# Patient Record
Sex: MALE | Race: WHITE | NOT HISPANIC OR LATINO | Employment: OTHER | URBAN - METROPOLITAN AREA
[De-identification: names, ages, dates, MRNs, and addresses within clinical notes are randomized per-mention and may not be internally consistent; named-entity substitution may affect disease eponyms.]

---

## 2017-01-06 ENCOUNTER — OFFICE VISIT (OUTPATIENT)
Dept: CARDIOLOGY | Facility: CLINIC | Age: 47
End: 2017-01-06
Payer: MEDICARE

## 2017-01-06 VITALS
BODY MASS INDEX: 32.74 KG/M2 | HEART RATE: 64 BPM | HEIGHT: 77 IN | SYSTOLIC BLOOD PRESSURE: 148 MMHG | DIASTOLIC BLOOD PRESSURE: 92 MMHG | WEIGHT: 277.31 LBS

## 2017-01-06 DIAGNOSIS — I34.0 NON-RHEUMATIC MITRAL REGURGITATION: ICD-10-CM

## 2017-01-06 DIAGNOSIS — I48.3 TYPICAL ATRIAL FLUTTER: ICD-10-CM

## 2017-01-06 DIAGNOSIS — I10 ESSENTIAL HYPERTENSION: ICD-10-CM

## 2017-01-06 DIAGNOSIS — R00.2 PALPITATIONS: ICD-10-CM

## 2017-01-06 DIAGNOSIS — R07.89 ATYPICAL CHEST PAIN: Primary | ICD-10-CM

## 2017-01-06 DIAGNOSIS — R61 DIAPHORESIS: ICD-10-CM

## 2017-01-06 DIAGNOSIS — Z72.0 TOBACCO ABUSE: ICD-10-CM

## 2017-01-06 PROCEDURE — 1159F MED LIST DOCD IN RCRD: CPT | Mod: S$GLB,,, | Performed by: INTERNAL MEDICINE

## 2017-01-06 PROCEDURE — 3080F DIAST BP >= 90 MM HG: CPT | Mod: S$GLB,,, | Performed by: INTERNAL MEDICINE

## 2017-01-06 PROCEDURE — 99214 OFFICE O/P EST MOD 30 MIN: CPT | Mod: S$GLB,,, | Performed by: INTERNAL MEDICINE

## 2017-01-06 PROCEDURE — 93000 ELECTROCARDIOGRAM COMPLETE: CPT | Mod: S$GLB,,, | Performed by: INTERNAL MEDICINE

## 2017-01-06 PROCEDURE — 99999 PR PBB SHADOW E&M-EST. PATIENT-LVL III: CPT | Mod: PBBFAC,,, | Performed by: INTERNAL MEDICINE

## 2017-01-06 PROCEDURE — 3077F SYST BP >= 140 MM HG: CPT | Mod: S$GLB,,, | Performed by: INTERNAL MEDICINE

## 2017-01-06 RX ORDER — METOPROLOL SUCCINATE 50 MG/1
50 TABLET, EXTENDED RELEASE ORAL DAILY
Qty: 30 TABLET | Refills: 11 | Status: SHIPPED | OUTPATIENT
Start: 2017-01-06 | End: 2017-03-27 | Stop reason: SDUPTHER

## 2017-01-06 NOTE — MR AVS SNAPSHOT
[facility]  [address] [facility] [address]  Phone: [phone]  Fax: [phone]                  Valerio Yan   2017 1:40 PM   Office Visit    Description:  Male : 1970   Provider:  Tomi Mir MD   Department:  [facility]           Reason for Visit     Hypertension     Atrial Flutter     Palpitations     Chest Pain           Diagnoses this Visit        Comments    Atypical chest pain    -  Primary     Tobacco abuse         Palpitations         Non-rheumatic mitral regurgitation         Typical atrial flutter         Diaphoresis         Essential hypertension                To Do List           Future Appointments        Provider Department Dept Phone    2017 11:40 AM [facility] [phone]      Goals (5 Years of Data)     None      Follow-Up and Disposition     Return in about 4 weeks (around 2/3/2017).       These Medications        Disp Refills Start End    metoprolol succinate (TOPROL-XL) 50 MG 24 hr tablet 30 tablet 11 2017    Take 1 tablet (50 mg total) by mouth once daily. - Oral    Pharmacy: [facility] [address] #: [phone]         [facility]     [facility] On Call Nurse Care Line -  Assistance  Registered nurses in the [facility] provide clinical advisement, health education, appointment booking, and other advisory services.  Call for this free service at [phone].             Medications           Message regarding Medications     Verify the changes and/or additions to your medication regime listed below are the same as discussed with your clinician today.  If any of these changes or additions are incorrect, please notify your healthcare provider.        START taking these NEW medications        Refills    metoprolol succinate (TOPROL-XL) 50 MG 24 hr tablet 11    Sig: Take 1 tablet (50 mg total) by  "mouth once daily.    Class: Normal    Route: Oral      STOP taking these medications     hydrocodone-acetaminophen 7.5-325mg (NORCO) 7.5-325 mg per tablet Take 1 tablet by mouth every 6 (six) hours as needed for Pain.    metoprolol tartrate (LOPRESSOR) 25 MG tablet Take 1 tablet (25 mg total) by mouth daily as needed.           Verify that the below list of medications is an accurate representation of the medications you are currently taking.  If none reported, the list may be blank. If incorrect, please contact your healthcare provider. Carry this list with you in case of emergency.           Current Medications     atorvastatin (LIPITOR) 20 MG tablet Take 1 tablet (20 mg total) by mouth once daily.    calcium-vitamin D 600 mg, 1,500mg,-200 unit 600 mg(1,500mg) -200 unit Tab Take 1 tablet by mouth once daily.     cyanocobalamin (VITAMIN B-12) 1,000 mcg/mL injection Inject 1 mL (1,000 mcg total) into the skin every 30 days. Please supply 3 mL syringes and 26 gauge 1/2 inch needles.    esomeprazole (NEXIUM PACKET) 40 mg GrPS ONE PACKET ONCE DAILY    lisinopril-hydrochlorothiazide (PRINZIDE,ZESTORETIC) 20-12.5 mg per tablet Take 1 tablet by mouth once daily.    multivitamin with minerals (MULTIPLE VITAMIN-MINERALS) tablet Take 1 tablet by mouth Daily.      metoprolol succinate (TOPROL-XL) 50 MG 24 hr tablet Take 1 tablet (50 mg total) by mouth once daily.           Clinical Reference Information           Vital Signs - Last Recorded  Most recent update: 1/6/2017  1:42 PM by Monica Talavera    BP Pulse Ht Wt BMI    (!) 148/92 (BP Location: Left arm, Patient Position: Sitting) 64 6' 5" (1.956 m) 125.8 kg (277 lb 5.4 oz) 32.89 kg/m2      Blood Pressure          Most Recent Value    Right Arm BP - Sitting  164/98    Left Arm BP - Sitting  148/92    BP  (!)  148/92      Allergies as of 1/6/2017     Latex    Reglan  [Metoclopramide Hcl]      Immunizations Administered on Date of Encounter - 1/6/2017     None      Orders " Placed During Today's Visit      Normal Orders This Visit    Ambulatory referral to Smoking Cessation Program     Future Labs/Procedures Expected by Expires    NM Myocardial Perfusion Spect Multi Pharmacologic  1/6/2017 1/6/2018    2D echo with color flow doppler  As directed 1/6/2018    NM Multi Pharm Stress Cardiac Component  As directed 1/6/2018      Smoking Cessation     If you would like to quit smoking:   You may be eligible for free services if you are a [place] resident and started smoking cigarettes before September 1, 1988.  Call the [employer]oking [employer]essation [employer] (SCT) toll free at [phone] or [phone].   Call 2-218-QUIT-NOW if you do not meet the above criteria.

## 2017-01-06 NOTE — PROGRESS NOTES
"Subjective:    Patient ID:  Valerio Yan is a 46 y.o. male who presents for evaluation of Hypertension; Atrial Flutter; Palpitations; and Chest Pain      HPI Mr. Yan presents for f/u.   His current medical conditions include HTN, PAFL s/p ablation May 2016, tobacco abuse.  + family h/o CAD (father MI in 50's, cabg; mother w MI).  Taken off xarelto since I last saw him after he saw Dr. Barone.  Has stable palpitations, improved after ablation and not similar to his a flutter.  HTN above goal, often > 140/80.  He increased his metoprolol to 50 mg nightly but it is tartrate.  Some atypical cp, few weeks, left sided, few hours, intermittent, better if he pushes on chest.  Also has had some sweats, intermittently, spontaneously.    Still smokes, but is interested in quitting.   ECG today shows sinus dyana 59 bpm, possible LAE.    Review of Systems   Constitution: Positive for diaphoresis.   HENT: Negative.    Eyes: Negative.    Cardiovascular: Positive for chest pain and palpitations.   Respiratory: Negative.    Endocrine: Negative.    Hematologic/Lymphatic: Negative.    Skin: Negative.    Musculoskeletal: Negative.    Gastrointestinal: Negative.    Genitourinary: Negative.    Neurological: Negative.    Psychiatric/Behavioral: Negative.    Allergic/Immunologic: Negative.        Visit Vitals    BP (!) 148/92 (BP Location: Left arm, Patient Position: Sitting)    Pulse 64    Ht 6' 5" (1.956 m)    Wt 125.8 kg (277 lb 5.4 oz)    BMI 32.89 kg/m2          Objective:    Physical Exam   Constitutional: He is oriented to person, place, and time. He appears well-developed and well-nourished.   HENT:   Head: Normocephalic.   Neck: Normal range of motion. Neck supple. Normal carotid pulses, no hepatojugular reflux and no JVD present. Carotid bruit is not present. No thyromegaly present.   Cardiovascular: Normal rate, regular rhythm, S1 normal and S2 normal.  PMI is not displaced.  Exam reveals no S3, no S4, no " distant heart sounds, no friction rub, no midsystolic click and no opening snap.    No murmur heard.  Pulses:       Radial pulses are 2+ on the right side, and 2+ on the left side.   Pulmonary/Chest: Effort normal and breath sounds normal. He has no wheezes. He has no rales.   Abdominal: Soft. Bowel sounds are normal. He exhibits no distension, no abdominal bruit, no ascites and no mass. There is no tenderness.   Musculoskeletal: He exhibits no edema.   Neurological: He is alert and oriented to person, place, and time.   Skin: Skin is warm.   Psychiatric: He has a normal mood and affect. His behavior is normal.   Nursing note and vitals reviewed.    I have reviewed all pertinent labs and cardiac studies.      Chemistry        Component Value Date/Time     05/23/2016 1059    K 4.2 05/23/2016 1059     05/23/2016 1059    CO2 23 05/23/2016 1059    BUN 11 05/23/2016 1059    CREATININE 0.7 05/23/2016 1059    GLU 98 05/23/2016 1059        Component Value Date/Time    CALCIUM 8.6 (L) 05/23/2016 1059    ALKPHOS 85 03/23/2016 0956    AST 21 03/23/2016 0956    ALT 23 03/23/2016 0956    BILITOT 0.5 03/23/2016 0956        Lab Results   Component Value Date    WBC 9.89 05/27/2016    HGB 13.3 (L) 05/27/2016    HCT 40.3 05/27/2016    MCV 82 05/27/2016     05/27/2016     Lab Results   Component Value Date    HGBA1C 5.8 05/23/2011     Lab Results   Component Value Date    CHOL 190 09/30/2014    CHOL 157 06/14/2013    CHOL 170 09/12/2012     Lab Results   Component Value Date    HDL 50 09/30/2014    HDL 47 06/14/2013    HDL 41 09/12/2012     Lab Results   Component Value Date    LDLCALC 120.2 09/30/2014    LDLCALC 97.0 06/14/2013    LDLCALC 110.0 09/12/2012     Lab Results   Component Value Date    TRIG 99 09/30/2014    TRIG 63 06/14/2013    TRIG 96 09/12/2012     Lab Results   Component Value Date    CHOLHDL 26.3 09/30/2014    CHOLHDL 29.9 06/14/2013    CHOLHDL 24.1 09/12/2012           Assessment:       1.  Atypical chest pain    2. Tobacco abuse    3. Palpitations    4. Non-rheumatic mitral regurgitation    5. Typical atrial flutter    6. Diaphoresis    7. Essential hypertension         Plan:             ATYPICAL CP.  LIKELY MSK IN ETIOLOGY.  MULTIPLE RISK FACTORS FOR CAD.  WILL R/O ISCHEMIA WITH STRESS TESTING.   STRESS MPI  ECHOCARDIOGRAM  CHANGE METOPROLOL TARTRATE TO TOPROL XL 50 MG DAILY WHICH WILL HELP HIS HTN CONTROL.  S/P PAFL ABLATION, SEEMS TO BE HOLDING UP.  ENROLL IN TOBACCO CESSATION CLINIC.  CONTINUE OTHER MEDS.   F/U AFTER WITH ME OR MID LEVEL AFTER STRESS TEST TO ASSESS HTN CONTROL.

## 2017-01-10 ENCOUNTER — TELEPHONE (OUTPATIENT)
Dept: INTERNAL MEDICINE | Facility: CLINIC | Age: 47
End: 2017-01-10

## 2017-01-10 NOTE — TELEPHONE ENCOUNTER
Recv'd fax from [employer]. Pt does not want to do PT, he does not think is it helping him.

## 2017-01-12 ENCOUNTER — HOSPITAL ENCOUNTER (OUTPATIENT)
Dept: RADIOLOGY | Facility: HOSPITAL | Age: 47
Discharge: HOME | End: 2017-01-12
Attending: INTERNAL MEDICINE
Payer: MEDICARE

## 2017-01-12 ENCOUNTER — CLINICAL SUPPORT (OUTPATIENT)
Dept: CARDIOLOGY | Facility: CLINIC | Age: 47
End: 2017-01-12
Payer: MEDICARE

## 2017-01-12 DIAGNOSIS — R00.2 PALPITATIONS: ICD-10-CM

## 2017-01-12 DIAGNOSIS — I34.0 NON-RHEUMATIC MITRAL REGURGITATION: ICD-10-CM

## 2017-01-12 DIAGNOSIS — I48.3 TYPICAL ATRIAL FLUTTER: ICD-10-CM

## 2017-01-12 DIAGNOSIS — R07.89 ATYPICAL CHEST PAIN: ICD-10-CM

## 2017-01-12 DIAGNOSIS — R61 DIAPHORESIS: ICD-10-CM

## 2017-01-12 PROCEDURE — 78452 HT MUSCLE IMAGE SPECT MULT: CPT | Mod: 26,,, | Performed by: INTERNAL MEDICINE

## 2017-01-12 PROCEDURE — 93015 CV STRESS TEST SUPVJ I&R: CPT | Mod: S$GLB,,, | Performed by: INTERNAL MEDICINE

## 2017-01-12 PROCEDURE — 93306 TTE W/DOPPLER COMPLETE: CPT | Mod: S$GLB,,, | Performed by: INTERNAL MEDICINE

## 2017-01-13 ENCOUNTER — PATIENT MESSAGE (OUTPATIENT)
Dept: CARDIOLOGY | Facility: CLINIC | Age: 47
End: 2017-01-13

## 2017-01-13 ENCOUNTER — TELEPHONE (OUTPATIENT)
Dept: CARDIOLOGY | Facility: CLINIC | Age: 47
End: 2017-01-13

## 2017-01-13 LAB
DIASTOLIC DYSFUNCTION: NO
DIASTOLIC DYSFUNCTION: NO
ESTIMATED PA SYSTOLIC PRESSURE: 28.09
RETIRED EF AND QEF - SEE NOTES: 60 (ref 55–65)

## 2017-01-13 NOTE — TELEPHONE ENCOUNTER
Please call pt  He passed his stress test  No blockages noted  Echo shows normal heart function    F/u next appt    Dr Mir

## 2017-01-16 ENCOUNTER — CLINICAL SUPPORT (OUTPATIENT)
Dept: SMOKING CESSATION | Facility: CLINIC | Age: 47
End: 2017-01-16
Payer: COMMERCIAL

## 2017-01-16 DIAGNOSIS — F17.200 NICOTINE DEPENDENCE: Primary | ICD-10-CM

## 2017-01-16 PROCEDURE — 99404 PREV MED CNSL INDIV APPRX 60: CPT | Mod: S$GLB,,, | Performed by: GENERAL PRACTICE

## 2017-01-16 RX ORDER — VARENICLINE TARTRATE 0.5 (11)-1
KIT ORAL
Qty: 1 PACKAGE | Refills: 0 | Status: SHIPPED | OUTPATIENT
Start: 2017-01-16 | End: 2017-02-09 | Stop reason: ALTCHOICE

## 2017-01-30 ENCOUNTER — CLINICAL SUPPORT (OUTPATIENT)
Dept: SMOKING CESSATION | Facility: CLINIC | Age: 47
End: 2017-01-30
Payer: COMMERCIAL

## 2017-01-30 DIAGNOSIS — F17.200 NICOTINE DEPENDENCE: Primary | ICD-10-CM

## 2017-01-30 PROCEDURE — 99404 PREV MED CNSL INDIV APPRX 60: CPT | Mod: S$GLB,,, | Performed by: GENERAL PRACTICE

## 2017-01-30 NOTE — PROGRESS NOTES
Individual Follow-Up Form    1/30/2017    Clinical Status of Patient: Outpatient    Length of Service: 60 minutes    Continuing Medication: yes  Chantix    Other Medications:      Target Symptoms: Withdrawal and medication side effects. The following were  rated moderate (3) to severe (4) on TCRS:  · Moderate (3): desire tobacco, vivid dreams  · Severe (4): none    Comments: Patient states that he is smoking the same amount each day. He is on Day 8 of Chantix and has noticed a change in the taste of his cigarettes. We discussed an aggressive quit plan that he feels he will be able to achieve. His CO measurement was 33. Will continue to monitor and encourage progress.    Diagnosis: F17.200    Next Visit: 1 week

## 2017-02-06 ENCOUNTER — CLINICAL SUPPORT (OUTPATIENT)
Dept: SMOKING CESSATION | Facility: CLINIC | Age: 47
End: 2017-02-06
Payer: COMMERCIAL

## 2017-02-06 DIAGNOSIS — F17.200 NICOTINE DEPENDENCE: Primary | ICD-10-CM

## 2017-02-06 PROCEDURE — 99407 BEHAV CHNG SMOKING > 10 MIN: CPT | Mod: S$GLB,,, | Performed by: GENERAL PRACTICE

## 2017-02-09 DIAGNOSIS — F17.200 NICOTINE DEPENDENCE: Primary | ICD-10-CM

## 2017-02-09 RX ORDER — VARENICLINE TARTRATE 1 MG/1
1 TABLET, FILM COATED ORAL 2 TIMES DAILY
Qty: 60 TABLET | Refills: 0 | Status: SHIPPED | OUTPATIENT
Start: 2017-02-09 | End: 2017-03-07 | Stop reason: ALTCHOICE

## 2017-02-10 ENCOUNTER — OFFICE VISIT (OUTPATIENT)
Dept: CARDIOLOGY | Facility: CLINIC | Age: 47
End: 2017-02-10
Payer: MEDICARE

## 2017-02-10 VITALS
SYSTOLIC BLOOD PRESSURE: 150 MMHG | HEART RATE: 54 BPM | HEIGHT: 77 IN | BODY MASS INDEX: 33.1 KG/M2 | DIASTOLIC BLOOD PRESSURE: 82 MMHG | WEIGHT: 280.31 LBS

## 2017-02-10 DIAGNOSIS — I34.0 NON-RHEUMATIC MITRAL REGURGITATION: ICD-10-CM

## 2017-02-10 DIAGNOSIS — Z72.0 TOBACCO ABUSE: ICD-10-CM

## 2017-02-10 DIAGNOSIS — R00.2 PALPITATIONS: ICD-10-CM

## 2017-02-10 DIAGNOSIS — R07.89 ATYPICAL CHEST PAIN: ICD-10-CM

## 2017-02-10 DIAGNOSIS — I10 ESSENTIAL HYPERTENSION: Primary | ICD-10-CM

## 2017-02-10 DIAGNOSIS — E78.2 MIXED HYPERLIPIDEMIA: ICD-10-CM

## 2017-02-10 DIAGNOSIS — I48.3 TYPICAL ATRIAL FLUTTER: ICD-10-CM

## 2017-02-10 DIAGNOSIS — I11.9 LVH (LEFT VENTRICULAR HYPERTROPHY) DUE TO HYPERTENSIVE DISEASE, WITHOUT HEART FAILURE: ICD-10-CM

## 2017-02-10 PROCEDURE — 99214 OFFICE O/P EST MOD 30 MIN: CPT | Mod: S$GLB,,, | Performed by: INTERNAL MEDICINE

## 2017-02-10 PROCEDURE — 99999 PR PBB SHADOW E&M-EST. PATIENT-LVL III: CPT | Mod: PBBFAC,,, | Performed by: INTERNAL MEDICINE

## 2017-02-10 PROCEDURE — 3079F DIAST BP 80-89 MM HG: CPT | Mod: S$GLB,,, | Performed by: INTERNAL MEDICINE

## 2017-02-10 PROCEDURE — 3077F SYST BP >= 140 MM HG: CPT | Mod: S$GLB,,, | Performed by: INTERNAL MEDICINE

## 2017-02-10 RX ORDER — LISINOPRIL AND HYDROCHLOROTHIAZIDE 12.5; 2 MG/1; MG/1
2 TABLET ORAL DAILY
Qty: 90 TABLET | Refills: 3
Start: 2017-02-10 | End: 2017-03-27 | Stop reason: SDUPTHER

## 2017-02-10 NOTE — PROGRESS NOTES
Subjective:    Patient ID:  Valerio Yan is a 46 y.o. male who presents for evaluation of Test Results,  Hypertension; Palpitations; Atrial Flutter; and Hyperlipidemia      HPI Mr. Yan presents for f/u.   His current medical conditions include HTN, PAFL s/p ablation May 2016, tobacco abuse.  + family h/o CAD (father MI in 50's, cabg; mother w MI).  Pt taken off xarelto after his PAFL ablation.  He was seen last month.  Had some atypical cp sxs.  Chronic intermittent palpitations.  BP was running high.  toprol substituted for his metoprolol tartrate.  Stress test done and shows no ischemia.  Echo shows normal LV function, LVH.  He states he quit smoking since last visit. On chantix now.  No bothersome cp sxs right now and palpitations stable, infrequent.  BP still high.      Patient Active Problem List   Diagnosis    Epigastric pain    Cerebral palsy    Polyneuropathy    Quadriplegia    Guillain-Crosby syndrome    Ozuna esophagus    Peptic ulcer disease    Ozuna's esophagus    Typical atrial flutter    Ozuna's esophagus without dysplasia    Mitral regurgitation    Palpitations    Tobacco abuse    Atypical chest pain    Diaphoresis    Hypertension    LVH (left ventricular hypertrophy) due to hypertensive disease    Mixed hyperlipidemia     Past Medical History   Diagnosis Date    Atrial flutter     Decubitus skin ulcer     Guillain-Crosby     Guillain-Crosby syndrome 7/30/2013    Hyperlipidemia     Hypertension     Joint pain      knees    LVH (left ventricular hypertrophy) due to hypertensive disease 2/10/2017    Mitral regurgitation 6/9/2016       Current Outpatient Prescriptions:     atorvastatin (LIPITOR) 20 MG tablet, Take 1 tablet (20 mg total) by mouth once daily., Disp: 90 tablet, Rfl: 3    calcium-vitamin D 600 mg, 1,500mg,-200 unit 600 mg(1,500mg) -200 unit Tab, Take 1 tablet by mouth once daily. , Disp: , Rfl:     cyanocobalamin (VITAMIN B-12) 1,000 mcg/mL  "injection, Inject 1 mL (1,000 mcg total) into the skin every 30 days. Please supply 3 mL syringes and 26 gauge 1/2 inch needles., Disp: 10 mL, Rfl: 1    esomeprazole (NEXIUM PACKET) 40 mg GrPS, ONE PACKET ONCE DAILY, Disp: 30 each, Rfl: 11    lisinopril-hydrochlorothiazide (PRINZIDE,ZESTORETIC) 20-12.5 mg per tablet, Take 2 tablets by mouth once daily., Disp: 90 tablet, Rfl: 3    metoprolol succinate (TOPROL-XL) 50 MG 24 hr tablet, Take 1 tablet (50 mg total) by mouth once daily., Disp: 30 tablet, Rfl: 11    multivitamin with minerals (MULTIPLE VITAMIN-MINERALS) tablet, Take 1 tablet by mouth Daily.  , Disp: , Rfl:     varenicline (CHANTIX) 1 mg Tab, Take 1 tablet (1 mg total) by mouth 2 (two) times daily., Disp: 60 tablet, Rfl: 0      Review of Systems   Constitution: Negative.   HENT: Negative.    Eyes: Negative.    Cardiovascular: Positive for chest pain and palpitations.   Respiratory: Negative.    Endocrine: Negative.    Hematologic/Lymphatic: Negative.    Skin: Negative.    Musculoskeletal: Negative.    Gastrointestinal: Negative.    Genitourinary: Negative.    Neurological: Negative.    Psychiatric/Behavioral: Negative.    Allergic/Immunologic: Negative.        Visit Vitals    BP (!) 150/82 (BP Location: Left arm, Patient Position: Sitting, BP Method: Manual)    Pulse (!) 54  Comment: radial    Ht 6' 5" (1.956 m)    Wt 127.2 kg (280 lb 5 oz)    BMI 33.24 kg/m2       Wt Readings from Last 3 Encounters:   02/10/17 127.2 kg (280 lb 5 oz)   01/06/17 125.8 kg (277 lb 5.4 oz)   12/07/16 125.7 kg (277 lb 1.9 oz)     Temp Readings from Last 3 Encounters:   12/07/16 98.3 °F (36.8 °C) (Tympanic)   11/14/16 98.2 °F (36.8 °C) (Tympanic)   05/28/16 98.3 °F (36.8 °C) (Oral)     BP Readings from Last 3 Encounters:   02/10/17 (!) 150/82   01/06/17 (!) 148/92   12/07/16 (!) 152/88     Pulse Readings from Last 3 Encounters:   02/10/17 (!) 54   01/06/17 64   12/07/16 (!) 49          Objective:    Physical Exam "   Constitutional: He is oriented to person, place, and time. He appears well-developed and well-nourished.   HENT:   Head: Normocephalic.   Neck: Normal range of motion. Neck supple. Normal carotid pulses, no hepatojugular reflux and no JVD present. Carotid bruit is not present. No thyromegaly present.   Cardiovascular: Normal rate, regular rhythm, S1 normal and S2 normal.  PMI is not displaced.  Exam reveals no S3, no S4, no distant heart sounds, no friction rub, no midsystolic click and no opening snap.    No murmur heard.  Pulses:       Radial pulses are 2+ on the right side, and 2+ on the left side.   Pulmonary/Chest: Effort normal and breath sounds normal. He has no wheezes. He has no rales.   Abdominal: Soft. Bowel sounds are normal. He exhibits no distension, no abdominal bruit, no ascites and no mass. There is no tenderness.   Musculoskeletal: He exhibits no edema.   Neurological: He is alert and oriented to person, place, and time.   Skin: Skin is warm.   Psychiatric: He has a normal mood and affect. His behavior is normal.   Nursing note and vitals reviewed.      I have reviewed all pertinent labs and cardiac studies.      Chemistry        Component Value Date/Time     05/23/2016 1059    K 4.2 05/23/2016 1059     05/23/2016 1059    CO2 23 05/23/2016 1059    BUN 11 05/23/2016 1059    CREATININE 0.7 05/23/2016 1059    GLU 98 05/23/2016 1059        Component Value Date/Time    CALCIUM 8.6 (L) 05/23/2016 1059    ALKPHOS 85 03/23/2016 0956    AST 21 03/23/2016 0956    ALT 23 03/23/2016 0956    BILITOT 0.5 03/23/2016 0956        Lab Results   Component Value Date    WBC 9.89 05/27/2016    HGB 13.3 (L) 05/27/2016    HCT 40.3 05/27/2016    MCV 82 05/27/2016     05/27/2016     Lab Results   Component Value Date    HGBA1C 5.8 05/23/2011     Lab Results   Component Value Date    CHOL 190 09/30/2014    CHOL 157 06/14/2013    CHOL 170 09/12/2012     Lab Results   Component Value Date    HDL 50  09/30/2014    HDL 47 06/14/2013    HDL 41 09/12/2012     Lab Results   Component Value Date    LDLCALC 120.2 09/30/2014    LDLCALC 97.0 06/14/2013    LDLCALC 110.0 09/12/2012     Lab Results   Component Value Date    TRIG 99 09/30/2014    TRIG 63 06/14/2013    TRIG 96 09/12/2012     Lab Results   Component Value Date    CHOLHDL 26.3 09/30/2014    CHOLHDL 29.9 06/14/2013    CHOLHDL 24.1 09/12/2012     Narrative   Date of Procedure: 01/12/2017        TEST DESCRIPTION   Technical Quality: This is a technically challenging study.     Aorta: The aortic root is normal in size, measuring 2.5 cm at sinotubular junction and 3.2 cm at Sinuses of Valsalva. The proximal ascending aorta is normal in size, measuring 3.7 cm across.     Left Atrium: The left atrial volume index is mildly enlarged, measuring 36.69 cc/m2.     Left Ventricle: The left ventricle is normal in size, with an end-diastolic diameter of 4.9 cm, and an end-systolic diameter of 3.7 cm. LV wall thickness is normal, with the septum measuring 1.9 cm and the posterior wall measuring 1.3 cm across. Relative   wall thickness was increased at 0.53, and the LV mass index was increased at 179.6 g/m2 consistent with concentric left ventricular hypertrophy. Global left ventricular systolic function appears normal. Visually estimated ejection fraction is 60-65%.   The LV Doppler derived stroke volume equals 80.0 ccs.   The E/e'(lat) is 5, consistent with normal diastolic function.     Right Atrium: The right atrium is normal in size, measuring 5.4 cm in length and 3.8 cm in width in the apical view.     Right Ventricle: The right ventricle is normal in size measuring 3.3 cm at the base in the apical right ventricle-focused view. Global right ventricular systolic function appears normal. Tricuspid annular plane systolic excursion (TAPSE) is 2.0 cm. The   estimated PA systolic pressure is greater than 28 mmHg.     Aortic Valve:  Aortic valve is normal in structure with  normal leaflet mobility.     Mitral Valve:  Mitral valve is normal in structure with normal leaflet mobility.     Tricuspid Valve:  Tricuspid valve is normal in structure with normal leaflet mobility.     Pulmonary Valve:  Pulmonary valve is normal in structure with normal leaflet mobility.     Intracavitary: There is no evidence of pericardial effusion, intracavity mass, thrombi, or vegetation.         CONCLUSIONS     1 - Mild left atrial enlargement.     2 - Concentric hypertrophy.     3 - Normal left ventricular systolic function (EF 60-65%).     4 - Normal left ventricular diastolic function.     5 - Normal right ventricular systolic function .     6 - The estimated PA systolic pressure is greater than 28 mmHg.             This document has been electronically    SIGNED BY: Dash Cannon MD On: 01/13/2017 10:25         EKG Conclusions:    1. The EKG portion of this study is negative for ischemia at a peak heart rate of 85 bpm (49% of predicted).   2. Blood pressure remained stable throughout the protocol  (Presenting BP: 150/103 Peak BP: 138/86).   3. No significant arrhythmias were present.   4. There were no symptoms of chest discomfort or significant dyspnea throughout the protocol.     Nuclear Procedure:  Following a single isotope protocol, 10.2 mCi of Tc99 labeled Sestamibi was given at rest and tomographic imaging was performed. Regadenoson pharmacologic stress testing was performed as described above. Immediately following the IV bolus of regadenoson,   33 mCi of Tc99 labeled Sestamibi was given and tomographic imaging was performed. The site of the IV injection was the left hand. Images were obtained on a ADTZ camera.     Comments:  This is a technically adequate study. Inspection of the transaxial images demonstrated no significant cranial, caudal, or lateral patient motion in the camera between rest and stress acquisitions. There is homogeneous uptake of radiotracer in all walls   of the  myocardium on stress and rest images. The extracardiac distribution of radioactivity is normal. The left ventricular cavity is normal in size and does not increase with stress. On gated SPECT, left ventricular motion is normal at rest. There is   evidence of right ventricular hypertrophy.     Nuclear Quantitative Functional Analysis:   LVEF: 54 %    Impression: NORMAL MYOCARDIAL PERFUSION  1. The perfusion scan is free of evidence for myocardial ischemia or injury.   2. Resting wall motion is physiologic.   3. Resting LV function is normal.   4. The ventricular volumes are normal at rest and stress.   5. The extracardiac distribution of radioactivity is normal.   6. There is evidence of right ventricular hypertrophy.           This document has been electronically    SIGNED BY: Dahs Cannon MD On: 01/13/2017 09:07  Assessment:       1. Essential hypertension    2. Tobacco abuse    3. Palpitations    4. Non-rheumatic mitral regurgitation    5. Typical atrial flutter    6. LVH (left ventricular hypertrophy) due to hypertensive disease, without heart failure    7. Atypical chest pain    8. Mixed hyperlipidemia         Plan:             INCREASE LISINOPRIL/HCTZ 20/12.5 MG TO 2 PILLS DAILY FOR MORE OPTIMAL HTN CONTROL.  CONTINUE OTHER CV MEDS.  STABLE PALPITATIONS, CONTINUE BETA-BLOCKER TX.  ATYPICAL CP, RESOLVED.  NO ISCHEMIA ON STRESS TEST.  MONITOR.  LVH -- NEEDS OPTIMAL HTN CONTROL.  ALL TEST RESULTS REVIEWED IN DETAIL WITH PT.   CONTINUE TO REFRAIN FROM SMOKING, CHANTIX OK.  EXERCISE  CARDIAC DIET  F/U 3 MONTHS WITH LIPIDS.

## 2017-02-10 NOTE — MR AVS SNAPSHOT
[facility]  [address] [address]  Phone: [phone]  Fax: [phone]                  Valerio Yan   2/10/2017 11:00 AM   Office Visit    Description:  Male : 1970   Provider:  Tomi Mir MD   Department:  [facility]           Reason for Visit     Hypertension     Palpitations     Atrial Flutter           Diagnoses this Visit        Comments    Essential hypertension    -  Primary     Tobacco abuse         Palpitations         Non-rheumatic mitral regurgitation         Typical atrial flutter         LVH (left ventricular hypertrophy) due to hypertensive disease, without heart failure         Atypical chest pain         Mixed hyperlipidemia                To Do List           Future Appointments        Provider Department Dept Phone    2017 10:40 AM Sam Barone MD [facility] Arrhythmia [phone]      Goals (5 Years of Data)     None      Follow-Up and Disposition     Return in about 3 months (around 5/10/2017).       These Medications        Disp Refills Start End    lisinopril-hydrochlorothiazide (PRINZIDE,ZESTORETIC) 20-12.5 mg per tablet 90 tablet 3 2/10/2017 2/10/2018    Take 2 tablets by mouth once daily. - Oral    Pharmacy: [facility] [id number] [facility] #: [phone]         [facility]     [facility] On Call Nurse Care Line -  Assistance  Registered nurses in the [facility] provide clinical advisement, health education, appointment booking, and other advisory services.  Call for this free service at [phone].             Medications           Message regarding Medications     Verify the changes and/or additions to your medication regime listed below are the same as discussed with your clinician today.  If any of these changes or additions are incorrect, please notify your healthcare provider.        CHANGE how you are taking these medications     Start  "Taking Instead of    lisinopril-hydrochlorothiazide (PRINZIDE,ZESTORETIC) 20-12.5 mg per tablet lisinopril-hydrochlorothiazide (PRINZIDE,ZESTORETIC) 20-12.5 mg per tablet    Dosage:  Take 2 tablets by mouth once daily. Dosage:  Take 1 tablet by mouth once daily.    Reason for Change:  Reorder            Verify that the below list of medications is an accurate representation of the medications you are currently taking.  If none reported, the list may be blank. If incorrect, please contact your healthcare provider. Carry this list with you in case of emergency.           Current Medications     atorvastatin (LIPITOR) 20 MG tablet Take 1 tablet (20 mg total) by mouth once daily.    calcium-vitamin D 600 mg, 1,500mg,-200 unit 600 mg(1,500mg) -200 unit Tab Take 1 tablet by mouth once daily.     cyanocobalamin (VITAMIN B-12) 1,000 mcg/mL injection Inject 1 mL (1,000 mcg total) into the skin every 30 days. Please supply 3 mL syringes and 26 gauge 1/2 inch needles.    esomeprazole (NEXIUM PACKET) 40 mg GrPS ONE PACKET ONCE DAILY    lisinopril-hydrochlorothiazide (PRINZIDE,ZESTORETIC) 20-12.5 mg per tablet Take 2 tablets by mouth once daily.    metoprolol succinate (TOPROL-XL) 50 MG 24 hr tablet Take 1 tablet (50 mg total) by mouth once daily.    multivitamin with minerals (MULTIPLE VITAMIN-MINERALS) tablet Take 1 tablet by mouth Daily.      varenicline (CHANTIX) 1 mg Tab Take 1 tablet (1 mg total) by mouth 2 (two) times daily.           Clinical Reference Information           Your Vitals Were     BP Pulse Height Weight BMI    150/82 (BP Location: Left arm, Patient Position: Sitting, BP Method: Manual) 54 6' 5" (1.956 m) 127.2 kg (280 lb 5 oz) 33.24 kg/m2      Blood Pressure          Most Recent Value    BP  (!)  150/82      Allergies as of 2/10/2017     Latex    Reglan  [Metoclopramide Hcl]      Immunizations Administered on Date of Encounter - 2/10/2017     None      Orders Placed During Today's Visit     Future " Labs/Procedures Expected by Expires    Comprehensive metabolic panel  5/10/2017 (Approximate) 4/11/2018    Lipid panel  5/10/2017 4/11/2018      Smoking Cessation     If you would like to quit smoking:   You may be eligible for free services if you are a [place] resident and started smoking cigarettes before September 1, 1988.  Call the [employer]oking [employer]essation [employer] (SCT) toll free at (047) 463-3265 or (405) 736-9596.   Call 1-800-QUIT-NOW if you do not meet the above criteria.            Language Assistance Services     ATTENTION: Language assistance services are available, free of charge. Please call [phone].      ATENCIÓN: Si habla español, tiene a pond disposición servicios gratuitos de asistencia lingüística. Llame al [phone].     CHÚ Ý: N?u b?n nói Ti?ng Vi?t, có các d?ch v? h? tr? ngôn ng? mi?n phí dành cho b?n. G?i s? [phone].         [facility] complies with applicable Federal civil rights laws and does not discriminate on the basis of race, color, national origin, age, disability, or sex.

## 2017-02-14 ENCOUNTER — CLINICAL SUPPORT (OUTPATIENT)
Dept: SMOKING CESSATION | Facility: CLINIC | Age: 47
End: 2017-02-14
Payer: COMMERCIAL

## 2017-02-14 DIAGNOSIS — F17.200 NICOTINE DEPENDENCE: Primary | ICD-10-CM

## 2017-02-14 PROCEDURE — 99403 PREV MED CNSL INDIV APPRX 45: CPT | Mod: S$GLB,,, | Performed by: GENERAL PRACTICE

## 2017-02-14 NOTE — PROGRESS NOTES
Individual Follow-Up Form    2/14/2017    Quit Date: 2-5-2017  Clinical Status of Patient: Outpatient    Length of Service: 45 minutes    Continuing Medication: yes  Chantix    Other Medications: none     Target Symptoms: Withdrawal and medication side effects. The following were  rated moderate (3) to severe (4) on TCRS:  · Moderate (3): none  · Severe (4): none    Comments: Patient states that he has successfully quit smoking. He has been monitoring his blood pressure due to history of hypertension. He states that he needs to schedule a EGD for follow up. He denies any negative thoughts or behavior at this time. Will continue to encourage and monitor progress.    Diagnosis: F17.200    Next Visit: 2 weeks

## 2017-02-16 ENCOUNTER — OFFICE VISIT (OUTPATIENT)
Dept: CARDIOLOGY | Facility: CLINIC | Age: 47
End: 2017-02-16
Payer: MEDICARE

## 2017-02-16 VITALS
HEIGHT: 77 IN | DIASTOLIC BLOOD PRESSURE: 74 MMHG | WEIGHT: 280.19 LBS | SYSTOLIC BLOOD PRESSURE: 130 MMHG | HEART RATE: 52 BPM | BODY MASS INDEX: 33.08 KG/M2

## 2017-02-16 DIAGNOSIS — I48.3 TYPICAL ATRIAL FLUTTER: Primary | ICD-10-CM

## 2017-02-16 DIAGNOSIS — I10 ESSENTIAL HYPERTENSION: ICD-10-CM

## 2017-02-16 PROCEDURE — 99214 OFFICE O/P EST MOD 30 MIN: CPT | Mod: S$GLB,,, | Performed by: INTERNAL MEDICINE

## 2017-02-16 PROCEDURE — 3078F DIAST BP <80 MM HG: CPT | Mod: S$GLB,,, | Performed by: INTERNAL MEDICINE

## 2017-02-16 PROCEDURE — 3075F SYST BP GE 130 - 139MM HG: CPT | Mod: S$GLB,,, | Performed by: INTERNAL MEDICINE

## 2017-02-16 PROCEDURE — 99999 PR PBB SHADOW E&M-EST. PATIENT-LVL III: CPT | Mod: PBBFAC,,, | Performed by: INTERNAL MEDICINE

## 2017-02-16 RX ORDER — HYDROGEN PEROXIDE 3 %
20 SOLUTION, NON-ORAL MISCELLANEOUS
Status: ON HOLD | COMMUNITY
End: 2017-03-21 | Stop reason: CLARIF

## 2017-02-16 NOTE — PROGRESS NOTES
Subjective:    Patient ID:  Valerio Yan is a 46 y.o. male who presents for follow-up of Atrial Flutter      HPI Comments: 46 yoM referred for AFL management. 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 He was discharged on metoprolol and aspirin. His echo was reportedly normal. He then presented for EGD 3/23/16 at [facility] and was still in AFL, rate controlled. He underwent EGD with biopsy and was started on xarelto afterwards. He has had pulse rates over 150 bpm. He feels palpitations, dyspnea and rare chest pressure. He has no history of HTN, DM, CHF, PVD, TIA/CVA. He has residual distal extremity weakness due to [place] disease. He has had no issues with xarelto. No known arrhythmia history.     7/18/16: He underwent successful AFL ablation 5/27/16 without complications. He has experienced some rapid palpitations, some with cold ingestion. He has used metoprolol on occasion. Xarelto stopped.     Interval history: Recent normal echo and stress tests by Dr Mir for atypical chest pain. No recurrence by symptoms.     Echo 1/17:  CONCLUSIONS     1 - Mild left atrial enlargement.     2 - Concentric hypertrophy.     3 - Normal left ventricular systolic function (EF 60-65%).     4 - Normal left ventricular diastolic function.     5 - Normal right ventricular systolic function .     6 - The estimated PA systolic pressure is greater than 28 mmHg.     NM Stress 1/17:    Nuclear Quantitative Functional Analysis:   LVEF: 54 %    Impression: NORMAL MYOCARDIAL PERFUSION  1. The perfusion scan is free of evidence for myocardial ischemia or injury.   2. Resting wall motion is physiologic.   3. Resting LV function is normal.   4. The ventricular volumes are normal at rest and stress.   5. The extracardiac distribution of radioactivity is normal.   6. There is evidence of right ventricular hypertrophy.     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     Hyperlipidemia                                                Hypertension                                                  Joint pain                                                      Comment:knees    LVH (left ventricular hypertrophy) due to hype* 2/10/2017     Mitral regurgitation                            6/9/2016      Past Surgical History:    JUAN-EN-Y PROCEDURE                                            TRACHEAL SURGERY                                               GASTROSTOMY TUBE PLACEMENT                                     PEG TUBE REMOVAL                                               decubitus surgery                                                Comment:to sacral    barrette esophagus                                             ESOPHAGOGASTRODUODENOSCOPY                                     KNEE ARTHROSCOPY                                 2002          RADIOFREQUENCY ABLATION                                        Social History    Marital status:              Spouse name:                       Years of education:                 Number of children:               Occupational History    None on file    Social History Main Topics    Smoking status: Former Smoker                                                                Packs/day: 0.75      Years: 25.00          Types: Cigarettes       Start date: 4/4/1988       Quit date: 2/5/2017    Smokeless status: Former User                          Types: Snuff       Quit date: 1/6/1999    Alcohol use: Yes           8.4 oz/week       14 Glasses of wine per week    Drug use: No              Sexual activity: Yes               Partners with: Female    Other Topics            Concern    None on file    Social History Narrative    None on file    Review of patient's  family history indicates:    Heart attack                   Mother                    Heart disease                  Mother                    Heart disease                  Father                    Heart attack                   Father                        Review of Systems   Constitution: Negative for weakness and malaise/fatigue.   HENT: Negative.    Eyes: Negative.    Cardiovascular: Negative for chest pain, dyspnea on exertion, irregular heartbeat, near-syncope, palpitations and syncope.   Respiratory: Negative.    Endocrine: Negative.    Hematologic/Lymphatic: Negative.    Skin: Negative.    Musculoskeletal: Negative.    Gastrointestinal: Negative.    Genitourinary: Negative.    Neurological: Positive for paresthesias.   Psychiatric/Behavioral: Negative.    Allergic/Immunologic: Negative.         Objective:    Physical Exam   Constitutional: He is oriented to person, place, and time. He appears well-developed and well-nourished. No distress.   HENT:   Head: Normocephalic and atraumatic.   Mouth/Throat: No oropharyngeal exudate.   Eyes: Conjunctivae and EOM are normal. Pupils are equal, round, and reactive to light. Right eye exhibits no discharge. Left eye exhibits no discharge.   Neck: Normal range of motion. Neck supple. No JVD present. No thyromegaly present.   Cardiovascular: Normal rate and regular rhythm.    No murmur heard.  Pulmonary/Chest: Effort normal and breath sounds normal. No respiratory distress. He has no wheezes.   Abdominal: Soft. Bowel sounds are normal. He exhibits no distension. There is no tenderness.   Musculoskeletal: He exhibits no edema.   Neurological: He is alert and oriented to person, place, and time. No cranial nerve deficit.   Bilateral upper extremity weakness   Skin: Skin is warm and dry. No rash noted. He is not diaphoretic. No erythema.   Psychiatric: He has a normal mood and affect. His behavior is normal. Judgment and thought content normal.   Vitals reviewed.         Assessment:       1. Typical atrial flutter    2. Essential hypertension         Plan:       46 yoM AFL s/p RFA here for long term visit. No clinical or documented recurrence. On metoprolol and aspirin. No changes to medical therapy. RTC 1y or as needed.

## 2017-03-07 ENCOUNTER — OFFICE VISIT (OUTPATIENT)
Dept: GASTROENTEROLOGY | Facility: CLINIC | Age: 47
End: 2017-03-07
Payer: MEDICARE

## 2017-03-07 ENCOUNTER — CLINICAL SUPPORT (OUTPATIENT)
Dept: SMOKING CESSATION | Facility: CLINIC | Age: 47
End: 2017-03-07
Payer: COMMERCIAL

## 2017-03-07 VITALS
HEIGHT: 77 IN | WEIGHT: 284.63 LBS | SYSTOLIC BLOOD PRESSURE: 128 MMHG | BODY MASS INDEX: 33.61 KG/M2 | DIASTOLIC BLOOD PRESSURE: 74 MMHG | HEART RATE: 58 BPM

## 2017-03-07 DIAGNOSIS — F17.200 NICOTINE DEPENDENCE: Primary | ICD-10-CM

## 2017-03-07 DIAGNOSIS — R16.0 HEPATOMEGALY: ICD-10-CM

## 2017-03-07 DIAGNOSIS — G61.0 GUILLAIN BARRÉ SYNDROME: ICD-10-CM

## 2017-03-07 DIAGNOSIS — K21.9 GASTROESOPHAGEAL REFLUX DISEASE WITHOUT ESOPHAGITIS: Primary | ICD-10-CM

## 2017-03-07 DIAGNOSIS — K22.70 BARRETT'S ESOPHAGUS WITHOUT DYSPLASIA: ICD-10-CM

## 2017-03-07 PROCEDURE — 3078F DIAST BP <80 MM HG: CPT | Mod: S$GLB,,, | Performed by: INTERNAL MEDICINE

## 2017-03-07 PROCEDURE — 99999 PR PBB SHADOW E&M-EST. PATIENT-LVL III: CPT | Mod: PBBFAC,,, | Performed by: INTERNAL MEDICINE

## 2017-03-07 PROCEDURE — 99402 PREV MED CNSL INDIV APPRX 30: CPT | Mod: S$GLB,,, | Performed by: GENERAL PRACTICE

## 2017-03-07 PROCEDURE — 3074F SYST BP LT 130 MM HG: CPT | Mod: S$GLB,,, | Performed by: INTERNAL MEDICINE

## 2017-03-07 PROCEDURE — 99214 OFFICE O/P EST MOD 30 MIN: CPT | Mod: S$GLB,,, | Performed by: INTERNAL MEDICINE

## 2017-03-07 PROCEDURE — 1160F RVW MEDS BY RX/DR IN RCRD: CPT | Mod: S$GLB,,, | Performed by: INTERNAL MEDICINE

## 2017-03-07 RX ORDER — VARENICLINE TARTRATE 1 MG/1
1 TABLET, FILM COATED ORAL 2 TIMES DAILY
Qty: 60 TABLET | Refills: 0 | Status: ON HOLD | OUTPATIENT
Start: 2017-03-07 | End: 2017-03-21 | Stop reason: CLARIF

## 2017-03-07 NOTE — MR AVS SNAPSHOT
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         Hepatomegaly         Guillain Barré syndrome                To Do List           Future Appointments        Provider Department Dept Phone    5/3/2017 9:50 AM LABORATORY, [address] [facility] [phone]    2017 10:40 AM Tomi Mir MD [facility] Cardiology [phone]      Goals (5 Years of Data)     None      [facility] On Call     [facility] Nurse Care Line -  Assistance  Registered nurses in the [facility] provide clinical advisement, health education, appointment booking, and other advisory services.  Call for this free service at [phone].             Medications           Message regarding Medications     Verify the changes and/or additions to your medication regime listed below are the same as discussed with your clinician today.  If any of these changes or additions are incorrect, please notify your healthcare provider.             Verify that the below list of medications is an accurate representation of the medications you are currently taking.  If none reported, the list may be blank. If incorrect, please contact your healthcare provider. Carry this list with you in case of emergency.           Current Medications     atorvastatin (LIPITOR) 20 MG tablet Take 1 tablet (20 mg total) by mouth once daily.    calcium-vitamin D 600 mg, 1,500mg,-200 unit 600 mg(1,500mg) -200 unit Tab Take 1 tablet by mouth once daily.     cyanocobalamin  "(VITAMIN B-12) 1,000 mcg/mL injection Inject 1 mL (1,000 mcg total) into the skin every 30 days. Please supply 3 mL syringes and 26 gauge 1/2 inch needles.    esomeprazole (NEXIUM PACKET) 40 mg GrPS ONE PACKET ONCE DAILY    esomeprazole (NEXIUM) 20 MG capsule Take 20 mg by mouth before breakfast.    lisinopril-hydrochlorothiazide (PRINZIDE,ZESTORETIC) 20-12.5 mg per tablet Take 2 tablets by mouth once daily.    metoprolol succinate (TOPROL-XL) 50 MG 24 hr tablet Take 1 tablet (50 mg total) by mouth once daily.    multivitamin with minerals (MULTIPLE VITAMIN-MINERALS) tablet Take 1 tablet by mouth Daily.      varenicline (CHANTIX CONTINUING MONTH BOX) 1 mg Tab Take 1 tablet (1 mg total) by mouth 2 (two) times daily.           Clinical Reference Information           Your Vitals Were     BP Pulse Height Weight BMI    128/74 58 6' 5" (1.956 m) 129.1 kg (284 lb 9.8 oz) 33.75 kg/m2      Blood Pressure          Most Recent Value    BP  128/74      Allergies as of 3/7/2017     Latex    Reglan  [Metoclopramide Hcl]      Immunizations Administered on Date of Encounter - 3/7/2017     None      Orders Placed During Today's Visit      Normal Orders This Visit    Case request GI: ESOPHAGOGASTRODUODENOSCOPY (EGD)     Future Labs/Procedures Expected by Expires    US Abdomen Complete  3/7/2017 3/7/2018      Language Assistance Services     ATTENTION: Language assistance services are available, free of charge. Please call [phone].      ATENCIÓN: Si habla español, tiene a pond disposición servicios gratuitos de asistencia lingüística. Llame al [phone].     CHÚ Ý: N?u b?n nói Ti?ng Vi?t, có các d?ch v? h? tr? ngôn ng? mi?n phí dành cho b?n. G?i s? [phone].         [facility] complies with applicable Federal civil rights laws and does not discriminate on the basis of race, color, national origin, age, disability, or sex.        "

## 2017-03-07 NOTE — PROGRESS NOTES
Individual Follow-Up Form    3/7/2017    Quit Date: 2-5-2017    Clinical Status of Patient: Outpatient    Length of Service: 30 minutes    Continuing Medication: yes  Chantix    Other Medications: none     Target Symptoms: Withdrawal and medication side effects. The following were  rated moderate (3) to severe (4) on TCRS:  · Moderate (3): none  · Severe (4): none    Comments: Patient was seen today for a medication follow up. He has remained tobacco free for 1 month. He states that he struggled with the urge to smoke during [place] when he was around other smokers but was successful in refraining. We talked about stress management and coping strategies for high risk situations. We talked about getting mentally prepared for those situations. He denies any negative thoughts or behavior at this time. Will continue to monitor progress.    Diagnosis: F17.200    Next Visit: 4 weeks

## 2017-03-07 NOTE — PROGRESS NOTES
Subjective:       Patient ID: Valerio Yan is a 46 y.o. male.    Chief Complaint: Follow-up and EGD    HPI Comments: Patient with history of GERD carries a diagnosis of Ozuna's esophagus. The last assessment was in March 2016, and area indeterminent for dysplasia was found. Because of this his repeat EGD was moved up to 1 year. He is presently due for repeat.    Review of Systems   Constitutional: Positive for diaphoresis. Negative for activity change, appetite change, chills, fatigue, fever and unexpected weight change.   HENT: Negative for congestion, ear discharge, facial swelling, hearing loss, nosebleeds, postnasal drip, sinus pressure, sneezing, tinnitus, trouble swallowing and voice change.    Eyes: Negative for photophobia, redness and visual disturbance.   Respiratory: Negative for cough, chest tightness, shortness of breath and wheezing.    Cardiovascular: Negative for chest pain and palpitations.   Gastrointestinal: Negative for abdominal distention, abdominal pain, blood in stool, constipation, diarrhea, nausea, rectal pain and vomiting.   Genitourinary: Negative for difficulty urinating, discharge, dysuria, flank pain, frequency, hematuria, scrotal swelling, testicular pain and urgency.   Musculoskeletal: Positive for back pain and gait problem. Negative for arthralgias, joint swelling, myalgias and neck stiffness.   Skin: Negative for color change, pallor, rash and wound.   Neurological: Negative for dizziness, tremors, seizures, syncope, facial asymmetry, speech difficulty, weakness, light-headedness, numbness and headaches.   Hematological: Negative for adenopathy. Does not bruise/bleed easily.   Psychiatric/Behavioral: Negative for agitation, confusion, hallucinations, sleep disturbance and suicidal ideas.       Objective:      Physical Exam   Constitutional: He is oriented to person, place, and time. He appears well-developed and well-nourished. No distress.   HENT:   Head: Normocephalic  and atraumatic.   Nose: Nose normal.   Mouth/Throat: Oropharynx is clear and moist. No oropharyngeal exudate.   Eyes: Conjunctivae are normal. Pupils are equal, round, and reactive to light. No scleral icterus.   Neck: Normal range of motion. Neck supple. No thyromegaly present.   Cardiovascular: Normal rate and regular rhythm.  Exam reveals no gallop and no friction rub.    No murmur heard.  Pulmonary/Chest: Effort normal and breath sounds normal. No respiratory distress. He has no wheezes. He has no rales.   Abdominal: Soft. Bowel sounds are normal. He exhibits no distension and no mass. There is no tenderness. There is no rebound and no guarding.   Mildly enlarged liver with full rounded edge.   Musculoskeletal: He exhibits no edema or tenderness.   Legs in braces; lower extremity muscle atrophy   Lymphadenopathy:     He has no cervical adenopathy.   Neurological: He is alert and oriented to person, place, and time. He exhibits normal muscle tone. Coordination normal.   Skin: Skin is warm. No rash noted. He is not diaphoretic.   Psychiatric: He has a normal mood and affect. His behavior is normal. Judgment and thought content normal.   Vitals reviewed.      Assessment:    GERD   Ozuna's Esophagus   Hepatomegaly    Guillain-Arabi  No diagnosis found.    Plan:       EGD and Abdominal U/S.

## 2017-03-13 ENCOUNTER — CLINICAL SUPPORT (OUTPATIENT)
Dept: SMOKING CESSATION | Facility: CLINIC | Age: 47
End: 2017-03-13
Payer: COMMERCIAL

## 2017-03-13 DIAGNOSIS — F17.200 NICOTINE DEPENDENCE: Primary | ICD-10-CM

## 2017-03-13 PROCEDURE — 99407 BEHAV CHNG SMOKING > 10 MIN: CPT | Mod: S$GLB,,, | Performed by: GENERAL PRACTICE

## 2017-03-20 ENCOUNTER — TELEPHONE (OUTPATIENT)
Dept: RADIOLOGY | Facility: HOSPITAL | Age: 47
End: 2017-03-20

## 2017-03-21 ENCOUNTER — HOSPITAL ENCOUNTER (OUTPATIENT)
Facility: HOSPITAL | Age: 47
Discharge: HOME | End: 2017-03-21
Attending: INTERNAL MEDICINE | Admitting: INTERNAL MEDICINE
Payer: MEDICARE

## 2017-03-21 ENCOUNTER — SURGERY (OUTPATIENT)
Age: 47
End: 2017-03-21

## 2017-03-21 ENCOUNTER — ANESTHESIA (OUTPATIENT)
Dept: ENDOSCOPY | Facility: HOSPITAL | Age: 47
End: 2017-03-21
Payer: MEDICARE

## 2017-03-21 ENCOUNTER — ANESTHESIA EVENT (OUTPATIENT)
Dept: ENDOSCOPY | Facility: HOSPITAL | Age: 47
End: 2017-03-21
Payer: MEDICARE

## 2017-03-21 ENCOUNTER — HOSPITAL ENCOUNTER (OUTPATIENT)
Dept: RADIOLOGY | Facility: HOSPITAL | Age: 47
Discharge: HOME | End: 2017-03-21
Attending: INTERNAL MEDICINE | Admitting: INTERNAL MEDICINE
Payer: MEDICARE

## 2017-03-21 VITALS
RESPIRATION RATE: 18 BRPM | OXYGEN SATURATION: 97 % | SYSTOLIC BLOOD PRESSURE: 109 MMHG | WEIGHT: 284 LBS | HEIGHT: 77 IN | DIASTOLIC BLOOD PRESSURE: 71 MMHG | BODY MASS INDEX: 33.53 KG/M2 | HEART RATE: 60 BPM | TEMPERATURE: 98 F

## 2017-03-21 DIAGNOSIS — R10.13 EPIGASTRIC PAIN: Primary | ICD-10-CM

## 2017-03-21 DIAGNOSIS — R00.2 PALPITATIONS: ICD-10-CM

## 2017-03-21 DIAGNOSIS — R07.89 ATYPICAL CHEST PAIN: ICD-10-CM

## 2017-03-21 DIAGNOSIS — G82.50 QUADRIPLEGIA: ICD-10-CM

## 2017-03-21 DIAGNOSIS — G61.0 GUILLAIN-BARRE SYNDROME: ICD-10-CM

## 2017-03-21 DIAGNOSIS — K22.70 BARRETT'S ESOPHAGUS WITHOUT DYSPLASIA: ICD-10-CM

## 2017-03-21 DIAGNOSIS — K21.9 GASTROESOPHAGEAL REFLUX DISEASE WITHOUT ESOPHAGITIS: ICD-10-CM

## 2017-03-21 DIAGNOSIS — I11.9 LVH (LEFT VENTRICULAR HYPERTROPHY) DUE TO HYPERTENSIVE DISEASE, WITHOUT HEART FAILURE: ICD-10-CM

## 2017-03-21 DIAGNOSIS — I48.3 TYPICAL ATRIAL FLUTTER: ICD-10-CM

## 2017-03-21 DIAGNOSIS — E78.2 MIXED HYPERLIPIDEMIA: ICD-10-CM

## 2017-03-21 DIAGNOSIS — R16.0 HEPATOMEGALY: ICD-10-CM

## 2017-03-21 DIAGNOSIS — I10 ESSENTIAL HYPERTENSION: ICD-10-CM

## 2017-03-21 DIAGNOSIS — K22.70 BARRETT'S ESOPHAGUS: ICD-10-CM

## 2017-03-21 DIAGNOSIS — R61 DIAPHORESIS: ICD-10-CM

## 2017-03-21 DIAGNOSIS — Z72.0 TOBACCO ABUSE: ICD-10-CM

## 2017-03-21 DIAGNOSIS — K27.9 PEPTIC ULCER DISEASE: ICD-10-CM

## 2017-03-21 DIAGNOSIS — G62.9 POLYNEUROPATHY: ICD-10-CM

## 2017-03-21 PROCEDURE — 43239 EGD BIOPSY SINGLE/MULTIPLE: CPT | Mod: ,,, | Performed by: INTERNAL MEDICINE

## 2017-03-21 PROCEDURE — 25000003 PHARM REV CODE 250: Performed by: NURSE ANESTHETIST, CERTIFIED REGISTERED

## 2017-03-21 PROCEDURE — 43239 EGD BIOPSY SINGLE/MULTIPLE: CPT | Performed by: INTERNAL MEDICINE

## 2017-03-21 PROCEDURE — 76700 US EXAM ABDOM COMPLETE: CPT | Mod: 26,,, | Performed by: RADIOLOGY

## 2017-03-21 PROCEDURE — 37000008 HC ANESTHESIA 1ST 15 MINUTES: Performed by: INTERNAL MEDICINE

## 2017-03-21 PROCEDURE — 27201012 HC FORCEPS, HOT/COLD, DISP: Performed by: INTERNAL MEDICINE

## 2017-03-21 PROCEDURE — 76700 US EXAM ABDOM COMPLETE: CPT | Mod: TC

## 2017-03-21 PROCEDURE — 25000003 PHARM REV CODE 250: Performed by: INTERNAL MEDICINE

## 2017-03-21 PROCEDURE — 88305 TISSUE EXAM BY PATHOLOGIST: CPT | Mod: 26,,, | Performed by: PATHOLOGY

## 2017-03-21 PROCEDURE — 63600175 PHARM REV CODE 636 W HCPCS: Performed by: NURSE ANESTHETIST, CERTIFIED REGISTERED

## 2017-03-21 PROCEDURE — 37000009 HC ANESTHESIA EA ADD 15 MINS: Performed by: INTERNAL MEDICINE

## 2017-03-21 PROCEDURE — 88305 TISSUE EXAM BY PATHOLOGIST: CPT | Performed by: PATHOLOGY

## 2017-03-21 RX ORDER — SODIUM CHLORIDE, SODIUM LACTATE, POTASSIUM CHLORIDE, CALCIUM CHLORIDE 600; 310; 30; 20 MG/100ML; MG/100ML; MG/100ML; MG/100ML
INJECTION, SOLUTION INTRAVENOUS CONTINUOUS
Status: DISCONTINUED | OUTPATIENT
Start: 2017-03-21 | End: 2017-03-21 | Stop reason: HOSPADM

## 2017-03-21 RX ORDER — LIDOCAINE HYDROCHLORIDE 10 MG/ML
INJECTION INFILTRATION; PERINEURAL
Status: DISCONTINUED | OUTPATIENT
Start: 2017-03-21 | End: 2017-03-21

## 2017-03-21 RX ORDER — PROPOFOL 10 MG/ML
VIAL (ML) INTRAVENOUS
Status: DISCONTINUED | OUTPATIENT
Start: 2017-03-21 | End: 2017-03-21

## 2017-03-21 RX ADMIN — SODIUM CHLORIDE, SODIUM LACTATE, POTASSIUM CHLORIDE, AND CALCIUM CHLORIDE: .6; .31; .03; .02 INJECTION, SOLUTION INTRAVENOUS at 10:03

## 2017-03-21 RX ADMIN — PROPOFOL 50 MG: 10 INJECTION, EMULSION INTRAVENOUS at 11:03

## 2017-03-21 RX ADMIN — LIDOCAINE HYDROCHLORIDE 50 MG: 10 INJECTION, SOLUTION INFILTRATION; PERINEURAL at 11:03

## 2017-03-21 NOTE — INTERVAL H&P NOTE
The patient has been examined and the H&P has been reviewed:  Family History   Problem Relation Age of Onset    Heart attack Mother     Heart disease Mother     Heart disease Father     Heart attack Father      Past Medical History:   Diagnosis Date    Atrial flutter     Ozuna's esophagus     Decubitus skin ulcer     Encounter for blood transfusion     Guillain-[place]     Guillain-[place] syndrome 7/30/2013    Hyperlipidemia     Hypertension     Joint pain     knees    LVH (left ventricular hypertrophy) due to hypertensive disease 2/10/2017    Mitral regurgitation 6/9/2016    Transfusion reaction     1 x  to PRBC fever     Past Surgical History:   Procedure Laterality Date    barrette esophagus      decubitus surgery      to sacral    ESOPHAGOGASTRODUODENOSCOPY      GASTROSTOMY TUBE PLACEMENT      KNEE ARTHROSCOPY  2002    PEG TUBE REMOVAL      RADIOFREQUENCY ABLATION      JUAN-EN-Y PROCEDURE      TRACHEAL SURGERY       Social History     Social History    Marital status:      Spouse name: N/A    Number of children: N/A    Years of education: N/A     Occupational History    Not on file.     Social History Main Topics    Smoking status: Former Smoker     Packs/day: 0.75     Years: 25.00     Types: Cigarettes     Start date: 4/4/1988     Quit date: 2/5/2017    Smokeless tobacco: Former User     Types: Snuff     Quit date: 1/6/1999    Alcohol use 8.4 oz/week     14 Glasses of wine per week    Drug use: No    Sexual activity: Yes     Partners: Female     Other Topics Concern    Not on file     Social History Narrative     Review of patient's allergies indicates:   Allergen Reactions    Latex      Other reaction(s): Itching  Other reaction(s): Hives    Reglan  [metoclopramide hcl]      Pt. Was in coma so is not aware of the reaction  Other reaction(s): Unable to obtain     No current facility-administered medications on file prior to encounter.      Current Outpatient  Prescriptions on File Prior to Encounter   Medication Sig Dispense Refill    atorvastatin (LIPITOR) 20 MG tablet Take 1 tablet (20 mg total) by mouth once daily. 90 tablet 3    calcium-vitamin D 600 mg, 1,500mg,-200 unit 600 mg(1,500mg) -200 unit Tab Take 1 tablet by mouth once daily.       esomeprazole (NEXIUM PACKET) 40 mg GrPS ONE PACKET ONCE DAILY 30 each 11    lisinopril-hydrochlorothiazide (PRINZIDE,ZESTORETIC) 20-12.5 mg per tablet Take 2 tablets by mouth once daily. 90 tablet 3    metoprolol succinate (TOPROL-XL) 50 MG 24 hr tablet Take 1 tablet (50 mg total) by mouth once daily. 30 tablet 11    multivitamin with minerals (MULTIPLE VITAMIN-MINERALS) tablet Take 1 tablet by mouth Daily.        cyanocobalamin (VITAMIN B-12) 1,000 mcg/mL injection Inject 1 mL (1,000 mcg total) into the skin every 30 days. Please supply 3 mL syringes and 26 gauge 1/2 inch needles. 10 mL 1    [DISCONTINUED] esomeprazole (NEXIUM) 20 MG capsule Take 20 mg by mouth before breakfast.      [DISCONTINUED] varenicline (CHANTIX CONTINUING MONTH BOX) 1 mg Tab Take 1 tablet (1 mg total) by mouth 2 (two) times daily. 60 tablet 0         Anesthesia/Surgery risks, benefits and alternative options discussed and understood by patient/family.          Active Hospital Problems    Diagnosis  POA    Ozuna's esophagus [K22.70]  Yes      Resolved Hospital Problems    Diagnosis Date Resolved POA   No resolved problems to display.

## 2017-03-21 NOTE — DISCHARGE SUMMARY
[facility]  Brief Operative Note     SUMMARY     Surgery Date: 3/21/2017     Surgeon(s) and Role:     * Ilan Araya III, MD - Primary    Assisting Surgeon: None    Pre-op Diagnosis:  Gastroesophageal reflux disease without esophagitis [K21.9]  Ozuna's esophagus without dysplasia [K22.70]    Post-op Diagnosis:  Post-Op Diagnosis Codes:     * Gastroesophageal reflux disease without esophagitis [K21.9]     * Ozuna's esophagus without dysplasia [K22.70]    Procedure(s) (LRB):  ESOPHAGOGASTRODUODENOSCOPY (EGD) (N/A)    Anesthesia: Monitor Anesthesia Care    Description of the findings of the procedure: Procedures completed. See Procedure note for full details.    Findings/Key Components: Procedures completed. See Procedure note for full details.    Prosthetics/Devices: None    Estimated Blood Loss: * No values recorded between 3/21/2017 12:00 AM and 3/21/2017 12:08 PM *         Specimens:   Specimen (12h ago through future)    Start     Ordered    03/21/17 1152  Specimen to Pathology - Surgery  Once     Comments:  1. Ozuna's esophagus biopsies at 38 cm  2. Ozuna's esophagus biopsies at 39 cm    03/21/17 1154          Discharge Note    SUMMARY     Admit Date: 3/21/2017    Discharge Date and Time: 3/21/2017    Hospital Course (synopsis of major diagnoses, care, treatment, and services provided during the course of the hospital stay):  Procedures completed. See Procedure note for full details. Discharge patient when discharge criteria met.    Final Diagnosis: Post-Op Diagnosis Codes:     * Gastroesophageal reflux disease without esophagitis [K21.9]     * Ozuna's esophagus without dysplasia [K22.70]    Disposition: Discharge patient when discharge criteria met.    Follow Up/Patient Instructions:       Medications:  Reconciled Home Medications: Current Discharge Medication List      CONTINUE these medications which have NOT CHANGED    Details   atorvastatin (LIPITOR) 20 MG tablet Take 1 tablet  (20 mg total) by mouth once daily.  Qty: 90 tablet, Refills: 3    Associated Diagnoses: Hypercholesteremia      calcium-vitamin D 600 mg, 1,500mg,-200 unit 600 mg(1,500mg) -200 unit Tab Take 1 tablet by mouth once daily.       esomeprazole (NEXIUM PACKET) 40 mg GrPS ONE PACKET ONCE DAILY  Qty: 30 each, Refills: 11    Associated Diagnoses: Ozuna's esophagus with low grade dysplasia; Gastroesophageal reflux disease, esophagitis presence not specified      lisinopril-hydrochlorothiazide (PRINZIDE,ZESTORETIC) 20-12.5 mg per tablet Take 2 tablets by mouth once daily.  Qty: 90 tablet, Refills: 3    Associated Diagnoses: Essential hypertension      metoprolol succinate (TOPROL-XL) 50 MG 24 hr tablet Take 1 tablet (50 mg total) by mouth once daily.  Qty: 30 tablet, Refills: 11    Associated Diagnoses: Essential hypertension      multivitamin with minerals (MULTIPLE VITAMIN-MINERALS) tablet Take 1 tablet by mouth Daily.        cyanocobalamin (VITAMIN B-12) 1,000 mcg/mL injection Inject 1 mL (1,000 mcg total) into the skin every 30 days. Please supply 3 mL syringes and 26 gauge 1/2 inch needles.  Qty: 10 mL, Refills: 1    Associated Diagnoses: History of Megha-en-Y gastric bypass              Discharge Procedure Orders  Diet general     Activity as tolerated

## 2017-03-21 NOTE — ANESTHESIA PREPROCEDURE EVALUATION
03/21/2017  Valerio Yan is a 46 y.o., male.    [facility] Anesthesia Evaluation    I have reviewed the Patient Summary Reports.    I have reviewed the Nursing Notes.   I have reviewed the Medications.     Review of Systems  Anesthesia Hx:  No problems with previous Anesthesia    Social:  Former Smoker    Cardiovascular:   Hypertension ECG has been reviewed. CONCLUSIONS     1 - Mild left atrial enlargement.     2 - Concentric hypertrophy.     3 - Normal left ventricular systolic function (EF 60-65%).     4 - Normal left ventricular diastolic function.     5 - Normal right ventricular systolic function .     6 - The estimated PA systolic pressure is greater than 28 mmHg.  Hypertension, Essential Hypertension    Hepatic/GI:   PUD,    Neurological:   Neuromuscular Disease,  Neuromuscular Disease, Guillain [place]   Endocrine:  Metabolic Disorders, Obesity / BMI > 30      Physical Exam  General:  Obesity    Airway/Jaw/Neck:  Airway Findings: Mouth Opening: Normal Tongue: Normal  General Airway Assessment: Adult       Chest/Lungs:  Chest/Lungs Findings: Clear to auscultation     Heart/Vascular:  Heart Findings:            Anesthesia Plan  Type of Anesthesia, risks & benefits discussed:  Anesthesia Type:  MAC  Patient's Preference:   Intra-op Monitoring Plan:   Intra-op Monitoring Plan Comments:   Post Op Pain Control Plan:   Post Op Pain Control Plan Comments:   Induction:   IV  Beta Blocker:  Patient is not currently on a Beta-Blocker (No further documentation required).       Informed Consent: Patient understands risks and agrees with Anesthesia plan.  Questions answered. Anesthesia consent signed with patient.  ASA Score: 3     Day of Surgery Review of History & Physical: I have interviewed and examined the patient. I have reviewed the patient's H&P dated:  There are no significant changes.          Ready For  Surgery From Anesthesia Perspective.

## 2017-03-21 NOTE — ANESTHESIA POSTPROCEDURE EVALUATION
"Anesthesia Post Evaluation    Patient: Valerio Yan    Procedure(s) Performed: Procedure(s) (LRB):  ESOPHAGOGASTRODUODENOSCOPY (EGD) (N/A)    Final Anesthesia Type: MAC  Patient location during evaluation: GI PACU  Patient participation: Yes- Able to Participate  Level of consciousness: awake and alert and oriented  Post-procedure vital signs: reviewed and stable  Pain management: adequate  Airway patency: patent  PONV status at discharge: No PONV  Anesthetic complications: no      Cardiovascular status: blood pressure returned to baseline  Respiratory status: unassisted, room air and spontaneous ventilation  Hydration status: euvolemic  Follow-up not needed.        Visit Vitals    /71 (BP Location: Left arm, Patient Position: Lying, BP Method: Automatic)    Pulse 60    Temp 36.7 °C (98.1 °F) (Oral)    Resp 18    Ht 6' 5" (1.956 m)    Wt 128.8 kg (284 lb)    SpO2 97%    BMI 33.68 kg/m2       Pain/Sridevi Score: Pain Assessment Performed: Yes (3/21/2017 10:30 AM)  Presence of Pain: denies (3/21/2017 12:30 PM)  Sridevi Score: 10 (3/21/2017 12:30 PM)      "

## 2017-03-21 NOTE — IP AVS SNAPSHOT
[address] [place] [address]           Patient Discharge Instructions     Our goal is to set you up for success. This packet includes information on your condition, medications, and your home care. It will help you to care for yourself so you don't get sicker and need to go back to the hospital.     Please ask your nurse if you have any questions.        There are many details to remember when preparing to leave the hospital. Here is what you will need to do:    1. Take your medicine. If you are prescribed medications, review your Medication List in the following pages. You may have new medications to  at the pharmacy and others that you'll need to stop taking. Review the instructions for how and when to take your medications. Talk with your doctor or nurses if you are unsure of what to do.     2. Go to your follow-up appointments. Specific follow-up information is listed in the following pages. Your may be contacted by a transition nurse or clinical provider about future appointments. Be sure we have all of the phone numbers to reach you, if needed. Please contact your provider's office if you are unable to make an appointment.     3. Watch for warning signs. Your doctor or nurse will give you detailed warning signs to watch for and when to call for assistance. These instructions may also include educational information about your condition. If you experience any of warning signs to your health, call your doctor.               ** Verify the list of medication(s) below is accurate and up to date. Carry this with you in case of emergency. If your medications have changed, please notify your healthcare provider.             Medication List      CONTINUE taking these medications        Additional Info                      atorvastatin 20 MG tablet   Commonly known as:  LIPITOR   Quantity:  90 tablet   Refills:  3   Dose:  20 mg    Instructions:  Take 1 tablet  (20 mg total) by mouth once daily.     Begin Date    AM    Noon    PM    Bedtime       calcium-vitamin D3 600 mg(1,500mg) -200 unit Tab   Refills:  0   Dose:  1 tablet    Instructions:  Take 1 tablet by mouth once daily.     Begin Date    AM    Noon    PM    Bedtime       cyanocobalamin 1,000 mcg/mL injection   Commonly known as:  VITAMIN B-12   Quantity:  10 mL   Refills:  1   Dose:  1000 mcg    Instructions:  Inject 1 mL (1,000 mcg total) into the skin every 30 days. Please supply 3 mL syringes and 26 gauge 1/2 inch needles.     Begin Date    AM    Noon    PM    Bedtime       esomeprazole 40 mg Grps   Commonly known as:  NEXIUM PACKET   Quantity:  30 each   Refills:  11    Instructions:  ONE PACKET ONCE DAILY     Begin Date    AM    Noon    PM    Bedtime       lisinopril-hydrochlorothiazide 20-12.5 mg per tablet   Commonly known as:  PRINZIDE,ZESTORETIC   Quantity:  90 tablet   Refills:  3   Dose:  2 tablet    Instructions:  Take 2 tablets by mouth once daily.     Begin Date    AM    Noon    PM    Bedtime       metoprolol succinate 50 MG 24 hr tablet   Commonly known as:  TOPROL-XL   Quantity:  30 tablet   Refills:  11   Dose:  50 mg    Instructions:  Take 1 tablet (50 mg total) by mouth once daily.     Begin Date    AM    Noon    PM    Bedtime       MULTIPLE VITAMIN-MINERALS tablet   Refills:  0   Dose:  1 tablet   Generic drug:  multivitamin with minerals    Instructions:  Take 1 tablet by mouth Daily.     Begin Date    AM    Noon    PM    Bedtime                  Please bring to all follow up appointments:    1. A copy of your discharge instructions.  2. All medicines you are currently taking in their original bottles.  3. Identification and insurance card.    Please arrive 15 minutes ahead of scheduled appointment time.    Please call 24 hours in advance if you must reschedule your appointment and/or time.        Your Scheduled Appointments     May 03, 2017  9:50 AM CDT   Fasting Lab with LABORATORY, JALIL  [facility]    [address] [facility] [address]   [phone]            May 19, 2017 10:40 AM CDT   Established Patient Visit with MD [facility] ([facility]    [address] [facility] [address]   [phone]                Discharge Instructions     Future Orders    Activity as tolerated     Diet general     Questions:    Total calories:      Fat restriction, if any:      Protein restriction, if any:      Na restriction, if any:      Fluid restriction:      Additional restrictions:          Discharge Instructions         What Is Ozuna Esophagus?          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 This means that there have been changes to the lining of the esophagus near the stomach. The changes may have been caused by the acid reflux that happens with GERD (gastroesophageal reflux disease). The changed lining is not cancerous, but may increase your chances of developing cancer later on.      When you have GERD  The esophagus is the tube that carries food and liquid from the mouth to the stomach. Your lower esophageal sphincter (LES) is a one-way valve at the top of the stomach. It keeps food and stomach acid from flowing backward. If the LES is weakened, food and stomach acid flow back (reflux) into your esophagus. If this happens often, the condition is called GERD.  Changes in the lining  The stomach is kept safe from its own acid by a special lining. The esophagus isnt meant to contact stomach acid. With GERD, acid flows back into the esophagus often. This damages the esophagus. In response to the damage, new tissue forms that is not normal. This is Ozuna esophagus. The new tissue may keep changing. This is why it is more likely to become cancer in the future.  Preventing further damage  Your healthcare provider may suggest regular tests to keep track of changes in the esophagus. This usually includes an endoscopy, when a  "flexible lighted scope is placed through the mouth into the esophagus. Biopsies (tissue samples) can be taken of the abnormal areas. You are usually sedated with an IV medicine for comfort. He or she may also suggest ways for you to control GERD. This includes lifestyle changes, medicine, or even surgery. This should help keep your Ozuna esophagus from getting worse.  Symptoms of GERD  Symptoms include the following:  · Heartburn  · Sour-tasting fluid backing up into your mouth  · Frequent burping or belching  · Symptoms that get worse after you eat, bend over, or lie down  · Coughing repeatedly to clear your throat  · Hoarseness   Date Last Reviewed: 6/1/2016 © 2000-2016 [employer]. [address] [place] [address], [place] [address]. All rights reserved. This information is not intended as a substitute for professional medical care. Always follow your healthcare professional's instructions.            Admission Information     Date & Time Provider Department CSN    3/21/2017  8:47 AM Ilan Araya III, MD [facility] -  [id number]      Care Providers     Provider Role Specialty Primary office phone    Ilan Araya III, MD Attending Provider Gastroenterology [phone]    Ilan Araya III, MD Surgeon  Gastroenterology [phone]      Your Vitals Were     BP Pulse Temp Resp Height Weight    102/44 (BP Location: Left arm, Patient Position: Lying, BP Method: Automatic) 63 98.1 °F (36.7 °C) (Oral) 18 6' 5" (1.956 m) 128.8 kg (284 lb)    SpO2 BMI             95% 33.68 kg/m2         Recent Lab Values        5/23/2011                           8:00 AM           A1C 5.8                       Pending Labs     Order Current Status    Specimen to Pathology - Surgery Collected (03/21/17 8394)      Allergies as of 3/21/2017        Reactions    Latex     Other reaction(s): Itching  Other reaction(s): Hives    Reglan  [Metoclopramide Hcl]     Pt. Was in coma so is not aware of the reaction  Other " reaction(s): Unable to obtain      [facility]     [facility] Nurse Care Line - 24/7 Assistance  Unless otherwise directed by your provider, please contact [facility], our nurse care line that is available for 24/7 assistance.     Registered nurses in the [facility] provide clinical advisement, health education, appointment booking, and other advisory services.  Call for this free service at [phone].        Advance Directives     An advance directive is a document which, in the event you are no longer able to make decisions for yourself, tells your healthcare team what kind of treatment you do or do not want to receive, or who you would like to make those decisions for you.  If you do not currently have an advance directive, [facility] encourages you to create one.  For more information call:  [phone] ([phone]), [phone] ([phone]),  or log on to [email or web address].        Smoking Cessation     If you would like to quit smoking:   You may be eligible for free services if you are a [place] resident and started smoking cigarettes before September 1, 1988.  Call the Smoking Cessation Trust (SCT) toll free at (081) 083-3609 or (451) 925-8332.   Call 5-415-QUIT-NOW if you do not meet the above criteria.            Language Assistance Services     ATTENTION: Language assistance services are available, free of charge. Please call [phone].      ATENCIÓN: Si habla español, tiene a pond disposición servicios gratuitos de asistencia lingüística. Llame al [phone].     [facility] Ý: N?u b?n nói Ti?ng Vi?t, có các d?ch v? h? tr? ngôn ng? mi?n phí dành cho b?n. G?i s? [phone].         [facility] -  complies with applicable Federal civil rights laws and does not discriminate on the basis of race, color, national origin, age, disability, or sex.

## 2017-03-21 NOTE — OR NURSING
Final time out preformed for Patient and procedure verification agreed by all staff - RN, Tech, MD and CRNA.

## 2017-03-21 NOTE — INTERVAL H&P NOTE
The patient has been examined and the H&P has been reviewed:I have reviewed this note and I agree with this assessment. The patient was seen in the GI office and remains stable for endoscopy at the time of this present evaluation.         Anesthesia/Surgery risks, benefits and alternative options discussed and understood by patient/family.          Active Hospital Problems    Diagnosis  POA    Ozuna's esophagus [K22.70]  Yes      Resolved Hospital Problems    Diagnosis Date Resolved POA   No resolved problems to display.

## 2017-03-21 NOTE — TRANSFER OF CARE
"Anesthesia Transfer of Care Note    Patient: Valerio Yan    Procedure(s) Performed: Procedure(s) (LRB):  ESOPHAGOGASTRODUODENOSCOPY (EGD) (N/A)    Patient location: GI    Anesthesia Type: MAC    Transport from OR: Transported from OR on room air with adequate spontaneous ventilation    Post pain: adequate analgesia    Post assessment: no apparent anesthetic complications    Post vital signs: stable    Level of consciousness: awake, oriented and alert    Nausea/Vomiting: no nausea/vomiting    Complications: none          Last vitals:   Visit Vitals    BP (!) 145/82 (BP Location: Left arm, Patient Position: Lying, BP Method: Automatic)    Pulse 61    Temp 36.7 °C (98.1 °F) (Oral)    Resp 20    Ht 6' 5" (1.956 m)    Wt 128.8 kg (284 lb)    SpO2 96%    BMI 33.68 kg/m2     "

## 2017-03-21 NOTE — ANESTHESIA RELEASE NOTE
"Anesthesia Release from PACU Note    Patient: Valerio Yan    Procedure(s) Performed: Procedure(s) (LRB):  ESOPHAGOGASTRODUODENOSCOPY (EGD) (N/A)    Anesthesia type: MAC    Post pain: Adequate analgesia    Post assessment: no apparent anesthetic complications, tolerated procedure well and no evidence of recall    Last Vitals:   Visit Vitals    /71 (BP Location: Left arm, Patient Position: Lying, BP Method: Automatic)    Pulse 60    Temp 36.7 °C (98.1 °F) (Oral)    Resp 18    Ht 6' 5" (1.956 m)    Wt 128.8 kg (284 lb)    SpO2 97%    BMI 33.68 kg/m2       Post vital signs: stable    Level of consciousness: awake, alert  and oriented    Nausea/Vomiting: no nausea/no vomiting    Complications: none    Airway Patency: patent    Respiratory: unassisted, spontaneous ventilation, room air    Cardiovascular: stable and blood pressure at baseline    Hydration: euvolemic  "

## 2017-03-21 NOTE — DISCHARGE INSTRUCTIONS
What Is Ozuna Esophagus?          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 This means that there have been changes to the lining of the esophagus near the stomach. The changes may have been caused by the acid reflux that happens with GERD (gastroesophageal reflux disease). The changed lining is not cancerous, but may increase your chances of developing cancer later on.      When you have GERD  The esophagus is the tube that carries food and liquid from the mouth to the stomach. Your lower esophageal sphincter (LES) is a one-way valve at the top of the stomach. It keeps food and stomach acid from flowing backward. If the LES is weakened, food and stomach acid flow back (reflux) into your esophagus. If this happens often, the condition is called GERD.  Changes in the lining  The stomach is kept safe from its own acid by a special lining. The esophagus isnt meant to contact stomach acid. With GERD, acid flows back into the esophagus often. This damages the esophagus. In response to the damage, new tissue forms that is not normal. This is Ozuna esophagus. The new tissue may keep changing. This is why it is more likely to become cancer in the future.  Preventing further damage  Your healthcare provider may suggest regular tests to keep track of changes in the esophagus. This usually includes an endoscopy, when a flexible lighted scope is placed through the mouth into the esophagus. Biopsies (tissue samples) can be taken of the abnormal areas. You are usually sedated with an IV medicine for comfort. He or she may also suggest ways for you to control GERD. This includes lifestyle changes, medicine, or even surgery. This should help keep your Ozuna esophagus from getting worse.  Symptoms of GERD  Symptoms include the following:  · Heartburn  · Sour-tasting fluid backing up into your mouth  · Frequent burping or belching  · Symptoms that get worse after you eat, bend over, or lie down  · Coughing repeatedly to clear your  throat  · Hoarseness   Date Last Reviewed: 6/1/2016  © 8550-9310 [employer]. [address] [place] [address], [place] [address]. All rights reserved. This information is not intended as a substitute for professional medical care. Always follow your healthcare professional's instructions.

## 2017-03-21 NOTE — H&P (VIEW-ONLY)
Subjective:       Patient ID: Valerio Yan is a 46 y.o. male.    Chief Complaint: Follow-up and EGD    HPI Comments: Patient with history of GERD carries a diagnosis of Ozuna's esophagus. The last assessment was in March 2016, and area indeterminent for dysplasia was found. Because of this his repeat EGD was moved up to 1 year. He is presently due for repeat.    Review of Systems   Constitutional: Positive for diaphoresis. Negative for activity change, appetite change, chills, fatigue, fever and unexpected weight change.   HENT: Negative for congestion, ear discharge, facial swelling, hearing loss, nosebleeds, postnasal drip, sinus pressure, sneezing, tinnitus, trouble swallowing and voice change.    Eyes: Negative for photophobia, redness and visual disturbance.   Respiratory: Negative for cough, chest tightness, shortness of breath and wheezing.    Cardiovascular: Negative for chest pain and palpitations.   Gastrointestinal: Negative for abdominal distention, abdominal pain, blood in stool, constipation, diarrhea, nausea, rectal pain and vomiting.   Genitourinary: Negative for difficulty urinating, discharge, dysuria, flank pain, frequency, hematuria, scrotal swelling, testicular pain and urgency.   Musculoskeletal: Positive for back pain and gait problem. Negative for arthralgias, joint swelling, myalgias and neck stiffness.   Skin: Negative for color change, pallor, rash and wound.   Neurological: Negative for dizziness, tremors, seizures, syncope, facial asymmetry, speech difficulty, weakness, light-headedness, numbness and headaches.   Hematological: Negative for adenopathy. Does not bruise/bleed easily.   Psychiatric/Behavioral: Negative for agitation, confusion, hallucinations, sleep disturbance and suicidal ideas.       Objective:      Physical Exam   Constitutional: He is oriented to person, place, and time. He appears well-developed and well-nourished. No distress.   HENT:   Head: Normocephalic  and atraumatic.   Nose: Nose normal.   Mouth/Throat: Oropharynx is clear and moist. No oropharyngeal exudate.   Eyes: Conjunctivae are normal. Pupils are equal, round, and reactive to light. No scleral icterus.   Neck: Normal range of motion. Neck supple. No thyromegaly present.   Cardiovascular: Normal rate and regular rhythm.  Exam reveals no gallop and no friction rub.    No murmur heard.  Pulmonary/Chest: Effort normal and breath sounds normal. No respiratory distress. He has no wheezes. He has no rales.   Abdominal: Soft. Bowel sounds are normal. He exhibits no distension and no mass. There is no tenderness. There is no rebound and no guarding.   Mildly enlarged liver with full rounded edge.   Musculoskeletal: He exhibits no edema or tenderness.   Legs in braces; lower extremity muscle atrophy   Lymphadenopathy:     He has no cervical adenopathy.   Neurological: He is alert and oriented to person, place, and time. He exhibits normal muscle tone. Coordination normal.   Skin: Skin is warm. No rash noted. He is not diaphoretic.   Psychiatric: He has a normal mood and affect. His behavior is normal. Judgment and thought content normal.   Vitals reviewed.      Assessment:    GERD   Ozuna's Esophagus   Hepatomegaly    Guillain-[place]  No diagnosis found.    Plan:       EGD and Abdominal U/S.

## 2017-03-26 ENCOUNTER — PATIENT MESSAGE (OUTPATIENT)
Dept: INTERNAL MEDICINE | Facility: CLINIC | Age: 47
End: 2017-03-26

## 2017-03-27 DIAGNOSIS — I10 ESSENTIAL HYPERTENSION: ICD-10-CM

## 2017-03-27 DIAGNOSIS — E78.00 HYPERCHOLESTEREMIA: ICD-10-CM

## 2017-03-27 RX ORDER — ATORVASTATIN CALCIUM 20 MG/1
20 TABLET, FILM COATED ORAL DAILY
Qty: 90 TABLET | Refills: 3 | Status: SHIPPED | OUTPATIENT
Start: 2017-03-27 | End: 2018-05-04 | Stop reason: SDUPTHER

## 2017-03-27 RX ORDER — METOPROLOL SUCCINATE 50 MG/1
50 TABLET, EXTENDED RELEASE ORAL DAILY
Qty: 90 TABLET | Refills: 3 | Status: SHIPPED | OUTPATIENT
Start: 2017-03-27 | End: 2018-04-26 | Stop reason: SDUPTHER

## 2017-03-27 RX ORDER — LISINOPRIL AND HYDROCHLOROTHIAZIDE 12.5; 2 MG/1; MG/1
2 TABLET ORAL DAILY
Qty: 90 TABLET | Refills: 3 | Status: SHIPPED | OUTPATIENT
Start: 2017-03-27 | End: 2017-06-23 | Stop reason: SDUPTHER

## 2017-03-28 ENCOUNTER — PATIENT MESSAGE (OUTPATIENT)
Dept: GASTROENTEROLOGY | Facility: CLINIC | Age: 47
End: 2017-03-28

## 2017-03-30 DIAGNOSIS — E78.00 HYPERCHOLESTEREMIA: ICD-10-CM

## 2017-03-30 RX ORDER — ATORVASTATIN CALCIUM 20 MG/1
20 TABLET, FILM COATED ORAL DAILY
Qty: 90 TABLET | Refills: 3 | Status: SHIPPED | OUTPATIENT
Start: 2017-03-30 | End: 2017-12-06 | Stop reason: SDUPTHER

## 2017-04-04 ENCOUNTER — CLINICAL SUPPORT (OUTPATIENT)
Dept: SMOKING CESSATION | Facility: CLINIC | Age: 47
End: 2017-04-04
Payer: COMMERCIAL

## 2017-04-04 DIAGNOSIS — F17.200 NICOTINE DEPENDENCE: Primary | ICD-10-CM

## 2017-04-04 PROCEDURE — 99402 PREV MED CNSL INDIV APPRX 30: CPT | Mod: S$GLB,,, | Performed by: GENERAL PRACTICE

## 2017-04-04 NOTE — PROGRESS NOTES
Individual Follow-Up Form    4/4/2017    Quit Date: 2-5-2017    Clinical Status of Patient: Outpatient    Length of Service: 30 minutes    Continuing Medication: no     Target Symptoms: Withdrawal and medication side effects. The following were  rated moderate (3) to severe (4) on TCRS:  · Moderate (3): none  · Severe (4): none    Comments: Patient remains tobacco free. He discontinued Chantix use at last visit due to increased liver enzymes and fatty liver results. He states that he went to the [facility] and was around other smokers. He states that he has a hard time refraining from smoking but did not smoke. Other smokers is his trigger at this time so he is avoiding those high risk situations. He denies any negative thoguths or behavior at this time. Will continue to encourage and monitor progress.  Diagnosis: F17.200    Next Visit: 2 weeks

## 2017-05-03 ENCOUNTER — PATIENT MESSAGE (OUTPATIENT)
Dept: INTERNAL MEDICINE | Facility: CLINIC | Age: 47
End: 2017-05-03

## 2017-05-03 ENCOUNTER — PATIENT MESSAGE (OUTPATIENT)
Dept: CARDIOLOGY | Facility: CLINIC | Age: 47
End: 2017-05-03

## 2017-05-03 ENCOUNTER — LAB VISIT (OUTPATIENT)
Dept: LAB | Facility: HOSPITAL | Age: 47
End: 2017-05-03
Attending: INTERNAL MEDICINE
Payer: MEDICARE

## 2017-05-03 DIAGNOSIS — E78.2 MIXED HYPERLIPIDEMIA: ICD-10-CM

## 2017-05-03 LAB
ALBUMIN SERPL BCP-MCNC: 3.9 G/DL
ALP SERPL-CCNC: 85 U/L
ALT SERPL W/O P-5'-P-CCNC: 51 U/L
ANION GAP SERPL CALC-SCNC: 9 MMOL/L
AST SERPL-CCNC: 43 U/L
BILIRUB SERPL-MCNC: 0.7 MG/DL
BUN SERPL-MCNC: 12 MG/DL
CALCIUM SERPL-MCNC: 9.8 MG/DL
CHLORIDE SERPL-SCNC: 104 MMOL/L
CHOLEST/HDLC SERPL: 3.2 {RATIO}
CO2 SERPL-SCNC: 27 MMOL/L
CREAT SERPL-MCNC: 0.8 MG/DL
EST. GFR  (AFRICAN AMERICAN): >60 ML/MIN/1.73 M^2
EST. GFR  (NON AFRICAN AMERICAN): >60 ML/MIN/1.73 M^2
GLUCOSE SERPL-MCNC: 91 MG/DL
HDL/CHOLESTEROL RATIO: 31.6 %
HDLC SERPL-MCNC: 190 MG/DL
HDLC SERPL-MCNC: 60 MG/DL
LDLC SERPL CALC-MCNC: 88.6 MG/DL
NONHDLC SERPL-MCNC: 130 MG/DL
POTASSIUM SERPL-SCNC: 4 MMOL/L
PROT SERPL-MCNC: 7.5 G/DL
SODIUM SERPL-SCNC: 140 MMOL/L
TRIGL SERPL-MCNC: 207 MG/DL

## 2017-05-03 PROCEDURE — 80053 COMPREHEN METABOLIC PANEL: CPT

## 2017-05-03 PROCEDURE — 80061 LIPID PANEL: CPT

## 2017-05-03 PROCEDURE — 36415 COLL VENOUS BLD VENIPUNCTURE: CPT

## 2017-05-19 ENCOUNTER — OFFICE VISIT (OUTPATIENT)
Dept: INTERNAL MEDICINE | Facility: CLINIC | Age: 47
End: 2017-05-19
Payer: MEDICARE

## 2017-05-19 ENCOUNTER — OFFICE VISIT (OUTPATIENT)
Dept: CARDIOLOGY | Facility: CLINIC | Age: 47
End: 2017-05-19
Payer: MEDICARE

## 2017-05-19 ENCOUNTER — LAB VISIT (OUTPATIENT)
Dept: LAB | Facility: HOSPITAL | Age: 47
End: 2017-05-19
Attending: FAMILY MEDICINE
Payer: MEDICARE

## 2017-05-19 VITALS
HEART RATE: 58 BPM | WEIGHT: 292.31 LBS | TEMPERATURE: 96 F | DIASTOLIC BLOOD PRESSURE: 90 MMHG | OXYGEN SATURATION: 97 % | HEIGHT: 77 IN | SYSTOLIC BLOOD PRESSURE: 160 MMHG | BODY MASS INDEX: 34.51 KG/M2

## 2017-05-19 VITALS
SYSTOLIC BLOOD PRESSURE: 134 MMHG | BODY MASS INDEX: 34.56 KG/M2 | HEART RATE: 60 BPM | DIASTOLIC BLOOD PRESSURE: 84 MMHG | WEIGHT: 292.69 LBS | HEIGHT: 77 IN

## 2017-05-19 DIAGNOSIS — Z72.0 TOBACCO ABUSE: ICD-10-CM

## 2017-05-19 DIAGNOSIS — I11.9 LVH (LEFT VENTRICULAR HYPERTROPHY) DUE TO HYPERTENSIVE DISEASE, WITHOUT HEART FAILURE: ICD-10-CM

## 2017-05-19 DIAGNOSIS — R25.1 SHAKY: Primary | ICD-10-CM

## 2017-05-19 DIAGNOSIS — E78.2 MIXED HYPERLIPIDEMIA: Primary | ICD-10-CM

## 2017-05-19 DIAGNOSIS — R00.2 PALPITATIONS: ICD-10-CM

## 2017-05-19 DIAGNOSIS — R23.2 HOT FLASHES: ICD-10-CM

## 2017-05-19 DIAGNOSIS — I48.3 TYPICAL ATRIAL FLUTTER: ICD-10-CM

## 2017-05-19 DIAGNOSIS — I34.0 NON-RHEUMATIC MITRAL REGURGITATION: ICD-10-CM

## 2017-05-19 DIAGNOSIS — R25.1 SHAKY: ICD-10-CM

## 2017-05-19 DIAGNOSIS — R61 DIAPHORESIS: ICD-10-CM

## 2017-05-19 DIAGNOSIS — I10 ESSENTIAL HYPERTENSION: ICD-10-CM

## 2017-05-19 PROBLEM — R07.89 ATYPICAL CHEST PAIN: Status: RESOLVED | Noted: 2017-01-06 | Resolved: 2017-05-19

## 2017-05-19 LAB
25(OH)D3+25(OH)D2 SERPL-MCNC: 25 NG/ML
GLUCOSE SERPL-MCNC: 89 MG/DL (ref 70–110)

## 2017-05-19 PROCEDURE — 1160F RVW MEDS BY RX/DR IN RCRD: CPT | Mod: S$GLB,,, | Performed by: INTERNAL MEDICINE

## 2017-05-19 PROCEDURE — 1160F RVW MEDS BY RX/DR IN RCRD: CPT | Mod: S$GLB,,, | Performed by: FAMILY MEDICINE

## 2017-05-19 PROCEDURE — 99999 PR PBB SHADOW E&M-EST. PATIENT-LVL III: CPT | Mod: PBBFAC,,, | Performed by: FAMILY MEDICINE

## 2017-05-19 PROCEDURE — 3074F SYST BP LT 130 MM HG: CPT | Mod: S$GLB,,, | Performed by: FAMILY MEDICINE

## 2017-05-19 PROCEDURE — 99214 OFFICE O/P EST MOD 30 MIN: CPT | Mod: S$GLB,,, | Performed by: FAMILY MEDICINE

## 2017-05-19 PROCEDURE — 3078F DIAST BP <80 MM HG: CPT | Mod: S$GLB,,, | Performed by: FAMILY MEDICINE

## 2017-05-19 PROCEDURE — 99999 PR PBB SHADOW E&M-EST. PATIENT-LVL III: CPT | Mod: PBBFAC,,, | Performed by: INTERNAL MEDICINE

## 2017-05-19 PROCEDURE — 82948 REAGENT STRIP/BLOOD GLUCOSE: CPT | Mod: S$GLB,,, | Performed by: FAMILY MEDICINE

## 2017-05-19 PROCEDURE — 3075F SYST BP GE 130 - 139MM HG: CPT | Mod: S$GLB,,, | Performed by: INTERNAL MEDICINE

## 2017-05-19 PROCEDURE — 3079F DIAST BP 80-89 MM HG: CPT | Mod: S$GLB,,, | Performed by: INTERNAL MEDICINE

## 2017-05-19 PROCEDURE — 99214 OFFICE O/P EST MOD 30 MIN: CPT | Mod: S$GLB,,, | Performed by: INTERNAL MEDICINE

## 2017-05-19 NOTE — PROGRESS NOTES
Subjective:       Patient ID: Valerio Yan is a 46 y.o. male.    Chief Complaint: Hot Flashes (feels like fainting, hot all the time) and Results (liver and spleen enlarged)    HPI Mr. Yan presents today with multiple complaints. He states that he has been having symptoms of sweating for a month. Lightheadedness, shaky some times before eating and sometimes after eating.   Couple swallows of coffee he notices he gets shaky however sometimes he has those same symptoms without coffee.   Tuesday he had an appointment and he thought he was going to fall out of his chair but then started feeling better so he didn't come to the appointment.    Feeling lasts until eating and if it occurs after eating he has to lay down for 10-15 minutes.     Hasn't taken metoprolol and lisinopril hctz the way he is suppose to since wife has been out of the country b/c she usually does that for him.   Hasn't eaten anything this morning.     Review of Systems   Constitutional: Positive for diaphoresis. Negative for activity change, fatigue and fever.   Cardiovascular: Negative for chest pain and palpitations.   Gastrointestinal: Negative for abdominal pain, diarrhea, nausea and vomiting.   Endocrine: Positive for heat intolerance. Negative for cold intolerance.   Musculoskeletal: Negative for arthralgias and back pain.   Neurological: Positive for dizziness, tremors and light-headedness.           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    Hyperlipidemia     Hypertension     Joint pain     knees    LVH (left ventricular hypertrophy) due to hypertensive disease 2/10/2017    Mitral regurgitation 6/9/2016    Transfusion reaction     1 x  to PRBC fever     Past Surgical History:   Procedure Laterality Date    barrette esophagus      decubitus surgery      to  sacral    ESOPHAGOGASTRODUODENOSCOPY      GASTROSTOMY TUBE PLACEMENT      KNEE ARTHROSCOPY  2002    PEG TUBE REMOVAL      RADIOFREQUENCY ABLATION      JUAN-EN-Y PROCEDURE      TRACHEAL SURGERY       Family History   Problem Relation Age of Onset    Heart attack Mother     Heart disease Mother     Heart disease Father     Heart attack Father      Social History     Social History    Marital status:      Spouse name: N/A    Number of children: N/A    Years of education: N/A     Social History Main Topics    Smoking status: Former Smoker     Packs/day: 0.75     Years: 25.00     Types: Cigarettes     Start date: 4/4/1988     Quit date: 2/5/2017    Smokeless tobacco: Former User     Types: Snuff     Quit date: 1/6/1999    Alcohol use 8.4 oz/week     14 Glasses of wine per week    Drug use: No    Sexual activity: Yes     Partners: Female     Other Topics Concern    None     Social History Narrative    None       Objective:        Physical Exam   HENT:   Head: Normocephalic.   Surgical scar   Cardiovascular: Normal rate, regular rhythm and normal heart sounds.    Pulmonary/Chest: Effort normal and breath sounds normal.   Abdominal: Soft. Bowel sounds are normal. He exhibits no distension.   Musculoskeletal: Normal range of motion.   Braces lower extremities   Skin: Skin is warm.         Assessment/Plan:     Shaky  -     POCT Glucose  -     Vitamin D; Future; Expected date: 05/19/2017    Hot flashes  -     Testosterone, free; Future; Expected date: 05/19/2017  -     TESTOSTERONE PANEL; Future; Expected date: 05/19/2017    Discussed symptoms with patient and reviewed recent blood work and added new labs today. Recommend cutting back on caffeine for a while and eating small meals throughout the day so that glucose doesn't drop too low. Follow up on blood work. He is concerned with testosterone levels as well.       Return if symptoms worsen or fail to improve.    Therese Lala MD  [facility]   Family  Medicine

## 2017-05-19 NOTE — MR AVS SNAPSHOT
[facility]  [address] [facility] [address]  Phone: [phone]  Fax: [phone]                  Valerio Yan   2017 9:40 AM   Office Visit    Description:  Male : 1970   Provider:  Therese Lala MD   [facility]           Reason for Visit     Hot Flashes     Results           Diagnoses this Visit        Comments    Shaky    -  Primary     Hot flashes                To Do List           Future Appointments        Provider Department Dept Phone    2017 10:40 AM Tomi Mir MD [facility] [phone]    2017 11:30 AM LAB, SAME DAY [facility] [phone]      Goals (5 Years of Data)     None      [facility] On Call     [facility] Nurse Care Line - 24/ Assistance  Unless otherwise directed by your provider, please contact [facility], our nurse care line that is available for  assistance.     Registered nurses in the [facility] Center provide: appointment scheduling, clinical advisement, health education, and other advisory services.  Call: [phone] (toll free)               Medications           Message regarding Medications     Verify the changes and/or additions to your medication regime listed below are the same as discussed with your clinician today.  If any of these changes or additions are incorrect, please notify your healthcare provider.             Verify that the below list of medications is an accurate representation of the medications you are currently taking.  If none reported, the list may be blank. If incorrect, please contact your healthcare provider. Carry this list with you in case of emergency.           Current Medications     atorvastatin (LIPITOR) 20 MG tablet Take 1 tablet (20 mg total) by mouth once daily.    atorvastatin (LIPITOR) 20 MG tablet Take 1 tablet (20 mg total) by mouth once daily.    calcium-vitamin D 600 mg,  "1,500mg,-200 unit 600 mg(1,500mg) -200 unit Tab Take 1 tablet by mouth once daily.     cyanocobalamin (VITAMIN B-12) 1,000 mcg/mL injection Inject 1 mL (1,000 mcg total) into the skin every 30 days. Please supply 3 mL syringes and 26 gauge 1/2 inch needles.    esomeprazole (NEXIUM PACKET) 40 mg GrPS ONE PACKET ONCE DAILY    lisinopril-hydrochlorothiazide (PRINZIDE,ZESTORETIC) 20-12.5 mg per tablet Take 2 tablets by mouth once daily.    metoprolol succinate (TOPROL-XL) 50 MG 24 hr tablet Take 1 tablet (50 mg total) by mouth once daily.    multivitamin with minerals (MULTIPLE VITAMIN-MINERALS) tablet Take 1 tablet by mouth Daily.             Clinical Reference Information           Your Vitals Were     BP Pulse Temp Height    160/90 (BP Location: Left arm, Patient Position: Sitting, BP Method: Manual) 58 96.3 °F (35.7 °C) (Tympanic) 6' 5" (1.956 m)    Weight SpO2 BMI    132.6 kg (292 lb 5.3 oz) 97% 34.67 kg/m2      Blood Pressure          Most Recent Value    BP  (!)  160/90      Allergies as of 5/19/2017     Latex    Reglan  [Metoclopramide Hcl]      Immunizations Administered on Date of Encounter - 5/19/2017     None      Orders Placed During Today's Visit      Normal Orders This Visit    POCT Glucose     Future Labs/Procedures Expected by Expires    TESTOSTERONE PANEL  5/19/2017 7/18/2018    Testosterone, free  5/19/2017 7/18/2018    Vitamin D  5/19/2017 7/18/2018 5/19/2017 10:29 AM - Nanda Victoria LPN      Component Results     Component Value Flag Ref Range Units Status    POC Glucose 89  70 - 110 mg/dL Final    Comment:    MD notified            Language Assistance Services     ATTENTION: Language assistance services are available, free of charge. Please call [phone].      ATENCIÓN: Si habla español, tiene a pond disposición servicios gratuitos de asistencia lingüística. Llame al [phone].     CHÚ Ý: N?u b?n nói Ti?ng Vi?t, có các d?ch v? h? tr? ngôn ng? mi?n phí dành cho b?n. G?i s? " [phone].         [facility] complies with applicable Federal civil rights laws and does not discriminate on the basis of race, color, national origin, age, disability, or sex.

## 2017-05-19 NOTE — MR AVS SNAPSHOT
[facility]  [address] [facility] [address] [address]  Phone: [phone]  Fax: [phone]                  Valerio Yan   2017 10:40 AM   Office Visit    Description:  Male : 1970   Provider:  Tomi Mir MD   [facility]  [facility]           Reason for Visit     Hyperlipidemia     Hypertension           Diagnoses this Visit        Comments    Mixed hyperlipidemia    -  Primary     Non-rheumatic mitral regurgitation         LVH (left ventricular hypertrophy) due to hypertensive disease, without heart failure         Essential hypertension         Typical atrial flutter         Tobacco abuse         Palpitations                To Do List           Future Appointments        Provider Department Dept Phone    2017 11:30 AM LAB, SAME DAY [facility] [phone]      Goals (5 Years of Data)     None      Follow-Up and Disposition     Return in about 6 months (around 2017).      [facility]     [facility] Care [facility] -  Assistance  Unless otherwise directed by your provider, please contact [facility], our nurse care line that is available for  assistance.     Registered nurses in the [facility] provide: appointment scheduling, clinical advisement, health education, and other advisory services.  Call: [phone] (toll free)               Medications           Message regarding Medications     Verify the changes and/or additions to your medication regime listed below are the same as discussed with your clinician today.  If any of these changes or additions are incorrect, please notify your healthcare provider.             Verify that the below list of medications is an accurate representation of the medications you are currently taking.  If none reported, the list may be blank. If incorrect, please contact your healthcare provider. Carry this list with you in case of emergency.     "       Current Medications     atorvastatin (LIPITOR) 20 MG tablet Take 1 tablet (20 mg total) by mouth once daily.    atorvastatin (LIPITOR) 20 MG tablet Take 1 tablet (20 mg total) by mouth once daily.    calcium-vitamin D 600 mg, 1,500mg,-200 unit 600 mg(1,500mg) -200 unit Tab Take 1 tablet by mouth once daily.     cyanocobalamin (VITAMIN B-12) 1,000 mcg/mL injection Inject 1 mL (1,000 mcg total) into the skin every 30 days. Please supply 3 mL syringes and 26 gauge 1/2 inch needles.    esomeprazole (NEXIUM PACKET) 40 mg GrPS ONE PACKET ONCE DAILY    lisinopril-hydrochlorothiazide (PRINZIDE,ZESTORETIC) 20-12.5 mg per tablet Take 2 tablets by mouth once daily.    metoprolol succinate (TOPROL-XL) 50 MG 24 hr tablet Take 1 tablet (50 mg total) by mouth once daily.    multivitamin with minerals (MULTIPLE VITAMIN-MINERALS) tablet Take 1 tablet by mouth Daily.             Clinical Reference Information           Your Vitals Were     BP Pulse Height Weight BMI    134/84 (BP Location: Left arm, Patient Position: Sitting) 60 6' 5" (1.956 m) 132.7 kg (292 lb 10.6 oz) 34.7 kg/m2      Blood Pressure          Most Recent Value    Right Arm BP - Sitting  142/88    Left Arm BP - Sitting  134/84    BP  134/84      Allergies as of 5/19/2017     Latex    Reglan  [Metoclopramide Hcl]      Immunizations Administered on Date of Encounter - 5/19/2017     None      Orders Placed During Today's Visit     Future Labs/Procedures Expected by Expires    Comprehensive metabolic panel  11/19/2017 7/18/2018    Lipid panel  11/19/2017 7/18/2018      Language Assistance Services     ATTENTION: Language assistance services are available, free of charge. Please call [phone].      ATENCIÓN: Si habla erickson, tiene a pond disposición servicios gratuitos de asistencia lingüística. Llame al [phone].     CHÚ Ý: N?u b?n nói Ti?ng Vi?t, có các d?ch v? h? tr? ngôn ng? mi?n phí dành cho b?n. G?i s? [phone].         [facility] " complies with applicable Federal civil rights laws and does not discriminate on the basis of race, color, national origin, age, disability, or sex.

## 2017-05-19 NOTE — PROGRESS NOTES
"Subjective:    Patient ID:  Valerio Yan is a 46 y.o. male who presents for evaluation of Atrial Flutter,   Hyperlipidemia and Hypertension      HPI Mr. Yan presents for f/u.   His current medical conditions include HTN, LVH, PAFL s/p ablation May 2016, tobacco abuse.  + family h/o CAD (father MI in 50's, cabg; mother w MI).  Pt taken off xarelto after his PAFL ablation.  Negative stress mpi Jan 2017.  Echo Jan 2017 showed normal LV function, LVH.  No cp sxs.  Sometimes feels lightheaded or shaky at times w sweats.  No DUKE. No pnd/orthopnea.  Feels shaky, like he might pass out sometimes.  States when wife is out of town doesn't take his BP meds like he should.  Rare palpitations.  TG elevated.  Not smoking, quit within last year.  Eats red meat, drinks more etoh than he should at times.  BP controlled overall right now.    Review of Systems   Constitution: Positive for malaise/fatigue and weight loss.   HENT: Negative.    Eyes: Negative.    Cardiovascular: Positive for palpitations.   Respiratory: Negative.    Endocrine: Negative.    Hematologic/Lymphatic: Negative.    Skin: Negative.    Musculoskeletal: Negative.    Gastrointestinal: Negative.    Genitourinary: Negative.    Neurological: Negative.    Psychiatric/Behavioral: Negative.    Allergic/Immunologic: Negative.            /84 (BP Location: Left arm, Patient Position: Sitting)  Pulse 60  Ht 6' 5" (1.956 m)  Wt 132.7 kg (292 lb 10.6 oz)  BMI 34.7 kg/m2    Wt Readings from Last 3 Encounters:   05/19/17 132.7 kg (292 lb 10.6 oz)   05/19/17 132.6 kg (292 lb 5.3 oz)   03/21/17 128.8 kg (284 lb)     Temp Readings from Last 3 Encounters:   05/19/17 96.3 °F (35.7 °C) (Tympanic)   03/21/17 98.1 °F (36.7 °C) (Oral)   12/07/16 98.3 °F (36.8 °C) (Tympanic)     BP Readings from Last 3 Encounters:   05/19/17 134/84   05/19/17 (!) 160/90   03/21/17 109/71     Pulse Readings from Last 3 Encounters:   05/19/17 60   05/19/17 (!) 58   03/21/17 60 "       Objective:    Physical Exam   Constitutional: He is oriented to person, place, and time. He appears well-developed and well-nourished.   HENT:   Head: Normocephalic.   Neck: Normal range of motion. Neck supple. Normal carotid pulses, no hepatojugular reflux and no JVD present. Carotid bruit is not present. No thyromegaly present.   Cardiovascular: Normal rate, regular rhythm, S1 normal and S2 normal.  PMI is not displaced.  Exam reveals no S3, no S4, no distant heart sounds, no friction rub, no midsystolic click and no opening snap.    No murmur heard.  Pulses:       Radial pulses are 2+ on the right side, and 2+ on the left side.   Pulmonary/Chest: Effort normal and breath sounds normal. He has no wheezes. He has no rales.   Abdominal: Soft. Bowel sounds are normal. He exhibits no distension, no abdominal bruit, no ascites and no mass. There is no tenderness.   Musculoskeletal: He exhibits no edema.   Neurological: He is alert and oriented to person, place, and time.   Skin: Skin is warm.   Psychiatric: He has a normal mood and affect. His behavior is normal.   Nursing note and vitals reviewed.      I have reviewed all pertinent labs and cardiac studies.      Chemistry        Component Value Date/Time     05/03/2017 0724    K 4.0 05/03/2017 0724     05/03/2017 0724    CO2 27 05/03/2017 0724    BUN 12 05/03/2017 0724    CREATININE 0.8 05/03/2017 0724    GLU 91 05/03/2017 0724        Component Value Date/Time    CALCIUM 9.8 05/03/2017 0724    ALKPHOS 85 05/03/2017 0724    AST 43 (H) 05/03/2017 0724    ALT 51 (H) 05/03/2017 0724    BILITOT 0.7 05/03/2017 0724        Lab Results   Component Value Date    WBC 9.89 05/27/2016    HGB 13.3 (L) 05/27/2016    HCT 40.3 05/27/2016    MCV 82 05/27/2016     05/27/2016     Lab Results   Component Value Date    HGBA1C 5.8 05/23/2011     Lab Results   Component Value Date    CHOL 190 05/03/2017    CHOL 190 09/30/2014    CHOL 157 06/14/2013     Lab Results    Component Value Date    HDL 60 05/03/2017    HDL 50 09/30/2014    HDL 47 06/14/2013     Lab Results   Component Value Date    LDLCALC 88.6 05/03/2017    LDLCALC 120.2 09/30/2014    LDLCALC 97.0 06/14/2013     Lab Results   Component Value Date    TRIG 207 (H) 05/03/2017    TRIG 99 09/30/2014    TRIG 63 06/14/2013     Lab Results   Component Value Date    CHOLHDL 31.6 05/03/2017    CHOLHDL 26.3 09/30/2014    CHOLHDL 29.9 06/14/2013           Assessment:       1. Mixed hyperlipidemia    2. Non-rheumatic mitral regurgitation    3. LVH (left ventricular hypertrophy) due to hypertensive disease, without heart failure    4. Essential hypertension    5. Typical atrial flutter    6. Tobacco abuse    7. Palpitations    8. Diaphoresis         Plan:             STABLE CV CONDITIONS.  SXS DON'T SOUND CARDIAC RELATED BUT MORE HORMONAL, OR BLOOD SUGAR RELATED.  F/U TESTS THAT PCP ORDERED TODAY.  HE HAD NEGATIVE STRESS MPI AND GOOD ECHO RESULTS EARLIER THIS YEAR.  CONTINUE CURRENT MEDS.  PT COUNSELED ON DIETARY MODIFICATION AS WELL AS LIMITING ETOH TO LOWER HIS TG TO GOAL.  THIS IS DUE TO DIET, WEIGHT GAIN SINCE HE QUIT SMOKING AND ETOH USE.  CARDIAC DIET  CONTINUE TO REFRAIN FROM SMOKING.   EXERCISE  WEIGHT LOSS  F/U 6 MONTHS WITH LIPIDS.

## 2017-05-22 ENCOUNTER — PATIENT MESSAGE (OUTPATIENT)
Dept: INTERNAL MEDICINE | Facility: CLINIC | Age: 47
End: 2017-05-22

## 2017-05-22 LAB — TESTOST FREE SERPL-MCNC: 10.7 PG/ML

## 2017-05-23 ENCOUNTER — CLINICAL SUPPORT (OUTPATIENT)
Dept: SMOKING CESSATION | Facility: CLINIC | Age: 47
End: 2017-05-23
Payer: COMMERCIAL

## 2017-05-23 DIAGNOSIS — F17.200 NICOTINE DEPENDENCE: Primary | ICD-10-CM

## 2017-05-23 PROCEDURE — 99407 BEHAV CHNG SMOKING > 10 MIN: CPT | Mod: S$GLB,,,

## 2017-05-24 ENCOUNTER — TELEPHONE (OUTPATIENT)
Dept: INTERNAL MEDICINE | Facility: CLINIC | Age: 47
End: 2017-05-24

## 2017-05-24 LAB
ALBUMIN SERPL-MCNC: 4.3 G/DL (ref 3.6–5.1)
SHBG SERPL-SCNC: 22 NMOL/L (ref 10–50)
TESTOST FREE SERPL-MCNC: 61.7 PG/ML (ref 46–224)
TESTOST SERPL-MCNC: 345 NG/DL (ref 250–1100)
TESTOSTERONE.FREE+WB SERPL-MCNC: 121.5 NG/DL (ref 110–575)

## 2017-05-24 NOTE — TELEPHONE ENCOUNTER
----- Message from Therese Lala MD sent at 5/22/2017  8:21 AM CDT -----  Free testosterone normal waiting for the panel to result

## 2017-05-26 ENCOUNTER — PATIENT MESSAGE (OUTPATIENT)
Dept: INTERNAL MEDICINE | Facility: CLINIC | Age: 47
End: 2017-05-26

## 2017-05-31 ENCOUNTER — OFFICE VISIT (OUTPATIENT)
Dept: ENDOCRINOLOGY | Facility: CLINIC | Age: 47
End: 2017-05-31
Payer: MEDICARE

## 2017-05-31 ENCOUNTER — LAB VISIT (OUTPATIENT)
Dept: LAB | Facility: HOSPITAL | Age: 47
End: 2017-05-31
Attending: INTERNAL MEDICINE
Payer: MEDICARE

## 2017-05-31 VITALS
HEIGHT: 77 IN | BODY MASS INDEX: 34.73 KG/M2 | SYSTOLIC BLOOD PRESSURE: 142 MMHG | HEART RATE: 56 BPM | DIASTOLIC BLOOD PRESSURE: 80 MMHG | WEIGHT: 294.13 LBS

## 2017-05-31 DIAGNOSIS — R79.89 LOW TESTOSTERONE: ICD-10-CM

## 2017-05-31 DIAGNOSIS — R25.1 SHAKING: ICD-10-CM

## 2017-05-31 DIAGNOSIS — R61 EXCESSIVE SWEATING: ICD-10-CM

## 2017-05-31 DIAGNOSIS — R53.83 FATIGUE, UNSPECIFIED TYPE: ICD-10-CM

## 2017-05-31 DIAGNOSIS — I10 ESSENTIAL HYPERTENSION: ICD-10-CM

## 2017-05-31 DIAGNOSIS — R61 EXCESSIVE SWEATING: Primary | ICD-10-CM

## 2017-05-31 LAB
BASOPHILS # BLD AUTO: 0.02 K/UL
BASOPHILS NFR BLD: 0.3 %
DIFFERENTIAL METHOD: ABNORMAL
EOSINOPHIL # BLD AUTO: 0.2 K/UL
EOSINOPHIL NFR BLD: 2.2 %
ERYTHROCYTE [DISTWIDTH] IN BLOOD BY AUTOMATED COUNT: 13.3 %
HCT VFR BLD AUTO: 42.4 %
HGB BLD-MCNC: 14.1 G/DL
LYMPHOCYTES # BLD AUTO: 2.6 K/UL
LYMPHOCYTES NFR BLD: 33.7 %
MCH RBC QN AUTO: 32 PG
MCHC RBC AUTO-ENTMCNC: 33.3 %
MCV RBC AUTO: 96 FL
MONOCYTES # BLD AUTO: 0.6 K/UL
MONOCYTES NFR BLD: 8.2 %
NEUTROPHILS # BLD AUTO: 4.2 K/UL
NEUTROPHILS NFR BLD: 55.3 %
PLATELET # BLD AUTO: 146 K/UL
PMV BLD AUTO: 11.1 FL
PROLACTIN SERPL IA-MCNC: 6 NG/ML
RBC # BLD AUTO: 4.4 M/UL
T3FREE SERPL-MCNC: 2.5 PG/ML
T4 FREE SERPL-MCNC: 0.75 NG/DL
TSH SERPL DL<=0.005 MIU/L-ACNC: 1.04 UIU/ML
VIT B12 SERPL-MCNC: 855 PG/ML
WBC # BLD AUTO: 7.65 K/UL

## 2017-05-31 PROCEDURE — 82384 ASSAY THREE CATECHOLAMINES: CPT

## 2017-05-31 PROCEDURE — 84270 ASSAY OF SEX HORMONE GLOBUL: CPT

## 2017-05-31 PROCEDURE — 84443 ASSAY THYROID STIM HORMONE: CPT

## 2017-05-31 PROCEDURE — 84439 ASSAY OF FREE THYROXINE: CPT

## 2017-05-31 PROCEDURE — 85025 COMPLETE CBC W/AUTO DIFF WBC: CPT

## 2017-05-31 PROCEDURE — 99999 PR PBB SHADOW E&M-EST. PATIENT-LVL III: CPT | Mod: PBBFAC,,, | Performed by: INTERNAL MEDICINE

## 2017-05-31 PROCEDURE — 84146 ASSAY OF PROLACTIN: CPT

## 2017-05-31 PROCEDURE — 36415 COLL VENOUS BLD VENIPUNCTURE: CPT | Mod: PO

## 2017-05-31 PROCEDURE — 82530 CORTISOL FREE: CPT

## 2017-05-31 PROCEDURE — 99204 OFFICE O/P NEW MOD 45 MIN: CPT | Mod: S$GLB,,, | Performed by: INTERNAL MEDICINE

## 2017-05-31 PROCEDURE — 83497 ASSAY OF 5-HIAA: CPT

## 2017-05-31 PROCEDURE — 83036 HEMOGLOBIN GLYCOSYLATED A1C: CPT

## 2017-05-31 PROCEDURE — 82607 VITAMIN B-12: CPT

## 2017-05-31 PROCEDURE — 84481 FREE ASSAY (FT-3): CPT

## 2017-05-31 NOTE — LETTER
May 31, 2017      Therese Lala MD  [address] [place] [address] [address] [place] [address]           [facility] Endocrin[facility]  [address]  [address]  [place] [address]  Phone: [phone]  Fax: [phone]          Patient: Valerio Yan   MR Number: [id number]   YOB: 1970   Date of Visit: 5/31/2017       Dear Dr. Therese Lala:    Thank you for referring Valerio Yan to me for evaluation. Attached you will find relevant portions of my assessment and plan of care.    If you have questions, please do not hesitate to call me. I look forward to following Valerio Yan along with you.    Sincerely,    Candace Vera MD    Enclosure  CC:  No Recipients    If you would like to receive this communication electronically, please contact [email or web address] or [phone] to request more information on [employer] access.    For providers and/or their staff who would like to refer a patient to [facility], please contact us through our one-stop-shop provider referral line, [facility], at [phone].    If you feel you have received this communication in error or would no longer like to receive these types of communications, please e-mail [email or web address]

## 2017-05-31 NOTE — PROGRESS NOTES
Subjective:       Patient ID: Valerio Yan is a 46 y.o. male.    Chief Complaint: Shaking (sweats)    HPI     Records were reviewed    Pt is concerned as he has been having 2 months of intermittent episodes involving feeling Hot, shaky, sweating and clammy associated with feeling hard to take a breath and feeling like he wants to pass out; drinking OJ used to help but food triggers episodes 40% of time; had similar sweating episodes when hospitalized with Guillian Sebastopol but it resolved([id number] hospitalized and in LTAC) but heat episodes resolved then after discharge    He has residual weakness of extremities as a result of Gullian Sebastopol with limited use of arms and hands, and uses orthotics in legs to help him walk, wife assists with ADLs    He is a former med tech  Hx of Atrial Flutter, s/p ablation 2016- His cardiologist does not think his sympoms are cardiac as he had normal cardia tests in pat year    He sees [facility] Mora [facility]    Laying down helps episode  No night sweats    Hx of CSF leak related to arachnoid cyst draining into frontal sinus that was repaired in 2013 and ever since then has had chronic headaches    Had gastric bypass surgery- wt prior to that was 360 lb and he got down to 240lb now 294lb- he can't explain the weight loss as he feels his diet and activity level have not changed    Testosterone was found to be 395, nl free testosterone    POCT glucose was 89 when in his PCPs office    Has hepatospenomegaly  Hx of Barretts  Drinks 3 large glasses of wine daily  Has fatty lever    Has HTN but never severely elevated    Hx of collapsed lung  Review of Systems   Constitutional: Positive for diaphoresis.   HENT: Positive for tinnitus.    Eyes: Positive for visual disturbance.   Gastrointestinal: Negative for abdominal pain and nausea.   Endocrine: Positive for heat intolerance.   Neurological: Positive for headaches.   Psychiatric/Behavioral: Negative for dysphoric mood and sleep  disturbance. The patient is not nervous/anxious.        Objective:      Physical Exam   Constitutional: He is oriented to person, place, and time. No distress.   HENT:   Head: Normocephalic and atraumatic.   Mouth/Throat: No oropharyngeal exudate.   No exoptholmos   Eyes: Conjunctivae and EOM are normal. Pupils are equal, round, and reactive to light. No scleral icterus.   Neck: No tracheal deviation present. No thyromegaly present.   Cardiovascular: Normal rate, regular rhythm, normal heart sounds and intact distal pulses.  Exam reveals no gallop and no friction rub.    No murmur heard.  Pulmonary/Chest: Effort normal and breath sounds normal. No respiratory distress. He has no wheezes. He has no rales.   Abdominal: Soft. Bowel sounds are normal. He exhibits no distension and no mass. There is no tenderness. There is no rebound and no guarding. No hernia.   Musculoskeletal: He exhibits deformity. He exhibits no edema or tenderness.   Weakness of all extremities with muscle atrophy of legs- orthothics in place, contraction of hands   Lymphadenopathy:     He has no cervical adenopathy.   Neurological: He is alert and oriented to person, place, and time. He has normal reflexes. No cranial nerve deficit.   Skin: Skin is warm and dry. No rash noted. He is not diaphoretic. No erythema.   Psychiatric: He has a normal mood and affect. His behavior is normal.   Vitals reviewed.        Lab Review: Results for YASMEEN TOUSSAINT (MRN [id number]) as of 5/31/2017 14:54   Ref. Range 5/3/2017 07:24 5/19/2017 10:29 5/19/2017 10:34   Sodium Latest Ref Range: 136 - 145 mmol/L 140     Potassium Latest Ref Range: 3.5 - 5.1 mmol/L 4.0     Chloride Latest Ref Range: 95 - 110 mmol/L 104     CO2 Latest Ref Range: 23 - 29 mmol/L 27     Anion Gap Latest Ref Range: 8 - 16 mmol/L 9     BUN, Bld Latest Ref Range: 6 - 20 mg/dL 12     Creatinine Latest Ref Range: 0.5 - 1.4 mg/dL 0.8     eGFR if non African American Latest Ref Range: >60  mL/min/1.73 m^2 >60.0     eGFR if African American Latest Ref Range: >60 mL/min/1.73 m^2 >60.0     Glucose Latest Ref Range: 70 - 110 mg/dL 91     Calcium Latest Ref Range: 8.7 - 10.5 mg/dL 9.8     Alkaline Phosphatase Latest Ref Range: 55 - 135 U/L 85     Total Protein Latest Ref Range: 6.0 - 8.4 g/dL 7.5     Albumin Latest Ref Range: 3.5 - 5.2 g/dL 3.9     Total Bilirubin Latest Ref Range: 0.1 - 1.0 mg/dL 0.7     AST Latest Ref Range: 10 - 40 U/L 43 (H)     ALT Latest Ref Range: 10 - 44 U/L 51 (H)     Triglycerides Latest Ref Range: 30 - 150 mg/dL 207 (H)     Cholesterol Latest Ref Range: 120 - 199 mg/dL 190     HDL Latest Ref Range: 40 - 75 mg/dL 60     LDL Cholesterol Latest Ref Range: 63.0 - 159.0 mg/dL 88.6     Total Cholesterol/HDL Ratio Latest Ref Range: 2.0 - 5.0  3.2     Vit D, 25-Hydroxy Latest Ref Range: 30 - 96 ng/mL   25 (L)   Testosterone Latest Ref Range: 250 - 1100 ng/dL   345   Testosterone, Free Latest Ref Range: 46.0 - 224.0 pg/mL   61.7   Testosterone Testosterone Latest Ref Range: 110.0 - 575.0 ng/dL   121.5   Sex Hormone Binding Globulin Latest Ref Range: 10 - 50 nmol/L   22   Albumin Latest Ref Range: 3.6 - 5.1 g/dL   4.3   Testosterone, Free Latest Ref Range: 5.1 - 41.5 pg/mL   10.7   POC Glucose Latest Ref Range: 70 - 110 mg/dL  89    HDL/Chol Ratio Latest Ref Range: 20.0 - 50.0 % 31.6     Non-HDL Cholesterol Latest Units: mg/dL 130     POCT GLUCOSE Unknown  Rpt        Assessment:     1. Excessive sweating  TSH    T4, free    T3, free    Hemoglobin A1c    CBC auto differential    Sex Hormone Binding Globulin    Vitamin B12    Prolactin    Metanephrines, Fractionated 24 hr Urine    Creatinine, urine, timed    Catecholamines, fractionated, Urine    Cortisol, urine, free    5 HIAA, quantitative, Urine    CORTISOL, URINE, FREE, 24 HOUR    Metanephrines, Fractionated 24 hr Urine    Creatinine, urine, timed    Catecholamines, fractionated, Urine    5 HIAA, quantitative, Urine   2. Fatigue,  unspecified type  TSH    T4, free    T3, free    Hemoglobin A1c    CBC auto differential    Sex Hormone Binding Globulin    Vitamin B12    Prolactin   3. Shaking  TSH    T4, free    T3, free    Hemoglobin A1c    CBC auto differential    Sex Hormone Binding Globulin    Vitamin B12    Prolactin    Metanephrines, Fractionated 24 hr Urine    Creatinine, urine, timed    Catecholamines, fractionated, Urine    Cortisol, urine, free    5 HIAA, quantitative, Urine   4. Essential hypertension  Metanephrines, Fractionated 24 hr Urine    Creatinine, urine, timed    Catecholamines, fractionated, Urine    Cortisol, urine, free    5 HIAA, quantitative, Urine    CORTISOL, URINE, FREE, 24 HOUR    Metanephrines, Fractionated 24 hr Urine    Creatinine, urine, timed    Catecholamines, fractionated, Urine    5 HIAA, quantitative, Urine   5. Low testosterone  Sex Hormone Binding Globulin        His symptoms are concerning for thyroid disease- hyperthyroidism although he does not have weight loss or tachycardia,; also in differential is pheochromocytoma due to his episodic symptoms ,  and neuroendocrine disease such as carcinoid; check tests below    In regards to his testosterone since free testosterone is normal to need to treat, can follow- check SHBG which can be decreased in obesity  Plan:   Excessive sweating  -     TSH; Future; Expected date: 05/31/2017  -     T4, free; Future; Expected date: 05/31/2017  -     T3, free; Future; Expected date: 05/31/2017  -     Hemoglobin A1c; Future; Expected date: 05/31/2017  -     CBC auto differential; Future; Expected date: 05/31/2017  -     Sex Hormone Binding Globulin; Future; Expected date: 05/31/2017  -     Vitamin B12; Future; Expected date: 05/31/2017  -     Prolactin; Future; Expected date: 05/31/2017  -     Metanephrines, Fractionated 24 hr Urine; Future  -     Creatinine, urine, timed; Future  -     Catecholamines, fractionated, Urine; Future  -     Cortisol, urine, free; Future  -      5 HIAA, quantitative, Urine; Future  -     CORTISOL, URINE, FREE, 24 HOUR; Future  -     Metanephrines, Fractionated 24 hr Urine; Future  -     Creatinine, urine, timed; Future  -     Catecholamines, fractionated, Urine; Future  -     5 HIAA, quantitative, Urine; Future    Fatigue, unspecified type  -     TSH; Future; Expected date: 05/31/2017  -     T4, free; Future; Expected date: 05/31/2017  -     T3, free; Future; Expected date: 05/31/2017  -     Hemoglobin A1c; Future; Expected date: 05/31/2017  -     CBC auto differential; Future; Expected date: 05/31/2017  -     Sex Hormone Binding Globulin; Future; Expected date: 05/31/2017  -     Vitamin B12; Future; Expected date: 05/31/2017  -     Prolactin; Future; Expected date: 05/31/2017    Shaking  -     TSH; Future; Expected date: 05/31/2017  -     T4, free; Future; Expected date: 05/31/2017  -     T3, free; Future; Expected date: 05/31/2017  -     Hemoglobin A1c; Future; Expected date: 05/31/2017  -     CBC auto differential; Future; Expected date: 05/31/2017  -     Sex Hormone Binding Globulin; Future; Expected date: 05/31/2017  -     Vitamin B12; Future; Expected date: 05/31/2017  -     Prolactin; Future; Expected date: 05/31/2017  -     Metanephrines, Fractionated 24 hr Urine; Future  -     Creatinine, urine, timed; Future  -     Catecholamines, fractionated, Urine; Future  -     Cortisol, urine, free; Future  -     5 HIAA, quantitative, Urine; Future    Essential hypertension  -     Metanephrines, Fractionated 24 hr Urine; Future  -     Creatinine, urine, timed; Future  -     Catecholamines, fractionated, Urine; Future  -     Cortisol, urine, free; Future  -     5 HIAA, quantitative, Urine; Future  -     CORTISOL, URINE, FREE, 24 HOUR; Future  -     Metanephrines, Fractionated 24 hr Urine; Future  -     Creatinine, urine, timed; Future  -     Catecholamines, fractionated, Urine; Future  -     5 HIAA, quantitative, Urine; Future    Low testosterone  -     Sex  Hormone Binding Globulin; Future; Expected date: 05/31/2017          This note is unavailable for viewing online. Certain specialties, including Behavioral Health and Pain Management, are currently not included in the open progress note program. For notes unavailable online, you can request a copy of your medical record.

## 2017-06-01 ENCOUNTER — PATIENT MESSAGE (OUTPATIENT)
Dept: ENDOCRINOLOGY | Facility: CLINIC | Age: 47
End: 2017-06-01

## 2017-06-01 LAB
ESTIMATED AVG GLUCOSE: 91 MG/DL
HBA1C MFR BLD HPLC: 4.8 %

## 2017-06-02 ENCOUNTER — LAB VISIT (OUTPATIENT)
Dept: LAB | Facility: HOSPITAL | Age: 47
End: 2017-06-02
Attending: INTERNAL MEDICINE
Payer: MEDICARE

## 2017-06-02 DIAGNOSIS — I10 ESSENTIAL HYPERTENSION: ICD-10-CM

## 2017-06-02 DIAGNOSIS — R61 EXCESSIVE SWEATING: ICD-10-CM

## 2017-06-02 LAB
CREAT 24H UR-MRATE: 85 MG/HR
CREAT UR-MCNC: 60 MG/DL
CREATININE, URINE (MG/SPEC): 2040 MG/SPEC
SHBG SERPL-SCNC: 27 NMOL/L
URINE COLLECTION DURATION: 24 HR
URINE VOLUME: 3400 ML

## 2017-06-02 PROCEDURE — 82570 ASSAY OF URINE CREATININE: CPT

## 2017-06-02 PROCEDURE — 83497 ASSAY OF 5-HIAA: CPT

## 2017-06-02 PROCEDURE — 82384 ASSAY THREE CATECHOLAMINES: CPT

## 2017-06-04 LAB
5HIAA & CREATININE UR-IMP: NORMAL
5OH-INDOLEACETATE 24H UR-MCNC: 6.6 MG/L
5OH-INDOLEACETATE 24H UR-MRATE: NORMAL MG/D (ref 0–15)
5OH-INDOLEACETATE/CREAT 24H UR: 4 MG/GCR (ref 0–14)
CATECHOLS UR-IMP: NORMAL
COLLECT DURATION TIME SPEC: NORMAL HR
COLLECT DURATION TIME SPEC: NORMAL HR
CREAT 24H UR-MRATE: NORMAL MG/D (ref 1000–2500)
CREAT 24H UR-MRATE: NORMAL MG/D (ref 1000–2500)
CREAT UR-MCNC: 184 MG/DL
CREAT UR-MCNC: 184 MG/DL
DOPAMINE 24H UR-MRATE: NORMAL UG/D (ref 77–324)
DOPAMINE UR-MCNC: 282 UG/L
DOPAMINE/CREAT UR: 153 UG/G CRT (ref 0–250)
EPINEPH 24H UR-MRATE: NORMAL UG/D (ref 1–7)
EPINEPH UR-MCNC: 25 UG/L
EPINEPH/CREAT UR: 14 UG/G CRT (ref 0–20)
NOREPINEPH 24H UR-MRATE: NORMAL UG/D (ref 16–71)
NOREPINEPH UR-MCNC: 59 UG/L
NOREPINEPH/CREAT UR: 32 UG/G CRT (ref 0–45)
SPECIMEN VOL ?TM UR: NORMAL ML
SPECIMEN VOL ?TM UR: NORMAL ML

## 2017-06-05 LAB
5HIAA & CREATININE UR-IMP: NORMAL
5OH-INDOLEACETATE 24H UR-MCNC: 2 MG/L
5OH-INDOLEACETATE 24H UR-MRATE: NORMAL MG/D (ref 0–15)
5OH-INDOLEACETATE/CREAT 24H UR: 3 MG/GCR (ref 0–14)
CATECHOLS UR-IMP: NORMAL
COLLECT DURATION TIME SPEC: 24 HR
COLLECT DURATION TIME SPEC: 24 HR
COLLECT DURATION TIME UR: 24 H
CORTIS 24H UR-MRATE: 1.4 MCG/24 H (ref 3.5–45)
CREAT 24H UR-MRATE: NORMAL MG/D (ref 1000–2500)
CREAT 24H UR-MRATE: NORMAL MG/D (ref 1000–2500)
CREAT UR-MCNC: 60 MG/DL
CREAT UR-MCNC: 60 MG/DL
DOPAMINE 24H UR-MRATE: NORMAL UG/D (ref 77–324)
DOPAMINE UR-MCNC: 87 UG/L
DOPAMINE/CREAT UR: 145 UG/G CRT (ref 0–250)
EPINEPH 24H UR-MRATE: NORMAL UG/D (ref 1–7)
EPINEPH UR-MCNC: 3 UG/L
EPINEPH/CREAT UR: 5 UG/G CRT (ref 0–20)
NOREPINEPH 24H UR-MRATE: NORMAL UG/D (ref 16–71)
NOREPINEPH UR-MCNC: 15 UG/L
NOREPINEPH/CREAT UR: 25 UG/G CRT (ref 0–45)
SPECIMEN VOL ?TM UR: 60 ML
SPECIMEN VOL ?TM UR: NORMAL ML
SPECIMEN VOL ?TM UR: NORMAL ML

## 2017-06-07 ENCOUNTER — PATIENT MESSAGE (OUTPATIENT)
Dept: ENDOCRINOLOGY | Facility: CLINIC | Age: 47
End: 2017-06-07

## 2017-06-07 ENCOUNTER — TELEPHONE (OUTPATIENT)
Dept: ADMINISTRATIVE | Facility: HOSPITAL | Age: 47
End: 2017-06-07

## 2017-06-07 DIAGNOSIS — R61 EXCESSIVE SWEATING: Primary | ICD-10-CM

## 2017-06-07 DIAGNOSIS — I10 ESSENTIAL HYPERTENSION: ICD-10-CM

## 2017-06-07 NOTE — TELEPHONE ENCOUNTER
----- Message from Candace Vera MD sent at 6/7/2017  2:13 PM CDT -----  Please schedule fasting lab for plasma metanephrine

## 2017-06-08 ENCOUNTER — PATIENT MESSAGE (OUTPATIENT)
Dept: ADMINISTRATIVE | Facility: HOSPITAL | Age: 47
End: 2017-06-08

## 2017-06-08 NOTE — TELEPHONE ENCOUNTER
PATIENT SCHEDULED FOR LAB WORK AND 24 HOUR URINE AT [facility] ON 6/8/2017 PER PATEINTS REQUEST.

## 2017-06-09 ENCOUNTER — LAB VISIT (OUTPATIENT)
Dept: LAB | Facility: HOSPITAL | Age: 47
End: 2017-06-09
Attending: INTERNAL MEDICINE
Payer: MEDICARE

## 2017-06-09 DIAGNOSIS — R61 EXCESSIVE SWEATING: ICD-10-CM

## 2017-06-09 DIAGNOSIS — I10 ESSENTIAL HYPERTENSION: ICD-10-CM

## 2017-06-12 ENCOUNTER — LAB VISIT (OUTPATIENT)
Dept: LAB | Facility: HOSPITAL | Age: 47
End: 2017-06-12
Attending: INTERNAL MEDICINE
Payer: MEDICARE

## 2017-06-12 DIAGNOSIS — G80.9 CEREBRAL PALSY, UNSPECIFIED TYPE: ICD-10-CM

## 2017-06-12 DIAGNOSIS — G61.0 GUILLAIN-BARRE SYNDROME: ICD-10-CM

## 2017-06-12 DIAGNOSIS — R07.89 ATYPICAL CHEST PAIN: Primary | ICD-10-CM

## 2017-06-12 DIAGNOSIS — R61 DIAPHORESIS: ICD-10-CM

## 2017-06-12 DIAGNOSIS — R07.89 ATYPICAL CHEST PAIN: ICD-10-CM

## 2017-06-12 DIAGNOSIS — R61 DIAPHORESIS: Primary | ICD-10-CM

## 2017-06-12 PROCEDURE — 83835 ASSAY OF METANEPHRINES: CPT

## 2017-06-16 LAB
COLLECT DURATION TIME SPEC: NORMAL HR
CREAT 24H UR-MRATE: NORMAL MG/D (ref 1000–2500)
CREAT UR-MCNC: 70 MG/DL
METANEPH 24H UR-MCNC: 70 UG/L
METANEPH 24H UR-MRATE: NORMAL UG/D (ref 62–207)
METANEPH+NORMETANEPH UR-IMP: NORMAL
METANEPH/CREAT 24H UR: 100 UG/G CRT (ref 0–300)
NORMETANEPHRINE 24H UR-MCNC: 138 UG/L
NORMETANEPHRINE 24H UR-MRATE: NORMAL UG/D (ref 125–510)
NORMETANEPHRINE/CREAT 24H UR: 197 UG/G CRT (ref 0–400)
SPECIMEN VOL ?TM UR: NORMAL ML

## 2017-06-23 DIAGNOSIS — I10 ESSENTIAL HYPERTENSION: ICD-10-CM

## 2017-06-23 RX ORDER — LISINOPRIL AND HYDROCHLOROTHIAZIDE 12.5; 2 MG/1; MG/1
2 TABLET ORAL DAILY
Qty: 90 TABLET | Refills: 3 | Status: SHIPPED | OUTPATIENT
Start: 2017-06-23 | End: 2018-04-26 | Stop reason: SDUPTHER

## 2017-06-23 NOTE — TELEPHONE ENCOUNTER
----- Message from Ruth JALEN Gentile sent at 6/23/2017  9:30 AM CDT -----  Contact: Viki from [facility]   States she's calling rg needing a refill and can be reached at [phone]//thanks/dbw       1. What is the name of the medication you are requesting? Lisinopril   2. What is the dose? 12.5mg  3. How do you take the medication? Orally, topically, etc? orally  4. How often do you take this medication? Twice daily per pt   5. Do you need a 30 day or 90 day supply? 90 day  6. How many refills are you requesting? Rest of the yr per pharm  7. What is your preferred pharmacy and location of the pharmacy?   [facility] - [place] - [address] AT [facility]  [address]  [facility] [address]  Phone: [phone] Fax: [phone]      8. Who can we contact with further questions? Viki

## 2017-06-27 ENCOUNTER — PATIENT MESSAGE (OUTPATIENT)
Dept: ENDOCRINOLOGY | Facility: CLINIC | Age: 47
End: 2017-06-27

## 2017-08-07 ENCOUNTER — PATIENT MESSAGE (OUTPATIENT)
Dept: INTERNAL MEDICINE | Facility: CLINIC | Age: 47
End: 2017-08-07

## 2017-08-07 DIAGNOSIS — G47.00 INSOMNIA, UNSPECIFIED TYPE: Primary | ICD-10-CM

## 2017-08-07 RX ORDER — TRAZODONE HYDROCHLORIDE 50 MG/1
50 TABLET ORAL NIGHTLY
Qty: 30 TABLET | Refills: 1 | Status: SHIPPED | OUTPATIENT
Start: 2017-08-07 | End: 2018-01-05

## 2017-08-16 ENCOUNTER — PATIENT MESSAGE (OUTPATIENT)
Dept: INTERNAL MEDICINE | Facility: CLINIC | Age: 47
End: 2017-08-16

## 2017-08-16 DIAGNOSIS — G47.00 INSOMNIA, UNSPECIFIED TYPE: Primary | ICD-10-CM

## 2017-08-18 ENCOUNTER — PATIENT MESSAGE (OUTPATIENT)
Dept: INTERNAL MEDICINE | Facility: CLINIC | Age: 47
End: 2017-08-18

## 2017-08-18 RX ORDER — AMITRIPTYLINE HYDROCHLORIDE 50 MG/1
50 TABLET, FILM COATED ORAL NIGHTLY
Qty: 30 TABLET | Refills: 1 | Status: SHIPPED | OUTPATIENT
Start: 2017-08-18 | End: 2018-05-04 | Stop reason: SDUPTHER

## 2017-10-20 ENCOUNTER — PATIENT MESSAGE (OUTPATIENT)
Dept: INTERNAL MEDICINE | Facility: CLINIC | Age: 47
End: 2017-10-20

## 2017-10-20 DIAGNOSIS — G47.00 INSOMNIA, UNSPECIFIED TYPE: ICD-10-CM

## 2017-10-20 NOTE — TELEPHONE ENCOUNTER
It looks like emails with Dr. Lala show his psychiatrist should be refilling this. Can we contact patient and verify?

## 2017-10-23 RX ORDER — AMITRIPTYLINE HYDROCHLORIDE 50 MG/1
TABLET, FILM COATED ORAL
Qty: 30 TABLET | Refills: 1 | OUTPATIENT
Start: 2017-10-23

## 2017-11-18 DIAGNOSIS — I10 ESSENTIAL HYPERTENSION: ICD-10-CM

## 2017-11-18 RX ORDER — LISINOPRIL AND HYDROCHLOROTHIAZIDE 12.5; 2 MG/1; MG/1
TABLET ORAL
Qty: 90 TABLET | Refills: 3 | Status: SHIPPED | OUTPATIENT
Start: 2017-11-18 | End: 2017-12-06 | Stop reason: SDUPTHER

## 2017-11-22 ENCOUNTER — PATIENT OUTREACH (OUTPATIENT)
Dept: ADMINISTRATIVE | Facility: HOSPITAL | Age: 47
End: 2017-11-22

## 2017-12-06 ENCOUNTER — OFFICE VISIT (OUTPATIENT)
Dept: INTERNAL MEDICINE | Facility: CLINIC | Age: 47
End: 2017-12-06
Payer: MEDICARE

## 2017-12-06 VITALS
HEIGHT: 77 IN | SYSTOLIC BLOOD PRESSURE: 126 MMHG | HEART RATE: 59 BPM | WEIGHT: 306.25 LBS | TEMPERATURE: 98 F | DIASTOLIC BLOOD PRESSURE: 78 MMHG | OXYGEN SATURATION: 97 % | BODY MASS INDEX: 36.16 KG/M2

## 2017-12-06 DIAGNOSIS — Z72.0 TOBACCO USE: Primary | ICD-10-CM

## 2017-12-06 DIAGNOSIS — Z00.00 ROUTINE MEDICAL EXAM: ICD-10-CM

## 2017-12-06 PROCEDURE — 99999 PR PBB SHADOW E&M-EST. PATIENT-LVL III: CPT | Mod: PBBFAC,,, | Performed by: FAMILY MEDICINE

## 2017-12-06 PROCEDURE — 99213 OFFICE O/P EST LOW 20 MIN: CPT | Mod: S$GLB,,, | Performed by: FAMILY MEDICINE

## 2017-12-06 NOTE — PROGRESS NOTES
Valerio Yan  12/07/2017  [id number]    Therese Lala MD  Patient Care Team:  Therese Lala MD as PCP - General (Internal Medicine)    Has the patient seen any provider outside of the network since the last visit ? (no). If yes, HIPPA forms completed and records requested.      Visit Type:a scheduled routine follow-up visit    Chief Complaint:  Chief Complaint   Patient presents with    Annual Exam       History of Present Illness:      Still sweating, not hormone problem-endocrine   Neurology [facility]   Gained a lot of weight since February and hasn't done anything different. When gaining weight he developed sweating. He started back smoking b/c his symptoms started when he quit. He tried taking Chantix     Screening Questionnaires:    In the last two weeks how often have you felt down, depressed, or hopeless ( no )    In the last two weeks how often have you had little interest or pleasure in doing  (no )    In the last two weeks how often have you been bothered by the following problems:  1. Feeling nervous, anxious, or on edge ( no )    2. Not being able to stop or control worrying ( no)    3. Worrying too much about different things ( no)    4. Trouble relaxing ( no )    5. Being so restless that it is hard to sit still  (no )    6. Becoming easily annoyed or irritable (no)    7. Feeling afraid as if something awful might happen (no )    How often do you have a drink containing Alcohol? denied     Do you exercise  (yes ) moderately active    Do you take a baby Aspirin daily ( no)    Do you have an advance directive ( no ) The patient was given information regarding Living Will/Durable Power-of- if requested.     The following were reviewed: Active problem list, medication list, allergies, family history, social history, and Health Maintenance.     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    Hyperlipidemia     Hypertension     Joint pain     knees    LVH (left ventricular hypertrophy) due to hypertensive disease 2/10/2017    Mitral regurgitation 6/9/2016    Transfusion reaction     1 x  to PRBC fever     Past Surgical History:   Procedure Laterality Date    barrette esophagus      decubitus surgery      to sacral    ESOPHAGOGASTRODUODENOSCOPY      GASTROSTOMY TUBE PLACEMENT      KNEE ARTHROSCOPY  2002    PEG TUBE REMOVAL      RADIOFREQUENCY ABLATION      JUAN-EN-Y PROCEDURE      TRACHEAL SURGERY       Family History   Problem Relation Age of Onset    Heart attack Mother     Heart disease Mother     Heart disease Father     Heart attack Father      Social History     Social History    Marital status:      Spouse name: N/A    Number of children: 2    Years of education: N/A     Occupational History    Not on file.     Social History Main Topics    Smoking status: Former Smoker     Packs/day: 0.75     Years: 25.00     Types: Cigarettes     Start date: 4/4/1988     Quit date: 2/5/2017    Smokeless tobacco: Former User     Types: Snuff     Quit date: 1/6/1999    Alcohol use 8.4 oz/week     14 Glasses of wine per week    Drug use: No    Sexual activity: Yes     Partners: Female     Other Topics Concern    Not on file     Social History Narrative    No narrative on file     Patient Active Problem List   Diagnosis    Epigastric pain    Cerebral palsy    Polyneuropathy    Quadriplegia    Guillain-[place] syndrome    Ozuna esophagus    Peptic ulcer disease    Ozuna's esophagus    Typical atrial flutter    Ozuna's esophagus without dysplasia    Mitral regurgitation    Palpitations    Tobacco abuse    Diaphoresis    Hypertension    LVH (left ventricular hypertrophy) due to hypertensive disease    Mixed hyperlipidemia     Review of patient's allergies indicates:   Allergen Reactions     Latex      Other reaction(s): Itching  Other reaction(s): Hives    Reglan  [metoclopramide hcl]      Pt. Was in coma so is not aware of the reaction  Other reaction(s): Unable to obtain       Health Maintenance  Health Maintenance Topics with due status: Not Due       Topic Last Completion Date    Lipid Panel 05/03/2017     Health Maintenance Due   Topic Date Due    TETANUS VACCINE  10/15/1986       Medications:  Current Outpatient Prescriptions on File Prior to Visit   Medication Sig Dispense Refill    amitriptyline (ELAVIL) 50 MG tablet Take 1 tablet (50 mg total) by mouth every evening. 30 tablet 1    atorvastatin (LIPITOR) 20 MG tablet Take 1 tablet (20 mg total) by mouth once daily. 90 tablet 3    calcium-vitamin D 600 mg, 1,500mg,-200 unit 600 mg(1,500mg) -200 unit Tab Take 1 tablet by mouth once daily.       cyanocobalamin (VITAMIN B-12) 1,000 mcg/mL injection Inject 1 mL (1,000 mcg total) into the skin every 30 days. Please supply 3 mL syringes and 26 gauge 1/2 inch needles. 10 mL 1    esomeprazole (NEXIUM PACKET) 40 mg GrPS ONE PACKET ONCE DAILY 30 each 11    lisinopril-hydrochlorothiazide (PRINZIDE,ZESTORETIC) 20-12.5 mg per tablet Take 2 tablets by mouth once daily. 90 tablet 3    metoprolol succinate (TOPROL-XL) 50 MG 24 hr tablet Take 1 tablet (50 mg total) by mouth once daily. 90 tablet 3    multivitamin with minerals (MULTIPLE VITAMIN-MINERALS) tablet Take 1 tablet by mouth Daily.        trazodone (DESYREL) 50 MG tablet Take 1 tablet (50 mg total) by mouth every evening. 30 tablet 1     No current facility-administered medications on file prior to visit.        Medications have been reviewed and reconciled with patient at visit today.    Barriers to medications present (no )    Adverse reactions to current medications (no)    Over the counter medications reviewed (No) and if needed added to active Medication list.    Exam:  Vitals:    12/06/17 1139   BP: 126/78   Pulse: (!) 59   Temp: 97.8  °F (36.6 °C)     Weight: (!) 138.9 kg (306 lb 3.5 oz)   Body mass index is 36.79 kg/m².    Review of Systems   HENT: Negative for hearing loss.    Eyes: Negative for discharge.   Respiratory: Negative for wheezing.    Cardiovascular: Negative for chest pain and palpitations.   Gastrointestinal: Negative for blood in stool, constipation, diarrhea and vomiting.   Genitourinary: Negative for hematuria and urgency.   Musculoskeletal: Negative for neck pain.   Neurological: Negative for weakness and headaches.   Endo/Heme/Allergies: Negative for polydipsia.       Physical Exam  HENT:   Head: Normocephalic.   Surgical scar   Cardiovascular: Normal rate, regular rhythm and normal heart sounds.    Pulmonary/Chest: Effort normal and breath sounds normal.   Abdominal: Soft. Bowel sounds are normal. He exhibits no distension.   Musculoskeletal: Normal range of motion.   Braces lower extremities   Skin: Skin is warm    Laboratory Reviewed: (Yes)  Lab Results   Component Value Date    WBC 7.65 05/31/2017    HGB 14.1 05/31/2017    HCT 42.4 05/31/2017     (L) 05/31/2017    CHOL 190 05/03/2017    TRIG 207 (H) 05/03/2017    HDL 60 05/03/2017    ALT 51 (H) 05/03/2017    AST 43 (H) 05/03/2017     05/03/2017    K 4.0 05/03/2017     05/03/2017    CREATININE 0.8 05/03/2017    BUN 12 05/03/2017    CO2 27 05/03/2017    TSH 1.036 05/31/2017    INR 1.1 05/23/2016    HGBA1C 4.8 05/31/2017       Assessment:  The primary encounter diagnosis was Tobacco use. A diagnosis of Routine medical exam was also pertinent to this visit.    Plan:  Tobacco use  -     Ambulatory referral to Smoking Cessation Program    Routine medical exam          Follow up: Return if symptoms worsen or fail to improve.      Care Plan/Goals: Reviewed N/A  Goals     None              After visit summary printed and given to patient upon discharge.  Patient goals and care plan are included in After visit summary.      Answers for HPI/ROS submitted by the  patient on 12/5/2017   activity change: No  unexpected weight change: No  rhinorrhea: No  trouble swallowing: No  visual disturbance: No  chest tightness: No  polyuria: No  difficulty urinating: No  joint swelling: No  arthralgias: No  confusion: No  dysphoric mood: No

## 2018-01-04 ENCOUNTER — HOSPITAL ENCOUNTER (EMERGENCY)
Facility: HOSPITAL | Age: 48
Discharge: HOME | End: 2018-01-05
Attending: EMERGENCY MEDICINE
Payer: MEDICARE

## 2018-01-04 DIAGNOSIS — T78.40XA ALLERGIC REACTION, INITIAL ENCOUNTER: Primary | ICD-10-CM

## 2018-01-04 DIAGNOSIS — M79.606 LEG PAIN, DIFFUSE, UNSPECIFIED LATERALITY: ICD-10-CM

## 2018-01-04 LAB
BASOPHILS # BLD AUTO: 0.03 K/UL
BASOPHILS NFR BLD: 0.3 %
DIFFERENTIAL METHOD: ABNORMAL
EOSINOPHIL # BLD AUTO: 0.2 K/UL
EOSINOPHIL NFR BLD: 1.7 %
ERYTHROCYTE [DISTWIDTH] IN BLOOD BY AUTOMATED COUNT: 13.9 %
HCT VFR BLD AUTO: 44.6 %
HGB BLD-MCNC: 15.4 G/DL
LYMPHOCYTES # BLD AUTO: 2.8 K/UL
LYMPHOCYTES NFR BLD: 28.7 %
MCH RBC QN AUTO: 32.8 PG
MCHC RBC AUTO-ENTMCNC: 34.5 G/DL
MCV RBC AUTO: 95 FL
MONOCYTES # BLD AUTO: 1 K/UL
MONOCYTES NFR BLD: 9.8 %
NEUTROPHILS # BLD AUTO: 5.9 K/UL
NEUTROPHILS NFR BLD: 59.5 %
PLATELET # BLD AUTO: 182 K/UL
PMV BLD AUTO: 10.7 FL
RBC # BLD AUTO: 4.69 M/UL
WBC # BLD AUTO: 9.89 K/UL

## 2018-01-04 PROCEDURE — 63600175 PHARM REV CODE 636 W HCPCS: Performed by: EMERGENCY MEDICINE

## 2018-01-04 PROCEDURE — 85025 COMPLETE CBC W/AUTO DIFF WBC: CPT

## 2018-01-04 PROCEDURE — 25000003 PHARM REV CODE 250: Performed by: EMERGENCY MEDICINE

## 2018-01-04 PROCEDURE — 96374 THER/PROPH/DIAG INJ IV PUSH: CPT

## 2018-01-04 PROCEDURE — 80053 COMPREHEN METABOLIC PANEL: CPT

## 2018-01-04 PROCEDURE — 83605 ASSAY OF LACTIC ACID: CPT

## 2018-01-04 PROCEDURE — S0028 INJECTION, FAMOTIDINE, 20 MG: HCPCS | Performed by: EMERGENCY MEDICINE

## 2018-01-04 PROCEDURE — 96375 TX/PRO/DX INJ NEW DRUG ADDON: CPT

## 2018-01-04 PROCEDURE — 99284 EMERGENCY DEPT VISIT MOD MDM: CPT | Mod: 25

## 2018-01-04 RX ORDER — DIPHENHYDRAMINE HYDROCHLORIDE 50 MG/ML
25 INJECTION INTRAMUSCULAR; INTRAVENOUS
Status: COMPLETED | OUTPATIENT
Start: 2018-01-04 | End: 2018-01-04

## 2018-01-04 RX ORDER — ONDANSETRON 2 MG/ML
4 INJECTION INTRAMUSCULAR; INTRAVENOUS
Status: COMPLETED | OUTPATIENT
Start: 2018-01-04 | End: 2018-01-04

## 2018-01-04 RX ORDER — METHYLPREDNISOLONE SOD SUCC 125 MG
125 VIAL (EA) INJECTION
Status: COMPLETED | OUTPATIENT
Start: 2018-01-04 | End: 2018-01-04

## 2018-01-04 RX ORDER — FAMOTIDINE 10 MG/ML
20 INJECTION INTRAVENOUS
Status: COMPLETED | OUTPATIENT
Start: 2018-01-04 | End: 2018-01-04

## 2018-01-04 RX ORDER — HYDROMORPHONE HYDROCHLORIDE 2 MG/ML
1 INJECTION, SOLUTION INTRAMUSCULAR; INTRAVENOUS; SUBCUTANEOUS
Status: COMPLETED | OUTPATIENT
Start: 2018-01-04 | End: 2018-01-04

## 2018-01-04 RX ADMIN — ONDANSETRON 4 MG: 2 INJECTION, SOLUTION INTRAMUSCULAR; INTRAVENOUS at 10:01

## 2018-01-04 RX ADMIN — HYDROMORPHONE HYDROCHLORIDE 1 MG: 2 INJECTION, SOLUTION INTRAMUSCULAR; INTRAVENOUS; SUBCUTANEOUS at 10:01

## 2018-01-04 RX ADMIN — FAMOTIDINE 20 MG: 10 INJECTION, SOLUTION INTRAVENOUS at 11:01

## 2018-01-04 RX ADMIN — METHYLPREDNISOLONE SODIUM SUCCINATE 125 MG: 125 INJECTION, POWDER, FOR SOLUTION INTRAMUSCULAR; INTRAVENOUS at 11:01

## 2018-01-04 RX ADMIN — DIPHENHYDRAMINE HYDROCHLORIDE 25 MG: 50 INJECTION, SOLUTION INTRAMUSCULAR; INTRAVENOUS at 11:01

## 2018-01-05 ENCOUNTER — OFFICE VISIT (OUTPATIENT)
Dept: INTERNAL MEDICINE | Facility: CLINIC | Age: 48
End: 2018-01-05
Payer: MEDICARE

## 2018-01-05 VITALS
BODY MASS INDEX: 36.32 KG/M2 | HEIGHT: 77 IN | TEMPERATURE: 97 F | HEART RATE: 70 BPM | SYSTOLIC BLOOD PRESSURE: 136 MMHG | OXYGEN SATURATION: 94 % | DIASTOLIC BLOOD PRESSURE: 88 MMHG | WEIGHT: 307.56 LBS

## 2018-01-05 VITALS
SYSTOLIC BLOOD PRESSURE: 163 MMHG | BODY MASS INDEX: 36.64 KG/M2 | RESPIRATION RATE: 18 BRPM | WEIGHT: 310.31 LBS | HEART RATE: 92 BPM | OXYGEN SATURATION: 95 % | TEMPERATURE: 98 F | HEIGHT: 77 IN | DIASTOLIC BLOOD PRESSURE: 81 MMHG

## 2018-01-05 DIAGNOSIS — M79.605 PAIN IN BOTH LOWER EXTREMITIES: Primary | ICD-10-CM

## 2018-01-05 DIAGNOSIS — M79.89 SWELLING OF BOTH LOWER EXTREMITIES: ICD-10-CM

## 2018-01-05 DIAGNOSIS — M79.604 PAIN IN BOTH LOWER EXTREMITIES: Primary | ICD-10-CM

## 2018-01-05 DIAGNOSIS — L72.9 SCALP CYST: ICD-10-CM

## 2018-01-05 LAB
ALBUMIN SERPL BCP-MCNC: 3.5 G/DL
ALP SERPL-CCNC: 79 U/L
ALT SERPL W/O P-5'-P-CCNC: 63 U/L
ANION GAP SERPL CALC-SCNC: 13 MMOL/L
AST SERPL-CCNC: 60 U/L
BILIRUB SERPL-MCNC: 0.7 MG/DL
BUN SERPL-MCNC: 9 MG/DL
CALCIUM SERPL-MCNC: 9 MG/DL
CHLORIDE SERPL-SCNC: 102 MMOL/L
CO2 SERPL-SCNC: 23 MMOL/L
CREAT SERPL-MCNC: 0.7 MG/DL
EST. GFR  (AFRICAN AMERICAN): >60 ML/MIN/1.73 M^2
EST. GFR  (NON AFRICAN AMERICAN): >60 ML/MIN/1.73 M^2
GLUCOSE SERPL-MCNC: 105 MG/DL
LACTATE SERPL-SCNC: 1.9 MMOL/L
POTASSIUM SERPL-SCNC: 3.3 MMOL/L
PROT SERPL-MCNC: 7.1 G/DL
SODIUM SERPL-SCNC: 138 MMOL/L

## 2018-01-05 PROCEDURE — 96376 TX/PRO/DX INJ SAME DRUG ADON: CPT

## 2018-01-05 PROCEDURE — 99213 OFFICE O/P EST LOW 20 MIN: CPT | Mod: S$GLB,,, | Performed by: FAMILY MEDICINE

## 2018-01-05 PROCEDURE — 99999 PR PBB SHADOW E&M-EST. PATIENT-LVL III: CPT | Mod: PBBFAC,,, | Performed by: FAMILY MEDICINE

## 2018-01-05 PROCEDURE — 63600175 PHARM REV CODE 636 W HCPCS: Performed by: EMERGENCY MEDICINE

## 2018-01-05 RX ORDER — PREDNISONE 20 MG/1
40 TABLET ORAL DAILY
Qty: 10 TABLET | Refills: 0 | Status: SHIPPED | OUTPATIENT
Start: 2018-01-05 | End: 2018-01-10

## 2018-01-05 RX ORDER — OXYCODONE AND ACETAMINOPHEN 7.5; 325 MG/1; MG/1
1 TABLET ORAL EVERY 8 HOURS PRN
Qty: 12 TABLET | Refills: 0 | Status: SHIPPED | OUTPATIENT
Start: 2018-01-05 | End: 2018-01-08 | Stop reason: ALTCHOICE

## 2018-01-05 RX ORDER — PROCHLORPERAZINE MALEATE 10 MG
10 TABLET ORAL ONCE
Status: COMPLETED | OUTPATIENT
Start: 2018-01-05 | End: 2018-01-05

## 2018-01-05 RX ORDER — DIPHENHYDRAMINE HCL 25 MG
25 CAPSULE ORAL EVERY 6 HOURS PRN
Qty: 20 CAPSULE | Refills: 0 | COMMUNITY
Start: 2018-01-05 | End: 2018-03-20

## 2018-01-05 RX ORDER — HYDROMORPHONE HYDROCHLORIDE 2 MG/ML
1 INJECTION, SOLUTION INTRAMUSCULAR; INTRAVENOUS; SUBCUTANEOUS
Status: COMPLETED | OUTPATIENT
Start: 2018-01-05 | End: 2018-01-05

## 2018-01-05 RX ADMIN — HYDROMORPHONE HYDROCHLORIDE 1 MG: 2 INJECTION, SOLUTION INTRAMUSCULAR; INTRAVENOUS; SUBCUTANEOUS at 12:01

## 2018-01-05 RX ADMIN — PROCHLORPERAZINE MALEATE 10 MG: 5 TABLET, FILM COATED ORAL at 12:01

## 2018-01-05 NOTE — ED NOTES
Patient AAOx3, nad, no sob, no compliants of cp. P Continue to monitor patient while in ER. Call light in reach, side rails up, bed locked an in low position. Patient reports no pain or nausea after medications. Continue to monitor.

## 2018-01-05 NOTE — ED NOTES
LOC: The patient is awake, alert and aware of environment with an appropriate affect, the patient is oriented x 3 and speaking appropriately.  APPEARANCE: Patient resting comfortably and in no acute distress, patient is clean and well groomed, patient's clothing is properly fastened.  SKIN: The skin is warm and dry, color consistent with ethnicity, patient has normal skin turgor and moist mucus membranes, skin intact, no breakdown or bruising noted.  MUSCULOSKELETAL: Patient moving all extremities spontaneously, no obvious swelling or deformities noted.  RESPIRATORY: Airway is open and patent, respirations are spontaneous, patient has a normal effort and rate, no accessory muscle use noted, bilateral breath sounds equal and clear to auscultation. Patient reports no shortness of breath, no distress noted.  CARDIAC: Patient has a normal rate and regular rhythm, no periphreal edema noted, capillary refill < 3 seconds. Bilateral lower leg redness and swelling  ABDOMEN: Soft and non tender to palpation, no distention noted, normoactive bowel sounds present in all four quadrants.  NEUROLOGIC: PERRL, 3 mm bilaterally, eyes open spontaneously, behavior appropriate to situation, follows commands, facial expression symmetrical, bilateral hand grasp equal and even, purposeful motor response noted, normal sensation in all extremities when touched with a finger.  PSCYH: Brief WNL

## 2018-01-05 NOTE — ED PROVIDER NOTES
SCRIBE #1 NOTE: I, Corinne Mack, am scribing for, and in the presence of, Joe Berrios MD. I have scribed the entire note.      History      Chief Complaint   Patient presents with    Cellulitis     redness, warmth, and pain to R foot and leg.       Review of patient's allergies indicates:   Allergen Reactions    Latex      Other reaction(s): Itching  Other reaction(s): Hives    Reglan  [metoclopramide hcl]      Pt. Was in coma so is not aware of the reaction  Other reaction(s): Unable to obtain        HPI   HPI    1/4/2018, 9:39 PM   History obtained from the patient      History of Present Illness: Valerio Yan is a 47 y.o. male patient who presents to the Emergency Department for worsening BLE swelling which onset today at 4:00 PM. Symptoms are constant and moderate in severity. No mitigating or exacerbating factors reported. Associated sxs include BLE redness and BLE pain. Patient denies any fever, chills, CP, SOB, N/V, back pain, neck pian, HA, dizziness, and all other sxs at this time. No prior Tx reported. Pt has Hx of Guillain-[place] syndrome, HTN, and LVH. No further complaints or concerns at this time.         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    Hyperlipidemia     Hypertension     Joint pain     knees    LVH (left ventricular hypertrophy) due to hypertensive disease 2/10/2017    Mitral regurgitation 6/9/2016    Transfusion reaction     1 x  to PRBC fever       Past Surgical History:  Past Surgical History:   Procedure Laterality Date    barrette esophagus      decubitus surgery      to sacral    ESOPHAGOGASTRODUODENOSCOPY      GASTROSTOMY TUBE PLACEMENT      KNEE ARTHROSCOPY  2002    PEG TUBE REMOVAL       RADIOFREQUENCY ABLATION      JUAN-EN-Y PROCEDURE      TRACHEAL SURGERY           Family History:  Family History   Problem Relation Age of Onset    Heart attack Mother     Heart disease Mother     Heart disease Father     Heart attack Father        Social History:  Social History     Social History Main Topics    Smoking status: Former Smoker     Packs/day: 0.75     Years: 25.00     Types: Cigarettes     Start date: 4/4/1988     Quit date: 2/5/2017    Smokeless tobacco: Former User     Types: Snuff     Quit date: 1/6/1999    Alcohol use 8.4 oz/week     14 Glasses of wine per week    Drug use: No    Sexual activity: Yes     Partners: Female       ROS   Review of Systems   Constitutional: Negative for chills and fever.   Respiratory: Negative for cough and shortness of breath.    Cardiovascular: Positive for leg swelling (bilaterally). Negative for chest pain.   Gastrointestinal: Negative for abdominal pain, diarrhea, nausea and vomiting.   Musculoskeletal: Positive for myalgias (BLE). Negative for back pain, neck pain and neck stiffness.        (+) BLE redness   Skin: Negative for rash and wound.   Neurological: Negative for dizziness, light-headedness, numbness and headaches.   All other systems reviewed and are negative.    Physical Exam      Initial Vitals [01/04/18 2006]   BP Pulse Resp Temp SpO2   (!) 165/109 92 18 98.7 °F (37.1 °C) 98 %      MAP       127.67          Physical Exam  Nursing Notes and Vital Signs Reviewed.  Constitutional: Patient is in no apparent distress. Well-developed and well-nourished.  Head: Atraumatic. Normocephalic.  Eyes: PERRL. EOM intact. Conjunctivae are not pale. No scleral icterus.  ENT: Mucous membranes are moist. Oropharynx is clear and symmetric.    Neck: Supple. Full ROM. No lymphadenopathy.  Cardiovascular: Regular rate. Regular rhythm. No murmurs, rubs, or gallops. Distal pulses are 2+ and symmetric.  Pulmonary/Chest: No respiratory distress. Clear to auscultation  "bilaterally. No wheezing or rales.  Abdominal: Soft and non-distended.    Musculoskeletal: Moves all extremities. No obvious deformities. Slight BLE edema. Calf tenderness to palpation. BLE erythema.  Skin: Warm and dry.  Neurological:  Alert, awake, and appropriate.  Normal speech.  No acute focal neurological deficits are appreciated.  Psychiatric: Normal affect. Good eye contact. Appropriate in content.    ED Course    Procedures  ED Vital Signs:  Vitals:    01/04/18 2006 01/04/18 2244 01/04/18 2247 01/04/18 2301   BP: (!) 165/109 (!) 162/95 (!) 152/78 137/70   Pulse: 92 92 95 89   Resp: 18      Temp: 98.7 °F (37.1 °C)      TempSrc: Oral      SpO2: 98% 97% 97% 96%   Weight: (!) 140.8 kg (310 lb 4.8 oz)      Height: 6' 5" (1.956 m)       01/04/18 2331   BP: 136/67   Pulse: 87   Resp:    Temp:    TempSrc:    SpO2: (!) 92%   Weight:    Height:        Abnormal Lab Results:  Labs Reviewed   CBC W/ AUTO DIFFERENTIAL - Abnormal; Notable for the following:        Result Value    MCH 32.8 (*)     All other components within normal limits   COMPREHENSIVE METABOLIC PANEL - Abnormal; Notable for the following:     Potassium 3.3 (*)     AST 60 (*)     ALT 63 (*)     All other components within normal limits   CULTURE, BLOOD   CULTURE, BLOOD   LACTIC ACID, PLASMA        All Lab Results:  Results for orders placed or performed during the hospital encounter of 01/04/18   CBC auto differential   Result Value Ref Range    WBC 9.89 3.90 - 12.70 K/uL    RBC 4.69 4.60 - 6.20 M/uL    Hemoglobin 15.4 14.0 - 18.0 g/dL    Hematocrit 44.6 40.0 - 54.0 %    MCV 95 82 - 98 fL    MCH 32.8 (H) 27.0 - 31.0 pg    MCHC 34.5 32.0 - 36.0 g/dL    RDW 13.9 11.5 - 14.5 %    Platelets 182 150 - 350 K/uL    MPV 10.7 9.2 - 12.9 fL    Gran # 5.9 1.8 - 7.7 K/uL    Lymph # 2.8 1.0 - 4.8 K/uL    Mono # 1.0 0.3 - 1.0 K/uL    Eos # 0.2 0.0 - 0.5 K/uL    Baso # 0.03 0.00 - 0.20 K/uL    Gran% 59.5 38.0 - 73.0 %    Lymph% 28.7 18.0 - 48.0 %    Mono% 9.8 4.0 - " 15.0 %    Eosinophil% 1.7 0.0 - 8.0 %    Basophil% 0.3 0.0 - 1.9 %    Differential Method Automated    Comprehensive metabolic panel   Result Value Ref Range    Sodium 138 136 - 145 mmol/L    Potassium 3.3 (L) 3.5 - 5.1 mmol/L    Chloride 102 95 - 110 mmol/L    CO2 23 23 - 29 mmol/L    Glucose 105 70 - 110 mg/dL    BUN, Bld 9 6 - 20 mg/dL    Creatinine 0.7 0.5 - 1.4 mg/dL    Calcium 9.0 8.7 - 10.5 mg/dL    Total Protein 7.1 6.0 - 8.4 g/dL    Albumin 3.5 3.5 - 5.2 g/dL    Total Bilirubin 0.7 0.1 - 1.0 mg/dL    Alkaline Phosphatase 79 55 - 135 U/L    AST 60 (H) 10 - 40 U/L    ALT 63 (H) 10 - 44 U/L    Anion Gap 13 8 - 16 mmol/L    eGFR if African American >60 >60 mL/min/1.73 m^2    eGFR if non African American >60 >60 mL/min/1.73 m^2   Lactic acid, plasma   Result Value Ref Range    Lactate (Lactic Acid) 1.9 0.5 - 2.2 mmol/L       Imaging Results:  Imaging Results          US Lower Extremity Veins Bilateral (Final result)  Result time 01/04/18 23:37:56    Final result by Joo Sosa MD (01/04/18 23:37:56)                 Impression:     No evidence of deep venous thrombosis bilateral lower extremities.      Electronically signed by: JOO SOSA  Date:     01/04/18  Time:    23:37              Narrative:    Exam: US LOWER EXTREMITY VEINS BILATERAL     Indication: M79.606 Pain in leg, unspecified;    Findings: There is documented compressibility and color Doppler flow with normal venous waveform and good calf augmentation response bilateral lower extremities.                             US Lower Extremity Arteries Bilateral (Final result)  Result time 01/05/18 00:15:21    Final result by Joo Sosa MD (01/05/18 00:15:21)                 Impression:     Less than 30% narrowing between the left popliteal and anterior tibial artery with only mildly increased peak systolic velocity.  Otherwise, unremarkable bilateral lower extremity arterial ultrasound.            Electronically signed by: JOO SOSA  Date:      01/05/18  Time:    00:15              Narrative:    Exam: US LOWER EXTREMITY ARTERIES BILATERAL     Indication: M79.606 Pain in leg, unspecified;    Findings: There is no identifiable atherosclerotic plaque involving either lower extremity.  Normal triphasic waveforms above the knee and biphasic waveforms below the knee.  There is no hemodynamically significant stenosis.  No occlusion.  Less than 50% narrowing between the left popliteal and anterior tibial arteries.  Peak velocities are as follows:    Right lower extremity:  Common femoral 135 cm/s  profunda is 79  proximal   mid   distal SFA 83  popliteal 97  anterior tibial 90  posterior tibial 70  dorsalis pedis 81    Left lower extremity:  common femoral 129 cm/s  profunda femoral 50  proximal   mid   distal SFA 77  popliteal 87  anterior tibial 125  the posterior tibial 83  dorsalis pedis 71                                      The Emergency Provider reviewed the vital signs and test results, which are outlined above.    ED Discussion     10:46 PM: Pt now has erythema to his distal BUE. Pt is in no respiratory distress.    12:40 AM: Reassessed pt at this time. Pt is awake, alert, and in no distress. Discussed with pt all pertinent ED information and results. Discussed pt dx and plan of tx. Gave pt all f/u and return to the ED instructions. All questions and concerns were addressed at this time. Pt expresses understanding of information and instructions, and is comfortable with plan to discharge. Pt is stable for discharge.    I discussed with patient and/or family/caretaker that evaluation in the ED does not suggest any emergent or life threatening medical conditions requiring immediate intervention beyond what was provided in the ED, and I believe patient is safe for discharge.  Regardless, an unremarkable evaluation in the ED does not preclude the development or presence of a serious of life threatening condition. As such,  patient was instructed to return immediately for any worsening or change in current symptoms.    Patient is safe for discharge. There is no suggestion of airway or ENT emergency. Patient is hemodynamically stable and there is no suggestion of active anaphylaxis or progressive worsening of current symptoms.      ED Medication(s):  Medications   hydromorphone (PF) injection 1 mg (1 mg Intravenous Given 1/4/18 2245)   ondansetron injection 4 mg (4 mg Intravenous Given 1/4/18 2244)   methylPREDNISolone sodium succinate injection 125 mg (125 mg Intravenous Given 1/4/18 2300)   famotidine (PF) injection 20 mg (20 mg Intravenous Given 1/4/18 2300)   diphenhydrAMINE injection 25 mg (25 mg Intravenous Given 1/4/18 2300)   hydromorphone (PF) injection 1 mg (1 mg Intravenous Given 1/5/18 0014)   prochlorperazine tablet 10 mg (10 mg Oral Given 1/5/18 0015)       New Prescriptions    DIPHENHYDRAMINE (BENADRYL) 25 MG CAPSULE    Take 1 each (25 mg total) by mouth every 6 (six) hours as needed for Itching or Allergies.    PREDNISONE (DELTASONE) 20 MG TABLET    Take 2 tablets (40 mg total) by mouth once daily.       Follow-up Information     Therese Lala MD. Call in 1 day.    Specialty:  Internal Medicine  Contact information:  [address] [place] [address] [phone]             [facility]    Specialty:  Emergency Medicine  Why:  If symptoms worsen  Contact information:  [address] [address] [facility] [address] [phone]                   Medical Decision Making    Medical Decision Making:   Clinical Tests:   Lab Tests: Ordered and Reviewed  Radiological Study: Ordered and Reviewed           Scribe Attestation:   Scribe #1: I performed the above scribed service and the documentation accurately describes the services I performed. I attest to the accuracy of the note.    Attending:   Physician Attestation Statement for Scribe #1: I, Joe Berrios MD,  personally performed the services described in this documentation, as scribed by Corinne Mack, in my presence, and it is both accurate and complete.          Clinical Impression       ICD-10-CM ICD-9-CM   1. Allergic reaction, initial encounter T78.40XA 995.3   2. Leg pain, diffuse, unspecified laterality M79.606 729.5       Disposition:   Disposition: Discharged  Condition: Stable         Joe Berrios MD  01/09/18 0401

## 2018-01-05 NOTE — ED NOTES
bc 1 collected at 2245, from left ac. Per provider hold sending cultures to lab. If need blood cultures will collect #2 and send. Patient clincial picture presented more like allergic response as patients redness spreading now to bilateral lower ext and bilateral upper ext. Patient denies itching. Patient denies new medications, denies different or unusal food consumptoion. Denies any new detergents collognes etc.

## 2018-01-05 NOTE — PROGRESS NOTES
Subjective:       Patient ID: Valerio Yan is a 47 y.o. male.    Chief Complaint: Leg Pain (ER f/u bilateral leg pain)    HPI Mr. Yan presents today after being seen in the ED yesterday for leg swelling and what was thought to be an allergic reaction. HPI from ED below    History of Present Illness: Valerio Yan is a 47 y.o. male patient who presents to the Emergency Department for worsening BLE swelling which onset today at 4:00 PM. Symptoms are constant and moderate in severity. No mitigating or exacerbating factors reported. Associated sxs include BLE redness and BLE pain. Patient denies any fever, chills, CP, SOB, N/V, back pain, neck pian, HA, dizziness, and all other sxs at this time. No prior Tx reported. Pt has Hx of Guillain-[place] syndrome, HTN, and LVH. No further complaints or concerns at this time.     In the ED he was given Dilaudid which helped for an hour then the second dose lasted 15 minutes  Swelling went down with benadryl.   Blood pressure improved   At first felt the pain was from his brace that he wears but pain got worse after taking shoe off.   He was fine yesterday until all of a sudden the redness, pain and swelling occurred.  He does not recall eating anything or doing anything different.    Since leaving the hospital it is worse in regard to pain.   Symptoms came on fast.   4/10 - 8/10- between 4 and 6 pm yesterday prior to going to the ED.     Took another dose of benadryl last night.     Review of Systems   Constitutional: Negative for activity change and unexpected weight change.   HENT: Negative for hearing loss, rhinorrhea and trouble swallowing.    Eyes: Negative for discharge and visual disturbance.   Respiratory: Negative for chest tightness and wheezing.    Cardiovascular: Positive for palpitations. Negative for chest pain.   Gastrointestinal: Positive for diarrhea. Negative for blood in stool, constipation and vomiting.   Endocrine: Negative for polydipsia  and polyuria.   Genitourinary: Negative for difficulty urinating, hematuria and urgency.   Musculoskeletal: Positive for joint swelling. Negative for arthralgias and neck pain.   Neurological: Positive for weakness and headaches.   Psychiatric/Behavioral: Negative for confusion and dysphoric mood.           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   HENT:   Head: Normocephalic and atraumatic.   Musculoskeletal: He exhibits edema.   Trace edema bilateral lower extremities.   Redness noted from knees to feet.   Pulses 2+ bilaterally   Feet are Cool to touch   Vitals reviewed.        Assessment/Plan:   Pain in both lower extremities  -     oxyCODONE-acetaminophen (PERCOCET) 7.5-325 mg per tablet; Take 1 tablet by mouth every 8 (eight) hours as needed for Pain.  Dispense: 12 tablet; Refill: 0  Patient prefers not to have more than he would use over the next few days. He has addictive tendencies when it comes to medication in the past. Last time he needed pain medication he was able to do fine and had medication left over. .    Symptoms thought to be an allergic reaction since it did respond to benadryl. Take one more dose of benadryl and  steroids given in the ED yesterday. Follow up Monday of next week if symptoms persist. Go to ED if symptoms worsen over the weekend.   Reviewed results from ED visit yesterday.    Swelling of both lower extremities-improved since yesterday  Symptoms thought to be an allergic reaction since it did  respond to benadryl. Take one more dose of benadryl and  steroids given in the ED yesterday. Follow up Monday of next week if symptoms persist. Go to ED if symptoms worsen over the weekend    Scalp cyst  Will monitor for now. Previous Physician said they would remove it if it got any larger. I am not comfortable with opening it as it is beneath the scalp.   If he would like it removed in the future will send to surgery for consult.         Return if symptoms worsen or fail to improve.    Therese Lala MD  [facility]   Family Medicine

## 2018-01-07 ENCOUNTER — PATIENT MESSAGE (OUTPATIENT)
Dept: INTERNAL MEDICINE | Facility: CLINIC | Age: 48
End: 2018-01-07

## 2018-01-07 DIAGNOSIS — M79.89 SWELLING OF BOTH LOWER EXTREMITIES: ICD-10-CM

## 2018-01-07 DIAGNOSIS — M79.605 PAIN IN BOTH LOWER EXTREMITIES: Primary | ICD-10-CM

## 2018-01-07 DIAGNOSIS — M79.604 PAIN IN BOTH LOWER EXTREMITIES: Primary | ICD-10-CM

## 2018-01-08 ENCOUNTER — TELEPHONE (OUTPATIENT)
Dept: RADIOLOGY | Facility: HOSPITAL | Age: 48
End: 2018-01-08

## 2018-01-08 ENCOUNTER — PATIENT MESSAGE (OUTPATIENT)
Dept: INTERNAL MEDICINE | Facility: CLINIC | Age: 48
End: 2018-01-08

## 2018-01-08 ENCOUNTER — TELEPHONE (OUTPATIENT)
Dept: INTERNAL MEDICINE | Facility: CLINIC | Age: 48
End: 2018-01-08

## 2018-01-08 DIAGNOSIS — M79.89 PAIN AND SWELLING OF LEFT LOWER EXTREMITY: Primary | ICD-10-CM

## 2018-01-08 DIAGNOSIS — M79.89 PAIN AND SWELLING OF RIGHT LOWER EXTREMITY: ICD-10-CM

## 2018-01-08 DIAGNOSIS — M79.604 PAIN AND SWELLING OF RIGHT LOWER EXTREMITY: ICD-10-CM

## 2018-01-08 DIAGNOSIS — M79.605 PAIN AND SWELLING OF LEFT LOWER EXTREMITY: Primary | ICD-10-CM

## 2018-01-08 RX ORDER — OXYCODONE AND ACETAMINOPHEN 10; 325 MG/1; MG/1
1 TABLET ORAL EVERY 6 HOURS PRN
Qty: 12 TABLET | Refills: 0 | Status: SHIPPED | OUTPATIENT
Start: 2018-01-08 | End: 2018-01-11 | Stop reason: SDUPTHER

## 2018-01-08 RX ORDER — IBUPROFEN 800 MG/1
800 TABLET ORAL 3 TIMES DAILY
Qty: 20 TABLET | Refills: 0 | Status: SHIPPED | OUTPATIENT
Start: 2018-01-08 | End: 2018-01-22 | Stop reason: SDUPTHER

## 2018-01-08 NOTE — TELEPHONE ENCOUNTER
Spoke to Dr Lala @ 10:00am about venous and arterial u/s. Stated that pt wanted studies standing . I suggested a Venous insuffiencey and exercise LISA would probably be the correct order. Dr Lala agreed.

## 2018-01-08 NOTE — TELEPHONE ENCOUNTER
With history with pain medication lets try a higher dose of the Ibuprofen. I sent 800 mg to the pharmacy today.

## 2018-01-08 NOTE — TELEPHONE ENCOUNTER
Appts scheduled Wed 01/08/18 @ 1pm. Pt states he may need more pain med. Took OTC IBU and states it helped pain but did not take it completely away. Please review.

## 2018-01-10 ENCOUNTER — CLINICAL SUPPORT (OUTPATIENT)
Dept: CARDIOLOGY | Facility: CLINIC | Age: 48
End: 2018-01-10
Attending: FAMILY MEDICINE
Payer: MEDICARE

## 2018-01-10 ENCOUNTER — PATIENT MESSAGE (OUTPATIENT)
Dept: INTERNAL MEDICINE | Facility: CLINIC | Age: 48
End: 2018-01-10

## 2018-01-10 DIAGNOSIS — M79.89 PAIN AND SWELLING OF RIGHT LOWER EXTREMITY: ICD-10-CM

## 2018-01-10 DIAGNOSIS — M79.605 PAIN AND SWELLING OF LEFT LOWER EXTREMITY: ICD-10-CM

## 2018-01-10 DIAGNOSIS — M79.89 PAIN AND SWELLING OF LEFT LOWER EXTREMITY: ICD-10-CM

## 2018-01-10 DIAGNOSIS — M79.89 PAIN AND SWELLING OF LOWER EXTREMITY, UNSPECIFIED LATERALITY: ICD-10-CM

## 2018-01-10 DIAGNOSIS — M79.604 LOWER EXTREMITY PAIN, BILATERAL: Primary | ICD-10-CM

## 2018-01-10 DIAGNOSIS — M79.89 PAIN AND SWELLING OF RIGHT LOWER EXTREMITY: Primary | ICD-10-CM

## 2018-01-10 DIAGNOSIS — M79.604 PAIN IN BOTH LOWER EXTREMITIES: ICD-10-CM

## 2018-01-10 DIAGNOSIS — M79.604 PAIN AND SWELLING OF RIGHT LOWER EXTREMITY: ICD-10-CM

## 2018-01-10 DIAGNOSIS — M79.604 PAIN AND SWELLING OF RIGHT LOWER EXTREMITY: Primary | ICD-10-CM

## 2018-01-10 DIAGNOSIS — L53.9 ERYTHEMA OF LOWER EXTREMITY: ICD-10-CM

## 2018-01-10 DIAGNOSIS — M79.605 LOWER EXTREMITY PAIN, BILATERAL: Primary | ICD-10-CM

## 2018-01-10 DIAGNOSIS — M79.605 PAIN IN BOTH LOWER EXTREMITIES: ICD-10-CM

## 2018-01-10 DIAGNOSIS — M79.606 PAIN AND SWELLING OF LOWER EXTREMITY, UNSPECIFIED LATERALITY: ICD-10-CM

## 2018-01-10 LAB — VASCULAR ANKLE BRACHIAL INDEX (ABI) RIGHT: 1.11 (ref 0.9–1.2)

## 2018-01-10 PROCEDURE — 93922 UPR/L XTREMITY ART 2 LEVELS: CPT | Mod: S$GLB,,, | Performed by: INTERNAL MEDICINE

## 2018-01-10 NOTE — TELEPHONE ENCOUNTER
----- Message from Therese Lala MD sent at 1/10/2018  8:12 AM CST -----      ----- Message -----  From: Jaclyn Rico  Sent: 1/9/2018   1:12 PM  To: Therese Lala MD    Hi Dr Lala, we have another order for a venous US on Mr Yan, and noticed a standing venous ultrasound to check for venous insufficiency was ordered, however, [facility] and our cardiologists don't have a protocol for doing these studies. These studies are done at a vascular clinic, usually Dr Campa's office. The exam we do in cardiology would basically be the same study he had a few days before. Sorry for the confusion, we will be happy to do his LISA with exercise when he come in on Wednesday.      Jaclyn Rico RCS, RVS  [facility]

## 2018-01-11 ENCOUNTER — PATIENT MESSAGE (OUTPATIENT)
Dept: INTERNAL MEDICINE | Facility: CLINIC | Age: 48
End: 2018-01-11

## 2018-01-11 RX ORDER — FUROSEMIDE 20 MG/1
20 TABLET ORAL 2 TIMES DAILY PRN
Qty: 60 TABLET | Refills: 0 | Status: SHIPPED | OUTPATIENT
Start: 2018-01-11 | End: 2018-02-20 | Stop reason: SDUPTHER

## 2018-01-11 RX ORDER — OXYCODONE AND ACETAMINOPHEN 10; 325 MG/1; MG/1
1 TABLET ORAL EVERY 8 HOURS PRN
Qty: 15 TABLET | Refills: 0 | Status: SHIPPED | OUTPATIENT
Start: 2018-01-11 | End: 2018-01-15 | Stop reason: SDUPTHER

## 2018-01-12 ENCOUNTER — TELEPHONE (OUTPATIENT)
Dept: INTERNAL MEDICINE | Facility: CLINIC | Age: 48
End: 2018-01-12

## 2018-01-12 NOTE — TELEPHONE ENCOUNTER
----- Message from Gema Morrow sent at 1/12/2018  8:07 AM CST -----  Contact: [facility]   [facility] called in regards to order for pt above need to know if pt need a ultrasound or need to see the doctor or both fax number is [phone] if questions call [phone]

## 2018-01-15 ENCOUNTER — PATIENT MESSAGE (OUTPATIENT)
Dept: INTERNAL MEDICINE | Facility: CLINIC | Age: 48
End: 2018-01-15

## 2018-01-15 ENCOUNTER — CLINICAL SUPPORT (OUTPATIENT)
Dept: SMOKING CESSATION | Facility: CLINIC | Age: 48
End: 2018-01-15
Payer: COMMERCIAL

## 2018-01-15 DIAGNOSIS — M79.604 PAIN IN BOTH LOWER EXTREMITIES: ICD-10-CM

## 2018-01-15 DIAGNOSIS — F17.200 NICOTINE DEPENDENCE: Primary | ICD-10-CM

## 2018-01-15 DIAGNOSIS — M79.605 PAIN IN BOTH LOWER EXTREMITIES: ICD-10-CM

## 2018-01-15 PROCEDURE — 99406 BEHAV CHNG SMOKING 3-10 MIN: CPT | Mod: S$GLB,,,

## 2018-01-15 RX ORDER — OXYCODONE AND ACETAMINOPHEN 10; 325 MG/1; MG/1
1 TABLET ORAL EVERY 8 HOURS PRN
Qty: 20 TABLET | Refills: 0 | Status: SHIPPED | OUTPATIENT
Start: 2018-01-15 | End: 2018-01-22 | Stop reason: SDUPTHER

## 2018-01-17 ENCOUNTER — PATIENT MESSAGE (OUTPATIENT)
Dept: INTERNAL MEDICINE | Facility: CLINIC | Age: 48
End: 2018-01-17

## 2018-01-17 DIAGNOSIS — L03.119 CELLULITIS OF LOWER EXTREMITY, UNSPECIFIED LATERALITY: Primary | ICD-10-CM

## 2018-01-17 RX ORDER — CLINDAMYCIN HYDROCHLORIDE 300 MG/1
300 CAPSULE ORAL EVERY 8 HOURS
Qty: 30 CAPSULE | Refills: 0 | Status: SHIPPED | OUTPATIENT
Start: 2018-01-17 | End: 2018-03-20

## 2018-01-18 ENCOUNTER — PATIENT MESSAGE (OUTPATIENT)
Dept: INTERNAL MEDICINE | Facility: CLINIC | Age: 48
End: 2018-01-18

## 2018-01-19 ENCOUNTER — PATIENT MESSAGE (OUTPATIENT)
Dept: INTERNAL MEDICINE | Facility: CLINIC | Age: 48
End: 2018-01-19

## 2018-01-19 DIAGNOSIS — M79.604 PAIN IN BOTH LOWER EXTREMITIES: ICD-10-CM

## 2018-01-19 DIAGNOSIS — M79.605 PAIN IN BOTH LOWER EXTREMITIES: ICD-10-CM

## 2018-01-22 DIAGNOSIS — M79.604 PAIN IN BOTH LOWER EXTREMITIES: ICD-10-CM

## 2018-01-22 DIAGNOSIS — M79.605 PAIN IN BOTH LOWER EXTREMITIES: ICD-10-CM

## 2018-01-22 RX ORDER — OXYCODONE AND ACETAMINOPHEN 10; 325 MG/1; MG/1
1 TABLET ORAL EVERY 8 HOURS PRN
Qty: 20 TABLET | Refills: 0 | Status: CANCELLED | OUTPATIENT
Start: 2018-01-22

## 2018-01-22 RX ORDER — OXYCODONE AND ACETAMINOPHEN 10; 325 MG/1; MG/1
1 TABLET ORAL EVERY 6 HOURS PRN
Qty: 30 TABLET | Refills: 0 | Status: SHIPPED | OUTPATIENT
Start: 2018-01-22 | End: 2018-01-25 | Stop reason: SDUPTHER

## 2018-01-22 RX ORDER — IBUPROFEN 800 MG/1
800 TABLET ORAL 3 TIMES DAILY
Qty: 30 TABLET | Refills: 0 | Status: SHIPPED | OUTPATIENT
Start: 2018-01-22 | End: 2018-02-14

## 2018-01-24 ENCOUNTER — TELEPHONE (OUTPATIENT)
Dept: SMOKING CESSATION | Facility: CLINIC | Age: 48
End: 2018-01-24

## 2018-01-24 ENCOUNTER — PATIENT MESSAGE (OUTPATIENT)
Dept: INTERNAL MEDICINE | Facility: CLINIC | Age: 48
End: 2018-01-24

## 2018-01-24 DIAGNOSIS — Z98.84 HISTORY OF ROUX-EN-Y GASTRIC BYPASS: ICD-10-CM

## 2018-01-24 RX ORDER — CYANOCOBALAMIN 1000 UG/ML
1000 INJECTION, SOLUTION INTRAMUSCULAR; SUBCUTANEOUS
Qty: 10 ML | Refills: 1 | Status: SHIPPED | OUTPATIENT
Start: 2018-01-24 | End: 2018-02-26

## 2018-01-24 NOTE — TELEPHONE ENCOUNTER
Attempted to contact patient to follow up with smoking cessation and offer to reschedule his intake. I was able to leave a detailed message with my contact information, Cecilia Carbajal, [phone]. Requested a return call.

## 2018-01-25 ENCOUNTER — PATIENT MESSAGE (OUTPATIENT)
Dept: INTERNAL MEDICINE | Facility: CLINIC | Age: 48
End: 2018-01-25

## 2018-01-25 DIAGNOSIS — M79.604 PAIN IN BOTH LOWER EXTREMITIES: ICD-10-CM

## 2018-01-25 DIAGNOSIS — M79.605 PAIN IN BOTH LOWER EXTREMITIES: ICD-10-CM

## 2018-01-25 RX ORDER — OXYCODONE AND ACETAMINOPHEN 10; 325 MG/1; MG/1
1 TABLET ORAL EVERY 6 HOURS PRN
Qty: 30 TABLET | Refills: 0 | Status: SHIPPED | OUTPATIENT
Start: 2018-01-25 | End: 2018-02-01 | Stop reason: SDUPTHER

## 2018-01-29 ENCOUNTER — PATIENT MESSAGE (OUTPATIENT)
Dept: ADMINISTRATIVE | Facility: OTHER | Age: 48
End: 2018-01-29

## 2018-01-31 ENCOUNTER — PATIENT MESSAGE (OUTPATIENT)
Dept: INTERNAL MEDICINE | Facility: CLINIC | Age: 48
End: 2018-01-31

## 2018-01-31 ENCOUNTER — CLINICAL SUPPORT (OUTPATIENT)
Dept: SMOKING CESSATION | Facility: CLINIC | Age: 48
End: 2018-01-31
Payer: COMMERCIAL

## 2018-01-31 DIAGNOSIS — F17.210 MODERATE SMOKER (20 OR LESS PER DAY): Primary | ICD-10-CM

## 2018-01-31 DIAGNOSIS — Z72.0 TOBACCO USE: Primary | ICD-10-CM

## 2018-01-31 PROCEDURE — 99999 PR PBB SHADOW E&M-EST. PATIENT-LVL I: CPT | Mod: PBBFAC,,,

## 2018-01-31 PROCEDURE — 99404 PREV MED CNSL INDIV APPRX 60: CPT | Mod: S$GLB,,,

## 2018-01-31 RX ORDER — IBUPROFEN 200 MG
1 TABLET ORAL DAILY
Qty: 14 PATCH | Refills: 0 | Status: SHIPPED | OUTPATIENT
Start: 2018-01-31 | End: 2018-03-20

## 2018-01-31 RX ORDER — VARENICLINE TARTRATE 0.5 (11)-1
KIT ORAL
Qty: 1 PACKAGE | Refills: 0 | Status: SHIPPED | OUTPATIENT
Start: 2018-01-31 | End: 2018-08-02

## 2018-01-31 RX ORDER — BUPROPION HYDROCHLORIDE 150 MG/1
150 TABLET, EXTENDED RELEASE ORAL 2 TIMES DAILY
Qty: 60 TABLET | Refills: 0 | Status: SHIPPED | OUTPATIENT
Start: 2018-01-31 | End: 2018-03-20

## 2018-02-01 ENCOUNTER — PATIENT MESSAGE (OUTPATIENT)
Dept: INTERNAL MEDICINE | Facility: CLINIC | Age: 48
End: 2018-02-01

## 2018-02-01 DIAGNOSIS — M79.604 PAIN IN BOTH LOWER EXTREMITIES: ICD-10-CM

## 2018-02-01 DIAGNOSIS — G90.523 COMPLEX REGIONAL PAIN SYNDROME TYPE 1 OF BOTH LOWER EXTREMITIES: Primary | ICD-10-CM

## 2018-02-01 DIAGNOSIS — M79.605 PAIN IN BOTH LOWER EXTREMITIES: ICD-10-CM

## 2018-02-01 RX ORDER — OXYCODONE AND ACETAMINOPHEN 10; 325 MG/1; MG/1
1 TABLET ORAL EVERY 4 HOURS PRN
Qty: 60 TABLET | Refills: 0 | Status: SHIPPED | OUTPATIENT
Start: 2018-02-01 | End: 2018-02-09 | Stop reason: SDUPTHER

## 2018-02-01 RX ORDER — OXYCODONE AND ACETAMINOPHEN 10; 325 MG/1; MG/1
1 TABLET ORAL EVERY 6 HOURS PRN
Qty: 30 TABLET | Refills: 0 | Status: SHIPPED | OUTPATIENT
Start: 2018-02-01 | End: 2018-02-01 | Stop reason: SDUPTHER

## 2018-02-08 ENCOUNTER — PATIENT MESSAGE (OUTPATIENT)
Dept: PAIN MEDICINE | Facility: CLINIC | Age: 48
End: 2018-02-08

## 2018-02-08 DIAGNOSIS — M79.605 PAIN IN BOTH LOWER EXTREMITIES: ICD-10-CM

## 2018-02-08 DIAGNOSIS — M79.604 PAIN IN BOTH LOWER EXTREMITIES: ICD-10-CM

## 2018-02-08 DIAGNOSIS — G90.523 COMPLEX REGIONAL PAIN SYNDROME TYPE 1 OF BOTH LOWER EXTREMITIES: ICD-10-CM

## 2018-02-08 RX ORDER — OXYCODONE AND ACETAMINOPHEN 10; 325 MG/1; MG/1
1 TABLET ORAL EVERY 4 HOURS PRN
Qty: 60 TABLET | Refills: 0 | Status: CANCELLED | OUTPATIENT
Start: 2018-02-08

## 2018-02-09 ENCOUNTER — OFFICE VISIT (OUTPATIENT)
Dept: INTERNAL MEDICINE | Facility: CLINIC | Age: 48
End: 2018-02-09
Payer: MEDICARE

## 2018-02-09 ENCOUNTER — PATIENT MESSAGE (OUTPATIENT)
Dept: INTERNAL MEDICINE | Facility: CLINIC | Age: 48
End: 2018-02-09

## 2018-02-09 VITALS
SYSTOLIC BLOOD PRESSURE: 130 MMHG | DIASTOLIC BLOOD PRESSURE: 80 MMHG | WEIGHT: 297.81 LBS | HEIGHT: 77 IN | TEMPERATURE: 96 F | BODY MASS INDEX: 35.16 KG/M2 | OXYGEN SATURATION: 85 % | HEART RATE: 99 BPM

## 2018-02-09 DIAGNOSIS — R49.0 HOARSENESS OF VOICE: ICD-10-CM

## 2018-02-09 DIAGNOSIS — G90.523 COMPLEX REGIONAL PAIN SYNDROME TYPE 1 OF BOTH LOWER EXTREMITIES: ICD-10-CM

## 2018-02-09 DIAGNOSIS — M79.671 PAIN IN BOTH FEET: ICD-10-CM

## 2018-02-09 DIAGNOSIS — M79.672 PAIN IN BOTH FEET: ICD-10-CM

## 2018-02-09 DIAGNOSIS — L03.119 CELLULITIS OF LOWER EXTREMITY, UNSPECIFIED LATERALITY: ICD-10-CM

## 2018-02-09 DIAGNOSIS — J02.9 SORE THROAT: Primary | ICD-10-CM

## 2018-02-09 DIAGNOSIS — M79.604 PAIN IN BOTH LOWER EXTREMITIES: ICD-10-CM

## 2018-02-09 DIAGNOSIS — L84 CALLUS OF FOOT: ICD-10-CM

## 2018-02-09 DIAGNOSIS — M79.605 PAIN IN BOTH LOWER EXTREMITIES: ICD-10-CM

## 2018-02-09 PROCEDURE — 99999 PR PBB SHADOW E&M-EST. PATIENT-LVL V: CPT | Mod: PBBFAC,,, | Performed by: FAMILY MEDICINE

## 2018-02-09 PROCEDURE — 99213 OFFICE O/P EST LOW 20 MIN: CPT | Mod: S$GLB,,, | Performed by: FAMILY MEDICINE

## 2018-02-09 PROCEDURE — 3008F BODY MASS INDEX DOCD: CPT | Mod: S$GLB,,, | Performed by: FAMILY MEDICINE

## 2018-02-09 PROCEDURE — 99499 UNLISTED E&M SERVICE: CPT | Mod: S$GLB,,, | Performed by: FAMILY MEDICINE

## 2018-02-09 RX ORDER — OXYCODONE AND ACETAMINOPHEN 10; 325 MG/1; MG/1
1 TABLET ORAL EVERY 4 HOURS PRN
Qty: 25 TABLET | Refills: 0 | Status: SHIPPED | OUTPATIENT
Start: 2018-02-11 | End: 2018-02-09 | Stop reason: SDUPTHER

## 2018-02-09 RX ORDER — OXYCODONE AND ACETAMINOPHEN 10; 325 MG/1; MG/1
1 TABLET ORAL EVERY 4 HOURS PRN
Qty: 25 TABLET | Refills: 0 | Status: SHIPPED | OUTPATIENT
Start: 2018-02-15 | End: 2018-02-16 | Stop reason: SDUPTHER

## 2018-02-09 NOTE — PROGRESS NOTES
Subjective:       Patient ID: Valerio Yan is a 47 y.o. male.    Chief Complaint: Sore Throat and Foot Pain (Bilateral)    HPI Mr. Yan presents with sore throat and foot pain. Redness of toes the past few days.    Painful to touch. He was seen for swelling and pain of lower extremities clots ruled out round of antibiotics given at the time for possible cellulitis after researching it was concluded he may have Chronic regional pain syndrome. He was seen by vascular medicine and the physician agreed with CRPS. He is now pending pain management appointment.   He started noticing redness, warmth where the redness seems to be spreading.   Both great toes are very dark he thinks he may have an infection.     Sore throat and has lost voice. Wakes up sometimes and feels he is choking. See past medical history  He has had persistent hoarseness of his voice for a while.     Review of Systems   Constitutional: Positive for activity change and appetite change (he reports not being able to taste).   HENT: Positive for postnasal drip and voice change.    Musculoskeletal: Positive for gait problem and myalgias.   Neurological: Negative for dizziness and headaches.           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    Hyperlipidemia     Hypertension     Joint pain     knees    LVH (left ventricular hypertrophy) due to hypertensive disease 2/10/2017    Mitral regurgitation 6/9/2016    Transfusion reaction     1 x  to PRBC fever     Past Surgical History:   Procedure Laterality Date    barrette esophagus      decubitus surgery      to sacral    ESOPHAGOGASTRODUODENOSCOPY      GASTROSTOMY TUBE PLACEMENT      KNEE ARTHROSCOPY  2002    PEG TUBE REMOVAL      RADIOFREQUENCY ABLATION      JUAN-EN-Y PROCEDURE      TRACHEAL SURGERY       Objective:         Physical Exam   Constitutional: He appears well-developed and well-nourished.   Musculoskeletal: He exhibits tenderness (bilateral lower extremities). He exhibits no edema.   Both feet are warm to touch they are usually cool to touch   Pulses are good 2+     Callus thick skin bottom of both great toes. Dark in color   See pictures attached   Vitals reviewed.        Assessment/Plan:     Sore throat  -     Ambulatory Referral to ENT    Hoarseness of voice  -     Ambulatory Referral to ENT    Callus of foot  -     Ambulatory Referral to Podiatry    Pain in both feet  -     Ambulatory Referral to Podiatry    Pain in both lower extremities  -     Discontinue: oxyCODONE-acetaminophen (PERCOCET)  mg per tablet; Take 1 tablet by mouth every 4 (four) hours as needed for Pain.  Dispense: 25 tablet; Refill: 0  -     oxyCODONE-acetaminophen (PERCOCET)  mg per tablet; Take 1 tablet by mouth every 4 (four) hours as needed for Pain.  Dispense: 25 tablet; Refill: 0    Complex regional pain syndrome type 1 of both lower extremities  -     Discontinue: oxyCODONE-acetaminophen (PERCOCET)  mg per tablet; Take 1 tablet by mouth every 4 (four) hours as needed for Pain.  Dispense: 25 tablet; Refill: 0  -     oxyCODONE-acetaminophen (PERCOCET)  mg per tablet; Take 1 tablet by mouth every 4 (four) hours as needed for Pain.  Dispense: 25 tablet; Refill: 0  Patient is scheduled to see pain management. He would like to know if he can use CBD oil as he has read this has helped many people with this diagnosis. He has not heard back from Dr. Craft. He is not going to use it without approval. Printed 2 prescriptions for patient both 4 days worth. He has had to increase the amount of times he takes this in a day for relief. He doesn't always get relief. He is concerned as he has had history of having to come off pain medication with Suboxone in the past.     Cellulitis of lower extremity, unspecified laterality    Yuriy had clindamycin from before that we stopped after 2 days he will complete this course of antibiotics. Inform MD if no improvement or redness spreading in   2-3 days    Follow-up if symptoms worsen or fail to improve.    Therese Lala MD  [facility]

## 2018-02-14 ENCOUNTER — HOSPITAL ENCOUNTER (OUTPATIENT)
Dept: RADIOLOGY | Facility: HOSPITAL | Age: 48
Discharge: HOME | End: 2018-02-14
Attending: PODIATRIST
Payer: MEDICARE

## 2018-02-14 ENCOUNTER — OFFICE VISIT (OUTPATIENT)
Dept: PODIATRY | Facility: CLINIC | Age: 48
End: 2018-02-14
Payer: MEDICARE

## 2018-02-14 VITALS
SYSTOLIC BLOOD PRESSURE: 118 MMHG | WEIGHT: 297.81 LBS | DIASTOLIC BLOOD PRESSURE: 82 MMHG | BODY MASS INDEX: 35.16 KG/M2 | HEART RATE: 79 BPM | HEIGHT: 77 IN

## 2018-02-14 DIAGNOSIS — R23.4 FISSURE IN SKIN OF FOOT: ICD-10-CM

## 2018-02-14 DIAGNOSIS — M79.672 BILATERAL FOOT PAIN: Primary | ICD-10-CM

## 2018-02-14 DIAGNOSIS — M79.671 BILATERAL FOOT PAIN: Primary | ICD-10-CM

## 2018-02-14 DIAGNOSIS — M21.372 FOOT DROP, BILATERAL: ICD-10-CM

## 2018-02-14 DIAGNOSIS — M21.371 FOOT DROP, BILATERAL: ICD-10-CM

## 2018-02-14 DIAGNOSIS — M79.671 BILATERAL FOOT PAIN: ICD-10-CM

## 2018-02-14 DIAGNOSIS — G61.0 GUILLAIN-BARRE SYNDROME: ICD-10-CM

## 2018-02-14 DIAGNOSIS — M79.672 BILATERAL FOOT PAIN: ICD-10-CM

## 2018-02-14 DIAGNOSIS — S92.411A CLOSED DISPLACED FRACTURE OF PROXIMAL PHALANX OF RIGHT GREAT TOE, INITIAL ENCOUNTER: Primary | ICD-10-CM

## 2018-02-14 DIAGNOSIS — G62.9 POLYNEUROPATHY: ICD-10-CM

## 2018-02-14 PROCEDURE — 73630 X-RAY EXAM OF FOOT: CPT | Mod: 26,50,, | Performed by: RADIOLOGY

## 2018-02-14 PROCEDURE — 99499 UNLISTED E&M SERVICE: CPT | Mod: S$GLB,,, | Performed by: PODIATRIST

## 2018-02-14 PROCEDURE — 99999 PR PBB SHADOW E&M-EST. PATIENT-LVL III: CPT | Mod: PBBFAC,,, | Performed by: PODIATRIST

## 2018-02-14 PROCEDURE — 3008F BODY MASS INDEX DOCD: CPT | Mod: S$GLB,,, | Performed by: PODIATRIST

## 2018-02-14 PROCEDURE — 73630 X-RAY EXAM OF FOOT: CPT | Mod: 50,TC,FY,PO

## 2018-02-14 PROCEDURE — 99204 OFFICE O/P NEW MOD 45 MIN: CPT | Mod: 25,S$GLB,, | Performed by: PODIATRIST

## 2018-02-14 PROCEDURE — 28510 TREATMENT OF TOE FRACTURE: CPT | Mod: RT,S$GLB,, | Performed by: PODIATRIST

## 2018-02-14 PROCEDURE — 11056 PARNG/CUTG B9 HYPRKR LES 2-4: CPT | Mod: 59,Q9,S$GLB, | Performed by: PODIATRIST

## 2018-02-14 NOTE — LETTER
February 14, 2018      Therese Lala MD  [address] [place] [address] [address] [place] [address]           [facility] - Podiatry  [address] [facility] [place] [address]  Phone: [phone]  Fax: [phone]          Patient: Valerio Yan   MR Number: [id number]   YOB: 1970   Date of Visit: 2/14/2018       Dear Dr. Therese Lala:    Thank you for referring Valerio Yan to me for evaluation. Attached you will find relevant portions of my assessment and plan of care.    If you have questions, please do not hesitate to call me. I look forward to following Valerio Yan along with you.    Sincerely,    Shyanne Nolan, DPALICIA    Enclosure  CC:  No Recipients    If you would like to receive this communication electronically, please contact [email or web address] or [phone] to request more information on [facility] Link access.    For providers and/or their staff who would like to refer a patient to [facility], please contact us through our one-stop-shop provider referral line, [facility], at [phone].    If you feel you have received this communication in error or would no longer like to receive these types of communications, please e-mail [email or web address]

## 2018-02-14 NOTE — PROGRESS NOTES
Podiatry Initial Consultation  Requesting Consult Provider:   PCP: Dr. Therese Lala MD    CHIEF COMPLAINT   Chief Complaint   Patient presents with    Foot Pain     bilateral great toe pain, redness, and swelling    Callouses     biltaral great toe callouses with dark discoloration and skin breakage         HPI:    Valerio Yan is a 47 y.o. male presenting to podiatry clinic with complaint of painful fissures on both great toes and calluses. Pt has Guillain-[place]. He has neuropathy. He denies any trauma. He states pain and swelling to great toe since January 2018. He has CRPS and states he will be seeing pain management for chronic pain.     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    Hyperlipidemia     Hypertension     Joint pain     knees    LVH (left ventricular hypertrophy) due to hypertensive disease 2/10/2017    Mitral regurgitation 6/9/2016    Transfusion reaction     1 x  to PRBC fever       PROBLEM LIST  Patient Active Problem List    Diagnosis Date Noted    LVH (left ventricular hypertrophy) due to hypertensive disease 02/10/2017    Mixed hyperlipidemia 02/10/2017    Diaphoresis 01/06/2017    Hypertension 01/06/2017    Mitral regurgitation 06/09/2016    Palpitations 06/09/2016    Tobacco abuse 06/09/2016    Ozuna's esophagus 03/23/2016    Typical atrial flutter 03/23/2016    Ozuna's esophagus without dysplasia 03/23/2016    Ozuna esophagus 04/02/2014    Peptic ulcer disease 04/02/2014    Cerebral palsy 07/30/2013    Polyneuropathy 07/30/2013    Quadriplegia 07/30/2013    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 30 tablet 1    atorvastatin (LIPITOR) 20 MG tablet Take 1 tablet (20 mg total) by mouth once daily. 90 tablet 3    calcium-vitamin D 600 mg, 1,500mg,-200 unit 600 mg(1,500mg) -200 unit Tab Take 1 tablet by mouth once daily.       clindamycin (CLEOCIN) 300 MG capsule Take 1 capsule (300 mg total) by mouth every 8 (eight) hours. 30 capsule 0    cyanocobalamin (VITAMIN B-12) 1,000 mcg/mL injection Inject 1 mL (1,000 mcg total) into the skin every 30 days. Please supply 3 mL syringes and 26 gauge 1/2 inch needles. 10 mL 1    esomeprazole (NEXIUM PACKET) 40 mg GrPS ONE PACKET ONCE DAILY 30 each 11    furosemide (LASIX) 20 MG tablet Take 1 tablet (20 mg total) by mouth 2 (two) times daily as needed (swelling). 60 tablet 0    lisinopril-hydrochlorothiazide (PRINZIDE,ZESTORETIC) 20-12.5 mg per tablet Take 2 tablets by mouth once daily. 90 tablet 3    metoprolol succinate (TOPROL-XL) 50 MG 24 hr tablet Take 1 tablet (50 mg total) by mouth once daily. 90 tablet 3    multivitamin with minerals (MULTIPLE VITAMIN-MINERALS) tablet Take 1 tablet by mouth Daily.        [START ON 2/15/2018] oxyCODONE-acetaminophen (PERCOCET)  mg per tablet Take 1 tablet by mouth every 4 (four) hours as needed for Pain. 25 tablet 0    buPROPion (WELLBUTRIN SR) 150 MG TBSR 12 hr tablet Take 1 tablet (150 mg total) by mouth 2 (two) times daily. 60 tablet 0    diphenhydrAMINE (BENADRYL) 25 mg capsule Take 1 each (25 mg total) by mouth every 6 (six) hours as needed for Itching or Allergies. 20 capsule 0    nicotine (NICODERM CQ) 21 mg/24 hr Place 1 patch onto the skin once daily. 14 patch 0    varenicline (CHANTIX STARTING MONTH BOX) 0.5 mg (11)- 1 mg (42) tablet Take one 0.5mg tab by mouth once daily X3 days,then increase to one 0.5mg tab twice daily X4 days,then increase to one 1mg tab twice daily 1 Package 0    [DISCONTINUED] ibuprofen (ADVIL,MOTRIN) 800 MG tablet Take 1 tablet (800 mg total) by  "mouth 3 (three) times daily. 30 tablet 0     No current facility-administered medications on file prior to visit.        PSH     Past Surgical History:   Procedure Laterality Date    barrette esophagus      decubitus surgery      to sacral    ESOPHAGOGASTRODUODENOSCOPY      GASTROSTOMY TUBE PLACEMENT      KNEE ARTHROSCOPY  2002    PEG TUBE REMOVAL      RADIOFREQUENCY ABLATION      JUAN-EN-Y PROCEDURE      TRACHEAL SURGERY          ALL  Review of patient's allergies indicates:   Allergen Reactions    Latex      Other reaction(s): Itching  Other reaction(s): Hives    Reglan  [metoclopramide hcl]      Pt. Was in coma so is not aware of the reaction  Other reaction(s): Unable to obtain       SOC     Social History   Substance Use Topics    Smoking status: Current Every Day Smoker     Packs/day: 0.50     Years: 30.00     Types: Cigarettes     Start date: 4/4/1988     Last attempt to quit: 2/5/2017    Smokeless tobacco: Former User     Types: Snuff     Quit date: 1/6/1999    Alcohol use 8.4 oz/week     14 Glasses of wine per week         Family HX    Family History   Problem Relation Age of Onset    Heart attack Mother     Heart disease Mother     Heart disease Father     Heart attack Father             REVIEW OF SYSTEMS  General: Denies any fever or chills  Chest: Denies shortness of breath, wheezing, coughing, or sputum production  Heart: Denies chest pain, cold extremities, orthopenia, or reduced exercise tolerance  As noted above and per history of current illness above, otherwise negative in the remainder of the 14 systems.      PHYSICAL EXAM:      Vitals:    02/14/18 1330   BP: 118/82   Pulse: 79   Weight: 135.1 kg (297 lb 13.5 oz)   Height: 6' 5.01" (1.956 m)       General: This patient is well-developed, well-nourished and appears stated age, well-oriented to person, place and time, and cooperative and pleasant on today's visit      LOWER EXTREMITY  Vascular:   · Palpable DP/PT pulses b/l  · Skin " temperature warm to warm from prox to distally  · CFT <5 secs b/l  · There is  edema noted b/l    Dermatologic:   · No open skin lesions noted  · No erythema or drainage noted b/l  · Webspaces are C/D/I B/L  · There is hyperkeratotic tissue noted plantar hallux b/l with deep fissure at sulcus of bilateral hallux, fissure noted plantar RIGHT heel .   · Skin texture and turgor WNL    Neurologic:  · epicritic sensation grossly intact b/l   · Light touch and sharp/dull sensation intact b/l  · Achilles and patellar deep tendon reflexes intact  · Babinski reflex absent b/l    Musculoskeletal/Orthopedic:  · No symptomatic structural abnormalities noted.   · Muscle strength weak with 0/5 muscle strength for anterior muscle compartment, 4/5 PFs, 5/5 invertors and evertors    IMAGING:  Right proximal phalanx fracture noted, minimally displaced  Left foot xrays no abnormal findings noted    ASSESSMENT   Fissure in skin of foot    Closed displaced fracture of proximal phalanx of right great toe, initial encounter    Foot drop, bilateral    Polyneuropathy    Guillain-[place] syndrome        PLAN    1. Patient was educated about clinical and imaging findings, and verbalizes understanding of above.    After obtaining verbal consent, closed treatment of toe fracture performed and demonstrated via musa splinting technique with cotton spacer placed in between digits; Pt instructed to perform daily for approximately 6-8 wks. Stiff soled shoe recommendation. No high impact activities involving the digit until healing is complete. NSAIDS prn and icing recommendations provided.     -With patient's permission via verbal consent, the involved area was cleansed with an alcohol swab. Trimming of hyperkeratotic lesions deep to epidermal layer x 3 bilateral foot was performed with a #15 blade without incident. Patient relates relief following the procedure. Patient will continue to monitor the areas daily, inspect feet, wear protective shoe  gear when ambulatory, moisturizer to maintain skin integrity.    -Rx for Compound UREA + antibiotic ointment cream Rx'd     Report Electronically Signed By:  Shyanne Nolan DPM   Podiatric Medicine & Surgery  [facility]  2/14/2018  1:38 PM

## 2018-02-15 ENCOUNTER — CLINICAL SUPPORT (OUTPATIENT)
Dept: SMOKING CESSATION | Facility: CLINIC | Age: 48
End: 2018-02-15
Payer: COMMERCIAL

## 2018-02-15 DIAGNOSIS — F17.210 MODERATE SMOKER (20 OR LESS PER DAY): Primary | ICD-10-CM

## 2018-02-15 PROCEDURE — 99999 PR PBB SHADOW E&M-EST. PATIENT-LVL I: CPT | Mod: PBBFAC,,,

## 2018-02-15 PROCEDURE — 99407 BEHAV CHNG SMOKING > 10 MIN: CPT | Mod: S$GLB,,,

## 2018-02-16 ENCOUNTER — PATIENT MESSAGE (OUTPATIENT)
Dept: INTERNAL MEDICINE | Facility: CLINIC | Age: 48
End: 2018-02-16

## 2018-02-16 DIAGNOSIS — M79.605 PAIN IN BOTH LOWER EXTREMITIES: ICD-10-CM

## 2018-02-16 DIAGNOSIS — M79.604 PAIN IN BOTH LOWER EXTREMITIES: ICD-10-CM

## 2018-02-16 DIAGNOSIS — G90.523 COMPLEX REGIONAL PAIN SYNDROME TYPE 1 OF BOTH LOWER EXTREMITIES: ICD-10-CM

## 2018-02-16 RX ORDER — OXYCODONE AND ACETAMINOPHEN 10; 325 MG/1; MG/1
1 TABLET ORAL EVERY 4 HOURS PRN
Qty: 25 TABLET | Refills: 0 | Status: SHIPPED | OUTPATIENT
Start: 2018-02-18 | End: 2018-02-20 | Stop reason: SDUPTHER

## 2018-02-20 DIAGNOSIS — M79.605 PAIN IN BOTH LOWER EXTREMITIES: ICD-10-CM

## 2018-02-20 DIAGNOSIS — M79.604 PAIN IN BOTH LOWER EXTREMITIES: ICD-10-CM

## 2018-02-20 DIAGNOSIS — G90.523 COMPLEX REGIONAL PAIN SYNDROME TYPE 1 OF BOTH LOWER EXTREMITIES: ICD-10-CM

## 2018-02-21 ENCOUNTER — PATIENT MESSAGE (OUTPATIENT)
Dept: INTERNAL MEDICINE | Facility: CLINIC | Age: 48
End: 2018-02-21

## 2018-02-21 ENCOUNTER — TELEPHONE (OUTPATIENT)
Dept: PAIN MEDICINE | Facility: CLINIC | Age: 48
End: 2018-02-21

## 2018-02-21 ENCOUNTER — PATIENT MESSAGE (OUTPATIENT)
Dept: PAIN MEDICINE | Facility: CLINIC | Age: 48
End: 2018-02-21

## 2018-02-21 DIAGNOSIS — M79.604 PAIN IN BOTH LOWER EXTREMITIES: ICD-10-CM

## 2018-02-21 DIAGNOSIS — M79.605 PAIN IN BOTH LOWER EXTREMITIES: ICD-10-CM

## 2018-02-21 DIAGNOSIS — G90.523 COMPLEX REGIONAL PAIN SYNDROME TYPE 1 OF BOTH LOWER EXTREMITIES: ICD-10-CM

## 2018-02-21 RX ORDER — FUROSEMIDE 20 MG/1
20 TABLET ORAL 2 TIMES DAILY PRN
Qty: 60 TABLET | Refills: 0 | Status: SHIPPED | OUTPATIENT
Start: 2018-02-21 | End: 2018-05-04 | Stop reason: SDUPTHER

## 2018-02-21 RX ORDER — OXYCODONE AND ACETAMINOPHEN 10; 325 MG/1; MG/1
1 TABLET ORAL EVERY 4 HOURS PRN
Qty: 25 TABLET | Refills: 0 | Status: SHIPPED | OUTPATIENT
Start: 2018-02-21 | End: 2018-02-21 | Stop reason: SDUPTHER

## 2018-02-21 RX ORDER — OXYCODONE AND ACETAMINOPHEN 10; 325 MG/1; MG/1
1 TABLET ORAL EVERY 4 HOURS PRN
Qty: 25 TABLET | Refills: 0 | Status: SHIPPED | OUTPATIENT
Start: 2018-02-24 | End: 2018-02-27 | Stop reason: SDUPTHER

## 2018-02-21 NOTE — TELEPHONE ENCOUNTER
Also your RX for the weekend is ready for . Sorry I tried to get it taken care of before you stopped by today and picked up the other prescription.

## 2018-02-26 ENCOUNTER — PATIENT MESSAGE (OUTPATIENT)
Dept: INTERNAL MEDICINE | Facility: CLINIC | Age: 48
End: 2018-02-26

## 2018-02-26 DIAGNOSIS — G90.523 COMPLEX REGIONAL PAIN SYNDROME TYPE 1 OF BOTH LOWER EXTREMITIES: ICD-10-CM

## 2018-02-26 DIAGNOSIS — M79.605 PAIN IN BOTH LOWER EXTREMITIES: ICD-10-CM

## 2018-02-26 DIAGNOSIS — M79.604 PAIN IN BOTH LOWER EXTREMITIES: ICD-10-CM

## 2018-02-27 DIAGNOSIS — G90.523 COMPLEX REGIONAL PAIN SYNDROME TYPE 1 OF BOTH LOWER EXTREMITIES: ICD-10-CM

## 2018-02-27 DIAGNOSIS — M79.605 PAIN IN BOTH LOWER EXTREMITIES: ICD-10-CM

## 2018-02-27 DIAGNOSIS — M79.604 PAIN IN BOTH LOWER EXTREMITIES: ICD-10-CM

## 2018-02-27 RX ORDER — OXYCODONE AND ACETAMINOPHEN 10; 325 MG/1; MG/1
1 TABLET ORAL EVERY 4 HOURS PRN
Qty: 25 TABLET | Refills: 0 | Status: CANCELLED | OUTPATIENT
Start: 2018-02-27

## 2018-02-27 NOTE — TELEPHONE ENCOUNTER
----- Message from Erinn Mendes sent at 2/27/2018  3:50 PM CST -----  Contact: pt  He's calling in regards to RX medication (pain meds) refill pls call pt back at [phone] (home)

## 2018-02-27 NOTE — TELEPHONE ENCOUNTER
Pt states he has been getting refills of Percocet every 4 days. Last refill 02/24/18. Requesting another. Will take last pill tonight from 02/24/18. Pain Mgmt appt scheduled 03/13/18. Please review

## 2018-02-28 ENCOUNTER — TELEPHONE (OUTPATIENT)
Dept: INTERNAL MEDICINE | Facility: CLINIC | Age: 48
End: 2018-02-28

## 2018-02-28 RX ORDER — OXYCODONE AND ACETAMINOPHEN 10; 325 MG/1; MG/1
1 TABLET ORAL EVERY 4 HOURS PRN
Qty: 43 TABLET | Refills: 0 | Status: SHIPPED | OUTPATIENT
Start: 2018-02-28 | End: 2018-03-05 | Stop reason: SDUPTHER

## 2018-02-28 NOTE — TELEPHONE ENCOUNTER
----- Message from Kacie Crawford sent at 2/28/2018 11:02 AM CST -----  Contact: pt  The pt request a call concerning a refill on his pain med, the pt can be reached at [phone]///[id number]

## 2018-03-05 ENCOUNTER — PATIENT MESSAGE (OUTPATIENT)
Dept: INTERNAL MEDICINE | Facility: CLINIC | Age: 48
End: 2018-03-05

## 2018-03-05 DIAGNOSIS — G90.523 COMPLEX REGIONAL PAIN SYNDROME TYPE 1 OF BOTH LOWER EXTREMITIES: ICD-10-CM

## 2018-03-05 DIAGNOSIS — M79.605 PAIN IN BOTH LOWER EXTREMITIES: ICD-10-CM

## 2018-03-05 DIAGNOSIS — G57.71 COMPLEX REGIONAL PAIN SYNDROME TYPE 2 OF BOTH LOWER EXTREMITIES: Primary | ICD-10-CM

## 2018-03-05 DIAGNOSIS — M79.604 PAIN IN BOTH LOWER EXTREMITIES: ICD-10-CM

## 2018-03-05 DIAGNOSIS — G57.72 COMPLEX REGIONAL PAIN SYNDROME TYPE 2 OF BOTH LOWER EXTREMITIES: Primary | ICD-10-CM

## 2018-03-05 RX ORDER — OXYCODONE AND ACETAMINOPHEN 10; 325 MG/1; MG/1
1 TABLET ORAL EVERY 4 HOURS PRN
Qty: 25 TABLET | Refills: 0 | Status: SHIPPED | OUTPATIENT
Start: 2018-03-05 | End: 2018-03-05 | Stop reason: SDUPTHER

## 2018-03-05 RX ORDER — OXYCODONE AND ACETAMINOPHEN 10; 325 MG/1; MG/1
1 TABLET ORAL EVERY 4 HOURS PRN
Qty: 25 TABLET | Refills: 0 | Status: SHIPPED | OUTPATIENT
Start: 2018-03-09 | End: 2018-03-13 | Stop reason: SDUPTHER

## 2018-03-05 NOTE — TELEPHONE ENCOUNTER
[facility], [address], [address] [place], [place] [address]   Phone: [phone]   Fax: [phone]   Email: [email or web address]

## 2018-03-07 ENCOUNTER — CLINICAL SUPPORT (OUTPATIENT)
Dept: SMOKING CESSATION | Facility: CLINIC | Age: 48
End: 2018-03-07
Payer: COMMERCIAL

## 2018-03-07 DIAGNOSIS — F17.210 MODERATE SMOKER (20 OR LESS PER DAY): Primary | ICD-10-CM

## 2018-03-07 PROCEDURE — 99407 BEHAV CHNG SMOKING > 10 MIN: CPT | Mod: S$GLB,,,

## 2018-03-07 PROCEDURE — 99999 PR PBB SHADOW E&M-EST. PATIENT-LVL I: CPT | Mod: PBBFAC,,,

## 2018-03-08 ENCOUNTER — PATIENT MESSAGE (OUTPATIENT)
Dept: INTERNAL MEDICINE | Facility: CLINIC | Age: 48
End: 2018-03-08

## 2018-03-12 DIAGNOSIS — M79.605 PAIN IN BOTH LOWER EXTREMITIES: ICD-10-CM

## 2018-03-12 DIAGNOSIS — M79.604 PAIN IN BOTH LOWER EXTREMITIES: ICD-10-CM

## 2018-03-12 DIAGNOSIS — G90.523 COMPLEX REGIONAL PAIN SYNDROME TYPE 1 OF BOTH LOWER EXTREMITIES: ICD-10-CM

## 2018-03-12 RX ORDER — OXYCODONE AND ACETAMINOPHEN 10; 325 MG/1; MG/1
1 TABLET ORAL EVERY 4 HOURS PRN
Qty: 25 TABLET | Refills: 0 | Status: CANCELLED | OUTPATIENT
Start: 2018-03-12

## 2018-03-13 ENCOUNTER — PATIENT MESSAGE (OUTPATIENT)
Dept: INTERNAL MEDICINE | Facility: CLINIC | Age: 48
End: 2018-03-13

## 2018-03-13 DIAGNOSIS — M79.604 PAIN IN BOTH LOWER EXTREMITIES: ICD-10-CM

## 2018-03-13 DIAGNOSIS — G90.523 COMPLEX REGIONAL PAIN SYNDROME TYPE 1 OF BOTH LOWER EXTREMITIES: ICD-10-CM

## 2018-03-13 DIAGNOSIS — M79.605 PAIN IN BOTH LOWER EXTREMITIES: ICD-10-CM

## 2018-03-14 ENCOUNTER — PATIENT MESSAGE (OUTPATIENT)
Dept: INTERNAL MEDICINE | Facility: CLINIC | Age: 48
End: 2018-03-14

## 2018-03-14 RX ORDER — OXYCODONE AND ACETAMINOPHEN 10; 325 MG/1; MG/1
1 TABLET ORAL EVERY 4 HOURS PRN
Qty: 25 TABLET | Refills: 0 | Status: SHIPPED | OUTPATIENT
Start: 2018-03-14 | End: 2018-03-16 | Stop reason: SDUPTHER

## 2018-03-14 RX ORDER — OXYCODONE AND ACETAMINOPHEN 10; 325 MG/1; MG/1
1 TABLET ORAL EVERY 4 HOURS PRN
Qty: 25 TABLET | Refills: 0 | Status: SHIPPED | OUTPATIENT
Start: 2018-03-14 | End: 2018-03-14 | Stop reason: SDUPTHER

## 2018-03-16 ENCOUNTER — OFFICE VISIT (OUTPATIENT)
Dept: PODIATRY | Facility: CLINIC | Age: 48
End: 2018-03-16
Payer: MEDICARE

## 2018-03-16 ENCOUNTER — PATIENT MESSAGE (OUTPATIENT)
Dept: INTERNAL MEDICINE | Facility: CLINIC | Age: 48
End: 2018-03-16

## 2018-03-16 ENCOUNTER — HOSPITAL ENCOUNTER (OUTPATIENT)
Dept: RADIOLOGY | Facility: HOSPITAL | Age: 48
Discharge: HOME | End: 2018-03-16
Attending: PODIATRIST
Payer: MEDICARE

## 2018-03-16 VITALS
DIASTOLIC BLOOD PRESSURE: 80 MMHG | SYSTOLIC BLOOD PRESSURE: 118 MMHG | BODY MASS INDEX: 35.16 KG/M2 | HEART RATE: 65 BPM | HEIGHT: 77 IN | WEIGHT: 297.81 LBS

## 2018-03-16 DIAGNOSIS — M79.605 PAIN IN BOTH LOWER EXTREMITIES: ICD-10-CM

## 2018-03-16 DIAGNOSIS — G90.523 COMPLEX REGIONAL PAIN SYNDROME TYPE 1 OF BOTH LOWER EXTREMITIES: ICD-10-CM

## 2018-03-16 DIAGNOSIS — S99.921D TOE INJURY, RIGHT, SUBSEQUENT ENCOUNTER: ICD-10-CM

## 2018-03-16 DIAGNOSIS — S99.921D TOE INJURY, RIGHT, SUBSEQUENT ENCOUNTER: Primary | ICD-10-CM

## 2018-03-16 DIAGNOSIS — S92.411D CLOSED DISPLACED FRACTURE OF PROXIMAL PHALANX OF RIGHT GREAT TOE WITH ROUTINE HEALING, SUBSEQUENT ENCOUNTER: ICD-10-CM

## 2018-03-16 DIAGNOSIS — G62.9 POLYNEUROPATHY: ICD-10-CM

## 2018-03-16 DIAGNOSIS — R23.4 FISSURE IN SKIN OF FOOT: Primary | ICD-10-CM

## 2018-03-16 DIAGNOSIS — M79.604 PAIN IN BOTH LOWER EXTREMITIES: ICD-10-CM

## 2018-03-16 DIAGNOSIS — L84 CORN OR CALLUS: ICD-10-CM

## 2018-03-16 DIAGNOSIS — G61.0 GUILLAIN-BARRE SYNDROME: ICD-10-CM

## 2018-03-16 PROCEDURE — 3079F DIAST BP 80-89 MM HG: CPT | Mod: CPTII,S$GLB,, | Performed by: PODIATRIST

## 2018-03-16 PROCEDURE — 99999 PR PBB SHADOW E&M-EST. PATIENT-LVL IV: CPT | Mod: PBBFAC,,, | Performed by: PODIATRIST

## 2018-03-16 PROCEDURE — 3074F SYST BP LT 130 MM HG: CPT | Mod: CPTII,S$GLB,, | Performed by: PODIATRIST

## 2018-03-16 PROCEDURE — 99213 OFFICE O/P EST LOW 20 MIN: CPT | Mod: 24,25,S$GLB, | Performed by: PODIATRIST

## 2018-03-16 PROCEDURE — 11056 PARNG/CUTG B9 HYPRKR LES 2-4: CPT | Mod: 79,Q8,S$GLB, | Performed by: PODIATRIST

## 2018-03-16 PROCEDURE — 73660 X-RAY EXAM OF TOE(S): CPT | Mod: TC

## 2018-03-16 PROCEDURE — 73660 X-RAY EXAM OF TOE(S): CPT | Mod: 26,RT,, | Performed by: RADIOLOGY

## 2018-03-16 PROCEDURE — 99499 UNLISTED E&M SERVICE: CPT | Mod: S$GLB,,, | Performed by: PODIATRIST

## 2018-03-16 RX ORDER — OXYCODONE AND ACETAMINOPHEN 10; 325 MG/1; MG/1
1 TABLET ORAL EVERY 4 HOURS PRN
Qty: 25 TABLET | Refills: 0 | Status: SHIPPED | OUTPATIENT
Start: 2018-03-16 | End: 2018-03-16 | Stop reason: SDUPTHER

## 2018-03-16 RX ORDER — OXYCODONE AND ACETAMINOPHEN 10; 325 MG/1; MG/1
1 TABLET ORAL EVERY 4 HOURS PRN
Qty: 25 TABLET | Refills: 0 | Status: SHIPPED | OUTPATIENT
Start: 2018-03-20 | End: 2018-03-21 | Stop reason: SDUPTHER

## 2018-03-16 NOTE — PROGRESS NOTES
Podiatry Note    CHIEF COMPLAINT   Chief Complaint   Patient presents with    Follow-up     right great toe fracture          HPI:    Valerio Yan is a 47 y.o. male presenting to podiatry clinic with complaint of painful fissures on both great toes and calluses. Pt has Guillain-[place]. He has neuropathy. He states pain and swelling to great toe since January 2018. He has CRPS and states he will be seeing pain management for chronic pain.  He is here for follow up. He has performed wound care instructions and musa splinting of right great toe. Wounds are healing and much improved. He denies any pain to fracture site. No further complaints.    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    Hyperlipidemia     Hypertension     Joint pain     knees    LVH (left ventricular hypertrophy) due to hypertensive disease 2/10/2017    Mitral regurgitation 6/9/2016    Transfusion reaction     1 x  to PRBC fever       PROBLEM LIST  Patient Active Problem List    Diagnosis Date Noted    Complex regional pain syndrome type 2 of both lower extremities 03/20/2018     Class: Chronic    LVH (left ventricular hypertrophy) due to hypertensive disease 02/10/2017    Mixed hyperlipidemia 02/10/2017    Diaphoresis 01/06/2017    Hypertension 01/06/2017    Mitral regurgitation 06/09/2016    Palpitations 06/09/2016    Tobacco abuse 06/09/2016    Ozuna's esophagus 03/23/2016    Typical atrial flutter 03/23/2016    Ozuna's esophagus without dysplasia 03/23/2016    Ozuna esophagus 04/02/2014    Peptic ulcer disease 04/02/2014    Cerebral palsy 07/30/2013    Polyneuropathy 07/30/2013    Quadriplegia 07/30/2013    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 30 tablet 1    atorvastatin (LIPITOR) 20 MG tablet Take 1 tablet (20 mg total) by mouth once daily. 90 tablet 3    calcium-vitamin D 600 mg, 1,500mg,-200 unit 600 mg(1,500mg) -200 unit Tab Take 1 tablet by mouth once daily.       esomeprazole (NEXIUM PACKET) 40 mg GrPS ONE PACKET ONCE DAILY 30 each 11    furosemide (LASIX) 20 MG tablet Take 1 tablet (20 mg total) by mouth 2 (two) times daily as needed (swelling). 60 tablet 0    lisinopril-hydrochlorothiazide (PRINZIDE,ZESTORETIC) 20-12.5 mg per tablet Take 2 tablets by mouth once daily. 90 tablet 3    metoprolol succinate (TOPROL-XL) 50 MG 24 hr tablet Take 1 tablet (50 mg total) by mouth once daily. 90 tablet 3    multivitamin with minerals (MULTIPLE VITAMIN-MINERALS) tablet Take 1 tablet by mouth Daily.        varenicline (CHANTIX STARTING MONTH BOX) 0.5 mg (11)- 1 mg (42) tablet Take one 0.5mg tab by mouth once daily X3 days,then increase to one 0.5mg tab twice daily X4 days,then increase to one 1mg tab twice daily 1 Package 0     No current facility-administered medications on file prior to visit.        PSH     Past Surgical History:   Procedure Laterality Date    barrette esophagus      decubitus surgery      to sacral    ESOPHAGOGASTRODUODENOSCOPY      GASTROSTOMY TUBE PLACEMENT      KNEE ARTHROSCOPY  2002    PEG TUBE REMOVAL      RADIOFREQUENCY ABLATION      JUAN-EN-Y PROCEDURE      TRACHEAL SURGERY          ALL  Review of patient's allergies indicates:   Allergen Reactions    Latex      Other reaction(s): Itching  Other reaction(s): Hives    Reglan  [metoclopramide hcl]      Pt. Was in coma so is not aware of the reaction  Other reaction(s): Unable to obtain       SOC     Social History   Substance Use Topics    Smoking status: Current Every Day Smoker     Packs/day: 0.50     Years: 30.00     Types: Cigarettes      "Start date: 4/4/1988     Last attempt to quit: 2/5/2017    Smokeless tobacco: Former User     Types: Snuff     Quit date: 1/6/1999    Alcohol use 8.4 oz/week     14 Glasses of wine per week         Family HX    Family History   Problem Relation Age of Onset    Heart attack Mother     Heart disease Mother     Heart disease Father     Heart attack Father             REVIEW OF SYSTEMS  General: Denies any fever or chills  Chest: Denies shortness of breath, wheezing, coughing, or sputum production  Heart: Denies chest pain, cold extremities, orthopenia, or reduced exercise tolerance  As noted above and per history of current illness above, otherwise negative in the remainder of the 14 systems.      PHYSICAL EXAM:      Vitals:    03/16/18 1359   BP: 118/80   Pulse: 65   Weight: 135.1 kg (297 lb 13.5 oz)   Height: 6' 5.01" (1.956 m)       General: This patient is well-developed, well-nourished and appears stated age, well-oriented to person, place and time, and cooperative and pleasant on today's visit      LOWER EXTREMITY  Vascular:   · Palpable DP/PT pulses b/l  · Skin temperature warm to warm from prox to distally  · CFT <5 secs b/l  · There is  edema noted b/l    Dermatologic:   · No open skin lesions noted  · No erythema or drainage noted b/l  · Webspaces are C/D/I B/L  · There is hyperkeratotic tissue noted plantar hallux b/l post debridement reveals no open wounds  · Skin texture and turgor WNL    Neurologic:  · epicritic sensation grossly intact b/l   · Light touch and sharp/dull sensation intact b/l  · Achilles and patellar deep tendon reflexes intact  · Babinski reflex absent b/l    Musculoskeletal/Orthopedic:  · No symptomatic structural abnormalities noted.   · Muscle strength weak with 0/5 muscle strength for anterior muscle compartment, 4/5 PFs, 5/5 invertors and evertors    IMAGING:  reviewed    ASSESSMENT   Fissure in skin of foot    Closed displaced fracture of proximal phalanx of right great toe " with routine healing, subsequent encounter    Polyneuropathy    Guillain-[place] syndrome        PLAN    1. Patient was educated about clinical and imaging findings, and verbalizes understanding of above.    Continue with musa splinting 3 additional weeks and stiff soled shoe    -With patient's permission via verbal consent, the involved area was cleansed with an alcohol swab. Trimming of hyperkeratotic lesions deep to epidermal layer x 1 bilateral foot was performed with a #15 blade without incident. Patient relates relief following the procedure. Patient will continue to monitor the areas daily, inspect feet, wear protective shoe gear when ambulatory, moisturizer to maintain skin integrity.  -continue with urea/topical abx    Report Electronically Signed By:  Shyanne Nolan DPM   Podiatric Medicine & Surgery  [facility]  3/27/2018  1:38 PM

## 2018-03-20 ENCOUNTER — PATIENT MESSAGE (OUTPATIENT)
Dept: INTERNAL MEDICINE | Facility: CLINIC | Age: 48
End: 2018-03-20

## 2018-03-20 ENCOUNTER — OFFICE VISIT (OUTPATIENT)
Dept: NEUROLOGY | Facility: CLINIC | Age: 48
End: 2018-03-20
Payer: MEDICARE

## 2018-03-20 VITALS
SYSTOLIC BLOOD PRESSURE: 120 MMHG | DIASTOLIC BLOOD PRESSURE: 82 MMHG | BODY MASS INDEX: 33.22 KG/M2 | HEIGHT: 77 IN | HEART RATE: 68 BPM | WEIGHT: 281.31 LBS

## 2018-03-20 DIAGNOSIS — G57.72 COMPLEX REGIONAL PAIN SYNDROME TYPE 2 OF BOTH LOWER EXTREMITIES: ICD-10-CM

## 2018-03-20 DIAGNOSIS — M79.604 PAIN IN BOTH LOWER EXTREMITIES: ICD-10-CM

## 2018-03-20 DIAGNOSIS — M79.605 PAIN IN BOTH LOWER EXTREMITIES: ICD-10-CM

## 2018-03-20 DIAGNOSIS — G57.71 COMPLEX REGIONAL PAIN SYNDROME TYPE 2 OF BOTH LOWER EXTREMITIES: ICD-10-CM

## 2018-03-20 DIAGNOSIS — G90.523 COMPLEX REGIONAL PAIN SYNDROME TYPE 1 OF BOTH LOWER EXTREMITIES: ICD-10-CM

## 2018-03-20 PROBLEM — G57.73 COMPLEX REGIONAL PAIN SYNDROME TYPE 2 OF BOTH LOWER EXTREMITIES: Status: ACTIVE | Noted: 2018-03-20

## 2018-03-20 PROCEDURE — 99205 OFFICE O/P NEW HI 60 MIN: CPT | Mod: S$GLB,,, | Performed by: PSYCHIATRY & NEUROLOGY

## 2018-03-20 PROCEDURE — 99499 UNLISTED E&M SERVICE: CPT | Mod: S$GLB,,, | Performed by: PSYCHIATRY & NEUROLOGY

## 2018-03-20 PROCEDURE — 3074F SYST BP LT 130 MM HG: CPT | Mod: CPTII,S$GLB,, | Performed by: PSYCHIATRY & NEUROLOGY

## 2018-03-20 PROCEDURE — 3079F DIAST BP 80-89 MM HG: CPT | Mod: CPTII,S$GLB,, | Performed by: PSYCHIATRY & NEUROLOGY

## 2018-03-20 PROCEDURE — 99999 PR PBB SHADOW E&M-EST. PATIENT-LVL III: CPT | Mod: PBBFAC,,, | Performed by: PSYCHIATRY & NEUROLOGY

## 2018-03-20 RX ORDER — GABAPENTIN 300 MG/1
300 CAPSULE ORAL 3 TIMES DAILY
Qty: 90 CAPSULE | Refills: 11 | Status: SHIPPED | OUTPATIENT
Start: 2018-03-20 | End: 2018-04-27 | Stop reason: SDUPTHER

## 2018-03-20 NOTE — PROGRESS NOTES
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  The patient because of that illness was either hospitalized or in a rehabilitation unit the better part of a year and a half before he was finally discharged.  He was left with significant residual deficits in both arms and both legs.    The patient reports today that he has developed a new group of symptoms related to an event that occurred in January when he developed suddenly as severe pain with swelling and erythema in the right foot.  He does not recall any particular injury to the foot or to the lower leg even though he has been in a bilateral braces because of his previous Guillain-Barré syndrome with residual weakness.  The pain was described as burning but also felt as hot and at other times felt as cold.  The patient noted extreme erythema that would calm and go in the foot.  The symptoms then began to develop in the left foot as well and involved other body areas from that point on.  The patient for example has had off-and-on erythema of both arms and hands.  The pain in the foot continues to the present day.  The patient states that the pain is clearly aggravated by any stimulus is applied to the foot including bed covers or light touching of the foot and ankle.  The patient has continued to have intermittent swelling and erythema in both feet as well.    Prior to this event, the patient had developed profound generalized diaphoresis is been present for the better part of the past year.  Patient states that he is constantly sweating whether the temperature is hot or temperature is cold.  The patient states that he is wringing wet most of the time with particular sweating in the head and neck area.  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    The patient states that the pain  has become intractable and is present despite taking oxycodone on a regular basis that have been provided by primary care.  The patient states that he would prefer being on alternative medication to the oxycodone but so far this is the only medicine that has seemed to be of any long lasting benefit to him.  The patient indicates that he is scheduled to be evaluated by chronic pain management in the near future.      ROS:  GENERAL: No weight loss.  The patient has had marked generalized fatigue and limits his activities because of the generalized weakness.  SKIN: No rashes, itching   HEAD: No headaches or recent head trauma.  EYES: Visual acuity fine. No photophobia, ocular pain or diplopia.  EARS: Denies ear pain, discharge or vertigo.  NOSE: No loss of smell, no epistaxis or postnasal drip.  MOUTH & THROAT: No hoarseness or change in voice. No excessive gum bleeding.  NODES: Denies swollen glands.  CHEST: Denies [place], cyanosis, wheezing, cough and sputum production.  CARDIOVASCULAR: Denies chest pain, PND, orthopnea  ABDOMEN: Appetite fine. No weight loss. Denies diarrhea, abdominal pain, hematemesis or blood in stool.  URINARY: No flank pain, dysuria or hematuria.  PERIPHERAL VASCULAR: No claudication or cyanosis.  MUSCULOSKELETAL: No joint stiffness or swelling.  The patient is also developed chronic low back pain because of his altered gait.  The patient has weakness in both arms and both legs and is wearing bilateral AFOs.  He has limited function of both hands due to muscle weakness and contractures.  NEUROLOGIC: No history of seizures, or unexplained loss of consciousness.    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  His father is living but has a history of coronary artery disease.    SOCIAL HISTORY:  The patient is  and lives with his family.  He is a current every day smoker.  He also utilizes alcohol in the form of wine.    PE:   VITAL SIGNS: Blood pressure 120/82, pulse 68 and regular, weight 127.6 kg, height 6 foot 5 inches, BMI 33.36  APPEARANCE: Well nourished, well developed, appears to be very uncomfortable with severe diaphoresis.  The patient is constantly having to wipe the skin dry because of the persistent diaphoresis.  HEAD: Normocephalic, atraumatic.  EYES: PERRL. EOMI.  Non-icteric sclerae.    EARS: TM's intact. Light reflex normal. No retraction or perforation.    NOSE: Mucosa pink. Airway clear.  MOUTH & THROAT: No tonsillar enlargement. No pharyngeal erythema or exudate. No stridor.  NECK: Supple. No bruits.  CHEST: Lungs clear to auscultation.  CARDIOVASCULAR: Regular rhythm without significant murmurs.  ABDOMEN: Bowel sounds normal. Not distended.   MUSCULOSKELETAL: The patient has bilateral AFOs in place.  He has limited mobility of both hands due to previous weakness but also the development of contractures and persistent weakness of the hand musculature and forearm musculature.  NEUROLOGIC:   Mental Status:  The patient is well oriented to person, time, place, and situation.  The patient is attentive to the environment and cooperative for the exam.  Cranial Nerves: II-XII grossly intact. Fundoscopic exam is normal.  No hemorrhage, exudate or papilledema is present. The extraocular muscles are intact in the cardinal directions of gaze.  No ptosis is present. Facial features are symmetrical.  Speech is normal in fluency, diction, and phrasing.  Tongue protrudes in the midline.    Gait and Station: The patient is independently ambulatory by utilizing bilateral AFOs.  His gait is unsteady and wide-based.  Motor: Marked generalized  weakness is present in both upper and both lower extremities as manifested by bilateral foot drop requiring AFOs.  He also has diminished  strength in both hands as well as limited mobility of the fingers and hand and distal forearm.    Sensory: Vibratory sensation is absent in both ankles and diminished at both knees.  He has diminished appreciation of vibratory sensation in the fingertips but is present at the wrist and elbow bilaterally.  Monofilament testing is diminished distally in both lower extremities and upper extremities.  In the lower extremities, monofilament testing is perceived just below the knee bilaterally.  In the upper extremities, monofilament testing is perceived at the metacarpal region and above.  Cerebellar: No resting tremor is seen.  No involuntary movements or present.  The patient is unable to perform rapid alternating movements due to his prior history of weakness and muscle contractures.  Reflexes:  Stretch reflexes are absent both upper and lower extremities.  Plantar stimulation is flexor bilaterally and no pathological reflexes are seen        ASSESSMENT:  1.  History of severe Guillain-Barré syndrome  2.  Complex regional pain syndrome, type II both lower extremities    RECOMMENDATIONS:  1.  I would totally support the patient's decision to have pain management evaluate him and to take measures to control his distal pain in both feet which I suspect is complex regional pain syndrome.  I did not prescribe pain medication as primary care head giving him enough until he was seen by pain management.  I did suggest a trial of gabapentin 300 mg 3 times a day even though he had utilized this medication to a dose level of 3200 mg in the past without success.  He also indicates that Lyrica was of no benefit to him.  2.  Return to neurology as needed.    This was a 60 minute visit with the patient and his wife with over 50% of time spent counseling the patient and discussion of  Guillain-Barré syndrome and complex regional pain syndrome.  This note is generated with speech recognition software and is subject to transcription error and sound alike phrases that may be missed by proofreading.

## 2018-03-21 RX ORDER — OXYCODONE AND ACETAMINOPHEN 10; 325 MG/1; MG/1
1 TABLET ORAL EVERY 4 HOURS PRN
Qty: 30 TABLET | Refills: 0 | Status: SHIPPED | OUTPATIENT
Start: 2018-03-21 | End: 2018-03-26 | Stop reason: SDUPTHER

## 2018-03-23 ENCOUNTER — PATIENT MESSAGE (OUTPATIENT)
Dept: INTERNAL MEDICINE | Facility: CLINIC | Age: 48
End: 2018-03-23

## 2018-03-23 DIAGNOSIS — M79.605 PAIN IN BOTH LOWER EXTREMITIES: ICD-10-CM

## 2018-03-23 DIAGNOSIS — M79.604 PAIN IN BOTH LOWER EXTREMITIES: ICD-10-CM

## 2018-03-23 DIAGNOSIS — G90.523 COMPLEX REGIONAL PAIN SYNDROME TYPE 1 OF BOTH LOWER EXTREMITIES: ICD-10-CM

## 2018-03-23 RX ORDER — OXYCODONE AND ACETAMINOPHEN 10; 325 MG/1; MG/1
1 TABLET ORAL EVERY 4 HOURS PRN
Qty: 30 TABLET | Refills: 0 | Status: CANCELLED | OUTPATIENT
Start: 2018-03-23

## 2018-03-26 ENCOUNTER — PATIENT MESSAGE (OUTPATIENT)
Dept: INTERNAL MEDICINE | Facility: CLINIC | Age: 48
End: 2018-03-26

## 2018-03-26 DIAGNOSIS — M79.604 PAIN IN BOTH LOWER EXTREMITIES: ICD-10-CM

## 2018-03-26 DIAGNOSIS — M79.605 PAIN IN BOTH LOWER EXTREMITIES: ICD-10-CM

## 2018-03-26 DIAGNOSIS — G90.523 COMPLEX REGIONAL PAIN SYNDROME TYPE 1 OF BOTH LOWER EXTREMITIES: ICD-10-CM

## 2018-03-26 RX ORDER — OXYCODONE AND ACETAMINOPHEN 10; 325 MG/1; MG/1
1 TABLET ORAL EVERY 4 HOURS PRN
Qty: 30 TABLET | Refills: 0 | Status: SHIPPED | OUTPATIENT
Start: 2018-03-26 | End: 2018-03-29 | Stop reason: SDUPTHER

## 2018-03-28 ENCOUNTER — PATIENT MESSAGE (OUTPATIENT)
Dept: INTERNAL MEDICINE | Facility: CLINIC | Age: 48
End: 2018-03-28

## 2018-03-29 ENCOUNTER — PATIENT MESSAGE (OUTPATIENT)
Dept: INTERNAL MEDICINE | Facility: CLINIC | Age: 48
End: 2018-03-29

## 2018-03-29 DIAGNOSIS — M79.604 PAIN IN BOTH LOWER EXTREMITIES: ICD-10-CM

## 2018-03-29 DIAGNOSIS — M79.605 PAIN IN BOTH LOWER EXTREMITIES: ICD-10-CM

## 2018-03-29 DIAGNOSIS — G90.523 COMPLEX REGIONAL PAIN SYNDROME TYPE 1 OF BOTH LOWER EXTREMITIES: ICD-10-CM

## 2018-03-29 RX ORDER — OXYCODONE AND ACETAMINOPHEN 10; 325 MG/1; MG/1
1 TABLET ORAL EVERY 4 HOURS PRN
Qty: 30 TABLET | Refills: 0 | Status: SHIPPED | OUTPATIENT
Start: 2018-03-29 | End: 2018-04-02 | Stop reason: SDUPTHER

## 2018-04-01 ENCOUNTER — PATIENT MESSAGE (OUTPATIENT)
Dept: INTERNAL MEDICINE | Facility: CLINIC | Age: 48
End: 2018-04-01

## 2018-04-01 DIAGNOSIS — E78.00 PURE HYPERCHOLESTEROLEMIA: ICD-10-CM

## 2018-04-01 DIAGNOSIS — R39.14 FEELING OF INCOMPLETE BLADDER EMPTYING: ICD-10-CM

## 2018-04-01 DIAGNOSIS — I10 ESSENTIAL HYPERTENSION: Primary | ICD-10-CM

## 2018-04-01 DIAGNOSIS — R39.198 DIFFICULTY URINATING: ICD-10-CM

## 2018-04-01 DIAGNOSIS — R39.16 STRAINS TO URINATE: ICD-10-CM

## 2018-04-02 ENCOUNTER — PATIENT MESSAGE (OUTPATIENT)
Dept: INTERNAL MEDICINE | Facility: CLINIC | Age: 48
End: 2018-04-02

## 2018-04-02 DIAGNOSIS — M79.604 PAIN IN BOTH LOWER EXTREMITIES: ICD-10-CM

## 2018-04-02 DIAGNOSIS — M79.605 PAIN IN BOTH LOWER EXTREMITIES: ICD-10-CM

## 2018-04-02 DIAGNOSIS — G90.523 COMPLEX REGIONAL PAIN SYNDROME TYPE 1 OF BOTH LOWER EXTREMITIES: ICD-10-CM

## 2018-04-02 RX ORDER — OXYCODONE AND ACETAMINOPHEN 10; 325 MG/1; MG/1
1 TABLET ORAL EVERY 4 HOURS PRN
Qty: 30 TABLET | Refills: 0 | Status: SHIPPED | OUTPATIENT
Start: 2018-04-02 | End: 2018-04-06 | Stop reason: SDUPTHER

## 2018-04-03 ENCOUNTER — LAB VISIT (OUTPATIENT)
Dept: LAB | Facility: HOSPITAL | Age: 48
End: 2018-04-03
Attending: FAMILY MEDICINE
Payer: MEDICARE

## 2018-04-03 DIAGNOSIS — E78.00 PURE HYPERCHOLESTEROLEMIA: ICD-10-CM

## 2018-04-03 DIAGNOSIS — I10 ESSENTIAL HYPERTENSION: ICD-10-CM

## 2018-04-03 DIAGNOSIS — R39.198 DIFFICULTY URINATING: ICD-10-CM

## 2018-04-03 DIAGNOSIS — R39.14 FEELING OF INCOMPLETE BLADDER EMPTYING: ICD-10-CM

## 2018-04-03 LAB
ALBUMIN SERPL BCP-MCNC: 3.5 G/DL
ALP SERPL-CCNC: 97 U/L
ALT SERPL W/O P-5'-P-CCNC: 23 U/L
ANION GAP SERPL CALC-SCNC: 10 MMOL/L
AST SERPL-CCNC: 28 U/L
BILIRUB SERPL-MCNC: 0.7 MG/DL
BUN SERPL-MCNC: 9 MG/DL
CALCIUM SERPL-MCNC: 8.8 MG/DL
CHLORIDE SERPL-SCNC: 99 MMOL/L
CHOLEST SERPL-MCNC: 163 MG/DL
CHOLEST/HDLC SERPL: 3.1 {RATIO}
CO2 SERPL-SCNC: 31 MMOL/L
CREAT SERPL-MCNC: 0.7 MG/DL
EST. GFR  (AFRICAN AMERICAN): >60 ML/MIN/1.73 M^2
EST. GFR  (NON AFRICAN AMERICAN): >60 ML/MIN/1.73 M^2
GLUCOSE SERPL-MCNC: 110 MG/DL
HDLC SERPL-MCNC: 52 MG/DL
HDLC SERPL: 31.9 %
LDLC SERPL CALC-MCNC: 91 MG/DL
NONHDLC SERPL-MCNC: 111 MG/DL
POTASSIUM SERPL-SCNC: 3.8 MMOL/L
PROSTATE SPECIFIC ANTIGEN, TOTAL: 1.4 NG/ML
PROT SERPL-MCNC: 7.1 G/DL
PSA FREE MFR SERPL: 15.71 %
PSA FREE SERPL-MCNC: 0.22 NG/ML
SODIUM SERPL-SCNC: 140 MMOL/L
TRIGL SERPL-MCNC: 100 MG/DL

## 2018-04-03 PROCEDURE — 36415 COLL VENOUS BLD VENIPUNCTURE: CPT

## 2018-04-03 PROCEDURE — 80061 LIPID PANEL: CPT

## 2018-04-03 PROCEDURE — 80053 COMPREHEN METABOLIC PANEL: CPT

## 2018-04-03 PROCEDURE — 84154 ASSAY OF PSA FREE: CPT

## 2018-04-04 ENCOUNTER — PATIENT MESSAGE (OUTPATIENT)
Dept: INTERNAL MEDICINE | Facility: CLINIC | Age: 48
End: 2018-04-04

## 2018-04-05 ENCOUNTER — PATIENT MESSAGE (OUTPATIENT)
Dept: INTERNAL MEDICINE | Facility: CLINIC | Age: 48
End: 2018-04-05

## 2018-04-06 ENCOUNTER — PATIENT MESSAGE (OUTPATIENT)
Dept: INTERNAL MEDICINE | Facility: CLINIC | Age: 48
End: 2018-04-06

## 2018-04-06 DIAGNOSIS — M79.605 PAIN IN BOTH LOWER EXTREMITIES: ICD-10-CM

## 2018-04-06 DIAGNOSIS — M79.604 PAIN IN BOTH LOWER EXTREMITIES: ICD-10-CM

## 2018-04-06 DIAGNOSIS — R82.79 POSITIVE URINE CULTURE: Primary | ICD-10-CM

## 2018-04-06 DIAGNOSIS — N30.00 ACUTE CYSTITIS WITHOUT HEMATURIA: ICD-10-CM

## 2018-04-06 DIAGNOSIS — G90.523 COMPLEX REGIONAL PAIN SYNDROME TYPE 1 OF BOTH LOWER EXTREMITIES: ICD-10-CM

## 2018-04-06 RX ORDER — OXYCODONE AND ACETAMINOPHEN 10; 325 MG/1; MG/1
1 TABLET ORAL EVERY 4 HOURS PRN
Qty: 30 TABLET | Refills: 0 | Status: SHIPPED | OUTPATIENT
Start: 2018-04-06 | End: 2018-04-11 | Stop reason: SDUPTHER

## 2018-04-06 RX ORDER — CIPROFLOXACIN 500 MG/1
500 TABLET ORAL EVERY 12 HOURS
Qty: 14 TABLET | Refills: 0 | Status: SHIPPED | OUTPATIENT
Start: 2018-04-06 | End: 2018-06-14 | Stop reason: CLARIF

## 2018-04-11 DIAGNOSIS — M79.604 PAIN IN BOTH LOWER EXTREMITIES: ICD-10-CM

## 2018-04-11 DIAGNOSIS — G90.523 COMPLEX REGIONAL PAIN SYNDROME TYPE 1 OF BOTH LOWER EXTREMITIES: ICD-10-CM

## 2018-04-11 DIAGNOSIS — M79.605 PAIN IN BOTH LOWER EXTREMITIES: ICD-10-CM

## 2018-04-12 ENCOUNTER — PATIENT MESSAGE (OUTPATIENT)
Dept: INTERNAL MEDICINE | Facility: CLINIC | Age: 48
End: 2018-04-12

## 2018-04-12 RX ORDER — OXYCODONE AND ACETAMINOPHEN 10; 325 MG/1; MG/1
1 TABLET ORAL EVERY 4 HOURS PRN
Qty: 30 TABLET | Refills: 0 | Status: SHIPPED | OUTPATIENT
Start: 2018-04-12 | End: 2018-04-16 | Stop reason: SDUPTHER

## 2018-04-13 ENCOUNTER — PATIENT MESSAGE (OUTPATIENT)
Dept: GASTROENTEROLOGY | Facility: CLINIC | Age: 48
End: 2018-04-13

## 2018-04-16 ENCOUNTER — PATIENT MESSAGE (OUTPATIENT)
Dept: INTERNAL MEDICINE | Facility: CLINIC | Age: 48
End: 2018-04-16

## 2018-04-16 DIAGNOSIS — Z12.11 COLON CANCER SCREENING: ICD-10-CM

## 2018-04-16 DIAGNOSIS — G90.523 COMPLEX REGIONAL PAIN SYNDROME TYPE 1 OF BOTH LOWER EXTREMITIES: ICD-10-CM

## 2018-04-16 DIAGNOSIS — M79.604 PAIN IN BOTH LOWER EXTREMITIES: ICD-10-CM

## 2018-04-16 DIAGNOSIS — M79.605 PAIN IN BOTH LOWER EXTREMITIES: ICD-10-CM

## 2018-04-16 DIAGNOSIS — K22.70 BARRETT'S ESOPHAGUS WITHOUT DYSPLASIA: Primary | ICD-10-CM

## 2018-04-16 RX ORDER — OXYCODONE AND ACETAMINOPHEN 10; 325 MG/1; MG/1
1 TABLET ORAL EVERY 4 HOURS PRN
Qty: 30 TABLET | Refills: 0 | Status: CANCELLED | OUTPATIENT
Start: 2018-04-16

## 2018-04-16 RX ORDER — SODIUM, POTASSIUM,MAG SULFATES 17.5-3.13G
SOLUTION, RECONSTITUTED, ORAL ORAL
Qty: 254 ML | Refills: 0 | Status: ON HOLD | OUTPATIENT
Start: 2018-04-16 | End: 2018-05-17 | Stop reason: HOSPADM

## 2018-04-16 RX ORDER — OXYCODONE AND ACETAMINOPHEN 10; 325 MG/1; MG/1
1 TABLET ORAL EVERY 4 HOURS PRN
Qty: 30 TABLET | Refills: 0 | Status: SHIPPED | OUTPATIENT
Start: 2018-04-16 | End: 2018-04-19 | Stop reason: SDUPTHER

## 2018-04-17 ENCOUNTER — TELEPHONE (OUTPATIENT)
Dept: GASTROENTEROLOGY | Facility: CLINIC | Age: 48
End: 2018-04-17

## 2018-04-18 ENCOUNTER — CLINICAL SUPPORT (OUTPATIENT)
Dept: SMOKING CESSATION | Facility: CLINIC | Age: 48
End: 2018-04-18
Payer: COMMERCIAL

## 2018-04-18 DIAGNOSIS — F17.200 NICOTINE DEPENDENCE: Primary | ICD-10-CM

## 2018-04-18 PROCEDURE — 99407 BEHAV CHNG SMOKING > 10 MIN: CPT | Mod: S$GLB,,,

## 2018-04-19 ENCOUNTER — PATIENT MESSAGE (OUTPATIENT)
Dept: INTERNAL MEDICINE | Facility: CLINIC | Age: 48
End: 2018-04-19

## 2018-04-19 DIAGNOSIS — M79.604 PAIN IN BOTH LOWER EXTREMITIES: ICD-10-CM

## 2018-04-19 DIAGNOSIS — M79.605 PAIN IN BOTH LOWER EXTREMITIES: ICD-10-CM

## 2018-04-19 DIAGNOSIS — G90.523 COMPLEX REGIONAL PAIN SYNDROME TYPE 1 OF BOTH LOWER EXTREMITIES: ICD-10-CM

## 2018-04-19 RX ORDER — OXYCODONE AND ACETAMINOPHEN 10; 325 MG/1; MG/1
1 TABLET ORAL EVERY 4 HOURS PRN
Qty: 30 TABLET | Refills: 0 | Status: SHIPPED | OUTPATIENT
Start: 2018-04-19 | End: 2018-04-24 | Stop reason: SDUPTHER

## 2018-04-23 ENCOUNTER — PATIENT MESSAGE (OUTPATIENT)
Dept: INTERNAL MEDICINE | Facility: CLINIC | Age: 48
End: 2018-04-23

## 2018-04-24 ENCOUNTER — PATIENT MESSAGE (OUTPATIENT)
Dept: INTERNAL MEDICINE | Facility: CLINIC | Age: 48
End: 2018-04-24

## 2018-04-24 DIAGNOSIS — G90.523 COMPLEX REGIONAL PAIN SYNDROME TYPE 1 OF BOTH LOWER EXTREMITIES: ICD-10-CM

## 2018-04-24 DIAGNOSIS — M79.605 PAIN IN BOTH LOWER EXTREMITIES: ICD-10-CM

## 2018-04-24 DIAGNOSIS — M79.604 PAIN IN BOTH LOWER EXTREMITIES: ICD-10-CM

## 2018-04-24 RX ORDER — OXYCODONE AND ACETAMINOPHEN 10; 325 MG/1; MG/1
1 TABLET ORAL EVERY 4 HOURS PRN
Qty: 30 TABLET | Refills: 0 | Status: SHIPPED | OUTPATIENT
Start: 2018-04-24 | End: 2018-04-27 | Stop reason: SDUPTHER

## 2018-04-26 DIAGNOSIS — M79.605 PAIN IN BOTH LOWER EXTREMITIES: ICD-10-CM

## 2018-04-26 DIAGNOSIS — M79.604 PAIN IN BOTH LOWER EXTREMITIES: ICD-10-CM

## 2018-04-26 DIAGNOSIS — G57.71 COMPLEX REGIONAL PAIN SYNDROME TYPE 2 OF BOTH LOWER EXTREMITIES: ICD-10-CM

## 2018-04-26 DIAGNOSIS — G57.72 COMPLEX REGIONAL PAIN SYNDROME TYPE 2 OF BOTH LOWER EXTREMITIES: ICD-10-CM

## 2018-04-26 DIAGNOSIS — G90.523 COMPLEX REGIONAL PAIN SYNDROME TYPE 1 OF BOTH LOWER EXTREMITIES: ICD-10-CM

## 2018-04-26 DIAGNOSIS — I10 ESSENTIAL HYPERTENSION: ICD-10-CM

## 2018-04-26 RX ORDER — METOPROLOL SUCCINATE 50 MG/1
50 TABLET, EXTENDED RELEASE ORAL DAILY
Qty: 90 TABLET | Refills: 3 | Status: SHIPPED | OUTPATIENT
Start: 2018-04-26 | End: 2019-03-12 | Stop reason: SDUPTHER

## 2018-04-26 RX ORDER — LISINOPRIL AND HYDROCHLOROTHIAZIDE 12.5; 2 MG/1; MG/1
2 TABLET ORAL DAILY
Qty: 90 TABLET | Refills: 3 | Status: SHIPPED | OUTPATIENT
Start: 2018-04-26 | End: 2018-11-07 | Stop reason: SDUPTHER

## 2018-04-27 ENCOUNTER — PATIENT MESSAGE (OUTPATIENT)
Dept: INTERNAL MEDICINE | Facility: CLINIC | Age: 48
End: 2018-04-27

## 2018-04-27 DIAGNOSIS — G57.72 COMPLEX REGIONAL PAIN SYNDROME TYPE 2 OF BOTH LOWER EXTREMITIES: ICD-10-CM

## 2018-04-27 DIAGNOSIS — G57.71 COMPLEX REGIONAL PAIN SYNDROME TYPE 2 OF BOTH LOWER EXTREMITIES: ICD-10-CM

## 2018-04-27 RX ORDER — GABAPENTIN 300 MG/1
300 CAPSULE ORAL 3 TIMES DAILY
Qty: 90 CAPSULE | Refills: 11 | Status: SHIPPED | OUTPATIENT
Start: 2018-04-27 | End: 2018-06-20 | Stop reason: SDUPTHER

## 2018-04-27 RX ORDER — OXYCODONE AND ACETAMINOPHEN 10; 325 MG/1; MG/1
1 TABLET ORAL EVERY 4 HOURS PRN
Qty: 30 TABLET | Refills: 0 | Status: SHIPPED | OUTPATIENT
Start: 2018-04-27 | End: 2018-04-30 | Stop reason: SDUPTHER

## 2018-04-27 RX ORDER — GABAPENTIN 300 MG/1
300 CAPSULE ORAL 3 TIMES DAILY
Qty: 90 CAPSULE | Refills: 11 | Status: CANCELLED | OUTPATIENT
Start: 2018-04-27 | End: 2019-04-27

## 2018-04-30 ENCOUNTER — PATIENT MESSAGE (OUTPATIENT)
Dept: INTERNAL MEDICINE | Facility: CLINIC | Age: 48
End: 2018-04-30

## 2018-04-30 DIAGNOSIS — M79.605 PAIN IN BOTH LOWER EXTREMITIES: ICD-10-CM

## 2018-04-30 DIAGNOSIS — M79.604 PAIN IN BOTH LOWER EXTREMITIES: ICD-10-CM

## 2018-04-30 DIAGNOSIS — G90.523 COMPLEX REGIONAL PAIN SYNDROME TYPE 1 OF BOTH LOWER EXTREMITIES: ICD-10-CM

## 2018-04-30 RX ORDER — OXYCODONE AND ACETAMINOPHEN 10; 325 MG/1; MG/1
1 TABLET ORAL EVERY 4 HOURS PRN
Qty: 30 TABLET | Refills: 0 | Status: SHIPPED | OUTPATIENT
Start: 2018-04-30 | End: 2018-05-04 | Stop reason: SDUPTHER

## 2018-05-04 ENCOUNTER — PATIENT MESSAGE (OUTPATIENT)
Dept: INTERNAL MEDICINE | Facility: CLINIC | Age: 48
End: 2018-05-04

## 2018-05-04 DIAGNOSIS — G47.00 INSOMNIA, UNSPECIFIED TYPE: ICD-10-CM

## 2018-05-04 DIAGNOSIS — G90.523 COMPLEX REGIONAL PAIN SYNDROME TYPE 1 OF BOTH LOWER EXTREMITIES: ICD-10-CM

## 2018-05-04 DIAGNOSIS — E78.00 HYPERCHOLESTEREMIA: ICD-10-CM

## 2018-05-04 DIAGNOSIS — M79.604 PAIN IN BOTH LOWER EXTREMITIES: ICD-10-CM

## 2018-05-04 DIAGNOSIS — M79.605 PAIN IN BOTH LOWER EXTREMITIES: ICD-10-CM

## 2018-05-04 RX ORDER — FUROSEMIDE 20 MG/1
20 TABLET ORAL 2 TIMES DAILY PRN
Qty: 180 TABLET | Refills: 3 | Status: SHIPPED | OUTPATIENT
Start: 2018-05-04 | End: 2020-03-12 | Stop reason: SDUPTHER

## 2018-05-04 RX ORDER — AMITRIPTYLINE HYDROCHLORIDE 50 MG/1
50 TABLET, FILM COATED ORAL NIGHTLY
Qty: 90 TABLET | Refills: 3 | Status: SHIPPED | OUTPATIENT
Start: 2018-05-04 | End: 2018-07-10

## 2018-05-04 RX ORDER — OXYCODONE AND ACETAMINOPHEN 10; 325 MG/1; MG/1
1 TABLET ORAL EVERY 4 HOURS PRN
Qty: 30 TABLET | Refills: 0 | Status: SHIPPED | OUTPATIENT
Start: 2018-05-04 | End: 2018-05-09 | Stop reason: SDUPTHER

## 2018-05-05 RX ORDER — ATORVASTATIN CALCIUM 20 MG/1
20 TABLET, FILM COATED ORAL DAILY
Qty: 90 TABLET | Refills: 3 | Status: SHIPPED | OUTPATIENT
Start: 2018-05-05 | End: 2019-02-27

## 2018-05-09 ENCOUNTER — PATIENT MESSAGE (OUTPATIENT)
Dept: INTERNAL MEDICINE | Facility: CLINIC | Age: 48
End: 2018-05-09

## 2018-05-09 DIAGNOSIS — G90.523 COMPLEX REGIONAL PAIN SYNDROME TYPE 1 OF BOTH LOWER EXTREMITIES: ICD-10-CM

## 2018-05-09 DIAGNOSIS — M79.605 PAIN IN BOTH LOWER EXTREMITIES: ICD-10-CM

## 2018-05-09 DIAGNOSIS — M79.604 PAIN IN BOTH LOWER EXTREMITIES: ICD-10-CM

## 2018-05-09 RX ORDER — OXYCODONE AND ACETAMINOPHEN 10; 325 MG/1; MG/1
1 TABLET ORAL EVERY 4 HOURS PRN
Qty: 30 TABLET | Refills: 0 | Status: SHIPPED | OUTPATIENT
Start: 2018-05-09 | End: 2018-05-14 | Stop reason: SDUPTHER

## 2018-05-10 ENCOUNTER — PATIENT MESSAGE (OUTPATIENT)
Dept: INTERNAL MEDICINE | Facility: CLINIC | Age: 48
End: 2018-05-10

## 2018-05-11 ENCOUNTER — PATIENT MESSAGE (OUTPATIENT)
Dept: OTHER | Facility: OTHER | Age: 48
End: 2018-05-11

## 2018-05-11 DIAGNOSIS — M79.604 PAIN IN BOTH LOWER EXTREMITIES: ICD-10-CM

## 2018-05-11 DIAGNOSIS — M79.605 PAIN IN BOTH LOWER EXTREMITIES: ICD-10-CM

## 2018-05-11 DIAGNOSIS — G90.523 COMPLEX REGIONAL PAIN SYNDROME TYPE 1 OF BOTH LOWER EXTREMITIES: ICD-10-CM

## 2018-05-11 RX ORDER — OXYCODONE AND ACETAMINOPHEN 10; 325 MG/1; MG/1
1 TABLET ORAL EVERY 4 HOURS PRN
Qty: 30 TABLET | Refills: 0 | Status: CANCELLED | OUTPATIENT
Start: 2018-05-11

## 2018-05-14 ENCOUNTER — PATIENT MESSAGE (OUTPATIENT)
Dept: INTERNAL MEDICINE | Facility: CLINIC | Age: 48
End: 2018-05-14

## 2018-05-14 ENCOUNTER — TELEPHONE (OUTPATIENT)
Dept: GASTROENTEROLOGY | Facility: CLINIC | Age: 48
End: 2018-05-14

## 2018-05-14 DIAGNOSIS — M79.604 PAIN IN BOTH LOWER EXTREMITIES: ICD-10-CM

## 2018-05-14 DIAGNOSIS — M79.605 PAIN IN BOTH LOWER EXTREMITIES: ICD-10-CM

## 2018-05-14 DIAGNOSIS — G90.523 COMPLEX REGIONAL PAIN SYNDROME TYPE 1 OF BOTH LOWER EXTREMITIES: ICD-10-CM

## 2018-05-14 RX ORDER — OXYCODONE AND ACETAMINOPHEN 10; 325 MG/1; MG/1
1 TABLET ORAL EVERY 4 HOURS PRN
Qty: 30 TABLET | Refills: 0 | Status: CANCELLED | OUTPATIENT
Start: 2018-05-14

## 2018-05-14 RX ORDER — OXYCODONE AND ACETAMINOPHEN 10; 325 MG/1; MG/1
1 TABLET ORAL EVERY 4 HOURS PRN
Qty: 30 TABLET | Refills: 0 | Status: SHIPPED | OUTPATIENT
Start: 2018-05-14 | End: 2018-08-01 | Stop reason: ALTCHOICE

## 2018-05-14 NOTE — TELEPHONE ENCOUNTER
----- Message from Isis Ramirez sent at 5/14/2018  2:12 PM CDT -----  Contact: pt   Pt states that he has some questions regarding procedure that is schedule for Thursday.   [phone] ([place])

## 2018-05-14 NOTE — TELEPHONE ENCOUNTER
----- Message from Kacie Crawford sent at 5/14/2018  3:35 PM CDT -----  Contact: pt  The pt states he is returning a missed call, the pt can be reached at [phone]///[id number]

## 2018-05-14 NOTE — TELEPHONE ENCOUNTER
Returned patients call and colonoscopy instructions were gone over with patient again.. Patient verbalized understanding.

## 2018-05-15 ENCOUNTER — PATIENT MESSAGE (OUTPATIENT)
Dept: INTERNAL MEDICINE | Facility: CLINIC | Age: 48
End: 2018-05-15

## 2018-05-17 ENCOUNTER — HOSPITAL ENCOUNTER (OUTPATIENT)
Facility: HOSPITAL | Age: 48
Discharge: HOME | End: 2018-05-17
Attending: INTERNAL MEDICINE | Admitting: INTERNAL MEDICINE
Payer: MEDICARE

## 2018-05-17 ENCOUNTER — SURGERY (OUTPATIENT)
Age: 48
End: 2018-05-17

## 2018-05-17 ENCOUNTER — ANESTHESIA EVENT (OUTPATIENT)
Dept: ENDOSCOPY | Facility: HOSPITAL | Age: 48
End: 2018-05-17
Payer: MEDICARE

## 2018-05-17 ENCOUNTER — ANESTHESIA (OUTPATIENT)
Dept: ENDOSCOPY | Facility: HOSPITAL | Age: 48
End: 2018-05-17
Payer: MEDICARE

## 2018-05-17 VITALS
RESPIRATION RATE: 18 BRPM | SYSTOLIC BLOOD PRESSURE: 118 MMHG | BODY MASS INDEX: 32.71 KG/M2 | OXYGEN SATURATION: 98 % | WEIGHT: 277 LBS | HEART RATE: 62 BPM | HEIGHT: 77 IN | TEMPERATURE: 98 F | DIASTOLIC BLOOD PRESSURE: 66 MMHG

## 2018-05-17 DIAGNOSIS — K22.70 BARRETT'S ESOPHAGUS: ICD-10-CM

## 2018-05-17 DIAGNOSIS — D12.4 ADENOMATOUS POLYP OF DESCENDING COLON: Primary | ICD-10-CM

## 2018-05-17 PROCEDURE — 25000003 PHARM REV CODE 250: Performed by: INTERNAL MEDICINE

## 2018-05-17 PROCEDURE — 88305 TISSUE EXAM BY PATHOLOGIST: CPT | Mod: 26,,, | Performed by: PATHOLOGY

## 2018-05-17 PROCEDURE — 43239 EGD BIOPSY SINGLE/MULTIPLE: CPT | Performed by: INTERNAL MEDICINE

## 2018-05-17 PROCEDURE — 27201012 HC FORCEPS, HOT/COLD, DISP: Performed by: INTERNAL MEDICINE

## 2018-05-17 PROCEDURE — 43239 EGD BIOPSY SINGLE/MULTIPLE: CPT | Mod: 51,,, | Performed by: INTERNAL MEDICINE

## 2018-05-17 PROCEDURE — 45385 COLONOSCOPY W/LESION REMOVAL: CPT | Mod: PT,,, | Performed by: INTERNAL MEDICINE

## 2018-05-17 PROCEDURE — 88305 TISSUE EXAM BY PATHOLOGIST: CPT | Performed by: PATHOLOGY

## 2018-05-17 PROCEDURE — 27201089 HC SNARE, DISP (ANY): Performed by: INTERNAL MEDICINE

## 2018-05-17 PROCEDURE — 63600175 PHARM REV CODE 636 W HCPCS: Performed by: NURSE ANESTHETIST, CERTIFIED REGISTERED

## 2018-05-17 PROCEDURE — 37000008 HC ANESTHESIA 1ST 15 MINUTES: Performed by: INTERNAL MEDICINE

## 2018-05-17 PROCEDURE — 25000003 PHARM REV CODE 250: Performed by: NURSE ANESTHETIST, CERTIFIED REGISTERED

## 2018-05-17 PROCEDURE — 45385 COLONOSCOPY W/LESION REMOVAL: CPT | Performed by: INTERNAL MEDICINE

## 2018-05-17 PROCEDURE — 27200997: Performed by: INTERNAL MEDICINE

## 2018-05-17 PROCEDURE — 37000009 HC ANESTHESIA EA ADD 15 MINS: Performed by: INTERNAL MEDICINE

## 2018-05-17 RX ORDER — SODIUM CHLORIDE, SODIUM LACTATE, POTASSIUM CHLORIDE, CALCIUM CHLORIDE 600; 310; 30; 20 MG/100ML; MG/100ML; MG/100ML; MG/100ML
INJECTION, SOLUTION INTRAVENOUS CONTINUOUS
Status: DISCONTINUED | OUTPATIENT
Start: 2018-05-17 | End: 2018-05-17 | Stop reason: HOSPADM

## 2018-05-17 RX ORDER — LIDOCAINE HYDROCHLORIDE 10 MG/ML
INJECTION INFILTRATION; PERINEURAL
Status: DISCONTINUED | OUTPATIENT
Start: 2018-05-17 | End: 2018-05-17

## 2018-05-17 RX ORDER — PROPOFOL 10 MG/ML
VIAL (ML) INTRAVENOUS
Status: DISCONTINUED | OUTPATIENT
Start: 2018-05-17 | End: 2018-05-17

## 2018-05-17 RX ADMIN — PROPOFOL 25 MG: 10 INJECTION, EMULSION INTRAVENOUS at 09:05

## 2018-05-17 RX ADMIN — PROPOFOL 50 MG: 10 INJECTION, EMULSION INTRAVENOUS at 09:05

## 2018-05-17 RX ADMIN — PROPOFOL 100 MG: 10 INJECTION, EMULSION INTRAVENOUS at 08:05

## 2018-05-17 RX ADMIN — SODIUM CHLORIDE, SODIUM LACTATE, POTASSIUM CHLORIDE, AND CALCIUM CHLORIDE: 600; 310; 30; 20 INJECTION, SOLUTION INTRAVENOUS at 09:05

## 2018-05-17 RX ADMIN — SODIUM CHLORIDE, SODIUM LACTATE, POTASSIUM CHLORIDE, AND CALCIUM CHLORIDE: 600; 310; 30; 20 INJECTION, SOLUTION INTRAVENOUS at 08:05

## 2018-05-17 RX ADMIN — LIDOCAINE HYDROCHLORIDE 100 MG: 10 INJECTION, SOLUTION INFILTRATION; PERINEURAL at 08:05

## 2018-05-17 NOTE — OR NURSING
+++++    EGD    1. 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 Barretts Bx at 42cm .Patient tolerated procedure well.    Colonoscopy    3. Descending colon polyp, Clip x1.  Patient tolerated procedure well.

## 2018-05-17 NOTE — PLAN OF CARE
Dr Araya came to bedside and discussed findings. NO N/V,  no abdominal pain, no GI bleeding, and vitals stable.  Pt discharged from unit.

## 2018-05-17 NOTE — ANESTHESIA PREPROCEDURE EVALUATION
05/17/2018  Valerio Yan is a 47 y.o., male.    Anesthesia Evaluation    I have reviewed the Patient Summary Reports.    I have reviewed the Nursing Notes.   I have reviewed the Medications.     Review of Systems  Anesthesia Hx:  No problems with previous Anesthesia Denies Hx of Anesthetic complications  History of prior surgery of interest to airway management or planning: Previous anesthesia: General, MAC Denies Family Hx of Anesthesia complications.   Denies Personal Hx of Anesthesia complications.   Social:  Smoker, Alcohol Use    Hematology/Oncology:  Hematology Normal   Oncology Normal     EENT/Dental:EENT/Dental Normal   Cardiovascular:   Hypertension, well controlled hyperlipidemia    Pulmonary:  Pulmonary Normal    Hepatic/GI:   PUD,    Neurological:   Neuromuscular Disease,  Neuromuscular Disease, Guillain [place]   Endocrine:  Endocrine Normal    Dermatological:  Skin Normal    Psych:  Psychiatric Normal           Physical Exam  General:  Well nourished    Airway/Jaw/Neck:  Airway Findings: Mouth Opening: Normal Tongue: Normal  General Airway Assessment: Adult  Mallampati: III  TM Distance: 4 - 6 cm      Dental:  Dental Findings: In tact   Chest/Lungs:  Chest/Lungs Findings: Clear to auscultation, Normal Respiratory Rate     Heart/Vascular:  Heart Findings: Rate: Normal  Rhythm: Regular Rhythm  Sounds: Normal        Mental Status:  Mental Status Findings:  Cooperative, Alert and Oriented         Anesthesia Plan  Type of Anesthesia, risks & benefits discussed:  Anesthesia Type:  MAC  Patient's Preference:   Intra-op Monitoring Plan: standard ASA monitors  Intra-op Monitoring Plan Comments:   Post Op Pain Control Plan:   Post Op Pain Control Plan Comments:   Induction:   IV  Beta Blocker:  Patient is on a Beta-Blocker and has received one dose within the past 24 hours (No further documentation  required).       Informed Consent: Patient understands risks and agrees with Anesthesia plan.  Questions answered. Anesthesia consent signed with patient.  ASA Score: 3     Day of Surgery Review of History & Physical: I have interviewed and examined the patient. I have reviewed the patient's H&P dated:            Ready For Surgery From Anesthesia Perspective.

## 2018-05-17 NOTE — DISCHARGE INSTRUCTIONS
Understanding Colon and Rectal Polyps    The colon (also called the large intestine) is a muscular tube that forms the last part of the digestive tract. It absorbs water and stores food waste. The colon is about 4 to 6 feet long. The rectum is the last 6 inches of the colon. The colon and rectum have a smooth lining composed of millions of cells. Changes in these cells can lead to growths in the colon that can become cancerous and should be removed. Multiple tests are available to screen for colon cancer, but the colonoscopy is the most recommended test. During colonoscopy, these polyps can be removed. How often you need this test depends on many things including your condition, your family history, symptoms, and what the findings were at the previous colonoscopy.   When the colon lining changes  Changes that happen in the cells that line the colon or rectum can lead to growths called polyps. Over a period of years, polyps can turn cancerous. Removing polyps early may prevent cancer from ever forming.  Polyps  Polyps are fleshy clumps of tissue that form on the lining of the colon or rectum. Small polyps are usually benign (not cancerous). However, over time, cells in a polyp can change and become cancerous. Certain types of polyps known as adenomatous polyps are premalignant. The risk for invasive cancer increases with the size of the polyp and certain cell and gene features. This means that they can become cancerous if they're not removed. Hyperplastic polyps are benign. They can grow quite large and not turn cancerous.   Cancer  Almost all colorectal cancers start when polyp cells begin growing abnormally. As a cancerous tumor grows, it may involve more and more of the colon or rectum. In time, cancer can also grow beyond the colon or rectum and spread to nearby organs or to glands called lymph nodes. The cells can also travel to other parts of the body. This is known as metastasis. The earlier a cancerous  tumor is removed, the better the chance of preventing its spread.    Date Last Reviewed: 8/1/2016  © 3539-9547 [employer]. [address] [place] [address], [place] [address]. All rights reserved. This information is not intended as a substitute for professional medical care. Always follow your healthcare professional's instructions.        What Is Ozuna Esophagus?          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 This means that there have been changes to the lining of the esophagus near the stomach. The changes may have been caused by the acid reflux that happens with GERD (gastroesophageal reflux disease). The changed lining is not cancerous, but may increase your chances of developing cancer later on.      When you have GERD  The esophagus is the tube that carries food and liquid from the mouth to the stomach. Your lower esophageal sphincter (LES) is a one-way valve at the top of the stomach. It keeps food and stomach acid from flowing backward. If the LES is weakened, food and stomach acid flow back (reflux) into your esophagus. If this happens often, the condition is called GERD.  Changes in the lining  The stomach is kept safe from its own acid by a special lining. The esophagus isnt meant to contact stomach acid. With GERD, acid flows back into the esophagus often. This damages the esophagus. In response to the damage, new tissue forms that is not normal. This is Ozuna esophagus. The new tissue may keep changing. This is why it is more likely to become cancer in the future.  Preventing further damage  Your healthcare provider may suggest regular tests to keep track of changes in the esophagus. This usually includes an endoscopy, when a flexible lighted scope is placed through the mouth into the esophagus. Biopsies (tissue samples) can be taken of the abnormal areas. You are usually sedated with an IV medicine for comfort. He or she may also suggest ways for you to control GERD. This includes lifestyle changes,  medicine, or even surgery. This should help keep your Ozuna esophagus from getting worse.  Symptoms of GERD  Symptoms include the following:  · Heartburn  · Sour-tasting fluid backing up into your mouth  · Frequent burping or belching  · Symptoms that get worse after you eat, bend over, or lie down  · Coughing repeatedly to clear your throat  · Hoarseness   Date Last Reviewed: 6/1/2016  © 0502-6981 [email or web address]. [address] [place] [address], [place] [address]. All rights reserved. This information is not intended as a substitute for professional medical care. Always follow your healthcare professional's instructions.

## 2018-05-17 NOTE — H&P
Short Stay Endoscopy History and Physical    PCP - Therese Lala MD    Procedure - EGD and Colonoscopy  ASA - 3  Mallampati - per anesthesia  History of Anesthesia problems - no  Family history Anesthesia problems -  no     HPI:  This is a 47 y.o.male here for evaluation of : Ozuna's esophagus and Colon Cancer Screen    Reflux - yes  Dysphagia - no  Abdominal pain - no  Diarrhea - no  Anemia - no  GI bleeding - no  Nausea and vomiting-no  Early satiety-no  aversion to sight or smell of food-no    ROS:  Constitutional: No fevers, chills, No weight loss  ENT: No allergies  CV: No chest pain  Pulm: No cough, No shortness of breath  Ophtho: No vision changes  GI: see HPI  Derm: No rash  Heme: No lymphadenopathy, No bruising  MSK: No arthritis  : No dysuria, No hematuria  Endo: No hot or cold intolerance  Neuro: No syncope, No seizure  Psych: No anxiety, No depression    Medical History:  Past Medical History:   Diagnosis Date    Atrial flutter     Ozuna's esophagus     Decubitus skin ulcer     Encounter for blood transfusion     Guillain-[place]     Guillain-[place] syndrome 7/30/2013    Hyperlipidemia     Hypertension     Joint pain     knees    LVH (left ventricular hypertrophy) due to hypertensive disease 2/10/2017    Mitral regurgitation 6/9/2016    Transfusion reaction     1 x  to PRBC fever       Surgical History:  Past Surgical History:   Procedure Laterality Date    barrette esophagus      decubitus surgery      to sacral    ESOPHAGOGASTRODUODENOSCOPY      GASTROSTOMY TUBE PLACEMENT      KNEE ARTHROSCOPY  2002    PEG TUBE REMOVAL      RADIOFREQUENCY ABLATION      JUAN-EN-Y PROCEDURE      TRACHEAL SURGERY         Family History:  Family History   Problem Relation Age of Onset    Heart attack Mother     Heart disease Mother     Heart disease Father     Heart attack Father        Social History:  Social History     Social History    Marital status:      Spouse name: N/A     Number of children: 2    Years of education: N/A     Occupational History    Not on file.     Social History Main Topics    Smoking status: Current Every Day Smoker     Packs/day: 0.50     Years: 30.00     Types: Cigarettes     Start date: 4/4/1988     Last attempt to quit: 2/5/2017    Smokeless tobacco: Former User     Types: Snuff     Quit date: 1/6/1999    Alcohol use 8.4 oz/week     14 Glasses of wine per week    Drug use: No    Sexual activity: Yes     Partners: Female     Other Topics Concern    Not on file     Social History Narrative    No narrative on file       Allergies:   Review of patient's allergies indicates:   Allergen Reactions    Latex      Other reaction(s): Itching  Other reaction(s): Hives    Reglan  [metoclopramide hcl]      Pt. Was in coma so is not aware of the reaction  Other reaction(s): Unable to obtain       Medications:   No current facility-administered medications on file prior to encounter.      Current Outpatient Prescriptions on File Prior to Encounter   Medication Sig Dispense Refill    calcium-vitamin D 600 mg, 1,500mg,-200 unit 600 mg(1,500mg) -200 unit Tab Take 1 tablet by mouth once daily.       ciprofloxacin HCl (CIPRO) 500 MG tablet Take 1 tablet (500 mg total) by mouth every 12 (twelve) hours. 14 tablet 0    esomeprazole (NEXIUM PACKET) 40 mg GrPS ONE PACKET ONCE DAILY 30 each 11    multivitamin with minerals (MULTIPLE VITAMIN-MINERALS) tablet Take 1 tablet by mouth Daily.        sodium,potassium,mag sulfates (SUPREP BOWEL PREP KIT) 17.5-3.13-1.6 gram SolR As directed for colonoscopy 254 mL 0    varenicline (CHANTIX STARTING MONTH BOX) 0.5 mg (11)- 1 mg (42) tablet Take one 0.5mg tab by mouth once daily X3 days,then increase to one 0.5mg tab twice daily X4 days,then increase to one 1mg tab twice daily 1 Package 0       Objective Findings:    Vital Signs:There were no vitals filed for this visit.        Physical Exam:  General Appearance: Well appearing in no  acute distress  Eyes:    No scleral icterus  ENT: Neck supple, Lips, mucosa, and tongue normal; teeth and gums normal  Lungs: CTA bilaterally in anterior and posterior fields, no wheezes, no crackles.  Heart:  Regular rate, S1, S2 normal, no murmurs heard.  Abdomen: Soft, non tender, non distended with normal bowel sounds. No hepatosplenomegaly, ascites, or mass.  Extremities: No clubbing, cyanosis or edema; Positive muscle atrophy in lower extremities  Skin: No rash    Labs:  Reviewed    Plan:EGD and Colonoscopy  I have explained the risks and benefits of endoscopy procedures to the patient including but not limited to bleeding, perforation, infection, and death. The patient wishes to proceed.

## 2018-05-17 NOTE — ANESTHESIA POSTPROCEDURE EVALUATION
"Anesthesia Post Evaluation    Patient: Valerio Yan    Procedure(s) Performed: Procedure(s) (LRB):  ESOPHAGOGASTRODUODENOSCOPY (EGD) (N/A)  COLONOSCOPY (N/A)    Final Anesthesia Type: MAC  Patient location during evaluation: GI PACU  Patient participation: Yes- Able to Participate  Level of consciousness: awake and alert  Post-procedure vital signs: reviewed and stable  Pain management: adequate  Airway patency: patent  PONV status at discharge: No PONV  Anesthetic complications: no      Cardiovascular status: hemodynamically stable and blood pressure returned to baseline  Respiratory status: unassisted, spontaneous ventilation and room air  Hydration status: euvolemic  Follow-up not needed.        Visit Vitals  /75 (BP Location: Right arm, Patient Position: Lying)   Pulse (!) 56   Temp 36.8 °C (98.2 °F) (Oral)   Resp 19   Ht 6' 5" (1.956 m)   Wt 125.6 kg (277 lb)   SpO2 96%   BMI 32.85 kg/m²       Pain/Sridevi Score: Pain Assessment Performed: Yes (5/17/2018  8:29 AM)  Presence of Pain: complains of pain/discomfort (5/17/2018  8:29 AM)      "

## 2018-05-17 NOTE — ANESTHESIA RELEASE NOTE
"Anesthesia Release from PACU Note    Patient: Valerio Yan    Procedure(s) Performed: Procedure(s) (LRB):  ESOPHAGOGASTRODUODENOSCOPY (EGD) (N/A)  COLONOSCOPY (N/A)    Anesthesia type: MAC    Post pain: Adequate analgesia    Post assessment: no apparent anesthetic complications, tolerated procedure well and no evidence of recall    Last Vitals:   Visit Vitals  /75 (BP Location: Right arm, Patient Position: Lying)   Pulse (!) 56   Temp 36.8 °C (98.2 °F) (Oral)   Resp 19   Ht 6' 5" (1.956 m)   Wt 125.6 kg (277 lb)   SpO2 96%   BMI 32.85 kg/m²       Post vital signs: stable    Level of consciousness: awake    Nausea/Vomiting: no nausea/no vomiting    Complications: none    Airway Patency: patent    Respiratory: unassisted, spontaneous ventilation, room air    Cardiovascular: stable and blood pressure at baseline    Hydration: euvolemic  "

## 2018-05-17 NOTE — DISCHARGE SUMMARY
[facility]  Brief Operative Note     SUMMARY     Surgery Date: 5/17/2018     Surgeon(s) and Role:     * Ilan Araya III, MD - Primary    Assisting Surgeon: None    Pre-op Diagnosis:  Colon cancer screening [Z12.11]  Ozuna's esophagus without dysplasia [K22.70]    Post-op Diagnosis:  Post-Op Diagnosis Codes:     * Colon cancer screening [Z12.11]     * Oznua's esophagus without dysplasia [K22.70]     - Colon Polyp     - S/P Bariatric Surgery  Procedure(s) (LRB):  ESOPHAGOGASTRODUODENOSCOPY (EGD) (N/A)  COLONOSCOPY (N/A)    Anesthesia: Monitor Anesthesia Care    Description of the findings of the procedure: Procedures completed. See Procedure note for full details.    Findings/Key Components: Procedures completed. See Procedure note for full details.    Prosthetics/Devices: None    Estimated Blood Loss: * No values recorded between 5/17/2018 12:00 AM and 5/17/2018 10:27 AM *         Specimens:   Specimen (12h ago through future)    Start     Ordered    05/17/18 0915  Specimen to Pathology - Surgery  Once     Comments:  1. Barretts Bx @ 40cm2. Barretts Bx at 42cm3. Descending colon polyp      05/17/18 0957          Discharge Note    SUMMARY     Admit Date: 5/17/2018    Discharge Date and Time: 5/17/2018    Hospital Course (synopsis of major diagnoses, care, treatment, and services provided during the course of the hospital stay):  Procedures completed. See Procedure note for full details. Discharge patient when discharge criteria met.    Final Diagnosis: Post-Op Diagnosis Codes:     * Colon cancer screening [Z12.11]     * Ozuna's esophagus without dysplasia [K22.70]      - Colon Polyp      - S/P bariatric Surgery  Disposition: Discharge patient when discharge criteria met.    Follow Up/Patient Instructions:       Medications:  Reconciled Home Medications: Current Discharge Medication List      CONTINUE these medications which have NOT CHANGED    Details   amitriptyline (ELAVIL) 50 MG tablet  Take 1 tablet (50 mg total) by mouth every evening.  Qty: 90 tablet, Refills: 3    Associated Diagnoses: Insomnia, unspecified type      atorvastatin (LIPITOR) 20 MG tablet Take 1 tablet (20 mg total) by mouth once daily.  Qty: 90 tablet, Refills: 3    Associated Diagnoses: Hypercholesteremia      calcium-vitamin D 600 mg, 1,500mg,-200 unit 600 mg(1,500mg) -200 unit Tab Take 1 tablet by mouth once daily.       esomeprazole (NEXIUM PACKET) 40 mg GrPS ONE PACKET ONCE DAILY  Qty: 30 each, Refills: 11    Associated Diagnoses: Ozuna's esophagus with low grade dysplasia; Gastroesophageal reflux disease, esophagitis presence not specified      furosemide (LASIX) 20 MG tablet Take 1 tablet (20 mg total) by mouth 2 (two) times daily as needed (swelling).  Qty: 180 tablet, Refills: 3      gabapentin (NEURONTIN) 300 MG capsule Take 1 capsule (300 mg total) by mouth 3 (three) times daily.  Qty: 90 capsule, Refills: 11    Associated Diagnoses: Complex regional pain syndrome type 2 of both lower extremities      lisinopril-hydrochlorothiazide (PRINZIDE,ZESTORETIC) 20-12.5 mg per tablet Take 2 tablets by mouth once daily.  Qty: 90 tablet, Refills: 3    Associated Diagnoses: Essential hypertension      metoprolol succinate (TOPROL-XL) 50 MG 24 hr tablet Take 1 tablet (50 mg total) by mouth once daily.  Qty: 90 tablet, Refills: 3    Associated Diagnoses: Essential hypertension      multivitamin with minerals (MULTIPLE VITAMIN-MINERALS) tablet Take 1 tablet by mouth Daily.        oxyCODONE-acetaminophen (PERCOCET)  mg per tablet Take 1 tablet by mouth every 4 (four) hours as needed for Pain.  Qty: 30 tablet, Refills: 0    Associated Diagnoses: Pain in both lower extremities; Complex regional pain syndrome type 1 of both lower extremities      varenicline (CHANTIX STARTING MONTH BOX) 0.5 mg (11)- 1 mg (42) tablet Take one 0.5mg tab by mouth once daily X3 days,then increase to one 0.5mg tab twice daily X4 days,then increase to  one 1mg tab twice daily  Qty: 1 Package, Refills: 0    Associated Diagnoses: Tobacco use      ciprofloxacin HCl (CIPRO) 500 MG tablet Take 1 tablet (500 mg total) by mouth every 12 (twelve) hours.  Qty: 14 tablet, Refills: 0    Associated Diagnoses: Positive urine culture; Acute cystitis without hematuria         STOP taking these medications       sodium,potassium,mag sulfates (SUPREP BOWEL PREP KIT) 17.5-3.13-1.6 gram SolR Comments:   Reason for Stopping:                Discharge Procedure Orders  Diet general     Activity as tolerated

## 2018-05-17 NOTE — TRANSFER OF CARE
"Anesthesia Transfer of Care Note    Patient: Valerio Yan    Procedure(s) Performed: Procedure(s) (LRB):  ESOPHAGOGASTRODUODENOSCOPY (EGD) (N/A)  COLONOSCOPY (N/A)    Patient location: Other: GI PACU    Anesthesia Type: MAC    Transport from OR: Transported from OR on room air with adequate spontaneous ventilation    Post pain: adequate analgesia    Post assessment: no apparent anesthetic complications    Post vital signs: stable    Level of consciousness: awake    Nausea/Vomiting: no nausea/vomiting    Complications: none    Transfer of care protocol was followed      Last vitals:   Visit Vitals  /75 (BP Location: Right arm, Patient Position: Lying)   Pulse (!) 56   Temp 36.8 °C (98.2 °F) (Oral)   Resp 19   Ht 6' 5" (1.956 m)   Wt 125.6 kg (277 lb)   SpO2 96%   BMI 32.85 kg/m²     "

## 2018-05-17 NOTE — [employer]OVATION PATIENT INSTRUCTIONS
Discharge Summary/Instructions after an Endoscopic Procedure  Patient Name: Valerio Yan  Patient MRN: [id number]  Patient YOB: 1970  Thursday, May 17, 2018 Ilan Araya III, MD  RESTRICTIONS:  During your procedure today, you received medications for sedation.  These   medications may affect your judgment, balance and coordination.  Therefore,   for 24 hours, you have the following restrictions:   - DO NOT drive a car, operate machinery, make legal/financial decisions,   sign important papers or drink alcohol.    ACTIVITY:  The following day: return to full activity including work, except no heavy   lifting, straining or running for 3 days if polyps were removed.  DIET:  Eat and drink normally unless instructed otherwise.     TREATMENT FOR COMMON SIDE EFFECTS:  - Mild abdominal pain, nausea, belching, bloating or excessive gas:  rest,   eat lightly and use a heating pad.  - Sore Throat: treat with throat lozenges and/or gargle with warm salt   water.  - Because air was used during the procedure, expelling large amounts of air   from your rectum or belching is normal.  - If a bowel prep was taken, you may not have a bowel movement for 1-3 days.    This is normal.  SYMPTOMS TO WATCH FOR AND REPORT TO YOUR PHYSICIAN:  1. Abdominal pain or bloating, other than gas cramps.  2. Chest pain.  3. Back pain.  4. Signs of infection such as: chills or fever occurring within 24 hours   after the procedure.  5. Rectal bleeding, which would show as bright red, maroon, or black stools.   (A tablespoon of blood from the rectum is not serious, especially if   hemorrhoids are present.)  6. Vomiting.  7. Weakness or dizziness.  GO DIRECTLY TO THE NEAREST EMERGENCY ROOM IF YOU HAVE ANY OF THE FOLLOWING:      Difficulty breathing              Chills and/or fever over 101 F   Persistent vomiting and/or vomiting blood   Severe abdominal pain   Severe chest pain   Black, tarry stools   Bleeding- more than one tablespoon   Any  other symptom or condition that you feel may need urgent attention  Your doctor recommends these additional instructions:  If any biopsies were taken, your doctors clinic will contact you in 1 to 2   weeks with any results.  - Discharge patient to home (via wheelchair).   - Resume previous diet.   - Continue present medications.   - Await pathology results.   - Repeat upper endoscopy in 2 years for surveillance based on pathology   results.   - Return to GI clinic at appointment to be scheduled.  For questions, problems or results please call your physician Ilan Araya III, MD at Work:  [phone]  If you have any questions about the above instructions, call the GI   department at [phone] or call the endoscopy unit at [phone]   from 7am until 3 pm.  [facility], EMERGENCY ROOM PHONE NUMBER:   [phone]  IF A COMPLICATION OR EMERGENCY SITUATION ARISES AND YOU ARE UNABLE TO REACH   YOUR PHYSICIAN - GO DIRECTLY TO THE EMERGENCY ROOM.  I have read or have had read to me these discharge instructions for my   procedure and have received a written copy.  I understand these   instructions and will follow-up with my physician if I have any questions.     __________________________________       _____________________________________  Nurse Signature                                          Patient/Designated   Responsible Party Signature  Ilan Araya III, MD  5/17/2018 10:25:22 AM  This report has been verified and signed electronically.  [employer]

## 2018-05-17 NOTE — PROVATION PATIENT INSTRUCTIONS
Discharge Summary/Instructions after an Endoscopic Procedure  Patient Name: Valerio Yan  Patient MRN: [id number]  Patient YOB: 1970  Thursday, May 17, 2018 Ilan Araya III, MD  RESTRICTIONS:  During your procedure today, you received medications for sedation.  These   medications may affect your judgment, balance and coordination.  Therefore,   for 24 hours, you have the following restrictions:   - DO NOT drive a car, operate machinery, make legal/financial decisions,   sign important papers or drink alcohol.    ACTIVITY:  The following day: return to full activity including work, except no heavy   lifting, straining or running for 3 days if polyps were removed.  DIET:  Eat and drink normally unless instructed otherwise.     TREATMENT FOR COMMON SIDE EFFECTS:  - Mild abdominal pain, nausea, belching, bloating or excessive gas:  rest,   eat lightly and use a heating pad.  - Sore Throat: treat with throat lozenges and/or gargle with warm salt   water.  - Because air was used during the procedure, expelling large amounts of air   from your rectum or belching is normal.  - If a bowel prep was taken, you may not have a bowel movement for 1-3 days.    This is normal.  SYMPTOMS TO WATCH FOR AND REPORT TO YOUR PHYSICIAN:  1. Abdominal pain or bloating, other than gas cramps.  2. Chest pain.  3. Back pain.  4. Signs of infection such as: chills or fever occurring within 24 hours   after the procedure.  5. Rectal bleeding, which would show as bright red, maroon, or black stools.   (A tablespoon of blood from the rectum is not serious, especially if   hemorrhoids are present.)  6. Vomiting.  7. Weakness or dizziness.  GO DIRECTLY TO THE NEAREST EMERGENCY ROOM IF YOU HAVE ANY OF THE FOLLOWING:      Difficulty breathing              Chills and/or fever over 101 F   Persistent vomiting and/or vomiting blood   Severe abdominal pain   Severe chest pain   Black, tarry stools   Bleeding- more than one tablespoon   Any  other symptom or condition that you feel may need urgent attention  Your doctor recommends these additional instructions:  If any biopsies were taken, your doctors clinic will contact you in 1 to 2   weeks with any results.  - Discharge patient to home (via wheelchair).   - High fiber diet.   - Continue present medications.   - Await pathology results.   - Repeat colonoscopy in 5 years for surveillance.   - Return to primary care physician as previously scheduled.   - Discharge patient to home (via wheelchair).   - High fiber diet.   - Continue present medications.   - Await pathology results.   - Repeat colonoscopy in 5 years for surveillance.   - Return to primary care physician as previously scheduled.  For questions, problems or results please call your physician Ilan Araya III, MD at Work:  [phone]  If you have any questions about the above instructions, call the GI   department at [phone] or call the endoscopy unit at [phone]   from 7am until 3 pm.  [facility], EMERGENCY ROOM PHONE NUMBER:   [phone]  IF A COMPLICATION OR EMERGENCY SITUATION ARISES AND YOU ARE UNABLE TO REACH   YOUR PHYSICIAN - GO DIRECTLY TO THE EMERGENCY ROOM.  I have read or have had read to me these discharge instructions for my   procedure and have received a written copy.  I understand these   instructions and will follow-up with my physician if I have any questions.     __________________________________       _____________________________________  Nurse Signature                                          Patient/Designated   Responsible Party Signature  Ilan Araya III, MD  5/17/2018 10:16:39 AM  This report has been verified and signed electronically.  [employer]

## 2018-05-23 ENCOUNTER — PATIENT OUTREACH (OUTPATIENT)
Dept: ADMINISTRATIVE | Facility: HOSPITAL | Age: 48
End: 2018-05-23

## 2018-05-29 ENCOUNTER — PATIENT MESSAGE (OUTPATIENT)
Dept: GASTROENTEROLOGY | Facility: CLINIC | Age: 48
End: 2018-05-29

## 2018-05-29 ENCOUNTER — TELEPHONE (OUTPATIENT)
Dept: GASTROENTEROLOGY | Facility: CLINIC | Age: 48
End: 2018-05-29

## 2018-05-29 NOTE — TELEPHONE ENCOUNTER
----- Message from Lizzy Crawford sent at 5/29/2018  2:34 PM CDT -----  Patient calling to get his test results from his procedure. Please adv/call [phone].//cw

## 2018-05-30 ENCOUNTER — PATIENT MESSAGE (OUTPATIENT)
Dept: NEUROLOGY | Facility: CLINIC | Age: 48
End: 2018-05-30

## 2018-05-30 ENCOUNTER — PATIENT MESSAGE (OUTPATIENT)
Dept: GASTROENTEROLOGY | Facility: CLINIC | Age: 48
End: 2018-05-30

## 2018-06-14 ENCOUNTER — HOSPITAL ENCOUNTER (EMERGENCY)
Facility: HOSPITAL | Age: 48
Discharge: HOME | End: 2018-06-14
Payer: MEDICARE

## 2018-06-14 ENCOUNTER — PATIENT MESSAGE (OUTPATIENT)
Dept: INTERNAL MEDICINE | Facility: CLINIC | Age: 48
End: 2018-06-14

## 2018-06-14 VITALS
DIASTOLIC BLOOD PRESSURE: 72 MMHG | HEART RATE: 72 BPM | BODY MASS INDEX: 32.12 KG/M2 | WEIGHT: 272 LBS | SYSTOLIC BLOOD PRESSURE: 120 MMHG | HEIGHT: 77 IN | RESPIRATION RATE: 17 BRPM | TEMPERATURE: 98 F | OXYGEN SATURATION: 96 %

## 2018-06-14 DIAGNOSIS — G89.29 ACUTE EXACERBATION OF CHRONIC LOW BACK PAIN: Primary | ICD-10-CM

## 2018-06-14 DIAGNOSIS — S20.229A CONTUSION OF BACK, UNSPECIFIED LATERALITY, INITIAL ENCOUNTER: ICD-10-CM

## 2018-06-14 DIAGNOSIS — F17.200 CURRENT SMOKER: ICD-10-CM
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
## 2018-06-14 DIAGNOSIS — S39.012A STRAIN OF LUMBAR PARASPINOUS MUSCLE, INITIAL ENCOUNTER: ICD-10-CM

## 2018-06-14 DIAGNOSIS — M54.50 ACUTE EXACERBATION OF CHRONIC LOW BACK PAIN: Primary | ICD-10-CM

## 2018-06-14 PROCEDURE — 99283 EMERGENCY DEPT VISIT LOW MDM: CPT

## 2018-06-14 PROCEDURE — 63600175 PHARM REV CODE 636 W HCPCS: Performed by: PHYSICIAN ASSISTANT

## 2018-06-14 RX ORDER — TIZANIDINE 4 MG/1
4 TABLET ORAL NIGHTLY PRN
Qty: 10 TABLET | Refills: 0 | Status: SHIPPED | OUTPATIENT
Start: 2018-06-14 | End: 2018-06-24

## 2018-06-14 RX ORDER — HYDROMORPHONE HYDROCHLORIDE 1 MG/ML
1 INJECTION, SOLUTION INTRAMUSCULAR; INTRAVENOUS; SUBCUTANEOUS
Status: COMPLETED | OUTPATIENT
Start: 2018-06-14 | End: 2018-06-14

## 2018-06-14 RX ORDER — ORPHENADRINE CITRATE 30 MG/ML
60 INJECTION INTRAMUSCULAR; INTRAVENOUS ONCE
Status: COMPLETED | OUTPATIENT
Start: 2018-06-14 | End: 2018-06-14

## 2018-06-14 RX ADMIN — HYDROMORPHONE HYDROCHLORIDE 1 MG: 1 INJECTION, SOLUTION INTRAMUSCULAR; INTRAVENOUS; SUBCUTANEOUS at 11:06

## 2018-06-14 RX ADMIN — ORPHENADRINE CITRATE 60 MG: 30 INJECTION INTRAMUSCULAR; INTRAVENOUS at 11:06

## 2018-06-14 NOTE — ED PROVIDER NOTES
History      Chief Complaint   Patient presents with    Back Pain     pt states his L leg went out while urinating this AM and he fell and hit the floor pt states he took 2 10 mg Percocets this AM       Review of patient's allergies indicates:   Allergen Reactions    Latex      Other reaction(s): Itching  Other reaction(s): Hives    Reglan  [metoclopramide hcl]      Pt. Was in coma so is not aware of the reaction  Other reaction(s): Unable to obtain        HPI   HPI    6/14/2018, 10:45 AM   History obtained from the patient      History of Present Illness: Valerio Yan is a 47 y.o. male patient who presents to the Emergency Department for flare up of low back pain x one day.  Patient states that he fell this morning; patient states that he landed on buttock.   Patient reports that he also hit head; denies LOC.   Patient is currently in pain management.   Patient reports no relief of back pain after taking 2 Percocet 10 mg earlier today.  Denies bowel/bladder incontinence, numbness, weakness, chest pain, fever, vomiting, diarrhea, SOB, headache, dizziness.  Patient states that pain is worse with movement.        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;  Service:  Endoscopy;  Laterality: N/A;    decubitus surgery      to sacral    ESOPHAGOGASTRODUODENOSCOPY      GASTROSTOMY TUBE PLACEMENT      KNEE ARTHROSCOPY  2002    PEG TUBE REMOVAL      RADIOFREQUENCY ABLATION      RFA      JUAN-EN-Y PROCEDURE      TRACHEAL SURGERY           Family History:  Family History   Problem Relation Age of Onset    Heart attack Mother     Heart disease Mother     Heart disease Father     Heart attack Father        Social History:  Social History     Social History Main Topics    Smoking status: Current Every Day Smoker     Packs/day: 0.50     Years: 30.00     Types: Cigarettes     Start date: 4/4/1988     Last attempt to quit: 2/5/2017    Smokeless tobacco: Former User     Types: Snuff     Quit date: 1/6/1999      Comment: Currently at 4 cigs per day    Alcohol use 8.4 oz/week     14 Glasses of wine per week    Drug use: No    Sexual activity: Yes     Partners: Female       ROS   Review of Systems   Constitutional: Negative for chills and fever.   HENT: Negative for congestion and rhinorrhea.    Respiratory: Negative for cough and wheezing.    Cardiovascular: Negative for chest pain and palpitations.   Gastrointestinal: Negative for diarrhea, nausea and vomiting.   Genitourinary: Negative for dysuria and frequency.   Musculoskeletal: Positive for back pain and myalgias. Negative for neck pain.   Skin: Negative for rash and wound.   Neurological: Negative for dizziness and headaches.       Physical Exam      Initial Vitals [06/14/18 1026]   BP Pulse Resp Temp SpO2   116/79 (!) 130 18 98.3 °F (36.8 °C) 97 %      MAP       --          Physical Exam  Nursing Notes and Vital Signs Reviewed.  Constitutional: Patient is in no apparent distress. Awake and alert. Well-developed and well-nourished.  Head: Atraumatic. Normocephalic.  Eyes: PERRL. EOM intact. Conjunctivae are not pale. No scleral icterus.  ENT: Mucous membranes are moist. Oropharynx is clear and symmetric.    Neck: Supple.  "Full ROM. No lymphadenopathy.  Cardiovascular: Regular rate. Regular rhythm. No murmurs, rubs, or gallops.   Pulmonary/Chest: No respiratory distress. Clear to auscultation bilaterally. No wheezing, rales, or rhonchi.  Abdominal: Soft and non-distended.  There is no tenderness.  No rebound, guarding, or rigidity.   Genitourinary: No CVA tenderness  Musculoskeletal: Moves all extremities. No obvious deformities. No edema. No calf tenderness.  Back:  Pain with lumbar flexion and extension.  TTP over bilateral lumbar paraspinal musculature.  Tender over lumbar spine.    Skin: Warm and dry.  Neurological:  Alert, awake, and appropriate.  Normal speech.  No acute focal neurological deficits are appreciated.  Equal strength BLE.  Lower extremity DTR 2+.    Psychiatric: Normal affect. Good eye contact. Appropriate in content.    ED Course    Procedures  ED Vital Signs:  Vitals:    06/14/18 1026 06/14/18 1208   BP: 116/79 120/72   Pulse: (!) 130 72   Resp: 18 17   Temp: 98.3 °F (36.8 °C) 98.3 °F (36.8 °C)   TempSrc: Oral Oral   SpO2: 97% 96%   Weight: 123.4 kg (272 lb)    Height: 6' 5" (1.956 m)        Abnormal Lab Results:  Labs Reviewed - No data to display         Imaging Results:  Imaging Results          X-Ray Lumbar Spine Ap And Lateral (Final result)  Result time 06/14/18 11:52:37    Final result by MONTSE Powell Sr., MD (06/14/18 11:52:37)                 Impression:      1. There are 4 lumbar type vertebral bodies. There is a transitional vertebral body between the thoracic and lumbar portions of the spine. There is a transitional vertebral body between L4 and the sacrum.  2. There are mild degenerative changes between the transitional vertebral body and L1.  3. There is a mild amount of atherosclerosis.      Electronically signed by: Wil Powell MD  Date:    06/14/2018  Time:    11:52             Narrative:    EXAMINATION:  XR LUMBAR SPINE AP AND LATERAL    CLINICAL HISTORY:  Unspecified fall, initial " encounterLow back pain, minor trauma;    COMPARISON:  12/07/2016    FINDINGS:  There are 4 lumbar type vertebral bodies.  There is a transitional vertebral body between the thoracic and lumbar portions of the spine.  There is a transitional vertebral body between L4 and the sacrum.  There are mild degenerative changes between the transitional vertebral body and L1.  There is no fracture, spondylolisthesis, or scoliosis. There is normal lumbar lordosis.  There is a mild amount of atherosclerosis.                                        The Emergency Provider reviewed the vital signs and test results, which are outlined above.    ED Discussion     12:10 PM: Reassessed pt at this time.  Pt states his condition has improved at this time. Discussed with pt all pertinent ED information and results. Discussed pt dx and plan of tx. Gave pt all f/u and return to the ED instructions. All questions and concerns were addressed at this time. Pt expresses understanding of information and instructions, and is comfortable with plan to discharge. Pt is stable for discharge.    I discussed with patient and/or family/caretaker that evaluation in the ED does not suggest any emergent or life threatening medical conditions requiring immediate intervention beyond what was provided in the ED, and I believe patient is safe for discharge.  Regardless, an unremarkable evaluation in the ED does not preclude the development or presence of a serious of life threatening condition. As such, patient was instructed to return immediately for any worsening or change in current symptoms.      ED Medication(s):  Medications   orphenadrine injection 60 mg (60 mg Intramuscular Given 6/14/18 1105)   HYDROmorphone injection 1 mg (1 mg Intramuscular Given 6/14/18 1105)       Discharge Medication List as of 6/14/2018 12:09 PM      START taking these medications    Details   tiZANidine (ZANAFLEX) 4 MG tablet Take 1 tablet (4 mg total) by mouth nightly as needed  (muscle spasms)., Starting Thu 6/14/2018, Until Sun 6/24/2018, Print             Follow-up Information     Therese Lala MD In 3 days.    Specialty:  Internal Medicine  Contact information:  [address] [place] [address]  [phone]             Pineda Saini MD. Schedule an appointment as soon as possible for a visit in 2 days.    Specialty:  Neurosurgery  Contact information:  [address] [address]  [facility] [address]  [place] [address]  [phone]                     Medical Decision Making                  Clinical Impression       ICD-10-CM ICD-9-CM   1. Acute exacerbation of chronic low back pain M54.5 724.2    G89.29 338.19     338.29   2. Fall W19.XXXA E888.9   3. Strain of lumbar paraspinous muscle, initial encounter S39.012A 847.2   4. Contusion of back, unspecified laterality, initial encounter S20.229A 922.31   5. Current smoker F17.200 305.1       Disposition:   Disposition: Discharged  Condition: Stable           Paula Victoria PA-C  06/15/18 1259

## 2018-06-16 ENCOUNTER — NURSE TRIAGE (OUTPATIENT)
Dept: ADMINISTRATIVE | Facility: CLINIC | Age: 48
End: 2018-06-16

## 2018-06-16 ENCOUNTER — HOSPITAL ENCOUNTER (EMERGENCY)
Facility: HOSPITAL | Age: 48
Discharge: HOME | End: 2018-06-16
Attending: EMERGENCY MEDICINE
Payer: MEDICARE

## 2018-06-16 VITALS
SYSTOLIC BLOOD PRESSURE: 162 MMHG | TEMPERATURE: 99 F | OXYGEN SATURATION: 98 % | BODY MASS INDEX: 32.12 KG/M2 | WEIGHT: 272 LBS | HEIGHT: 77 IN | RESPIRATION RATE: 18 BRPM | DIASTOLIC BLOOD PRESSURE: 94 MMHG | HEART RATE: 88 BPM

## 2018-06-16 DIAGNOSIS — S93.402A SPRAIN OF LEFT ANKLE, UNSPECIFIED LIGAMENT, INITIAL ENCOUNTER: ICD-10-CM

## 2018-06-16 DIAGNOSIS — G61.0 GUILLAIN-BARRE SYNDROME: ICD-10-CM
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
## 2018-06-16 DIAGNOSIS — S86.912S KNEE STRAIN, LEFT, SEQUELA: Primary | ICD-10-CM

## 2018-06-16 PROCEDURE — 63600175 PHARM REV CODE 636 W HCPCS: Performed by: EMERGENCY MEDICINE

## 2018-06-16 PROCEDURE — 96372 THER/PROPH/DIAG INJ SC/IM: CPT

## 2018-06-16 PROCEDURE — 29505 APPLICATION LONG LEG SPLINT: CPT | Mod: LT

## 2018-06-16 PROCEDURE — 99283 EMERGENCY DEPT VISIT LOW MDM: CPT | Mod: 25

## 2018-06-16 RX ORDER — KETOROLAC TROMETHAMINE 30 MG/ML
30 INJECTION, SOLUTION INTRAMUSCULAR; INTRAVENOUS
Status: COMPLETED | OUTPATIENT
Start: 2018-06-16 | End: 2018-06-16

## 2018-06-16 RX ADMIN — KETOROLAC TROMETHAMINE 30 MG: 30 INJECTION, SOLUTION INTRAMUSCULAR at 10:06

## 2018-06-16 NOTE — ED PROVIDER NOTES
"SCRIBE #1 NOTE: I, Corinne Mack, am scribing for, and in the presence of, Daron Lombardi Jr., MD. I have scribed the entire note.      History      Chief Complaint   Patient presents with    Fall     pt reports frequent falls, states "for no reason, legs just giving out of him" LEFT knee/ankle pain       Review of patient's allergies indicates:   Allergen Reactions    Latex      Other reaction(s): Itching  Other reaction(s): Hives    Reglan  [metoclopramide hcl]      Pt. Was in coma so is not aware of the reaction  Other reaction(s): Unable to obtain        HPI   HPI    6/16/2018, 10:25 AM   History obtained from the patient      History of Present Illness: Valerio Yan is a 47 y.o. male patient with PMHx of Aflutter, Guillain-Burdett syndrome, HTN, and LVH who presents to the Emergency Department for frequent falls which onset a few days ago. Pt states that his "legs have been giving out" recently which is causing him to fall more frequently. Symptoms are episodic and moderate in severity. No mitigating or exacerbating factors reported. Associated sxs include bilateral knee pain and L ankle pain. Patient denies any head injury, N/V, back pain, neck pain, HA, dizziness, and all other sxs at this time. No prior Tx reported. No further complaints or concerns at this time.         Arrival mode: Personal vehicle    PCP: Therese Lala MD       Past Medical History:  Past Medical History:   Diagnosis Date    Atrial flutter     Ozuna's esophagus     CRPS (complex regional pain syndrome type II)     Decubitus skin ulcer     Encounter for blood transfusion     Guillain-Burdett     Guillain-Burdett syndrome 7/30/2013    Hyperlipidemia     Hypertension     Joint pain     knees    LVH (left ventricular hypertrophy) due to hypertensive disease 2/10/2017    Mitral regurgitation 6/9/2016    Transfusion reaction     1 x  to PRBC fever       Past Surgical History:  Past Surgical History:   Procedure Laterality " Date    barrette esophagus      COLONOSCOPY N/A 5/17/2018    Procedure: COLONOSCOPY;  Surgeon: Ilan Araya III, MD;  Location: [facility];  Service: Endoscopy;  Laterality: N/A;    decubitus surgery      to sacral    ESOPHAGOGASTRODUODENOSCOPY      GASTROSTOMY TUBE PLACEMENT      KNEE ARTHROSCOPY  2002    PEG TUBE REMOVAL      RADIOFREQUENCY ABLATION      RFA      JUAN-EN-Y PROCEDURE      TRACHEAL SURGERY           Family History:  Family History   Problem Relation Age of Onset    Heart attack Mother     Heart disease Mother     Heart disease Father     Heart attack Father        Social History:  Social History     Social History Main Topics    Smoking status: Current Every Day Smoker     Packs/day: 0.50     Years: 30.00     Types: Cigarettes     Start date: 4/4/1988     Last attempt to quit: 2/5/2017    Smokeless tobacco: Former User     Types: Snuff     Quit date: 1/6/1999      Comment: Currently at 4 cigs per day    Alcohol use 8.4 oz/week     14 Glasses of wine per week    Drug use: No    Sexual activity: Yes     Partners: Female       ROS   Review of Systems   Constitutional: Negative for chills and fever.   HENT: Negative for nosebleeds.         (-) head injury   Respiratory: Negative for cough and shortness of breath.    Cardiovascular: Negative for chest pain and leg swelling.   Gastrointestinal: Negative for abdominal pain, nausea and vomiting.   Musculoskeletal: Positive for arthralgias (L ankle; bilateral knees). Negative for back pain, neck pain and neck stiffness.        (+) frequent falls   Skin: Negative for rash and wound.   Neurological: Negative for dizziness, light-headedness, numbness and headaches.        (-) LOC   All other systems reviewed and are negative.    Physical Exam      Initial Vitals [06/16/18 1020]   BP Pulse Resp Temp SpO2   (!) 150/100 72 17 98.8 °F (37.1 °C) 96 %      MAP       --          Physical Exam  Nursing Notes and Vital Signs  Reviewed.  Constitutional: Patient is in no apparent distress. Well-developed and well-nourished.  Head: Atraumatic. Normocephalic.  Eyes: PERRL. EOM intact. Conjunctivae are not pale. No scleral icterus.  ENT: Mucous membranes are moist. Oropharynx is clear and symmetric.    Neck: Supple. Full ROM. No lymphadenopathy.  Cardiovascular: Regular rate. Regular rhythm. No murmurs, rubs, or gallops. Distal pulses are 2+ and symmetric.  Pulmonary/Chest: No respiratory distress. Clear to auscultation bilaterally. No wheezing or rales.  Abdominal: Soft and non-distended.  There is no tenderness.  No rebound, guarding, or rigidity.   Musculoskeletal: Moves all extremities. No obvious deformities. No edema. Chronic deficits noted to the BUE and the BLE. Braces in place to the BLE.  Left Ankle:  No obvious deformity. There is no swelling.  There is no tenderness.  No bony tenderness over navicular.  No tenderness over the base of the 5th metatarsal.  No proximal fibular tenderness. He is able to bear weight.   Ankle dorsiflexion and ankle plantar flexion are intact.  Intact sensation to light touch. Distal capillary refill takes less than 2 seconds.  PT and DP pulses are 2+ bilaterally.  Left Knee:  No obvious deformity. There is pain with AP stress with ligament instability.  No increased warmth, erythema, induration or fluctuance.  No ligament laxity.   DP and PT pulses are 2+.  Normal capillary refill.  Distal sensation is intact.  Skin: Warm and dry.  Neurological:  Alert, awake, and appropriate.  Normal speech.  No acute focal neurological deficits are appreciated.  Psychiatric: Normal affect. Good eye contact. Appropriate in content.    ED Course    Splint Application  Date/Time: 6/16/2018 11:11 AM  Performed by: SHERMAN MADDOX JR  Authorized by: SHERMAN MADDOX JR   Consent Done: Yes  Consent: Verbal consent obtained.  Risks and benefits: risks, benefits and alternatives were discussed  Consent given by: patient  Patient  "understanding: patient states understanding of the procedure being performed  Patient consent: the patient's understanding of the procedure matches consent given  Patient identity confirmed: , name and verbally with patient  Location details: left knee  Splint type: knee immobilizer.  Post-procedure: The splinted body part was neurovascularly unchanged following the procedure.  Patient tolerance: Patient tolerated the procedure well with no immediate complications        ED Vital Signs:  Vitals:    18 1020   BP: (!) 150/100   Pulse: 72   Resp: 17   Temp: 98.8 °F (37.1 °C)   TempSrc: Oral   SpO2: 96%   Weight: 123.4 kg (272 lb)   Height: 6' 5" (1.956 m)       Abnormal Lab Results:  Labs Reviewed - No data to display     All Lab Results:  None    Imaging Results:  Imaging Results          X-Ray Knee Complete 4 or more Views Bilat (Final result)  Result time 18 10:56:30   Procedure changed from X-Ray Ankle Complete Left     Final result by Hermes Howard MD (18 10:56:30)                 Impression:      No acute findings.      Electronically signed by: Hermes Howard MD  Date:    2018  Time:    10:56             Narrative:    EXAMINATION:  XR KNEE COMP 4 OR MORE VIEWS BILAT    CLINICAL HISTORY:  Knee pain, trauma after fall    TECHNIQUE:  Routine radiographs obtained.    COMPARISON:  None    FINDINGS:  Negative for acute fracture or dislocation.    No significant arthritic changes.    Osteopenia.                               X-Ray Ankle Complete Left (Final result)  Result time 18 10:57:48   Procedure changed from X-Ray Knee 3 View Bilateral     Final result by Hermes Howard MD (18 10:57:48)                 Impression:      Negative for fracture.  There are some small rectangular densities projected just deep to the anterior calcaneus which could represent foreign bodies/glass.  Correlate with exam.  The largest of these is approximately 1 cm..      Electronically signed " by: Hermes Howard MD  Date:    06/16/2018  Time:    10:57             Narrative:    EXAMINATION:  XR ANKLE COMPLETE 3 VIEW LEFT    CLINICAL HISTORY:  pain;  Unspecified fall, initial encounter    TECHNIQUE:  Routine radiographs obtained.    COMPARISON:  None    FINDINGS:  Negative for acute fracture or dislocation.    No significant arthritic changes.    Osteopenia.                                        The Emergency Provider reviewed the vital signs and test results, which are outlined above.    ED Discussion     11:08 AM: Reassessed pt at this time. Pt is awake, alert, and in no distress. Discussed with pt all pertinent ED information and results. Discussed pt dx and plan of tx. Gave pt all f/u and return to the ED instructions. All questions and concerns were addressed at this time. Pt expresses understanding of information and instructions, and is comfortable with plan to discharge. Pt is stable for discharge.    I discussed with patient and/or family/caretaker that evaluation in the ED does not suggest any emergent or life threatening medical conditions requiring immediate intervention beyond what was provided in the ED, and I believe patient is safe for discharge.  Regardless, an unremarkable evaluation in the ED does not preclude the development or presence of a serious of life threatening condition. As such, patient was instructed to return immediately for any worsening or change in current symptoms.      ED Medication(s):  Medications   ketorolac injection 30 mg (30 mg Intramuscular Given 6/16/18 1059)       New Prescriptions    No medications on file             Medical Decision Making    Medical Decision Making:   Clinical Tests:   Radiological Study: Ordered and Reviewed           Scribe Attestation:   Scribe #1: I performed the above scribed service and the documentation accurately describes the services I performed. I attest to the accuracy of the note.    Attending:   Physician Attestation Statement  for Scribe #1: I, Daron Lombardi Jr., MD, personally performed the services described in this documentation, as scribed by Corinne Mack, in my presence, and it is both accurate and complete.          Clinical Impression       ICD-10-CM ICD-9-CM   1. Knee strain, left, sequela S86.912S 905.7   2. Fall W19.XXXA E888.9   3. Sprain of left ankle, unspecified ligament, initial encounter S93.402A 845.00   4. Guillain-Hominy syndrome G61.0 357.0       Disposition:   Disposition: Discharged  Condition: Stable           Daron Lombardi Jr., MD  06/16/18 5718

## 2018-06-16 NOTE — TELEPHONE ENCOUNTER
Patient called the following:    -I have fallen 3 times in the past 36 hours   -my knees gave out on me   -I am in a lot of pain  -both needs need MRI   -left knee injury 2001  -I have taken 2 percocet and my pain is 10/10    Education completed per [facility] On Call Care Advice including follow up with provider within 4 hours. Patient verbalized understanding.    Reason for Disposition   [1] SEVERE pain (e.g., excruciating, unable to walk) AND [2] not improved after 2 hours of pain medicine    Protocols used: ST KNEE PAIN-A-AH

## 2018-06-18 ENCOUNTER — PATIENT MESSAGE (OUTPATIENT)
Dept: INTERNAL MEDICINE | Facility: CLINIC | Age: 48
End: 2018-06-18

## 2018-06-18 ENCOUNTER — HOSPITAL ENCOUNTER (OUTPATIENT)
Dept: RADIOLOGY | Facility: HOSPITAL | Age: 48
Discharge: HOME | End: 2018-06-18
Attending: ORTHOPAEDIC SURGERY
Payer: MEDICARE

## 2018-06-18 DIAGNOSIS — M25.562 ACUTE PAIN OF BOTH KNEES: Primary | ICD-10-CM

## 2018-06-18 DIAGNOSIS — M79.671 PAIN IN BOTH FEET: ICD-10-CM
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
## 2018-06-18 DIAGNOSIS — M25.562 ACUTE PAIN OF BOTH KNEES: ICD-10-CM

## 2018-06-18 DIAGNOSIS — M25.561 ACUTE PAIN OF BOTH KNEES: Primary | ICD-10-CM

## 2018-06-18 DIAGNOSIS — M25.561 ACUTE PAIN OF BOTH KNEES: ICD-10-CM

## 2018-06-18 DIAGNOSIS — M79.672 PAIN IN BOTH FEET: ICD-10-CM

## 2018-06-18 DIAGNOSIS — M79.89 BILATERAL SWELLING OF FEET: ICD-10-CM

## 2018-06-18 PROCEDURE — 73564 X-RAY EXAM KNEE 4 OR MORE: CPT | Mod: 26,50,, | Performed by: RADIOLOGY

## 2018-06-18 PROCEDURE — 73564 X-RAY EXAM KNEE 4 OR MORE: CPT | Mod: TC,50

## 2018-06-20 ENCOUNTER — PATIENT MESSAGE (OUTPATIENT)
Dept: INTERNAL MEDICINE | Facility: CLINIC | Age: 48
End: 2018-06-20

## 2018-06-20 ENCOUNTER — OFFICE VISIT (OUTPATIENT)
Dept: INTERNAL MEDICINE | Facility: CLINIC | Age: 48
End: 2018-06-20
Payer: MEDICARE

## 2018-06-20 ENCOUNTER — OFFICE VISIT (OUTPATIENT)
Dept: ORTHOPEDICS | Facility: CLINIC | Age: 48
End: 2018-06-20
Payer: MEDICARE

## 2018-06-20 ENCOUNTER — HOSPITAL ENCOUNTER (OUTPATIENT)
Dept: RADIOLOGY | Facility: HOSPITAL | Age: 48
Discharge: HOME | End: 2018-06-20
Attending: FAMILY MEDICINE
Payer: MEDICARE

## 2018-06-20 ENCOUNTER — HOSPITAL ENCOUNTER (OUTPATIENT)
Dept: RADIOLOGY | Facility: HOSPITAL | Age: 48
Discharge: HOME | End: 2018-06-20
Attending: ORTHOPAEDIC SURGERY
Payer: MEDICARE

## 2018-06-20 VITALS
BODY MASS INDEX: 33.19 KG/M2 | SYSTOLIC BLOOD PRESSURE: 122 MMHG | WEIGHT: 281.06 LBS | HEART RATE: 74 BPM | DIASTOLIC BLOOD PRESSURE: 78 MMHG | HEIGHT: 77 IN | TEMPERATURE: 98 F | OXYGEN SATURATION: 98 %

## 2018-06-20 VITALS
WEIGHT: 272 LBS | HEIGHT: 77 IN | SYSTOLIC BLOOD PRESSURE: 133 MMHG | DIASTOLIC BLOOD PRESSURE: 87 MMHG | HEART RATE: 62 BPM | BODY MASS INDEX: 32.12 KG/M2

## 2018-06-20 DIAGNOSIS — I48.3 TYPICAL ATRIAL FLUTTER: ICD-10-CM

## 2018-06-20 DIAGNOSIS — M79.671 PAIN IN BOTH FEET: ICD-10-CM

## 2018-06-20 DIAGNOSIS — M25.572 LEFT ANKLE PAIN, UNSPECIFIED CHRONICITY: ICD-10-CM

## 2018-06-20 DIAGNOSIS — G80.8 OTHER CEREBRAL PALSY: ICD-10-CM

## 2018-06-20 DIAGNOSIS — G57.71 COMPLEX REGIONAL PAIN SYNDROME TYPE 2 OF BOTH LOWER EXTREMITIES: ICD-10-CM

## 2018-06-20 DIAGNOSIS — S92.514A CLOSED NONDISPLACED FRACTURE OF PROXIMAL PHALANX OF LESSER TOE OF RIGHT FOOT, INITIAL ENCOUNTER: ICD-10-CM

## 2018-06-20 DIAGNOSIS — G57.72 COMPLEX REGIONAL PAIN SYNDROME TYPE 2 OF BOTH LOWER EXTREMITIES: ICD-10-CM

## 2018-06-20 DIAGNOSIS — M25.562 LEFT KNEE PAIN, UNSPECIFIED CHRONICITY: Primary | ICD-10-CM

## 2018-06-20 DIAGNOSIS — Z00.00 ROUTINE PHYSICAL EXAMINATION: Primary | ICD-10-CM

## 2018-06-20 DIAGNOSIS — M79.672 PAIN IN BOTH FEET: ICD-10-CM

## 2018-06-20 DIAGNOSIS — S92.525A CLOSED NONDISPLACED FRACTURE OF MIDDLE PHALANX OF LESSER TOE OF LEFT FOOT, INITIAL ENCOUNTER: ICD-10-CM

## 2018-06-20 DIAGNOSIS — M79.672 LEFT FOOT PAIN: ICD-10-CM

## 2018-06-20 DIAGNOSIS — M79.89 BILATERAL SWELLING OF FEET: ICD-10-CM
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
## 2018-06-20 PROCEDURE — 3074F SYST BP LT 130 MM HG: CPT | Mod: CPTII,S$GLB,, | Performed by: FAMILY MEDICINE

## 2018-06-20 PROCEDURE — 73630 X-RAY EXAM OF FOOT: CPT | Mod: 50,TC

## 2018-06-20 PROCEDURE — 3078F DIAST BP <80 MM HG: CPT | Mod: CPTII,S$GLB,, | Performed by: FAMILY MEDICINE

## 2018-06-20 PROCEDURE — 3079F DIAST BP 80-89 MM HG: CPT | Mod: CPTII,S$GLB,, | Performed by: ORTHOPAEDIC SURGERY

## 2018-06-20 PROCEDURE — 99999 PR PBB SHADOW E&M-EST. PATIENT-LVL IV: CPT | Mod: PBBFAC,,, | Performed by: ORTHOPAEDIC SURGERY

## 2018-06-20 PROCEDURE — 99396 PREV VISIT EST AGE 40-64: CPT | Mod: S$GLB,,, | Performed by: FAMILY MEDICINE

## 2018-06-20 PROCEDURE — 3008F BODY MASS INDEX DOCD: CPT | Mod: CPTII,S$GLB,, | Performed by: ORTHOPAEDIC SURGERY

## 2018-06-20 PROCEDURE — 99999 PR PBB SHADOW E&M-EST. PATIENT-LVL III: CPT | Mod: PBBFAC,,, | Performed by: FAMILY MEDICINE

## 2018-06-20 PROCEDURE — 99204 OFFICE O/P NEW MOD 45 MIN: CPT | Mod: S$GLB,,, | Performed by: ORTHOPAEDIC SURGERY

## 2018-06-20 PROCEDURE — 3075F SYST BP GE 130 - 139MM HG: CPT | Mod: CPTII,S$GLB,, | Performed by: ORTHOPAEDIC SURGERY

## 2018-06-20 PROCEDURE — 73630 X-RAY EXAM OF FOOT: CPT | Mod: 26,50,, | Performed by: RADIOLOGY

## 2018-06-20 PROCEDURE — 99499 UNLISTED E&M SERVICE: CPT | Mod: S$PBB,,, | Performed by: FAMILY MEDICINE

## 2018-06-20 PROCEDURE — 73610 X-RAY EXAM OF ANKLE: CPT | Mod: TC,LT

## 2018-06-20 PROCEDURE — 73610 X-RAY EXAM OF ANKLE: CPT | Mod: 26,LT,, | Performed by: RADIOLOGY

## 2018-06-20 RX ORDER — GABAPENTIN 300 MG/1
300 CAPSULE ORAL 3 TIMES DAILY
Qty: 90 CAPSULE | Refills: 11 | Status: SHIPPED | OUTPATIENT
Start: 2018-06-20 | End: 2018-07-27 | Stop reason: DRUGHIGH

## 2018-06-20 NOTE — PROGRESS NOTES
Subjective:     Patient ID: Valerio Yan is a 47 y.o. male.    Chief Complaint: Pain of the Left Knee    He is here for pain of the left knee as well as pain of the left distal tibia at the ankle, pain of the left and right feet.  He had a fall on Friday and Thursday of last week, multiple falls.  He believes this may be due to medication side effect.  Has a history of [place]ime [place] disease previously. Wears B AFOs to ankles due to foot drop. Notes prior left knee arthrsocopy in the past.       Knee Injury    The pain is present in the left knee. The pain radiates to the left foot. This is a new problem. The current episode started in the past 7 days. The injury was the result of a falling action while at home. The problem occurs intermittently. The problem has been gradually worsening. The quality of the pain is described as aching, sharp, tingling and shooting. The pain is at a severity of 6/10. Associated symptoms include joint swelling and stiffness. Pertinent negatives include no fever or itching. The symptoms are aggravated by activity, bearing weight, standing, walking, rotation, twisting, bending and lying down. He has tried brace/corset and oral narcotics for the symptoms. The treatment provided mild relief. Physical therapy was not tried.      Past Medical History:   Diagnosis Date    Atrial flutter     Ozuna's esophagus     CRPS (complex regional pain syndrome type II)     Decubitus skin ulcer     Encounter for blood transfusion     Guillain-Goodland     Guillain-Goodland syndrome 7/30/2013    Hyperlipidemia     Hypertension     Joint pain     knees    LVH (left ventricular hypertrophy) due to hypertensive disease 2/10/2017    Mitral regurgitation 6/9/2016    Transfusion reaction     1 x  to PRBC fever     Past Surgical History:   Procedure Laterality Date    barrette esophagus      COLONOSCOPY N/A 5/17/2018    Procedure: COLONOSCOPY;  Surgeon: Ilan Araya III, MD;  Location: [facility] [place]  END[facility];  Service: Endoscopy;  Laterality: N/A;    decubitus surgery      to sacral    ESOPHAGOGASTRODUODENOSCOPY      GASTROSTOMY TUBE PLACEMENT      KNEE ARTHROSCOPY  2002    PEG TUBE REMOVAL      RADIOFREQUENCY ABLATION      RFA      JUAN-EN-Y PROCEDURE      TRACHEAL SURGERY       Family History   Problem Relation Age of Onset    Heart attack Mother     Heart disease Mother     Heart disease Father     Heart attack Father      Social History     Social History    Marital status:      Spouse name: N/A    Number of children: 2    Years of education: N/A     Occupational History    Not on file.     Social History Main Topics    Smoking status: Current Every Day Smoker     Packs/day: 0.50     Years: 30.00     Types: Cigarettes     Start date: 4/4/1988     Last attempt to quit: 2/5/2017    Smokeless tobacco: Former User     Types: Snuff     Quit date: 1/6/1999      Comment: Currently at 4 cigs per day    Alcohol use 8.4 oz/week     14 Glasses of wine per week    Drug use: No    Sexual activity: Yes     Partners: Female     Other Topics Concern    Not on file     Social History Narrative    No narrative on file     Medication List with Changes/Refills   Current Medications    AMITRIPTYLINE (ELAVIL) 50 MG TABLET    Take 1 tablet (50 mg total) by mouth every evening.    ATORVASTATIN (LIPITOR) 20 MG TABLET    Take 1 tablet (20 mg total) by mouth once daily.    CALCIUM-VITAMIN D 600 MG, 1,500MG,-200 UNIT 600 MG(1,500MG) -200 UNIT TAB    Take 1 tablet by mouth once daily.     ESOMEPRAZOLE (NEXIUM PACKET) 40 MG GRPS    ONE PACKET ONCE DAILY    FUROSEMIDE (LASIX) 20 MG TABLET    Take 1 tablet (20 mg total) by mouth 2 (two) times daily as needed (swelling).    LISINOPRIL-HYDROCHLOROTHIAZIDE (PRINZIDE,ZESTORETIC) 20-12.5 MG PER TABLET    Take 2 tablets by mouth once daily.    METOPROLOL SUCCINATE (TOPROL-XL) 50 MG 24 HR TABLET    Take 1 tablet (50 mg total) by mouth once daily.    MULTIVITAMIN WITH  MINERALS (MULTIPLE VITAMIN-MINERALS) TABLET    Take 1 tablet by mouth Daily.      OXYCODONE-ACETAMINOPHEN (PERCOCET)  MG PER TABLET    Take 1 tablet by mouth every 4 (four) hours as needed for Pain.    TIZANIDINE (ZANAFLEX) 4 MG TABLET    Take 1 tablet (4 mg total) by mouth nightly as needed (muscle spasms).    VARENICLINE (CHANTIX STARTING MONTH BOX) 0.5 MG (11)- 1 MG (42) TABLET    Take one 0.5mg tab by mouth once daily X3 days,then increase to one 0.5mg tab twice daily X4 days,then increase to one 1mg tab twice daily   Changed and/or Refilled Medications    Modified Medication Previous Medication    GABAPENTIN (NEURONTIN) 300 MG CAPSULE gabapentin (NEURONTIN) 300 MG capsule       Take 1 capsule (300 mg total) by mouth 3 (three) times daily.    Take 1 capsule (300 mg total) by mouth 3 (three) times daily.     Review of patient's allergies indicates:   Allergen Reactions    Latex      Other reaction(s): Itching  Other reaction(s): Hives    Reglan  [metoclopramide hcl]      Pt. Was in coma so is not aware of the reaction  Other reaction(s): Unable to obtain     Review of Systems   Constitution: Negative for fever.   HENT: Negative for sore throat.    Eyes: Negative for blurred vision.   Cardiovascular: Negative for dyspnea on exertion.   Respiratory: Negative for shortness of breath.    Hematologic/Lymphatic: Does not bruise/bleed easily.   Skin: Negative for itching.   Musculoskeletal: Positive for arthritis, back pain, joint pain, joint swelling, muscle cramps, muscle weakness and stiffness.   Gastrointestinal: Negative for vomiting.   Genitourinary: Negative for dysuria.   Neurological: Negative for dizziness.   Psychiatric/Behavioral: The patient does not have insomnia.        Objective:   Body mass index is 32.25 kg/m².  Vitals:    06/20/18 1320   BP: 133/87   Pulse: 62           General    Nursing note and vitals reviewed.  Constitutional: He is oriented to person, place, and time. He appears  well-developed. No distress.   HENT:   Head: Normocephalic and atraumatic.   Eyes: EOM are normal.   Cardiovascular: Normal rate.    Pulmonary/Chest: Effort normal. No stridor.   Neurological: He is alert and oriented to person, place, and time.   Psychiatric: He has a normal mood and affect. His behavior is normal.         Right Ankle/Foot Exam     Comments:  Right foot multiple areas of punctate bruising to the toes.  Minimally tender to palpation over the 1st toe, but significantly tender to palpation and markedly tender palpation over the base of the 5th proximal phalanx.  Sensation light touch is decreased to the foot but baseline. wwp toes    Left Ankle/Foot Exam     Comments:  Left ft multiple areas of punctate bruising to the toes.  Minimally tender palpation over the anterior aspect of the distal tibia just above the plafond.  Skin is intact here.  Significantly and markedly tender palpation over the 4th and 5th toes.  Mild to moderate midfoot swelling. Non hypermobile 1st ray.  Calcaneus is nontender palpation medial and lateral aspect of the ankle are nontender palpation.  Toes are warm well-perfused.  Sensation light touch is decreased to the foot but baseline.    Right Knee Exam     Inspection   Scars: absent  Effusion: effusion    Range of Motion   Extension: 0   Flexion: 120     Left Knee Exam     Inspection   Scars: absent  Effusion: absent    Tenderness   The patient tender to palpation of the medial joint line and lateral joint line.    Crepitus   The patient has crepitus of the patella.    Range of Motion   Extension: 0   Flexion: 110     Tests   Stability PCL-Posterior Drawer: normal (0 to 2mm)  MCL - Valgus: normal (0 to 2mm)  LCL - Varus: normal (0 to 2mm)  Patella   Patellar Tracking: normal  J-Sign: J sign absent    Other   Sensation: normal    Comments:  WWP foot    Muscle Strength   Right Lower Extremity   Right quadriceps strength: decreased quad tone.   Left Lower Extremity   Left  quadriceps strength: decreased quad tone.       IMAGING   EXAMINATION:  XR FOOT COMPLETE 3 VIEW BILATERAL    CLINICAL HISTORY:  Unspecified fall, subsequent encounter    TECHNIQUE:  AP, lateral, and oblique views of both feet were performed.    COMPARISON:  02/14/2018    FINDINGS:  Right: Previously described intra-articular fracture involving the head of the 1st proximal phalanx medially appears unchanged.  There is also Mild cortical irregularity concerning for nondisplaced fracture at the junction of the proximal shaft and base of the 5th proximal phalanx without definite involvement of the articular surface.  This finding was not definitely present on prior exam.  No dislocations visualized.    Left: There is an obliquely oriented minimally distracted fracture involving the base of the 5th proximal phalanx which likely involves the articular surface at the MTP joint.  There is a transverse oblique nondisplaced fracture involving the proximal shaft of the 4th proximal phalanx without appreciable involvement of the articular surface.  There also appears to be a minimally distracted obliquely oriented fracture involving the base of the 3rd middle phalanx with possible involvement of the articular surface.  These findings were not seen on prior exam.  No dislocations visualized.   Impression       Multiple bilateral fractures as above.      Electronically signed by: Hadley Bella MD  Date: 06/20/2018  Time: 13:26     EXAMINATION:  XR KNEE ORTHO BILAT WITH FLEXION    CLINICAL HISTORY:  Pain in right knee    TECHNIQUE:  AP standing of both knees, PA flexion standing views of both knees, and Merchant views of both knees were performed.  Lateral views of both knees were also performed.    COMPARISON:  06/16/2018    FINDINGS:  Mild bilateral tricompartmental degenerative changes are seen with marginal spurring and slight medial compartment joint space narrowing.  No joint effusion.  No acute fracture.  No dislocation.   Minimal patellar tilt on the left.   Impression       As above      Electronically signed by: Adair Stroud MD  Date: 06/18/2018  Time: 13:07     EXAMINATION:  XR ANKLE COMPLETE 3 VIEW LEFT    CLINICAL HISTORY:  Pain in left ankle and joints of left foot    TECHNIQUE:  AP, lateral and oblique views of the left ankle were performed.    COMPARISON:  06/16/2018    FINDINGS:  No acute fractures or dislocations visualized.  Multiple previously described rectangular densities in the soft tissues along the plantar surface of the anterior calcaneus again noted raising concern for possible foreign bodies.  Appearance is unchanged from prior.  Ankle mortise is well maintained.Dorsal and plantar surface calcaneal enthesophytes are present.   Impression       Unchanged appearance of the left ankle as above.      Electronically signed by: Hadley Bella MD  Date: 06/20/2018  Time: 16:00     Results reviewed by me interpreted by me in demonstrated discussed with the patient and his wife.    Assessment:     Encounter Diagnoses   Name Primary?    Left knee pain, unspecified chronicity Yes    Left foot pain     Left ankle pain, unspecified chronicity     Closed nondisplaced fracture of proximal phalanx of lesser toe of right foot, initial encounter     Closed nondisplaced fracture of middle phalanx of lesser toe of left foot, initial encounter         Plan:       Discussed with his acute injury and acute pain to the left knee, physical exam findings, I do think it is reasonable to evaluate the integrity of the menisci and MRI will be very helpful going forward treatment recommendations.    Discussed that with his swelling to the midfoot, I do not see obvious Lisfranc injury, but I do think an MRI here is very helpful to rule out more serious midfoot sprain or Lisfran injury.     Short Cam boot for the left lower extremity at this time    -post op shoe right foot    He will follow up in 1-2 days after these imaging tests have  been completed    RLE WBAT in post op shoe  LLE can be NWB for now until results are back  May need wheelchair

## 2018-06-20 NOTE — PROGRESS NOTES
Valerio Yan  06/26/2018  [id number]    Therese Lala MD  Patient Care Team:  Therese Lala MD as PCP - General (Internal Medicine)  [facility] (Pain Medicine)    Has the patient seen any provider outside of the network since the last visit ? (no). If yes, HIPPA forms completed and records requested.      Visit Type:a scheduled routine follow-up visit    Chief Complaint:  Chief Complaint   Patient presents with    Annual Exam       History of Present Illness:    Annual exam  Falling  Repeated falls over the last few days he fell 3 times in 2 days.   Thinks it is the amitryptiline   Woke up at 5 am and was still groggy.   2nd took medications early    3rd feels knee gave out on him.     Screening Questionnaires:    In the last two weeks how often have you felt down, depressed, or hopeless ( no )    In the last two weeks how often have you had little interest or pleasure in doing  (no )    In the last two weeks how often have you been bothered by the following problems:  1. Feeling nervous, anxious, or on edge ( no )    2. Not being able to stop or control worrying ( no)    3. Worrying too much about different things ( no)    4. Trouble relaxing ( no )    5. Being so restless that it is hard to sit still  (no )    6. Becoming easily annoyed or irritable (intermittent)    7. Feeling afraid as if something awful might happen (no )    How often do you have a drink containing Alcohol? denied     Do you exercise  (no ) not active    Do you take a baby Aspirin daily ( no)    Do you have an advance directive ( no ) The patient was given information regarding Living Will/Durable Power-of- if requested.     The following were reviewed: Active problem list, medication list, allergies, family history, social history, and Health Maintenance.     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    Hyperlipidemia     Hypertension     Joint pain     knees    LVH (left ventricular hypertrophy) due to hypertensive disease 2/10/2017    Mitral regurgitation 6/9/2016    Transfusion reaction     1 x  to PRBC fever     Past Surgical History:   Procedure Laterality Date    barrette esophagus      COLONOSCOPY N/A 5/17/2018    Procedure: COLONOSCOPY;  Surgeon: Ilan Araya III, MD;  Location[facility];  Service: Endoscopy;  Laterality: N/A;    decubitus surgery      to sacral    ESOPHAGOGASTRODUODENOSCOPY      GASTROSTOMY TUBE PLACEMENT      KNEE ARTHROSCOPY  2002    PEG TUBE REMOVAL      RADIOFREQUENCY ABLATION      RFA      JUAN-EN-Y PROCEDURE      TRACHEAL SURGERY       Family History   Problem Relation Age of Onset    Heart attack Mother     Heart disease Mother     Heart disease Father     Heart attack Father      Social History     Social History    Marital status:      Spouse name: N/A    Number of children: 2    Years of education: N/A     Occupational History    Not on file.     Social History Main Topics    Smoking status: Current Every Day Smoker     Packs/day: 0.50     Years: 30.00     Types: Cigarettes     Start date: 4/4/1988     Last attempt to quit: 2/5/2017    Smokeless tobacco: Former User     Types: Snuff     Quit date: 1/6/1999      Comment: Currently at 4 cigs per day    Alcohol use 8.4 oz/week     14 Glasses of wine per week    Drug use: No    Sexual activity: Yes     Partners: Female     Other Topics Concern    Not on file     Social History Narrative    No narrative on file     Patient Active Problem List   Diagnosis    Epigastric pain    Cerebral palsy    Polyneuropathy    Guillain-[place] syndrome    Ozuna esophagus    Peptic ulcer disease    Ozuna's esophagus    Typical atrial flutter    Ozuna's esophagus without  dysplasia    Mitral regurgitation    Palpitations    Tobacco abuse    Diaphoresis    Hypertension    LVH (left ventricular hypertrophy) due to hypertensive disease    Mixed hyperlipidemia    Complex regional pain syndrome type 2 of both lower extremities     Review of patient's allergies indicates:   Allergen Reactions    Reglan  [metoclopramide hcl] Other (See Comments)     Pt. Was in coma so is not aware of the reaction  Other reaction(s): Unable to obtain    Latex Rash     Other reaction(s): Itching  Other reaction(s): Hives       Health Maintenance  Health Maintenance Topics with due status: Not Due       Topic Last Completion Date    Influenza Vaccine 12/06/2017    Lipid Panel 04/03/2018     Health Maintenance Due   Topic Date Due    TETANUS VACCINE  10/15/1986    Complete Opioid Risk Tool  10/15/1986    Urine Drug Screen  10/15/1986    Naloxone Prescription  10/15/1986       Medications:  Current Outpatient Prescriptions on File Prior to Visit   Medication Sig Dispense Refill    amitriptyline (ELAVIL) 50 MG tablet Take 1 tablet (50 mg total) by mouth every evening. (Patient taking differently: Take 150 mg by mouth every evening. ) 90 tablet 3    atorvastatin (LIPITOR) 20 MG tablet Take 1 tablet (20 mg total) by mouth once daily. 90 tablet 3    calcium-vitamin D 600 mg, 1,500mg,-200 unit 600 mg(1,500mg) -200 unit Tab Take 1 tablet by mouth once daily.       esomeprazole (NEXIUM PACKET) 40 mg GrPS ONE PACKET ONCE DAILY 30 each 11    furosemide (LASIX) 20 MG tablet Take 1 tablet (20 mg total) by mouth 2 (two) times daily as needed (swelling). 180 tablet 3    lisinopril-hydrochlorothiazide (PRINZIDE,ZESTORETIC) 20-12.5 mg per tablet Take 2 tablets by mouth once daily. 90 tablet 3    metoprolol succinate (TOPROL-XL) 50 MG 24 hr tablet Take 1 tablet (50 mg total) by mouth once daily. 90 tablet 3    multivitamin with minerals (MULTIPLE VITAMIN-MINERALS) tablet Take 1 tablet by mouth Daily.         oxyCODONE-acetaminophen (PERCOCET)  mg per tablet Take 1 tablet by mouth every 4 (four) hours as needed for Pain. 30 tablet 0    varenicline (CHANTIX STARTING MONTH BOX) 0.5 mg (11)- 1 mg (42) tablet Take one 0.5mg tab by mouth once daily X3 days,then increase to one 0.5mg tab twice daily X4 days,then increase to one 1mg tab twice daily 1 Package 0     No current facility-administered medications on file prior to visit.        Medications have been reviewed and reconciled with patient at visit today.    Barriers to medications present (no )    Adverse reactions to current medications (no)    Over the counter medications reviewed (Yes) and if needed added to active Medication list.    Exam:  Vitals:    06/20/18 1522   BP: 122/78   Pulse: 74   Temp: 97.7 °F (36.5 °C)     Weight: 127.5 kg (281 lb 1.4 oz)   Body mass index is 33.33 kg/m².    Review of Systems   Constitutional: Negative for chills and fever.   HENT: Negative for congestion and tinnitus.    Eyes: Negative for blurred vision, pain and discharge.   Respiratory: Negative for cough and wheezing.    Cardiovascular: Negative for chest pain, palpitations, orthopnea and leg swelling.   Gastrointestinal: Negative for abdominal pain, blood in stool, constipation, diarrhea, heartburn, nausea and vomiting.   Genitourinary: Negative for dysuria, flank pain, frequency, hematuria and urgency.   Musculoskeletal: Positive for back pain, falls, joint pain, myalgias and neck pain.   Skin: Negative for itching and rash.   Neurological: Negative for dizziness, tingling and headaches.   Psychiatric/Behavioral: Negative for depression.       Physical Exam   Constitutional: He is oriented to person, place, and time. He appears well-developed and well-nourished.   HENT:   Head: Normocephalic and atraumatic.   Eyes: EOM are normal. Pupils are equal, round, and reactive to light.   Neck: Normal range of motion.   Musculoskeletal: He exhibits no edema.   Boot on right  foot hard sole shoe on left foot  Brace on both feet, ankle and lower legs   Neurological: He is alert and oriented to person, place, and time.   Vitals reviewed.      Laboratory Reviewed: (Yes)  Lab Results   Component Value Date    WBC 9.89 01/04/2018    HGB 15.4 01/04/2018    HCT 44.6 01/04/2018     01/04/2018    CHOL 163 04/03/2018    TRIG 100 04/03/2018    HDL 52 04/03/2018    ALT 23 04/03/2018    AST 28 04/03/2018     04/03/2018    K 3.8 04/03/2018    CL 99 04/03/2018    CREATININE 0.7 04/03/2018    BUN 9 04/03/2018    CO2 31 (H) 04/03/2018    TSH 1.036 05/31/2017    INR 1.1 05/23/2016    HGBA1C 4.8 05/31/2017       Assessment:  The primary encounter diagnosis was Routine physical examination. Diagnoses of Complex regional pain syndrome type 2 of both lower extremities, Other cerebral palsy, and Typical atrial flutter were also pertinent to this visit.    Plan:    Routine physical examination  Reviewed medical, social, surgical and family history. Reviewed health maintenance    Complex regional pain syndrome type 2 of both lower extremities-pain management bellieves his pain is from the [place]  -     gabapentin (NEURONTIN) 300 MG capsule; Take 1 capsule (300 mg total) by mouth 3 (three) times daily.  Dispense: 90 capsule; Refill: 11    Other cerebral palsy-stabe    Typical atrial flutter-stable        Follow up: Follow-up in about 1 year (around 6/20/2019).      Care Plan/Goals: Reviewed Yes  Goals        Patient Stated     Blood Pressure < 130/80 (pt-stated)               After visit summary printed and given to patient upon discharge.  Patient goals and care plan are included in After visit summary.

## 2018-07-02 ENCOUNTER — HOSPITAL ENCOUNTER (OUTPATIENT)
Dept: RADIOLOGY | Facility: HOSPITAL | Age: 48
Discharge: HOME | End: 2018-07-02
Attending: ORTHOPAEDIC SURGERY
Payer: MEDICARE

## 2018-07-02 DIAGNOSIS — M79.672 LEFT FOOT PAIN: ICD-10-CM

## 2018-07-02 DIAGNOSIS — M25.562 LEFT KNEE PAIN, UNSPECIFIED CHRONICITY: ICD-10-CM

## 2018-07-02 PROCEDURE — 73721 MRI JNT OF LWR EXTRE W/O DYE: CPT | Mod: TC,LT

## 2018-07-02 PROCEDURE — 73718 MRI LOWER EXTREMITY W/O DYE: CPT | Mod: TC,LT

## 2018-07-06 ENCOUNTER — PES CALL (OUTPATIENT)
Dept: ADMINISTRATIVE | Facility: CLINIC | Age: 48
End: 2018-07-06

## 2018-07-10 ENCOUNTER — PATIENT MESSAGE (OUTPATIENT)
Dept: INTERNAL MEDICINE | Facility: CLINIC | Age: 48
End: 2018-07-10

## 2018-07-11 ENCOUNTER — TELEPHONE (OUTPATIENT)
Dept: SMOKING CESSATION | Facility: CLINIC | Age: 48
End: 2018-07-11

## 2018-07-13 ENCOUNTER — OFFICE VISIT (OUTPATIENT)
Dept: ORTHOPEDICS | Facility: CLINIC | Age: 48
End: 2018-07-13
Payer: MEDICARE

## 2018-07-13 ENCOUNTER — HOSPITAL ENCOUNTER (OUTPATIENT)
Dept: RADIOLOGY | Facility: HOSPITAL | Age: 48
Discharge: HOME | End: 2018-07-13
Attending: ORTHOPAEDIC SURGERY
Payer: MEDICARE

## 2018-07-13 VITALS
HEIGHT: 77 IN | HEART RATE: 63 BPM | SYSTOLIC BLOOD PRESSURE: 171 MMHG | WEIGHT: 281.06 LBS | DIASTOLIC BLOOD PRESSURE: 108 MMHG | BODY MASS INDEX: 33.19 KG/M2

## 2018-07-13 DIAGNOSIS — M79.672 LEFT FOOT PAIN: Primary | ICD-10-CM

## 2018-07-13 DIAGNOSIS — S92.511D CLOSED DISPLACED FRACTURE OF PROXIMAL PHALANX OF LESSER TOE OF RIGHT FOOT WITH ROUTINE HEALING, SUBSEQUENT ENCOUNTER: ICD-10-CM

## 2018-07-13 DIAGNOSIS — S92.512D CLOSED DISPLACED FRACTURE OF PROXIMAL PHALANX OF LESSER TOE OF LEFT FOOT WITH ROUTINE HEALING, SUBSEQUENT ENCOUNTER: ICD-10-CM

## 2018-07-13 DIAGNOSIS — M79.672 LEFT FOOT PAIN: ICD-10-CM

## 2018-07-13 DIAGNOSIS — S82.122D CLOSED FRACTURE OF LATERAL PORTION OF LEFT TIBIAL PLATEAU WITH ROUTINE HEALING, SUBSEQUENT ENCOUNTER: Primary | ICD-10-CM

## 2018-07-13 PROCEDURE — 73630 X-RAY EXAM OF FOOT: CPT | Mod: 26,LT,, | Performed by: RADIOLOGY

## 2018-07-13 PROCEDURE — 99213 OFFICE O/P EST LOW 20 MIN: CPT | Mod: S$GLB,,, | Performed by: ORTHOPAEDIC SURGERY

## 2018-07-13 PROCEDURE — 3008F BODY MASS INDEX DOCD: CPT | Mod: CPTII,S$GLB,, | Performed by: ORTHOPAEDIC SURGERY

## 2018-07-13 PROCEDURE — 73630 X-RAY EXAM OF FOOT: CPT | Mod: TC,LT

## 2018-07-13 PROCEDURE — 99999 PR PBB SHADOW E&M-EST. PATIENT-LVL III: CPT | Mod: PBBFAC,,, | Performed by: ORTHOPAEDIC SURGERY

## 2018-07-13 PROCEDURE — 3077F SYST BP >= 140 MM HG: CPT | Mod: CPTII,S$GLB,, | Performed by: ORTHOPAEDIC SURGERY

## 2018-07-13 PROCEDURE — 3080F DIAST BP >= 90 MM HG: CPT | Mod: CPTII,S$GLB,, | Performed by: ORTHOPAEDIC SURGERY

## 2018-07-13 NOTE — PROGRESS NOTES
"Subjective:     Patient ID: Valerio Yan is a 47 y.o. male.    Chief Complaint: Pain and Swelling of the Left Knee    New: left knee pain resolved. Left foot pain across toes persists. Has been weight bearing against medical advice, notes that wheelchair is "cumbersome" and boot feels "unsteady." Right foot no pain.    Prior: He is here for pain of the left knee as well as pain of the left distal tibia at the ankle, pain of the left and right feet.  He had a fall on Friday and Thursday of last week, multiple falls.  He believes this may be due to medication side effect.  Has a history of [place] disease previously. Wears B AFOs to ankles due to foot drop. Notes prior left knee arthrsocopy in the past.       Knee Injury    The pain is present in the left knee and left foot. The pain radiates to the left foot. This is a new problem. The current episode started more than 1 month ago. The injury was the result of a falling action while at home. The problem occurs intermittently. The problem has been gradually improving. The quality of the pain is described as aching, sharp, tingling and shooting. The pain is at a severity of 4/10. Associated symptoms include joint swelling and stiffness. Pertinent negatives include no fever or itching. The symptoms are aggravated by activity, bearing weight, standing, walking, rotation, twisting, bending and lying down. He has tried brace/corset and oral narcotics for the symptoms. The treatment provided mild relief. Physical therapy was not tried.      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    Hyperlipidemia     Hypertension     Joint pain     knees    LVH (left ventricular hypertrophy) due to hypertensive disease 2/10/2017    Mitral regurgitation " 6/9/2016    Transfusion reaction     1 x  to PRBC fever     Past Surgical History:   Procedure Laterality Date    barrette esophagus      COLONOSCOPY N/A 5/17/2018    Procedure: COLONOSCOPY;  Surgeon: Ilan Araya III, MD;  Location: [facility];  Service: Endoscopy;  Laterality: N/A;    decubitus surgery      to sacral    ESOPHAGOGASTRODUODENOSCOPY      GASTROSTOMY TUBE PLACEMENT      KNEE ARTHROSCOPY  2002    PEG TUBE REMOVAL      RADIOFREQUENCY ABLATION      RFA      JUAN-EN-Y PROCEDURE      TRACHEAL SURGERY       Family History   Problem Relation Age of Onset    Heart attack Mother     Heart disease Mother     Heart disease Father     Heart attack Father      Social History     Social History    Marital status:      Spouse name: N/A    Number of children: 2    Years of education: N/A     Occupational History    Not on file.     Social History Main Topics    Smoking status: Current Every Day Smoker     Packs/day: 0.50     Years: 30.00     Types: Cigarettes     Start date: 4/4/1988     Last attempt to quit: 2/5/2017    Smokeless tobacco: Former User     Types: Snuff     Quit date: 1/6/1999      Comment: Currently at 4 cigs per day    Alcohol use 8.4 oz/week     14 Glasses of wine per week    Drug use: No    Sexual activity: Yes     Partners: Female     Other Topics Concern    Not on file     Social History Narrative    No narrative on file     Medication List with Changes/Refills   Current Medications    ATORVASTATIN (LIPITOR) 20 MG TABLET    Take 1 tablet (20 mg total) by mouth once daily.    CALCIUM-VITAMIN D 600 MG, 1,500MG,-200 UNIT 600 MG(1,500MG) -200 UNIT TAB    Take 1 tablet by mouth once daily.     ESOMEPRAZOLE (NEXIUM PACKET) 40 MG GRPS    ONE PACKET ONCE DAILY    FUROSEMIDE (LASIX) 20 MG TABLET    Take 1 tablet (20 mg total) by mouth 2 (two) times daily as needed (swelling).    GABAPENTIN (NEURONTIN) 300 MG CAPSULE    Take 1 capsule (300 mg total) by mouth 3 (three)  times daily.    LISINOPRIL-HYDROCHLOROTHIAZIDE (PRINZIDE,ZESTORETIC) 20-12.5 MG PER TABLET    Take 2 tablets by mouth once daily.    METOPROLOL SUCCINATE (TOPROL-XL) 50 MG 24 HR TABLET    Take 1 tablet (50 mg total) by mouth once daily.    MULTIVITAMIN WITH MINERALS (MULTIPLE VITAMIN-MINERALS) TABLET    Take 1 tablet by mouth Daily.      OXYCODONE-ACETAMINOPHEN (PERCOCET)  MG PER TABLET    Take 1 tablet by mouth every 4 (four) hours as needed for Pain.    VARENICLINE (CHANTIX STARTING MONTH BOX) 0.5 MG (11)- 1 MG (42) TABLET    Take one 0.5mg tab by mouth once daily X3 days,then increase to one 0.5mg tab twice daily X4 days,then increase to one 1mg tab twice daily     Review of patient's allergies indicates:   Allergen Reactions    Latex      Other reaction(s): Itching  Other reaction(s): Hives    Reglan  [metoclopramide hcl]      Pt. Was in coma so is not aware of the reaction  Other reaction(s): Unable to obtain     Review of Systems   Constitution: Negative for fever.   HENT: Negative for sore throat.    Eyes: Negative for blurred vision.   Cardiovascular: Negative for dyspnea on exertion.   Respiratory: Positive for sleep disturbances due to breathing. Negative for shortness of breath.    Hematologic/Lymphatic: Does not bruise/bleed easily.   Skin: Negative for itching.   Musculoskeletal: Positive for arthritis, back pain, joint pain, joint swelling, muscle cramps, muscle weakness and stiffness.   Gastrointestinal: Negative for vomiting.   Genitourinary: Negative for dysuria.   Neurological: Negative for dizziness.   Psychiatric/Behavioral: The patient does not have insomnia.        Objective:   Body mass index is 33.33 kg/m².  Vitals:    07/13/18 1055   BP: (!) 171/108   Pulse: 63           General    Nursing note and vitals reviewed.  Constitutional: He is oriented to person, place, and time. He appears well-developed. No distress.   HENT:   Head: Normocephalic and atraumatic.   Eyes: EOM are normal.    Cardiovascular: Normal rate.    Pulmonary/Chest: Effort normal. No stridor.   Neurological: He is alert and oriented to person, place, and time.   Psychiatric: He has a normal mood and affect. His behavior is normal.         Right Ankle/Foot Exam     Comments:    Minimally tender to palpation over the 1st toe,  tender to palpation over the base of the 5th proximal phalanx.  Sensation light touch is decreased to the foot but baseline. wwp toes    Left Ankle/Foot Exam     Comments:     Minimally tender palpation over the anterior aspect of the distal tibia just above the plafond.  Skin is intact here.  Significantly and markedly tender palpation over the 4th and 5th toes.  Mild to moderate midfoot swelling. Non hypermobile 1st ray.  Calcaneus is nontender palpation medial and lateral aspect of the ankle are nontender palpation.  Toes are warm well-perfused.  Sensation light touch is decreased to the foot but baseline.    Right Knee Exam     Inspection   Scars: absent  Effusion: effusion    Range of Motion   Extension: 0   Flexion: 120     Left Knee Exam     Inspection   Scars: absent  Effusion: absent    Tenderness   The patient tender to palpation of the medial joint line and lateral joint line.    Crepitus   The patient has crepitus of the patella.    Range of Motion   Extension: 0   Flexion: 110     Tests   Stability PCL-Posterior Drawer: normal (0 to 2mm)  MCL - Valgus: normal (0 to 2mm)  LCL - Varus: normal (0 to 2mm)  Patella   Patellar Tracking: normal  J-Sign: J sign absent    Other   Sensation: normal    Comments:  WWP foot    Muscle Strength   Right Lower Extremity   Right quadriceps strength: decreased quad tone.   Left Lower Extremity   Left quadriceps strength: decreased quad tone.        EXAMINATION:  MRI FOOT (FOREFOOT) LEFT WITHOUT CONTRAST    CLINICAL HISTORY:  abnormal xray;    TECHNIQUE:  Standard foot MRI protocol without IV contrast was performed.    COMPARISON:  Comparison was made to a plain  film examination of the left foot performed on 06/20/2018    FINDINGS:  There is a nondisplaced fracture in the distal diaphyseal portion of the proximal phalanx of the 2nd toe.  There is a mild amount of associated bone marrow edema.  There is a nondisplaced fracture in the proximal metaphyseal portion of the middle phalanx of the 3rd toe.  There is a marked amount of associated edema.  There is a nondisplaced fracture in the proximal metaphyseal portion of the proximal phalanx of the 4th toe.  There is a moderate amount of associated bone marrow edema.  There is a nondisplaced fracture in the proximal metaphyseal portion of the proximal phalanx of the small toe.  There is a moderate amount of associated bone marrow edema.  There is no dislocation.  There is a nondisplaced fracture in the distal aspect of the 1st metatarsal.  There is a mild amount of associated bone marrow edema.  There is no dislocation.    There is a mild amount of edema in the tarsal bones.  There is a metallic artifact in the lateral aspect of the sole of the foot.  There is a normal appearing amount of fluid within the visualized joints of the foot.  The ligaments and tendons of the foot appear to be intact.   Impression       1. There is a nondisplaced fracture in the distal diaphyseal portion of the proximal phalanx of the 2nd toe. There is a mild amount of associated bone marrow edema. There is a nondisplaced fracture in the proximal metaphyseal portion of the middle phalanx of the 3rd toe. There is a marked amount of associated edema. There is a nondisplaced fracture in the proximal metaphyseal portion of the proximal phalanx of the 4th toe. There is a moderate amount of associated bone marrow edema. There is a nondisplaced fracture in the proximal metaphyseal portion of the proximal phalanx of the small toe. There is a moderate amount of associated bone marrow edema.  2. There is a mild amount of edema in the tarsal bones.  3. The  ligaments and tendons of the foot appear to be intact.      Electronically signed by: Wil Powell MD  Date: 07/02/2018  Time: 13:05     EXAMINATION:  MRI KNEE WITHOUT CONTRAST LEFT    CLINICAL HISTORY:  left knee pain;    TECHNIQUE:  Standard knee MRI protocol without IV contrast was performed.    COMPARISON:  A plain film examination of the knees performed on 06/18/2018.    FINDINGS:  There is a depressed fracture involving the posterior aspect of the lateral tibial plateau.  There is a moderate amount of adjacent bone marrow edema.  There is no dislocation. There is a normal amount of fluid within the knee. There is a 23 mm Baker's cyst. The cruciate and collateral ligaments appear intact.  The medial meniscus is normal in appearance.  There is a poorly visualized tear involving the inferior surface of the posterior horn of the lateral meniscus.  There are mild degenerative changes in the anterior horn and body of the lateral meniscus.  There is a 10 mm chondral defect overlying the medial femoral condyle.  There is moderate thinning of the cartilage in the lateral compartment of the knee.  There is grade 3 chondromalacia of the medial and lateral patellar facets.   Impression       1. There is a depressed fracture involving the posterior aspect of the lateral tibial plateau. There is a moderate amount of adjacent bone marrow edema.  2. There is a poorly visualized tear involving the inferior surface of the posterior horn of the lateral meniscus. There are mild degenerative changes in the anterior horn and body of the lateral meniscus.  3. There is a 10 mm chondral defect overlying the medial femoral condyle. There is moderate thinning of the cartilage in the lateral compartment of the knee. There is grade 3 chondromalacia of the medial and lateral patellar facets.  4. There is a 23 mm Baker's cyst.      Electronically signed by: Wil Powell MD  Date: 07/02/2018  Time: 12:31         Results reviewed by me  interpreted by me in demonstrated discussed with the patient     Assessment:     Encounter Diagnoses   Name Primary?    Closed fracture of lateral portion of left tibial plateau with routine healing, subsequent encounter Yes    Closed displaced fracture of proximal phalanx of lesser toe of left foot with routine healing, subsequent encounter     Closed displaced fracture of proximal phalanx of lesser toe of right foot with routine healing, subsequent encounter         Plan:       -Left foot NWB or can WBAT with post op shoe or boot  -right foot NWB or can WBAT with post op shoe or boot  -left knee can WBAT ROM as tolerated at this point  -no indication for surgery  Follow up in 6 weeks with new xrays Left foot weight bearing

## 2018-07-17 ENCOUNTER — TELEPHONE (OUTPATIENT)
Dept: SMOKING CESSATION | Facility: CLINIC | Age: 48
End: 2018-07-17

## 2018-07-26 ENCOUNTER — PATIENT MESSAGE (OUTPATIENT)
Dept: INTERNAL MEDICINE | Facility: CLINIC | Age: 48
End: 2018-07-26

## 2018-07-27 ENCOUNTER — PATIENT MESSAGE (OUTPATIENT)
Dept: INTERNAL MEDICINE | Facility: CLINIC | Age: 48
End: 2018-07-27

## 2018-07-27 RX ORDER — GABAPENTIN 600 MG/1
600 TABLET ORAL 3 TIMES DAILY
Qty: 270 TABLET | Refills: 3 | Status: SHIPPED | OUTPATIENT
Start: 2018-07-27 | End: 2020-11-11 | Stop reason: ALTCHOICE

## 2018-08-01 ENCOUNTER — PATIENT MESSAGE (OUTPATIENT)
Dept: INTERNAL MEDICINE | Facility: CLINIC | Age: 48
End: 2018-08-01

## 2018-08-02 ENCOUNTER — PATIENT MESSAGE (OUTPATIENT)
Dept: INTERNAL MEDICINE | Facility: CLINIC | Age: 48
End: 2018-08-02

## 2018-08-02 DIAGNOSIS — F17.200 TOBACCO USE DISORDER: ICD-10-CM

## 2018-08-02 DIAGNOSIS — Z71.6 ENCOUNTER FOR SMOKING CESSATION COUNSELING: Primary | ICD-10-CM

## 2018-08-02 RX ORDER — VARENICLINE TARTRATE 1 MG/1
1 TABLET, FILM COATED ORAL 2 TIMES DAILY
Qty: 60 TABLET | Refills: 2 | Status: SHIPPED | OUTPATIENT
Start: 2018-08-02 | End: 2018-10-15 | Stop reason: SDUPTHER

## 2018-08-08 ENCOUNTER — TELEPHONE (OUTPATIENT)
Dept: SMOKING CESSATION | Facility: CLINIC | Age: 48
End: 2018-08-08

## 2018-08-08 ENCOUNTER — PATIENT MESSAGE (OUTPATIENT)
Dept: INTERNAL MEDICINE | Facility: CLINIC | Age: 48
End: 2018-08-08

## 2018-08-18 ENCOUNTER — CLINICAL SUPPORT (OUTPATIENT)
Dept: SMOKING CESSATION | Facility: CLINIC | Age: 48
End: 2018-08-18
Payer: COMMERCIAL

## 2018-08-18 DIAGNOSIS — F17.200 NICOTINE DEPENDENCE: Primary | ICD-10-CM

## 2018-08-18 PROCEDURE — 99407 BEHAV CHNG SMOKING > 10 MIN: CPT | Mod: S$GLB,,,

## 2018-08-18 NOTE — PROGRESS NOTES
Called pt to f/u on his 6 month smoking cessation quit status. Pt stated he is still smoking. Informed him he has benefits available and is able to rejoin. Pt not interested in program since Chantix isn't covered. Will f/u in 6 months for 1 yr f/u.

## 2018-08-24 DIAGNOSIS — R52 PAIN: Primary | ICD-10-CM

## 2018-08-27 ENCOUNTER — PATIENT MESSAGE (OUTPATIENT)
Dept: INTERNAL MEDICINE | Facility: CLINIC | Age: 48
End: 2018-08-27

## 2018-08-27 ENCOUNTER — OFFICE VISIT (OUTPATIENT)
Dept: ORTHOPEDICS | Facility: CLINIC | Age: 48
End: 2018-08-27
Payer: MEDICARE

## 2018-08-27 ENCOUNTER — HOSPITAL ENCOUNTER (OUTPATIENT)
Dept: RADIOLOGY | Facility: HOSPITAL | Age: 48
Discharge: HOME | End: 2018-08-27
Attending: ORTHOPAEDIC SURGERY
Payer: MEDICARE

## 2018-08-27 VITALS
WEIGHT: 281 LBS | HEART RATE: 56 BPM | SYSTOLIC BLOOD PRESSURE: 140 MMHG | HEIGHT: 77 IN | DIASTOLIC BLOOD PRESSURE: 92 MMHG | BODY MASS INDEX: 33.18 KG/M2

## 2018-08-27 DIAGNOSIS — M79.672 LEFT FOOT PAIN: ICD-10-CM

## 2018-08-27 DIAGNOSIS — S92.512D CLOSED DISPLACED FRACTURE OF PROXIMAL PHALANX OF LESSER TOE OF LEFT FOOT WITH ROUTINE HEALING, SUBSEQUENT ENCOUNTER: Primary | ICD-10-CM

## 2018-08-27 PROCEDURE — 3077F SYST BP >= 140 MM HG: CPT | Mod: CPTII,S$GLB,, | Performed by: ORTHOPAEDIC SURGERY

## 2018-08-27 PROCEDURE — 73630 X-RAY EXAM OF FOOT: CPT | Mod: TC,LT

## 2018-08-27 PROCEDURE — 99214 OFFICE O/P EST MOD 30 MIN: CPT | Mod: S$GLB,,, | Performed by: ORTHOPAEDIC SURGERY

## 2018-08-27 PROCEDURE — 3008F BODY MASS INDEX DOCD: CPT | Mod: CPTII,S$GLB,, | Performed by: ORTHOPAEDIC SURGERY

## 2018-08-27 PROCEDURE — 99999 PR PBB SHADOW E&M-EST. PATIENT-LVL III: CPT | Mod: PBBFAC,,, | Performed by: ORTHOPAEDIC SURGERY

## 2018-08-27 PROCEDURE — 73630 X-RAY EXAM OF FOOT: CPT | Mod: 26,LT,, | Performed by: RADIOLOGY

## 2018-08-27 PROCEDURE — 3080F DIAST BP >= 90 MM HG: CPT | Mod: CPTII,S$GLB,, | Performed by: ORTHOPAEDIC SURGERY

## 2018-08-27 RX ORDER — IBUPROFEN 800 MG/1
800 TABLET ORAL 3 TIMES DAILY
Qty: 30 TABLET | Refills: 0 | Status: SHIPPED | OUTPATIENT
Start: 2018-08-27 | End: 2021-01-18 | Stop reason: SDUPTHER

## 2018-08-27 NOTE — PROGRESS NOTES
Subjective:     Patient ID: Valerio Yan is a 47 y.o. male.    Chief Complaint: Pain of the Left Foot    New:  Feels like left forefoot pain is improving overall, does not like using a boot or postop shoe due to proceed fall risk.  Uses his AFOs and athletic shoe very well he states.  He does report another fall with possible new breaks to the right forefoot and toes.  He does not want to get x-rays of the right foot today.  He does have x-rays of left foot that her back.    Prior: He is here for pain of the left knee as well as pain of the left distal tibia at the ankle, pain of the left and right feet.  He had a fall on Friday and Thursday of last week, multiple falls.  He believes this may be due to medication side effect.  Has a history of guillain-Zwingle disease previously. Wears B AFOs to ankles due to foot drop. Notes prior left knee arthrsocopy in the past.       Knee Injury    The pain is present in the left knee and left foot. The pain radiates to the left foot. This is a new problem. The current episode started more than 1 month ago. The injury was the result of a falling action while at home. The problem occurs intermittently. The problem has been gradually improving. The quality of the pain is described as aching, sharp, tingling and shooting. The pain is at a severity of 3/10. Associated symptoms include joint swelling and stiffness. Pertinent negatives include no fever or itching. The symptoms are aggravated by activity, bearing weight, standing, walking, rotation, twisting, bending and lying down. He has tried brace/corset and oral narcotics for the symptoms. The treatment provided mild relief. Physical therapy was not tried.      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     Hyperlipidemia     Hypertension     Joint pain     knees    LVH (left ventricular hypertrophy) due to hypertensive disease 2/10/2017    Mitral regurgitation 2016    Transfusion reaction     1 x  to PRBC fever     Past Surgical History:   Procedure Laterality Date    barrette esophagus      decubitus surgery      to sacral    ESOPHAGOGASTRODUODENOSCOPY      GASTROSTOMY TUBE PLACEMENT      KNEE ARTHROSCOPY      PEG TUBE REMOVAL      RADIOFREQUENCY ABLATION      RFA      JUAN-EN-Y PROCEDURE      TRACHEAL SURGERY       Family History   Problem Relation Age of Onset    Heart attack Mother     Heart disease Mother     Heart disease Father     Heart attack Father      Social History     Socioeconomic History    Marital status:      Spouse name: Not on file    Number of children: 2    Years of education: Not on file    Highest education level: Not on file   Social Needs    Financial resource strain: Not on file    Food insecurity - worry: Not on file    Food insecurity - inability: Not on file    Transportation needs - medical: Not on file    Transportation needs - non-medical: Not on file   Occupational History    Not on file   Tobacco Use    Smoking status: Current Every Day Smoker     Packs/day: 0.50     Years: 30.00     Pack years: 15.00     Types: Cigarettes     Start date: 1988     Last attempt to quit: 2017     Years since quittin.5    Smokeless tobacco: Former User     Types: Snuff     Quit date: 1999    Tobacco comment: Currently at 4 cigs per day   Substance and Sexual Activity    Alcohol use: Yes     Alcohol/week: 8.4 oz     Types: 14 Glasses of wine per week    Drug use: No    Sexual activity: Yes     Partners: Female   Other Topics Concern    Not on file   Social History Narrative    Not on file     Medication List with Changes/Refills   New Medications    IBUPROFEN (ADVIL,MOTRIN) 800 MG TABLET    Take 1 tablet (800 mg total) by mouth 3 (three)  times daily.   Current Medications    ATORVASTATIN (LIPITOR) 20 MG TABLET    Take 1 tablet (20 mg total) by mouth once daily.    CALCIUM-VITAMIN D 600 MG, 1,500MG,-200 UNIT 600 MG(1,500MG) -200 UNIT TAB    Take 1 tablet by mouth once daily.     ESOMEPRAZOLE (NEXIUM PACKET) 40 MG GRPS    ONE PACKET ONCE DAILY    FUROSEMIDE (LASIX) 20 MG TABLET    Take 1 tablet (20 mg total) by mouth 2 (two) times daily as needed (swelling).    GABAPENTIN (NEURONTIN) 600 MG TABLET    Take 1 tablet (600 mg total) by mouth 3 (three) times daily.    LISINOPRIL-HYDROCHLOROTHIAZIDE (PRINZIDE,ZESTORETIC) 20-12.5 MG PER TABLET    Take 2 tablets by mouth once daily.    METOPROLOL SUCCINATE (TOPROL-XL) 50 MG 24 HR TABLET    Take 1 tablet (50 mg total) by mouth once daily.    MULTIVITAMIN WITH MINERALS (MULTIPLE VITAMIN-MINERALS) TABLET    Take 1 tablet by mouth Daily.      VARENICLINE (CHANTIX) 1 MG TAB    Take 1 tablet (1 mg total) by mouth 2 (two) times daily.     Review of patient's allergies indicates:   Allergen Reactions    Latex      Other reaction(s): Itching  Other reaction(s): Hives    Reglan  [metoclopramide hcl]      Pt. Was in coma so is not aware of the reaction  Other reaction(s): Unable to obtain     Review of Systems   Constitution: Negative for fever.   HENT: Negative for sore throat.    Eyes: Negative for blurred vision.   Cardiovascular: Negative for dyspnea on exertion.   Respiratory: Positive for sleep disturbances due to breathing. Negative for shortness of breath.    Hematologic/Lymphatic: Does not bruise/bleed easily.   Skin: Negative for itching.   Musculoskeletal: Positive for arthritis, back pain, joint pain, joint swelling, muscle cramps, muscle weakness and stiffness.   Gastrointestinal: Negative for vomiting.   Genitourinary: Negative for dysuria.   Neurological: Negative for dizziness.   Psychiatric/Behavioral: The patient does not have insomnia.        Objective:   Body mass index is 33.32 kg/m².  Vitals:     08/27/18 0946   BP: (!) 140/92   Pulse: (!) 56           General    Nursing note and vitals reviewed.  Constitutional: He is oriented to person, place, and time. He appears well-developed. No distress.   HENT:   Head: Normocephalic and atraumatic.   Eyes: EOM are normal.   Cardiovascular: Normal rate.    Pulmonary/Chest: Effort normal. No stridor.   Neurological: He is alert and oriented to person, place, and time.   Psychiatric: He has a normal mood and affect. His behavior is normal.         Right Ankle/Foot Exam     Comments:    Minimally tender to palpation over the 1st toe,  tender to palpation over the base of the 5th proximal phalanx.  Sensation light touch is decreased to the foot but baseline. wwp toes    Left Ankle/Foot Exam     Comments:     Minimally tender palpation over the anterior aspect of the distal tibia just above the plafond.  Skin is intact here.  Mild  tender palpation over the 4th and 5th toes.  Mild to moderate midfoot swelling. Non hypermobile 1st ray.  Calcaneus is nontender palpation medial and lateral aspect of the ankle are nontender palpation.  Toes are warm well-perfused.  Sensation light touch is decreased to the foot but baseline.      Left Knee Exam     Other   Sensation: normal    Comments:       Muscle Strength   Right Lower Extremity   Right quadriceps strength: decreased quad tone.   Left Lower Extremity   Left quadriceps strength: decreased quad tone.              EXAMINATION:  XR FOOT COMPLETE 3 VIEW LEFT    CLINICAL HISTORY:  .  Pain in left foot    TECHNIQUE:  AP, lateral and oblique views of the left foot were performed.    COMPARISON:  July 13, 2018    FINDINGS:  Apparent healing fractures again noted involving the bases of the 4th and 5th proximal phalanges without significant callus formation.  Articulation maintained at the MTP and PIP joints.  Healing fracture base 3rd middle phalanx noted without significant change from prior exam.  No grossly evident fracture  involving the 2nd proximal phalanx on this exam.  Correlate clinically.  Multi articular degenerative changes noted throughout the foot.  Dorsal and plantar calcaneal spurs.  Pes planus.  Several radiopaque densities project along the plantar margin of the hindfoot level of the anterior aspect of the calcaneus.  In retrospect little interval change noted compared to prior studies.   Impression       Healing fractures again noted involving the bases of the 4th and 5th proximal and 3rd middle phalanx without definite fracture noted involving the 2nd proximal phalanx.    Additional chronic findings as detailed above.      Electronically signed by: Robert Burnette MD  Date: 08/27/2018  Time: 09:49           Results reviewed by me interpreted by me in demonstrated discussed with the patient     Assessment:     Encounter Diagnosis   Name Primary?    Closed displaced fracture of proximal phalanx of lesser toe of left foot with routine healing, subsequent encounter Yes        Plan:       -Left foot can WBAT    -right foot can WBAT    -no indication for surgery  Follow up prn

## 2018-10-11 ENCOUNTER — PATIENT MESSAGE (OUTPATIENT)
Dept: INTERNAL MEDICINE | Facility: CLINIC | Age: 48
End: 2018-10-11

## 2018-10-13 ENCOUNTER — PATIENT MESSAGE (OUTPATIENT)
Dept: INTERNAL MEDICINE | Facility: CLINIC | Age: 48
End: 2018-10-13

## 2018-10-13 DIAGNOSIS — Z71.6 ENCOUNTER FOR SMOKING CESSATION COUNSELING: ICD-10-CM

## 2018-10-13 DIAGNOSIS — F17.200 TOBACCO USE DISORDER: ICD-10-CM

## 2018-10-15 RX ORDER — VARENICLINE TARTRATE 1 MG/1
1 TABLET, FILM COATED ORAL 2 TIMES DAILY
Qty: 60 TABLET | Refills: 2 | Status: SHIPPED | OUTPATIENT
Start: 2018-10-15 | End: 2019-04-29

## 2018-10-22 ENCOUNTER — OFFICE VISIT (OUTPATIENT)
Dept: INTERNAL MEDICINE | Facility: CLINIC | Age: 48
End: 2018-10-22
Payer: MEDICARE

## 2018-10-22 VITALS
HEART RATE: 58 BPM | WEIGHT: 286.81 LBS | DIASTOLIC BLOOD PRESSURE: 80 MMHG | OXYGEN SATURATION: 96 % | TEMPERATURE: 98 F | SYSTOLIC BLOOD PRESSURE: 124 MMHG | BODY MASS INDEX: 34.01 KG/M2

## 2018-10-22 DIAGNOSIS — Z86.69 HISTORY OF GUILLAIN-BARRE SYNDROME: ICD-10-CM

## 2018-10-22 DIAGNOSIS — F17.200 TOBACCO USE DISORDER: Primary | ICD-10-CM

## 2018-10-22 PROCEDURE — 3008F BODY MASS INDEX DOCD: CPT | Mod: CPTII,HCNC,, | Performed by: FAMILY MEDICINE

## 2018-10-22 PROCEDURE — 99213 OFFICE O/P EST LOW 20 MIN: CPT | Mod: S$PBB,HCNC,, | Performed by: FAMILY MEDICINE

## 2018-10-22 PROCEDURE — 99214 OFFICE O/P EST MOD 30 MIN: CPT | Mod: PBBFAC,HCNC | Performed by: FAMILY MEDICINE

## 2018-10-22 PROCEDURE — 3079F DIAST BP 80-89 MM HG: CPT | Mod: CPTII,HCNC,, | Performed by: FAMILY MEDICINE

## 2018-10-22 PROCEDURE — 99999 PR PBB SHADOW E&M-EST. PATIENT-LVL IV: CPT | Mod: PBBFAC,HCNC,, | Performed by: FAMILY MEDICINE

## 2018-10-22 PROCEDURE — 3074F SYST BP LT 130 MM HG: CPT | Mod: CPTII,HCNC,, | Performed by: FAMILY MEDICINE

## 2018-10-22 NOTE — PROGRESS NOTES
Subjective:       Patient ID: Valerio Yan is a 48 y.o. male.    Chief Complaint: Follow-up    HPI   Mr. Yan presents today for paperwork.     Neurologist use to complete a paper for him after he had guillan barre.   He has never had seizures.   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 This has been 10.5 years ago.     Pain both shoulders and left elbow- nothing he does make it better or worse.   Takes ibuprofen     Review of Systems   Constitutional: Negative for activity change and unexpected weight change.   HENT: Negative for hearing loss, rhinorrhea and trouble swallowing.    Eyes: Negative for discharge and visual disturbance.   Respiratory: Negative for chest tightness and wheezing.    Cardiovascular: Negative for chest pain and palpitations.   Gastrointestinal: Negative for blood in stool, constipation, diarrhea and vomiting.   Endocrine: Negative for polydipsia and polyuria.   Genitourinary: Negative for difficulty urinating, hematuria and urgency.   Musculoskeletal: Positive for arthralgias. Negative for joint swelling and neck pain.   Neurological: Negative for weakness and headaches.   Psychiatric/Behavioral: Negative for confusion and dysphoric mood.         Past Medical History:   Diagnosis Date    Atrial flutter     Ozuna's esophagus     CRPS (complex regional pain syndrome type II)     Decubitus skin ulcer     Encounter for blood transfusion     Guillain-[place]     Guillain-[place] syndrome 7/30/2013    Hyperlipidemia     Hypertension     Joint pain     knees    LVH (left ventricular hypertrophy) due to hypertensive disease 2/10/2017    Mitral regurgitation 6/9/2016    Transfusion reaction     1 x  to PRBC fever     Objective:        Physical Exam   HENT:   Head: Normocephalic and atraumatic.   Eyes: EOM are normal.   Musculoskeletal:   AFOs bilateral LE    Vitals reviewed.          Assessment/Plan:     Tobacco use disorder    History of  Guillain-[place] syndrome    Decreased significantly still using chantix    Completed paperwork for DMV   10.5 years since paralysis with Guillain [place]    Follow-up if symptoms worsen or fail to improve.    Therese Lala MD  [facility]

## 2018-10-24 ENCOUNTER — TELEPHONE (OUTPATIENT)
Dept: INTERNAL MEDICINE | Facility: CLINIC | Age: 48
End: 2018-10-24

## 2018-10-24 ENCOUNTER — PATIENT MESSAGE (OUTPATIENT)
Dept: INTERNAL MEDICINE | Facility: CLINIC | Age: 48
End: 2018-10-24

## 2018-10-25 NOTE — TELEPHONE ENCOUNTER
notified pt of that his RX for chantix came in to the clinic and is ready for .  Pt states he will stop by the clinic today and pick it up.

## 2018-11-05 ENCOUNTER — PATIENT MESSAGE (OUTPATIENT)
Dept: INTERNAL MEDICINE | Facility: CLINIC | Age: 48
End: 2018-11-05

## 2018-11-05 DIAGNOSIS — M25.511 RIGHT SHOULDER PAIN, UNSPECIFIED CHRONICITY: Primary | ICD-10-CM

## 2018-11-05 RX ORDER — OXYCODONE AND ACETAMINOPHEN 10; 325 MG/1; MG/1
1 TABLET ORAL EVERY 6 HOURS PRN
Qty: 24 TABLET | Refills: 0 | Status: SHIPPED | OUTPATIENT
Start: 2018-11-05 | End: 2019-01-31

## 2018-11-06 ENCOUNTER — PATIENT MESSAGE (OUTPATIENT)
Dept: ORTHOPEDICS | Facility: CLINIC | Age: 48
End: 2018-11-06

## 2018-11-07 DIAGNOSIS — I10 ESSENTIAL HYPERTENSION: ICD-10-CM

## 2018-11-07 RX ORDER — LISINOPRIL AND HYDROCHLOROTHIAZIDE 12.5; 2 MG/1; MG/1
TABLET ORAL
Qty: 180 TABLET | Refills: 3 | Status: SHIPPED | OUTPATIENT
Start: 2018-11-07 | End: 2020-03-12 | Stop reason: SDUPTHER

## 2018-11-08 DIAGNOSIS — R52 PAIN: Primary | ICD-10-CM

## 2018-11-09 ENCOUNTER — OFFICE VISIT (OUTPATIENT)
Dept: ORTHOPEDICS | Facility: CLINIC | Age: 48
End: 2018-11-09
Payer: MEDICARE

## 2018-11-09 ENCOUNTER — HOSPITAL ENCOUNTER (OUTPATIENT)
Dept: RADIOLOGY | Facility: HOSPITAL | Age: 48
Discharge: HOME | End: 2018-11-09
Attending: ORTHOPAEDIC SURGERY
Payer: MEDICARE

## 2018-11-09 VITALS
WEIGHT: 286.81 LBS | HEIGHT: 77 IN | HEART RATE: 51 BPM | DIASTOLIC BLOOD PRESSURE: 90 MMHG | SYSTOLIC BLOOD PRESSURE: 145 MMHG | BODY MASS INDEX: 33.86 KG/M2

## 2018-11-09 DIAGNOSIS — M75.21 TENDONITIS OF LONG HEAD OF BICEPS BRACHII OF RIGHT SHOULDER: Primary | ICD-10-CM

## 2018-11-09 DIAGNOSIS — R52 PAIN: ICD-10-CM

## 2018-11-09 PROCEDURE — 99214 OFFICE O/P EST MOD 30 MIN: CPT | Mod: 25,HCNC,S$GLB, | Performed by: ORTHOPAEDIC SURGERY

## 2018-11-09 PROCEDURE — 3077F SYST BP >= 140 MM HG: CPT | Mod: CPTII,HCNC,S$GLB, | Performed by: ORTHOPAEDIC SURGERY

## 2018-11-09 PROCEDURE — 20550 NJX 1 TENDON SHEATH/LIGAMENT: CPT | Mod: HCNC,RT,S$GLB, | Performed by: ORTHOPAEDIC SURGERY

## 2018-11-09 PROCEDURE — 73030 X-RAY EXAM OF SHOULDER: CPT | Mod: TC,HCNC,RT

## 2018-11-09 PROCEDURE — 3080F DIAST BP >= 90 MM HG: CPT | Mod: CPTII,HCNC,S$GLB, | Performed by: ORTHOPAEDIC SURGERY

## 2018-11-09 PROCEDURE — 73030 X-RAY EXAM OF SHOULDER: CPT | Mod: 26,HCNC,RT, | Performed by: RADIOLOGY

## 2018-11-09 PROCEDURE — 3008F BODY MASS INDEX DOCD: CPT | Mod: CPTII,HCNC,S$GLB, | Performed by: ORTHOPAEDIC SURGERY

## 2018-11-09 PROCEDURE — 99999 PR PBB SHADOW E&M-EST. PATIENT-LVL III: CPT | Mod: PBBFAC,HCNC,, | Performed by: ORTHOPAEDIC SURGERY

## 2018-11-09 RX ADMIN — METHYLPREDNISOLONE ACETATE 80 MG: 80 INJECTION, SUSPENSION INTRA-ARTICULAR; INTRALESIONAL; INTRAMUSCULAR; SOFT TISSUE at 03:11

## 2018-11-09 NOTE — LETTER
November 12, 2018      Therese Lala MD  [address] [place] [address] [address] [place] [id number]           [facility]  [address] [place] [address] [address] [place] [address]  Phone: [phone]  Fax: [phone]          Patient: Valerio Yan   MR Number: [id number]   YOB: 1970   Date of Visit: 11/9/2018       Dear Dr. Therese Lala:    Thank you for referring Valerio Yan to me for evaluation. Attached you will find relevant portions of my assessment and plan of care.    If you have questions, please do not hesitate to call me. I look forward to following Valerio Yan along with you.    Sincerely,    Elvis Downing MD    Enclosure  CC:  No Recipients    If you would like to receive this communication electronically, please contact [email or web address] or [phone] to request more information on [facility] access.    For providers and/or their staff who would like to refer a patient to [facility], please contact us through our one-stop-shop provider referral line, [facility], at [phone].    If you feel you have received this communication in error or would no longer like to receive these types of communications, please e-mail [email or web address]

## 2018-11-09 NOTE — PROGRESS NOTES
Subjective:     Patient ID: Valerio Yan is a 48 y.o. male.    Chief Complaint: Pain of the Right Shoulder    Right shoulder pain for the past 3 weeks, over the anterior aspect of the shoulder.       Shoulder Pain    The pain is present in the right shoulder. This is a new problem. The current episode started 1 to 4 weeks ago (pain for about 3 week). There has been no history of extremity trauma. Movement associated with injury: no injury.The problem occurs constantly. The problem has been gradually worsening. The quality of the pain is described as sharp and burning. The pain is at a severity of 6/10. Associated symptoms include joint locking, a limited range of motion and stiffness. Pertinent negatives include no fever or itching. The symptoms are aggravated by activity, bearing weight and lifting. He has tried oral narcotics and OTC pain meds for the symptoms. The treatment provided mild relief. Physical therapy was not tried.      Past Medical History:   Diagnosis Date    Atrial flutter     Ozuna's esophagus     CRPS (complex regional pain syndrome type II)     Decubitus skin ulcer     Encounter for blood transfusion     Guillain-[place]     Guillain-[place] syndrome 7/30/2013    Hyperlipidemia     Hypertension     Joint pain     knees    LVH (left ventricular hypertrophy) due to hypertensive disease 2/10/2017    Mitral regurgitation 6/9/2016    Transfusion reaction     1 x  to PRBC fever     Past Surgical History:   Procedure Laterality Date    ABLATION N/A 5/27/2016    [facility]    barrette esophagus      COLONOSCOPY N/A 5/17/2018    Procedure: COLONOSCOPY;  Surgeon: Ilan Araya III, MD;  Location: [facility] ENDO;  Service: Endoscopy;  Laterality: N/A;    COLONOSCOPY N/A 5/17/2018    Performed by Ilan Araya III, MD at [facility] END[facility]    decubitus surgery      to sacral    EGD (ESOPHAGOGASTRODUODENOSCOPY) N/A 8/23/2013    Performed by Chapin WILLARD  MD L[facility] at [facility] ENDO    ESOPHAGOGASTRODUODENOSCOPY      ESOPHAGOGASTRODUODENOSCOPY (EGD) N/A 2018    Performed by [facility] III, MD at [facility] ENDO    ESOPHAGOGASTRODUODENOSCOPY (EGD) N/A 3/21/2017    Performed by [facility] III, MD at [facility] ENDO    ESOPHAGOGASTRODUODENOSCOPY (EGD) N/A 3/23/2016    Performed by [facility] III, MD at [facility] ENDO    GASTROSTOMY TUBE PLACEMENT      KNEE ARTHROSCOPY      PEG TUBE REMOVAL      RADIOFREQUENCY ABLATION      RFA      JUAN-EN-Y PROCEDURE      TRACHEAL SURGERY      TRANSESOPHAGEAL ECHOCARDIOGRAM (NAILA) N/A 2016    Performed by [facility]one MD at [facility] CATH LAB     Family History   Problem Relation Age of Onset    Heart attack Mother     Heart disease Mother     Heart disease Father     Heart attack Father      Social History     Socioeconomic History    Marital status:      Spouse name: Not on file    Number of children: 2    Years of education: Not on file    Highest education level: Not on file   Social Needs    Financial resource strain: Not on file    Food insecurity - worry: Not on file    Food insecurity - inability: Not on file    Transportation needs - medical: Not on file    Transportation needs - non-medical: Not on file   Occupational History    Not on file   Tobacco Use    Smoking status: Current Every Day Smoker     Packs/day: 0.30     Years: 30.00     Pack years: 9.00     Types: Cigarettes     Start date: 1988     Last attempt to quit: 2017     Years since quittin.7    Smokeless tobacco: Former User     Types: Snuff     Quit date: 1999    Tobacco comment: Currently at 4 cigs per day   Substance and Sexual Activity    Alcohol use: Yes     Alcohol/week: 8.4 oz     Types: 14 Glasses of wine per week    Drug use: No    Sexual activity: Yes     Partners: Female   Other Topics Concern    Not on file   Social History Narrative    Not on file        Medication List            "Accurate as of 11/9/18 11:59 PM. If you have any questions, ask your nurse or doctor.               CONTINUE taking these medications    atorvastatin 20 MG tablet  Commonly known as:  LIPITOR  Take 1 tablet (20 mg total) by mouth once daily.     calcium-vitamin D3 600 mg(1,500mg) -200 unit Tab  Commonly known as:  CALCIUM 600 + D(3)     esomeprazole 40 mg Grps  Commonly known as:  NexIUM Packet  ONE PACKET ONCE DAILY     furosemide 20 MG tablet  Commonly known as:  LASIX  Take 1 tablet (20 mg total) by mouth 2 (two) times daily as needed (swelling).     gabapentin 600 MG tablet  Commonly known as:  NEURONTIN  Take 1 tablet (600 mg total) by mouth 3 (three) times daily.     ibuprofen 800 MG tablet  Commonly known as:  ADVIL,MOTRIN  Take 1 tablet (800 mg total) by mouth 3 (three) times daily.     lisinopril-hydrochlorothiazide 20-12.5 mg per tablet  Commonly known as:  PRINZIDE,ZESTORETIC  TAKE 2 TABLETS ONE TIME DAILY     metoprolol succinate 50 MG 24 hr tablet  Commonly known as:  TOPROL-XL  Take 1 tablet (50 mg total) by mouth once daily.     MULTIPLE VITAMIN-MINERALS tablet  Generic drug:  multivitamin with minerals     oxyCODONE-acetaminophen  mg per tablet  Commonly known as:  PERCOCET  Take 1 tablet by mouth every 6 (six) hours as needed for Pain.     varenicline 1 mg Tab  Commonly known as:  CHANTIX  Take 1 tablet (1 mg total) by mouth 2 (two) times daily.          Review of patient's allergies indicates:   Allergen Reactions    Metoclopramide Other (See Comments) and Hives     Pt. Was in coma so is not aware of the reaction  Pt states "he don't know, was in coma"      Reglan  [metoclopramide hcl] Other (See Comments)     Pt. Was in coma so is not aware of the reaction  Other reaction(s): Unable to obtain    Sulfa (sulfonamide antibiotics) Other (See Comments)     Pt states "he don't know"    Latex Rash     Other reaction(s): Itching  Other reaction(s): Hives    Latex, natural rubber Rash     " blisters       Review of Systems   Constitution: Negative for fever.   HENT: Negative for sore throat.    Eyes: Negative for blurred vision.   Cardiovascular: Negative for dyspnea on exertion.   Respiratory: Negative for shortness of breath.    Hematologic/Lymphatic: Does not bruise/bleed easily.   Skin: Negative for itching.   Musculoskeletal: Positive for arthritis, falls, joint pain, joint swelling, muscle weakness, neck pain and stiffness.   Gastrointestinal: Negative for vomiting.   Genitourinary: Negative for dysuria.   Neurological: Negative for dizziness.   Psychiatric/Behavioral: The patient does not have insomnia.        Objective:   Body mass index is 34.01 kg/m².  Vitals:    11/09/18 1003   BP: (!) 145/90   Pulse: (!) 51           General    Nursing note and vitals reviewed.  Constitutional: He is oriented to person, place, and time. He appears well-developed. No distress.   HENT:   Head: Normocephalic and atraumatic.   Eyes: EOM are normal.   Cardiovascular: Normal rate.    Pulmonary/Chest: Effort normal. No stridor.   Neurological: He is alert and oriented to person, place, and time.   Psychiatric: He has a normal mood and affect. His behavior is normal.         Right Shoulder Exam     Inspection/Observation   Scapular Dyskinesia: positive    Tenderness   The patient is tender to palpation of the biceps tendon.    Tests & Signs   Impingement: positive  Rotator Cuff Painful Arc/Range: moderate  Active Compression Test ([place]'s Sign): positive    Comments:  Right shoulder exam is difficult and limited by his underlying baseline neurologic issues, remember he has previous history of Guillan-[place] syndrome    Very painful over the biceps tendon in the groove - notes this is the primary area of his symptoms      Muscle Strength   Right Upper Extremity   Shoulder External Rotation: 4/5   Supraspinatus: 4/5/5   Left Upper Extremity  Shoulder External Rotation: 5/5   Supraspinatus: 5/5/5     Vascular Exam        Capillary Refill  Right Hand: normal capillary refill      IMAGING Imaging / Radiographs:  4 views of the Right shoulder - AP, Grashey, Outlet and Axillary view were ordered by me and reviewed / interpreted by me demonstrate:   No fracture or dislocation    There is moderate posterior subluxation on the axillary view, no shaye dislocation     Proximal migration of humeral head: None    Glenohumeral degenerative change / arthritis: mild   AC joint degenerative change / arthritis: mild   Acromion type: Type II      Assessment:     Encounter Diagnosis   Name Primary?    Tendonitis of long head of biceps brachii of right shoulder Yes        Plan:     I had an in depth discussion today with Valerio today regarding his right shoulder problem, going over his radiographs and the model to help further his understanding. I explained the anatomy and pathophysiology of the problem. I told Valerio  that I believe the problem relates to long head of the biceps tendonitis . We had an in depth discussion regarding appropriate conservative treatment and management of his condition.     From a treatment standpoint, the decision was made to go forward with     1. Right shoulder biceps tendon sheath CSI recommened, discussed pros and cons, risks and benefits including small risk infection, he would like to proceed    2. celebrex short course x 3 weeks, discussed pros and cons, risks and benefits including black box warning    3. Follow up PRN

## 2018-11-12 RX ORDER — METHYLPREDNISOLONE ACETATE 80 MG/ML
80 INJECTION, SUSPENSION INTRA-ARTICULAR; INTRALESIONAL; INTRAMUSCULAR; SOFT TISSUE
Status: DISCONTINUED | OUTPATIENT
Start: 2018-11-09 | End: 2018-11-12 | Stop reason: HOSPADM

## 2018-11-12 NOTE — PROCEDURES
R bicep tendonTendon Sheath  Date/Time: 11/9/2018 3:14 PM  Performed by: Elvis Downing MD  Authorized by: Elvis Downing MD     Consent Done?: Yes (Verbal)  Timeout: prior to procedure the correct patient, procedure, and site was verified    Indications:  Pain and diagnostic evaluation  Site marked: the procedure site was marked    Timeout: prior to procedure the correct patient, procedure, and site was verified    Location:  Shoulder  Prep: patient was prepped and draped in usual sterile fashion    Ultrasonic guidance for needle placement?: No    Needle size:  22 G  Approach:  Volar  Medications:  80 mg methylPREDNISolone acetate 80 mg/mL  Patient tolerance:  Patient tolerated the procedure well with no immediate complications   The patient had no adverse reactions to the medication. The patient was instructed to apply ice to the joint for 30 minutes on and 30 minutes off for the next 48 hours, and avoid strenuous activities for 48 hours following the injection. Patient was warned of possible blood sugar and/or blood pressure changes during that time regarding corticosteroid injections if applicable.  Following that time, patient can resume regular activities. Note that informed consent was performed with the patient before injection discussing risks, benefits, indications and alternatives to injection, risks including but not limited to bleeding and infection.

## 2018-12-12 ENCOUNTER — PATIENT MESSAGE (OUTPATIENT)
Dept: ORTHOPEDICS | Facility: CLINIC | Age: 48
End: 2018-12-12

## 2019-01-07 ENCOUNTER — OFFICE VISIT (OUTPATIENT)
Dept: ORTHOPEDICS | Facility: CLINIC | Age: 49
End: 2019-01-07
Payer: MEDICARE

## 2019-01-07 VITALS
WEIGHT: 286 LBS | HEIGHT: 77 IN | HEART RATE: 66 BPM | DIASTOLIC BLOOD PRESSURE: 92 MMHG | SYSTOLIC BLOOD PRESSURE: 146 MMHG | BODY MASS INDEX: 33.77 KG/M2

## 2019-01-07 DIAGNOSIS — I48.3 TYPICAL ATRIAL FLUTTER: ICD-10-CM

## 2019-01-07 DIAGNOSIS — K22.70 BARRETT'S ESOPHAGUS WITHOUT DYSPLASIA: ICD-10-CM

## 2019-01-07 DIAGNOSIS — I11.9 HYPERTENSIVE LEFT VENTRICULAR HYPERTROPHY, WITHOUT HEART FAILURE: ICD-10-CM

## 2019-01-07 DIAGNOSIS — E78.2 MIXED HYPERLIPIDEMIA: ICD-10-CM

## 2019-01-07 DIAGNOSIS — G61.0 GUILLAIN-BARRE SYNDROME: ICD-10-CM

## 2019-01-07 DIAGNOSIS — M17.12 PRIMARY OSTEOARTHRITIS OF LEFT KNEE: ICD-10-CM

## 2019-01-07 DIAGNOSIS — I34.0 NON-RHEUMATIC MITRAL REGURGITATION: ICD-10-CM

## 2019-01-07 DIAGNOSIS — G80.8 OTHER CEREBRAL PALSY: ICD-10-CM

## 2019-01-07 DIAGNOSIS — Z72.0 TOBACCO ABUSE: ICD-10-CM

## 2019-01-07 DIAGNOSIS — M75.21 TENDONITIS OF LONG HEAD OF BICEPS BRACHII OF RIGHT SHOULDER: ICD-10-CM

## 2019-01-07 DIAGNOSIS — G62.9 POLYNEUROPATHY: ICD-10-CM

## 2019-01-07 PROCEDURE — 3077F PR MOST RECENT SYSTOLIC BLOOD PRESSURE >= 140 MM HG: ICD-10-PCS | Mod: CPTII,S$GLB,, | Performed by: FAMILY MEDICINE

## 2019-01-07 PROCEDURE — 99999 PR PBB SHADOW E&M-EST. PATIENT-LVL III: CPT | Mod: PBBFAC,,, | Performed by: FAMILY MEDICINE

## 2019-01-07 PROCEDURE — 3008F PR BODY MASS INDEX (BMI) DOCUMENTED: ICD-10-PCS | Mod: CPTII,S$GLB,, | Performed by: FAMILY MEDICINE

## 2019-01-07 PROCEDURE — 99214 PR OFFICE/OUTPT VISIT, EST, LEVL IV, 30-39 MIN: ICD-10-PCS | Mod: S$GLB,,, | Performed by: FAMILY MEDICINE

## 2019-01-07 PROCEDURE — 99999 PR PBB SHADOW E&M-EST. PATIENT-LVL III: ICD-10-PCS | Mod: PBBFAC,,, | Performed by: FAMILY MEDICINE

## 2019-01-07 PROCEDURE — 99499 UNLISTED E&M SERVICE: CPT | Mod: S$GLB,,, | Performed by: FAMILY MEDICINE

## 2019-01-07 PROCEDURE — 99499 RISK ADDL DX/OHS AUDIT: ICD-10-PCS | Mod: S$GLB,,, | Performed by: FAMILY MEDICINE

## 2019-01-07 PROCEDURE — 3080F PR MOST RECENT DIASTOLIC BLOOD PRESSURE >= 90 MM HG: ICD-10-PCS | Mod: CPTII,S$GLB,, | Performed by: FAMILY MEDICINE

## 2019-01-07 PROCEDURE — 99214 OFFICE O/P EST MOD 30 MIN: CPT | Mod: S$GLB,,, | Performed by: FAMILY MEDICINE

## 2019-01-07 PROCEDURE — 3077F SYST BP >= 140 MM HG: CPT | Mod: CPTII,S$GLB,, | Performed by: FAMILY MEDICINE

## 2019-01-07 PROCEDURE — 3080F DIAST BP >= 90 MM HG: CPT | Mod: CPTII,S$GLB,, | Performed by: FAMILY MEDICINE

## 2019-01-07 PROCEDURE — 3008F BODY MASS INDEX DOCD: CPT | Mod: CPTII,S$GLB,, | Performed by: FAMILY MEDICINE

## 2019-01-07 NOTE — PROGRESS NOTES
Subjective:     Patient ID: Valerio Yan is a 48 y.o. male.    Chief Complaint: Pain of the Right Shoulder    Patient is a 48-year-old male with cerebral palsy, generalized debility, history of Guillain-[place], hypertension, AFib, and peptic ulcer disease who presents clinic today for right shoulder pain and left knee buckling.    Right shoulder:  Patient states he has had this right shoulder pain for the past several months.  States that he had a fall which broke multiple of his toes.  Patient is unsure if he injured his shoulder during the fall.  States that most of his pain is on the anterior portion of her shoulder and is difficult for him to raise his arm above his head.  Patient states that he was seen by Dr. Downing who recommended trying a cortisone injection and managing his symptoms conservatively.  Patient denies any physical therapy or occupational therapy.  Denies any previous surgeries or injuries to the shoulder.    Left knee:  Patient states that he has a history of bucket-handle meniscus tear with meniscectomy many years prior.  Patient states that his knee does not hurt her swell, but occasionally wants to buckle and give out.  Patient states that the buckling is concerning to him that he might have a fall or worsening injury.  Denies any physical therapy, cortisone injections, MRIs, redness, or swelling        Past Medical History:   Diagnosis Date    Atrial flutter     Ozuna's esophagus     CRPS (complex regional pain syndrome type II)     Decubitus skin ulcer     Encounter for blood transfusion     Guillain-[place]     Guillain-[place] syndrome 7/30/2013    Hyperlipidemia     Hypertension     Joint pain     knees    LVH (left ventricular hypertrophy) due to hypertensive disease 2/10/2017    Mitral regurgitation 6/9/2016    Primary osteoarthritis of left knee 1/7/2019    Transfusion reaction     1 x  to PRBC fever     Past Surgical History:   Procedure Laterality Date     ABLATION N/A 2016    Per[facility] at [facility] CATH [facility]    barrette esophagus      COLONOSCOPY N/A 2018    Performed by [facility]an [facility] III, [facility] at [facility] ENDO    decubitus surgery      to sacral    EGD (ESOPHAGOGASTRODUODENOSCOPY) N/A 2013    Performed by Chapin Batista MD at [facility] ENDO    ESOPHAGOGASTRODUODENOSCOPY      ESOPHAGOGASTRODUODENOSCOPY (EGD) N/A 2018    Performed by [facility], [facility] at [facility] ENDO    ESOPHAGOGASTRODUODENOSCOPY (EGD) N/A 3/21/2017    Performed by [facility] at [facility] ENDO    ESOPHAGOGASTRODUODENOSCOPY (EGD) N/A 3/23/2016    Performed by [facility] III, [facility] at [facility] ENDO    GASTROSTOMY TUBE PLACEMENT      KNEE ARTHROSCOPY      PEG TUBE REMOVAL      RADIOFREQUENCY ABLATION      RFA      JUAN-EN-Y PROCEDURE      TRACHEAL SURGERY      TRANSESOPHAGEAL ECHOCARDIOGRAM (NAILA) N/A 2016    Performed by [facility] at [facility] CATH LAB     Family History   Problem Relation Age of Onset    Heart attack Mother     Heart disease Mother     Heart disease Father     Heart attack Father      Social History     Socioeconomic History    Marital status:      Spouse name: Not on file    Number of children: 2    Years of education: Not on file    Highest education level: Not on file   Social Needs    Financial resource strain: Not on file    Food insecurity - worry: Not on file    Food insecurity - inability: Not on file    Transportation needs - medical: Not on file    Transportation needs - non-medical: Not on file   Occupational History    Not on file   Tobacco Use    Smoking status: Current Every Day Smoker     Packs/day: 0.30     Years: 30.00     Pack years: 9.00     Types: Cigarettes     Start date: 1988     Last attempt to quit: 2017     Years since quittin.9    Smokeless tobacco: Former User     Types: Snuff     Quit date: 1999    Tobacco comment: Currently at 4 cigs per  "day   Substance and Sexual Activity    Alcohol use: Yes     Alcohol/week: 8.4 oz     Types: 14 Glasses of wine per week    Drug use: No    Sexual activity: Yes     Partners: Female   Other Topics Concern    Not on file   Social History Narrative    Not on file        Medication List           Accurate as of 1/7/19  1:15 PM. If you have any questions, ask your nurse or doctor.               CONTINUE taking these medications    atorvastatin 20 MG tablet  Commonly known as:  LIPITOR  Take 1 tablet (20 mg total) by mouth once daily.     calcium-vitamin D3 600 mg(1,500mg) -200 unit Tab  Commonly known as:  CALCIUM 600 + D(3)     esomeprazole 40 mg Grps  Commonly known as:  NexIUM Packet  ONE PACKET ONCE DAILY     furosemide 20 MG tablet  Commonly known as:  LASIX  Take 1 tablet (20 mg total) by mouth 2 (two) times daily as needed (swelling).     gabapentin 600 MG tablet  Commonly known as:  NEURONTIN  Take 1 tablet (600 mg total) by mouth 3 (three) times daily.     ibuprofen 800 MG tablet  Commonly known as:  ADVIL,MOTRIN  Take 1 tablet (800 mg total) by mouth 3 (three) times daily.     lisinopril-hydrochlorothiazide 20-12.5 mg per tablet  Commonly known as:  PRINZIDE,ZESTORETIC  TAKE 2 TABLETS ONE TIME DAILY     metoprolol succinate 50 MG 24 hr tablet  Commonly known as:  TOPROL-XL  Take 1 tablet (50 mg total) by mouth once daily.     MULTIPLE VITAMIN-MINERALS tablet  Generic drug:  multivitamin with minerals     oxyCODONE-acetaminophen  mg per tablet  Commonly known as:  PERCOCET  Take 1 tablet by mouth every 6 (six) hours as needed for Pain.     varenicline 1 mg Tab  Commonly known as:  CHANTIX  Take 1 tablet (1 mg total) by mouth 2 (two) times daily.          Review of patient's allergies indicates:   Allergen Reactions    Metoclopramide Other (See Comments) and Hives     Pt. Was in coma so is not aware of the reaction  Pt states "he don't know, was in coma"      Reglan  [metoclopramide hcl] Other (See " "Comments)     Pt. Was in coma so is not aware of the reaction  Other reaction(s): Unable to obtain    Sulfa (sulfonamide antibiotics) Other (See Comments)     Pt states "he don't know"    Latex Rash     Other reaction(s): Itching  Other reaction(s): Hives    Latex, natural rubber Rash     blisters       Review of Systems   Constitutional: Negative for chills and fever.   Cardiovascular: Negative for leg swelling.   Gastrointestinal: Negative for nausea and vomiting.   Musculoskeletal: Positive for joint pain. Negative for back pain, falls and myalgias.   Skin: Negative for rash.   Neurological: Negative for tingling, sensory change, focal weakness and weakness.        Objective:   Body mass index is 33.91 kg/m².  Vitals:    01/07/19 1155   BP: (!) 146/92   Pulse: 66   Weight: 129.7 kg (286 lb)   Height: 6' 5" (1.956 m)   PainSc:   3   PainLoc: Shoulder           General    Nursing note and vitals reviewed.  Constitutional: He is oriented to person, place, and time. He appears well-developed and well-nourished. No distress.   Eyes: Conjunctivae are normal. No scleral icterus.   Pulmonary/Chest: Effort normal.   Neurological: He is alert and oriented to person, place, and time.   Psychiatric: He has a normal mood and affect. His behavior is normal. Judgment and thought content normal.     General Musculoskeletal Exam   Gait: abnormal         Left Knee Exam     Inspection   Erythema: absent  Effusion: absent  Deformity: absent    Tenderness   The patient is experiencing no tenderness.     Crepitus   The patient has crepitus of the patella.    Range of Motion   The patient has normal left knee ROM.    Tests   Meniscus   Cyrus:  Medial - negative Lateral - negative    Other   Sensation: normalRight Shoulder Exam     Inspection/Observation   Swelling: absent  Deformity: absent  Scapular Winging: absent  Scapular Dyskinesia: positive  Atrophy: present    Tenderness   The patient is tender to palpation of the " supraspinatus and biceps tendon.    Range of Motion   Active abduction: abnormal   Passive abduction: abnormal   Extension: abnormal   Forward Flexion: abnormal   Forward Elevation: abnormal  Adduction: abnormal  External Rotation 0 degrees: abnormal   External Rotation 90 degrees: abnormal  Internal rotation 0 degrees: abnormal   Internal rotation 90 degrees: abnormal     Tests & Signs   Galarza test: positive  Impingement: positive  Rotator Cuff Painful Arc/Range: moderate  Active Compression Test (Houston's Sign): negative  Speed's Test: positive    Other   Sensation: normal    Comments:  Positive Alexander's; patient's exam is limited secondary to his cerebral palsy and general debility    Left Shoulder Exam   Left shoulder exam is normal.    Inspection/Observation   Swelling: absent  Deformity: absent  Scapular Winging: absent  Scapular Dyskinesia: negative  Atrophy: absent    Tenderness   The patient is experiencing no tenderness.     Range of Motion   Active abduction: normal   Passive abduction: normal   Extension: normal   Forward Flexion: normal   Forward Elevation: normal  Adduction: normal    Tests & Signs   Galarza test: negative  Impingement: negative  Active Compression test (Houston's Sign): negative    Other   Sensation: normal       Muscle Strength   Right Upper Extremity   Shoulder Abduction: 3/5   Shoulder Internal Rotation: 3/5   Shoulder External Rotation: 3/5   Supraspinatus: 3/5/5   Subscapularis: 3/5/5   Biceps: 3/5/5   Left Upper Extremity  Shoulder Abduction: 5/5   Shoulder Internal Rotation: 5/5   Shoulder External Rotation: 5/5   Supraspinatus: 5/5/5   Subscapularis: 5/5/5   Biceps: 5/5/5     EXAMINATION:  XR SHOULDER COMPLETE 2 OR MORE VIEWS RIGHT    CLINICAL HISTORY:  Pain, unspecified    TECHNIQUE:  Two or three views of the right shoulder were performed.    COMPARISON:  None    FINDINGS:  No acute abnormality.  Glenohumeral greater than acromioclavicular joint degenerative findings noted.   Soft tissues unremarkable.      Impression       As above      Electronically signed by: Easton Hand MD  Date: 11/09/2018  Time: 09:50     EXAMINATION:  MRI KNEE WITHOUT CONTRAST LEFT    CLINICAL HISTORY:  left knee pain;    TECHNIQUE:  Standard knee MRI protocol without IV contrast was performed.    COMPARISON:  A plain film examination of the knees performed on 06/18/2018.    FINDINGS:  There is a depressed fracture involving the posterior aspect of the lateral tibial plateau.  There is a moderate amount of adjacent bone marrow edema.  There is no dislocation. There is a normal amount of fluid within the knee. There is a 23 mm Baker's cyst. The cruciate and collateral ligaments appear intact.  The medial meniscus is normal in appearance.  There is a poorly visualized tear involving the inferior surface of the posterior horn of the lateral meniscus.  There are mild degenerative changes in the anterior horn and body of the lateral meniscus.  There is a 10 mm chondral defect overlying the medial femoral condyle.  There is moderate thinning of the cartilage in the lateral compartment of the knee.  There is grade 3 chondromalacia of the medial and lateral patellar facets.      Impression       1. There is a depressed fracture involving the posterior aspect of the lateral tibial plateau. There is a moderate amount of adjacent bone marrow edema.  2. There is a poorly visualized tear involving the inferior surface of the posterior horn of the lateral meniscus. There are mild degenerative changes in the anterior horn and body of the lateral meniscus.  3. There is a 10 mm chondral defect overlying the medial femoral condyle. There is moderate thinning of the cartilage in the lateral compartment of the knee. There is grade 3 chondromalacia of the medial and lateral patellar facets.  4. There is a 23 mm Baker's cyst.      Electronically signed by: Wil Powell MD  Date: 07/02/2018  Time: 12:31         Valerio was seen  today for pain.    Diagnoses and all orders for this visit:    Rotator cuff syndrome of right shoulder and allied disorders  -     Ambulatory Referral to Physical/Occupational Therapy    Tendonitis of long head of biceps brachii of right shoulder  -     Ambulatory Referral to Physical/Occupational Therapy    Primary osteoarthritis of left knee  -     Ambulatory Referral to Physical/Occupational Therapy    Other cerebral palsy    Guillain-[place] syndrome    Hypertensive left ventricular hypertrophy, without heart failure    Mixed hyperlipidemia    Non-rheumatic mitral regurgitation    Typical atrial flutter    Ozuna's esophagus without dysplasia    Tobacco abuse    Polyneuropathy    -discussed conservative versus surgical treatment options with the patient.  Patient at this time would like to take a conservative approach with physical and occupational therapy.  Will have the patient go for twice a week for 6 weeks and then reassess.  If patient is doing well and happy with the results, will continue to monitor.  Patient is unhappy or symptoms worsen/persist, will have him follow back up with Dr. Downing (ortho surgery).  -right shoulder three view Xray images were independently viewed and read by me showing mild AC and glenohumeral joint osteoarthritis.  Humeral head is writing high indicating possible rotator cuff pathology.  No acute fractures dislocations.  -left knee MRI images were independently viewed and read by me  -Formal read by radiologist is as described above  -Discussed findings with patient  -Treatment options and alternatives were discussed with the patient. Patient expressed understanding. Patient was given the opportunity to ask questions and be an active participant in their medical care. Patient had no further questions or concerns at this time.   -Patient is an overall moderate risk for health complications from their medical conditions.

## 2019-01-08 ENCOUNTER — PATIENT MESSAGE (OUTPATIENT)
Dept: NEUROLOGY | Facility: CLINIC | Age: 49
End: 2019-01-08

## 2019-01-08 DIAGNOSIS — G61.0 GUILLAIN BARRÉ SYNDROME: Primary | ICD-10-CM

## 2019-01-11 ENCOUNTER — OFFICE VISIT (OUTPATIENT)
Dept: INTERNAL MEDICINE | Facility: CLINIC | Age: 49
End: 2019-01-11
Payer: MEDICARE

## 2019-01-11 VITALS
WEIGHT: 291 LBS | SYSTOLIC BLOOD PRESSURE: 116 MMHG | HEART RATE: 62 BPM | DIASTOLIC BLOOD PRESSURE: 82 MMHG | OXYGEN SATURATION: 97 % | HEIGHT: 77 IN | BODY MASS INDEX: 34.36 KG/M2 | TEMPERATURE: 96 F

## 2019-01-11 DIAGNOSIS — G61.0 GUILLAIN BARRÉ SYNDROME: ICD-10-CM

## 2019-01-11 DIAGNOSIS — G80.8 OTHER CEREBRAL PALSY: ICD-10-CM

## 2019-01-11 DIAGNOSIS — Z86.69 HISTORY OF GUILLAIN-BARRE SYNDROME: ICD-10-CM

## 2019-01-11 DIAGNOSIS — R07.89 CHEST DISCOMFORT: ICD-10-CM

## 2019-01-11 DIAGNOSIS — R19.7 DIARRHEA, UNSPECIFIED TYPE: Primary | ICD-10-CM

## 2019-01-11 PROCEDURE — 99214 PR OFFICE/OUTPT VISIT, EST, LEVL IV, 30-39 MIN: ICD-10-PCS | Mod: S$GLB,,, | Performed by: FAMILY MEDICINE

## 2019-01-11 PROCEDURE — 3079F PR MOST RECENT DIASTOLIC BLOOD PRESSURE 80-89 MM HG: ICD-10-PCS | Mod: CPTII,S$GLB,, | Performed by: FAMILY MEDICINE

## 2019-01-11 PROCEDURE — 99499 UNLISTED E&M SERVICE: CPT | Mod: S$GLB,,, | Performed by: FAMILY MEDICINE

## 2019-01-11 PROCEDURE — 3074F SYST BP LT 130 MM HG: CPT | Mod: CPTII,S$GLB,, | Performed by: FAMILY MEDICINE

## 2019-01-11 PROCEDURE — 3074F PR MOST RECENT SYSTOLIC BLOOD PRESSURE < 130 MM HG: ICD-10-PCS | Mod: CPTII,S$GLB,, | Performed by: FAMILY MEDICINE

## 2019-01-11 PROCEDURE — 99214 OFFICE O/P EST MOD 30 MIN: CPT | Mod: S$GLB,,, | Performed by: FAMILY MEDICINE

## 2019-01-11 PROCEDURE — 3079F DIAST BP 80-89 MM HG: CPT | Mod: CPTII,S$GLB,, | Performed by: FAMILY MEDICINE

## 2019-01-11 PROCEDURE — 99999 PR PBB SHADOW E&M-EST. PATIENT-LVL III: CPT | Mod: PBBFAC,,, | Performed by: FAMILY MEDICINE

## 2019-01-11 PROCEDURE — 99999 PR PBB SHADOW E&M-EST. PATIENT-LVL III: ICD-10-PCS | Mod: PBBFAC,,, | Performed by: FAMILY MEDICINE

## 2019-01-11 PROCEDURE — 3008F PR BODY MASS INDEX (BMI) DOCUMENTED: ICD-10-PCS | Mod: CPTII,S$GLB,, | Performed by: FAMILY MEDICINE

## 2019-01-11 PROCEDURE — 99499 RISK ADDL DX/OHS AUDIT: ICD-10-PCS | Mod: S$GLB,,, | Performed by: FAMILY MEDICINE

## 2019-01-11 PROCEDURE — 3008F BODY MASS INDEX DOCD: CPT | Mod: CPTII,S$GLB,, | Performed by: FAMILY MEDICINE

## 2019-01-11 NOTE — PROGRESS NOTES
Subjective:       Patient ID: Valerio Yan is a 48 y.o. male.    Chief Complaint: Diarrhea    HPI Mr. Yan presents today with diarrhea and AFO's (new order). He has had diarrhea since Thanksgiving almost 2 months now.   Upset stomach  Has had multiple accidents.   Sometimes it is watery sometimes just loose.   No pain or cramping.   This is occurring 6 days a week sometimes 2-3 times a day.   No one else in the home has it except for son who had it for 2 days.     AFO's   Mr. Yan needs a custom brace as the ones he has are worn and causing problems.      Wears bilateral AFO's   He is now having problems with them.   He previously had an abnormality with the anatomy of his feet that made the braces uncomfortable. He recently had a fall where he obtained a Closed displaced fracture of proximal phalanx of lesser toe of left foot and this has also made it complicated to use a regular brace.    He now has flapping of the right foot when he walks (which I can hear and visualize when walking into exam room)    Because of deformity of his feet the bar on lateral side is irritating and he is at risk of developing a sore.    Lateral malleolus is also likely to be rubbed with the normal brace.   He would benefit from a custom brace and would also benefit from a Hinge, Equinunk ankle joints with posterior bumper stop     With morning medication and night time medication within 10 minutes of taking them he has pain in the upper chest. He describes it as an intense pain. He has nausea and has vomited his medication on a couple occassions. This has been going on since beginning of November.     Has Nexium and has not been taking it daily he has been taking a couple times a week.       Review of Systems   Constitutional: Negative.    Gastrointestinal: Positive for diarrhea. Negative for abdominal pain.   Musculoskeletal: Positive for arthralgias and gait problem.   Skin: Negative.    Psychiatric/Behavioral:  Negative.            Past Medical History:   Diagnosis Date    Atrial flutter     Ozuna's esophagus     CRPS (complex regional pain syndrome type II)     Decubitus skin ulcer     Encounter for blood transfusion     Guillain-[place]     Guillain-[place] syndrome 7/30/2013    Hyperlipidemia     Hypertension     Joint pain     knees    LVH (left ventricular hypertrophy) due to hypertensive disease 2/10/2017    Mitral regurgitation 6/9/2016    Primary osteoarthritis of left knee 1/7/2019    Transfusion reaction     1 x  to PRBC fever     Past Surgical History:   Procedure Laterality Date    ABLATION N/A 5/27/2016    [facility] CATH LAB    barrette esophagus      COLONOSCOPY N/A 5/17/2018    Performed by [facility] at [facility] ENDO    decubitus surgery      to sacral    EGD (ESOPHAGOGASTRODUODENOSCOPY) N/A 8/23/2013    Performed by Chapin Batista MD at [facility] ENDO    ESOPHAGOGASTRODUODENOSCOPY      ESOPHAGOGASTRODUODENOSCOPY (EGD) N/A 5/17/2018    Performed by Ilan [facility]a III, MD at [facility] ENDO    ESOPHAGOGASTRODUODENOSCOPY (EGD) N/A 3/21/2017    Performed by Ilan [facility] III, MD at [facility] ENDO    ESOPHAGOGASTRODUODENOSCOPY (EGD) N/A 3/23/2016    Performed by [facility]an [facility]a III, MD at [facility] ENDO    GASTROSTOMY TUBE PLACEMENT      KNEE ARTHROSCOPY  2002    PEG TUBE REMOVAL      RADIOFREQUENCY ABLATION      RFA      JUAN-EN-Y PROCEDURE      TRACHEAL SURGERY      TRANSESOPHAGEAL ECHOCARDIOGRAM (NAILA) N/A 5/27/2016    Performed by [facility] MD at [facility] CATH LAB     Objective:        Physical Exam   Constitutional: He appears well-developed and well-nourished.   HENT:   Head: Normocephalic and atraumatic.   Abdominal: Soft. Bowel sounds are normal. He exhibits no distension. Tenderness: discomfort on palpation.   Musculoskeletal:   Atrophy bilateral lower extremities  Right Lower Extremity   Right quadriceps strength: decreased quad tone.    Left Lower Extremity   Left quadriceps strength: decreased quad tone   Skin: Skin is warm.   Vitals reviewed.      Right Ankle/Foot Exam: Sensation light touch is decreased to the foot but baseline. wwp toes     Left Ankle/Foot Exam:  Non hypermobile 1st ray.  Calcaneus is nontender palpation medial and lateral aspect of the ankle are nontender palpation.  Toes are warm well-perfused.  Sensation light touch is decreased to the foot but baseline.    Assessment/Plan:     Diarrhea, unspecified type  -     H. pylori antigen, stool; Future; Expected date: 01/11/2019  -     Pancreatic elastase, fecal; Future; Expected date: 01/11/2019  -     Fecal fat, qualitative; Future; Expected date: 01/11/2019  -     Occult blood x 1, stool; Future; Expected date: 01/11/2019  -     WBC, Stool; Future; Expected date: 01/11/2019  -     Rotavirus antigen, stool; Future; Expected date: 01/11/2019  -     Calprotectin; Future; Expected date: 01/11/2019  -     Giardia / Cryptosporidum, EIA; Future; Expected date: 01/11/2019  -     Stool Exam-Ova,Cysts,Parasites; Future; Expected date: 01/11/2019  -     Clostridium difficile EIA; Future; Expected date: 01/11/2019  -     Stool culture; Future; Expected date: 01/11/2019    Chest discomfort  Start taking the Nexium daily for 2 weeks and let me know if symptoms improve/resolve    Other cerebral palsy  History of Guillain-[place] syndrome  Need for Custom AFO's bilaterally      Follow-up if symptoms worsen or fail to improve.    Therese Lala MD  [facility]   Family Medicine

## 2019-01-14 ENCOUNTER — LAB VISIT (OUTPATIENT)
Dept: LAB | Facility: HOSPITAL | Age: 49
End: 2019-01-14
Attending: FAMILY MEDICINE
Payer: MEDICARE

## 2019-01-14 DIAGNOSIS — R19.7 DIARRHEA, UNSPECIFIED TYPE: ICD-10-CM

## 2019-01-14 LAB
OB PNL STL: NEGATIVE
WBC #/AREA STL HPF: NORMAL /[HPF]

## 2019-01-14 PROCEDURE — 87209 SMEAR COMPLEX STAIN: CPT

## 2019-01-14 PROCEDURE — 82272 OCCULT BLD FECES 1-3 TESTS: CPT

## 2019-01-14 PROCEDURE — 87046 STOOL CULTR AEROBIC BACT EA: CPT | Mod: 59

## 2019-01-14 PROCEDURE — 87329 GIARDIA AG IA: CPT

## 2019-01-14 PROCEDURE — 87338 HPYLORI STOOL AG IA: CPT

## 2019-01-14 PROCEDURE — 87045 FECES CULTURE AEROBIC BACT: CPT

## 2019-01-14 PROCEDURE — 87427 SHIGA-LIKE TOXIN AG IA: CPT

## 2019-01-14 PROCEDURE — 89055 LEUKOCYTE ASSESSMENT FECAL: CPT

## 2019-01-14 PROCEDURE — 87425 ROTAVIRUS AG IA: CPT

## 2019-01-14 PROCEDURE — 83993 ASSAY FOR CALPROTECTIN FECAL: CPT

## 2019-01-14 PROCEDURE — 82656 EL-1 FECAL QUAL/SEMIQ: CPT

## 2019-01-14 PROCEDURE — 89125 SPECIMEN FAT STAIN: CPT

## 2019-01-15 LAB
CRYPTOSP AG STL QL IA: NEGATIVE
E COLI SXT1 STL QL IA: NEGATIVE
E COLI SXT2 STL QL IA: NEGATIVE
FAT STL SUDAN IV STN: NORMAL
G LAMBLIA AG STL QL IA: NEGATIVE
O+P STL TRI STN: NORMAL
RV AG STL QL IA.RAPID: NEGATIVE

## 2019-01-16 ENCOUNTER — TELEPHONE (OUTPATIENT)
Dept: SMOKING CESSATION | Facility: CLINIC | Age: 49
End: 2019-01-16

## 2019-01-17 ENCOUNTER — PATIENT MESSAGE (OUTPATIENT)
Dept: INTERNAL MEDICINE | Facility: CLINIC | Age: 49
End: 2019-01-17

## 2019-01-17 ENCOUNTER — TELEPHONE (OUTPATIENT)
Dept: INTERNAL MEDICINE | Facility: CLINIC | Age: 49
End: 2019-01-17

## 2019-01-17 LAB — BACTERIA STL CULT: NORMAL

## 2019-01-17 NOTE — TELEPHONE ENCOUNTER
----- Message from Isis Ramirez sent at 1/17/2019  2:19 PM CST -----  Kourtney Traylor states that she need speak with nurse regarding paperwork that she just sent over.   [phone]

## 2019-01-17 NOTE — TELEPHONE ENCOUNTER
Spoke to Kourtney with [employer] ph : [phone]. She reports she needs this form completed by Dr Lala for pt.   Print, Sign and initial form and fax back

## 2019-01-18 LAB
ELASTASE 1, FECAL: 105 MCG/G
H PYLORI AG STL QL IA: NOT DETECTED

## 2019-01-20 LAB — CALPROTECTIN STL-MCNT: 68.7 MCG/G

## 2019-01-23 ENCOUNTER — CLINICAL SUPPORT (OUTPATIENT)
Dept: SMOKING CESSATION | Facility: CLINIC | Age: 49
End: 2019-01-23
Payer: COMMERCIAL

## 2019-01-23 DIAGNOSIS — F17.200 NICOTINE DEPENDENCE: Primary | ICD-10-CM

## 2019-01-23 PROCEDURE — 99407 BEHAV CHNG SMOKING > 10 MIN: CPT | Mod: S$GLB,,,

## 2019-01-23 PROCEDURE — 99407 PR TOBACCO USE CESSATION INTENSIVE >10 MINUTES: ICD-10-PCS | Mod: S$GLB,,,

## 2019-01-23 NOTE — PROGRESS NOTES
Called pt to f/u on his 12 month smoking cessation quit status. Pt stated he is still smoking, but has cut back a lot. Informed him he has benefits available and is able to rejoin. Pt not interested, stated he will continue to work on his quit on his own. Informed him of benefit period, phone follow ups, and contact information. Will complete smart form and resolve quit #2 episode.

## 2019-01-24 ENCOUNTER — TELEPHONE (OUTPATIENT)
Dept: INTERNAL MEDICINE | Facility: CLINIC | Age: 49
End: 2019-01-24

## 2019-01-24 NOTE — TELEPHONE ENCOUNTER
Notified pt of results. Pt verbalized understanding. Scheduled appt with Dr Araya for 03/01/19 recommended to keep his appt with Dr Lala for 01/31/19. Pt agreed to plan. Pt reports still having diarrhea off and on.

## 2019-01-31 ENCOUNTER — OFFICE VISIT (OUTPATIENT)
Dept: INTERNAL MEDICINE | Facility: CLINIC | Age: 49
End: 2019-01-31
Payer: MEDICARE

## 2019-01-31 ENCOUNTER — TELEPHONE (OUTPATIENT)
Dept: RADIOLOGY | Facility: HOSPITAL | Age: 49
End: 2019-01-31

## 2019-01-31 ENCOUNTER — LAB VISIT (OUTPATIENT)
Dept: LAB | Facility: HOSPITAL | Age: 49
End: 2019-01-31
Attending: FAMILY MEDICINE
Payer: MEDICARE

## 2019-01-31 ENCOUNTER — PATIENT MESSAGE (OUTPATIENT)
Dept: INTERNAL MEDICINE | Facility: CLINIC | Age: 49
End: 2019-01-31

## 2019-01-31 ENCOUNTER — OFFICE VISIT (OUTPATIENT)
Dept: GASTROENTEROLOGY | Facility: CLINIC | Age: 49
End: 2019-01-31
Payer: MEDICARE

## 2019-01-31 VITALS
WEIGHT: 293.44 LBS | DIASTOLIC BLOOD PRESSURE: 88 MMHG | HEIGHT: 77 IN | BODY MASS INDEX: 34.65 KG/M2 | SYSTOLIC BLOOD PRESSURE: 132 MMHG | HEART RATE: 64 BPM

## 2019-01-31 VITALS
BODY MASS INDEX: 34.67 KG/M2 | DIASTOLIC BLOOD PRESSURE: 88 MMHG | SYSTOLIC BLOOD PRESSURE: 132 MMHG | OXYGEN SATURATION: 96 % | HEIGHT: 77 IN | WEIGHT: 293.63 LBS | HEART RATE: 62 BPM | TEMPERATURE: 98 F

## 2019-01-31 DIAGNOSIS — R19.7 DIARRHEA, UNSPECIFIED TYPE: ICD-10-CM

## 2019-01-31 DIAGNOSIS — R10.11 RIGHT UPPER QUADRANT PAIN: ICD-10-CM

## 2019-01-31 DIAGNOSIS — R10.11 RIGHT UPPER QUADRANT PAIN: Primary | ICD-10-CM

## 2019-01-31 DIAGNOSIS — R19.7 DIARRHEA IN ADULT PATIENT: ICD-10-CM

## 2019-01-31 DIAGNOSIS — R10.11 RIGHT UPPER QUADRANT ABDOMINAL PAIN: Primary | ICD-10-CM

## 2019-01-31 DIAGNOSIS — R19.5 OTHER FECAL ABNORMALITIES: ICD-10-CM

## 2019-01-31 LAB
ALBUMIN SERPL BCP-MCNC: 4.1 G/DL
ALP SERPL-CCNC: 77 U/L
ALT SERPL W/O P-5'-P-CCNC: 61 U/L
ANION GAP SERPL CALC-SCNC: 13 MMOL/L
AST SERPL-CCNC: 63 U/L
BASOPHILS # BLD AUTO: 0.05 K/UL
BASOPHILS NFR BLD: 0.6 %
BILIRUB SERPL-MCNC: 0.7 MG/DL
BUN SERPL-MCNC: 9 MG/DL
CALCIUM SERPL-MCNC: 9.1 MG/DL
CHLORIDE SERPL-SCNC: 100 MMOL/L
CO2 SERPL-SCNC: 26 MMOL/L
CREAT SERPL-MCNC: 0.7 MG/DL
DIFFERENTIAL METHOD: ABNORMAL
EOSINOPHIL # BLD AUTO: 0.2 K/UL
EOSINOPHIL NFR BLD: 2.8 %
ERYTHROCYTE [DISTWIDTH] IN BLOOD BY AUTOMATED COUNT: 12.9 %
EST. GFR  (AFRICAN AMERICAN): >60 ML/MIN/1.73 M^2
EST. GFR  (NON AFRICAN AMERICAN): >60 ML/MIN/1.73 M^2
GLUCOSE SERPL-MCNC: 100 MG/DL
HCT VFR BLD AUTO: 45.6 %
HGB BLD-MCNC: 15.3 G/DL
LYMPHOCYTES # BLD AUTO: 2.7 K/UL
LYMPHOCYTES NFR BLD: 33.2 %
MCH RBC QN AUTO: 32 PG
MCHC RBC AUTO-ENTMCNC: 33.6 G/DL
MCV RBC AUTO: 95 FL
MONOCYTES # BLD AUTO: 0.8 K/UL
MONOCYTES NFR BLD: 9.8 %
NEUTROPHILS # BLD AUTO: 4.3 K/UL
NEUTROPHILS NFR BLD: 53.6 %
PLATELET # BLD AUTO: 180 K/UL
PMV BLD AUTO: 10 FL
POTASSIUM SERPL-SCNC: 4.3 MMOL/L
PROT SERPL-MCNC: 7.3 G/DL
RBC # BLD AUTO: 4.78 M/UL
SODIUM SERPL-SCNC: 139 MMOL/L
WBC # BLD AUTO: 7.99 K/UL

## 2019-01-31 PROCEDURE — 99214 OFFICE O/P EST MOD 30 MIN: CPT | Mod: S$GLB,,, | Performed by: FAMILY MEDICINE

## 2019-01-31 PROCEDURE — 3079F PR MOST RECENT DIASTOLIC BLOOD PRESSURE 80-89 MM HG: ICD-10-PCS | Mod: CPTII,S$GLB,, | Performed by: FAMILY MEDICINE

## 2019-01-31 PROCEDURE — 85025 COMPLETE CBC W/AUTO DIFF WBC: CPT

## 2019-01-31 PROCEDURE — 99212 OFFICE O/P EST SF 10 MIN: CPT | Mod: S$GLB,,, | Performed by: INTERNAL MEDICINE

## 2019-01-31 PROCEDURE — 3075F SYST BP GE 130 - 139MM HG: CPT | Mod: CPTII,S$GLB,, | Performed by: INTERNAL MEDICINE

## 2019-01-31 PROCEDURE — 99999 PR PBB SHADOW E&M-EST. PATIENT-LVL III: CPT | Mod: PBBFAC,,, | Performed by: INTERNAL MEDICINE

## 2019-01-31 PROCEDURE — 3075F PR MOST RECENT SYSTOLIC BLOOD PRESS GE 130-139MM HG: ICD-10-PCS | Mod: CPTII,S$GLB,, | Performed by: FAMILY MEDICINE

## 2019-01-31 PROCEDURE — 99999 PR PBB SHADOW E&M-EST. PATIENT-LVL IV: CPT | Mod: PBBFAC,,, | Performed by: FAMILY MEDICINE

## 2019-01-31 PROCEDURE — 3008F BODY MASS INDEX DOCD: CPT | Mod: CPTII,S$GLB,, | Performed by: FAMILY MEDICINE

## 2019-01-31 PROCEDURE — 99999 PR PBB SHADOW E&M-EST. PATIENT-LVL IV: ICD-10-PCS | Mod: PBBFAC,,, | Performed by: FAMILY MEDICINE

## 2019-01-31 PROCEDURE — 99999 PR PBB SHADOW E&M-EST. PATIENT-LVL III: ICD-10-PCS | Mod: PBBFAC,,, | Performed by: INTERNAL MEDICINE

## 2019-01-31 PROCEDURE — 3079F DIAST BP 80-89 MM HG: CPT | Mod: CPTII,S$GLB,, | Performed by: INTERNAL MEDICINE

## 2019-01-31 PROCEDURE — 3079F PR MOST RECENT DIASTOLIC BLOOD PRESSURE 80-89 MM HG: ICD-10-PCS | Mod: CPTII,S$GLB,, | Performed by: INTERNAL MEDICINE

## 2019-01-31 PROCEDURE — 3075F PR MOST RECENT SYSTOLIC BLOOD PRESS GE 130-139MM HG: ICD-10-PCS | Mod: CPTII,S$GLB,, | Performed by: INTERNAL MEDICINE

## 2019-01-31 PROCEDURE — 3008F BODY MASS INDEX DOCD: CPT | Mod: CPTII,S$GLB,, | Performed by: INTERNAL MEDICINE

## 2019-01-31 PROCEDURE — 3079F DIAST BP 80-89 MM HG: CPT | Mod: CPTII,S$GLB,, | Performed by: FAMILY MEDICINE

## 2019-01-31 PROCEDURE — 80053 COMPREHEN METABOLIC PANEL: CPT

## 2019-01-31 PROCEDURE — 3008F PR BODY MASS INDEX (BMI) DOCUMENTED: ICD-10-PCS | Mod: CPTII,S$GLB,, | Performed by: FAMILY MEDICINE

## 2019-01-31 PROCEDURE — 36415 COLL VENOUS BLD VENIPUNCTURE: CPT

## 2019-01-31 PROCEDURE — 3075F SYST BP GE 130 - 139MM HG: CPT | Mod: CPTII,S$GLB,, | Performed by: FAMILY MEDICINE

## 2019-01-31 PROCEDURE — 3008F PR BODY MASS INDEX (BMI) DOCUMENTED: ICD-10-PCS | Mod: CPTII,S$GLB,, | Performed by: INTERNAL MEDICINE

## 2019-01-31 PROCEDURE — 99212 PR OFFICE/OUTPT VISIT, EST, LEVL II, 10-19 MIN: ICD-10-PCS | Mod: S$GLB,,, | Performed by: INTERNAL MEDICINE

## 2019-01-31 PROCEDURE — 99214 PR OFFICE/OUTPT VISIT, EST, LEVL IV, 30-39 MIN: ICD-10-PCS | Mod: S$GLB,,, | Performed by: FAMILY MEDICINE

## 2019-01-31 RX ORDER — MULTIVITAMIN
1 TABLET ORAL NIGHTLY
COMMUNITY

## 2019-01-31 NOTE — PROGRESS NOTES
Subjective:       Patient ID: Valerio Yan is a 48 y.o. male.    Chief Complaint: Follow-up (stomach issues/diarrhea with mucous) and Flank Pain (right flank pain/off and on for 2 days)    HPI Mr. Yan presents today for follow up diarrhea and stomach problems. He also complains of right flank pain that has been on and off for 2 days.   Still having pain and diarrhea   Had to miss doctor's appt and physical therapy appt due to the diarrhea.     Diarrhea X 3 on yesterday-looked like oil/fatty and mucous  Today he has gone once with diarrhea.   Pain in the right upper quadrant.   Currently 3/10 but has gotten to be 7/10   He has not associated increases in pain with eating.   Has not tried anything for pain.     Review of Systems   Constitutional: Negative for activity change, appetite change, fatigue and fever.   HENT: Negative for congestion, ear pain, facial swelling, hearing loss, sore throat and tinnitus.    Eyes: Negative for redness and visual disturbance.   Respiratory: Negative for cough, chest tightness and wheezing.    Gastrointestinal: Positive for abdominal pain and diarrhea. Negative for abdominal distention, constipation, nausea and vomiting.   Endocrine: Negative for polydipsia and polyuria.   Genitourinary: Negative for discharge, flank pain and frequency.   Musculoskeletal: Positive for gait problem. Negative for back pain and joint swelling.   Skin: Negative for rash.   Neurological: Negative for dizziness, tremors, seizures, weakness and headaches.   Psychiatric/Behavioral: Negative for agitation and confusion.           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   HENT:   Head: Normocephalic and atraumatic.   Cardiovascular: Normal heart sounds.   Pulmonary/Chest: Breath sounds normal.   Abdominal: Soft. Bowel sounds are normal. There is tenderness.   Vitals reviewed.        Results for orders placed or performed in visit on 01/14/19   Stool culture   Result Value Ref Range    Stool Culture       No Salmonella,Shigella,Vibrio,Campylobacter,Yersinia isolated.   E. coli 0157 antigen   Result Value Ref Range    Shiga Toxin 1 E.coli Negative     Shiga Toxin 2 E.coli Negative    H. pylori antigen, stool   Result Value Ref Range    H. Pylori Antigen, Stool Not detected Not detected   Pancreatic elastase, fecal   Result Value Ref Range    Elastase 1, Fecal 105 (L) mcg/g   Fecal fat, qualitative   Result Value Ref Range    Fat Stain, Stool Normal Fats    Occult blood x 1, stool   Result Value Ref Range    Occult Blood Negative Negative   WBC, Stool   Result Value Ref Range    Stool WBC No neutrophils seen No neutrophils seen   Rotavirus antigen, stool   Result Value Ref Range    Rotavirus Negative Negative   Calprotectin   Result Value Ref Range    Calprotectin 68.7 mcg/g   Giardia / Cryptosporidum, EIA   Result Value Ref Range    Giardia Antigen - EIA Negative Negative    Cryptosporidium Antigen Negative Negative   Stool Exam-Ova,Cysts,Parasites   Result Value Ref Range    Stool Exam-Ova,Cysts,Parasites No ova or parasites seen        Assessment/Plan:     Right upper quadrant pain  -     Comprehensive metabolic panel; Future; Expected date: 01/31/2019  -     CBC auto differential; Future; Expected date: 01/31/2019  -     CT Abdomen Without Contrast; Future; Expected date: 01/31/2019  -     Cancel: CT Abdomen With Contrast;  Future; Expected date: 01/31/2019  -     CT Abdomen Pelvis W Wo Contrast; Future; Expected date: 02/01/2019    Diarrhea, unspecified type  -     Comprehensive metabolic panel; Future; Expected date: 01/31/2019  -     CBC auto differential; Future; Expected date: 01/31/2019  -     CT Abdomen Without Contrast; Future; Expected date: 01/31/2019  -     Cancel: CT Abdomen With Contrast; Future; Expected date: 01/31/2019  -     CT Abdomen Pelvis W Wo Contrast; Future; Expected date: 02/01/2019    Other fecal abnormalities  -     CT Abdomen Without Contrast; Future; Expected date: 01/31/2019  -     Cancel: CT Abdomen With Contrast; Future; Expected date: 01/31/2019  -     CT Abdomen Pelvis W Wo Contrast; Future; Expected date: 02/01/2019    Scheduled with Dr. Araya and will schedule CT scan  Stool studies showed abnormal fecal elastase which is associated with the pancreas    Follow-up if symptoms worsen or fail to improve.    Therese Lala MD  [facility]   Family Medicine

## 2019-02-01 ENCOUNTER — HOSPITAL ENCOUNTER (OUTPATIENT)
Dept: RADIOLOGY | Facility: HOSPITAL | Age: 49
Discharge: HOME | End: 2019-02-01
Attending: FAMILY MEDICINE
Payer: MEDICARE

## 2019-02-01 ENCOUNTER — HOSPITAL ENCOUNTER (OUTPATIENT)
Dept: RADIOLOGY | Facility: HOSPITAL | Age: 49
Discharge: HOME | End: 2019-02-01
Attending: INTERNAL MEDICINE
Payer: MEDICARE

## 2019-02-01 DIAGNOSIS — R19.7 DIARRHEA IN ADULT PATIENT: ICD-10-CM

## 2019-02-01 DIAGNOSIS — R10.11 RIGHT UPPER QUADRANT PAIN: ICD-10-CM

## 2019-02-01 DIAGNOSIS — R19.5 OTHER FECAL ABNORMALITIES: ICD-10-CM

## 2019-02-01 DIAGNOSIS — R10.11 RIGHT UPPER QUADRANT ABDOMINAL PAIN: ICD-10-CM

## 2019-02-01 DIAGNOSIS — R19.7 DIARRHEA, UNSPECIFIED TYPE: ICD-10-CM

## 2019-02-01 PROCEDURE — 74178 CT ABDOMEN PELVIS W WO CONTRAST: ICD-10-PCS | Mod: 26,,, | Performed by: RADIOLOGY

## 2019-02-01 PROCEDURE — 74019 RADEX ABDOMEN 2 VIEWS: CPT | Mod: TC

## 2019-02-01 PROCEDURE — 74178 CT ABD&PLV WO CNTR FLWD CNTR: CPT | Mod: TC

## 2019-02-01 PROCEDURE — 74019 XR ABDOMEN FLAT AND ERECT: ICD-10-PCS | Mod: 26,,, | Performed by: RADIOLOGY

## 2019-02-01 PROCEDURE — 74178 CT ABD&PLV WO CNTR FLWD CNTR: CPT | Mod: 26,,, | Performed by: RADIOLOGY

## 2019-02-01 PROCEDURE — 74019 RADEX ABDOMEN 2 VIEWS: CPT | Mod: 26,,, | Performed by: RADIOLOGY

## 2019-02-01 PROCEDURE — 25500020 PHARM REV CODE 255: Performed by: FAMILY MEDICINE

## 2019-02-01 RX ADMIN — IOHEXOL 16 ML: 350 INJECTION, SOLUTION INTRAVENOUS at 12:02

## 2019-02-01 RX ADMIN — IOHEXOL 100 ML: 350 INJECTION, SOLUTION INTRAVENOUS at 02:02

## 2019-02-04 ENCOUNTER — PATIENT MESSAGE (OUTPATIENT)
Dept: ADMINISTRATIVE | Facility: OTHER | Age: 49
End: 2019-02-04

## 2019-02-06 ENCOUNTER — HOSPITAL ENCOUNTER (EMERGENCY)
Facility: HOSPITAL | Age: 49
Discharge: HOME | End: 2019-02-06
Attending: EMERGENCY MEDICINE
Payer: MEDICARE

## 2019-02-06 VITALS
OXYGEN SATURATION: 99 % | BODY MASS INDEX: 34.59 KG/M2 | RESPIRATION RATE: 16 BRPM | TEMPERATURE: 98 F | SYSTOLIC BLOOD PRESSURE: 150 MMHG | DIASTOLIC BLOOD PRESSURE: 72 MMHG | HEIGHT: 77 IN | HEART RATE: 57 BPM | WEIGHT: 293 LBS

## 2019-02-06 DIAGNOSIS — R07.9 CHEST PAIN: ICD-10-CM

## 2019-02-06 DIAGNOSIS — R53.1 GENERALIZED WEAKNESS: Primary | ICD-10-CM

## 2019-02-06 LAB
ALBUMIN SERPL BCP-MCNC: 4.1 G/DL
ALP SERPL-CCNC: 80 U/L
ALT SERPL W/O P-5'-P-CCNC: 56 U/L
ANION GAP SERPL CALC-SCNC: 12 MMOL/L
AST SERPL-CCNC: 51 U/L
BASOPHILS # BLD AUTO: 0.03 K/UL
BASOPHILS NFR BLD: 0.4 %
BILIRUB SERPL-MCNC: 0.6 MG/DL
BILIRUB UR QL STRIP: NEGATIVE
BNP SERPL-MCNC: 16 PG/ML
BUN SERPL-MCNC: 11 MG/DL
CALCIUM SERPL-MCNC: 9.3 MG/DL
CHLORIDE SERPL-SCNC: 102 MMOL/L
CLARITY UR: CLEAR
CO2 SERPL-SCNC: 26 MMOL/L
COLOR UR: YELLOW
CREAT SERPL-MCNC: 0.7 MG/DL
DIFFERENTIAL METHOD: ABNORMAL
EOSINOPHIL # BLD AUTO: 0.2 K/UL
EOSINOPHIL NFR BLD: 2.4 %
ERYTHROCYTE [DISTWIDTH] IN BLOOD BY AUTOMATED COUNT: 13.4 %
EST. GFR  (AFRICAN AMERICAN): >60 ML/MIN/1.73 M^2
EST. GFR  (NON AFRICAN AMERICAN): >60 ML/MIN/1.73 M^2
GLUCOSE SERPL-MCNC: 107 MG/DL
GLUCOSE UR QL STRIP: NEGATIVE
HCT VFR BLD AUTO: 45 %
HGB BLD-MCNC: 15.5 G/DL
HGB UR QL STRIP: NEGATIVE
KETONES UR QL STRIP: NEGATIVE
LEUKOCYTE ESTERASE UR QL STRIP: NEGATIVE
LYMPHOCYTES # BLD AUTO: 2.5 K/UL
LYMPHOCYTES NFR BLD: 34 %
MCH RBC QN AUTO: 33.3 PG
MCHC RBC AUTO-ENTMCNC: 34.4 G/DL
MCV RBC AUTO: 97 FL
MONOCYTES # BLD AUTO: 0.6 K/UL
MONOCYTES NFR BLD: 8.9 %
NEUTROPHILS # BLD AUTO: 3.9 K/UL
NEUTROPHILS NFR BLD: 54.3 %
NITRITE UR QL STRIP: NEGATIVE
PH UR STRIP: 8 [PH] (ref 5–8)
PLATELET # BLD AUTO: 172 K/UL
PMV BLD AUTO: 10.5 FL
POTASSIUM SERPL-SCNC: 4.1 MMOL/L
PROT SERPL-MCNC: 7.2 G/DL
PROT UR QL STRIP: NEGATIVE
RBC # BLD AUTO: 4.65 M/UL
SODIUM SERPL-SCNC: 140 MMOL/L
SP GR UR STRIP: 1.01 (ref 1–1.03)
TROPONIN I SERPL DL<=0.01 NG/ML-MCNC: <0.006 NG/ML
URN SPEC COLLECT METH UR: NORMAL
UROBILINOGEN UR STRIP-ACNC: NEGATIVE EU/DL
WBC # BLD AUTO: 7.2 K/UL

## 2019-02-06 PROCEDURE — 25000003 PHARM REV CODE 250: Performed by: EMERGENCY MEDICINE

## 2019-02-06 PROCEDURE — 85025 COMPLETE CBC W/AUTO DIFF WBC: CPT

## 2019-02-06 PROCEDURE — 93010 ELECTROCARDIOGRAM REPORT: CPT | Mod: ,,, | Performed by: INTERNAL MEDICINE

## 2019-02-06 PROCEDURE — 93005 ELECTROCARDIOGRAM TRACING: CPT

## 2019-02-06 PROCEDURE — 81003 URINALYSIS AUTO W/O SCOPE: CPT

## 2019-02-06 PROCEDURE — 83880 ASSAY OF NATRIURETIC PEPTIDE: CPT

## 2019-02-06 PROCEDURE — 84484 ASSAY OF TROPONIN QUANT: CPT

## 2019-02-06 PROCEDURE — 63600175 PHARM REV CODE 636 W HCPCS: Performed by: EMERGENCY MEDICINE

## 2019-02-06 PROCEDURE — 93010 EKG 12-LEAD: ICD-10-PCS | Mod: ,,, | Performed by: INTERNAL MEDICINE

## 2019-02-06 PROCEDURE — 96365 THER/PROPH/DIAG IV INF INIT: CPT

## 2019-02-06 PROCEDURE — 99285 EMERGENCY DEPT VISIT HI MDM: CPT | Mod: 25

## 2019-02-06 PROCEDURE — 80053 COMPREHEN METABOLIC PANEL: CPT

## 2019-02-06 RX ORDER — ACETAMINOPHEN 10 MG/ML
1000 INJECTION, SOLUTION INTRAVENOUS ONCE
Status: COMPLETED | OUTPATIENT
Start: 2019-02-06 | End: 2019-02-06

## 2019-02-06 RX ADMIN — ACETAMINOPHEN 1000 MG: 10 INJECTION, SOLUTION INTRAVENOUS at 01:02

## 2019-02-06 RX ADMIN — DICYCLOMINE HYDROCHLORIDE 50 ML: 10 SOLUTION ORAL at 02:02

## 2019-02-06 NOTE — ED PROVIDER NOTES
"SCRIBE #1 NOTE: I, Ban Gottlieb, am scribing for, and in the presence of, Lynnette Chatman MD. I have scribed the entire note.       History     Chief Complaint   Patient presents with    Fatigue     started suddenly this AM, after taking BP meds    Chest Pain     Review of patient's allergies indicates:   Allergen Reactions    Metoclopramide Other (See Comments) and Hives     Pt. Was in coma so is not aware of the reaction  Pt states "he don't know, was in coma"      Reglan  [metoclopramide hcl] Other (See Comments)     Pt. Was in coma so is not aware of the reaction  Other reaction(s): Unable to obtain    Sulfa (sulfonamide antibiotics) Other (See Comments)     Pt states "he don't know"    Latex Rash     Other reaction(s): Itching  Other reaction(s): Hives    Latex, natural rubber Rash     blisters           History of Present Illness     HPI    2/6/2019, 12:01 PM  History obtained from the patient      History of Present Illness: Valerio Yan is a 48 y.o. male patient with a PMHx of HTN, atrial flutter, and Guillain-Branch syndrome who presents to the Emergency Department for evaluation of light-headedness which onset suddenly earlier today. Symptoms are constant and moderate in severity. Pt states his BP was elevated. No mitigating or exacerbating factors reported. Associated sxs include HA, CP, and nausea. Pt is also c/o 3 episodes of diarrhea today. He states these sxs have been ongoing for months, and Dr. Araya (GI) was made aware of this already. Pt has a f/u appointment with Dr. Araya in the near future. Patient denies any fever, chills, vomiting, blood in stool, abd pain, leg swelling, palpitations, weakness, numbness, LOC, and all other sxs at this time. No further complaints or concerns at this time.       Arrival mode: Personal vehicle      PCP: Therese Lala MD        Past Medical History:  Past Medical History:   Diagnosis Date    Atrial flutter     Ozuna's esophagus     " CRPS (complex regional pain syndrome type II)     Decubitus skin ulcer     Encounter for blood transfusion     Guillain-[place]     Guillain-[place] syndrome 2013    Hyperlipidemia     Hypertension     Joint pain     knees    LVH (left ventricular hypertrophy) due to hypertensive disease 2/10/2017    Mitral regurgitation 2016    Primary osteoarthritis of left knee 2019    Transfusion reaction     1 x  to PRBC fever       Past Surgical History:  Past Surgical History:   Procedure Laterality Date    ABLATION N/A 2016    Performed by Sam Barone MD at [facility] CATH LAB    barrette esophagus      COLONOSCOPY N/A 2018    Performed by [facility]an [facility]a III, [facility] at [facility] ENDO    decubitus surgery      to sacral    EGD (ESOPHAGOGASTRODUODENOSCOPY) N/A 2013    Performed by Chapin Batista MD at [facility] ENDO    ESOPHAGOGASTRODUODENOSCOPY      ESOPHAGOGASTRODUODENOSCOPY (EGD) N/A 2018    Performed by Ilan [facility]a III, MD at [facility] ENDO    ESOPHAGOGASTRODUODENOSCOPY (EGD) N/A 3/21/2017    Performed by Ilan [facility]a III, MD at [facility] ENDO    ESOPHAGOGASTRODUODENOSCOPY (EGD) N/A 3/23/2016    Performed by Ilan [facility]a III, MD at [facility] ENDO    GASTROSTOMY TUBE PLACEMENT      KNEE ARTHROSCOPY      PEG TUBE REMOVAL      RADIOFREQUENCY ABLATION      RFA      JUAN-EN-Y PROCEDURE      TRACHEAL SURGERY      TRANSESOPHAGEAL ECHOCARDIOGRAM (NAILA) N/A 2016    Performed by Sam Barone MD at [facility] CATH LAB         Family History:  Family History   Problem Relation Age of Onset    Heart attack Mother     Heart disease Mother     Heart disease Father     Heart attack Father        Social History:  Social History     Tobacco Use    Smoking status: Current Some Day Smoker     Packs/day: 0.30     Years: 30.00     Pack years: 9.00     Types: Cigarettes     Start date: 1988     Last attempt to quit: 2017     Years since quittin.0    Smokeless tobacco:  Former User     Types: Snuff     Quit date: 1/6/1999    Tobacco comment: Currently at 4 cigs per day   Substance and Sexual Activity    Alcohol use: Yes     Alcohol/week: 8.4 oz     Types: 14 Glasses of wine per week    Drug use: No    Sexual activity: Yes     Partners: Female        Review of Systems     Review of Systems   Constitutional: Negative for chills, diaphoresis and fever.   HENT: Negative for congestion, rhinorrhea and sore throat.    Respiratory: Negative for cough and shortness of breath.    Cardiovascular: Positive for chest pain. Negative for palpitations and leg swelling.   Gastrointestinal: Positive for diarrhea and nausea. Negative for abdominal pain, blood in stool and vomiting.   Genitourinary: Negative for dysuria, frequency and hematuria.   Musculoskeletal: Negative for back pain and neck pain.   Skin: Negative for rash.   Neurological: Positive for dizziness, light-headedness and headaches. Negative for syncope, weakness and numbness.   Hematological: Does not bruise/bleed easily.   All other systems reviewed and are negative.       Physical Exam     Initial Vitals [02/06/19 1146]   BP Pulse Resp Temp SpO2   (!) 189/106 60 18 98.3 °F (36.8 °C) 96 %      MAP       --          Physical Exam  Nursing Notes and Vital Signs Reviewed.  Constitutional: Patient is in no acute distress. Chronically ill-appearing.  Head: Atraumatic. Normocephalic.  Eyes: PERRL. EOM intact. Conjunctivae are not pale. No scleral icterus.  ENT: Mucous membranes are moist. Oropharynx is clear and symmetric.    Neck: Supple. Full ROM. No lymphadenopathy.  Cardiovascular: Regular rate. Regular rhythm. No murmurs, rubs, or gallops. Distal pulses are 2+ and symmetric.  Pulmonary/Chest: No respiratory distress. Clear to auscultation bilaterally. No wheezing or rales.  Abdominal: Soft and non-distended.  There is no tenderness.  No rebound, guarding, or rigidity.  Musculoskeletal: Moves all extremities. No obvious  "deformities. No edema. Braces to BLE.  Skin: Warm and dry.  Neurological:  Alert, awake, and appropriate.  Normal speech.  strength weak but equal.  Psychiatric: Normal affect. Good eye contact. Appropriate in content.     ED Course   Procedures  ED Vital Signs:  Vitals:    02/06/19 1146 02/06/19 1300 02/06/19 1323 02/06/19 1456   BP: (!) 189/106 (!) 145/79 127/70 (!) 150/72   Pulse: 60 (!) 55 (!) 54 (!) 57   Resp: 18 18 15 16   Temp: 98.3 °F (36.8 °C)      TempSrc: Oral      SpO2: 96% 96% 97% 99%   Weight: 132.9 kg (293 lb)      Height: 6' 5" (1.956 m)          Abnormal Lab Results:  Labs Reviewed   CBC W/ AUTO DIFFERENTIAL - Abnormal; Notable for the following components:       Result Value    MCH 33.3 (*)     All other components within normal limits   COMPREHENSIVE METABOLIC PANEL - Abnormal; Notable for the following components:    AST 51 (*)     ALT 56 (*)     All other components within normal limits   TROPONIN I   B-TYPE NATRIURETIC PEPTIDE   URINALYSIS, REFLEX TO URINE CULTURE    Narrative:     Preferred Collection Type->Urine, Clean Catch        All Lab Results:  Results for orders placed or performed during the hospital encounter of 02/06/19   CBC auto differential   Result Value Ref Range    WBC 7.20 3.90 - 12.70 K/uL    RBC 4.65 4.60 - 6.20 M/uL    Hemoglobin 15.5 14.0 - 18.0 g/dL    Hematocrit 45.0 40.0 - 54.0 %    MCV 97 82 - 98 fL    MCH 33.3 (H) 27.0 - 31.0 pg    MCHC 34.4 32.0 - 36.0 g/dL    RDW 13.4 11.5 - 14.5 %    Platelets 172 150 - 350 K/uL    MPV 10.5 9.2 - 12.9 fL    Gran # (ANC) 3.9 1.8 - 7.7 K/uL    Lymph # 2.5 1.0 - 4.8 K/uL    Mono # 0.6 0.3 - 1.0 K/uL    Eos # 0.2 0.0 - 0.5 K/uL    Baso # 0.03 0.00 - 0.20 K/uL    Gran% 54.3 38.0 - 73.0 %    Lymph% 34.0 18.0 - 48.0 %    Mono% 8.9 4.0 - 15.0 %    Eosinophil% 2.4 0.0 - 8.0 %    Basophil% 0.4 0.0 - 1.9 %    Differential Method Automated    Comprehensive metabolic panel   Result Value Ref Range    Sodium 140 136 - 145 mmol/L    Potassium " 4.1 3.5 - 5.1 mmol/L    Chloride 102 95 - 110 mmol/L    CO2 26 23 - 29 mmol/L    Glucose 107 70 - 110 mg/dL    BUN, Bld 11 6 - 20 mg/dL    Creatinine 0.7 0.5 - 1.4 mg/dL    Calcium 9.3 8.7 - 10.5 mg/dL    Total Protein 7.2 6.0 - 8.4 g/dL    Albumin 4.1 3.5 - 5.2 g/dL    Total Bilirubin 0.6 0.1 - 1.0 mg/dL    Alkaline Phosphatase 80 55 - 135 U/L    AST 51 (H) 10 - 40 U/L    ALT 56 (H) 10 - 44 U/L    Anion Gap 12 8 - 16 mmol/L    eGFR if African American >60 >60 mL/min/1.73 m^2    eGFR if non African American >60 >60 mL/min/1.73 m^2   Troponin I #1   Result Value Ref Range    Troponin I <0.006 0.000 - 0.026 ng/mL   B-Type natriuretic peptide (BNP)   Result Value Ref Range    BNP 16 0 - 99 pg/mL   Urinalysis, Reflex to Urine Culture Urine, Clean Catch   Result Value Ref Range    Specimen UA Urine, Clean Catch     Color, UA Yellow Yellow, Straw, Beatris    Appearance, UA Clear Clear    pH, UA 8.0 5.0 - 8.0    Specific Gravity, UA 1.010 1.005 - 1.030    Protein, UA Negative Negative    Glucose, UA Negative Negative    Ketones, UA Negative Negative    Bilirubin (UA) Negative Negative    Occult Blood UA Negative Negative    Nitrite, UA Negative Negative    Urobilinogen, UA Negative <2.0 EU/dL    Leukocytes, UA Negative Negative       Imaging Results:  Imaging Results          CT Head Without Contrast (Final result)  Result time 02/06/19 14:34:41    Final result by Vivek Hernandez MD (02/06/19 14:34:41)                 Impression:      No acute abnormalities.    All CT scans at this facility use dose modulation, iterative reconstruction, and/or weight based dosing when appropriate to reduce radiation dose to as low as reasonable achievable.      Electronically signed by: Vivek Hernandez MD  Date:    02/06/2019  Time:    14:34             Narrative:    EXAMINATION:  CT HEAD WITHOUT CONTRAST    CLINICAL HISTORY:  Headache, acute, norm neuro exam;    TECHNIQUE:  Low dose axial CT images obtained throughout the head without  intravenous contrast. Sagittal and coronal reconstructions were performed.    COMPARISON:  06/10/2014.    FINDINGS:  Intracranial compartment:    The brain parenchyma demonstrates areas of decreased attenuation with moderate periventricular white matter consistent with chronic microvascular ischemic changes.  Chronic encephalomalacia right frontal lobe..  No parenchymal mass, hemorrhage, edema or major vascular distribution infarct.  Vascular calcifications are noted.    Moderate prominence of the sulci and ventricles are consistent with age-related involutional changes.    No extra-axial blood or fluid collections.    Skull/extracranial contents (limited evaluation): No fracture.  Mild ethmoid mucosal thickening.  Postoperative changes are seen within the right frontal bone and involving the right frontal sinus which appears similar to prior.  Mastoid air cells and paranasal sinuses are essentially clear.                               X-Ray Chest AP Portable (Final result)  Result time 02/06/19 12:18:58    Final result by MONTSE Powell Sr., MD (02/06/19 12:18:58)                 Impression:      Normal study.      Electronically signed by: Wil Powell MD  Date:    02/06/2019  Time:    12:18             Narrative:    EXAMINATION:  XR CHEST AP PORTABLE    CLINICAL HISTORY:  Chest Pain;    COMPARISON:  04/30/2014    FINDINGS:  The size of the heart is normal. The lungs are clear. There is no pneumothorax.  The costophrenic angles are sharp.                                 The EKG was ordered, reviewed, and independently interpreted by the ED provider.  Interpretation time: 11:54  Rate: 61 BPM  Rhythm: normal sinus rhythm  Interpretation: No ST&T wave abnormalities. No STEMI.             The Emergency Provider reviewed the vital signs and test results, which are outlined above.     ED Discussion     2:00 PM: Re-evaluated pt. Pt is resting. He is now c/o a burning sensation that feels like indigestion. Will order  GI cocktail.  D/w pt all pertinent results. D/w pt any concerns expressed at this time. Answered all questions. Pt expresses understanding at this time.    2:42 PM: Reassessed pt at this time. Patient is awake, alert, and in NAD. Pt states his condition has improved at this time. I do not believe symptoms are ACS related.  Discussed with pt all pertinent ED information and results. Discussed pt dx and plan of tx. Gave pt all f/u and return to the ED instructions. All questions and concerns were addressed at this time. Pt expresses understanding of information and instructions, and is comfortable with plan to discharge. Pt is stable for discharge.    I have discussed with patient and/or family/caretaker chest pain precautions, specifically to return for worsening chest pain, shortness of breath, fever, or any concern.  I have low suspicion for cardiopulmonary, vascular, infectious, respiratory, or other emergent medical condition based on my evaluation in the ED.    I discussed with patient and/or family/caretaker that evaluation in the ED does not suggest any emergent or life threatening medical conditions requiring immediate intervention beyond what was provided in the ED, and I believe patient is safe for discharge.  Regardless, an unremarkable evaluation in the ED does not preclude the development or presence of a serious of life threatening condition. As such, patient was instructed to return immediately for any worsening or change in current symptoms.    ED Medication(s):  Medications   acetaminophen (10 mg/mL) injection 1,000 mg (0 mg Intravenous Stopped 2/6/19 1416)   GI cocktail (mylanta 30 mL, lidocaine 2 % viscous 10 mL, dicyclomine 10 mL) 50 mL (50 mLs Oral Given 2/6/19 1423)       Discharge Medication List as of 2/6/2019  2:49 PM          Follow-up Information     Therese Lala MD. Schedule an appointment as soon as possible for a visit in 1 day.    Specialty:  Internal Medicine  Why:  REturn to the  emergency room, If symptoms worsen  Contact information:  [id number] [place] [address] [address] [place] [id number]  [phone]                         Medical Decision Making:   Clinical Tests:   Lab Tests: Reviewed and Ordered  Radiological Study: Reviewed and Ordered  Medical Tests: Reviewed and Ordered             Scribe Attestation:   Scribe #1: I performed the above scribed service and the documentation accurately describes the services I performed. I attest to the accuracy of the note.     Attending:   Physician Attestation Statement for Scribe #1: I, Lynnette Chatman MD, personally performed the services described in this documentation, as scribed by Ban Gottlieb, in my presence, and it is both accurate and complete.           Clinical Impression       ICD-10-CM ICD-9-CM   1. Generalized weakness R53.1 780.79   2. Chest pain R07.9 786.50       Disposition:   Disposition: Discharged  Condition: Stable           Lynnette Chatman MD  02/08/19 0845

## 2019-02-07 ENCOUNTER — LAB VISIT (OUTPATIENT)
Dept: LAB | Facility: HOSPITAL | Age: 49
End: 2019-02-07
Payer: MEDICARE

## 2019-02-07 ENCOUNTER — OFFICE VISIT (OUTPATIENT)
Dept: CARDIOLOGY | Facility: CLINIC | Age: 49
End: 2019-02-07
Payer: MEDICARE

## 2019-02-07 ENCOUNTER — OFFICE VISIT (OUTPATIENT)
Dept: INTERNAL MEDICINE | Facility: CLINIC | Age: 49
End: 2019-02-07
Payer: MEDICARE

## 2019-02-07 VITALS
HEART RATE: 54 BPM | OXYGEN SATURATION: 98 % | DIASTOLIC BLOOD PRESSURE: 58 MMHG | BODY MASS INDEX: 34.86 KG/M2 | HEIGHT: 77 IN | TEMPERATURE: 97 F | SYSTOLIC BLOOD PRESSURE: 118 MMHG | WEIGHT: 295.19 LBS

## 2019-02-07 VITALS
SYSTOLIC BLOOD PRESSURE: 118 MMHG | HEART RATE: 56 BPM | HEIGHT: 77 IN | DIASTOLIC BLOOD PRESSURE: 78 MMHG | BODY MASS INDEX: 34.75 KG/M2 | WEIGHT: 294.31 LBS

## 2019-02-07 DIAGNOSIS — Z72.0 TOBACCO ABUSE: ICD-10-CM

## 2019-02-07 DIAGNOSIS — I48.3 TYPICAL ATRIAL FLUTTER: ICD-10-CM

## 2019-02-07 DIAGNOSIS — R42 DIZZINESS: ICD-10-CM

## 2019-02-07 DIAGNOSIS — R07.89 CHEST TIGHTNESS: ICD-10-CM

## 2019-02-07 DIAGNOSIS — I34.0 NON-RHEUMATIC MITRAL REGURGITATION: ICD-10-CM

## 2019-02-07 DIAGNOSIS — E78.2 MIXED HYPERLIPIDEMIA: ICD-10-CM

## 2019-02-07 DIAGNOSIS — I10 ESSENTIAL HYPERTENSION: ICD-10-CM

## 2019-02-07 DIAGNOSIS — R07.89 ATYPICAL CHEST PAIN: Primary | ICD-10-CM

## 2019-02-07 DIAGNOSIS — R07.9 CHEST PAIN AT REST: ICD-10-CM

## 2019-02-07 DIAGNOSIS — R74.8 ABNORMAL LIVER ENZYMES: ICD-10-CM

## 2019-02-07 DIAGNOSIS — R07.9 CHEST PAIN AT REST: Primary | ICD-10-CM

## 2019-02-07 DIAGNOSIS — R74.8 ELEVATED CK: ICD-10-CM

## 2019-02-07 DIAGNOSIS — I11.9 HYPERTENSIVE LEFT VENTRICULAR HYPERTROPHY, WITHOUT HEART FAILURE: ICD-10-CM

## 2019-02-07 LAB
ALBUMIN SERPL BCP-MCNC: 4.1 G/DL
ALP SERPL-CCNC: 83 U/L
ALT SERPL W/O P-5'-P-CCNC: 47 U/L
AST SERPL-CCNC: 44 U/L
BILIRUB DIRECT SERPL-MCNC: 0.3 MG/DL
BILIRUB SERPL-MCNC: 0.9 MG/DL
CK SERPL-CCNC: 369 U/L
PROT SERPL-MCNC: 7.2 G/DL
TROPONIN I SERPL DL<=0.01 NG/ML-MCNC: 0.01 NG/ML

## 2019-02-07 PROCEDURE — 82550 ASSAY OF CK (CPK): CPT

## 2019-02-07 PROCEDURE — 3074F SYST BP LT 130 MM HG: CPT | Mod: CPTII,S$GLB,, | Performed by: FAMILY MEDICINE

## 2019-02-07 PROCEDURE — 3074F SYST BP LT 130 MM HG: CPT | Mod: CPTII,S$GLB,, | Performed by: INTERNAL MEDICINE

## 2019-02-07 PROCEDURE — 3008F BODY MASS INDEX DOCD: CPT | Mod: CPTII,S$GLB,, | Performed by: FAMILY MEDICINE

## 2019-02-07 PROCEDURE — 99214 PR OFFICE/OUTPT VISIT, EST, LEVL IV, 30-39 MIN: ICD-10-PCS | Mod: S$GLB,,, | Performed by: FAMILY MEDICINE

## 2019-02-07 PROCEDURE — 99214 PR OFFICE/OUTPT VISIT, EST, LEVL IV, 30-39 MIN: ICD-10-PCS | Mod: S$GLB,,, | Performed by: INTERNAL MEDICINE

## 2019-02-07 PROCEDURE — 82552 ASSAY OF CPK IN BLOOD: CPT

## 2019-02-07 PROCEDURE — 84484 ASSAY OF TROPONIN QUANT: CPT

## 2019-02-07 PROCEDURE — 3078F DIAST BP <80 MM HG: CPT | Mod: CPTII,S$GLB,, | Performed by: FAMILY MEDICINE

## 2019-02-07 PROCEDURE — 80076 HEPATIC FUNCTION PANEL: CPT

## 2019-02-07 PROCEDURE — 3074F PR MOST RECENT SYSTOLIC BLOOD PRESSURE < 130 MM HG: ICD-10-PCS | Mod: CPTII,S$GLB,, | Performed by: FAMILY MEDICINE

## 2019-02-07 PROCEDURE — 3078F PR MOST RECENT DIASTOLIC BLOOD PRESSURE < 80 MM HG: ICD-10-PCS | Mod: CPTII,S$GLB,, | Performed by: INTERNAL MEDICINE

## 2019-02-07 PROCEDURE — 3008F PR BODY MASS INDEX (BMI) DOCUMENTED: ICD-10-PCS | Mod: CPTII,S$GLB,, | Performed by: INTERNAL MEDICINE

## 2019-02-07 PROCEDURE — 36415 COLL VENOUS BLD VENIPUNCTURE: CPT

## 2019-02-07 PROCEDURE — 99999 PR PBB SHADOW E&M-EST. PATIENT-LVL III: ICD-10-PCS | Mod: PBBFAC,,, | Performed by: FAMILY MEDICINE

## 2019-02-07 PROCEDURE — 99214 OFFICE O/P EST MOD 30 MIN: CPT | Mod: S$GLB,,, | Performed by: FAMILY MEDICINE

## 2019-02-07 PROCEDURE — 3078F DIAST BP <80 MM HG: CPT | Mod: CPTII,S$GLB,, | Performed by: INTERNAL MEDICINE

## 2019-02-07 PROCEDURE — 3074F PR MOST RECENT SYSTOLIC BLOOD PRESSURE < 130 MM HG: ICD-10-PCS | Mod: CPTII,S$GLB,, | Performed by: INTERNAL MEDICINE

## 2019-02-07 PROCEDURE — 99999 PR PBB SHADOW E&M-EST. PATIENT-LVL IV: CPT | Mod: PBBFAC,,, | Performed by: INTERNAL MEDICINE

## 2019-02-07 PROCEDURE — 99999 PR PBB SHADOW E&M-EST. PATIENT-LVL III: CPT | Mod: PBBFAC,,, | Performed by: FAMILY MEDICINE

## 2019-02-07 PROCEDURE — 99999 PR PBB SHADOW E&M-EST. PATIENT-LVL IV: ICD-10-PCS | Mod: PBBFAC,,, | Performed by: INTERNAL MEDICINE

## 2019-02-07 PROCEDURE — 3078F PR MOST RECENT DIASTOLIC BLOOD PRESSURE < 80 MM HG: ICD-10-PCS | Mod: CPTII,S$GLB,, | Performed by: FAMILY MEDICINE

## 2019-02-07 PROCEDURE — 3008F BODY MASS INDEX DOCD: CPT | Mod: CPTII,S$GLB,, | Performed by: INTERNAL MEDICINE

## 2019-02-07 PROCEDURE — 3008F PR BODY MASS INDEX (BMI) DOCUMENTED: ICD-10-PCS | Mod: CPTII,S$GLB,, | Performed by: FAMILY MEDICINE

## 2019-02-07 PROCEDURE — 99214 OFFICE O/P EST MOD 30 MIN: CPT | Mod: S$GLB,,, | Performed by: INTERNAL MEDICINE

## 2019-02-07 RX ORDER — HYDROGEN PEROXIDE 3 %
40 SOLUTION, NON-ORAL MISCELLANEOUS
Qty: 60 CAPSULE | Refills: 11
Start: 2019-02-07 | End: 2023-08-31

## 2019-02-07 RX ORDER — CLONIDINE HYDROCHLORIDE 0.1 MG/1
TABLET ORAL
Qty: 30 TABLET | Refills: 11 | Status: SHIPPED | OUTPATIENT
Start: 2019-02-07 | End: 2021-01-14

## 2019-02-07 NOTE — PROGRESS NOTES
Subjective:    Patient ID:  Valerio Yan is a 48 y.o. male who presents for evaluation of Hypertension; Chest Pain; Risk Factor Management For Atherosclerosis; and Hospital Follow Up      HPI Mr. Yan presents for f/u.   His current medical conditions include HTN, LVH, PAFL s/p ablation May 2016, tobacco abuse.  H/o Guillain-[place] syndrome age 36.  + family h/o CAD (father MI in 50's, cabg; mother w MI).  Pt taken off xarelto after his PAFL ablation.  Negative stress mpi Jan 2017.  Echo Jan 2017 showed normal LV function, LVH.  Now here.  Seen in ER yesterday for elevated BP, dizziness, cp, nausea, diarrhea.  BP was high in ER.  Given GI cocktail.  ECG normal.  - troponin.  Released to go home.   States has had diarrhea for months, and has seen GI for eval, no etiology per pt.  Yesterday, had diarrhea, then lightheadedness.  Few hours later, bad cp sxs.  Squeezing, tightness in mid chest.   Intermittent. No obvious exertional component.  Dizziness sitting up.  - troponin today.  CPK elevated, more than usual for him.  BP now on low side for him and his pulse per pt.       Component      Latest Ref Rng & Units 2/7/2019   CPK      20 - 200 U/L 369 (H)         Current Outpatient Medications:     atorvastatin (LIPITOR) 20 MG tablet, Take 1 tablet (20 mg total) by mouth once daily., Disp: 90 tablet, Rfl: 3    calcium-vitamin D 600 mg, 1,500mg,-200 unit 600 mg(1,500mg) -200 unit Tab, Take 1 tablet by mouth once daily. , Disp: , Rfl:     furosemide (LASIX) 20 MG tablet, Take 1 tablet (20 mg total) by mouth 2 (two) times daily as needed (swelling)., Disp: 180 tablet, Rfl: 3    gabapentin (NEURONTIN) 600 MG tablet, Take 1 tablet (600 mg total) by mouth 3 (three) times daily., Disp: 270 tablet, Rfl: 3    ibuprofen (ADVIL,MOTRIN) 800 MG tablet, Take 1 tablet (800 mg total) by mouth 3 (three) times daily., Disp: 30 tablet, Rfl: 0    lisinopril-hydrochlorothiazide (PRINZIDE,ZESTORETIC) 20-12.5 mg per tablet,  "TAKE 2 TABLETS ONE TIME DAILY, Disp: 180 tablet, Rfl: 3    metoprolol succinate (TOPROL-XL) 50 MG 24 hr tablet, Take 1 tablet (50 mg total) by mouth once daily., Disp: 90 tablet, Rfl: 3    multivitamin (ONE DAILY MULTIVITAMIN) per tablet, Take 1 tablet by mouth., Disp: , Rfl:     multivitamin with minerals (MULTIPLE VITAMIN-MINERALS) tablet, Take 1 tablet by mouth Daily.  , Disp: , Rfl:     varenicline (CHANTIX) 1 mg Tab, Take 1 tablet (1 mg total) by mouth 2 (two) times daily., Disp: 60 tablet, Rfl: 2      Review of Systems   Constitution: Positive for malaise/fatigue.   HENT: Negative.    Eyes: Negative.    Cardiovascular: Positive for chest pain.   Respiratory: Negative.    Endocrine: Negative.    Hematologic/Lymphatic: Negative.    Skin: Negative.    Musculoskeletal: Negative.    Gastrointestinal: Positive for diarrhea and nausea.   Genitourinary: Negative.    Neurological: Positive for dizziness and headaches.   Psychiatric/Behavioral: Negative.    Allergic/Immunologic: Negative.        /78 (BP Location: Left arm, Patient Position: Sitting, BP Method: Large (Manual))   Pulse (!) 56   Ht 6' 5" (1.956 m)   Wt 133.5 kg (294 lb 5 oz)   BMI 34.90 kg/m²     Wt Readings from Last 3 Encounters:   02/07/19 133.5 kg (294 lb 5 oz)   02/07/19 133.9 kg (295 lb 3.1 oz)   02/06/19 132.9 kg (293 lb)     Temp Readings from Last 3 Encounters:   02/07/19 97.1 °F (36.2 °C) (Tympanic)   02/06/19 98.3 °F (36.8 °C) (Oral)   01/31/19 97.8 °F (36.6 °C) (Tympanic)     BP Readings from Last 3 Encounters:   02/07/19 118/78   02/07/19 (!) 118/58   02/06/19 (!) 150/72     Pulse Readings from Last 3 Encounters:   02/07/19 (!) 56   02/07/19 (!) 54   02/06/19 (!) 57          Objective:    Physical Exam   Constitutional: He is oriented to person, place, and time. He appears well-developed and well-nourished.   HENT:   Head: Normocephalic.   Neck: Normal range of motion. Neck supple. Normal carotid pulses, no hepatojugular reflux " and no JVD present. Carotid bruit is not present. No thyromegaly present.   Cardiovascular: Normal rate, regular rhythm, S1 normal and S2 normal. PMI is not displaced. Exam reveals no S3, no S4, no distant heart sounds, no friction rub, no midsystolic click and no opening snap.   No murmur heard.  Pulses:       Radial pulses are 2+ on the right side, and 2+ on the left side.   Some reproducible chest wall pain on palpation mid chest   Pulmonary/Chest: Effort normal and breath sounds normal. He has no wheezes. He has no rales.   Abdominal: Soft. Bowel sounds are normal. He exhibits no distension, no abdominal bruit, no ascites and no mass. There is no tenderness.   Musculoskeletal: He exhibits no edema.   Neurological: He is alert and oriented to person, place, and time.   Skin: Skin is warm.   Psychiatric: He has a normal mood and affect. His behavior is normal.   Nursing note and vitals reviewed.      I have reviewed all pertinent labs and cardiac studies.      Chemistry        Component Value Date/Time     02/06/2019 1209    K 4.1 02/06/2019 1209     02/06/2019 1209    CO2 26 02/06/2019 1209    BUN 11 02/06/2019 1209    CREATININE 0.7 02/06/2019 1209     02/06/2019 1209        Component Value Date/Time    CALCIUM 9.3 02/06/2019 1209    ALKPHOS 83 02/07/2019 1524    AST 44 (H) 02/07/2019 1524    ALT 47 (H) 02/07/2019 1524    BILITOT 0.9 02/07/2019 1524    ESTGFRAFRICA >60 02/06/2019 1209    EGFRNONAA >60 02/06/2019 1209        Lab Results   Component Value Date    WBC 7.20 02/06/2019    HGB 15.5 02/06/2019    HCT 45.0 02/06/2019    MCV 97 02/06/2019     02/06/2019     Lab Results   Component Value Date    TSH 1.036 05/31/2017     Lab Results   Component Value Date    CHOL 163 04/03/2018    CHOL 190 05/03/2017    CHOL 190 09/30/2014     Lab Results   Component Value Date    HDL 52 04/03/2018    HDL 60 05/03/2017    HDL 50 09/30/2014     Lab Results   Component Value Date    LDLCALC 91.0  04/03/2018    LDLCALC 88.6 05/03/2017    LDLCALC 120.2 09/30/2014     Lab Results   Component Value Date    TRIG 100 04/03/2018    TRIG 207 (H) 05/03/2017    TRIG 99 09/30/2014     Lab Results   Component Value Date    CHOLHDL 31.9 04/03/2018    CHOLHDL 31.6 05/03/2017    CHOLHDL 26.3 09/30/2014           Assessment:       1. Atypical chest pain    2. Mixed hyperlipidemia    3. Hypertensive left ventricular hypertrophy, without heart failure    4. Essential hypertension    5. Typical atrial flutter    6. Non-rheumatic mitral regurgitation    7. Elevated CK    8. Dizziness         Plan:             Assortment of sxs presented by patient today in clinic.  Chest pain with mostly atypical features but at risk for CAD (family hx), normal ECG in ER and - troponin x 2.  CP likely MSK, ? Spasms/cramps and has elevated CK enzymes.  ? Statin related myopathy.  Stop Atorvastatin.  Rheumatology referral.  He thinks his BP is now on low side.   Stop Lasix for now.  Cut Toprol back to 25 mg qd.  Echo  Stress test  Clonidine prn for BP > 160/90; may repeat in 30 minutes.  To ER if sxs worsen  Holter  Takes Nexium 20 mg qd.  Increase to 40 mg qd for now.   Recheck CK enzyme one week.   Keep  Hydrated.     Phone review for test results.

## 2019-02-07 NOTE — PROGRESS NOTES
Subjective:       Patient ID: Valerio Yan is a 48 y.o. male.    Chief Complaint: Hospital Follow Up (chest pain)    HPI Mr. holder presents today for follow up from ER visit yesterday.   Still having diarrhea, went to see GI and they felt one of his stool studies that were positive was a false positive. They were to follow up on CT scan. CT scan :  1. Diffuse Fatty infiltration of the liver.  2. Cholelithiasis without evidence to suggest cholecystitis  3. Postoperative changes associated with the stomach.  4. Remaining findings as discussed above.    Still dizzy with sitting and standing.   Drinking lots of water during the day and even over night he drinks water.     BP lower than his normal.   Appt with cardiology today we will repeat labs today    Review of Systems    Constitution: Positive for malaise/fatigue.   HENT: Negative.    Eyes: Negative.    Cardiovascular: Positive for chest pain.   Respiratory: Negative.    Endocrine: Negative.    Hematologic/Lymphatic: Negative.    Skin: Negative.    Musculoskeletal: Negative.    Gastrointestinal: Positive for diarrhea and nausea.   Genitourinary: Negative.    Neurological: Positive for dizziness and headaches.   Psychiatric/Behavioral: Negative.    Allergic/Immunologic: Negative    Past Medical History:   Diagnosis Date    Atrial flutter     Ozuna's esophagus     CRPS (complex regional pain syndrome type II)     Decubitus skin ulcer     Encounter for blood transfusion     Guillain-[place]     Guillain-[place] syndrome 7/30/2013    Hyperlipidemia     Hypertension     Joint pain     knees    LVH (left ventricular hypertrophy) due to hypertensive disease 2/10/2017    Mitral regurgitation 6/9/2016    Primary osteoarthritis of left knee 1/7/2019    Transfusion reaction     1 x  to PRBC fever     Family History   Problem Relation Age of Onset    Heart attack Mother     Heart disease Mother     Heart disease Father     Heart attack Father       Objective:        Physical Exam   Constitutional: He is oriented to person, place, and time. He appears well-developed and well-nourished.   Appears fatigued   HENT:   Surgical scar right temporal region-scalp   Cardiovascular: Normal rate, regular rhythm and normal heart sounds.   Pulmonary/Chest: Effort normal and breath sounds normal.   Neurological: He is alert and oriented to person, place, and time.   Vitals reviewed.        Results for orders placed or performed during the hospital encounter of 02/06/19   CBC auto differential   Result Value Ref Range    WBC 7.20 3.90 - 12.70 K/uL    RBC 4.65 4.60 - 6.20 M/uL    Hemoglobin 15.5 14.0 - 18.0 g/dL    Hematocrit 45.0 40.0 - 54.0 %    MCV 97 82 - 98 fL    MCH 33.3 (H) 27.0 - 31.0 pg    MCHC 34.4 32.0 - 36.0 g/dL    RDW 13.4 11.5 - 14.5 %    Platelets 172 150 - 350 K/uL    MPV 10.5 9.2 - 12.9 fL    Gran # (ANC) 3.9 1.8 - 7.7 K/uL    Lymph # 2.5 1.0 - 4.8 K/uL    Mono # 0.6 0.3 - 1.0 K/uL    Eos # 0.2 0.0 - 0.5 K/uL    Baso # 0.03 0.00 - 0.20 K/uL    Gran% 54.3 38.0 - 73.0 %    Lymph% 34.0 18.0 - 48.0 %    Mono% 8.9 4.0 - 15.0 %    Eosinophil% 2.4 0.0 - 8.0 %    Basophil% 0.4 0.0 - 1.9 %    Differential Method Automated    Comprehensive metabolic panel   Result Value Ref Range    Sodium 140 136 - 145 mmol/L    Potassium 4.1 3.5 - 5.1 mmol/L    Chloride 102 95 - 110 mmol/L    CO2 26 23 - 29 mmol/L    Glucose 107 70 - 110 mg/dL    BUN, Bld 11 6 - 20 mg/dL    Creatinine 0.7 0.5 - 1.4 mg/dL    Calcium 9.3 8.7 - 10.5 mg/dL    Total Protein 7.2 6.0 - 8.4 g/dL    Albumin 4.1 3.5 - 5.2 g/dL    Total Bilirubin 0.6 0.1 - 1.0 mg/dL    Alkaline Phosphatase 80 55 - 135 U/L    AST 51 (H) 10 - 40 U/L    ALT 56 (H) 10 - 44 U/L    Anion Gap 12 8 - 16 mmol/L    eGFR if African American >60 >60 mL/min/1.73 m^2    eGFR if non African American >60 >60 mL/min/1.73 m^2   Troponin I #1   Result Value Ref Range    Troponin I <0.006 0.000 - 0.026 ng/mL   B-Type natriuretic peptide (BNP)    Result Value Ref Range    BNP 16 0 - 99 pg/mL   Urinalysis, Reflex to Urine Culture Urine, Clean Catch   Result Value Ref Range    Specimen UA Urine, Clean Catch     Color, UA Yellow Yellow, Straw, Beatris    Appearance, UA Clear Clear    pH, UA 8.0 5.0 - 8.0    Specific Gravity, UA 1.010 1.005 - 1.030    Protein, UA Negative Negative    Glucose, UA Negative Negative    Ketones, UA Negative Negative    Bilirubin (UA) Negative Negative    Occult Blood UA Negative Negative    Nitrite, UA Negative Negative    Urobilinogen, UA Negative <2.0 EU/dL    Leukocytes, UA Negative Negative       Assessment/Plan:     Chest pain at rest  -     Troponin I; Future; Expected date: 02/07/2019  -     CK; Future; Expected date: 02/07/2019    Chest tightness  -     Troponin I; Future; Expected date: 02/07/2019  -     CK; Future; Expected date: 02/07/2019  -     CK isoenzymes; Future; Expected date: 02/07/2019    Abnormal liver enzymes  -     Hepatic function panel; Future; Expected date: 02/07/2019      Reviewed ED note and labs  Patient sees Cardiology in 1.5 hours. ED didn't repeat any labs/cardiac enzymes. He is still uncomfortable will get labs today and hope resulted by the time he sees Dr. Mir      No Follow-up on file.    Therese Lala MD  ON   Family Medicine

## 2019-02-08 ENCOUNTER — TELEPHONE (OUTPATIENT)
Dept: CARDIOLOGY | Facility: CLINIC | Age: 49
End: 2019-02-08

## 2019-02-08 NOTE — TELEPHONE ENCOUNTER
I have attempted without success to contact this patient by phone to schedule Cardiac Testing.  Left message to contact office.

## 2019-02-10 NOTE — PROGRESS NOTES
Subjective:       Patient ID: Valerio Yan is a 48 y.o. male.    Chief Complaint: Diarrhea; Abdominal Pain; and Weight Gain    The patient is normal service from multiple previous encounters.  He carries a diagnosis of gastroesophageal reflux disease with Ozuna's esophagus, and he has a history of colon polyps with a low risk adenoma removed during colonoscopy in May of last year.  The patient also has Guillain-[place] syndrome and has previously had difficulties with constipation issues because of requirement for for use of narcotic analgesics.  He is now complaining of right upper quadrant abdominal pain as well as diarrhea.    The patient is here before ordered assessment of his abdomen by CT of abdomen and pelvis.  It is felt that Any recommendations will be incomplete in the absence of results of the ordered studies.  It was recommended to him that we are with results of the CT assessment, and I will also order a flat and erect abdomen study for evaluation of his stool burden.  He has continued problems with constipation, he likely has an overflow diarrhea.  If not we will consider other assessments.  Stool cultures have already been requested by his PCP and they are negative.  As stated above, reason colonoscopy in May of last year revealed no major pathology.  Further recommendations will be made when other studies are complete.      Review of Systems    Objective:      Physical Exam    Assessment:       1. Right upper quadrant abdominal pain    2. Diarrhea in adult patient        Plan:   Await results of abdominal and pelvic CT  Flat and upright abdomen

## 2019-02-11 ENCOUNTER — TELEPHONE (OUTPATIENT)
Dept: RHEUMATOLOGY | Facility: CLINIC | Age: 49
End: 2019-02-11

## 2019-02-12 ENCOUNTER — OFFICE VISIT (OUTPATIENT)
Dept: RHEUMATOLOGY | Facility: CLINIC | Age: 49
End: 2019-02-12
Payer: MEDICARE

## 2019-02-12 ENCOUNTER — LAB VISIT (OUTPATIENT)
Dept: LAB | Facility: HOSPITAL | Age: 49
End: 2019-02-12
Payer: MEDICARE

## 2019-02-12 VITALS
HEIGHT: 77 IN | SYSTOLIC BLOOD PRESSURE: 134 MMHG | HEART RATE: 51 BPM | BODY MASS INDEX: 35.45 KG/M2 | DIASTOLIC BLOOD PRESSURE: 82 MMHG | WEIGHT: 300.25 LBS

## 2019-02-12 DIAGNOSIS — R74.8 ABNORMAL CK: ICD-10-CM

## 2019-02-12 DIAGNOSIS — R74.8 ABNORMAL CK: Primary | ICD-10-CM

## 2019-02-12 LAB
CK BB CFR SERPL ELPH: 0 %
CK MB CFR SERPL ELPH: 2 % (ref 0–3.3)
CK MM CFR SERPL ELPH: 98 % (ref 96.7–100)
CK SERPL-CCNC: 302 U/L
CK SERPL-CCNC: 313 U/L (ref 30–223)

## 2019-02-12 PROCEDURE — 3079F DIAST BP 80-89 MM HG: CPT | Mod: CPTII,S$GLB,, | Performed by: INTERNAL MEDICINE

## 2019-02-12 PROCEDURE — 99205 PR OFFICE/OUTPT VISIT, NEW, LEVL V, 60-74 MIN: ICD-10-PCS | Mod: S$GLB,,, | Performed by: INTERNAL MEDICINE

## 2019-02-12 PROCEDURE — 3075F PR MOST RECENT SYSTOLIC BLOOD PRESS GE 130-139MM HG: ICD-10-PCS | Mod: CPTII,S$GLB,, | Performed by: INTERNAL MEDICINE

## 2019-02-12 PROCEDURE — 99999 PR PBB SHADOW E&M-EST. PATIENT-LVL III: ICD-10-PCS | Mod: PBBFAC,,, | Performed by: INTERNAL MEDICINE

## 2019-02-12 PROCEDURE — 3008F PR BODY MASS INDEX (BMI) DOCUMENTED: ICD-10-PCS | Mod: CPTII,S$GLB,, | Performed by: INTERNAL MEDICINE

## 2019-02-12 PROCEDURE — 82550 ASSAY OF CK (CPK): CPT

## 2019-02-12 PROCEDURE — 36415 COLL VENOUS BLD VENIPUNCTURE: CPT

## 2019-02-12 PROCEDURE — 3075F SYST BP GE 130 - 139MM HG: CPT | Mod: CPTII,S$GLB,, | Performed by: INTERNAL MEDICINE

## 2019-02-12 PROCEDURE — 99205 OFFICE O/P NEW HI 60 MIN: CPT | Mod: S$GLB,,, | Performed by: INTERNAL MEDICINE

## 2019-02-12 PROCEDURE — 3079F PR MOST RECENT DIASTOLIC BLOOD PRESSURE 80-89 MM HG: ICD-10-PCS | Mod: CPTII,S$GLB,, | Performed by: INTERNAL MEDICINE

## 2019-02-12 PROCEDURE — 99999 PR PBB SHADOW E&M-EST. PATIENT-LVL III: CPT | Mod: PBBFAC,,, | Performed by: INTERNAL MEDICINE

## 2019-02-12 PROCEDURE — 82085 ASSAY OF ALDOLASE: CPT

## 2019-02-12 PROCEDURE — 3008F BODY MASS INDEX DOCD: CPT | Mod: CPTII,S$GLB,, | Performed by: INTERNAL MEDICINE

## 2019-02-12 NOTE — PROGRESS NOTES
RHEUMATOLOGY CLINIC INITIAL VISIT    Reason for referral:-  Referred by Dr. Mir for evaluation of abnormal CK.     Chief complaints:-  To get checked for abnormal CK.     HPI:-  Valerio Jones a 48 y.o. pleasant male comes in for an initial visit with above chief complaints.  He has significant past medical history as reviewed below including Guillain-[place] syndrome and related muscle atrophy.  He was recently seen at emergency room for substernal chest pain.  Then he will order with his primary care physician who check cardiac enzymes which showed abnormal CK.  He is scheduled to see cardiologist next week.  Since his EKG was abnormal Dr. Mir referred him for evaluation of underlying myopathy.  He have also asked him to stop statin 4 days ago.  Before or after stopping starting he denies any muscle pain.  No unusual muscle weakness other than myopathy related to his neurological problem.  He denies any other photosensitive skin rash, sicca syndrome, Raynaud's phenomenon, treatment resistant headaches, seizures.  No joint pain today.       Review of Systems   Constitutional: Negative for chills and fever.   HENT: Negative for congestion and sore throat.    Eyes: Negative for blurred vision and redness.   Respiratory: Negative for cough and shortness of breath.    Cardiovascular: Positive for chest pain. Negative for leg swelling.   Gastrointestinal: Negative for abdominal pain.   Genitourinary: Negative for dysuria.   Musculoskeletal: Positive for myalgias and neck pain. Negative for back pain, falls and joint pain.   Skin: Negative for rash.   Neurological: Positive for tingling, sensory change and focal weakness. Negative for headaches.   Endo/Heme/Allergies: Does not bruise/bleed easily.   Psychiatric/Behavioral: Negative for memory loss. The patient does not have insomnia.        Past Medical History:   Diagnosis Date    Atrial flutter      Ozuna's esophagus     CRPS (complex regional pain syndrome type II)     Decubitus skin ulcer     Encounter for blood transfusion     Guillain-[place]     Guillain-[place] syndrome 2013    Hyperlipidemia     Hypertension     Joint pain     knees    LVH (left ventricular hypertrophy) due to hypertensive disease 2/10/2017    Mitral regurgitation 2016    Primary osteoarthritis of left knee 2019    Transfusion reaction     1 x  to PRBC fever       Past Surgical History:   Procedure Laterality Date    ABLATION N/A 2016    Performed by [facility] at [facility] CATH LAB    barrette esophagus      COLONOSCOPY N/A 2018    [facility]a III[facility] ENDO    decubitus surgery      to sacral    EGD (ESOPHAGOGASTRODUODENOSCOPY) N/A 2013    Per[facility] by [facility]in Batista [facility] at [facility] ENDO    ESOPHAGOGASTRODUODENOSCOPY      ESOPHAGOGASTRODUODENOSCOPY (EGD) N/A 2018    Performed by [facility], [facility] at [facility] ENDO    ESOPHAGOGASTRODUODENOSCOPY (EGD) N/A 3/21/2017    Performed by [facility] III, [facility] at [facility] ENDO    ESOPHAGOGASTRODUODENOSCOPY (EGD) N/A 3/23/2016    Performed by [facility] III, [facility] at [facility] ENDO    GASTROSTOMY TUBE PLACEMENT      KNEE ARTHROSCOPY      PEG TUBE REMOVAL      RADIOFREQUENCY ABLATION      RFA      JUAN-EN-Y PROCEDURE      TRACHEAL SURGERY      TRANSESOPHAGEAL ECHOCARDIOGRAM (NAILA) N/A 2016    Performed by [facility] at [facility] CATH LAB        Social History     Tobacco Use    Smoking status: Current Some Day Smoker     Packs/day: 0.30     Years: 30.00     Pack years: 9.00     Types: Cigarettes     Start date: 1988     Last attempt to quit: 2017     Years since quittin.0    Smokeless tobacco: Former User     Types: Snuff     Quit date: 1999    Tobacco comment: Currently at 4 cigs per day   Substance Use Topics    Alcohol use: Yes     Alcohol/week: 8.4 oz     Types: 14 Glasses  "of wine per week    Drug use: No       Family History   Problem Relation Age of Onset    Heart attack Mother     Heart disease Mother     Heart disease Father     Heart attack Father        Review of patient's allergies indicates:   Allergen Reactions    Metoclopramide Other (See Comments) and Hives     Pt. Was in coma so is not aware of the reaction  Pt states "he don't know, was in coma"      Reglan  [metoclopramide hcl] Other (See Comments)     Pt. Was in coma so is not aware of the reaction  Other reaction(s): Unable to obtain    Sulfa (sulfonamide antibiotics) Other (See Comments)     Pt states "he don't know"    Latex Rash     Other reaction(s): Itching  Other reaction(s): Hives    Latex, natural rubber Rash     blisters             Physical examination:-    Vitals:    02/12/19 1205   BP: 134/82   Pulse: (!) 51   Weight: (!) 136.2 kg (300 lb 4.3 oz)   Height: 6' 5" (1.956 m)   PainSc: 0-No pain       Physical Exam   Constitutional: He is oriented to person, place, and time and well-developed, well-nourished, and in no distress. No distress.   HENT:   Head: Normocephalic.   Mouth/Throat: Oropharynx is clear and moist.   Eyes: Conjunctivae are normal. Pupils are equal, round, and reactive to light.   Neck: Normal range of motion. Neck supple.   Cardiovascular: Normal rate and intact distal pulses.   Pulmonary/Chest: Effort normal. No respiratory distress.   Abdominal: Soft. There is no tenderness.   Musculoskeletal:   No synovitis in small joints of hands or feet.  Good range of motion in large joints.     Neurological: He is alert and oriented to person, place, and time.   Skin: Skin is warm. No rash noted. No erythema.   Psychiatric: Mood and affect normal.       Labs:-  Results for YASMEEN TOUSSAINT (MRN [id number]) as of 2/12/2019 16:28   Ref. Range 2/7/2019 15:24 2/12/2019 13:33   CPK Latest Ref Range: 20 - 200 U/L 369 (H) 302 (H)       Medication List with Changes/Refills   Current Medications "    ATORVASTATIN (LIPITOR) 20 MG TABLET    Take 1 tablet (20 mg total) by mouth once daily.    CALCIUM-VITAMIN D 600 MG, 1,500MG,-200 UNIT 600 MG(1,500MG) -200 UNIT TAB    Take 1 tablet by mouth once daily.     CLONIDINE (CATAPRES) 0.1 MG TABLET    Take 1 pill for BP > 160/90; may repeat once in 30 minutes    ESOMEPRAZOLE (NEXIUM) 20 MG CAPSULE    Take 2 capsules (40 mg total) by mouth before breakfast.    FUROSEMIDE (LASIX) 20 MG TABLET    Take 1 tablet (20 mg total) by mouth 2 (two) times daily as needed (swelling).    GABAPENTIN (NEURONTIN) 600 MG TABLET    Take 1 tablet (600 mg total) by mouth 3 (three) times daily.    IBUPROFEN (ADVIL,MOTRIN) 800 MG TABLET    Take 1 tablet (800 mg total) by mouth 3 (three) times daily.    LISINOPRIL-HYDROCHLOROTHIAZIDE (PRINZIDE,ZESTORETIC) 20-12.5 MG PER TABLET    TAKE 2 TABLETS ONE TIME DAILY    METOPROLOL SUCCINATE (TOPROL-XL) 50 MG 24 HR TABLET    Take 1 tablet (50 mg total) by mouth once daily.    MULTIVITAMIN (ONE DAILY MULTIVITAMIN) PER TABLET    Take 1 tablet by mouth.    MULTIVITAMIN WITH MINERALS (MULTIPLE VITAMIN-MINERALS) TABLET    Take 1 tablet by mouth Daily.      VARENICLINE (CHANTIX) 1 MG TAB    Take 1 tablet (1 mg total) by mouth 2 (two) times daily.       Assessment/Plans:-  # Abnormal CK:-  His CK was checked as a part of his chest pain workup. He denies any new onset myalgias or muscle weakness no more than his usual weakness related to his Guillain-barre syndrome related muscle atrophy.  He has stopped statin 4 days ago.  He denies any change in his muscle weakness in the last 4 days.    Repeat CK today returned lesser than the recent.  His CK isoenzymes when the CK was abnormal shows that it was from his skeletal muscle and not his myocardial muscle cells.  Differential diagnosis for his abnormal CK would be Muscle atrophy related to neuropathy/Statin induced myopathy.  He does not have statin induced myalgias and based on the level of CK statin induced  immune myositis is extremely unlikely.  I will follow up on the repeat CK ordered by his cardiologist in 10 days.  If both CK and transaminases improved in the next 4 weeks then it is most likely related to statin induced damage and I would recommend trying alternative statins like Pravastatin/fluvastatin which seems to have lesser effect on muscle cells with close monitoring of CK every 4 weeks at least for initial 3 months.   - CK; Future  - Aldolase; Future  - Comprehensive metabolic panel; Standing     # RTC in 4 WEEKS    Thank you Dr. Mir  for allowing me to participate in the care ofValerio Yan.    Time spent: 60 minutes in face to face discussion concerning diagnosis, prognosis, review of lab and test results, benefits of treatment as well as management of disease, counseling of patient and coordination of care between various health care providers . Greater than half of the time spent - 35 minutes was used for coordination of care and counseling of patient.    Disclaimer: This note was prepared using voice recognition system and is likely to have sound alike errors and is not proof read.  Please call me with any questions.

## 2019-02-12 NOTE — ASSESSMENT & PLAN NOTE
His CK was checked as a part of his chest pain workup. He denies any new onset myalgias or muscle weakness no more than his usual weakness related to his Guillain-barre syndrome related muscle atrophy.  He has stopped statin 4 days ago.  He denies any change in his muscle weakness in the last 4 days.    Repeat CK today returned lesser than the recent.  His CK isoenzymes when the CK was abnormal shows that it was from his skeletal muscle and not his myocardial muscle cells.  Differential diagnosis for his abnormal CK would be Muscle atrophy related to neuropathy/Statin induced myopathy.  He does not have statin induced myalgias and based on the level of CK statin induced immune myositis is extremely unlikely.  I will follow up on the repeat CK ordered by his cardiologist in 10 days.  If both CK and transaminases improved in the next 4 weeks then it is most likely related to statin induced damage and I would recommend trying alternative statins like Pravastatin/fluvastatin which seems to have lesser effect on muscle cells with close monitoring of CK every 4 weeks at least for initial 3 months.

## 2019-02-13 LAB — ALDOLASE SERPL-CCNC: 5.3 U/L

## 2019-02-21 ENCOUNTER — OFFICE VISIT (OUTPATIENT)
Dept: NEUROLOGY | Facility: CLINIC | Age: 49
End: 2019-02-21
Payer: MEDICARE

## 2019-02-21 VITALS
DIASTOLIC BLOOD PRESSURE: 82 MMHG | HEIGHT: 77 IN | SYSTOLIC BLOOD PRESSURE: 118 MMHG | HEART RATE: 60 BPM | BODY MASS INDEX: 35.06 KG/M2 | WEIGHT: 296.94 LBS

## 2019-02-21 DIAGNOSIS — G57.72 COMPLEX REGIONAL PAIN SYNDROME TYPE 2 OF BOTH LOWER EXTREMITIES: ICD-10-CM

## 2019-02-21 DIAGNOSIS — G61.0 GUILLAIN-BARRE: Primary | ICD-10-CM

## 2019-02-21 DIAGNOSIS — G57.71 COMPLEX REGIONAL PAIN SYNDROME TYPE 2 OF BOTH LOWER EXTREMITIES: ICD-10-CM

## 2019-02-21 PROCEDURE — 3074F SYST BP LT 130 MM HG: CPT | Mod: CPTII,S$GLB,, | Performed by: PSYCHIATRY & NEUROLOGY

## 2019-02-21 PROCEDURE — 3079F DIAST BP 80-89 MM HG: CPT | Mod: CPTII,S$GLB,, | Performed by: PSYCHIATRY & NEUROLOGY

## 2019-02-21 PROCEDURE — 3074F PR MOST RECENT SYSTOLIC BLOOD PRESSURE < 130 MM HG: ICD-10-PCS | Mod: CPTII,S$GLB,, | Performed by: PSYCHIATRY & NEUROLOGY

## 2019-02-21 PROCEDURE — 3008F PR BODY MASS INDEX (BMI) DOCUMENTED: ICD-10-PCS | Mod: CPTII,S$GLB,, | Performed by: PSYCHIATRY & NEUROLOGY

## 2019-02-21 PROCEDURE — 3008F BODY MASS INDEX DOCD: CPT | Mod: CPTII,S$GLB,, | Performed by: PSYCHIATRY & NEUROLOGY

## 2019-02-21 PROCEDURE — 99214 OFFICE O/P EST MOD 30 MIN: CPT | Mod: S$GLB,,, | Performed by: PSYCHIATRY & NEUROLOGY

## 2019-02-21 PROCEDURE — 99999 PR PBB SHADOW E&M-EST. PATIENT-LVL III: ICD-10-PCS | Mod: PBBFAC,,, | Performed by: PSYCHIATRY & NEUROLOGY

## 2019-02-21 PROCEDURE — 99999 PR PBB SHADOW E&M-EST. PATIENT-LVL III: CPT | Mod: PBBFAC,,, | Performed by: PSYCHIATRY & NEUROLOGY

## 2019-02-21 PROCEDURE — 99214 PR OFFICE/OUTPT VISIT, EST, LEVL IV, 30-39 MIN: ICD-10-PCS | Mod: S$GLB,,, | Performed by: PSYCHIATRY & NEUROLOGY

## 2019-02-21 PROCEDURE — 3079F PR MOST RECENT DIASTOLIC BLOOD PRESSURE 80-89 MM HG: ICD-10-PCS | Mod: CPTII,S$GLB,, | Performed by: PSYCHIATRY & NEUROLOGY

## 2019-02-21 NOTE — PROGRESS NOTES
Subjective:      Patient ID: Valerio Yan is a 48 y.o. male.    Chief Complaint: Follow-up for Guillain-[place] syndrome     The patient returns indicating that he since last being seen has been able to withdraw from his opiates.  The patient states that following withdrawal from the pain medication, his pain actually seem to improve although he still has occasional episodes of burning and tingling in both feet.  The patient has gone through a process of new bracing for both ankles and has how had custom AFO made.  The patient continues to have significant disability in both hands and arms due to lack of motor movement which is greater on the left than on the right.  The patient has ability to grasp white objects with the right hand but does so with a great deal of difficulty.  He is not able to grasp with the left hand.  He continues to have bilateral footdrop.  However with the bracing he is able to ambulate without assistive devices other than the bracing.    He continues to take gabapentin 600 mg twice a day.  He states that this medication has probably been of greater benefit now than in the past.  He also utilizes ibuprofen 800 mg at bedtime to assist him with   Night time pain.          ROS:  GENERAL: NO FEVER, CHILLS,  OR WEIGHT LOSS.  SKIN: NO RASHES, ITCHING OR CHANGES IN COLOR OR TEXTURE OF SKIN.  HEAD: NO HEADACHES OR RECENT HEAD TRAUMA.  EYES: VISUAL ACUITY FINE. NO PHOTOPHOBIA, OCULAR PAIN OR DIPLOPIA.  EARS: DENIES EAR PAIN, DISCHARGE OR VERTIGO.  NOSE: NO LOSS OF SMELL, NO EPISTAXIS OR POSTNASAL DRIP.  MOUTH & THROAT: NO HOARSENESS OR CHANGE IN VOICE. NO EXCESSIVE GUM BLEEDING.  NODES: DENIES SWOLLEN GLANDS.  CHEST: DENIES [place], CYANOSIS, WHEEZING, COUGH AND SPUTUM PRODUCTION.  CARDIOVASCULAR: DENIES CHEST PAIN, PND, ORTHOPNEA  ABDOMEN: APPETITE FINE. NO WEIGHT LOSS. DENIES DIARRHEA, ABDOMINAL PAIN, HEMATEMESIS OR BLOOD IN STOOL.  URINARY: NO FLANK PAIN, DYSURIA OR HEMATURIA.  PERIPHERAL  VASCULAR: NO CLAUDICATION OR CYANOSIS.  MUSCULOSKELETAL: NO JOINT STIFFNESS OR SWELLING. DENIES BACK PAIN.  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    Hyperlipidemia     Hypertension     Joint pain     knees    LVH (left ventricular hypertrophy) due to hypertensive disease 2/10/2017    Mitral regurgitation 6/9/2016    Primary osteoarthritis of left knee 1/7/2019    Transfusion reaction     1 x  to PRBC fever     Past Surgical History:   Procedure Laterality Date    ABLATION N/A 5/27/2016    [facility]ATH LAB    barrette esophagus      COLONOSCOPY N/A 5/17/2018    [facility] III, [facility] at [facility]    decubitus surgery      to sacral    EGD (ESOPHAGOGASTRODUODENOSCOPY) N/A 8/23/2013    [facility] Chapin [facility] at [facility]    ESOPHAGOGASTRODUODENOSCOPY      ESOPHAGOGASTRODUODENOSCOPY (EGD) N/A 5/17/2018    Per[facility] by [facility] at [facility] END[facility]    ESOPHAGOGASTRODUODENOSCOPY (EGD) N/A 3/21/2017    Per[facility] at [facility] END[facility]    ESOPHAGOGASTRODUODENOSCOPY (EGD) N/A 3/23/2016    Performed by [facility] at [facility] END[facility]    GASTROSTOMY TUBE PLACEMENT      KNEE ARTHROSCOPY  2002    PEG TUBE REMOVAL      RADIOFREQUENCY ABLATION      RFA      JUAN-EN-Y PROCEDURE      TRACHEAL SURGERY      TRANSESOPHAGEAL ECHOCARDIOGRAM (NAILA) N/A 5/27/2016    [facility] CATH LAB     Family History   Problem Relation Age of Onset    Heart attack Mother     Heart disease Mother     Heart disease Father     Heart attack Father      Social History     Socioeconomic History    Marital status:       Spouse name: Not on file    Number of children: 2    Years of education: Not on file    Highest education level: Not on file   Social Needs    Financial resource strain: Not on file    Food insecurity - worry: Not on file    Food insecurity - inability: Not on file    Transportation needs - medical: Not on file    Transportation needs - non-medical: Not on file   Occupational History    Not on file   Tobacco Use    Smoking status: Current Some Day Smoker     Packs/day: 0.30     Years: 30.00     Pack years: 9.00     Types: Cigarettes     Start date: 1988     Last attempt to quit: 2017     Years since quittin.0    Smokeless tobacco: Former User     Types: Snuff     Quit date: 1999    Tobacco comment: Currently at 4 cigs per day   Substance and Sexual Activity    Alcohol use: Yes     Alcohol/week: 8.4 oz     Types: 14 Glasses of wine per week    Drug use: No    Sexual activity: Yes     Partners: Female   Other Topics Concern    Not on file   Social History Narrative    Not on file         Objective:   PE:   VITAL SIGNS:  HEIGHT 6 FT 5 IN, WEIGHT 134.7 KG, BMI 35.21  Vitals:    19 0819   BP: 118/82   Pulse: 60     APPEARANCE: WELL NOURISHED, WELL DEVELOPED, IN NO ACUTE DISTRESS.    HEAD: NORMOCEPHALIC, ATRAUMATIC.  EYES: PERRL. EOMI.  NON-ICTERIC SCLERAE.    EARS: TM'S INTACT. LIGHT REFLEX NORMAL. NO RETRACTION OR PERFORATION.    NOSE: MUCOSA PINK. AIRWAY CLEAR.  MOUTH & THROAT: NO TONSILLAR ENLARGEMENT. NO PHARYNGEAL ERYTHEMA OR EXUDATE. NO STRIDOR.  NECK: SUPPLE. NO BRUITS.  CHEST: LUNGS CLEAR TO AUSCULTATION.  CARDIOVASCULAR: REGULAR RHYTHM WITHOUT SIGNIFICANT MURMURS.  ABDOMEN: BOWEL SOUNDS NORMAL.   MUSCULOSKELETAL:  NO BONY DEFORMITY SEEN.  THE PATIENT HAS SEVERE BILATERAL FOREARM AND HAND ATROPHY WITH BILATERAL WRIST DROP.  HE HAS BILATERAL FOOTDROP AS WELL AND IS WEARING BILATERAL AFOS.    NEUROLOGIC:   MENTAL STATUS:  THE PATIENT IS WELL ORIENTED TO PERSON,  TIME, PLACE, AND SITUATION.  THE PATIENT IS ATTENTIVE TO THE ENVIRONMENT AND COOPERATIVE FOR THE EXAM.  CRANIAL NERVES: II-XII GROSSLY INTACT. FUNDOSCOPIC EXAM IS NORMAL.  NO HEMORRHAGE, EXUDATE OR PAPILLEDEMA IS PRESENT. THE EXTRAOCULAR MUSCLES ARE INTACT IN THE CARDINAL DIRECTIONS OF GAZE.  NO PTOSIS IS PRESENT. FACIAL FEATURES ARE SYMMETRICAL.  SPEECH IS NORMAL IN FLUENCY, DICTION, AND PHRASING.  TONGUE PROTRUDES IN THE MIDLINE.    GAIT AND STATION:    THE PATIENT IS INDEPENDENT FOR AMBULATION WITH HIS BILATERAL AFOS BUT HAS AN EXAGGERATED FT AND LEG PLACEMENT DURING AMBULATION.  MOTOR:    THE PATIENT HAS A METER GRASP BILATERALLY WHICH IS SLIGHTLY STRONGER ON THE RIGHT THAN ON THE LEFT.  HE IS UNABLE TO OPPOSE THE THUMB TO THE FINGERS ON EITHER SIDE EXCEPT TO THE 1ST FINGER ON THE RIGHT SIDE WITH A GRADE 2/5 PINCH BETWEEN THUMB AND 1ST FINGER.  BILATERAL FT DROP IS ALSO PRESENT.  SENSORY:    DIMINISHED APPRECIATION OF ALL MODALITIES OF SENSATION IN BOTH FEET AND LOWER LEGS TO ABOVE THE KNEES  CEREBELLAR:    NO INVOLUNTARY MOVEMENTS OR TREMOR SEEN.  REFLEXES:  STRETCH REFLEXES ARE  ABSENT BOTH UPPER AND LOWER EXTREMITIES.  PLANTAR STIMULATION IS FLEXOR BILATERALLY AND NO PATHOLOGICAL REFLEXES ARE SEEN              Assessment:   No diagnosis found.    LATE AFFECTS OF ACUTE GUILLAIN-BARRE SYNDROME  BILATERAL LOWER EXTREMITY COMPLEX REGIONAL PAIN SYNDROME      Plan:    1.  The patient was advised to continue gabapentin and ibuprofen as previously prescribed.    2. He is to continue wearing AFOs for affective ambulation and to continue active exercise as much as possible to maintain his current physical status  3.  Routine follow-up with Neurology in 1 year    This was a 35 min visit with the patient with over 50% of the time spent counseling the patient and discussing with the patient his diagnosis and completing insurance forms for the patient.  This note is generated with speech recognition software and is subject  to transcription error and sound alike phrases that may be missed by proofreading.

## 2019-02-26 ENCOUNTER — CLINICAL SUPPORT (OUTPATIENT)
Dept: CARDIOLOGY | Facility: CLINIC | Age: 49
End: 2019-02-26
Attending: INTERNAL MEDICINE
Payer: MEDICARE

## 2019-02-26 ENCOUNTER — HOSPITAL ENCOUNTER (OUTPATIENT)
Dept: RADIOLOGY | Facility: HOSPITAL | Age: 49
Discharge: HOME | End: 2019-02-26
Attending: INTERNAL MEDICINE
Payer: MEDICARE

## 2019-02-26 DIAGNOSIS — I48.3 TYPICAL ATRIAL FLUTTER: ICD-10-CM

## 2019-02-26 DIAGNOSIS — I11.9 HYPERTENSIVE LEFT VENTRICULAR HYPERTROPHY, WITHOUT HEART FAILURE: ICD-10-CM

## 2019-02-26 DIAGNOSIS — R07.89 ATYPICAL CHEST PAIN: ICD-10-CM

## 2019-02-26 DIAGNOSIS — R42 DIZZINESS: ICD-10-CM

## 2019-02-26 DIAGNOSIS — I10 ESSENTIAL HYPERTENSION: ICD-10-CM

## 2019-02-26 DIAGNOSIS — I34.0 NON-RHEUMATIC MITRAL REGURGITATION: ICD-10-CM

## 2019-02-26 LAB
DIASTOLIC DYSFUNCTION: NO
DIASTOLIC DYSFUNCTION: NO
ESTIMATED PA SYSTOLIC PRESSURE: 24.9
RETIRED EF AND QEF - SEE NOTES: 55 (ref 55–65)

## 2019-02-26 PROCEDURE — 93015 NM MULTI PHARM STRESS CARDIAC COMPONENT: ICD-10-PCS | Mod: S$GLB,,, | Performed by: INTERNAL MEDICINE

## 2019-02-26 PROCEDURE — 93015 CV STRESS TEST SUPVJ I&R: CPT | Mod: S$GLB,,, | Performed by: INTERNAL MEDICINE

## 2019-02-26 PROCEDURE — 93306 2D ECHO WITH COLOR FLOW DOPPLER: ICD-10-PCS | Mod: S$GLB,,, | Performed by: INTERNAL MEDICINE

## 2019-02-26 PROCEDURE — 93306 TTE W/DOPPLER COMPLETE: CPT | Mod: S$GLB,,, | Performed by: INTERNAL MEDICINE

## 2019-02-26 PROCEDURE — 93224 HOLTER MONITOR - 48 HOUR: ICD-10-PCS | Mod: S$GLB,,, | Performed by: INTERNAL MEDICINE

## 2019-02-26 PROCEDURE — A9502 TC99M TETROFOSMIN: HCPCS

## 2019-02-26 PROCEDURE — 93224 XTRNL ECG REC UP TO 48 HRS: CPT | Mod: S$GLB,,, | Performed by: INTERNAL MEDICINE

## 2019-02-26 PROCEDURE — 78452 HT MUSCLE IMAGE SPECT MULT: CPT | Mod: 26,,, | Performed by: INTERNAL MEDICINE

## 2019-02-26 PROCEDURE — 78452 NM MULTI PHARM STRESS CARDIAC COMPONENT: ICD-10-PCS | Mod: 26,,, | Performed by: INTERNAL MEDICINE

## 2019-02-27 ENCOUNTER — TELEPHONE (OUTPATIENT)
Dept: CARDIOLOGY | Facility: HOSPITAL | Age: 49
End: 2019-02-27

## 2019-02-28 ENCOUNTER — TELEPHONE (OUTPATIENT)
Dept: CARDIOLOGY | Facility: CLINIC | Age: 49
End: 2019-02-28

## 2019-02-28 NOTE — TELEPHONE ENCOUNTER
Please call pt  He passed his nuclear stress test.  No blockages noted.  Echo shows normal heart strength/function.  Muscle enzyme elevated but stable.  Stay off statin.  Fu with Rheumatology on this issue.    Dr Mir

## 2019-02-28 NOTE — TELEPHONE ENCOUNTER
Spoke with patient, to advise him that He passed his nuclear stress test.  No blockages noted.  Echo shows normal heart strength/function.  Muscle enzyme elevated but stable.  Stay off statin.  Fu with Rheumatology on this issue.  States that he has Rheumatology appt. On 3/12/19.  Patient denies any questions/concerns.  Instructed to notify office should any questions/concerns arise.  Patient verbalized understanding.

## 2019-03-01 ENCOUNTER — TELEPHONE (OUTPATIENT)
Dept: CARDIOLOGY | Facility: CLINIC | Age: 49
End: 2019-03-01

## 2019-03-01 NOTE — TELEPHONE ENCOUNTER
----- Message from Kristy Ramirez sent at 3/1/2019  7:53 AM CST -----  Contact: self   Type:  Patient Returning Call    Who Called:patient   Who Left Message for Patient:dr. Mir office  Does the patient know what this is regarding?:possibly holter results  Would the patient rather a call back or a response via [facility]? call  Best Call Back Number:[phone]  Additional Information: /a

## 2019-03-12 ENCOUNTER — OFFICE VISIT (OUTPATIENT)
Dept: RHEUMATOLOGY | Facility: CLINIC | Age: 49
End: 2019-03-12
Payer: MEDICARE

## 2019-03-12 VITALS
BODY MASS INDEX: 34.8 KG/M2 | WEIGHT: 294.75 LBS | DIASTOLIC BLOOD PRESSURE: 76 MMHG | HEIGHT: 77 IN | HEART RATE: 53 BPM | SYSTOLIC BLOOD PRESSURE: 143 MMHG

## 2019-03-12 DIAGNOSIS — I10 ESSENTIAL HYPERTENSION: ICD-10-CM

## 2019-03-12 DIAGNOSIS — R74.8 ABNORMAL CK: Primary | ICD-10-CM

## 2019-03-12 PROCEDURE — 3078F DIAST BP <80 MM HG: CPT | Mod: CPTII,S$GLB,, | Performed by: INTERNAL MEDICINE

## 2019-03-12 PROCEDURE — 99999 PR PBB SHADOW E&M-EST. PATIENT-LVL III: ICD-10-PCS | Mod: PBBFAC,,, | Performed by: INTERNAL MEDICINE

## 2019-03-12 PROCEDURE — 3077F PR MOST RECENT SYSTOLIC BLOOD PRESSURE >= 140 MM HG: ICD-10-PCS | Mod: CPTII,S$GLB,, | Performed by: INTERNAL MEDICINE

## 2019-03-12 PROCEDURE — 99214 PR OFFICE/OUTPT VISIT, EST, LEVL IV, 30-39 MIN: ICD-10-PCS | Mod: S$GLB,,, | Performed by: INTERNAL MEDICINE

## 2019-03-12 PROCEDURE — 3008F BODY MASS INDEX DOCD: CPT | Mod: CPTII,S$GLB,, | Performed by: INTERNAL MEDICINE

## 2019-03-12 PROCEDURE — 3078F PR MOST RECENT DIASTOLIC BLOOD PRESSURE < 80 MM HG: ICD-10-PCS | Mod: CPTII,S$GLB,, | Performed by: INTERNAL MEDICINE

## 2019-03-12 PROCEDURE — 3077F SYST BP >= 140 MM HG: CPT | Mod: CPTII,S$GLB,, | Performed by: INTERNAL MEDICINE

## 2019-03-12 PROCEDURE — 99999 PR PBB SHADOW E&M-EST. PATIENT-LVL III: CPT | Mod: PBBFAC,,, | Performed by: INTERNAL MEDICINE

## 2019-03-12 PROCEDURE — 3008F PR BODY MASS INDEX (BMI) DOCUMENTED: ICD-10-PCS | Mod: CPTII,S$GLB,, | Performed by: INTERNAL MEDICINE

## 2019-03-12 PROCEDURE — 99214 OFFICE O/P EST MOD 30 MIN: CPT | Mod: S$GLB,,, | Performed by: INTERNAL MEDICINE

## 2019-03-12 RX ORDER — METOPROLOL SUCCINATE 50 MG/1
50 TABLET, EXTENDED RELEASE ORAL DAILY
Qty: 90 TABLET | Refills: 3 | Status: SHIPPED | OUTPATIENT
Start: 2019-03-12 | End: 2019-05-21 | Stop reason: SDUPTHER

## 2019-03-12 NOTE — ASSESSMENT & PLAN NOTE
Stable abnormal CK likely secondary to Guillain-barre syndrome related muscle atrophy.  No suspicion for any underlying statin myopathy/statin myalgias or statin associated inflammatory autoimmune myositis.  No significant contraindication from muscle standpoint to take statin.  But he is trying diet control and see whether he can stay off statin medications.  Advised to call in future if needed.

## 2019-03-12 NOTE — PROGRESS NOTES
RHEUMATOLOGY CLINIC FOLLOW UP VISIT  Chief complaints:-  To follow up for MUSCLE PROBLEM.    HPI:-  Valerio Jones a 48 y.o. pleasant male comes in for a follow up visit.  He reports doing well today.  He started following key toe diet and lost almost 15 lb since last visit.  He gained back 8 lbs due to [place] Celebrations  when he went to [place].  No new muscle weakness.  No skin rash.    Review of Systems   Constitutional: Negative for chills and fever.   HENT: Negative for congestion and sore throat.    Eyes: Negative for blurred vision and redness.   Respiratory: Negative for cough and shortness of breath.    Cardiovascular: Positive for chest pain. Negative for leg swelling.   Gastrointestinal: Negative for abdominal pain.   Genitourinary: Negative for dysuria.   Musculoskeletal: Positive for myalgias and neck pain. Negative for back pain, falls and joint pain.   Skin: Negative for rash.   Neurological: Positive for tingling, sensory change and focal weakness. Negative for headaches.   Endo/Heme/Allergies: Does not bruise/bleed easily.   Psychiatric/Behavioral: Negative for memory loss. The patient does not have insomnia.        Past Medical History:   Diagnosis Date    Atrial flutter     Ozuna's esophagus     CRPS (complex regional pain syndrome type II)     Decubitus skin ulcer     Encounter for blood transfusion     Guillain-[place]     Guillain-[place] syndrome 7/30/2013    Hyperlipidemia     Hypertension     Joint pain     knees    LVH (left ventricular hypertrophy) due to hypertensive disease 2/10/2017    Mitral regurgitation 6/9/2016    Primary osteoarthritis of left knee 1/7/2019    Transfusion reaction     1 x  to PRBC fever       Past Surgical History:   Procedure Laterality Date    ABLATION N/A 5/27/2016    [facility]ATH LAB    barrette esophagus      COLONOSCOPY N/A 5/17/2018     "Performed by [facility] III[facility] at [facility] ENDO    decubitus surgery      to sacral    EGD (ESOPHAGOGASTRODUODENOSCOPY) N/A 8/23/2013    Performed by Chapin Batista MD at [facility] ENDO    ESOPHAGOGASTRODUODENOSCOPY      ESOPHAGOGASTRODUODENOSCOPY (EGD) N/A 5/17/2018    Performed by Ilan [facility] III, MD at [facility] ENDO    ESOPHAGOGASTRODUODENOSCOPY (EGD) N/A 3/21/2017    Performed by Ilan A[facility]a III, MD at [facility] ENDO    ESOPHAGOGASTRODUODENOSCOPY (EGD) N/A 3/23/2016    Performed by Ilan [facility] III, MD at [facility] ENDO    GASTROSTOMY TUBE PLACEMENT      KNEE ARTHROSCOPY  2002    PEG TUBE REMOVAL      RADIOFREQUENCY ABLATION      RFA      JUAN-EN-Y PROCEDURE      TRACHEAL SURGERY      TRANSESOPHAGEAL ECHOCARDIOGRAM (NAILA) N/A 5/27/2016    Performed by [facility] MD at [facility] CATH LAB        Social History     Tobacco Use    Smoking status: Current Some Day Smoker     Packs/day: 0.30     Years: 30.00     Pack years: 9.00     Types: Cigarettes     Start date: 4/4/1988    Smokeless tobacco: Former User     Types: Snuff     Quit date: 1/6/1999    Tobacco comment: Currently at 4 cigs per day   Substance Use Topics    Alcohol use: Yes     Alcohol/week: 8.4 oz     Types: 14 Glasses of wine per week    Drug use: No       Family History   Problem Relation Age of Onset    Heart attack Mother     Heart disease Mother     Heart disease Father     Heart attack Father        Review of patient's allergies indicates:   Allergen Reactions    Metoclopramide Other (See Comments) and Hives     Pt. Was in coma so is not aware of the reaction  Pt states "he don't know, was in coma"      Reglan  [metoclopramide hcl] Other (See Comments)     Pt. Was in coma so is not aware of the reaction  Other reaction(s): Unable to obtain    Sulfa (sulfonamide antibiotics) Other (See Comments)     Pt states "he don't know"    Latex Rash     Other reaction(s): Itching  Other reaction(s): Hives    Latex, natural " "rubber Rash     blisters         Vitals:    03/12/19 1356   BP: (!) 143/76   Pulse: (!) 53   Weight: 133.7 kg (294 lb 12.1 oz)   Height: 6' 5" (1.956 m)   PainSc: 0-No pain       Physical Exam   Constitutional: He is oriented to person, place, and time and well-developed, well-nourished, and in no distress. No distress.   HENT:   Head: Normocephalic.   Mouth/Throat: Oropharynx is clear and moist.   Eyes: Conjunctivae are normal. Pupils are equal, round, and reactive to light.   Neck: Normal range of motion. Neck supple.   Cardiovascular: Normal rate and intact distal pulses.   Pulmonary/Chest: Effort normal. No respiratory distress.   Abdominal: Soft. There is no tenderness.   Musculoskeletal:   No synovitis in small joints of hands or feet.  No effusion. Weakness related to GBS.    Neurological: He is alert and oriented to person, place, and time.   Skin: Skin is warm. No rash noted. No erythema.   Psychiatric: Mood and affect normal.   Nursing note and vitals reviewed.      Medication List with Changes/Refills   Current Medications    CALCIUM-VITAMIN D 600 MG, 1,500MG,-200 UNIT 600 MG(1,500MG) -200 UNIT TAB    Take 1 tablet by mouth once daily.     CLONIDINE (CATAPRES) 0.1 MG TABLET    Take 1 pill for BP > 160/90; may repeat once in 30 minutes    ESOMEPRAZOLE (NEXIUM) 20 MG CAPSULE    Take 2 capsules (40 mg total) by mouth before breakfast.    FUROSEMIDE (LASIX) 20 MG TABLET    Take 1 tablet (20 mg total) by mouth 2 (two) times daily as needed (swelling).    GABAPENTIN (NEURONTIN) 600 MG TABLET    Take 1 tablet (600 mg total) by mouth 3 (three) times daily.    IBUPROFEN (ADVIL,MOTRIN) 800 MG TABLET    Take 1 tablet (800 mg total) by mouth 3 (three) times daily.    LISINOPRIL-HYDROCHLOROTHIAZIDE (PRINZIDE,ZESTORETIC) 20-12.5 MG PER TABLET    TAKE 2 TABLETS ONE TIME DAILY    MULTIVITAMIN (ONE DAILY MULTIVITAMIN) PER TABLET    Take 1 tablet by mouth.    MULTIVITAMIN WITH MINERALS (MULTIPLE VITAMIN-MINERALS) TABLET    " Take 1 tablet by mouth Daily.      VARENICLINE (CHANTIX) 1 MG TAB    Take 1 tablet (1 mg total) by mouth 2 (two) times daily.   Changed and/or Refilled Medications    Modified Medication Previous Medication    METOPROLOL SUCCINATE (TOPROL-XL) 50 MG 24 HR TABLET metoprolol succinate (TOPROL-XL) 50 MG 24 hr tablet       Take 1 tablet (50 mg total) by mouth once daily.    Take 1 tablet (50 mg total) by mouth once daily.     Results for YASMEEN TOUSSAINT (MRN [id number]) as of 3/12/2019 18:00   Ref. Range 2/7/2019 15:24 2/12/2019 13:33 2/26/2019 09:35   CPK Latest Ref Range: 20 - 200 U/L 369 (H) 302 (H) 308 (H)   The  Assessment/Plans:-  Abnormal CK  Stable abnormal CK likely secondary to Guillain-barre syndrome related muscle atrophy.  No suspicion for any underlying statin myopathy/statin myalgias or statin associated inflammatory autoimmune myositis.  No significant contraindication from muscle standpoint to take statin.  But he is trying diet control and see whether he can stay off statin medications.  Advised to call in future if needed.     # Follow-up if symptoms worsen or fail to improve.      Disclaimer: This note was prepared using voice recognition system and is likely to have sound alike errors and is not proof read.  Please call me with any questions.

## 2019-03-18 ENCOUNTER — PATIENT MESSAGE (OUTPATIENT)
Dept: INTERNAL MEDICINE | Facility: CLINIC | Age: 49
End: 2019-03-18

## 2019-03-21 ENCOUNTER — PES CALL (OUTPATIENT)
Dept: ADMINISTRATIVE | Facility: CLINIC | Age: 49
End: 2019-03-21

## 2019-03-25 ENCOUNTER — TELEPHONE (OUTPATIENT)
Dept: CARDIOLOGY | Facility: CLINIC | Age: 49
End: 2019-03-25

## 2019-03-25 ENCOUNTER — PATIENT MESSAGE (OUTPATIENT)
Dept: CARDIOLOGY | Facility: CLINIC | Age: 49
End: 2019-03-25

## 2019-03-26 ENCOUNTER — TELEPHONE (OUTPATIENT)
Dept: CARDIOLOGY | Facility: CLINIC | Age: 49
End: 2019-03-26

## 2019-03-26 ENCOUNTER — PATIENT MESSAGE (OUTPATIENT)
Dept: CARDIOLOGY | Facility: CLINIC | Age: 49
End: 2019-03-26

## 2019-03-26 NOTE — TELEPHONE ENCOUNTER
Patient called to cancel his Friday appointment due to time conflicts ant limited access to vehicle.  Advised him to try to make appointment Friday.  Patient stated that he will try to work it out with family members.

## 2019-04-04 ENCOUNTER — PATIENT OUTREACH (OUTPATIENT)
Dept: ADMINISTRATIVE | Facility: HOSPITAL | Age: 49
End: 2019-04-04

## 2019-04-19 ENCOUNTER — PATIENT MESSAGE (OUTPATIENT)
Dept: INTERNAL MEDICINE | Facility: CLINIC | Age: 49
End: 2019-04-19

## 2019-04-22 ENCOUNTER — TELEPHONE (OUTPATIENT)
Dept: INTERNAL MEDICINE | Facility: CLINIC | Age: 49
End: 2019-04-22

## 2019-04-22 ENCOUNTER — PATIENT MESSAGE (OUTPATIENT)
Dept: INTERNAL MEDICINE | Facility: CLINIC | Age: 49
End: 2019-04-22

## 2019-04-22 DIAGNOSIS — R10.11 RIGHT UPPER QUADRANT ABDOMINAL PAIN: Primary | ICD-10-CM

## 2019-04-22 DIAGNOSIS — K80.20 GALLSTONES: ICD-10-CM

## 2019-04-23 ENCOUNTER — OFFICE VISIT (OUTPATIENT)
Dept: SURGERY | Facility: CLINIC | Age: 49
End: 2019-04-23
Payer: MEDICARE

## 2019-04-23 ENCOUNTER — LAB VISIT (OUTPATIENT)
Dept: LAB | Facility: HOSPITAL | Age: 49
End: 2019-04-23
Attending: SURGERY
Payer: MEDICARE

## 2019-04-23 VITALS
TEMPERATURE: 99 F | WEIGHT: 284.38 LBS | DIASTOLIC BLOOD PRESSURE: 83 MMHG | BODY MASS INDEX: 33.72 KG/M2 | HEART RATE: 64 BPM | SYSTOLIC BLOOD PRESSURE: 124 MMHG

## 2019-04-23 DIAGNOSIS — K80.20 SYMPTOMATIC CHOLELITHIASIS: ICD-10-CM

## 2019-04-23 DIAGNOSIS — K80.20 SYMPTOMATIC CHOLELITHIASIS: Primary | ICD-10-CM

## 2019-04-23 LAB
ALBUMIN SERPL BCP-MCNC: 3.9 G/DL (ref 3.5–5.2)
ALP SERPL-CCNC: 71 U/L (ref 55–135)
ALT SERPL W/O P-5'-P-CCNC: 42 U/L (ref 10–44)
ANION GAP SERPL CALC-SCNC: 13 MMOL/L (ref 8–16)
AST SERPL-CCNC: 39 U/L (ref 10–40)
BILIRUB SERPL-MCNC: 0.6 MG/DL (ref 0.1–1)
BUN SERPL-MCNC: 12 MG/DL (ref 6–20)
CALCIUM SERPL-MCNC: 10 MG/DL (ref 8.7–10.5)
CHLORIDE SERPL-SCNC: 102 MMOL/L (ref 95–110)
CO2 SERPL-SCNC: 27 MMOL/L (ref 23–29)
CREAT SERPL-MCNC: 0.8 MG/DL (ref 0.5–1.4)
ERYTHROCYTE [DISTWIDTH] IN BLOOD BY AUTOMATED COUNT: 13 % (ref 11.5–14.5)
EST. GFR  (AFRICAN AMERICAN): >60 ML/MIN/1.73 M^2
EST. GFR  (NON AFRICAN AMERICAN): >60 ML/MIN/1.73 M^2
GLUCOSE SERPL-MCNC: 91 MG/DL (ref 70–110)
HCT VFR BLD AUTO: 46.1 % (ref 40–54)
HGB BLD-MCNC: 15.6 G/DL (ref 14–18)
MCH RBC QN AUTO: 32.1 PG (ref 27–31)
MCHC RBC AUTO-ENTMCNC: 33.8 G/DL (ref 32–36)
MCV RBC AUTO: 95 FL (ref 82–98)
PLATELET # BLD AUTO: 205 K/UL (ref 150–350)
PMV BLD AUTO: 11.6 FL (ref 9.2–12.9)
POTASSIUM SERPL-SCNC: 3.9 MMOL/L (ref 3.5–5.1)
PROT SERPL-MCNC: 7.4 G/DL (ref 6–8.4)
RBC # BLD AUTO: 4.86 M/UL (ref 4.6–6.2)
SODIUM SERPL-SCNC: 142 MMOL/L (ref 136–145)
WBC # BLD AUTO: 9.61 K/UL (ref 3.9–12.7)

## 2019-04-23 PROCEDURE — 85027 COMPLETE CBC AUTOMATED: CPT

## 2019-04-23 PROCEDURE — 80053 COMPREHEN METABOLIC PANEL: CPT

## 2019-04-23 PROCEDURE — 99999 PR PBB SHADOW E&M-EST. PATIENT-LVL III: CPT | Mod: PBBFAC,,, | Performed by: SURGERY

## 2019-04-23 PROCEDURE — 3079F PR MOST RECENT DIASTOLIC BLOOD PRESSURE 80-89 MM HG: ICD-10-PCS | Mod: CPTII,S$GLB,, | Performed by: SURGERY

## 2019-04-23 PROCEDURE — 3079F DIAST BP 80-89 MM HG: CPT | Mod: CPTII,S$GLB,, | Performed by: SURGERY

## 2019-04-23 PROCEDURE — 3008F BODY MASS INDEX DOCD: CPT | Mod: CPTII,S$GLB,, | Performed by: SURGERY

## 2019-04-23 PROCEDURE — 36415 COLL VENOUS BLD VENIPUNCTURE: CPT

## 2019-04-23 PROCEDURE — 3008F PR BODY MASS INDEX (BMI) DOCUMENTED: ICD-10-PCS | Mod: CPTII,S$GLB,, | Performed by: SURGERY

## 2019-04-23 PROCEDURE — 99204 OFFICE O/P NEW MOD 45 MIN: CPT | Mod: S$GLB,,, | Performed by: SURGERY

## 2019-04-23 PROCEDURE — 99999 PR PBB SHADOW E&M-EST. PATIENT-LVL III: ICD-10-PCS | Mod: PBBFAC,,, | Performed by: SURGERY

## 2019-04-23 PROCEDURE — 3074F SYST BP LT 130 MM HG: CPT | Mod: CPTII,S$GLB,, | Performed by: SURGERY

## 2019-04-23 PROCEDURE — 3074F PR MOST RECENT SYSTOLIC BLOOD PRESSURE < 130 MM HG: ICD-10-PCS | Mod: CPTII,S$GLB,, | Performed by: SURGERY

## 2019-04-23 PROCEDURE — 99204 PR OFFICE/OUTPT VISIT, NEW, LEVL IV, 45-59 MIN: ICD-10-PCS | Mod: S$GLB,,, | Performed by: SURGERY

## 2019-04-23 NOTE — PROGRESS NOTES
History & Physical  General Surgery      SUBJECTIVE:     Chief Complaint/Reason for Admission: Gall Bladder Problem (right upper quadrant abdmonial pain)      History of Present Illness:  Patient is a 48 y.o. male presents with Gall Bladder Problem (right upper quadrant abdmonial pain)    48-year-old male with history of Guillain-Davilla syndrome, hypertension, a flutter status post ablation not on anticoagulation, tobacco abuse, GERD, Ozuna's esophagus, prior Megha-en-Y bypass, referred for evaluation of right upper quadrant abdominal pain. The patient reports a 1 year history of intermittent right upper quadrant abdominal pain not related to p.o. Intake.  This was also in conjunction with diarrhea which has now resolved with dietary changes. Last upper endoscopy in May 2018 with patent anastomosis and surveillance for Ozuna's.  He reports cramping right upper quadrant abdominal pain which was initially 1 episode per month and has subsequently increased in frequency since January.  His prior abdominal surgeries include a laparoscopic Megha-en-Y gastric bypass and PEG placement.  He has undergone multiple procedures under anesthesia without known anesthetic complication.    Current Outpatient Medications:     calcium-vitamin D 600 mg, 1,500mg,-200 unit 600 mg(1,500mg) -200 unit Tab, Take 1 tablet by mouth once daily. , Disp: , Rfl:     cloNIDine (CATAPRES) 0.1 MG tablet, Take 1 pill for BP > 160/90; may repeat once in 30 minutes, Disp: 30 tablet, Rfl: 11    esomeprazole (NEXIUM) 20 MG capsule, Take 2 capsules (40 mg total) by mouth before breakfast., Disp: 60 capsule, Rfl: 11    furosemide (LASIX) 20 MG tablet, Take 1 tablet (20 mg total) by mouth 2 (two) times daily as needed (swelling)., Disp: 180 tablet, Rfl: 3    gabapentin (NEURONTIN) 600 MG tablet, Take 1 tablet (600 mg total) by mouth 3 (three) times daily., Disp: 270 tablet, Rfl: 3    ibuprofen (ADVIL,MOTRIN) 800 MG tablet, Take 1 tablet (800 mg total)  "by mouth 3 (three) times daily., Disp: 30 tablet, Rfl: 0    lisinopril-hydrochlorothiazide (PRINZIDE,ZESTORETIC) 20-12.5 mg per tablet, TAKE 2 TABLETS ONE TIME DAILY, Disp: 180 tablet, Rfl: 3    metoprolol succinate (TOPROL-XL) 50 MG 24 hr tablet, Take 1 tablet (50 mg total) by mouth once daily., Disp: 90 tablet, Rfl: 3    multivitamin (ONE DAILY MULTIVITAMIN) per tablet, Take 1 tablet by mouth., Disp: , Rfl:     multivitamin with minerals (MULTIPLE VITAMIN-MINERALS) tablet, Take 1 tablet by mouth Daily.  , Disp: , Rfl:     varenicline (CHANTIX) 1 mg Tab, Take 1 tablet (1 mg total) by mouth 2 (two) times daily., Disp: 60 tablet, Rfl: 2    Review of patient's allergies indicates:   Allergen Reactions    Metoclopramide Other (See Comments) and Hives     Pt. Was in coma so is not aware of the reaction  Pt states "he don't know, was in coma"      Metoclopramide (bulk)      Other reaction(s): Unable to obtain    Reglan  [metoclopramide hcl] Other (See Comments)     Pt. Was in coma so is not aware of the reaction  Other reaction(s): Unable to obtain    Sulfa (sulfonamide antibiotics) Other (See Comments)     Pt states "he don't know"    Latex Rash and Itching     Other reaction(s): Itching  Other reaction(s): Hives    Latex, natural rubber Rash     blisters         Past Medical History:   Diagnosis Date    Atrial flutter     Ozuna's esophagus     CRPS (complex regional pain syndrome type II)     Decubitus skin ulcer     Encounter for blood transfusion     Guillain-Hudson     Guillain-Hudson syndrome 7/30/2013    Hyperlipidemia     Hypertension     Joint pain     knees    LVH (left ventricular hypertrophy) due to hypertensive disease 2/10/2017    Mitral regurgitation 6/9/2016    Primary osteoarthritis of left knee 1/7/2019    Transfusion reaction     1 x  to PRBC fever     Past Surgical History:   Procedure Laterality Date    ABLATION N/A 5/27/2016    [facility] " CATH LAB    barrette esophagus      COLONOSCOPY N/A 5/17/2018    Performed by [facility]an [facility]a III, [facility] at [facility] ENDO    decubitus surgery      to sacral    EGD (ESOPHAGOGASTRODUODENOSCOPY) N/A 8/23/2013    Performed by Chapin [facility]ista [facility] at [facility] ENDO    ESOPHAGOGASTRODUODENOSCOPY      ESOPHAGOGASTRODUODENOSCOPY (EGD) N/A 5/17/2018    Performed by [facility], [facility] at [facility] ENDO    ESOPHAGOGASTRODUODENOSCOPY (EGD) N/A 3/21/2017    Performed by [facility] at [facility] ENDO    ESOPHAGOGASTRODUODENOSCOPY (EGD) N/A 3/23/2016    Performed by [facility] III, [facility] at [facility] ENDO    GASTROSTOMY TUBE PLACEMENT      KNEE ARTHROSCOPY  2002    PEG TUBE REMOVAL      RADIOFREQUENCY ABLATION      RFA      JUAN-EN-Y PROCEDURE      TRACHEAL SURGERY      TRANSESOPHAGEAL ECHOCARDIOGRAM (NAILA) N/A 5/27/2016    Performed by [facility] at [facility] CATH LAB     Family History   Problem Relation Age of Onset    Heart attack Mother     Heart disease Mother     Heart disease Father     Heart attack Father      Social History     Tobacco Use    Smoking status: Current Some Day Smoker     Packs/day: 0.30     Years: 30.00     Pack years: 9.00     Types: Cigarettes     Start date: 4/4/1988    Smokeless tobacco: Former User     Types: Snuff     Quit date: 1/6/1999    Tobacco comment: Currently at 4 cigs per day   Substance Use Topics    Alcohol use: Yes     Alcohol/week: 8.4 oz     Types: 14 Glasses of wine per week    Drug use: No        Review of Systems:  Review of Systems   Constitutional: Negative for unexpected weight change.   HENT: Negative for congestion.    Eyes: Negative for visual disturbance.   Respiratory: Negative for shortness of breath.    Cardiovascular: Negative for chest pain.   Gastrointestinal: Positive for abdominal pain. Negative for diarrhea, nausea and vomiting.   Genitourinary: Negative for difficulty urinating.   Musculoskeletal: Positive for arthralgias and back pain.    Skin: Negative for rash.   Allergic/Immunologic: Negative for immunocompromised state.   Neurological: Positive for weakness.   Psychiatric/Behavioral: The patient is not nervous/anxious.        OBJECTIVE:     Vital Signs (Most Recent)  /83 (BP Location: Left arm, Patient Position: Sitting)   Pulse 64   Temp 98.5 °F (36.9 °C) (Oral)   Wt 129 kg (284 lb 6.3 oz)   BMI 33.72 kg/m²     Physical Exam:   Physical Exam   Constitutional: He is oriented to person, place, and time. He appears well-developed and well-nourished. No distress.   HENT:   Head: Normocephalic and atraumatic.   Craniotomy scar noted  Trach scar noted   Eyes: EOM are normal. No scleral icterus.   Neck: Neck supple.   Cardiovascular: Normal rate and regular rhythm.   Pulmonary/Chest: Effort normal.   Abdominal: Soft. He exhibits no distension. There is tenderness (Right upper quadrant, negative Silvestre's).   Laparoscopic and PEG scar noted   Musculoskeletal: Normal range of motion.   Neurological: He is alert and oriented to person, place, and time.   Skin: Skin is warm.   Vitals reviewed.    CT abdomen and pelvis personally reviewed and noted cholelithiasis  Ultrasound from 2017 revealed no stones    ASSESSMENT/PLAN:     Symptomatic cholelithiasis  -     CBC Without Differential; Future; Expected date: 04/23/2019  -     Comprehensive metabolic panel; Future; Expected date: 04/23/2019      Will contact Dr. Mir for preoperative cardiac risk assessment as he has recently seen him.  CBC and CMP   Will coordinate with my partners to schedule potential laparoscopic cholecystectomy    Jeannette Valencia     Addendum of clearance:

## 2019-04-23 NOTE — LETTER
April 23, 2019      Therese Lala MD  [address] [place] [address] Dr Selam [place] [id number]           [facility] - General Surgery  [address] [place] [address] Deann [place] [address]  Phone: [phone]  Fax: [phone]          Patient: Valerio Yan   MR Number: [id number]   YOB: 1970   Date of Visit: 4/23/2019       Dear Dr. Therese Lala:    Thank you for referring Valerio Yan to me for evaluation. Attached you will find relevant portions of my assessment and plan of care.    If you have questions, please do not hesitate to call me. I look forward to following Valerio Yan along with you.    Sincerely,    Jeannette Valencia MD    Enclosure  CC:  No Recipients    If you would like to receive this communication electronically, please contact [email or web address] or [phone] to request more information on [facility] [employer] access.    For providers and/or their staff who would like to refer a patient to [facility], please contact us through our one-stop-shop provider referral line, [facility], at [phone].    If you feel you have received this communication in error or would no longer like to receive these types of communications, please e-mail [email or web address]

## 2019-04-25 ENCOUNTER — TELEPHONE (OUTPATIENT)
Dept: SURGERY | Facility: CLINIC | Age: 49
End: 2019-04-25

## 2019-04-25 NOTE — TELEPHONE ENCOUNTER
----- Message from Goldie Gerardo sent at 4/25/2019  3:54 PM CDT -----  Contact: Pt  Type:  Patient Returning Call    Who Called: Pt  Who Left Message for Patient:nina  Does the patient know what this is regarding?:yes   Would the patient rather a call back or a response via [facility]? Call back   Best Call Back Number: [phone] ([place])   Additional Information: n/a

## 2019-04-25 NOTE — TELEPHONE ENCOUNTER
Left voicemail message for pt to call back to schedule an appt to discuss surgery with Dr Lai.     ----- Message from Jeannette Valencia MD sent at 4/25/2019  2:18 PM CDT -----  Please schedule. Dr Mir messaged that he is low risk for procedure.

## 2019-04-29 ENCOUNTER — OFFICE VISIT (OUTPATIENT)
Dept: SURGERY | Facility: CLINIC | Age: 49
End: 2019-04-29
Payer: MEDICARE

## 2019-04-29 VITALS
BODY MASS INDEX: 33.53 KG/M2 | HEART RATE: 54 BPM | DIASTOLIC BLOOD PRESSURE: 90 MMHG | WEIGHT: 283.94 LBS | SYSTOLIC BLOOD PRESSURE: 139 MMHG | HEIGHT: 77 IN | TEMPERATURE: 99 F

## 2019-04-29 DIAGNOSIS — K80.20 SYMPTOMATIC CHOLELITHIASIS: Primary | ICD-10-CM

## 2019-04-29 PROCEDURE — 99999 PR PBB SHADOW E&M-EST. PATIENT-LVL IV: CPT | Mod: PBBFAC,,, | Performed by: SURGERY

## 2019-04-29 PROCEDURE — 99999 PR PBB SHADOW E&M-EST. PATIENT-LVL IV: ICD-10-PCS | Mod: PBBFAC,,, | Performed by: SURGERY

## 2019-04-29 PROCEDURE — 99499 UNLISTED E&M SERVICE: CPT | Mod: S$GLB,,, | Performed by: SURGERY

## 2019-04-29 PROCEDURE — 99499 NO LOS: ICD-10-PCS | Mod: S$GLB,,, | Performed by: SURGERY

## 2019-04-29 RX ORDER — SODIUM CHLORIDE 9 MG/ML
INJECTION, SOLUTION INTRAVENOUS CONTINUOUS
Status: CANCELLED | OUTPATIENT
Start: 2019-04-29

## 2019-04-29 RX ORDER — LIDOCAINE HYDROCHLORIDE 10 MG/ML
1 INJECTION, SOLUTION EPIDURAL; INFILTRATION; INTRACAUDAL; PERINEURAL ONCE
Status: CANCELLED | OUTPATIENT
Start: 2019-04-29 | End: 2019-04-29

## 2019-04-30 NOTE — H&P (VIEW-ONLY)
History & Physical  General Surgery      SUBJECTIVE:     Chief Complaint/Reason for Admission: Follow-up (discuss gallbladder surgery )      History of Present Illness:  Patient is a 48 y.o. male presents with Follow-up (discuss gallbladder surgery )   Presents to schedule cholecystectomy.  Patient underwent evaluation with Dr. Valencia (see below).  The patient reports his symptoms are continued to get worse and he is noticing increased pain after eating.  He would like to proceed with cholecystectomy.    Per Dr. Valencia:  48-year-old male with history of Guillain-Houston syndrome, hypertension, a flutter status post ablation not on anticoagulation, tobacco abuse, GERD, Ozuna's esophagus, prior Megha-en-Y bypass, referred for evaluation of right upper quadrant abdominal pain. The patient reports a 1 year history of intermittent right upper quadrant abdominal pain not related to p.o. Intake.  This was also in conjunction with diarrhea which has now resolved with dietary changes. Last upper endoscopy in May 2018 with patent anastomosis and surveillance for Ozuna's.  He reports cramping right upper quadrant abdominal pain which was initially 1 episode per month and has subsequently increased in frequency since January.  His prior abdominal surgeries include a laparoscopic Megha-en-Y gastric bypass and PEG placement.  He has undergone multiple procedures under anesthesia without known anesthetic complication.    Current Outpatient Medications:     calcium-vitamin D 600 mg, 1,500mg,-200 unit 600 mg(1,500mg) -200 unit Tab, Take 1 tablet by mouth once daily. , Disp: , Rfl:     cloNIDine (CATAPRES) 0.1 MG tablet, Take 1 pill for BP > 160/90; may repeat once in 30 minutes, Disp: 30 tablet, Rfl: 11    esomeprazole (NEXIUM) 20 MG capsule, Take 2 capsules (40 mg total) by mouth before breakfast., Disp: 60 capsule, Rfl: 11    furosemide (LASIX) 20 MG tablet, Take 1 tablet (20 mg total) by mouth 2 (two) times daily as needed  "(swelling)., Disp: 180 tablet, Rfl: 3    gabapentin (NEURONTIN) 600 MG tablet, Take 1 tablet (600 mg total) by mouth 3 (three) times daily., Disp: 270 tablet, Rfl: 3    ibuprofen (ADVIL,MOTRIN) 800 MG tablet, Take 1 tablet (800 mg total) by mouth 3 (three) times daily., Disp: 30 tablet, Rfl: 0    lisinopril-hydrochlorothiazide (PRINZIDE,ZESTORETIC) 20-12.5 mg per tablet, TAKE 2 TABLETS ONE TIME DAILY, Disp: 180 tablet, Rfl: 3    metoprolol succinate (TOPROL-XL) 50 MG 24 hr tablet, Take 1 tablet (50 mg total) by mouth once daily., Disp: 90 tablet, Rfl: 3    multivitamin (ONE DAILY MULTIVITAMIN) per tablet, Take 1 tablet by mouth., Disp: , Rfl:     multivitamin with minerals (MULTIPLE VITAMIN-MINERALS) tablet, Take 1 tablet by mouth Daily.  , Disp: , Rfl:     Review of patient's allergies indicates:   Allergen Reactions    Metoclopramide Other (See Comments) and Hives     Pt. Was in coma so is not aware of the reaction  Pt states "he don't know, was in coma"      Metoclopramide (bulk)      Other reaction(s): Unable to obtain    Reglan  [metoclopramide hcl] Other (See Comments)     Pt. Was in coma so is not aware of the reaction  Other reaction(s): Unable to obtain    Sulfa (sulfonamide antibiotics) Other (See Comments)     Pt states "he don't know"    Latex Rash and Itching     Other reaction(s): Itching  Other reaction(s): Hives    Latex, natural rubber Rash     blisters         Past Medical History:   Diagnosis Date    Atrial flutter     Ozuna's esophagus     CRPS (complex regional pain syndrome type II)     Decubitus skin ulcer     Encounter for blood transfusion     Guillain-Ramer     Guillain-Ramer syndrome 7/30/2013    Hyperlipidemia     Hypertension     Joint pain     knees    LVH (left ventricular hypertrophy) due to hypertensive disease 2/10/2017    Mitral regurgitation 6/9/2016    Primary osteoarthritis of left knee 1/7/2019    Transfusion reaction     1 x  to PRBC fever     Past " Surgical History:   Procedure Laterality Date    ABLATION N/A 5/27/2016    [facility]    barrette esophagus      COLONOSCOPY N/A 5/17/2018    [facility] ENDO    decubitus surgery      to sacral    EGD (ESOPHAGOGASTRODUODENOSCOPY) N/A 8/23/2013    [facility] by Chapin Batista [facility] at [facility] ENDO    ESOPHAGOGASTRODUODENOSCOPY      ESOPHAGOGASTRODUODENOSCOPY (EGD) N/A 5/17/2018    Per[facility], [facility] at [facility] ENDO    ESOPHAGOGASTRODUODENOSCOPY (EGD) N/A 3/21/2017    Per[facility], [facility] at [facility] ENDO    ESOPHAGOGASTRODUODENOSCOPY (EGD) N/A 3/23/2016    Per[facility], [facility] at [facility] ENDO    GASTROSTOMY TUBE PLACEMENT      KNEE ARTHROSCOPY  2002    PEG TUBE REMOVAL      RADIOFREQUENCY ABLATION      RFA      JUAN-EN-Y PROCEDURE      TRACHEAL SURGERY      TRANSESOPHAGEAL ECHOCARDIOGRAM (NAILA) N/A 5/27/2016    [facility] CATH LAB     Family History   Problem Relation Age of Onset    Heart attack Mother     Heart disease Mother     Heart disease Father     Heart attack Father      Social History     Tobacco Use    Smoking status: Current Some Day Smoker     Packs/day: 0.30     Years: 30.00     Pack years: 9.00     Types: Cigarettes     Start date: 4/4/1988    Smokeless tobacco: Former User     Types: Snuff     Quit date: 1/6/1999    Tobacco comment: Currently at 4 cigs per day   Substance Use Topics    Alcohol use: Yes     Alcohol/week: 8.4 oz     Types: 14 Glasses of wine per week    Drug use: No        Review of Systems:  Review of Systems   Constitutional: Negative for unexpected weight change.   HENT: Negative for congestion.    Eyes: Negative for visual disturbance.   Respiratory: Negative for shortness of breath.    Cardiovascular: Negative for chest pain.   Gastrointestinal: Positive for abdominal pain. Negative for diarrhea, nausea and  "vomiting.   Genitourinary: Negative for difficulty urinating.   Musculoskeletal: Positive for arthralgias and back pain.   Skin: Negative for rash.   Allergic/Immunologic: Negative for immunocompromised state.   Neurological: Positive for weakness.   Psychiatric/Behavioral: The patient is not nervous/anxious.        OBJECTIVE:     Vital Signs (Most Recent)  BP (!) 139/90 (BP Location: Right arm, Patient Position: Sitting, BP Method: Small (Automatic))   Pulse (!) 54   Temp 99.1 °F (37.3 °C) (Oral)   Ht 6' 5" (1.956 m)   Wt 128.8 kg (283 lb 15.2 oz)   BMI 33.67 kg/m²     Physical Exam:   Physical Exam   Constitutional: He is oriented to person, place, and time. He appears well-developed and well-nourished. No distress.   HENT:   Head: Normocephalic and atraumatic.   Craniotomy scar noted  Trach scar noted   Eyes: EOM are normal. No scleral icterus.   Neck: Neck supple.   Cardiovascular: Normal rate and regular rhythm.   Pulmonary/Chest: Effort normal.   Abdominal: Soft. He exhibits no distension. There is tenderness (Right upper quadrant, negative Silvestre's).   Laparoscopic and PEG scar noted   Musculoskeletal: Normal range of motion.   Neurological: He is alert and oriented to person, place, and time.   Skin: Skin is warm.   Vitals reviewed.    CT abdomen and pelvis personally reviewed and noted cholelithiasis  Ultrasound from 2017 revealed no stones    ASSESSMENT/PLAN:     Symptomatic cholelithiasis  -     Case Request Operating Room: XI ROBOTIC CHOLECYSTECTOMY      Will contact Dr. Mir for preoperative cardiac risk assessment as he has recently seen him.  CBC and CMP   Will coordinate with my partners to schedule potential laparoscopic cholecystectomy      Addendum of clearance:        Robotic cholecystectomy 05/02/2019  Preop:  CBC/CMP/EKG/cardiac clearance reviewed  The risks of robotic/laparoscopic cholecystectomy including bleeding, infection, common bile duct injury, bile leak, injury to abdominal " organs, failure to alleviate symptoms, pulmonary embolus, deep vein thrombosis, cardiac event, and possibility of conversion to an open operation were explained to the patient.   The nature of the patient's condition, probability of success, risks of refusing treatment, and alternatives and risks of the alternatives were also explained.  The patient verbalized understanding.

## 2019-04-30 NOTE — PROGRESS NOTES
History & Physical  General Surgery      SUBJECTIVE:     Chief Complaint/Reason for Admission: Follow-up (discuss gallbladder surgery )      History of Present Illness:  Patient is a 48 y.o. male presents with Follow-up (discuss gallbladder surgery )   Presents to schedule cholecystectomy.  Patient underwent evaluation with Dr. Valencia (see below).  The patient reports his symptoms are continued to get worse and he is noticing increased pain after eating.  He would like to proceed with cholecystectomy.    Per Dr. Valencia:  48-year-old male with history of Guillain-Poyen syndrome, hypertension, a flutter status post ablation not on anticoagulation, tobacco abuse, GERD, Ozuna's esophagus, prior Megha-en-Y bypass, referred for evaluation of right upper quadrant abdominal pain. The patient reports a 1 year history of intermittent right upper quadrant abdominal pain not related to p.o. Intake.  This was also in conjunction with diarrhea which has now resolved with dietary changes. Last upper endoscopy in May 2018 with patent anastomosis and surveillance for Ozuna's.  He reports cramping right upper quadrant abdominal pain which was initially 1 episode per month and has subsequently increased in frequency since January.  His prior abdominal surgeries include a laparoscopic Megha-en-Y gastric bypass and PEG placement.  He has undergone multiple procedures under anesthesia without known anesthetic complication.    Current Outpatient Medications:     calcium-vitamin D 600 mg, 1,500mg,-200 unit 600 mg(1,500mg) -200 unit Tab, Take 1 tablet by mouth once daily. , Disp: , Rfl:     cloNIDine (CATAPRES) 0.1 MG tablet, Take 1 pill for BP > 160/90; may repeat once in 30 minutes, Disp: 30 tablet, Rfl: 11    esomeprazole (NEXIUM) 20 MG capsule, Take 2 capsules (40 mg total) by mouth before breakfast., Disp: 60 capsule, Rfl: 11    furosemide (LASIX) 20 MG tablet, Take 1 tablet (20 mg total) by mouth 2 (two) times daily as needed  "(swelling)., Disp: 180 tablet, Rfl: 3    gabapentin (NEURONTIN) 600 MG tablet, Take 1 tablet (600 mg total) by mouth 3 (three) times daily., Disp: 270 tablet, Rfl: 3    ibuprofen (ADVIL,MOTRIN) 800 MG tablet, Take 1 tablet (800 mg total) by mouth 3 (three) times daily., Disp: 30 tablet, Rfl: 0    lisinopril-hydrochlorothiazide (PRINZIDE,ZESTORETIC) 20-12.5 mg per tablet, TAKE 2 TABLETS ONE TIME DAILY, Disp: 180 tablet, Rfl: 3    metoprolol succinate (TOPROL-XL) 50 MG 24 hr tablet, Take 1 tablet (50 mg total) by mouth once daily., Disp: 90 tablet, Rfl: 3    multivitamin (ONE DAILY MULTIVITAMIN) per tablet, Take 1 tablet by mouth., Disp: , Rfl:     multivitamin with minerals (MULTIPLE VITAMIN-MINERALS) tablet, Take 1 tablet by mouth Daily.  , Disp: , Rfl:     Review of patient's allergies indicates:   Allergen Reactions    Metoclopramide Other (See Comments) and Hives     Pt. Was in coma so is not aware of the reaction  Pt states "he don't know, was in coma"      Metoclopramide (bulk)      Other reaction(s): Unable to obtain    Reglan  [metoclopramide hcl] Other (See Comments)     Pt. Was in coma so is not aware of the reaction  Other reaction(s): Unable to obtain    Sulfa (sulfonamide antibiotics) Other (See Comments)     Pt states "he don't know"    Latex Rash and Itching     Other reaction(s): Itching  Other reaction(s): Hives    Latex, natural rubber Rash     blisters         Past Medical History:   Diagnosis Date    Atrial flutter     Ozuna's esophagus     CRPS (complex regional pain syndrome type II)     Decubitus skin ulcer     Encounter for blood transfusion     Guillain-Norfolk     Guillain-Norfolk syndrome 7/30/2013    Hyperlipidemia     Hypertension     Joint pain     knees    LVH (left ventricular hypertrophy) due to hypertensive disease 2/10/2017    Mitral regurgitation 6/9/2016    Primary osteoarthritis of left knee 1/7/2019    Transfusion reaction     1 x  to PRBC fever     Past " Surgical History:   Procedure Laterality Date    ABLATION N/A 5/27/2016    Performed by [facility] at [facility] CATH LAB    barrette esophagus      COLONOSCOPY N/A 5/17/2018    Performed by Ilan [facility]a III, [facility] at [facility] ENDO    decubitus surgery      to sacral    EGD (ESOPHAGOGASTRODUODENOSCOPY) N/A 8/23/2013    Performed by Chapin Batista MD at [facility] ENDO    ESOPHAGOGASTRODUODENOSCOPY      ESOPHAGOGASTRODUODENOSCOPY (EGD) N/A 5/17/2018    Performed by Ilan [facility]a III, MD at [facility] ENDO    ESOPHAGOGASTRODUODENOSCOPY (EGD) N/A 3/21/2017    Performed by Ilan [facility]a III, MD at [facility] ENDO    ESOPHAGOGASTRODUODENOSCOPY (EGD) N/A 3/23/2016    Performed by Ilan [facility]a III, MD at [facility] ENDO    GASTROSTOMY TUBE PLACEMENT      KNEE ARTHROSCOPY  2002    PEG TUBE REMOVAL      RADIOFREQUENCY ABLATION      RFA      JUAN-EN-Y PROCEDURE      TRACHEAL SURGERY      TRANSESOPHAGEAL ECHOCARDIOGRAM (NAILA) N/A 5/27/2016    Performed by [facility] at [facility] CATH LAB     Family History   Problem Relation Age of Onset    Heart attack Mother     Heart disease Mother     Heart disease Father     Heart attack Father      Social History     Tobacco Use    Smoking status: Current Some Day Smoker     Packs/day: 0.30     Years: 30.00     Pack years: 9.00     Types: Cigarettes     Start date: 4/4/1988    Smokeless tobacco: Former User     Types: Snuff     Quit date: 1/6/1999    Tobacco comment: Currently at 4 cigs per day   Substance Use Topics    Alcohol use: Yes     Alcohol/week: 8.4 oz     Types: 14 Glasses of wine per week    Drug use: No        Review of Systems:  Review of Systems   Constitutional: Negative for unexpected weight change.   HENT: Negative for congestion.    Eyes: Negative for visual disturbance.   Respiratory: Negative for shortness of breath.    Cardiovascular: Negative for chest pain.   Gastrointestinal: Positive for abdominal pain. Negative for diarrhea, nausea and  "vomiting.   Genitourinary: Negative for difficulty urinating.   Musculoskeletal: Positive for arthralgias and back pain.   Skin: Negative for rash.   Allergic/Immunologic: Negative for immunocompromised state.   Neurological: Positive for weakness.   Psychiatric/Behavioral: The patient is not nervous/anxious.        OBJECTIVE:     Vital Signs (Most Recent)  BP (!) 139/90 (BP Location: Right arm, Patient Position: Sitting, BP Method: Small (Automatic))   Pulse (!) 54   Temp 99.1 °F (37.3 °C) (Oral)   Ht 6' 5" (1.956 m)   Wt 128.8 kg (283 lb 15.2 oz)   BMI 33.67 kg/m²     Physical Exam:   Physical Exam   Constitutional: He is oriented to person, place, and time. He appears well-developed and well-nourished. No distress.   HENT:   Head: Normocephalic and atraumatic.   Craniotomy scar noted  Trach scar noted   Eyes: EOM are normal. No scleral icterus.   Neck: Neck supple.   Cardiovascular: Normal rate and regular rhythm.   Pulmonary/Chest: Effort normal.   Abdominal: Soft. He exhibits no distension. There is tenderness (Right upper quadrant, negative Silvestre's).   Laparoscopic and PEG scar noted   Musculoskeletal: Normal range of motion.   Neurological: He is alert and oriented to person, place, and time.   Skin: Skin is warm.   Vitals reviewed.    CT abdomen and pelvis personally reviewed and noted cholelithiasis  Ultrasound from 2017 revealed no stones    ASSESSMENT/PLAN:     Symptomatic cholelithiasis  -     Case Request Operating Room: XI ROBOTIC CHOLECYSTECTOMY      Will contact Dr. Mir for preoperative cardiac risk assessment as he has recently seen him.  CBC and CMP   Will coordinate with my partners to schedule potential laparoscopic cholecystectomy      Addendum of clearance:        Robotic cholecystectomy 05/02/2019  Preop:  CBC/CMP/EKG/cardiac clearance reviewed  The risks of robotic/laparoscopic cholecystectomy including bleeding, infection, common bile duct injury, bile leak, injury to abdominal " organs, failure to alleviate symptoms, pulmonary embolus, deep vein thrombosis, cardiac event, and possibility of conversion to an open operation were explained to the patient.   The nature of the patient's condition, probability of success, risks of refusing treatment, and alternatives and risks of the alternatives were also explained.  The patient verbalized understanding.

## 2019-05-01 ENCOUNTER — ANESTHESIA EVENT (OUTPATIENT)
Dept: SURGERY | Facility: HOSPITAL | Age: 49
End: 2019-05-01
Payer: MEDICARE

## 2019-05-01 NOTE — PRE ADMISSION SCREENING
Pre op instructions reviewed with patient per phone:    To confirm, Your surgeon has instructed you:  Surgery is scheduled 05/02/19at 1400.        Please report to [facility] main lobby by 1230.      INSTRUCTIONS IMPORTANT!!!  ¨ No smoking after 12 midnight, the night before surgery.  ¨ No solid food after 12 midnight, but you may have clear liquids up until 3 hours prior to surgery.  This includes: grape, cranberry, and apple juice (not orange, and no coffee.)   ¨ OK to brush teeth, but no gum, candy or mints!    ¨ Take only these medicines with a small swallow of water-morning of surgery.  Gabapentin, Nexium        ____  Do not wear makeup, including mascara.  ____  No powder, lotions or creams to surgical area.  ____  Please remove all jewelry, including piercings and leave at home.  ____  No money or valuables needed. Please leave at home.  ____  Please bring identification and insurance information to hospital.  ____  If going home the same day, arrange for a ride home. You will not be able to   drive if Anesthesia was used.  ____  Children, under 12 years old, must remain in the waiting room with an adult.  They are not allowed in patient areas.  ____  Wear loose fitting clothing. Allow for dressings, bandages.  ____  Stop Aspirin, Ibuprofen, Motrin and Aleve at least 5-7 days before surgery, unless otherwise instructed by your doctor, or the nurse.   You MAY use Tylenol/acetaminophen until day of surgery.  ____  If you take diabetic medication, do not take am of surgery unless instructed by   Doctor.  ____ Stop taking any Fish Oil supplement or any Vitamins that contain Vitamin E at least 5 days prior to surgery.          Bathing Instructions-- The night before surgery and the morning prior to coming to the hospital:   -Do not shave the surgical area.   -Shower and wash your hair and body as usual with your regular soap and shampoo.   -Rinse your hair and body  completely.   -Use one packet of hibiclens to wash the surgical site (using your hand) gently for 5 minutes.  Do not scrub you skin too hard.   -Do not use hibiclens on your head, face, or genitals.   -Do not wash with regular soap after you use the hibiclens.   -Rinse your body thoroughly.   -Dry with clean, soft towel.  Do not use lotion, cream, deodorant, or powders on   the surgical site.    Use antibacterial soap in place of hibiclens if your surgery is on the head, face or genitals.         Surgical Site Infection    Prevention of surgical site infections:     -Keep incisions clean and dry.   -Do not soak/submerge incisions in water until completely healed.   -Do not apply lotions, powders, creams, or deodorants to site.   -Always make sure hands are cleaned with antibacterial soap/ alcohol-based   prior to touching the surgical site.  (This includes doctors, nurses, staff, and yourself.)    Signs and symptoms:   -Redness and pain around the area where you had surgery   -Drainage of cloudy fluid from your surgical wound   -Fever over 100.4  I have read or had read and explained to me, and understand the above information.

## 2019-05-02 ENCOUNTER — HOSPITAL ENCOUNTER (OUTPATIENT)
Facility: HOSPITAL | Age: 49
Discharge: HOME | End: 2019-05-02
Attending: SURGERY | Admitting: SURGERY
Payer: MEDICARE

## 2019-05-02 ENCOUNTER — ANESTHESIA (OUTPATIENT)
Dept: SURGERY | Facility: HOSPITAL | Age: 49
End: 2019-05-02
Payer: MEDICARE

## 2019-05-02 DIAGNOSIS — K80.20 SYMPTOMATIC CHOLELITHIASIS: ICD-10-CM

## 2019-05-02 PROCEDURE — 71000039 HC RECOVERY, EACH ADD'L HOUR: Performed by: SURGERY

## 2019-05-02 PROCEDURE — 36000710: Performed by: SURGERY

## 2019-05-02 PROCEDURE — 63600175 PHARM REV CODE 636 W HCPCS: Performed by: SURGERY

## 2019-05-02 PROCEDURE — 27201423 OPTIME MED/SURG SUP & DEVICES STERILE SUPPLY: Performed by: SURGERY

## 2019-05-02 PROCEDURE — 63600175 PHARM REV CODE 636 W HCPCS: Performed by: NURSE ANESTHETIST, CERTIFIED REGISTERED

## 2019-05-02 PROCEDURE — 88304 TISSUE EXAM BY PATHOLOGIST: CPT | Mod: 26,,, | Performed by: PATHOLOGY

## 2019-05-02 PROCEDURE — 63600175 PHARM REV CODE 636 W HCPCS: Performed by: ANESTHESIOLOGY

## 2019-05-02 PROCEDURE — 37000009 HC ANESTHESIA EA ADD 15 MINS: Performed by: SURGERY

## 2019-05-02 PROCEDURE — 25000003 PHARM REV CODE 250: Performed by: SURGERY

## 2019-05-02 PROCEDURE — 71000015 HC POSTOP RECOV 1ST HR: Performed by: SURGERY

## 2019-05-02 PROCEDURE — 47562 PR LAP,CHOLECYSTECTOMY: ICD-10-PCS | Mod: ,,, | Performed by: SURGERY

## 2019-05-02 PROCEDURE — 25000003 PHARM REV CODE 250: Performed by: ANESTHESIOLOGY

## 2019-05-02 PROCEDURE — 36000711: Performed by: SURGERY

## 2019-05-02 PROCEDURE — 25000003 PHARM REV CODE 250: Performed by: NURSE ANESTHETIST, CERTIFIED REGISTERED

## 2019-05-02 PROCEDURE — 47562 LAPAROSCOPIC CHOLECYSTECTOMY: CPT | Mod: ,,, | Performed by: SURGERY

## 2019-05-02 PROCEDURE — 88304 TISSUE SPECIMEN TO PATHOLOGY - SURGERY: ICD-10-PCS | Mod: 26,,, | Performed by: PATHOLOGY

## 2019-05-02 PROCEDURE — 88304 TISSUE EXAM BY PATHOLOGIST: CPT | Performed by: PATHOLOGY

## 2019-05-02 PROCEDURE — 37000008 HC ANESTHESIA 1ST 15 MINUTES: Performed by: SURGERY

## 2019-05-02 PROCEDURE — 71000033 HC RECOVERY, INTIAL HOUR: Performed by: SURGERY

## 2019-05-02 RX ORDER — PROPOFOL 10 MG/ML
VIAL (ML) INTRAVENOUS
Status: DISCONTINUED | OUTPATIENT
Start: 2019-05-02 | End: 2019-05-02

## 2019-05-02 RX ORDER — GLYCOPYRROLATE 0.2 MG/ML
INJECTION INTRAMUSCULAR; INTRAVENOUS
Status: DISCONTINUED | OUTPATIENT
Start: 2019-05-02 | End: 2019-05-02

## 2019-05-02 RX ORDER — AMOXICILLIN 250 MG
1 CAPSULE ORAL 2 TIMES DAILY
COMMUNITY
Start: 2019-05-02 | End: 2020-11-11 | Stop reason: ALTCHOICE

## 2019-05-02 RX ORDER — LIDOCAINE HCL/PF 100 MG/5ML
SYRINGE (ML) INTRAVENOUS
Status: DISCONTINUED | OUTPATIENT
Start: 2019-05-02 | End: 2019-05-02

## 2019-05-02 RX ORDER — HYDROCODONE BITARTRATE AND ACETAMINOPHEN 5; 325 MG/1; MG/1
2 TABLET ORAL EVERY 4 HOURS PRN
Qty: 30 TABLET | Refills: 0 | Status: SHIPPED | OUTPATIENT
Start: 2019-05-02 | End: 2019-05-02 | Stop reason: HOSPADM

## 2019-05-02 RX ORDER — ONDANSETRON 2 MG/ML
4 INJECTION INTRAMUSCULAR; INTRAVENOUS EVERY 12 HOURS PRN
Status: DISCONTINUED | OUTPATIENT
Start: 2019-05-02 | End: 2019-05-06 | Stop reason: HOSPADM

## 2019-05-02 RX ORDER — EPHEDRINE SULFATE 50 MG/ML
INJECTION, SOLUTION INTRAVENOUS
Status: DISCONTINUED | OUTPATIENT
Start: 2019-05-02 | End: 2019-05-02

## 2019-05-02 RX ORDER — HYDROMORPHONE HYDROCHLORIDE 2 MG/ML
0.5 INJECTION, SOLUTION INTRAMUSCULAR; INTRAVENOUS; SUBCUTANEOUS EVERY 5 MIN PRN
Status: DISCONTINUED | OUTPATIENT
Start: 2019-05-02 | End: 2019-05-06 | Stop reason: HOSPADM

## 2019-05-02 RX ORDER — SODIUM CHLORIDE 9 MG/ML
INJECTION, SOLUTION INTRAVENOUS CONTINUOUS
Status: DISCONTINUED | OUTPATIENT
Start: 2019-05-02 | End: 2019-05-06 | Stop reason: HOSPADM

## 2019-05-02 RX ORDER — SODIUM CHLORIDE 0.9 % (FLUSH) 0.9 %
3 SYRINGE (ML) INJECTION
Status: DISCONTINUED | OUTPATIENT
Start: 2019-05-02 | End: 2019-05-06 | Stop reason: HOSPADM

## 2019-05-02 RX ORDER — SODIUM CHLORIDE, SODIUM LACTATE, POTASSIUM CHLORIDE, CALCIUM CHLORIDE 600; 310; 30; 20 MG/100ML; MG/100ML; MG/100ML; MG/100ML
INJECTION, SOLUTION INTRAVENOUS CONTINUOUS
Status: DISCONTINUED | OUTPATIENT
Start: 2019-05-02 | End: 2023-07-27

## 2019-05-02 RX ORDER — INDOCYANINE GREEN AND WATER 25 MG
2.5 KIT INJECTION ONCE
Status: COMPLETED | OUTPATIENT
Start: 2019-05-02 | End: 2019-05-02

## 2019-05-02 RX ORDER — BUPIVACAINE HYDROCHLORIDE 2.5 MG/ML
INJECTION, SOLUTION EPIDURAL; INFILTRATION; INTRACAUDAL
Status: DISCONTINUED | OUTPATIENT
Start: 2019-05-02 | End: 2019-05-02 | Stop reason: HOSPADM

## 2019-05-02 RX ORDER — FENTANYL CITRATE 50 UG/ML
INJECTION, SOLUTION INTRAMUSCULAR; INTRAVENOUS
Status: DISCONTINUED | OUTPATIENT
Start: 2019-05-02 | End: 2019-05-02

## 2019-05-02 RX ORDER — CEFAZOLIN SODIUM 2 G/50ML
2 SOLUTION INTRAVENOUS
Status: COMPLETED | OUTPATIENT
Start: 2019-05-02 | End: 2019-05-02

## 2019-05-02 RX ORDER — OXYCODONE HYDROCHLORIDE 5 MG/1
5 TABLET ORAL
Status: DISCONTINUED | OUTPATIENT
Start: 2019-05-02 | End: 2019-05-06 | Stop reason: HOSPADM

## 2019-05-02 RX ORDER — HYDROCODONE BITARTRATE AND ACETAMINOPHEN 5; 325 MG/1; MG/1
1 TABLET ORAL EVERY 4 HOURS PRN
Status: DISCONTINUED | OUTPATIENT
Start: 2019-05-02 | End: 2019-05-06 | Stop reason: HOSPADM

## 2019-05-02 RX ORDER — MORPHINE SULFATE 4 MG/ML
2 INJECTION, SOLUTION INTRAMUSCULAR; INTRAVENOUS EVERY 5 MIN PRN
Status: DISCONTINUED | OUTPATIENT
Start: 2019-05-02 | End: 2019-05-06 | Stop reason: HOSPADM

## 2019-05-02 RX ORDER — MEPERIDINE HYDROCHLORIDE 50 MG/ML
12.5 INJECTION INTRAMUSCULAR; INTRAVENOUS; SUBCUTANEOUS ONCE AS NEEDED
Status: DISCONTINUED | OUTPATIENT
Start: 2019-05-02 | End: 2019-05-03 | Stop reason: HOSPADM

## 2019-05-02 RX ORDER — ROCURONIUM BROMIDE 10 MG/ML
INJECTION, SOLUTION INTRAVENOUS
Status: DISCONTINUED | OUTPATIENT
Start: 2019-05-02 | End: 2019-05-02

## 2019-05-02 RX ORDER — LIDOCAINE HYDROCHLORIDE 10 MG/ML
1 INJECTION, SOLUTION EPIDURAL; INFILTRATION; INTRACAUDAL; PERINEURAL ONCE
Status: DISCONTINUED | OUTPATIENT
Start: 2019-05-02 | End: 2019-05-06 | Stop reason: HOSPADM

## 2019-05-02 RX ORDER — LIDOCAINE HYDROCHLORIDE 10 MG/ML
INJECTION, SOLUTION EPIDURAL; INFILTRATION; INTRACAUDAL; PERINEURAL
Status: DISCONTINUED | OUTPATIENT
Start: 2019-05-02 | End: 2019-05-02 | Stop reason: HOSPADM

## 2019-05-02 RX ORDER — LIDOCAINE HYDROCHLORIDE 20 MG/ML
JELLY TOPICAL
Status: DISCONTINUED | OUTPATIENT
Start: 2019-05-02 | End: 2019-05-02

## 2019-05-02 RX ORDER — NEOSTIGMINE METHYLSULFATE 1 MG/ML
INJECTION, SOLUTION INTRAVENOUS
Status: DISCONTINUED | OUTPATIENT
Start: 2019-05-02 | End: 2019-05-02

## 2019-05-02 RX ORDER — METOCLOPRAMIDE HYDROCHLORIDE 5 MG/ML
10 INJECTION INTRAMUSCULAR; INTRAVENOUS EVERY 10 MIN PRN
Status: DISCONTINUED | OUTPATIENT
Start: 2019-05-02 | End: 2019-05-06 | Stop reason: HOSPADM

## 2019-05-02 RX ORDER — SODIUM CHLORIDE 0.9 % (FLUSH) 0.9 %
3 SYRINGE (ML) INJECTION EVERY 8 HOURS
Status: DISCONTINUED | OUTPATIENT
Start: 2019-05-02 | End: 2019-05-06 | Stop reason: HOSPADM

## 2019-05-02 RX ORDER — ACETAMINOPHEN 10 MG/ML
INJECTION, SOLUTION INTRAVENOUS
Status: DISCONTINUED | OUTPATIENT
Start: 2019-05-02 | End: 2019-05-02

## 2019-05-02 RX ORDER — ONDANSETRON 2 MG/ML
INJECTION INTRAMUSCULAR; INTRAVENOUS
Status: DISCONTINUED | OUTPATIENT
Start: 2019-05-02 | End: 2019-05-02

## 2019-05-02 RX ORDER — HYDROCODONE BITARTRATE AND ACETAMINOPHEN 10; 325 MG/1; MG/1
1 TABLET ORAL EVERY 4 HOURS PRN
Status: DISCONTINUED | OUTPATIENT
Start: 2019-05-02 | End: 2019-05-06 | Stop reason: HOSPADM

## 2019-05-02 RX ORDER — OXYCODONE AND ACETAMINOPHEN 5; 325 MG/1; MG/1
2 TABLET ORAL EVERY 4 HOURS PRN
Qty: 30 TABLET | Refills: 0 | Status: SHIPPED | OUTPATIENT
Start: 2019-05-02 | End: 2019-05-06 | Stop reason: SDUPTHER

## 2019-05-02 RX ORDER — DEXAMETHASONE SODIUM PHOSPHATE 4 MG/ML
INJECTION, SOLUTION INTRA-ARTICULAR; INTRALESIONAL; INTRAMUSCULAR; INTRAVENOUS; SOFT TISSUE
Status: DISCONTINUED | OUTPATIENT
Start: 2019-05-02 | End: 2019-05-02

## 2019-05-02 RX ADMIN — INDOCYANINE GREEN 2.5 MG: KIT INTRAVENOUS at 02:05

## 2019-05-02 RX ADMIN — ROCURONIUM BROMIDE 20 MG: 10 INJECTION, SOLUTION INTRAVENOUS at 02:05

## 2019-05-02 RX ADMIN — SODIUM CHLORIDE, SODIUM LACTATE, POTASSIUM CHLORIDE, AND CALCIUM CHLORIDE: 600; 310; 30; 20 INJECTION, SOLUTION INTRAVENOUS at 01:05

## 2019-05-02 RX ADMIN — ACETAMINOPHEN 1000 MG: 10 INJECTION, SOLUTION INTRAVENOUS at 03:05

## 2019-05-02 RX ADMIN — NEOSTIGMINE METHYLSULFATE 5 MG: 1 INJECTION INTRAVENOUS at 03:05

## 2019-05-02 RX ADMIN — FENTANYL CITRATE 50 MCG: 50 INJECTION, SOLUTION INTRAMUSCULAR; INTRAVENOUS at 03:05

## 2019-05-02 RX ADMIN — ONDANSETRON 4 MG: 2 INJECTION, SOLUTION INTRAMUSCULAR; INTRAVENOUS at 03:05

## 2019-05-02 RX ADMIN — DEXAMETHASONE SODIUM PHOSPHATE 8 MG: 4 INJECTION, SOLUTION INTRA-ARTICULAR; INTRALESIONAL; INTRAMUSCULAR; INTRAVENOUS; SOFT TISSUE at 02:05

## 2019-05-02 RX ADMIN — CEFAZOLIN SODIUM 3 G: 2 SOLUTION INTRAVENOUS at 02:05

## 2019-05-02 RX ADMIN — MORPHINE SULFATE 2 MG: 4 INJECTION INTRAVENOUS at 04:05

## 2019-05-02 RX ADMIN — HYDROMORPHONE HYDROCHLORIDE 0.5 MG: 2 INJECTION INTRAMUSCULAR; INTRAVENOUS; SUBCUTANEOUS at 04:05

## 2019-05-02 RX ADMIN — OXYCODONE HYDROCHLORIDE 5 MG: 5 TABLET ORAL at 04:05

## 2019-05-02 RX ADMIN — ROBINUL 0.8 MG: 0.2 INJECTION INTRAMUSCULAR; INTRAVENOUS at 03:05

## 2019-05-02 RX ADMIN — EPHEDRINE SULFATE 25 MG: 50 INJECTION INTRAMUSCULAR; INTRAVENOUS; SUBCUTANEOUS at 02:05

## 2019-05-02 RX ADMIN — LIDOCAINE HYDROCHLORIDE 100 MG: 20 INJECTION, SOLUTION INTRAVENOUS at 01:05

## 2019-05-02 RX ADMIN — PROPOFOL 50 MG: 10 INJECTION, EMULSION INTRAVENOUS at 01:05

## 2019-05-02 RX ADMIN — FENTANYL CITRATE 100 MCG: 50 INJECTION, SOLUTION INTRAMUSCULAR; INTRAVENOUS at 01:05

## 2019-05-02 RX ADMIN — LIDOCAINE HYDROCHLORIDE 1 ML: 20 JELLY TOPICAL at 02:05

## 2019-05-02 RX ADMIN — PROPOFOL 150 MG: 10 INJECTION, EMULSION INTRAVENOUS at 01:05

## 2019-05-02 RX ADMIN — ROCURONIUM BROMIDE 40 MG: 10 INJECTION, SOLUTION INTRAVENOUS at 01:05

## 2019-05-02 RX ADMIN — SODIUM CHLORIDE, SODIUM LACTATE, POTASSIUM CHLORIDE, AND CALCIUM CHLORIDE: 600; 310; 30; 20 INJECTION, SOLUTION INTRAVENOUS at 02:05

## 2019-05-02 NOTE — ANESTHESIA POSTPROCEDURE EVALUATION
Anesthesia Post Evaluation    Patient: Valerio Yan    Procedure(s) Performed: Procedure(s) (LRB):  XI ROBOTIC CHOLECYSTECTOMY (N/A)    Final Anesthesia Type: general  Patient location during evaluation: PACU  Patient participation: Yes- Able to Participate  Level of consciousness: awake and alert  Post-procedure vital signs: reviewed and stable  Pain management: adequate  Airway patency: patent  PONV status at discharge: No PONV  Anesthetic complications: no      Cardiovascular status: blood pressure returned to baseline  Respiratory status: unassisted  Hydration status: euvolemic  Follow-up not needed.          Vitals Value Taken Time   /73 5/2/2019  5:24 PM   Temp 36.7 °C (98 °F) 5/2/2019  5:24 PM   Pulse 84 5/2/2019  5:24 PM   Resp 20 5/2/2019  5:24 PM   SpO2 94 % 5/2/2019  5:25 PM   Vitals shown include unvalidated device data.      No case tracking events are documented in the log.      Pain/Sridevi Score: Pain Rating Prior to Med Admin: 6 (5/2/2019  4:57 PM)  Sridevi Score: 6 (5/2/2019  3:57 PM)

## 2019-05-02 NOTE — PLAN OF CARE
Gave home care instructions to patient and wife, verbalized understanding. All questions answered to the satisfaction of both.

## 2019-05-02 NOTE — OP NOTE
[facility]  Surgery Department  Operative Note    SUMMARY     Date of Procedure: 5/2/2019     Procedure: Procedure(s) (LRB):  XI ROBOTIC CHOLECYSTECTOMY (N/A)     Surgeon(s) and Role:     * Valerio Lai MD - Primary    Assisting Surgeon: None    Pre-Operative Diagnosis: Symptomatic cholelithiasis [K80.20]    Post-Operative Diagnosis: Post-Op Diagnosis Codes:     * Symptomatic cholelithiasis [K80.20]    Anesthesia: General    Technical Procedures Used:  Robotic cholecystectomy    Description of the Findings of the Procedure:  Gallbladder    Complications: No    Estimated Blood Loss (EBL):  10 cc    Implants: * No implants in log *    Specimens:   Specimen (12h ago, onward)    Start     Ordered    05/02/19 1521  Specimen to Pathology - Surgery  Once     Comments:  Pre-op Diagnosis: Symptomatic cholelithiasis [K80.20]Procedure(s):XI ROBOTIC CHOLECYSTECTOMY Specimens: 1.Gallbladder (perm)     Start Status     05/02/19 1521 Collected (05/02/19 1521) Order ID: [id number]       05/02/19 1521                  Condition: Good    Disposition: PACU - hemodynamically stable.    Procedure in detail:  The patient was brought into the operating room and placed on the operating table in the supine position.  General endotracheal anesthesia was induced.  IV antibiotics were administered.  Pneumatic compression devices were placed in the lower extremities.  Arms were tucked at the side.  The abdomen was clipped, prepped and draped in the standard manner.  Indocyanine green was administered     A timeout was performed.     A small incision was made just above  the umbilicus. The fascia was identified and elevated with Amberson clamps. A varies needle was inserted and its position confirmed by saline drop test.  Pneumoperitoneum was established to 15 mmHg.  A 8 mm robotic trocar was inserted.  The laparoscope was inserted.  There was no evidence of a vascular or enteric injury.     Additional trochars were placed as  follows.  An 8 mm trocar was placed in the midclavicular line of the right midabdomen.  An 8 mm trocar was placed in the anterior axillary line of the right midabdomen.  An 8 mm robotic trocar was placed in the midclavicular line in the left midabdomen  The patient was placed in reverse Trendelenburg position and rolled to the left.  The patient was docked to the da Kameron robot.     The fundus of the gallbladder was retracted cephalad.  The infundibulum was retracted laterally.      Peritoneum over the neck of the gallbladder was taken down with the hook cautery and the cystic duct was dissected circumferentially.  The cystic artery was then dissected circumferentially and the neck of the gallbladder was then dissected off the gallbladder bed.  This cleared Calots triangle of all loose areolar tissue and the critical view was obtained.     The cystic duct was clipped with the clip applier using 2 clips proximally 1 clip distally. The cystic artery was clipped with 2 clips proximally 1 clip distally and transected.  The hook cautery was then used to remove the gallbladder from the gallbladder bed.     The gallbladder was nearly excised from the gallbladder bed, the gallbladder bed was inspected and hemostasis was ensured using noted. Removal of the gallbladder was then completed from the liver.    The gallbladder was only connected by the clipped cystic duct at this time this was transected.     The right lateral operating arm of the robot was then removed and an Endo Catch bag was inserted.  The gallbladder and stones was placed in Endo Catch bag.  The patient was then undocked from the da Kameron operating robot.     The gallbladder was extracted. The trochars were removed under direct vision and no bleeding was noted. The right lateral incision site fascia was closed with 0 Vicryl.  The abdomen was then desufflated.  Marcaine was infiltrated.  All incisions were closed with 4-0 Monocryl in a subcuticular manner.  Dermaflex was applied to the incision sites

## 2019-05-02 NOTE — INTERVAL H&P NOTE
The patient has been examined and the H&P has been reviewed:    I concur with the findings and no changes have occurred since H&P was written.    Anesthesia/Surgery risks, benefits and alternative options discussed and understood by patient/family.          Active Hospital Problems    Diagnosis  POA    Symptomatic cholelithiasis [K80.20]  Yes      Resolved Hospital Problems   No resolved problems to display.

## 2019-05-02 NOTE — BRIEF OP NOTE
[facility]  Brief Operative Note     SUMMARY     Surgery Date: 5/2/2019     Surgeon(s) and Role:     * Valerio Lai MD - Primary    Assisting Surgeon: None    Pre-op Diagnosis:  Symptomatic cholelithiasis [K80.20]    Post-op Diagnosis:  Post-Op Diagnosis Codes:     * Symptomatic cholelithiasis [K80.20]    Procedure(s) (LRB):  XI ROBOTIC CHOLECYSTECTOMY (N/A)    Anesthesia: General    Description of the findings of the procedure:  Robotic cholecystectomy    Findings/Key Components:  See op note    Estimated Blood Loss: * No values recorded between 5/2/2019  2:16 PM and 5/2/2019  3:50 PM *         Specimens:   Specimen (12h ago, onward)    Start     Ordered    05/02/19 1521  Specimen to Pathology - Surgery  Once     Comments:  Pre-op Diagnosis: Symptomatic cholelithiasis [K80.20]Procedure(s):XI ROBOTIC CHOLECYSTECTOMY Specimens: 1.Gallbladder (perm)     Start Status     05/02/19 1521 Collected (05/02/19 1521) Order ID: [id number]       05/02/19 1521          Discharge Note    SUMMARY     Admit Date: 5/2/2019    Discharge Date and Time:  05/02/2019 3:51 PM    Hospital Course patient underwent cholecystectomy and was discharged postoperatively    Final Diagnosis: Post-Op Diagnosis Codes:     * Symptomatic cholelithiasis [K80.20]    Disposition: Home or Self Care    Follow Up/Patient Instructions:     Medications:  Reconciled Home Medications:      Medication List      START taking these medications    HYDROcodone-acetaminophen 5-325 mg per tablet  Commonly known as:  NORCO  Take 2 tablets by mouth every 4 (four) hours as needed for Pain.     senna-docusate 8.6-50 mg 8.6-50 mg per tablet  Commonly known as:  PERICOLACE  Take 1 tablet by mouth 2 (two) times daily.        CHANGE how you take these medications    cloNIDine 0.1 MG tablet  Commonly known as:  CATAPRES  Take 1 pill for BP > 160/90; may repeat once in 30 minutes  What changed:    · how much to take  · how to take this  · additional  instructions     esomeprazole 20 MG capsule  Commonly known as:  NEXIUM  Take 2 capsules (40 mg total) by mouth before breakfast.  What changed:  how much to take     furosemide 20 MG tablet  Commonly known as:  LASIX  Take 1 tablet (20 mg total) by mouth 2 (two) times daily as needed (swelling).  What changed:  when to take this     metoprolol succinate 50 MG 24 hr tablet  Commonly known as:  TOPROL-XL  Take 1 tablet (50 mg total) by mouth once daily.  What changed:    · how much to take  · when to take this        CONTINUE taking these medications    calcium-vitamin D3 600 mg(1,500mg) -200 unit Tab  Commonly known as:  CALCIUM 600 + D(3)  Take 1 tablet by mouth once daily.     gabapentin 600 MG tablet  Commonly known as:  NEURONTIN  Take 1 tablet (600 mg total) by mouth 3 (three) times daily.     ibuprofen 800 MG tablet  Commonly known as:  ADVIL,MOTRIN  Take 1 tablet (800 mg total) by mouth 3 (three) times daily.     lisinopril-hydrochlorothiazide 20-12.5 mg per tablet  Commonly known as:  PRINZIDE,ZESTORETIC  TAKE 2 TABLETS ONE TIME DAILY     ONE DAILY MULTIVITAMIN per tablet  Generic drug:  multivitamin  Take 1 tablet by mouth nightly.     VITAMIN B-12 INJ  Inject as directed every 30 days.        ASK your doctor about these medications    MULTIPLE VITAMIN-MINERALS tablet  Generic drug:  multivitamin with minerals  Take 1 tablet by mouth Daily.          Discharge Procedure Orders   Diet general     Ice to affected area     Lifting restrictions   Order Comments: No heavy lifting greater than 10 lb or strenuous activity     Call MD for:  temperature >100.4     Call MD for:  persistent nausea and vomiting     Call MD for:  severe uncontrolled pain     Call MD for:  difficulty breathing, headache or visual disturbances     Call MD for:  redness, tenderness, or signs of infection (pain, swelling, redness, odor or green/yellow discharge around incision site)     Call MD for:  hives     Call MD for:  persistent  dizziness or light-headedness     Call MD for:  extreme fatigue     No dressing needed     Remove dressing in 48 hours     Activity as tolerated     Shower on day dressing removed (No bath)

## 2019-05-02 NOTE — ANESTHESIA PROCEDURE NOTES
Intubation    Diagnosis: cholelithiasis  Patient location during procedure: done in OR  Staffing  Resident/CRNA: Danelle Nelson CRNA  Performed: resident/CRNA   Preanesthetic Checklist  Completed: patient identified, site marked, surgical consent, pre-op evaluation, timeout performed, IV checked, risks and benefits discussed, monitors and equipment checked and anesthesia consent given  Intubation  Indication: surgery  Pre-oxygenation. Induction: intravenous, mask ventilation: easy with oral airway.  Intubation: postinduction, laryngoscopy glidescope  Endotracheal Tube: oral, 7.5 mm ID, cuffed (inflated to minimal occlusive pressure)  Attempts: 2, Grade I - full view of cords  Complicating Factors: anterior larynx  Tube secured at 24 cm at the lips.  Findings post-intubation: bilateral breath sounds, positive ETCO2, atraumatic / condition of teeth unchanged  Position Confirmation: auscultation  Eye Care: taped after induction / before airway management  Additional Notes  Attempt x1 with Mil 2, grade III view. Attempt x1 with glidescope 4 handle, successful intubation with grade I view. BMV between attempts

## 2019-05-02 NOTE — ANESTHESIA PREPROCEDURE EVALUATION
05/02/2019  Valerio Yan is a 48 y.o., male.    Anesthesia Evaluation    I have reviewed the Patient Summary Reports.    I have reviewed the Nursing Notes.   I have reviewed the Medications.     Review of Systems  Anesthesia Hx:  Denies Family Hx of Anesthesia complications.   Denies Personal Hx of Anesthesia complications.   Social:  Smoker    Cardiovascular:   Hypertension CONCLUSIONS     1 - Normal left ventricular systolic function (EF 55%).     2 - Normal left ventricular diastolic function.     3 - Normal right ventricular systolic function .             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  Upper extremity muscle weakness from this.        Physical Exam  General:  Obesity    Airway/Jaw/Neck:  Airway Findings: Mouth Opening: Normal Mallampati: II      Dental:  DENTAL FINDINGS: Normal   Chest/Lungs:  Chest/Lungs Findings: Normal Respiratory Rate     Heart/Vascular:  Heart Findings: Normal            Anesthesia Plan  Type of Anesthesia, risks & benefits discussed:  Anesthesia Type:  general  Patient's Preference:   Intra-op Monitoring Plan: standard ASA monitors  Intra-op Monitoring Plan Comments:   Post Op Pain Control Plan: multimodal analgesia  Post Op Pain Control Plan Comments:   Induction:   IV  Beta Blocker:  Patient is on a Beta-Blocker and has not received dose within the past 24 hours due to non-compliance or for other reasons (Patient should receive a perioperative dose or document why it is withheld).       Informed Consent: Patient understands risks and agrees with Anesthesia plan.  Questions answered. Anesthesia consent signed with patient.  ASA Score: 3     Day of  Surgery Review of History & Physical: I have interviewed and examined the patient. I have reviewed the patient's H&P dated:  There are no significant changes.          Ready For Surgery From Anesthesia Perspective.

## 2019-05-06 ENCOUNTER — PATIENT MESSAGE (OUTPATIENT)
Dept: SURGERY | Facility: CLINIC | Age: 49
End: 2019-05-06

## 2019-05-06 RX ORDER — OXYCODONE AND ACETAMINOPHEN 5; 325 MG/1; MG/1
2 TABLET ORAL EVERY 6 HOURS PRN
Qty: 20 TABLET | Refills: 0 | Status: SHIPPED | OUTPATIENT
Start: 2019-05-06 | End: 2020-11-11 | Stop reason: ALTCHOICE

## 2019-05-06 RX ORDER — ONDANSETRON 8 MG/1
8 TABLET, ORALLY DISINTEGRATING ORAL EVERY 8 HOURS PRN
Qty: 10 TABLET | Refills: 0 | Status: SHIPPED | OUTPATIENT
Start: 2019-05-06 | End: 2020-11-11 | Stop reason: ALTCHOICE

## 2019-05-09 ENCOUNTER — TELEPHONE (OUTPATIENT)
Dept: INTERNAL MEDICINE | Facility: CLINIC | Age: 49
End: 2019-05-09

## 2019-05-09 ENCOUNTER — PATIENT MESSAGE (OUTPATIENT)
Dept: SURGERY | Facility: CLINIC | Age: 49
End: 2019-05-09

## 2019-05-09 VITALS
BODY MASS INDEX: 33.13 KG/M2 | OXYGEN SATURATION: 92 % | SYSTOLIC BLOOD PRESSURE: 150 MMHG | TEMPERATURE: 98 F | HEART RATE: 61 BPM | WEIGHT: 280.63 LBS | HEIGHT: 77 IN | RESPIRATION RATE: 20 BRPM | DIASTOLIC BLOOD PRESSURE: 73 MMHG

## 2019-05-09 DIAGNOSIS — R10.9 ABDOMINAL PAIN, UNSPECIFIED ABDOMINAL LOCATION: Primary | ICD-10-CM

## 2019-05-09 RX ORDER — OXYCODONE AND ACETAMINOPHEN 5; 325 MG/1; MG/1
2 TABLET ORAL EVERY 6 HOURS PRN
Qty: 20 TABLET | Refills: 0 | OUTPATIENT
Start: 2019-05-09

## 2019-05-09 RX ORDER — PROMETHAZINE HYDROCHLORIDE 12.5 MG/1
12.5 TABLET ORAL EVERY 6 HOURS PRN
Qty: 20 TABLET | Refills: 0 | Status: SHIPPED | OUTPATIENT
Start: 2019-05-09 | End: 2020-11-11 | Stop reason: ALTCHOICE

## 2019-05-09 NOTE — TELEPHONE ENCOUNTER
----- Message from Valerio Lai MD sent at 5/9/2019  1:47 PM CDT -----  Regarding: labs   The incisions look ok from the pic but will see tomorrow in person, I ordered labs for tomorrow we can have him get these before we see him if possible and I sent another antiemetic to the pharmacy for him.

## 2019-05-09 NOTE — TELEPHONE ENCOUNTER
----- Message from Tracee Case sent at 5/9/2019  2:22 PM CDT -----  Contact: [facility]  Kourtney called and stated that the clinical notes that they received weren't signed for the DOS  1/11/19. She would like a signed copy faxed to [phone]. She can be contacted at [phone] - ask for Loulou or Nurys.    Thanks,  Tracee

## 2019-05-13 ENCOUNTER — PATIENT MESSAGE (OUTPATIENT)
Dept: SURGERY | Facility: CLINIC | Age: 49
End: 2019-05-13

## 2019-05-13 ENCOUNTER — OFFICE VISIT (OUTPATIENT)
Dept: SURGERY | Facility: CLINIC | Age: 49
End: 2019-05-13
Payer: MEDICARE

## 2019-05-13 ENCOUNTER — TELEPHONE (OUTPATIENT)
Dept: SURGERY | Facility: CLINIC | Age: 49
End: 2019-05-13

## 2019-05-13 ENCOUNTER — LAB VISIT (OUTPATIENT)
Dept: LAB | Facility: HOSPITAL | Age: 49
End: 2019-05-13
Attending: SURGERY
Payer: MEDICARE

## 2019-05-13 VITALS
WEIGHT: 276 LBS | SYSTOLIC BLOOD PRESSURE: 121 MMHG | TEMPERATURE: 99 F | DIASTOLIC BLOOD PRESSURE: 81 MMHG | BODY MASS INDEX: 32.59 KG/M2 | HEART RATE: 57 BPM | HEIGHT: 77 IN

## 2019-05-13 DIAGNOSIS — Z90.49 S/P LAPAROSCOPIC CHOLECYSTECTOMY: ICD-10-CM

## 2019-05-13 DIAGNOSIS — K80.20 SYMPTOMATIC CHOLELITHIASIS: Primary | ICD-10-CM

## 2019-05-13 DIAGNOSIS — R10.9 ABDOMINAL PAIN, UNSPECIFIED ABDOMINAL LOCATION: ICD-10-CM

## 2019-05-13 LAB
ALBUMIN SERPL BCP-MCNC: 3.7 G/DL (ref 3.5–5.2)
ALP SERPL-CCNC: 86 U/L (ref 55–135)
ALT SERPL W/O P-5'-P-CCNC: 39 U/L (ref 10–44)
ANION GAP SERPL CALC-SCNC: 13 MMOL/L (ref 8–16)
AST SERPL-CCNC: 41 U/L (ref 10–40)
BASOPHILS # BLD AUTO: 0.04 K/UL (ref 0–0.2)
BASOPHILS NFR BLD: 0.4 % (ref 0–1.9)
BILIRUB SERPL-MCNC: 0.6 MG/DL (ref 0.1–1)
BUN SERPL-MCNC: 8 MG/DL (ref 6–20)
CALCIUM SERPL-MCNC: 9.4 MG/DL (ref 8.7–10.5)
CHLORIDE SERPL-SCNC: 101 MMOL/L (ref 95–110)
CO2 SERPL-SCNC: 26 MMOL/L (ref 23–29)
CREAT SERPL-MCNC: 0.7 MG/DL (ref 0.5–1.4)
DIFFERENTIAL METHOD: ABNORMAL
EOSINOPHIL # BLD AUTO: 0.2 K/UL (ref 0–0.5)
EOSINOPHIL NFR BLD: 2 % (ref 0–8)
ERYTHROCYTE [DISTWIDTH] IN BLOOD BY AUTOMATED COUNT: 13.5 % (ref 11.5–14.5)
EST. GFR  (AFRICAN AMERICAN): >60 ML/MIN/1.73 M^2
EST. GFR  (NON AFRICAN AMERICAN): >60 ML/MIN/1.73 M^2
GLUCOSE SERPL-MCNC: 80 MG/DL (ref 70–110)
HCT VFR BLD AUTO: 46.8 % (ref 40–54)
HGB BLD-MCNC: 15.5 G/DL (ref 14–18)
IMM GRANULOCYTES # BLD AUTO: 0.04 K/UL (ref 0–0.04)
IMM GRANULOCYTES NFR BLD AUTO: 0.4 % (ref 0–0.5)
LYMPHOCYTES # BLD AUTO: 2 K/UL (ref 1–4.8)
LYMPHOCYTES NFR BLD: 21.5 % (ref 18–48)
MCH RBC QN AUTO: 32.2 PG (ref 27–31)
MCHC RBC AUTO-ENTMCNC: 33.1 G/DL (ref 32–36)
MCV RBC AUTO: 97 FL (ref 82–98)
MONOCYTES # BLD AUTO: 0.8 K/UL (ref 0.3–1)
MONOCYTES NFR BLD: 8.5 % (ref 4–15)
NEUTROPHILS # BLD AUTO: 6.3 K/UL (ref 1.8–7.7)
NEUTROPHILS NFR BLD: 67.2 % (ref 38–73)
NRBC BLD-RTO: 0 /100 WBC
PLATELET # BLD AUTO: 186 K/UL (ref 150–350)
PMV BLD AUTO: 11.4 FL (ref 9.2–12.9)
POTASSIUM SERPL-SCNC: 4 MMOL/L (ref 3.5–5.1)
PROT SERPL-MCNC: 7.1 G/DL (ref 6–8.4)
RBC # BLD AUTO: 4.81 M/UL (ref 4.6–6.2)
SODIUM SERPL-SCNC: 140 MMOL/L (ref 136–145)
WBC # BLD AUTO: 9.41 K/UL (ref 3.9–12.7)

## 2019-05-13 PROCEDURE — 36415 COLL VENOUS BLD VENIPUNCTURE: CPT

## 2019-05-13 PROCEDURE — 99024 PR POST-OP FOLLOW-UP VISIT: ICD-10-PCS | Mod: S$GLB,,, | Performed by: SURGERY

## 2019-05-13 PROCEDURE — 80053 COMPREHEN METABOLIC PANEL: CPT

## 2019-05-13 PROCEDURE — 99999 PR PBB SHADOW E&M-EST. PATIENT-LVL III: ICD-10-PCS | Mod: PBBFAC,,, | Performed by: SURGERY

## 2019-05-13 PROCEDURE — 85025 COMPLETE CBC W/AUTO DIFF WBC: CPT

## 2019-05-13 PROCEDURE — 99024 POSTOP FOLLOW-UP VISIT: CPT | Mod: S$GLB,,, | Performed by: SURGERY

## 2019-05-13 PROCEDURE — 99999 PR PBB SHADOW E&M-EST. PATIENT-LVL III: CPT | Mod: PBBFAC,,, | Performed by: SURGERY

## 2019-05-13 NOTE — TELEPHONE ENCOUNTER
----- Message from Fabiola Lawton sent at 5/13/2019  9:46 AM CDT -----  Contact: patient  Type:  Needs Medical Advice    Who Called: Patient  Symptoms (please be specific):    How long has patient had these symptoms:    Pharmacy name and phone #:    Would the patient rather a call back or a response via [facility]?call   Best Call Back Number: [phone]  Additional Information: Patient wants to speak to nurse about canceling an appt on 5/13/19

## 2019-05-13 NOTE — TELEPHONE ENCOUNTER
Patient states that he is feeling better. He is going to come in today and see Dr. Lai and get the lab work done

## 2019-05-14 NOTE — PROGRESS NOTES
History & Physical  General Surgery      SUBJECTIVE:     Chief Complaint/Reason for Admission: Post-op Evaluation      History of Present Illness:  Patient is a 48 y.o. male s/p robotic cholecystectomy 5/2/19 presents for post op. He reports continued right upper quadrant pain similar to before the surgery which was severe up until a day ago. He also has nausea which is worse with fatty/greasy foods.    -Presents to schedule cholecystectomy.  Patient underwent evaluation with Dr. Valencia (see below).  The patient reports his symptoms are continued to get worse and he is noticing increased pain after eating.  He would like to proceed with cholecystectomy.    Per Dr. Valencia:  48-year-old male with history of Guillain-[place] syndrome, hypertension, a flutter status post ablation not on anticoagulation, tobacco abuse, GERD, Ozuna's esophagus, prior Megha-en-Y bypass, referred for evaluation of right upper quadrant abdominal pain. The patient reports a 1 year history of intermittent right upper quadrant abdominal pain not related to p.o. Intake.  This was also in conjunction with diarrhea which has now resolved with dietary changes. Last upper endoscopy in May 2018 with patent anastomosis and surveillance for Ozuna's.  He reports cramping right upper quadrant abdominal pain which was initially 1 episode per month and has subsequently increased in frequency since January.  His prior abdominal surgeries include a laparoscopic Megha-en-Y gastric bypass and PEG placement.  He has undergone multiple procedures under anesthesia without known anesthetic complication.    Current Outpatient Medications:     calcium-vitamin D 600 mg, 1,500mg,-200 unit 600 mg(1,500mg) -200 unit Tab, Take 1 tablet by mouth once daily. , Disp: , Rfl:     cloNIDine (CATAPRES) 0.1 MG tablet, Take 1 pill for BP > 160/90; may repeat once in 30 minutes (Patient taking differently: Take 0.1 mg by mouth. Take 1 pill for BP > 160/90; may repeat once in  30 minutes), Disp: 30 tablet, Rfl: 11    cyanocobalamin, vitamin B-12, (VITAMIN B-12 INJ), Inject as directed every 30 days., Disp: , Rfl:     esomeprazole (NEXIUM) 20 MG capsule, Take 2 capsules (40 mg total) by mouth before breakfast. (Patient taking differently: Take 20 mg by mouth before breakfast. ), Disp: 60 capsule, Rfl: 11    furosemide (LASIX) 20 MG tablet, Take 1 tablet (20 mg total) by mouth 2 (two) times daily as needed (swelling). (Patient taking differently: Take 20 mg by mouth once daily. ), Disp: 180 tablet, Rfl: 3    gabapentin (NEURONTIN) 600 MG tablet, Take 1 tablet (600 mg total) by mouth 3 (three) times daily., Disp: 270 tablet, Rfl: 3    ibuprofen (ADVIL,MOTRIN) 800 MG tablet, Take 1 tablet (800 mg total) by mouth 3 (three) times daily., Disp: 30 tablet, Rfl: 0    lisinopril-hydrochlorothiazide (PRINZIDE,ZESTORETIC) 20-12.5 mg per tablet, TAKE 2 TABLETS ONE TIME DAILY, Disp: 180 tablet, Rfl: 3    metoprolol succinate (TOPROL-XL) 50 MG 24 hr tablet, Take 1 tablet (50 mg total) by mouth once daily. (Patient taking differently: Take 25 mg by mouth nightly. ), Disp: 90 tablet, Rfl: 3    multivitamin (ONE DAILY MULTIVITAMIN) per tablet, Take 1 tablet by mouth nightly. , Disp: , Rfl:     multivitamin with minerals (MULTIPLE VITAMIN-MINERALS) tablet, Take 1 tablet by mouth Daily.  , Disp: , Rfl:     ondansetron (ZOFRAN-ODT) 8 MG TbDL, Take 1 tablet (8 mg total) by mouth every 8 (eight) hours as needed (Nausea)., Disp: 10 tablet, Rfl: 0    oxyCODONE-acetaminophen (PERCOCET) 5-325 mg per tablet, Take 2 tablets by mouth every 6 (six) hours as needed for Pain., Disp: 20 tablet, Rfl: 0    promethazine (PHENERGAN) 12.5 MG Tab, Take 1 tablet (12.5 mg total) by mouth every 6 (six) hours as needed., Disp: 20 tablet, Rfl: 0    senna-docusate 8.6-50 mg (PERICOLACE) 8.6-50 mg per tablet, Take 1 tablet by mouth 2 (two) times daily., Disp: , Rfl:   No current facility-administered medications for this  "visit.     Facility-Administered Medications Ordered in Other Visits:     lactated ringers infusion, , Intravenous, Continuous, Van Gandhi MD, Stopped at 05/02/19 1551    Review of patient's allergies indicates:   Allergen Reactions    Metoclopramide Other (See Comments) and Hives     Pt. Was in coma so is not aware of the reaction  Pt states "he don't know, was in coma"      Metoclopramide (bulk)      Other reaction(s): Unable to obtain    Reglan  [metoclopramide hcl] Other (See Comments)     Pt. Was in coma so is not aware of the reaction  Other reaction(s): Unable to obtain    Sulfa (sulfonamide antibiotics) Other (See Comments)     Never taken  States son is allergic    Latex Hives, Itching and Rash           Latex, natural rubber Rash     Blisters, adhesives          Past Medical History:   Diagnosis Date    Arm vein blood clot, bilateral     Atrial flutter     Ozuna's esophagus     CRPS (complex regional pain syndrome type II)     Decubitus skin ulcer     Encounter for blood transfusion     Guillain-[place]     Guillain-[place] syndrome 7/30/2013    Hyperlipidemia     Hypertension     Joint pain     knees    LVH (left ventricular hypertrophy) due to hypertensive disease 2/10/2017    Mitral regurgitation 6/9/2016    KIA (obstructive sleep apnea)     Primary osteoarthritis of left knee 1/7/2019    Transfusion reaction     1 x  to PRBC fever     Past Surgical History:   Procedure Laterality Date    ABLATION N/A 5/27/2016    [facility] CATH LAB    barrette esophagus      COLONOSCOPY N/A 5/17/2018    Per[facility]an [facility]a III, [facility] at [facility] ENDO    decubitus surgery      to sacral    EGD (ESOPHAGOGASTRODUODENOSCOPY) N/A 8/23/2013    Performed by Chapin Batista MD at [facility] ENDO    ESOPHAGOGASTRODUODENOSCOPY      ESOPHAGOGASTRODUODENOSCOPY (EGD) N/A 5/17/2018    Performed by [facility]a III, [facility] at [facility] ENDO    " ESOPHAGOGASTRODUODENOSCOPY (EGD) N/A 3/21/2017    Performed by [facility] III, MD at [facility] ENDO    ESOPHAGOGASTRODUODENOSCOPY (EGD) N/A 3/23/2016    Performed by [facility]an [facility] III, [facility] at [facility] ENDO    GASTROSTOMY TUBE PLACEMENT      KNEE ARTHROSCOPY Left 2002    PEG TUBE REMOVAL      RADIOFREQUENCY ABLATION      RFA      JUAN-EN-Y PROCEDURE      TONSILLECTOMY      TRACHEAL SURGERY      TRANSESOPHAGEAL ECHOCARDIOGRAM (NAILA) N/A 5/27/2016    Performed by [facility] at [facility] CATH LAB    XI ROBOTIC CHOLECYSTECTOMY N/A 5/2/2019    Performed by Valerio Lai MD at [facility] OR     Family History   Problem Relation Age of Onset    Heart attack Mother     Heart disease Mother     Heart disease Father     Heart attack Father      Social History     Tobacco Use    Smoking status: Current Some Day Smoker     Packs/day: 0.30     Years: 30.00     Pack years: 9.00     Types: Cigarettes     Start date: 4/4/1988    Smokeless tobacco: Former User     Types: Snuff     Quit date: 1/6/1999    Tobacco comment: Currently at 4 cigs per day No smoking after m.n prior to sx   Substance Use Topics    Alcohol use: Yes     Alcohol/week: 8.4 oz     Types: 14 Glasses of wine per week     Comment: no alcohol prior to surgery    Drug use: No        Review of Systems:  Review of Systems   Constitutional: Negative for unexpected weight change.   HENT: Negative for congestion.    Eyes: Negative for visual disturbance.   Respiratory: Negative for shortness of breath.    Cardiovascular: Negative for chest pain.   Gastrointestinal: Positive for abdominal pain. Negative for diarrhea, nausea and vomiting.   Genitourinary: Negative for difficulty urinating.   Musculoskeletal: Positive for arthralgias and back pain.   Skin: Negative for rash.   Allergic/Immunologic: Negative for immunocompromised state.   Neurological: Positive for weakness.   Psychiatric/Behavioral: The patient is not nervous/anxious.        OBJECTIVE:  "    Vital Signs (Most Recent)  /81 (BP Location: Right arm, Patient Position: Sitting, BP Method: Small (Automatic))   Pulse (!) 57   Temp 98.7 °F (37.1 °C) (Oral)   Ht 6' 5" (1.956 m)   Wt 125.2 kg (276 lb 0.3 oz)   BMI 32.73 kg/m²     Physical Exam:   Physical Exam   Constitutional: He is oriented to person, place, and time. He appears well-developed and well-nourished. No distress.   HENT:   Head: Normocephalic and atraumatic.   Craniotomy scar noted  Trach scar noted   Eyes: EOM are normal. No scleral icterus.   Neck: Neck supple.   Cardiovascular: Normal rate and regular rhythm.   Pulmonary/Chest: Effort normal.   Abdominal: Soft. He exhibits no distension. There is tenderness (Right upper quadrant, negative Silvestre's).   Laparoscopic and PEG scar noted    Surgical incisions healing well with no signs of infection   Musculoskeletal: Normal range of motion.   Neurological: He is alert and oriented to person, place, and time.   Skin: Skin is warm.   Vitals reviewed.    CT abdomen and pelvis personally reviewed and noted cholelithiasis  Ultrasound from 2017 revealed no stones    ASSESSMENT/PLAN:     S/p robotic joão    CBC, CMP  Pain slightly better last couple days  Will review labs and possibly schedule for imaging if labs abnormal or symptoms persist    "

## 2019-05-21 DIAGNOSIS — I10 ESSENTIAL HYPERTENSION: ICD-10-CM

## 2019-05-21 RX ORDER — METOPROLOL SUCCINATE 50 MG/1
50 TABLET, EXTENDED RELEASE ORAL DAILY
Qty: 90 TABLET | Refills: 3 | Status: SHIPPED | OUTPATIENT
Start: 2019-05-21 | End: 2020-05-28 | Stop reason: SDUPTHER

## 2019-09-13 ENCOUNTER — PES CALL (OUTPATIENT)
Dept: ADMINISTRATIVE | Facility: CLINIC | Age: 49
End: 2019-09-13

## 2019-11-06 ENCOUNTER — PATIENT MESSAGE (OUTPATIENT)
Dept: INTERNAL MEDICINE | Facility: CLINIC | Age: 49
End: 2019-11-06

## 2019-11-11 ENCOUNTER — OFFICE VISIT (OUTPATIENT)
Dept: SURGERY | Facility: CLINIC | Age: 49
End: 2019-11-11
Payer: MEDICARE

## 2019-11-11 VITALS
BODY MASS INDEX: 31.36 KG/M2 | SYSTOLIC BLOOD PRESSURE: 137 MMHG | HEIGHT: 77 IN | WEIGHT: 265.63 LBS | DIASTOLIC BLOOD PRESSURE: 87 MMHG | TEMPERATURE: 99 F | HEART RATE: 61 BPM

## 2019-11-11 DIAGNOSIS — L72.9 SCALP CYST: ICD-10-CM

## 2019-11-11 DIAGNOSIS — D17.24 LIPOMA OF LEFT LOWER EXTREMITY: Primary | ICD-10-CM

## 2019-11-11 PROCEDURE — 3075F PR MOST RECENT SYSTOLIC BLOOD PRESS GE 130-139MM HG: ICD-10-PCS | Mod: CPTII,S$GLB,, | Performed by: SURGERY

## 2019-11-11 PROCEDURE — 3079F DIAST BP 80-89 MM HG: CPT | Mod: CPTII,S$GLB,, | Performed by: SURGERY

## 2019-11-11 PROCEDURE — 3008F PR BODY MASS INDEX (BMI) DOCUMENTED: ICD-10-PCS | Mod: CPTII,S$GLB,, | Performed by: SURGERY

## 2019-11-11 PROCEDURE — 99213 PR OFFICE/OUTPT VISIT, EST, LEVL III, 20-29 MIN: ICD-10-PCS | Mod: S$GLB,,, | Performed by: SURGERY

## 2019-11-11 PROCEDURE — 3079F PR MOST RECENT DIASTOLIC BLOOD PRESSURE 80-89 MM HG: ICD-10-PCS | Mod: CPTII,S$GLB,, | Performed by: SURGERY

## 2019-11-11 PROCEDURE — 99999 PR PBB SHADOW E&M-EST. PATIENT-LVL III: ICD-10-PCS | Mod: PBBFAC,,, | Performed by: SURGERY

## 2019-11-11 PROCEDURE — 99213 OFFICE O/P EST LOW 20 MIN: CPT | Mod: S$GLB,,, | Performed by: SURGERY

## 2019-11-11 PROCEDURE — 3008F BODY MASS INDEX DOCD: CPT | Mod: CPTII,S$GLB,, | Performed by: SURGERY

## 2019-11-11 PROCEDURE — 3075F SYST BP GE 130 - 139MM HG: CPT | Mod: CPTII,S$GLB,, | Performed by: SURGERY

## 2019-11-11 PROCEDURE — 99999 PR PBB SHADOW E&M-EST. PATIENT-LVL III: CPT | Mod: PBBFAC,,, | Performed by: SURGERY

## 2019-11-11 NOTE — PROGRESS NOTES
History & Physical  General Surgery      SUBJECTIVE:     Chief Complaint/Reason for Admission: Consult      History of Present Illness:  Patient is a 49 y.o. male s/p robotic cholecystectomy 5/2/19 presents for eval of left leg lipoma and scalp cyst. He reports the left leg mass has grown in size and become more painful.  The scalp cyst has been present and stable in size but would like to have it removed    -Presents to schedule cholecystectomy.  Patient underwent evaluation with Dr. Valencia (see below).  The patient reports his symptoms are continued to get worse and he is noticing increased pain after eating.  He would like to proceed with cholecystectomy.    Per Dr. Valencia:  48-year-old male with history of Guillain-[place]ra syndrome, hypertension, a flutter status post ablation not on anticoagulation, tobacco abuse, GERD, Ozuna's esophagus, prior Megha-en-Y bypass, referred for evaluation of right upper quadrant abdominal pain. The patient reports a 1 year history of intermittent right upper quadrant abdominal pain not related to p.o. Intake.  This was also in conjunction with diarrhea which has now resolved with dietary changes. Last upper endoscopy in May 2018 with patent anastomosis and surveillance for Ozuna's.  He reports cramping right upper quadrant abdominal pain which was initially 1 episode per month and has subsequently increased in frequency since January.  His prior abdominal surgeries include a laparoscopic Megha-en-Y gastric bypass and PEG placement.  He has undergone multiple procedures under anesthesia without known anesthetic complication.    Current Outpatient Medications:     calcium-vitamin D 600 mg, 1,500mg,-200 unit 600 mg(1,500mg) -200 unit Tab, Take 1 tablet by mouth once daily. , Disp: , Rfl:     cloNIDine (CATAPRES) 0.1 MG tablet, Take 1 pill for BP > 160/90; may repeat once in 30 minutes (Patient taking differently: Take 0.1 mg by mouth. Take 1 pill for BP > 160/90; may repeat once  in 30 minutes), Disp: 30 tablet, Rfl: 11    cyanocobalamin, vitamin B-12, (VITAMIN B-12 INJ), Inject as directed every 30 days., Disp: , Rfl:     esomeprazole (NEXIUM) 20 MG capsule, Take 2 capsules (40 mg total) by mouth before breakfast. (Patient taking differently: Take 20 mg by mouth before breakfast. ), Disp: 60 capsule, Rfl: 11    furosemide (LASIX) 20 MG tablet, Take 1 tablet (20 mg total) by mouth 2 (two) times daily as needed (swelling). (Patient taking differently: Take 20 mg by mouth once daily. ), Disp: 180 tablet, Rfl: 3    lisinopril-hydrochlorothiazide (PRINZIDE,ZESTORETIC) 20-12.5 mg per tablet, TAKE 2 TABLETS ONE TIME DAILY, Disp: 180 tablet, Rfl: 3    metoprolol succinate (TOPROL-XL) 50 MG 24 hr tablet, Take 1 tablet (50 mg total) by mouth once daily., Disp: 90 tablet, Rfl: 3    multivitamin (ONE DAILY MULTIVITAMIN) per tablet, Take 1 tablet by mouth nightly. , Disp: , Rfl:     multivitamin with minerals (MULTIPLE VITAMIN-MINERALS) tablet, Take 1 tablet by mouth Daily.  , Disp: , Rfl:     gabapentin (NEURONTIN) 600 MG tablet, Take 1 tablet (600 mg total) by mouth 3 (three) times daily. (Patient not taking: Reported on 11/11/2019), Disp: 270 tablet, Rfl: 3    ibuprofen (ADVIL,MOTRIN) 800 MG tablet, Take 1 tablet (800 mg total) by mouth 3 (three) times daily. (Patient not taking: Reported on 11/11/2019), Disp: 30 tablet, Rfl: 0    ondansetron (ZOFRAN-ODT) 8 MG TbDL, Take 1 tablet (8 mg total) by mouth every 8 (eight) hours as needed (Nausea). (Patient not taking: Reported on 11/11/2019), Disp: 10 tablet, Rfl: 0    oxyCODONE-acetaminophen (PERCOCET) 5-325 mg per tablet, Take 2 tablets by mouth every 6 (six) hours as needed for Pain. (Patient not taking: Reported on 11/11/2019), Disp: 20 tablet, Rfl: 0    promethazine (PHENERGAN) 12.5 MG Tab, Take 1 tablet (12.5 mg total) by mouth every 6 (six) hours as needed. (Patient not taking: Reported on 11/11/2019), Disp: 20 tablet, Rfl: 0     "senna-docusate 8.6-50 mg (PERICOLACE) 8.6-50 mg per tablet, Take 1 tablet by mouth 2 (two) times daily. (Patient not taking: Reported on 11/11/2019), Disp: , Rfl:   No current facility-administered medications for this visit.     Facility-Administered Medications Ordered in Other Visits:     lactated ringers infusion, , Intravenous, Continuous, Van Gandhi MD, Stopped at 05/02/19 1551    Review of patient's allergies indicates:   Allergen Reactions    Metoclopramide Other (See Comments) and Hives     Pt. Was in coma so is not aware of the reaction  Pt states "he don't know, was in coma"      Metoclopramide (bulk)      Other reaction(s): Unable to obtain    Reglan  [metoclopramide hcl] Other (See Comments)     Pt. Was in coma so is not aware of the reaction  Other reaction(s): Unable to obtain    Sulfa (sulfonamide antibiotics) Other (See Comments)     Never taken  States son is allergic    Latex Hives, Itching and Rash           Latex, natural rubber Rash     Blisters, adhesives          Past Medical History:   Diagnosis Date    Arm vein blood clot, bilateral     Atrial flutter     Ozuan's esophagus     CRPS (complex regional pain syndrome type II)     Decubitus skin ulcer     Encounter for blood transfusion     Guillain-[place]     Guillain-[place] syndrome 7/30/2013    Hyperlipidemia     Hypertension     Joint pain     knees    LVH (left ventricular hypertrophy) due to hypertensive disease 2/10/2017    Mitral regurgitation 6/9/2016    KIA (obstructive sleep apnea)     Primary osteoarthritis of left knee 1/7/2019    Transfusion reaction     1 x  to PRBC fever     Past Surgical History:   Procedure Laterality Date    barrette esophagus      COLONOSCOPY N/A 5/17/2018    Procedure: COLONOSCOPY;  Surgeon: Ilan Araya III, MD;  Location: [facility];  Service: Endoscopy;  Laterality: N/A;    decubitus surgery      to sacral    ESOPHAGOGASTRODUODENOSCOPY      GASTROSTOMY TUBE " "PLACEMENT      KNEE ARTHROSCOPY Left 2002    PEG TUBE REMOVAL      RADIOFREQUENCY ABLATION      RFA      ROBOT-ASSISTED CHOLECYSTECTOMY USING DA GABRIELA XI N/A 5/2/2019    Procedure: XI ROBOTIC CHOLECYSTECTOMY;  Surgeon: Valerio Lai MD;  Location: [facility];  Service: General;  Laterality: N/A;    JUAN-EN-Y PROCEDURE      TONSILLECTOMY      TRACHEAL SURGERY       Family History   Problem Relation Age of Onset    Heart attack Mother     Heart disease Mother     Heart disease Father     Heart attack Father      Social History     Tobacco Use    Smoking status: Current Some Day Smoker     Packs/day: 0.30     Years: 30.00     Pack years: 9.00     Types: Cigarettes     Start date: 4/4/1988    Smokeless tobacco: Former User     Types: Snuff     Quit date: 1/6/1999    Tobacco comment: Currently at 4 cigs per day No smoking after m.n prior to sx   Substance Use Topics    Alcohol use: Yes     Alcohol/week: 14.0 standard drinks     Types: 14 Glasses of wine per week     Comment: no alcohol prior to surgery    Drug use: No        Review of Systems:  Review of Systems   Constitutional: Negative for chills, fever and unexpected weight change.   HENT: Negative for congestion.    Eyes: Negative for visual disturbance.   Respiratory: Negative for shortness of breath.    Cardiovascular: Negative for chest pain.   Gastrointestinal: Negative for abdominal distention, abdominal pain, constipation, nausea, rectal pain and vomiting.   Genitourinary: Negative for dysuria.   Musculoskeletal: Negative for arthralgias.   Skin: Negative for rash.   Neurological: Negative for light-headedness.   Hematological: Negative for adenopathy.       OBJECTIVE:     Vital Signs (Most Recent)  /87 (BP Location: Left arm, Patient Position: Sitting, BP Method: Small (Automatic))   Pulse 61   Temp 99 °F (37.2 °C) (Oral)   Ht 6' 5" (1.956 m)   Wt 120.5 kg (265 lb 10.5 oz)   BMI 31.50 kg/m²     Physical Exam:   Physical Exam "   Constitutional: He is oriented to person, place, and time. He appears well-developed and well-nourished. No distress.   HENT:   Head: Normocephalic and atraumatic.   Craniotomy scar noted  Trach scar noted   Eyes: EOM are normal. No scleral icterus.   Neck: Neck supple.   Cardiovascular: Normal rate and regular rhythm.   Pulmonary/Chest: Effort normal.   Abdominal: Soft. He exhibits no distension. There is no tenderness.   Laparoscopic and PEG scar noted    Surgical incisions healing well with no signs of infection   Musculoskeletal: Normal range of motion.   Neurological: He is alert and oriented to person, place, and time.   Skin: Skin is warm.        Vitals reviewed.    CT abdomen and pelvis personally reviewed and noted cholelithiasis  Ultrasound from 2017 revealed no stones    ASSESSMENT/PLAN:     49-year-old male S/p robotic joão now with Left leg lipoma, scalp cyst      Schedule for removal in minor procedure room

## 2019-11-18 ENCOUNTER — PROCEDURE VISIT (OUTPATIENT)
Dept: SURGERY | Facility: CLINIC | Age: 49
End: 2019-11-18
Payer: MEDICARE

## 2019-11-18 VITALS
BODY MASS INDEX: 31.63 KG/M2 | HEART RATE: 61 BPM | WEIGHT: 267.88 LBS | SYSTOLIC BLOOD PRESSURE: 102 MMHG | DIASTOLIC BLOOD PRESSURE: 66 MMHG | HEIGHT: 77 IN | TEMPERATURE: 99 F

## 2019-11-18 DIAGNOSIS — L72.0 CYST OF SKIN AND SUBCUTANEOUS TISSUE: ICD-10-CM

## 2019-11-18 DIAGNOSIS — L72.9 SCALP CYST: Primary | ICD-10-CM

## 2019-11-18 PROCEDURE — 11403 EXC, CYST: ICD-10-PCS | Mod: S$GLB,,, | Performed by: SURGERY

## 2019-11-18 PROCEDURE — 12032 INTMD RPR S/A/T/EXT 2.6-7.5: CPT | Mod: 51,S$GLB,, | Performed by: SURGERY

## 2019-11-18 PROCEDURE — 12032 PR LAYR CLOS WND TRUNK,ARM,LEG 2.6-7.5 CM: ICD-10-PCS | Mod: 51,S$GLB,, | Performed by: SURGERY

## 2019-11-18 PROCEDURE — 11422 EXC H-F-NK-SP B9+MARG 1.1-2: CPT | Mod: 51,S$GLB,, | Performed by: SURGERY

## 2019-11-18 PROCEDURE — 11403 EXC TR-EXT B9+MARG 2.1-3CM: CPT | Mod: S$GLB,,, | Performed by: SURGERY

## 2019-11-18 PROCEDURE — 11422 EXC, CYST: ICD-10-PCS | Mod: 51,S$GLB,, | Performed by: SURGERY

## 2019-11-18 NOTE — PROCEDURES
Exc, Cyst  Date/Time: 11/18/2019 1:00 PM  Performed by: Valerio Lai MD  Authorized by: Valerio Lai MD     Consent Done?:  Yes (Written)    STAFF:  Assistants?: No    I was present for the entire procedure.  INDICATIONS:cyst  LOCATION:  Body area:  Lower leg / ankleleft    PREP:  Patient was prepped and draped in the normal sterile fashion.Position:  Prone    ANESTHESIA:  Local anesthetic:  Lidocaine 1% with epinephrine    PROCEDURE DETAILS:  Excision type:  Skin  Malignancy:  Benign  Excision size (cm):  3  Scalpel size:  15  Incision type:  Elliptical  Hemostasis was obtained.  Estimated blood loss (cc):  1  Wound closure:  Intermediate layered  Wound repair size (cm):  3  Sutures:  3-0 Vicryl and 4-0 Monocryl  Sterile dressings:  Gauze, Mastisol, Steri-strips and Tegaderm  Post-op diagnosis: Same as pre-op diagnosis.  Needle, instrument, and sponge counts were correct.  Patient tolerated the procedure well with no immediate complications.  Post-operative instructions were provided for the patient.  Patient was discharged and will follow up for wound check.    Exc, Cyst  Date/Time: 11/18/2019 1:00 PM  Performed by: Valerio aLi MD  Authorized by: Valerio Lai MD     Consent Done?:  Yes (Written)    STAFF:  Assistants?: No    I was present for the entire procedure.  INDICATIONS:cyst  LOCATION:  Body area:  Scalpleft    PREP:Position:  Supine    ANESTHESIA:  Local anesthetic:  Lidocaine 1% with epinephrine    PROCEDURE DETAILS:  Excision type:  Skin  Malignancy:  Benign  Excision size (cm):  2  Scalpel size:  15  Incision type:  Single straight  Hemostasis was obtained.  Estimated blood loss (cc):  1  Sutures:  3-0 Vicryl and 4-0 Monocryl  Sterile dressings:  Gauze, Mastisol, Steri-strips and Tegaderm  Post-op diagnosis: Same as pre-op diagnosis.  Needle, instrument, and sponge counts were correct.  Patient tolerated the procedure well with no immediate complications.  Post-operative instructions were provided  for the patient.  Patient was discharged and will follow up for wound check.

## 2020-02-20 ENCOUNTER — PATIENT MESSAGE (OUTPATIENT)
Dept: INTERNAL MEDICINE | Facility: CLINIC | Age: 50
End: 2020-02-20

## 2020-02-20 DIAGNOSIS — Z72.0 TOBACCO USE: Primary | ICD-10-CM

## 2020-03-11 ENCOUNTER — PATIENT MESSAGE (OUTPATIENT)
Dept: INTERNAL MEDICINE | Facility: CLINIC | Age: 50
End: 2020-03-11

## 2020-03-12 ENCOUNTER — LAB VISIT (OUTPATIENT)
Dept: LAB | Facility: HOSPITAL | Age: 50
End: 2020-03-12
Attending: FAMILY MEDICINE
Payer: MEDICARE

## 2020-03-12 ENCOUNTER — OFFICE VISIT (OUTPATIENT)
Dept: INTERNAL MEDICINE | Facility: CLINIC | Age: 50
End: 2020-03-12
Payer: MEDICARE

## 2020-03-12 VITALS
HEART RATE: 61 BPM | SYSTOLIC BLOOD PRESSURE: 128 MMHG | TEMPERATURE: 96 F | OXYGEN SATURATION: 96 % | BODY MASS INDEX: 31.29 KG/M2 | WEIGHT: 263.88 LBS | DIASTOLIC BLOOD PRESSURE: 82 MMHG

## 2020-03-12 DIAGNOSIS — Z11.4 ENCOUNTER FOR SCREENING FOR HUMAN IMMUNODEFICIENCY VIRUS (HIV): ICD-10-CM

## 2020-03-12 DIAGNOSIS — Z00.00 ROUTINE PHYSICAL EXAMINATION: ICD-10-CM

## 2020-03-12 DIAGNOSIS — G80.8 OTHER CEREBRAL PALSY: ICD-10-CM

## 2020-03-12 DIAGNOSIS — I48.3 TYPICAL ATRIAL FLUTTER: ICD-10-CM

## 2020-03-12 DIAGNOSIS — K59.00 CONSTIPATION, UNSPECIFIED CONSTIPATION TYPE: ICD-10-CM

## 2020-03-12 DIAGNOSIS — I10 ESSENTIAL HYPERTENSION: ICD-10-CM

## 2020-03-12 DIAGNOSIS — G61.0 GUILLAIN-BARRE: ICD-10-CM

## 2020-03-12 DIAGNOSIS — Z00.00 ROUTINE PHYSICAL EXAMINATION: Primary | ICD-10-CM

## 2020-03-12 DIAGNOSIS — R60.9 SWELLING: ICD-10-CM

## 2020-03-12 PROCEDURE — 99999 PR PBB SHADOW E&M-EST. PATIENT-LVL III: ICD-10-PCS | Mod: PBBFAC,,, | Performed by: FAMILY MEDICINE

## 2020-03-12 PROCEDURE — 3074F SYST BP LT 130 MM HG: CPT | Mod: CPTII,S$GLB,, | Performed by: FAMILY MEDICINE

## 2020-03-12 PROCEDURE — 99213 PR OFFICE/OUTPT VISIT, EST, LEVL III, 20-29 MIN: ICD-10-PCS | Mod: S$GLB,,, | Performed by: FAMILY MEDICINE

## 2020-03-12 PROCEDURE — 99499 RISK ADDL DX/OHS AUDIT: ICD-10-PCS | Mod: S$GLB,,, | Performed by: FAMILY MEDICINE

## 2020-03-12 PROCEDURE — 99213 OFFICE O/P EST LOW 20 MIN: CPT | Mod: S$GLB,,, | Performed by: FAMILY MEDICINE

## 2020-03-12 PROCEDURE — 99999 PR PBB SHADOW E&M-EST. PATIENT-LVL III: CPT | Mod: PBBFAC,,, | Performed by: FAMILY MEDICINE

## 2020-03-12 PROCEDURE — 80053 COMPREHEN METABOLIC PANEL: CPT

## 2020-03-12 PROCEDURE — 80061 LIPID PANEL: CPT

## 2020-03-12 PROCEDURE — 99499 UNLISTED E&M SERVICE: CPT | Mod: S$GLB,,, | Performed by: FAMILY MEDICINE

## 2020-03-12 PROCEDURE — 36415 COLL VENOUS BLD VENIPUNCTURE: CPT

## 2020-03-12 PROCEDURE — 3074F PR MOST RECENT SYSTOLIC BLOOD PRESSURE < 130 MM HG: ICD-10-PCS | Mod: CPTII,S$GLB,, | Performed by: FAMILY MEDICINE

## 2020-03-12 PROCEDURE — 3079F DIAST BP 80-89 MM HG: CPT | Mod: CPTII,S$GLB,, | Performed by: FAMILY MEDICINE

## 2020-03-12 PROCEDURE — 86703 HIV-1/HIV-2 1 RESULT ANTBDY: CPT

## 2020-03-12 PROCEDURE — 3079F PR MOST RECENT DIASTOLIC BLOOD PRESSURE 80-89 MM HG: ICD-10-PCS | Mod: CPTII,S$GLB,, | Performed by: FAMILY MEDICINE

## 2020-03-12 RX ORDER — LISINOPRIL AND HYDROCHLOROTHIAZIDE 12.5; 2 MG/1; MG/1
2 TABLET ORAL DAILY
Qty: 180 TABLET | Refills: 3 | Status: SHIPPED | OUTPATIENT
Start: 2020-03-12 | End: 2020-11-11

## 2020-03-12 RX ORDER — FUROSEMIDE 20 MG/1
20 TABLET ORAL DAILY
Qty: 180 TABLET | Refills: 1 | Status: SHIPPED | OUTPATIENT
Start: 2020-03-12 | End: 2021-08-10 | Stop reason: SDUPTHER

## 2020-03-12 RX ORDER — POLYETHYLENE GLYCOL 3350 17 G/17G
17 POWDER, FOR SOLUTION ORAL DAILY
Qty: 100 PACKET | Refills: 0 | Status: SHIPPED | OUTPATIENT
Start: 2020-03-12 | End: 2020-03-16

## 2020-03-12 NOTE — PROGRESS NOTES
Subjective:       Patient ID: Valerio Yan is a 49 y.o. male.    Chief Complaint: No chief complaint on file.    HPI Mr. Yan presents today for follow up. He would like to use this as his routine exam.     Needs refill on Lasix and lisinopril/hctz   He has been taking Lasix once daily     Doing well.   No complaint    Review of Systems   Constitutional: Negative for activity change and unexpected weight change.   HENT: Negative for hearing loss, rhinorrhea and trouble swallowing.    Eyes: Negative for discharge and visual disturbance.   Respiratory: Negative for chest tightness and wheezing.    Cardiovascular: Negative for chest pain and palpitations.   Gastrointestinal: Negative for blood in stool, constipation, diarrhea and vomiting.   Endocrine: Negative for polydipsia and polyuria.   Genitourinary: Negative for difficulty urinating, hematuria and urgency.   Musculoskeletal: Negative for arthralgias, joint swelling and neck pain.   Neurological: Negative for weakness and headaches.   Psychiatric/Behavioral: Negative for confusion and dysphoric mood.           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    Hyperlipidemia     Hypertension     Joint pain     knees    LVH (left ventricular hypertrophy) due to hypertensive disease 2/10/2017    Mitral regurgitation 6/9/2016    KIA (obstructive sleep apnea)     Primary osteoarthritis of left knee 1/7/2019    Transfusion reaction     1 x  to PRBC fever     Past Surgical History:   Procedure Laterality Date    barrette esophagus      COLONOSCOPY N/A 5/17/2018    Procedure: COLONOSCOPY;  Surgeon: Ilan Araya III, MD;  Location: [facility];  Service: Endoscopy;  Laterality: N/A;    decubitus surgery       to sacral    ESOPHAGOGASTRODUODENOSCOPY      GASTROSTOMY TUBE PLACEMENT      KNEE ARTHROSCOPY Left 2002    PEG TUBE REMOVAL      RADIOFREQUENCY ABLATION      RFA      ROBOT-ASSISTED CHOLECYSTECTOMY USING DA GABRIELA XI N/A 5/2/2019    Procedure: XI ROBOTIC CHOLECYSTECTOMY;  Surgeon: Valerio Lai MD;  Location: [facility];  Service: General;  Laterality: N/A;    JUAN-EN-Y PROCEDURE      TONSILLECTOMY      TRACHEAL SURGERY       Family History   Problem Relation Age of Onset    Heart attack Mother     Heart disease Mother     Heart disease Father     Heart attack Father      Social History     Socioeconomic History    Marital status:      Spouse name: Not on file    Number of children: 2    Years of education: Not on file    Highest education level: Not on file   Occupational History    Not on file   Social Needs    Financial resource strain: Not very hard    Food insecurity:     Worry: Never true     Inability: Never true    Transportation needs:     Medical: No     Non-medical: No   Tobacco Use    Smoking status: Current Some Day Smoker     Packs/day: 0.30     Years: 30.00     Pack years: 9.00     Types: Cigarettes     Start date: 4/4/1988    Smokeless tobacco: Former User     Types: Snuff     Quit date: 1/6/1999    Tobacco comment: Currently at 4 cigs per day No smoking after m.n prior to sx   Substance and Sexual Activity    Alcohol use: Yes     Alcohol/week: 14.0 standard drinks     Types: 14 Glasses of wine per week     Frequency: 4 or more times a week     Drinks per session: 1 or 2     Binge frequency: Never     Comment: no alcohol prior to surgery    Drug use: No    Sexual activity: Yes     Partners: Female   Lifestyle    Physical activity:     Days per week: 0 days     Minutes per session: 0 min    Stress: Not at all   Relationships    Social connections:     Talks on phone: More than three times a week     Gets together: Once a week     Attends Hoahaoism service: Not on file      Active member of club or organization: No     Attends meetings of clubs or organizations: Never     Relationship status:    Other Topics Concern    Not on file   Social History Narrative    Not on file       Objective:        Physical Exam    APPEARANCE: WELL NOURISHED, WELL DEVELOPED, IN NO ACUTE DISTRESS.    HEAD: NORMOCEPHALIC, ATRAUMATIC.  EYES: PERRL. EOMI.  NON-ICTERIC SCLERAE.    EARS: TM'S INTACT. LIGHT REFLEX NORMAL. NO RETRACTION OR PERFORATION.    NOSE: MUCOSA PINK. AIRWAY CLEAR.  MOUTH & THROAT: NO TONSILLAR ENLARGEMENT. NO PHARYNGEAL ERYTHEMA OR EXUDATE. NO STRIDOR.  NECK: SUPPLE. NO BRUITS.  CHEST: LUNGS CLEAR TO AUSCULTATION.  CARDIOVASCULAR: REGULAR RHYTHM WITHOUT SIGNIFICANT MURMURS.  ABDOMEN: BOWEL SOUNDS NORMAL.   MUSCULOSKELETAL:  NO BONY DEFORMITY SEEN.  THE PATIENT HAS SEVERE BILATERAL FOREARM AND HAND ATROPHY WITH BILATERAL WRIST DROP.  HE HAS BILATERAL FOOTDROP AS WELL AND IS WEARING BILATERAL AFOS.    Results for orders placed or performed in visit on 05/13/19   CBC auto differential   Result Value Ref Range    WBC 9.41 3.90 - 12.70 K/uL    RBC 4.81 4.60 - 6.20 M/uL    Hemoglobin 15.5 14.0 - 18.0 g/dL    Hematocrit 46.8 40.0 - 54.0 %    Mean Corpuscular Volume 97 82 - 98 fL    Mean Corpuscular Hemoglobin 32.2 (H) 27.0 - 31.0 pg    Mean Corpuscular Hemoglobin Conc 33.1 32.0 - 36.0 g/dL    RDW 13.5 11.5 - 14.5 %    Platelets 186 150 - 350 K/uL    MPV 11.4 9.2 - 12.9 fL    Immature Granulocytes 0.4 0.0 - 0.5 %    Gran # (ANC) 6.3 1.8 - 7.7 K/uL    Immature Grans (Abs) 0.04 0.00 - 0.04 K/uL    Lymph # 2.0 1.0 - 4.8 K/uL    Mono # 0.8 0.3 - 1.0 K/uL    Eos # 0.2 0.0 - 0.5 K/uL    Baso # 0.04 0.00 - 0.20 K/uL    nRBC 0 0 /100 WBC    Gran% 67.2 38.0 - 73.0 %    Lymph% 21.5 18.0 - 48.0 %    Mono% 8.5 4.0 - 15.0 %    Eosinophil% 2.0 0.0 - 8.0 %    Basophil% 0.4 0.0 - 1.9 %    Differential Method Automated    Comprehensive metabolic panel   Result Value Ref Range    Sodium 140 136 - 145  mmol/L    Potassium 4.0 3.5 - 5.1 mmol/L    Chloride 101 95 - 110 mmol/L    CO2 26 23 - 29 mmol/L    Glucose 80 70 - 110 mg/dL    BUN, Bld 8 6 - 20 mg/dL    Creatinine 0.7 0.5 - 1.4 mg/dL    Calcium 9.4 8.7 - 10.5 mg/dL    Total Protein 7.1 6.0 - 8.4 g/dL    Albumin 3.7 3.5 - 5.2 g/dL    Total Bilirubin 0.6 0.1 - 1.0 mg/dL    Alkaline Phosphatase 86 55 - 135 U/L    AST 41 (H) 10 - 40 U/L    ALT 39 10 - 44 U/L    Anion Gap 13 8 - 16 mmol/L    eGFR if African American >60.0 >60 mL/min/1.73 m^2    eGFR if non African American >60.0 >60 mL/min/1.73 m^2       Assessment/Plan:   Routine physical examination  -     Comprehensive metabolic panel; Future; Expected date: 03/12/2020  -     Lipid panel; Future; Expected date: 03/12/2020  -     HIV 1/2 Ag/Ab (4th Gen); Future; Expected date: 03/12/2020  Reviewed medical, social, surgical and family history. Reviewed health maintenance     Other cerebral palsy-stable   Pt has no complaint    [id number]-[id number] of   He declines vaccinations today     Typical atrial flutter- followed by Cardiology  Asymptomatic today    Essential hypertension-well controlled  -     lisinopriL-hydrochlorothiazide (PRINZIDE,ZESTORETIC) 20-12.5 mg per tablet; Take 2 tablets by mouth once daily.  Dispense: 180 tablet; Refill: 3  -     Cancel: Lipid panel; Future; Expected date: 03/12/2020  -     Cancel: Comprehensive metabolic panel; Future; Expected date: 03/12/2020  -     Lipid panel; Future; Expected date: 03/12/2020    Swelling  -     furosemide (LASIX) 20 MG tablet; Take 1 tablet (20 mg total) by mouth once daily.  Dispense: 180 tablet; Refill: 1    Encounter for screening for human immunodeficiency virus (HIV)   -     HIV 1/2 Ag/Ab (4th Gen); Future; Expected date: 03/12/2020    Constipation, unspecified constipation type  -     polyethylene glycol (GLYCOLAX) 17 gram PwPk; Take 17 g by mouth once daily.  Dispense: 100 packet; Refill: 0        Follow up in 1 year or as needed     Therese Lala  MD  [facility]

## 2020-03-13 LAB
ALBUMIN SERPL BCP-MCNC: 3.8 G/DL (ref 3.5–5.2)
ALP SERPL-CCNC: 74 U/L (ref 55–135)
ALT SERPL W/O P-5'-P-CCNC: 39 U/L (ref 10–44)
ANION GAP SERPL CALC-SCNC: 15 MMOL/L (ref 8–16)
AST SERPL-CCNC: 44 U/L (ref 10–40)
BILIRUB SERPL-MCNC: 0.9 MG/DL (ref 0.1–1)
BUN SERPL-MCNC: 7 MG/DL (ref 6–20)
CALCIUM SERPL-MCNC: 9.6 MG/DL (ref 8.7–10.5)
CHLORIDE SERPL-SCNC: 100 MMOL/L (ref 95–110)
CHOLEST SERPL-MCNC: 243 MG/DL (ref 120–199)
CHOLEST/HDLC SERPL: 4.8 {RATIO} (ref 2–5)
CO2 SERPL-SCNC: 26 MMOL/L (ref 23–29)
CREAT SERPL-MCNC: 0.7 MG/DL (ref 0.5–1.4)
EST. GFR  (AFRICAN AMERICAN): >60 ML/MIN/1.73 M^2
EST. GFR  (NON AFRICAN AMERICAN): >60 ML/MIN/1.73 M^2
GLUCOSE SERPL-MCNC: 79 MG/DL (ref 70–110)
HDLC SERPL-MCNC: 51 MG/DL (ref 40–75)
HDLC SERPL: 21 % (ref 20–50)
HIV 1+2 AB+HIV1 P24 AG SERPL QL IA: NEGATIVE
LDLC SERPL CALC-MCNC: 154.4 MG/DL (ref 63–159)
NONHDLC SERPL-MCNC: 192 MG/DL
POTASSIUM SERPL-SCNC: 3.3 MMOL/L (ref 3.5–5.1)
PROT SERPL-MCNC: 7.1 G/DL (ref 6–8.4)
SODIUM SERPL-SCNC: 141 MMOL/L (ref 136–145)
TRIGL SERPL-MCNC: 188 MG/DL (ref 30–150)

## 2020-03-16 ENCOUNTER — PATIENT MESSAGE (OUTPATIENT)
Dept: INTERNAL MEDICINE | Facility: CLINIC | Age: 50
End: 2020-03-16

## 2020-03-16 RX ORDER — POLYETHYLENE GLYCOL 3350 17 G/17G
17 POWDER, FOR SOLUTION ORAL DAILY
Qty: 595 G | Refills: 0 | Status: ON HOLD | OUTPATIENT
Start: 2020-03-16 | End: 2020-12-10 | Stop reason: HOSPADM

## 2020-05-28 DIAGNOSIS — I10 ESSENTIAL HYPERTENSION: ICD-10-CM

## 2020-05-28 RX ORDER — METOPROLOL SUCCINATE 50 MG/1
50 TABLET, EXTENDED RELEASE ORAL DAILY
Qty: 90 TABLET | Refills: 0 | Status: SHIPPED | OUTPATIENT
Start: 2020-05-28 | End: 2020-05-29 | Stop reason: SDUPTHER

## 2020-05-28 RX ORDER — METOPROLOL SUCCINATE 50 MG/1
TABLET, EXTENDED RELEASE ORAL
Qty: 90 TABLET | Refills: 0 | Status: SHIPPED | OUTPATIENT
Start: 2020-05-28 | End: 2021-01-11 | Stop reason: ALTCHOICE

## 2020-05-28 NOTE — TELEPHONE ENCOUNTER
Please call pt.  Overdue for f/u appt.  Please schedule clinic visit within next 3 months.    Dr Mir

## 2020-05-29 ENCOUNTER — PATIENT MESSAGE (OUTPATIENT)
Dept: CARDIOLOGY | Facility: CLINIC | Age: 50
End: 2020-05-29

## 2020-05-29 RX ORDER — METOPROLOL SUCCINATE 50 MG/1
50 TABLET, EXTENDED RELEASE ORAL DAILY
Qty: 90 TABLET | Refills: 0 | Status: SHIPPED | OUTPATIENT
Start: 2020-05-29 | End: 2020-10-14 | Stop reason: SDUPTHER

## 2020-05-29 NOTE — TELEPHONE ENCOUNTER
I called the patient and he would like to schedule the follow up on his own. He was notified that if we were asked to schedule him again we would call him back.

## 2020-06-05 DIAGNOSIS — I10 ESSENTIAL HYPERTENSION: Primary | ICD-10-CM

## 2020-10-14 DIAGNOSIS — I10 ESSENTIAL HYPERTENSION: ICD-10-CM

## 2020-10-14 RX ORDER — METOPROLOL SUCCINATE 50 MG/1
50 TABLET, EXTENDED RELEASE ORAL DAILY
Qty: 90 TABLET | Refills: 0 | Status: SHIPPED | OUTPATIENT
Start: 2020-10-14 | End: 2021-01-14

## 2020-11-10 ENCOUNTER — TELEPHONE (OUTPATIENT)
Dept: CARDIOLOGY | Facility: CLINIC | Age: 50
End: 2020-11-10

## 2020-11-10 DIAGNOSIS — I10 HYPERTENSION, UNSPECIFIED TYPE: Primary | ICD-10-CM

## 2020-11-11 ENCOUNTER — OFFICE VISIT (OUTPATIENT)
Dept: CARDIOLOGY | Facility: CLINIC | Age: 50
End: 2020-11-11
Payer: MEDICARE

## 2020-11-11 VITALS
HEIGHT: 77 IN | OXYGEN SATURATION: 97 % | SYSTOLIC BLOOD PRESSURE: 110 MMHG | HEART RATE: 74 BPM | BODY MASS INDEX: 32.25 KG/M2 | WEIGHT: 273.13 LBS | DIASTOLIC BLOOD PRESSURE: 80 MMHG

## 2020-11-11 DIAGNOSIS — R00.2 PALPITATIONS: Primary | ICD-10-CM

## 2020-11-11 DIAGNOSIS — I10 ESSENTIAL HYPERTENSION: ICD-10-CM

## 2020-11-11 DIAGNOSIS — R74.8 ABNORMAL CK: ICD-10-CM

## 2020-11-11 DIAGNOSIS — R07.89 ATYPICAL CHEST PAIN: ICD-10-CM

## 2020-11-11 DIAGNOSIS — Z82.49 FAMILY HISTORY OF CARDIOVASCULAR DISEASE: ICD-10-CM

## 2020-11-11 DIAGNOSIS — I34.0 NONRHEUMATIC MITRAL VALVE REGURGITATION: ICD-10-CM

## 2020-11-11 DIAGNOSIS — I10 HYPERTENSION, UNSPECIFIED TYPE: ICD-10-CM

## 2020-11-11 DIAGNOSIS — I48.3 TYPICAL ATRIAL FLUTTER: ICD-10-CM

## 2020-11-11 DIAGNOSIS — E78.2 MIXED HYPERLIPIDEMIA: ICD-10-CM

## 2020-11-11 DIAGNOSIS — E87.6 HYPOKALEMIA: ICD-10-CM

## 2020-11-11 DIAGNOSIS — Z72.0 TOBACCO ABUSE: ICD-10-CM

## 2020-11-11 PROCEDURE — 99214 OFFICE O/P EST MOD 30 MIN: CPT | Mod: S$GLB,,, | Performed by: INTERNAL MEDICINE

## 2020-11-11 PROCEDURE — 3079F DIAST BP 80-89 MM HG: CPT | Mod: CPTII,S$GLB,, | Performed by: INTERNAL MEDICINE

## 2020-11-11 PROCEDURE — 99214 PR OFFICE/OUTPT VISIT, EST, LEVL IV, 30-39 MIN: ICD-10-PCS | Mod: S$GLB,,, | Performed by: INTERNAL MEDICINE

## 2020-11-11 PROCEDURE — 93005 ELECTROCARDIOGRAM TRACING: CPT

## 2020-11-11 PROCEDURE — 93010 EKG 12-LEAD: ICD-10-PCS | Mod: S$GLB,,, | Performed by: INTERNAL MEDICINE

## 2020-11-11 PROCEDURE — 3074F SYST BP LT 130 MM HG: CPT | Mod: CPTII,S$GLB,, | Performed by: INTERNAL MEDICINE

## 2020-11-11 PROCEDURE — 99999 PR PBB SHADOW E&M-EST. PATIENT-LVL IV: CPT | Mod: PBBFAC,,, | Performed by: INTERNAL MEDICINE

## 2020-11-11 PROCEDURE — 3079F PR MOST RECENT DIASTOLIC BLOOD PRESSURE 80-89 MM HG: ICD-10-PCS | Mod: CPTII,S$GLB,, | Performed by: INTERNAL MEDICINE

## 2020-11-11 PROCEDURE — 3008F PR BODY MASS INDEX (BMI) DOCUMENTED: ICD-10-PCS | Mod: CPTII,S$GLB,, | Performed by: INTERNAL MEDICINE

## 2020-11-11 PROCEDURE — 3074F PR MOST RECENT SYSTOLIC BLOOD PRESSURE < 130 MM HG: ICD-10-PCS | Mod: CPTII,S$GLB,, | Performed by: INTERNAL MEDICINE

## 2020-11-11 PROCEDURE — 99999 PR PBB SHADOW E&M-EST. PATIENT-LVL IV: ICD-10-PCS | Mod: PBBFAC,,, | Performed by: INTERNAL MEDICINE

## 2020-11-11 PROCEDURE — 93010 ELECTROCARDIOGRAM REPORT: CPT | Mod: S$GLB,,, | Performed by: INTERNAL MEDICINE

## 2020-11-11 PROCEDURE — 3008F BODY MASS INDEX DOCD: CPT | Mod: CPTII,S$GLB,, | Performed by: INTERNAL MEDICINE

## 2020-11-11 RX ORDER — LISINOPRIL AND HYDROCHLOROTHIAZIDE 12.5; 2 MG/1; MG/1
1 TABLET ORAL DAILY
Qty: 180 TABLET | Refills: 3
Start: 2020-11-11 | End: 2021-08-11

## 2020-11-12 NOTE — PROGRESS NOTES
Subjective:    Patient ID:  Valerio Yan is a 50 y.o. male who presents for evaluation of Palpitations, Hypertension, Risk Factor Management For Atherosclerosis, Hyperlipidemia, and Atrial Flutter        HPI Mr. Yan presents for f/u.   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  H/o Guillain-[place] syndrome age 36.  + family h/o CAD (father MI in 50's, cabg; mother w MI).  Sister has had PCI age 55.  Pt taken off xarelto after his PAFL ablation.  Negative stress mpi Jan 2017.  Echo Jan 2017 showed normal LV function, LVH.  Now here.  Last seen Feb 2019.  Has h/o atypical cp sxs.  Current not bothering him.  Quit smoking 5/20.  HTN controlled on current meds.  He has had palpitations over last month, at rest, sxs x one hour.  Spontaneously resolves.  Associated with dyspnea.  States HR gets up to near 100 bpm.  ecg today sinus dyana 59 bpm, normal otherwise.   Taken off statin for elevated muscle enzymes 2019.  - stress MPI Feb 2019.  Echo Feb 2019 normal LVEF, LVH, LAE.  - Holter Feb 2019.        Current Outpatient Medications   Medication Instructions    calcium-vitamin D 600 mg, 1,500mg,-200 unit 600 mg(1,500mg) -200 unit Tab 1 tablet, Oral, Daily    cloNIDine (CATAPRES) 0.1 MG tablet Take 1 pill for BP > 160/90; may repeat once in 30 minutes    cyanocobalamin, vitamin B-12, (VITAMIN B-12 INJ) Injection, Every 30 days    esomeprazole (NEXIUM) 40 mg, Oral, Before breakfast    furosemide (LASIX) 20 mg, Oral, Daily    ibuprofen (ADVIL,MOTRIN) 800 mg, Oral, 3 times daily    lisinopriL-hydrochlorothiazide (PRINZIDE,ZESTORETIC) 20-12.5 mg per tablet 1 tablet, Oral, Daily    metoprolol succinate (TOPROL-XL) 50 MG 24 hr tablet Take 1 tablet by mouth once daily    metoprolol succinate (TOPROL-XL) 50 mg, Oral, Daily    multivitamin (ONE DAILY MULTIVITAMIN) per tablet 1 tablet, Oral, Nightly    multivitamin with minerals (MULTIPLE VITAMIN-MINERALS) tablet  "1 tablet, Daily    polyethylene glycol (GLYCOLAX) 17 g, Oral, Daily         Review of Systems   Constitution: Negative.   HENT: Negative.    Eyes: Negative.    Cardiovascular: Positive for dyspnea on exertion and palpitations.   Respiratory: Positive for shortness of breath.    Endocrine: Negative.    Hematologic/Lymphatic: Negative.    Skin: Negative.    Musculoskeletal: Negative.    Gastrointestinal: Negative.    Genitourinary: Negative.    Neurological: Negative.    Psychiatric/Behavioral: Negative.    Allergic/Immunologic: Negative.        /80 (BP Location: Right arm, Patient Position: Sitting, BP Method: Large (Manual))   Pulse 74   Ht 6' 5" (1.956 m)   Wt 123.9 kg (273 lb 2.4 oz)   SpO2 97%   BMI 32.39 kg/m²     Wt Readings from Last 3 Encounters:   11/11/20 123.9 kg (273 lb 2.4 oz)   03/12/20 119.7 kg (263 lb 14.3 oz)   11/18/19 121.5 kg (267 lb 13.7 oz)     Temp Readings from Last 3 Encounters:   03/12/20 96 °F (35.6 °C)   11/18/19 98.7 °F (37.1 °C) (Oral)   11/11/19 99 °F (37.2 °C) (Oral)     BP Readings from Last 3 Encounters:   11/11/20 110/80   03/12/20 128/82   11/18/19 102/66     Pulse Readings from Last 3 Encounters:   11/11/20 74   03/12/20 61   11/18/19 61          Objective:    Physical Exam   Constitutional: He is oriented to person, place, and time. He appears well-developed and well-nourished.   HENT:   Head: Normocephalic.   Neck: Normal range of motion. Neck supple. Normal carotid pulses, no hepatojugular reflux and no JVD present. Carotid bruit is not present. No thyromegaly present.   Cardiovascular: Normal rate, regular rhythm, S1 normal and S2 normal. PMI is not displaced. Exam reveals no S3, no S4, no distant heart sounds, no friction rub, no midsystolic click and no opening snap.   No murmur heard.  Pulses:       Radial pulses are 2+ on the right side and 2+ on the left side.   Pulmonary/Chest: Effort normal and breath sounds normal. He has no wheezes. He has no rales. "   Abdominal: Soft. Bowel sounds are normal. He exhibits no distension, no abdominal bruit, no ascites and no mass. There is no abdominal tenderness.   Musculoskeletal:         General: No edema.   Neurological: He is alert and oriented to person, place, and time.   Skin: Skin is warm.   Psychiatric: He has a normal mood and affect. His behavior is normal.   Nursing note and vitals reviewed.      I have reviewed all pertinent labs and cardiac studies.          Chemistry        Component Value Date/Time     03/12/2020 1101    K 3.3 (L) 03/12/2020 1101     03/12/2020 1101    CO2 26 03/12/2020 1101    BUN 7 03/12/2020 1101    CREATININE 0.7 03/12/2020 1101    GLU 79 03/12/2020 1101        Component Value Date/Time    CALCIUM 9.6 03/12/2020 1101    ALKPHOS 74 03/12/2020 1101    AST 44 (H) 03/12/2020 1101    ALT 39 03/12/2020 1101    BILITOT 0.9 03/12/2020 1101    ESTGFRAFRICA >60.0 03/12/2020 1101    EGFRNONAA >60.0 03/12/2020 1101        Lab Results   Component Value Date    WBC 9.41 05/13/2019    HGB 15.5 05/13/2019    HCT 46.8 05/13/2019    MCV 97 05/13/2019     05/13/2019       Lab Results   Component Value Date    HGBA1C 4.8 05/31/2017     Lab Results   Component Value Date    CHOL 243 (H) 03/12/2020    CHOL 163 04/03/2018    CHOL 190 05/03/2017     Lab Results   Component Value Date    HDL 51 03/12/2020    HDL 52 04/03/2018    HDL 60 05/03/2017     Lab Results   Component Value Date    LDLCALC 154.4 03/12/2020    LDLCALC 91.0 04/03/2018    LDLCALC 88.6 05/03/2017     Lab Results   Component Value Date    TRIG 188 (H) 03/12/2020    TRIG 100 04/03/2018    TRIG 207 (H) 05/03/2017     Lab Results   Component Value Date    CHOLHDL 21.0 03/12/2020    CHOLHDL 31.9 04/03/2018    CHOLHDL 31.6 05/03/2017       Lab Results   Component Value Date    TSH 1.036 05/31/2017           Assessment:       1. Palpitations    2. Typical atrial flutter    3. Tobacco abuse    4. Atypical chest pain    5. Nonrheumatic  mitral valve regurgitation    6. Mixed hyperlipidemia    7. Essential hypertension    8. Hypokalemia    9. Abnormal CK    10. Family history of cardiovascular disease         Plan:             Take Toproxl XL 50 mg qd instead of the 25 mg qd that he is currently taking.  Repeat labs -- assess electrolytes as his potassium was low earlier in the year.  He will call me if his HR continues to bother him and if so, will line up a Zio Holter (he declines today).  Update lipids.  Check CK enzymes.  Continue to refrain from smoking.   Stable cardiovascular conditions at present time on current medical treatment.  Reviewed all tests and above medical conditions with patient in detail and formulated treatment plan.  Continue optimal medical treatment for cardiovascular conditions.  Cardiac low salt diet discussed.  Daily exercise encouraged, goal 30 +  minutes aerobic exercise as tolerated.  Maintaining healthy weight and weight loss goals (if needed) were discussed in clinic.  At risk for CAD.    Discussed coronary calcium score, pros/cons.  He will schedule if interested.   Phone review for test results.

## 2020-11-17 ENCOUNTER — PATIENT MESSAGE (OUTPATIENT)
Dept: CARDIOLOGY | Facility: CLINIC | Age: 50
End: 2020-11-17

## 2020-11-18 ENCOUNTER — PATIENT MESSAGE (OUTPATIENT)
Dept: CARDIOLOGY | Facility: CLINIC | Age: 50
End: 2020-11-18

## 2020-11-19 ENCOUNTER — TELEPHONE (OUTPATIENT)
Dept: CARDIOLOGY | Facility: CLINIC | Age: 50
End: 2020-11-19

## 2020-11-19 ENCOUNTER — TELEPHONE (OUTPATIENT)
Dept: RADIOLOGY | Facility: HOSPITAL | Age: 50
End: 2020-11-19

## 2020-11-20 ENCOUNTER — PATIENT OUTREACH (OUTPATIENT)
Dept: ADMINISTRATIVE | Facility: OTHER | Age: 50
End: 2020-11-20

## 2020-11-20 ENCOUNTER — PATIENT MESSAGE (OUTPATIENT)
Dept: CARDIOLOGY | Facility: CLINIC | Age: 50
End: 2020-11-20

## 2020-11-20 ENCOUNTER — OFFICE VISIT (OUTPATIENT)
Dept: GASTROENTEROLOGY | Facility: CLINIC | Age: 50
End: 2020-11-20
Payer: MEDICARE

## 2020-11-20 DIAGNOSIS — K22.70 BARRETT'S ESOPHAGUS WITHOUT DYSPLASIA: Primary | ICD-10-CM

## 2020-11-20 DIAGNOSIS — G61.0 GUILLAIN BARRÉ SYNDROME: ICD-10-CM

## 2020-11-20 DIAGNOSIS — K21.9 GASTROESOPHAGEAL REFLUX DISEASE WITHOUT ESOPHAGITIS: ICD-10-CM

## 2020-11-20 DIAGNOSIS — K59.09 CHRONIC CONSTIPATION: ICD-10-CM

## 2020-11-20 PROCEDURE — 99212 PR OFFICE/OUTPT VISIT, EST, LEVL II, 10-19 MIN: ICD-10-PCS | Mod: 95,,, | Performed by: INTERNAL MEDICINE

## 2020-11-20 PROCEDURE — 99499 UNLISTED E&M SERVICE: CPT | Mod: 95,,, | Performed by: INTERNAL MEDICINE

## 2020-11-20 PROCEDURE — 99499 RISK ADDL DX/OHS AUDIT: ICD-10-PCS | Mod: 95,,, | Performed by: INTERNAL MEDICINE

## 2020-11-20 PROCEDURE — 99212 OFFICE O/P EST SF 10 MIN: CPT | Mod: 95,,, | Performed by: INTERNAL MEDICINE

## 2020-11-20 NOTE — PROGRESS NOTES
Subjective:       Patient ID: Valerio Yan is a 50 y.o. male.    Chief Complaint: No chief complaint on file.    The patient is known to our service from previous encounters.  He was last evaluated endoscopically in May of 2018 because of known history of Ozuna's esophagus.  He was due for repeat EGD in May of this year; however, because of COVID his study was not even be done.  He needs to be scheduled now.    He is also complaining of issues with constipation.  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  He is no longer requiring this medication, but seems from time to time to have difficulties continuing with constipation.  He had been on MiraLax, it is concerned now that he is having some difficulty with constipation presently.  He is advised to resume his MiraLax; it is unlikely that repeating a colonoscopy at this point would be of any benefit.    Review of Systems   Constitutional: Negative.  Negative for activity change, appetite change, chills, diaphoresis, fatigue, fever and unexpected weight change.   HENT: Negative for congestion, ear discharge, facial swelling, hearing loss, nosebleeds, postnasal drip, sinus pressure, sneezing, tinnitus, trouble swallowing and voice change.    Eyes: Negative for photophobia, redness and visual disturbance.   Respiratory: Positive for shortness of breath. Negative for cough, chest tightness and wheezing.         Mainly dyspnea on exertion   Cardiovascular: Positive for palpitations. Negative for chest pain.   Gastrointestinal: Positive for constipation. Negative for abdominal distention, abdominal pain, blood in stool, diarrhea, nausea, rectal pain and vomiting.   Genitourinary: Negative for difficulty urinating, discharge, dysuria, flank pain, frequency, hematuria, scrotal swelling, testicular pain and urgency.   Musculoskeletal: Negative for arthralgias, back pain,  gait problem, joint swelling, myalgias and neck stiffness.   Skin: Negative for color change, pallor, rash and wound.   Neurological: Negative for dizziness, tremors, seizures, syncope, facial asymmetry, speech difficulty, weakness, light-headedness, numbness and headaches.   Hematological: Negative for adenopathy. Does not bruise/bleed easily.   Psychiatric/Behavioral: Negative for agitation, confusion, hallucinations, sleep disturbance and suicidal ideas.       Objective:      Physical Exam    Assessment:   GERD  Ozuna's esophagus secondary to above  Chronic Constipation  Guillain-[place]  neuropathy  Plan:   EGD  Presumed MiraLax

## 2020-11-20 NOTE — PROGRESS NOTES
Health Maintenance Due   Topic Date Due    TETANUS VACCINE  10/15/1986    Shingles Vaccine (1 of 2) 10/15/2018     Updates were requested from care everywhere.  Chart was reviewed for overdue Proactive [facility] Encounters (JACLYN) topics (CRS, Breast Cancer Screening, Eye exam)  Health Maintenance has been updated.  LINKS immunization registry triggered.  LINKS not responding.

## 2020-11-23 ENCOUNTER — PATIENT MESSAGE (OUTPATIENT)
Dept: CARDIOLOGY | Facility: CLINIC | Age: 50
End: 2020-11-23

## 2020-11-25 ENCOUNTER — TELEPHONE (OUTPATIENT)
Dept: CARDIOLOGY | Facility: CLINIC | Age: 50
End: 2020-11-25

## 2020-11-25 ENCOUNTER — PATIENT MESSAGE (OUTPATIENT)
Dept: NEUROLOGY | Facility: CLINIC | Age: 50
End: 2020-11-25

## 2020-11-25 NOTE — TELEPHONE ENCOUNTER
----- Message from Herminia Carrasco sent at 11/25/2020 10:32 AM CST -----  Contact: Valerio Rascon called in regarding speaking to the nurse about his lab results. Please give him a call back at [phone].    Thanks,  Pb

## 2020-11-30 ENCOUNTER — PATIENT MESSAGE (OUTPATIENT)
Dept: INTERNAL MEDICINE | Facility: CLINIC | Age: 50
End: 2020-11-30

## 2020-11-30 DIAGNOSIS — S69.92XA HAND INJURY, LEFT, INITIAL ENCOUNTER: Primary | ICD-10-CM

## 2020-12-01 ENCOUNTER — TELEPHONE (OUTPATIENT)
Dept: RADIOLOGY | Facility: HOSPITAL | Age: 50
End: 2020-12-01

## 2020-12-01 ENCOUNTER — PATIENT MESSAGE (OUTPATIENT)
Dept: INTERNAL MEDICINE | Facility: CLINIC | Age: 50
End: 2020-12-01

## 2020-12-02 ENCOUNTER — HOSPITAL ENCOUNTER (OUTPATIENT)
Dept: RADIOLOGY | Facility: HOSPITAL | Age: 50
Discharge: HOME | End: 2020-12-02
Attending: FAMILY MEDICINE
Payer: MEDICARE

## 2020-12-02 ENCOUNTER — TELEPHONE (OUTPATIENT)
Dept: CARDIOLOGY | Facility: HOSPITAL | Age: 50
End: 2020-12-02

## 2020-12-02 ENCOUNTER — HOSPITAL ENCOUNTER (OUTPATIENT)
Dept: RADIOLOGY | Facility: HOSPITAL | Age: 50
Discharge: HOME | End: 2020-12-02
Attending: INTERNAL MEDICINE
Payer: MEDICARE

## 2020-12-02 DIAGNOSIS — S69.92XA HAND INJURY, LEFT, INITIAL ENCOUNTER: ICD-10-CM

## 2020-12-02 DIAGNOSIS — R74.8 ABNORMAL CK: ICD-10-CM

## 2020-12-02 DIAGNOSIS — R07.89 ATYPICAL CHEST PAIN: ICD-10-CM

## 2020-12-02 DIAGNOSIS — Z72.0 TOBACCO ABUSE: ICD-10-CM

## 2020-12-02 DIAGNOSIS — I34.0 NONRHEUMATIC MITRAL VALVE REGURGITATION: ICD-10-CM

## 2020-12-02 DIAGNOSIS — I48.3 TYPICAL ATRIAL FLUTTER: ICD-10-CM

## 2020-12-02 DIAGNOSIS — E78.2 MIXED HYPERLIPIDEMIA: ICD-10-CM

## 2020-12-02 DIAGNOSIS — R00.2 PALPITATIONS: ICD-10-CM

## 2020-12-02 DIAGNOSIS — Z82.49 FAMILY HISTORY OF CARDIOVASCULAR DISEASE: ICD-10-CM

## 2020-12-02 DIAGNOSIS — E87.6 HYPOKALEMIA: ICD-10-CM

## 2020-12-02 DIAGNOSIS — I10 ESSENTIAL HYPERTENSION: ICD-10-CM

## 2020-12-02 PROCEDURE — 75571 CT CALCIUM SCORING CARDIAC: ICD-10-PCS | Mod: 26,,, | Performed by: RADIOLOGY

## 2020-12-02 PROCEDURE — 73130 X-RAY EXAM OF HAND: CPT | Mod: TC,LT

## 2020-12-02 PROCEDURE — 73130 X-RAY EXAM OF HAND: CPT | Mod: 26,LT,, | Performed by: RADIOLOGY

## 2020-12-02 PROCEDURE — 75571 CT HRT W/O DYE W/CA TEST: CPT | Mod: 26,,, | Performed by: RADIOLOGY

## 2020-12-02 PROCEDURE — 73130 XR HAND COMPLETE 3 VIEW LEFT: ICD-10-PCS | Mod: 26,LT,, | Performed by: RADIOLOGY

## 2020-12-02 PROCEDURE — 75571 CT HRT W/O DYE W/CA TEST: CPT | Mod: TC

## 2020-12-03 NOTE — TELEPHONE ENCOUNTER
Please call pt.  Needs f/u appt this month to discuss his abnormal coronary calcium score test results and make further treatment decisions.    Dr Mir

## 2020-12-03 NOTE — TELEPHONE ENCOUNTER
Successfully spoke with pt in regards to scheduling appt. Set for 12/7 @ 8:40  Understanding was verbalized with no questions.

## 2020-12-06 PROBLEM — I25.10 CAD IN NATIVE ARTERY: Status: ACTIVE | Noted: 2020-12-06

## 2020-12-06 PROBLEM — R93.1 ELEVATED CORONARY ARTERY CALCIUM SCORE: Status: ACTIVE | Noted: 2020-12-06

## 2020-12-07 ENCOUNTER — OFFICE VISIT (OUTPATIENT)
Dept: CARDIOLOGY | Facility: CLINIC | Age: 50
End: 2020-12-07
Payer: MEDICARE

## 2020-12-07 ENCOUNTER — TELEPHONE (OUTPATIENT)
Dept: CARDIOLOGY | Facility: CLINIC | Age: 50
End: 2020-12-07

## 2020-12-07 ENCOUNTER — LAB VISIT (OUTPATIENT)
Dept: LAB | Facility: HOSPITAL | Age: 50
End: 2020-12-07
Attending: INTERNAL MEDICINE
Payer: MEDICARE

## 2020-12-07 ENCOUNTER — SPECIALTY PHARMACY (OUTPATIENT)
Dept: PHARMACY | Facility: CLINIC | Age: 50
End: 2020-12-07

## 2020-12-07 VITALS
BODY MASS INDEX: 32.85 KG/M2 | WEIGHT: 278.25 LBS | SYSTOLIC BLOOD PRESSURE: 130 MMHG | DIASTOLIC BLOOD PRESSURE: 80 MMHG | HEIGHT: 77 IN | HEART RATE: 63 BPM | OXYGEN SATURATION: 98 %

## 2020-12-07 DIAGNOSIS — R93.1 ELEVATED CORONARY ARTERY CALCIUM SCORE: ICD-10-CM

## 2020-12-07 DIAGNOSIS — I48.92 ATRIAL FLUTTER, UNSPECIFIED TYPE: ICD-10-CM

## 2020-12-07 DIAGNOSIS — I25.10 CAD IN NATIVE ARTERY: ICD-10-CM

## 2020-12-07 DIAGNOSIS — I48.92 ATRIAL FIBRILLATION AND FLUTTER: ICD-10-CM

## 2020-12-07 DIAGNOSIS — I10 ESSENTIAL HYPERTENSION: ICD-10-CM

## 2020-12-07 DIAGNOSIS — I25.10 CAD IN NATIVE ARTERY: Primary | ICD-10-CM

## 2020-12-07 DIAGNOSIS — R93.1 ELEVATED CORONARY ARTERY CALCIUM SCORE: Primary | ICD-10-CM

## 2020-12-07 DIAGNOSIS — I20.89 ATYPICAL ANGINA: ICD-10-CM

## 2020-12-07 DIAGNOSIS — I34.0 NONRHEUMATIC MITRAL VALVE REGURGITATION: ICD-10-CM

## 2020-12-07 DIAGNOSIS — I48.3 TYPICAL ATRIAL FLUTTER: ICD-10-CM

## 2020-12-07 DIAGNOSIS — Z01.810 PRE-PROCEDURAL CARDIOVASCULAR EXAMINATION: Primary | ICD-10-CM

## 2020-12-07 DIAGNOSIS — I11.9 HYPERTENSIVE LEFT VENTRICULAR HYPERTROPHY, WITHOUT HEART FAILURE: ICD-10-CM

## 2020-12-07 DIAGNOSIS — I48.91 ATRIAL FIBRILLATION AND FLUTTER: ICD-10-CM

## 2020-12-07 DIAGNOSIS — R07.9 CHEST PAIN, UNSPECIFIED TYPE: ICD-10-CM

## 2020-12-07 DIAGNOSIS — R74.8 ABNORMAL CK: ICD-10-CM

## 2020-12-07 DIAGNOSIS — E78.2 MIXED HYPERLIPIDEMIA: ICD-10-CM

## 2020-12-07 DIAGNOSIS — R07.89 ATYPICAL CHEST PAIN: ICD-10-CM

## 2020-12-07 DIAGNOSIS — Z82.49 FAMILY HISTORY OF CARDIOVASCULAR DISEASE: ICD-10-CM

## 2020-12-07 LAB
APTT BLDCRRT: 31.2 SEC (ref 21–32)
BASOPHILS # BLD AUTO: 0.04 K/UL (ref 0–0.2)
BASOPHILS NFR BLD: 0.5 % (ref 0–1.9)
DIFFERENTIAL METHOD: ABNORMAL
EOSINOPHIL # BLD AUTO: 0.2 K/UL (ref 0–0.5)
EOSINOPHIL NFR BLD: 2.1 % (ref 0–8)
ERYTHROCYTE [DISTWIDTH] IN BLOOD BY AUTOMATED COUNT: 13.4 % (ref 11.5–14.5)
HCT VFR BLD AUTO: 46.2 % (ref 40–54)
HGB BLD-MCNC: 15 G/DL (ref 14–18)
IMM GRANULOCYTES # BLD AUTO: 0.04 K/UL (ref 0–0.04)
IMM GRANULOCYTES NFR BLD AUTO: 0.5 % (ref 0–0.5)
INR PPP: 1 (ref 0.8–1.2)
LYMPHOCYTES # BLD AUTO: 2.5 K/UL (ref 1–4.8)
LYMPHOCYTES NFR BLD: 30.7 % (ref 18–48)
MCH RBC QN AUTO: 31.7 PG (ref 27–31)
MCHC RBC AUTO-ENTMCNC: 32.5 G/DL (ref 32–36)
MCV RBC AUTO: 98 FL (ref 82–98)
MONOCYTES # BLD AUTO: 0.6 K/UL (ref 0.3–1)
MONOCYTES NFR BLD: 7.7 % (ref 4–15)
NEUTROPHILS # BLD AUTO: 4.8 K/UL (ref 1.8–7.7)
NEUTROPHILS NFR BLD: 58.5 % (ref 38–73)
NRBC BLD-RTO: 0 /100 WBC
PLATELET # BLD AUTO: 210 K/UL (ref 150–350)
PMV BLD AUTO: 11.4 FL (ref 9.2–12.9)
PROTHROMBIN TIME: 10.9 SEC (ref 9–12.5)
RBC # BLD AUTO: 4.73 M/UL (ref 4.6–6.2)
WBC # BLD AUTO: 8.14 K/UL (ref 3.9–12.7)

## 2020-12-07 PROCEDURE — 1126F AMNT PAIN NOTED NONE PRSNT: CPT | Mod: S$GLB,,, | Performed by: INTERNAL MEDICINE

## 2020-12-07 PROCEDURE — 1126F PR PAIN SEVERITY QUANTIFIED, NO PAIN PRESENT: ICD-10-PCS | Mod: S$GLB,,, | Performed by: INTERNAL MEDICINE

## 2020-12-07 PROCEDURE — 85730 THROMBOPLASTIN TIME PARTIAL: CPT

## 2020-12-07 PROCEDURE — 99999 PR PBB SHADOW E&M-EST. PATIENT-LVL III: ICD-10-PCS | Mod: PBBFAC,,, | Performed by: INTERNAL MEDICINE

## 2020-12-07 PROCEDURE — 3008F PR BODY MASS INDEX (BMI) DOCUMENTED: ICD-10-PCS | Mod: CPTII,S$GLB,, | Performed by: INTERNAL MEDICINE

## 2020-12-07 PROCEDURE — 99215 OFFICE O/P EST HI 40 MIN: CPT | Mod: S$GLB,,, | Performed by: INTERNAL MEDICINE

## 2020-12-07 PROCEDURE — 3075F PR MOST RECENT SYSTOLIC BLOOD PRESS GE 130-139MM HG: ICD-10-PCS | Mod: CPTII,S$GLB,, | Performed by: INTERNAL MEDICINE

## 2020-12-07 PROCEDURE — 99215 PR OFFICE/OUTPT VISIT, EST, LEVL V, 40-54 MIN: ICD-10-PCS | Mod: S$GLB,,, | Performed by: INTERNAL MEDICINE

## 2020-12-07 PROCEDURE — 85610 PROTHROMBIN TIME: CPT

## 2020-12-07 PROCEDURE — 3008F BODY MASS INDEX DOCD: CPT | Mod: CPTII,S$GLB,, | Performed by: INTERNAL MEDICINE

## 2020-12-07 PROCEDURE — 3075F SYST BP GE 130 - 139MM HG: CPT | Mod: CPTII,S$GLB,, | Performed by: INTERNAL MEDICINE

## 2020-12-07 PROCEDURE — 80048 BASIC METABOLIC PNL TOTAL CA: CPT

## 2020-12-07 PROCEDURE — 36415 COLL VENOUS BLD VENIPUNCTURE: CPT

## 2020-12-07 PROCEDURE — 3079F DIAST BP 80-89 MM HG: CPT | Mod: CPTII,S$GLB,, | Performed by: INTERNAL MEDICINE

## 2020-12-07 PROCEDURE — 85025 COMPLETE CBC W/AUTO DIFF WBC: CPT

## 2020-12-07 PROCEDURE — 3079F PR MOST RECENT DIASTOLIC BLOOD PRESSURE 80-89 MM HG: ICD-10-PCS | Mod: CPTII,S$GLB,, | Performed by: INTERNAL MEDICINE

## 2020-12-07 PROCEDURE — 99999 PR PBB SHADOW E&M-EST. PATIENT-LVL III: CPT | Mod: PBBFAC,,, | Performed by: INTERNAL MEDICINE

## 2020-12-07 RX ORDER — ASPIRIN 81 MG/1
81 TABLET ORAL DAILY
COMMUNITY

## 2020-12-07 RX ORDER — ISOSORBIDE MONONITRATE 30 MG/1
30 TABLET, EXTENDED RELEASE ORAL DAILY
Qty: 30 TABLET | Refills: 11 | Status: SHIPPED | OUTPATIENT
Start: 2020-12-07 | End: 2022-05-02 | Stop reason: SDUPTHER

## 2020-12-07 NOTE — TELEPHONE ENCOUNTER
Marley    Please call pt.  Schedule [facility] Thurs 12/10/20 [id number].    Dr Mir

## 2020-12-07 NOTE — H&P (VIEW-ONLY)
Subjective:    Patient ID:  Valerio Yan is a 50 y.o. male who presents for evaluation of Coronary Artery Disease, Risk Factor Management For Atherosclerosis, and Hypertension        HPI Mr. Yan presents for f/u.   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  Past hx pertinent for following;  H/o Guillain-Bellaire syndrome age 36.  + family h/o CAD (father MI in 50's, cabg; mother w MI).  Sister has had PCI age 55.  Pt taken off xarelto after his PAFL ablation.  Negative stress MPI Jan 2017.  Echo Jan 2017 showed normal LV function, LVH.  Quit smoking 5/20.  ecg 11/11/20 sinus dyana 59 bpm, normal otherwise.   Taken off statin for elevated muscle enzymes 2019.  - stress MPI Feb 2019.  Echo Feb 2019 normal LVEF, LVH, LAE.  - Holter Feb 2019.  Now here.  Pt recently seen.  Coronary calcium score ordered Nov 2020 and score is high 2893.  He is here to discuss.  Also has chronic elevation of CK enzymes and statin had to be stopped.  Toprol xl increased to 50 mg qd from 25 mg qd last appt Nov 2020.  This was done for palpitation sxs.  H/o atypical cp sxs, stable.  Gets very infrequent chest tightness, not exertional, 1 hour or so.  Spontaneously resolves.  Labs 11/20 from PCP reviewed:  Cholesterol 238, , , CK enzymes 586, K+ wnl.        Current Outpatient Medications:     aspirin (ECOTRIN) 81 MG EC tablet, Take 81 mg by mouth once daily., Disp: , Rfl:     alirocumab (PRALUENT PEN) 150 mg/mL Donnell, Inject 1 mL (150 mg total) into the skin every 14 (fourteen) days., Disp: 2 mL, Rfl: 12    calcium-vitamin D 600 mg, 1,500mg,-200 unit 600 mg(1,500mg) -200 unit Tab, Take 1 tablet by mouth once daily. , Disp: , Rfl:     cloNIDine (CATAPRES) 0.1 MG tablet, Take 1 pill for BP > 160/90; may repeat once in 30 minutes (Patient taking differently: Take 0.1 mg by mouth. Take 1 pill for BP > 160/90; may repeat once in 30 minutes), Disp: 30 tablet, Rfl: 11     cyanocobalamin, vitamin B-12, (VITAMIN B-12 INJ), Inject as directed every 30 days., Disp: , Rfl:     esomeprazole (NEXIUM) 20 MG capsule, Take 2 capsules (40 mg total) by mouth before breakfast. (Patient taking differently: Take 20 mg by mouth before breakfast. ), Disp: 60 capsule, Rfl: 11    furosemide (LASIX) 20 MG tablet, Take 1 tablet (20 mg total) by mouth once daily., Disp: 180 tablet, Rfl: 1    ibuprofen (ADVIL,MOTRIN) 800 MG tablet, Take 1 tablet (800 mg total) by mouth 3 (three) times daily., Disp: 30 tablet, Rfl: 0    isosorbide mononitrate (IMDUR) 30 MG 24 hr tablet, Take 1 tablet (30 mg total) by mouth once daily., Disp: 30 tablet, Rfl: 11    lisinopriL-hydrochlorothiazide (PRINZIDE,ZESTORETIC) 20-12.5 mg per tablet, Take 1 tablet by mouth once daily., Disp: 180 tablet, Rfl: 3    metoprolol succinate (TOPROL-XL) 50 MG 24 hr tablet, Take 1 tablet by mouth once daily, Disp: 90 tablet, Rfl: 0    metoprolol succinate (TOPROL-XL) 50 MG 24 hr tablet, Take 1 tablet (50 mg total) by mouth once daily., Disp: 90 tablet, Rfl: 0    multivitamin (ONE DAILY MULTIVITAMIN) per tablet, Take 1 tablet by mouth nightly. , Disp: , Rfl:     multivitamin with minerals (MULTIPLE VITAMIN-MINERALS) tablet, Take 1 tablet by mouth Daily.  , Disp: , Rfl:     polyethylene glycol (GLYCOLAX) 17 gram/dose powder, Take 17 g by mouth once daily., Disp: 595 g, Rfl: 0  No current facility-administered medications for this visit.     Facility-Administered Medications Ordered in Other Visits:     lactated ringers infusion, , Intravenous, Continuous, Van Gandhi MD, Stopped at 05/02/19 2571      Review of Systems   Constitution: Negative.   HENT: Negative.    Eyes: Negative.    Cardiovascular: Positive for chest pain and palpitations.   Respiratory: Negative.    Endocrine: Negative.    Hematologic/Lymphatic: Negative.    Skin: Negative.    Musculoskeletal: Negative.    Gastrointestinal: Negative.    Genitourinary: Negative.  "   Neurological: Negative.    Psychiatric/Behavioral: Negative.    Allergic/Immunologic: Negative.        /80 (BP Location: Right arm, Patient Position: Sitting, BP Method: Large (Manual))   Pulse 63   Ht 6' 5" (1.956 m)   Wt 126.2 kg (278 lb 3.5 oz)   SpO2 98%   BMI 32.99 kg/m²     Wt Readings from Last 3 Encounters:   12/07/20 126.2 kg (278 lb 3.5 oz)   11/11/20 123.9 kg (273 lb 2.4 oz)   03/12/20 119.7 kg (263 lb 14.3 oz)     Temp Readings from Last 3 Encounters:   03/12/20 96 °F (35.6 °C)   11/18/19 98.7 °F (37.1 °C) (Oral)   11/11/19 99 °F (37.2 °C) (Oral)     BP Readings from Last 3 Encounters:   12/07/20 130/80   11/11/20 110/80   03/12/20 128/82     Pulse Readings from Last 3 Encounters:   12/07/20 63   11/11/20 74   03/12/20 61          Objective:    Physical Exam   Constitutional: He is oriented to person, place, and time. He appears well-developed and well-nourished.   HENT:   Head: Normocephalic.   Neck: Normal range of motion. Neck supple. Normal carotid pulses, no hepatojugular reflux and no JVD present. Carotid bruit is not present. No thyromegaly present.   Cardiovascular: Normal rate, regular rhythm, S1 normal and S2 normal. PMI is not displaced. Exam reveals no S3, no S4, no distant heart sounds, no friction rub, no midsystolic click and no opening snap.   No murmur heard.  Pulses:       Radial pulses are 2+ on the right side and 2+ on the left side.   Pulmonary/Chest: Effort normal and breath sounds normal. He has no wheezes. He has no rales.   Abdominal: Soft. Bowel sounds are normal. He exhibits no distension, no abdominal bruit, no ascites and no mass. There is no abdominal tenderness.   Musculoskeletal:         General: No edema.   Neurological: He is alert and oriented to person, place, and time.   Skin: Skin is warm.   Psychiatric: He has a normal mood and affect. His behavior is normal.   Nursing note and vitals reviewed.      I have reviewed all pertinent labs and cardiac " studies.      Chemistry        Component Value Date/Time     03/12/2020 1101    K 3.3 (L) 03/12/2020 1101     03/12/2020 1101    CO2 26 03/12/2020 1101    BUN 7 03/12/2020 1101    CREATININE 0.7 03/12/2020 1101    GLU 79 03/12/2020 1101        Component Value Date/Time    CALCIUM 9.6 03/12/2020 1101    ALKPHOS 74 03/12/2020 1101    AST 44 (H) 03/12/2020 1101    ALT 39 03/12/2020 1101    BILITOT 0.9 03/12/2020 1101    ESTGFRAFRICA >60.0 03/12/2020 1101    EGFRNONAA >60.0 03/12/2020 1101          Lab Results   Component Value Date    CHOL 243 (H) 03/12/2020    CHOL 163 04/03/2018    CHOL 190 05/03/2017     Lab Results   Component Value Date    HDL 51 03/12/2020    HDL 52 04/03/2018    HDL 60 05/03/2017     Lab Results   Component Value Date    LDLCALC 154.4 03/12/2020    LDLCALC 91.0 04/03/2018    LDLCALC 88.6 05/03/2017     Lab Results   Component Value Date    TRIG 188 (H) 03/12/2020    TRIG 100 04/03/2018    TRIG 207 (H) 05/03/2017     Lab Results   Component Value Date    CHOLHDL 21.0 03/12/2020    CHOLHDL 31.9 04/03/2018    CHOLHDL 31.6 05/03/2017       Calcium score:     0-10: Very little coronary heart disease risk     11 through 100: Low to moderate coronary heart disease risk     101 through 400: Moderate to high coronary heart disease risk     Greater than 401: High coronary heart disease risk.     SCORE SUMMARY     Your total calcium score is 2893     Incidental findings: There appear to be postoperative changes associated with the stomach.     Impression:     Your total calcium score is 2893.  High coronary heart disease risk        Electronically signed by: Terry Huang DO  Date:                                            12/02/2020  Time:                                           15:10        Assessment:       1. Elevated coronary artery calcium score (2893)    2. Typical atrial flutter    3. Mixed hyperlipidemia    4. Nonrheumatic mitral valve regurgitation    5. Abnormal CK    6. Atypical  chest pain    7. Family history of cardiovascular disease    8. Essential hypertension    9. Hypertensive left ventricular hypertrophy, without heart failure    10. CAD in native artery    11. Atypical angina         Plan:               Markedly elevated calcium score 2893.  Recommend proceeding with C to assess severity of underlying CAD as in my experience when the score is this elevated, patient's often have multivessel CAD.  Pt advised to undergo left heart catheterization with possible PCI if warranted.  Pt advised of all risks and benefits of procedure.  Pt advised of alternative approaches and treatment strategies to include medical therapy, PCI as well as surgical revascularization with possible CABG.  All questions answered in clinic.    Discussed pros/cons of LHC vs stress testing again.  Pt agreeable to proceeding.  Add Praluent for lipid control.  No statin due to abnl CK enzymes.  Add Imdur 30 mg qd for CAD.  Patient advised of indications for above medications, risks/benefits and side effect profile.  Reviewed all tests and above medical conditions with patient in detail and formulated treatment plan.  Continue optimal medical treatment for cardiovascular conditions.  Cardiac low salt diet discussed.  Daily exercise encouraged, goal 30 +  minutes aerobic exercise as tolerated.  Maintaining healthy weight and weight loss goals (if needed) were discussed in clinic.  Continue to refrain from smoking.    F/u after cath.    [facility] scheduled Thurs 12/10/20  0730.

## 2020-12-07 NOTE — PROGRESS NOTES
Subjective:    Patient ID:  Valerio Yan is a 50 y.o. male who presents for evaluation of Coronary Artery Disease, Risk Factor Management For Atherosclerosis, and Hypertension        HPI Mr. Yan presents for f/u.   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  Past hx pertinent for following;  H/o Guillain-Parker syndrome age 36.  + family h/o CAD (father MI in 50's, cabg; mother w MI).  Sister has had PCI age 55.  Pt taken off xarelto after his PAFL ablation.  Negative stress MPI Jan 2017.  Echo Jan 2017 showed normal LV function, LVH.  Quit smoking 5/20.  ecg 11/11/20 sinus dyana 59 bpm, normal otherwise.   Taken off statin for elevated muscle enzymes 2019.  - stress MPI Feb 2019.  Echo Feb 2019 normal LVEF, LVH, LAE.  - Holter Feb 2019.  Now here.  Pt recently seen.  Coronary calcium score ordered Nov 2020 and score is high 2893.  He is here to discuss.  Also has chronic elevation of CK enzymes and statin had to be stopped.  Toprol xl increased to 50 mg qd from 25 mg qd last appt Nov 2020.  This was done for palpitation sxs.  H/o atypical cp sxs, stable.  Gets very infrequent chest tightness, not exertional, 1 hour or so.  Spontaneously resolves.  Labs 11/20 from PCP reviewed:  Cholesterol 238, , , CK enzymes 586, K+ wnl.        Current Outpatient Medications:     aspirin (ECOTRIN) 81 MG EC tablet, Take 81 mg by mouth once daily., Disp: , Rfl:     alirocumab (PRALUENT PEN) 150 mg/mL Donnell, Inject 1 mL (150 mg total) into the skin every 14 (fourteen) days., Disp: 2 mL, Rfl: 12    calcium-vitamin D 600 mg, 1,500mg,-200 unit 600 mg(1,500mg) -200 unit Tab, Take 1 tablet by mouth once daily. , Disp: , Rfl:     cloNIDine (CATAPRES) 0.1 MG tablet, Take 1 pill for BP > 160/90; may repeat once in 30 minutes (Patient taking differently: Take 0.1 mg by mouth. Take 1 pill for BP > 160/90; may repeat once in 30 minutes), Disp: 30 tablet, Rfl: 11     cyanocobalamin, vitamin B-12, (VITAMIN B-12 INJ), Inject as directed every 30 days., Disp: , Rfl:     esomeprazole (NEXIUM) 20 MG capsule, Take 2 capsules (40 mg total) by mouth before breakfast. (Patient taking differently: Take 20 mg by mouth before breakfast. ), Disp: 60 capsule, Rfl: 11    furosemide (LASIX) 20 MG tablet, Take 1 tablet (20 mg total) by mouth once daily., Disp: 180 tablet, Rfl: 1    ibuprofen (ADVIL,MOTRIN) 800 MG tablet, Take 1 tablet (800 mg total) by mouth 3 (three) times daily., Disp: 30 tablet, Rfl: 0    isosorbide mononitrate (IMDUR) 30 MG 24 hr tablet, Take 1 tablet (30 mg total) by mouth once daily., Disp: 30 tablet, Rfl: 11    lisinopriL-hydrochlorothiazide (PRINZIDE,ZESTORETIC) 20-12.5 mg per tablet, Take 1 tablet by mouth once daily., Disp: 180 tablet, Rfl: 3    metoprolol succinate (TOPROL-XL) 50 MG 24 hr tablet, Take 1 tablet by mouth once daily, Disp: 90 tablet, Rfl: 0    metoprolol succinate (TOPROL-XL) 50 MG 24 hr tablet, Take 1 tablet (50 mg total) by mouth once daily., Disp: 90 tablet, Rfl: 0    multivitamin (ONE DAILY MULTIVITAMIN) per tablet, Take 1 tablet by mouth nightly. , Disp: , Rfl:     multivitamin with minerals (MULTIPLE VITAMIN-MINERALS) tablet, Take 1 tablet by mouth Daily.  , Disp: , Rfl:     polyethylene glycol (GLYCOLAX) 17 gram/dose powder, Take 17 g by mouth once daily., Disp: 595 g, Rfl: 0  No current facility-administered medications for this visit.     Facility-Administered Medications Ordered in Other Visits:     lactated ringers infusion, , Intravenous, Continuous, Van Gandhi MD, Stopped at 05/02/19 9721      Review of Systems   Constitution: Negative.   HENT: Negative.    Eyes: Negative.    Cardiovascular: Positive for chest pain and palpitations.   Respiratory: Negative.    Endocrine: Negative.    Hematologic/Lymphatic: Negative.    Skin: Negative.    Musculoskeletal: Negative.    Gastrointestinal: Negative.    Genitourinary: Negative.  "   Neurological: Negative.    Psychiatric/Behavioral: Negative.    Allergic/Immunologic: Negative.        /80 (BP Location: Right arm, Patient Position: Sitting, BP Method: Large (Manual))   Pulse 63   Ht 6' 5" (1.956 m)   Wt 126.2 kg (278 lb 3.5 oz)   SpO2 98%   BMI 32.99 kg/m²     Wt Readings from Last 3 Encounters:   12/07/20 126.2 kg (278 lb 3.5 oz)   11/11/20 123.9 kg (273 lb 2.4 oz)   03/12/20 119.7 kg (263 lb 14.3 oz)     Temp Readings from Last 3 Encounters:   03/12/20 96 °F (35.6 °C)   11/18/19 98.7 °F (37.1 °C) (Oral)   11/11/19 99 °F (37.2 °C) (Oral)     BP Readings from Last 3 Encounters:   12/07/20 130/80   11/11/20 110/80   03/12/20 128/82     Pulse Readings from Last 3 Encounters:   12/07/20 63   11/11/20 74   03/12/20 61          Objective:    Physical Exam   Constitutional: He is oriented to person, place, and time. He appears well-developed and well-nourished.   HENT:   Head: Normocephalic.   Neck: Normal range of motion. Neck supple. Normal carotid pulses, no hepatojugular reflux and no JVD present. Carotid bruit is not present. No thyromegaly present.   Cardiovascular: Normal rate, regular rhythm, S1 normal and S2 normal. PMI is not displaced. Exam reveals no S3, no S4, no distant heart sounds, no friction rub, no midsystolic click and no opening snap.   No murmur heard.  Pulses:       Radial pulses are 2+ on the right side and 2+ on the left side.   Pulmonary/Chest: Effort normal and breath sounds normal. He has no wheezes. He has no rales.   Abdominal: Soft. Bowel sounds are normal. He exhibits no distension, no abdominal bruit, no ascites and no mass. There is no abdominal tenderness.   Musculoskeletal:         General: No edema.   Neurological: He is alert and oriented to person, place, and time.   Skin: Skin is warm.   Psychiatric: He has a normal mood and affect. His behavior is normal.   Nursing note and vitals reviewed.      I have reviewed all pertinent labs and cardiac " studies.      Chemistry        Component Value Date/Time     03/12/2020 1101    K 3.3 (L) 03/12/2020 1101     03/12/2020 1101    CO2 26 03/12/2020 1101    BUN 7 03/12/2020 1101    CREATININE 0.7 03/12/2020 1101    GLU 79 03/12/2020 1101        Component Value Date/Time    CALCIUM 9.6 03/12/2020 1101    ALKPHOS 74 03/12/2020 1101    AST 44 (H) 03/12/2020 1101    ALT 39 03/12/2020 1101    BILITOT 0.9 03/12/2020 1101    ESTGFRAFRICA >60.0 03/12/2020 1101    EGFRNONAA >60.0 03/12/2020 1101          Lab Results   Component Value Date    CHOL 243 (H) 03/12/2020    CHOL 163 04/03/2018    CHOL 190 05/03/2017     Lab Results   Component Value Date    HDL 51 03/12/2020    HDL 52 04/03/2018    HDL 60 05/03/2017     Lab Results   Component Value Date    LDLCALC 154.4 03/12/2020    LDLCALC 91.0 04/03/2018    LDLCALC 88.6 05/03/2017     Lab Results   Component Value Date    TRIG 188 (H) 03/12/2020    TRIG 100 04/03/2018    TRIG 207 (H) 05/03/2017     Lab Results   Component Value Date    CHOLHDL 21.0 03/12/2020    CHOLHDL 31.9 04/03/2018    CHOLHDL 31.6 05/03/2017       Calcium score:     0-10: Very little coronary heart disease risk     11 through 100: Low to moderate coronary heart disease risk     101 through 400: Moderate to high coronary heart disease risk     Greater than 401: High coronary heart disease risk.     SCORE SUMMARY     Your total calcium score is 2893     Incidental findings: There appear to be postoperative changes associated with the stomach.     Impression:     Your total calcium score is 2893.  High coronary heart disease risk        Electronically signed by: Terry Huang DO  Date:                                            12/02/2020  Time:                                           15:10        Assessment:       1. Elevated coronary artery calcium score (2893)    2. Typical atrial flutter    3. Mixed hyperlipidemia    4. Nonrheumatic mitral valve regurgitation    5. Abnormal CK    6. Atypical  chest pain    7. Family history of cardiovascular disease    8. Essential hypertension    9. Hypertensive left ventricular hypertrophy, without heart failure    10. CAD in native artery    11. Atypical angina         Plan:               Markedly elevated calcium score 2893.  Recommend proceeding with C to assess severity of underlying CAD as in my experience when the score is this elevated, patient's often have multivessel CAD.  Pt advised to undergo left heart catheterization with possible PCI if warranted.  Pt advised of all risks and benefits of procedure.  Pt advised of alternative approaches and treatment strategies to include medical therapy, PCI as well as surgical revascularization with possible CABG.  All questions answered in clinic.    Discussed pros/cons of LHC vs stress testing again.  Pt agreeable to proceeding.  Add Praluent for lipid control.  No statin due to abnl CK enzymes.  Add Imdur 30 mg qd for CAD.  Patient advised of indications for above medications, risks/benefits and side effect profile.  Reviewed all tests and above medical conditions with patient in detail and formulated treatment plan.  Continue optimal medical treatment for cardiovascular conditions.  Cardiac low salt diet discussed.  Daily exercise encouraged, goal 30 +  minutes aerobic exercise as tolerated.  Maintaining healthy weight and weight loss goals (if needed) were discussed in clinic.  Continue to refrain from smoking.    F/u after cath.    [facility] scheduled Thurs 12/10/20  0730.

## 2020-12-08 LAB
ANION GAP SERPL CALC-SCNC: 14 MMOL/L (ref 8–16)
BUN SERPL-MCNC: 18 MG/DL (ref 6–20)
CALCIUM SERPL-MCNC: 8.8 MG/DL (ref 8.7–10.5)
CHLORIDE SERPL-SCNC: 100 MMOL/L (ref 95–110)
CO2 SERPL-SCNC: 23 MMOL/L (ref 23–29)
CREAT SERPL-MCNC: 0.7 MG/DL (ref 0.5–1.4)
EST. GFR  (AFRICAN AMERICAN): >60 ML/MIN/1.73 M^2
EST. GFR  (NON AFRICAN AMERICAN): >60 ML/MIN/1.73 M^2
GLUCOSE SERPL-MCNC: 103 MG/DL (ref 70–110)
POTASSIUM SERPL-SCNC: 3.6 MMOL/L (ref 3.5–5.1)
SODIUM SERPL-SCNC: 137 MMOL/L (ref 136–145)

## 2020-12-09 ENCOUNTER — PATIENT MESSAGE (OUTPATIENT)
Dept: CARDIOLOGY | Facility: CLINIC | Age: 50
End: 2020-12-09

## 2020-12-10 ENCOUNTER — HOSPITAL ENCOUNTER (OUTPATIENT)
Facility: HOSPITAL | Age: 50
Discharge: HOME | End: 2020-12-10
Attending: INTERNAL MEDICINE | Admitting: INTERNAL MEDICINE
Payer: MEDICARE

## 2020-12-10 VITALS
HEART RATE: 48 BPM | DIASTOLIC BLOOD PRESSURE: 81 MMHG | BODY MASS INDEX: 32.82 KG/M2 | TEMPERATURE: 98 F | SYSTOLIC BLOOD PRESSURE: 119 MMHG | HEIGHT: 77 IN | RESPIRATION RATE: 9 BRPM | WEIGHT: 278 LBS | OXYGEN SATURATION: 93 %

## 2020-12-10 DIAGNOSIS — I25.10 CAD IN NATIVE ARTERY: ICD-10-CM

## 2020-12-10 DIAGNOSIS — R93.1 ELEVATED CORONARY ARTERY CALCIUM SCORE: Primary | ICD-10-CM

## 2020-12-10 DIAGNOSIS — I10 ESSENTIAL HYPERTENSION: ICD-10-CM

## 2020-12-10 DIAGNOSIS — R93.1 ELEVATED CORONARY ARTERY CALCIUM SCORE: ICD-10-CM

## 2020-12-10 LAB — CATH EF QUANTITATIVE: 65 %

## 2020-12-10 PROCEDURE — 99152 MOD SED SAME PHYS/QHP 5/>YRS: CPT | Mod: ,,, | Performed by: INTERNAL MEDICINE

## 2020-12-10 PROCEDURE — C1894 INTRO/SHEATH, NON-LASER: HCPCS | Performed by: INTERNAL MEDICINE

## 2020-12-10 PROCEDURE — 93458 L HRT ARTERY/VENTRICLE ANGIO: CPT | Performed by: INTERNAL MEDICINE

## 2020-12-10 PROCEDURE — C1760 CLOSURE DEV, VASC: HCPCS | Performed by: INTERNAL MEDICINE

## 2020-12-10 PROCEDURE — C1769 GUIDE WIRE: HCPCS | Performed by: INTERNAL MEDICINE

## 2020-12-10 PROCEDURE — 99152 PR MOD CONSCIOUS SEDATION, SAME PHYS, 5+ YRS, FIRST 15 MIN: ICD-10-PCS | Mod: ,,, | Performed by: INTERNAL MEDICINE

## 2020-12-10 PROCEDURE — 99152 MOD SED SAME PHYS/QHP 5/>YRS: CPT | Performed by: INTERNAL MEDICINE

## 2020-12-10 PROCEDURE — 25500020 PHARM REV CODE 255: Performed by: INTERNAL MEDICINE

## 2020-12-10 PROCEDURE — 93458 PR CATH PLACE/CORON ANGIO, IMG SUPER/INTERP,W LEFT HEART VENTRICULOGRAPHY: ICD-10-PCS | Mod: 26,,, | Performed by: INTERNAL MEDICINE

## 2020-12-10 PROCEDURE — 25000242 PHARM REV CODE 250 ALT 637 W/ HCPCS: Performed by: INTERNAL MEDICINE

## 2020-12-10 PROCEDURE — 93458 L HRT ARTERY/VENTRICLE ANGIO: CPT | Mod: 26,,, | Performed by: INTERNAL MEDICINE

## 2020-12-10 PROCEDURE — 99153 MOD SED SAME PHYS/QHP EA: CPT | Performed by: INTERNAL MEDICINE

## 2020-12-10 PROCEDURE — 63600175 PHARM REV CODE 636 W HCPCS: Performed by: INTERNAL MEDICINE

## 2020-12-10 PROCEDURE — 25000003 PHARM REV CODE 250: Performed by: INTERNAL MEDICINE

## 2020-12-10 RX ORDER — ASPIRIN 325 MG
325 TABLET ORAL ONCE
Status: COMPLETED | OUTPATIENT
Start: 2020-12-10 | End: 2020-12-10

## 2020-12-10 RX ORDER — SODIUM CHLORIDE 9 MG/ML
INJECTION, SOLUTION INTRAVENOUS CONTINUOUS
Status: DISCONTINUED | OUTPATIENT
Start: 2020-12-10 | End: 2020-12-10 | Stop reason: HOSPADM

## 2020-12-10 RX ORDER — DIPHENHYDRAMINE HYDROCHLORIDE 50 MG/ML
INJECTION INTRAMUSCULAR; INTRAVENOUS
Status: DISCONTINUED | OUTPATIENT
Start: 2020-12-10 | End: 2020-12-10 | Stop reason: HOSPADM

## 2020-12-10 RX ORDER — FENTANYL CITRATE 50 UG/ML
INJECTION, SOLUTION INTRAMUSCULAR; INTRAVENOUS
Status: DISCONTINUED | OUTPATIENT
Start: 2020-12-10 | End: 2020-12-10 | Stop reason: HOSPADM

## 2020-12-10 RX ORDER — MIDAZOLAM HYDROCHLORIDE 1 MG/ML
INJECTION, SOLUTION INTRAMUSCULAR; INTRAVENOUS
Status: DISCONTINUED | OUTPATIENT
Start: 2020-12-10 | End: 2020-12-10 | Stop reason: HOSPADM

## 2020-12-10 RX ORDER — HYDROMORPHONE HYDROCHLORIDE 2 MG/ML
INJECTION, SOLUTION INTRAMUSCULAR; INTRAVENOUS; SUBCUTANEOUS
Status: DISCONTINUED | OUTPATIENT
Start: 2020-12-10 | End: 2020-12-10 | Stop reason: HOSPADM

## 2020-12-10 RX ORDER — MORPHINE SULFATE 2 MG/ML
2 INJECTION, SOLUTION INTRAMUSCULAR; INTRAVENOUS ONCE
Status: COMPLETED | OUTPATIENT
Start: 2020-12-10 | End: 2020-12-10

## 2020-12-10 RX ORDER — LIDOCAINE HYDROCHLORIDE 20 MG/ML
INJECTION, SOLUTION EPIDURAL; INFILTRATION; INTRACAUDAL; PERINEURAL
Status: DISCONTINUED | OUTPATIENT
Start: 2020-12-10 | End: 2020-12-10 | Stop reason: HOSPADM

## 2020-12-10 RX ORDER — ACETAMINOPHEN 325 MG/1
650 TABLET ORAL EVERY 6 HOURS PRN
Status: DISCONTINUED | OUTPATIENT
Start: 2020-12-10 | End: 2020-12-10 | Stop reason: HOSPADM

## 2020-12-10 RX ORDER — NITROGLYCERIN 0.4 MG/1
0.4 TABLET SUBLINGUAL EVERY 5 MIN PRN
Status: DISCONTINUED | OUTPATIENT
Start: 2020-12-10 | End: 2020-12-10 | Stop reason: HOSPADM

## 2020-12-10 RX ORDER — ALPRAZOLAM 0.5 MG/1
0.5 TABLET ORAL ONCE
Status: DISCONTINUED | OUTPATIENT
Start: 2020-12-10 | End: 2020-12-10

## 2020-12-10 RX ORDER — DIPHENHYDRAMINE HCL 50 MG
50 CAPSULE ORAL ONCE
Status: COMPLETED | OUTPATIENT
Start: 2020-12-10 | End: 2020-12-10

## 2020-12-10 RX ORDER — HYDROCODONE BITARTRATE AND ACETAMINOPHEN 5; 325 MG/1; MG/1
1 TABLET ORAL EVERY 4 HOURS PRN
Status: DISCONTINUED | OUTPATIENT
Start: 2020-12-10 | End: 2020-12-10 | Stop reason: HOSPADM

## 2020-12-10 RX ORDER — DIAZEPAM 5 MG/1
5 TABLET ORAL
Status: COMPLETED | OUTPATIENT
Start: 2020-12-10 | End: 2020-12-10

## 2020-12-10 RX ORDER — ALPRAZOLAM 1 MG/1
1 TABLET ORAL ONCE
Status: COMPLETED | OUTPATIENT
Start: 2020-12-10 | End: 2020-12-10

## 2020-12-10 RX ADMIN — DIAZEPAM 5 MG: 5 TABLET ORAL at 06:12

## 2020-12-10 RX ADMIN — MORPHINE SULFATE 2 MG: 2 INJECTION, SOLUTION INTRAMUSCULAR; INTRAVENOUS at 08:12

## 2020-12-10 RX ADMIN — SODIUM CHLORIDE 100 ML/HR: 0.9 INJECTION, SOLUTION INTRAVENOUS at 06:12

## 2020-12-10 RX ADMIN — NITROGLYCERIN 0.4 MG: 0.4 TABLET, ORALLY DISINTEGRATING SUBLINGUAL at 08:12

## 2020-12-10 RX ADMIN — DIPHENHYDRAMINE HYDROCHLORIDE 50 MG: 50 CAPSULE ORAL at 06:12

## 2020-12-10 RX ADMIN — ASPIRIN 325 MG ORAL TABLET 325 MG: 325 PILL ORAL at 06:12

## 2020-12-10 RX ADMIN — SODIUM CHLORIDE 150 ML/HR: 0.9 INJECTION, SOLUTION INTRAVENOUS at 08:12

## 2020-12-10 RX ADMIN — ACETAMINOPHEN 650 MG: 325 TABLET ORAL at 08:12

## 2020-12-10 RX ADMIN — ALPRAZOLAM 1 MG: 1 TABLET ORAL at 08:12

## 2020-12-10 NOTE — DISCHARGE INSTRUCTIONS
"Post-op Right Heart Catheterization - Groin or brachial access      . DIET: It is advisable for you to follow a diet that limits the intake of salt, sugar, saturated fats and cholesterol.   2. FOR THE NEXT 24 HOURS:   For the next 8 hours, you should be watched by a responsible adult. This person should make sure your condition is not getting worse.   Don't drink any alcohol?for the next 24 hours.   Don't drive, operate dangerous machinery, or make important business or personal decisions?during the next 24 hours.   Your healthcare provider may tell you not to take any medicine by mouth for pain or sleep in the next 4 hours. These medicines may react with the medicines you were given in the hospital. This could cause a much stronger response than usual.   3. ACTIVITY:     Day of discharge: NO vigorous activity, lifting or straining      Day after discharge: Avoid heavy lifting (up to 10 lbs)    The day after discharge you may shower, but avoid tub baths or    submerging your site in water for 5 days     Wash site gently with soap and water    NO powder or lotion to your procedure site.    Before you shower, remove dressing, apply bandaid if desired       2nd day after discharge: Resume normal activities     Exercise program as instructed  ?  ?  4. WOUND CARE: It is not unusual to have a small amount of bruising appear in the groin area. It is also common to have a tender "knot" develop beneath the skin at the puncture site in the groin. This is scar tissue only and is not a cause for concern or alarm. This tender knot may take several weeks to fully resolve. The bruise will usually spread over several days. If the lump gets bigger, call you doctor immediately.  ?  5. DISCOMFORT: For general discomfort at the puncture site, you may take 1 or 2 Acetaminophen (Tylenol) tablets every 4 hours as needed. (Do not take more than 4000 mg a day)  ?  6. CALL YOUR HEALTHCARE PROVIDER IF YOU START TO HAVE THE FOLLOWING SYMPTOMS: " "  ?  1. Problems with the affected leg: Pain, discomfort, loss of warmth, numbness or tingling   ?  2. Problems at the groin site: Bleeding, pain that is sudden/sharp/persistent,  swelling at site or a change in "lump" size, increased redness or drainage at   puncture site   ?  3. High fever (101 degrees or higher)  4. Drowsiness that doesn't get better  ?  5. Weakness or dizziness that doesn't get better  6. Repeated vomiting  ?  ?  7. GO TO THE EMERGENCY ROOM OR CALL 911 IF YOU HAVE: Chest pains or discomforts not relieved with 3 nitroglycerin doses (sublingual tablets or spray), numbness or severe pain or if your foot or leg becomes cold or discolored or uncontrolled bleeding from site (apply direct pressure above site).          Post-op Left Heart Catherization - radial access    . DIET: It is advisable for you to follow a diet that limits the intake of salt, sugar, saturated fats and cholesterol.     2. FOR THE NEXT 24 HOURS:   For the next 8 hours, you should be watched by a responsible adult. This person should make sure your condition is not getting worse.   Don't drink any alcohol?for the next 24 hours.   Don't drive, operate dangerous machinery, or make important business or personal decisions?during the next 24 hours.   Your healthcare provider may tell you not to take any medicine by mouth for pain or sleep in the next 4 hours. These medicines may react with the medicines you were given in the hospital. This could cause a much stronger response than usual.     3. ACTIVITY: AVOID activities that require bending of the affected arm/wrist for 3 days and submerging the site in water for 3 days. REMOVE the dressing the day after  the procedure, you may apply a bandaid to the site if you desire. You may RESUME   your normal activities or prescribed exercise program as instructed by your physician after 3 days.                                         4. WOUND CARE: It is not unusual to have a small amount of " "bruising appear in the groin area. It is also common to have a tender "knot" develop beneath the skin at the puncture site in the groin. This is scar tissue only and is not a cause for concern or alarm. This tender knot may take several weeks to fully resolve. The bruise will usually spread over several days. If the lump gets bigger, call you doctor immediately.  ?  5. DISCOMFORT: For general discomfort at the puncture site, you may take 1 or 2 Acetaminophen (Tylenol) tablets every 4 hours as needed. (Do not take more than 4000 mg a day)  ?  6. CALL YOUR HEALTHCARE PROVIDER IF YOU START TO HAVE THE FOLLOWING SYMPTOMS:   ?  1. Problems with the affected leg: Pain, discomfort, loss of warmth, numbness or tingling   ?  2. Problems at the groin site: Bleeding, pain that is sudden/sharp/persistent,  swelling at site or a change in "lump" size, increased redness or drainage at   puncture site   ?  3. High fever (101 degrees or higher)  4. Drowsiness that doesn't get better  ?  5. Weakness or dizziness that doesn't get better  6. Repeated vomiting  ?  ?  7. GO TO THE EMERGENCY ROOM OR CALL 911 IF YOU HAVE: Chest pains or discomforts not relieved with 3 nitroglycerin doses (sublingual tablets or spray), numbness or severe pain or if your foot or leg becomes cold or discolored or uncontrolled bleeding from site (apply direct pressure above site).                                                                    "

## 2020-12-10 NOTE — NURSING
Pt c/o chest pain and tightness, VSS, O2 88%, appears restless, MD notified and at bedside, EKG done, new orders received and carried out. Will cont to monitor

## 2020-12-10 NOTE — INTERVAL H&P NOTE
The patient has been examined and the H&P has been reviewed:    I concur with the findings and no changes have occurred since H&P was written.    Anesthesia/Surgery risks, benefits and alternative options discussed and understood by patient/family.          Active Hospital Problems    Diagnosis  POA    Elevated coronary artery calcium score (2893) [R93.1]  Yes     Priority: Low      Resolved Hospital Problems   No resolved problems to display.

## 2020-12-10 NOTE — OP NOTE
[facility] -   Cardiac Catheterization  Procedure and Discharge Note    SUMMARY     Valerio Yan  [id number]  Therese Lala MD    Date of Procedure: 12/10/2020    Procedure:  1.  LHC  2. LEFT VENTRICULOGRAM  3.  CORONARY ANGIOGRAM    Provider: Tomi Mir MD    Indications: He was referred for cardiac catheterization to further evaluate CAD, markedly elevated coronary calcium score.    Pre-Procedure Diagnosis: CAD, markedly elevated coronary calcium score.    Post-Procedure Diagnosis:  same    Anesthesia: RN IV Sedation    Description of the Findings of the Procedure:     The risks, benefits, complications, treatment options, and expected outcomes were discussed with the patient. The patient and/or family concurred with the proposed plan, giving informed consent. Patient was brought to the cath lab after IV hydration was begun and oral premedication was given.    Findings:    75% apical LAD.  50% OM1  50% RCA  Normal LVEF.  Right femoral Exoseal.        Complications: None; patient tolerated the procedure well.    Estimated Blood Loss (EBL): Minimal           Implants: NONE    Specimens: NONE    Condition: stable    Disposition: PACU - hemodynamically stable.    Attestation: I was present and scrubbed for the entire procedure.     RECOMMENDATIONS:    USUAL POST CATH CARE  OPTIMIZE MED TX FOR CAD.  APICAL LAD -- MED MGT ADVISED.  CARDIAC DIET  PRALUENT FOR LIPIDS.  DAILY EXERCISE.  CONTINUE CURRENT MEDS  F/U CARDS CLINIC 1 WEEK.

## 2020-12-10 NOTE — NURSING
Discharge instructions, medications and appointments reviewed with patient, pt verbalized understanding of all instructions given and all questions answered, Rt groin dressing clean, dry and intact, no bleeding or hematoma noted   PIV and Telemetry removed pt noe well, VSS, denies any discomfort and appears to be in no distress  Pt left floor via wheelchair per hospital staff

## 2020-12-11 ENCOUNTER — PATIENT MESSAGE (OUTPATIENT)
Dept: CARDIOLOGY | Facility: CLINIC | Age: 50
End: 2020-12-11

## 2020-12-20 ENCOUNTER — TELEPHONE (OUTPATIENT)
Dept: CARDIOLOGY | Facility: CLINIC | Age: 50
End: 2020-12-20

## 2020-12-21 ENCOUNTER — OFFICE VISIT (OUTPATIENT)
Dept: INTERNAL MEDICINE | Facility: CLINIC | Age: 50
End: 2020-12-21
Payer: MEDICARE

## 2020-12-21 ENCOUNTER — PATIENT MESSAGE (OUTPATIENT)
Dept: CARDIOLOGY | Facility: CLINIC | Age: 50
End: 2020-12-21

## 2020-12-21 DIAGNOSIS — R10.9 RIGHT FLANK PAIN: Primary | ICD-10-CM

## 2020-12-21 PROCEDURE — 99213 PR OFFICE/OUTPT VISIT, EST, LEVL III, 20-29 MIN: ICD-10-PCS | Mod: 95,,, | Performed by: FAMILY MEDICINE

## 2020-12-21 PROCEDURE — 99213 OFFICE O/P EST LOW 20 MIN: CPT | Mod: 95,,, | Performed by: FAMILY MEDICINE

## 2020-12-21 RX ORDER — ONDANSETRON HYDROCHLORIDE 8 MG/1
8 TABLET, FILM COATED ORAL 2 TIMES DAILY
Qty: 24 TABLET | Refills: 0 | Status: SHIPPED | OUTPATIENT
Start: 2020-12-21 | End: 2021-10-20

## 2020-12-21 RX ORDER — OXYCODONE AND ACETAMINOPHEN 5; 325 MG/1; MG/1
1 TABLET ORAL EVERY 4 HOURS PRN
Qty: 12 EACH | Refills: 0 | Status: SHIPPED | OUTPATIENT
Start: 2020-12-21 | End: 2020-12-23 | Stop reason: SDUPTHER

## 2020-12-21 RX ORDER — TIZANIDINE 4 MG/1
4 TABLET ORAL EVERY 6 HOURS PRN
Qty: 30 TABLET | Refills: 0 | Status: SHIPPED | OUTPATIENT
Start: 2020-12-21 | End: 2020-12-29 | Stop reason: SDUPTHER

## 2020-12-21 NOTE — PROGRESS NOTES
The patient location is: [place] ([place])  The chief complaint leading to consultation is: ER follow up/ Pain and bradycardia     Visit type: audiovisual    Face to Face time with patient: 19 minutes  19 minutes of total time spent on the encounter, which includes face to face time and non-face to face time preparing to see the patient (eg, review of tests), Obtaining and/or reviewing separately obtained history, Documenting clinical information in the electronic or other health record, Independently interpreting results (not separately reported) and communicating results to the patient/family/caregiver, or Care coordination (not separately reported).         Each patient to whom he or she provides medical services by telemedicine is:  (1) informed of the relationship between the physician and patient and the respective role of any other health care provider with respect to management of the patient; and (2) notified that he or she may decline to receive medical services by telemedicine and may withdraw from such care at any time.    Subjective:       Patient ID: Valerio Yan is a 50 y.o. male.    Chief Complaint: No chief complaint on file.    HPI     Went on 12/19/20 to ED   Didn't sleep Friday or Saturday    Thought he had a kidney stone  Urine was dark    CT scan-negative    Morphine didn't touch the pain  When they increased to 8 g it helped for a few hours    Robaxin Muscle relaxer didn't help once he got home     151/91 blood pressure with HR 48    He did have a fall on Thursday night   No pain on Friday during the day    Had a heart cath about a week ago    Activity level is decreased   Coughing hurts   Lower right side pain     Ibuprofen 800 mg   Pain level 8/10 -1 when not moving  Nausea     Review of Systems   Constitutional: Negative for fever.   Cardiovascular: Positive for chest pain.   Gastrointestinal: Negative for abdominal pain.   Genitourinary: Negative for dysuria and hematuria.    Musculoskeletal: Positive for back pain.   Neurological: Negative for weakness, numbness and headaches.           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    Hyperlipidemia     Hypertension     Joint pain     knees    LVH (left ventricular hypertrophy) due to hypertensive disease 2/10/2017    Mitral regurgitation 6/9/2016    KIA (obstructive sleep apnea)     Primary osteoarthritis of left knee 1/7/2019    Transfusion reaction     1 x  to PRBC fever     Past Surgical History:   Procedure Laterality Date    ANGIOGRAM, CORONARY, WITH LEFT HEART CATHETERIZATION  12/10/2020    Procedure: Angiogram, Coronary, with Left Heart Cath;  Surgeon: Tomi Mir MD;  Location: [facility] CATH LAB;  Service: Cardiology;;    barrette esophagus      COLONOSCOPY N/A 5/17/2018    Procedure: COLONOSCOPY;  Surgeon: Ilan Araya III, MD;  Location: [facility] ENDO;  Service: Endoscopy;  Laterality: N/A;    decubitus surgery      to sacral    ESOPHAGOGASTRODUODENOSCOPY      GASTROSTOMY TUBE PLACEMENT      KNEE ARTHROSCOPY Left 2002    LEFT HEART CATHETERIZATION Left 12/10/2020    Procedure: CATHETERIZATION, HEART, LEFT;  Surgeon: Tomi Mir MD;  Location: [facility] CATH LAB;  Service: Cardiology;  Laterality: Left;  730 start time    PEG TUBE REMOVAL      RADIOFREQUENCY ABLATION      RFA      ROBOT-ASSISTED CHOLECYSTECTOMY USING DA GABRIELA XI N/A 5/2/2019    Procedure: XI ROBOTIC CHOLECYSTECTOMY;  Surgeon: Valerio Lai MD;  Location: [facility] OR;  Service: General;  Laterality: N/A;    JUAN-EN-Y PROCEDURE      TONSILLECTOMY      TRACHEAL SURGERY       Family History   Problem Relation Age of Onset    Heart attack Mother     Heart disease Mother     Heart disease Father      Heart attack Father      Social History     Socioeconomic History    Marital status:      Spouse name: Not on file    Number of children: 2    Years of education: Not on file    Highest education level: Not on file   Occupational History    Not on file   Social Needs    Financial resource strain: Not very hard    Food insecurity     Worry: Never true     Inability: Never true    Transportation needs     Medical: No     Non-medical: No   Tobacco Use    Smoking status: Current Some Day Smoker     Packs/day: 0.30     Years: 30.00     Pack years: 9.00     Types: Cigarettes     Start date: 4/4/1988    Smokeless tobacco: Former User     Types: Snuff     Quit date: 1/6/1999    Tobacco comment: Currently at 4 cigs per day No smoking after m.n prior to sx   Substance and Sexual Activity    Alcohol use: Yes     Alcohol/week: 14.0 standard drinks     Types: 14 Glasses of wine per week     Frequency: 4 or more times a week     Drinks per session: 1 or 2     Binge frequency: Never     Comment: no alcohol prior to surgery    Drug use: No    Sexual activity: Yes     Partners: Female   Lifestyle    Physical activity     Days per week: 0 days     Minutes per session: 0 min    Stress: Not at all   Relationships    Social connections     Talks on phone: More than three times a week     Gets together: Once a week     Attends Confucianist service: Not on file     Active member of club or organization: No     Attends meetings of clubs or organizations: Never     Relationship status:    Other Topics Concern    Not on file   Social History Narrative    Not on file       Objective:        Physical Exam  Constitutional:       Appearance: Normal appearance.   HENT:      Head: Atraumatic.   Pulmonary:      Effort: Pulmonary effort is normal.   Neurological:      Mental Status: He is alert and oriented to person, place, and time.           Results for orders placed or performed during the hospital encounter of  12/10/20   Cardiac catheterization   Result Value Ref Range    Cath EF Quantitative 65 %       Assessment/Plan:     Right flank pain  -     ondansetron (ZOFRAN) 8 MG tablet; Take 1 tablet (8 mg total) by mouth 2 (two) times daily.  Dispense: 24 tablet; Refill: 0  -     Discontinue: oxyCODONE-acetaminophen (PERCOCET) 5-325 mg per tablet; Take 1 tablet by mouth every 4 (four) hours as needed for Pain.  Dispense: 12 each; Refill: 0  -     tiZANidine (ZANAFLEX) 4 MG tablet; Take 1 tablet (4 mg total) by mouth every 6 (six) hours as needed (pain).  Dispense: 30 tablet; Refill: 0    Robaxin can cause decrease in heart rate  Changing muscle relaxer    Pain medication   Discussed with patient has history of difficulty coming off medication. Wife controlled medication over a year ago julio he had some.   Will use for short term      Follow up as needed     Therese Lala MD  ON   Family Medicine

## 2020-12-21 NOTE — TELEPHONE ENCOUNTER
Lower Toprol xl back to 25 mg qd.  It was increased to 50 mg qd last month.  This will help with his sinus bradycardia.  See me next month in clinic.    Dr Mir

## 2020-12-22 ENCOUNTER — PATIENT MESSAGE (OUTPATIENT)
Dept: INTERNAL MEDICINE | Facility: CLINIC | Age: 50
End: 2020-12-22

## 2020-12-23 ENCOUNTER — PATIENT MESSAGE (OUTPATIENT)
Dept: INTERNAL MEDICINE | Facility: CLINIC | Age: 50
End: 2020-12-23

## 2020-12-23 DIAGNOSIS — R10.9 RIGHT FLANK PAIN: ICD-10-CM

## 2020-12-23 RX ORDER — OXYCODONE AND ACETAMINOPHEN 5; 325 MG/1; MG/1
1 TABLET ORAL EVERY 4 HOURS PRN
Qty: 12 EACH | Refills: 0 | Status: SHIPPED | OUTPATIENT
Start: 2020-12-23 | End: 2020-12-26 | Stop reason: SDUPTHER

## 2020-12-26 ENCOUNTER — TELEPHONE (OUTPATIENT)
Dept: INTERNAL MEDICINE | Facility: CLINIC | Age: 50
End: 2020-12-26

## 2020-12-26 ENCOUNTER — NURSE TRIAGE (OUTPATIENT)
Dept: ADMINISTRATIVE | Facility: CLINIC | Age: 50
End: 2020-12-26

## 2020-12-26 DIAGNOSIS — R10.9 RIGHT FLANK PAIN: ICD-10-CM

## 2020-12-26 RX ORDER — OXYCODONE AND ACETAMINOPHEN 5; 325 MG/1; MG/1
1 TABLET ORAL EVERY 4 HOURS PRN
Qty: 10 EACH | Refills: 0 | Status: SHIPPED | OUTPATIENT
Start: 2020-12-26 | End: 2020-12-29 | Stop reason: SDUPTHER

## 2020-12-26 NOTE — TELEPHONE ENCOUNTER
"  Reason for Disposition   [1] Caller has URGENT medication question about med that PCP or specialist prescribed AND [2] triager unable to answer question    Additional Information   Negative: Drug overdose and triager unable to answer question   Negative: Caller requesting information unrelated to medicine   Negative: Caller requesting a prescription for Strep throat and has a positive culture result   Negative: Rash while taking a medication or within 3 days of stopping it   Negative: Immunization reaction suspected   Negative: [1] Asthma and [2] having symptoms of asthma (cough, wheezing, etc.)   Negative: [1] Influenza symptoms AND [2] anti-viral med prescription request, such as Tamiflu   Negative: [1] Symptom of illness (e.g., headache, abdominal pain, earache, vomiting) AND [2] more than mild   Negative: MORE THAN A DOUBLE DOSE of a prescription or over-the-counter (OTC) drug   Negative: [1] DOUBLE DOSE (an extra dose or lesser amount) of over-the-counter (OTC) drug AND [2] any symptoms (e.g., dizziness, nausea, pain, sleepiness)   Negative: [1] DOUBLE DOSE (an extra dose or lesser amount) of prescription drug AND [2] any symptoms (e.g., dizziness, nausea, pain, sleepiness)   Negative: Took another person's prescription drug   Negative: [1] Pharmacy calling with prescription questions AND [2] triager unable to answer question   Negative: [1] Prescription not at pharmacy AND [2] was prescribed by PCP recently   Negative: [1] Request for URGENT new prescription or refill of "essential" medication (i.e., likelihood of harm to patient if not taken) AND [2] triager unable to fill per unit policy   Negative: Diabetes drug error or overdose (e.g., took wrong type of insulin or took extra dose)   Negative: [1] DOUBLE DOSE (an extra dose or lesser amount) of prescription drug AND [2] NO symptoms (Exception: a double dose of antibiotics)    Protocols used: [facility]  [facility] re " rx. Percocet. MD states sent in refill. pt states he still having an issue. pt states he has two pills remaining. pt states having pain a lot better than it was. worse in afternoon , HR dropping in low 40s only while on certain muscle relaxer changed med but HR still dropping to 50. denies feeling dizzy lightheaded faint. hx heart cath two weeks ago. c/o pain R side. afeb. was having lots of nausea denies now. no blood in urine. rates pain 3. last dose of med 0800. pt states pain will be 10 tonight. thought to be muscle strain. not sure about kidney stone. told if still hurting when doc back on tues more testing will be done.   pharm [facility]    Spoke with dr rowland via secure chat. MD states she refilled for #10 pills; contact PCP on Monday if additional medication is needed. Pt notified. Call back with questions.

## 2020-12-29 ENCOUNTER — PATIENT MESSAGE (OUTPATIENT)
Dept: INTERNAL MEDICINE | Facility: CLINIC | Age: 50
End: 2020-12-29

## 2020-12-31 ENCOUNTER — OFFICE VISIT (OUTPATIENT)
Dept: INTERNAL MEDICINE | Facility: CLINIC | Age: 50
End: 2020-12-31
Payer: MEDICARE

## 2020-12-31 DIAGNOSIS — R10.9 RIGHT FLANK PAIN: Primary | ICD-10-CM

## 2020-12-31 PROCEDURE — 99213 OFFICE O/P EST LOW 20 MIN: CPT | Mod: 95,,, | Performed by: FAMILY MEDICINE

## 2020-12-31 PROCEDURE — 99213 PR OFFICE/OUTPT VISIT, EST, LEVL III, 20-29 MIN: ICD-10-PCS | Mod: 95,,, | Performed by: FAMILY MEDICINE

## 2021-01-04 ENCOUNTER — PATIENT MESSAGE (OUTPATIENT)
Dept: INTERNAL MEDICINE | Facility: CLINIC | Age: 51
End: 2021-01-04

## 2021-01-04 DIAGNOSIS — R10.9 RIGHT FLANK PAIN: ICD-10-CM

## 2021-01-04 RX ORDER — OXYCODONE AND ACETAMINOPHEN 5; 325 MG/1; MG/1
1 TABLET ORAL EVERY 4 HOURS PRN
Qty: 10 EACH | Refills: 0 | Status: SHIPPED | OUTPATIENT
Start: 2021-01-04 | End: 2021-01-07 | Stop reason: SDUPTHER

## 2021-01-05 ENCOUNTER — HOSPITAL ENCOUNTER (OUTPATIENT)
Dept: RADIOLOGY | Facility: HOSPITAL | Age: 51
Discharge: HOME | End: 2021-01-05
Attending: INTERNAL MEDICINE
Payer: MEDICARE

## 2021-01-05 DIAGNOSIS — Z01.810 PRE-PROCEDURAL CARDIOVASCULAR EXAMINATION: ICD-10-CM

## 2021-01-05 DIAGNOSIS — I25.10 CAD IN NATIVE ARTERY: ICD-10-CM

## 2021-01-05 DIAGNOSIS — I11.9 HYPERTENSIVE LEFT VENTRICULAR HYPERTROPHY, WITHOUT HEART FAILURE: ICD-10-CM

## 2021-01-05 DIAGNOSIS — I48.3 TYPICAL ATRIAL FLUTTER: ICD-10-CM

## 2021-01-05 DIAGNOSIS — I20.89 ATYPICAL ANGINA: ICD-10-CM

## 2021-01-05 DIAGNOSIS — R07.9 CHEST PAIN, UNSPECIFIED TYPE: ICD-10-CM

## 2021-01-05 PROCEDURE — 71046 X-RAY EXAM CHEST 2 VIEWS: CPT | Mod: TC

## 2021-01-05 PROCEDURE — 71046 XR CHEST PA AND LATERAL: ICD-10-PCS | Mod: 26,,, | Performed by: RADIOLOGY

## 2021-01-05 PROCEDURE — 71046 X-RAY EXAM CHEST 2 VIEWS: CPT | Mod: 26,,, | Performed by: RADIOLOGY

## 2021-01-06 ENCOUNTER — PATIENT MESSAGE (OUTPATIENT)
Dept: INTERNAL MEDICINE | Facility: CLINIC | Age: 51
End: 2021-01-06

## 2021-01-06 DIAGNOSIS — R10.9 RIGHT FLANK PAIN: ICD-10-CM

## 2021-01-06 RX ORDER — OXYCODONE AND ACETAMINOPHEN 5; 325 MG/1; MG/1
1 TABLET ORAL EVERY 4 HOURS PRN
Qty: 10 EACH | Refills: 0 | Status: CANCELLED | OUTPATIENT
Start: 2021-01-06

## 2021-01-07 ENCOUNTER — PATIENT MESSAGE (OUTPATIENT)
Dept: INTERNAL MEDICINE | Facility: CLINIC | Age: 51
End: 2021-01-07

## 2021-01-07 ENCOUNTER — PATIENT MESSAGE (OUTPATIENT)
Dept: CARDIOLOGY | Facility: CLINIC | Age: 51
End: 2021-01-07

## 2021-01-07 DIAGNOSIS — R10.9 RIGHT FLANK PAIN: ICD-10-CM

## 2021-01-07 RX ORDER — CEPHALEXIN 500 MG/1
500 CAPSULE ORAL EVERY 6 HOURS
Qty: 14 CAPSULE | Refills: 0 | Status: SHIPPED | OUTPATIENT
Start: 2021-01-07 | End: 2021-04-13

## 2021-01-07 RX ORDER — OXYCODONE AND ACETAMINOPHEN 5; 325 MG/1; MG/1
1 TABLET ORAL EVERY 4 HOURS PRN
Qty: 10 EACH | Refills: 0 | Status: CANCELLED | OUTPATIENT
Start: 2021-01-07

## 2021-01-07 RX ORDER — TIZANIDINE 4 MG/1
4 TABLET ORAL EVERY 6 HOURS PRN
Qty: 30 TABLET | Refills: 0 | Status: CANCELLED | OUTPATIENT
Start: 2021-01-07 | End: 2021-01-17

## 2021-01-07 RX ORDER — OXYCODONE AND ACETAMINOPHEN 5; 325 MG/1; MG/1
1 TABLET ORAL EVERY 4 HOURS PRN
Qty: 10 EACH | Refills: 0 | Status: SHIPPED | OUTPATIENT
Start: 2021-01-07 | End: 2021-01-14

## 2021-01-08 ENCOUNTER — PATIENT MESSAGE (OUTPATIENT)
Dept: CARDIOLOGY | Facility: CLINIC | Age: 51
End: 2021-01-08

## 2021-01-08 DIAGNOSIS — I25.10 CAD IN NATIVE ARTERY: ICD-10-CM

## 2021-01-08 DIAGNOSIS — Z82.49 FAMILY HISTORY OF CARDIOVASCULAR DISEASE: ICD-10-CM

## 2021-01-08 DIAGNOSIS — R93.1 ELEVATED CORONARY ARTERY CALCIUM SCORE: ICD-10-CM

## 2021-01-08 DIAGNOSIS — E78.2 MIXED HYPERLIPIDEMIA: ICD-10-CM

## 2021-01-08 DIAGNOSIS — R74.8 ABNORMAL CK: ICD-10-CM

## 2021-01-11 ENCOUNTER — TELEPHONE (OUTPATIENT)
Dept: CARDIOLOGY | Facility: CLINIC | Age: 51
End: 2021-01-11

## 2021-01-11 ENCOUNTER — HOSPITAL ENCOUNTER (OUTPATIENT)
Dept: CARDIOLOGY | Facility: HOSPITAL | Age: 51
Discharge: HOME | End: 2021-01-11
Attending: INTERNAL MEDICINE
Payer: MEDICARE

## 2021-01-11 ENCOUNTER — OFFICE VISIT (OUTPATIENT)
Dept: CARDIOLOGY | Facility: CLINIC | Age: 51
End: 2021-01-11
Payer: MEDICARE

## 2021-01-11 VITALS
HEIGHT: 77 IN | OXYGEN SATURATION: 98 % | WEIGHT: 279.13 LBS | DIASTOLIC BLOOD PRESSURE: 90 MMHG | SYSTOLIC BLOOD PRESSURE: 162 MMHG | BODY MASS INDEX: 32.96 KG/M2 | HEART RATE: 60 BPM

## 2021-01-11 DIAGNOSIS — R00.1 SINUS BRADYCARDIA: Primary | ICD-10-CM

## 2021-01-11 DIAGNOSIS — I25.118 CORONARY ARTERY DISEASE OF NATIVE ARTERY OF NATIVE HEART WITH STABLE ANGINA PECTORIS: ICD-10-CM

## 2021-01-11 DIAGNOSIS — E78.2 MIXED HYPERLIPIDEMIA: ICD-10-CM

## 2021-01-11 DIAGNOSIS — Z91.89 AT RISK FOR SLEEP APNEA: ICD-10-CM

## 2021-01-11 DIAGNOSIS — I34.0 NONRHEUMATIC MITRAL VALVE REGURGITATION: ICD-10-CM

## 2021-01-11 DIAGNOSIS — R93.1 ELEVATED CORONARY ARTERY CALCIUM SCORE: ICD-10-CM

## 2021-01-11 DIAGNOSIS — Z72.0 TOBACCO ABUSE: ICD-10-CM

## 2021-01-11 DIAGNOSIS — R74.8 ABNORMAL CK: ICD-10-CM

## 2021-01-11 DIAGNOSIS — I10 ESSENTIAL HYPERTENSION: ICD-10-CM

## 2021-01-11 DIAGNOSIS — I10 HYPERTENSION, UNSPECIFIED TYPE: ICD-10-CM

## 2021-01-11 DIAGNOSIS — I11.9 HYPERTENSIVE LEFT VENTRICULAR HYPERTROPHY, WITHOUT HEART FAILURE: ICD-10-CM

## 2021-01-11 DIAGNOSIS — I48.3 TYPICAL ATRIAL FLUTTER: ICD-10-CM

## 2021-01-11 DIAGNOSIS — I10 HYPERTENSION, UNSPECIFIED TYPE: Primary | ICD-10-CM

## 2021-01-11 DIAGNOSIS — R07.89 ATYPICAL CHEST PAIN: ICD-10-CM

## 2021-01-11 DIAGNOSIS — I20.89 ATYPICAL ANGINA: ICD-10-CM

## 2021-01-11 DIAGNOSIS — Z82.49 FAMILY HISTORY OF CARDIOVASCULAR DISEASE: ICD-10-CM

## 2021-01-11 DIAGNOSIS — I25.10 CAD IN NATIVE ARTERY: ICD-10-CM

## 2021-01-11 DIAGNOSIS — I25.10 CAD, MULTIPLE VESSEL: ICD-10-CM

## 2021-01-11 PROCEDURE — 3008F BODY MASS INDEX DOCD: CPT | Mod: CPTII,S$GLB,, | Performed by: INTERNAL MEDICINE

## 2021-01-11 PROCEDURE — 93010 EKG 12-LEAD: ICD-10-PCS | Mod: ,,, | Performed by: INTERNAL MEDICINE

## 2021-01-11 PROCEDURE — 3008F PR BODY MASS INDEX (BMI) DOCUMENTED: ICD-10-PCS | Mod: CPTII,S$GLB,, | Performed by: INTERNAL MEDICINE

## 2021-01-11 PROCEDURE — 3077F SYST BP >= 140 MM HG: CPT | Mod: CPTII,S$GLB,, | Performed by: INTERNAL MEDICINE

## 2021-01-11 PROCEDURE — 3077F PR MOST RECENT SYSTOLIC BLOOD PRESSURE >= 140 MM HG: ICD-10-PCS | Mod: CPTII,S$GLB,, | Performed by: INTERNAL MEDICINE

## 2021-01-11 PROCEDURE — 99999 PR PBB SHADOW E&M-EST. PATIENT-LVL IV: ICD-10-PCS | Mod: PBBFAC,,, | Performed by: INTERNAL MEDICINE

## 2021-01-11 PROCEDURE — 1126F PR PAIN SEVERITY QUANTIFIED, NO PAIN PRESENT: ICD-10-PCS | Mod: S$GLB,,, | Performed by: INTERNAL MEDICINE

## 2021-01-11 PROCEDURE — 99999 PR PBB SHADOW E&M-EST. PATIENT-LVL IV: CPT | Mod: PBBFAC,,, | Performed by: INTERNAL MEDICINE

## 2021-01-11 PROCEDURE — 99215 OFFICE O/P EST HI 40 MIN: CPT | Mod: 25,S$GLB,, | Performed by: INTERNAL MEDICINE

## 2021-01-11 PROCEDURE — 93010 ELECTROCARDIOGRAM REPORT: CPT | Mod: ,,, | Performed by: INTERNAL MEDICINE

## 2021-01-11 PROCEDURE — 99215 PR OFFICE/OUTPT VISIT, EST, LEVL V, 40-54 MIN: ICD-10-PCS | Mod: 25,S$GLB,, | Performed by: INTERNAL MEDICINE

## 2021-01-11 PROCEDURE — 93005 ELECTROCARDIOGRAM TRACING: CPT

## 2021-01-11 PROCEDURE — 1126F AMNT PAIN NOTED NONE PRSNT: CPT | Mod: S$GLB,,, | Performed by: INTERNAL MEDICINE

## 2021-01-11 PROCEDURE — 3080F DIAST BP >= 90 MM HG: CPT | Mod: CPTII,S$GLB,, | Performed by: INTERNAL MEDICINE

## 2021-01-11 PROCEDURE — 3080F PR MOST RECENT DIASTOLIC BLOOD PRESSURE >= 90 MM HG: ICD-10-PCS | Mod: CPTII,S$GLB,, | Performed by: INTERNAL MEDICINE

## 2021-01-11 RX ORDER — AMLODIPINE BESYLATE 5 MG/1
5 TABLET ORAL DAILY
Qty: 90 TABLET | Refills: 4 | Status: SHIPPED | OUTPATIENT
Start: 2021-01-11 | End: 2022-05-18 | Stop reason: SDUPTHER

## 2021-01-11 RX ORDER — AMLODIPINE BESYLATE 5 MG/1
5 TABLET ORAL DAILY
Qty: 30 TABLET | Refills: 11 | Status: SHIPPED | OUTPATIENT
Start: 2021-01-11 | End: 2021-01-11 | Stop reason: SDUPTHER

## 2021-01-12 ENCOUNTER — SPECIALTY PHARMACY (OUTPATIENT)
Dept: PHARMACY | Facility: CLINIC | Age: 51
End: 2021-01-12

## 2021-01-14 ENCOUNTER — OFFICE VISIT (OUTPATIENT)
Dept: PULMONOLOGY | Facility: CLINIC | Age: 51
End: 2021-01-14
Payer: MEDICARE

## 2021-01-14 VITALS
RESPIRATION RATE: 18 BRPM | SYSTOLIC BLOOD PRESSURE: 120 MMHG | OXYGEN SATURATION: 97 % | HEIGHT: 77 IN | DIASTOLIC BLOOD PRESSURE: 60 MMHG | BODY MASS INDEX: 32.67 KG/M2 | WEIGHT: 276.69 LBS | HEART RATE: 68 BPM

## 2021-01-14 DIAGNOSIS — R06.89 GASPING FOR BREATH: ICD-10-CM

## 2021-01-14 DIAGNOSIS — I25.10 CAD IN NATIVE ARTERY: ICD-10-CM

## 2021-01-14 DIAGNOSIS — Z91.89 AT RISK FOR SLEEP APNEA: ICD-10-CM

## 2021-01-14 DIAGNOSIS — R06.83 SNORING: ICD-10-CM

## 2021-01-14 DIAGNOSIS — R40.0 DAYTIME SLEEPINESS: ICD-10-CM

## 2021-01-14 DIAGNOSIS — Z87.891 FORMER CIGARETTE SMOKER: ICD-10-CM

## 2021-01-14 DIAGNOSIS — G61.0 GUILLAIN-BARRE: ICD-10-CM

## 2021-01-14 DIAGNOSIS — G47.30 SLEEP-DISORDERED BREATHING: Primary | ICD-10-CM

## 2021-01-14 DIAGNOSIS — G80.8 OTHER CEREBRAL PALSY: ICD-10-CM

## 2021-01-14 PROCEDURE — 99204 PR OFFICE/OUTPT VISIT, NEW, LEVL IV, 45-59 MIN: ICD-10-PCS | Mod: S$GLB,,, | Performed by: NURSE PRACTITIONER

## 2021-01-14 PROCEDURE — 99204 OFFICE O/P NEW MOD 45 MIN: CPT | Mod: S$GLB,,, | Performed by: NURSE PRACTITIONER

## 2021-01-14 PROCEDURE — 3078F PR MOST RECENT DIASTOLIC BLOOD PRESSURE < 80 MM HG: ICD-10-PCS | Mod: CPTII,S$GLB,, | Performed by: NURSE PRACTITIONER

## 2021-01-14 PROCEDURE — 99999 PR PBB SHADOW E&M-EST. PATIENT-LVL V: CPT | Mod: PBBFAC,,, | Performed by: NURSE PRACTITIONER

## 2021-01-14 PROCEDURE — 3074F SYST BP LT 130 MM HG: CPT | Mod: CPTII,S$GLB,, | Performed by: NURSE PRACTITIONER

## 2021-01-14 PROCEDURE — 99999 PR PBB SHADOW E&M-EST. PATIENT-LVL V: ICD-10-PCS | Mod: PBBFAC,,, | Performed by: NURSE PRACTITIONER

## 2021-01-14 PROCEDURE — 3008F PR BODY MASS INDEX (BMI) DOCUMENTED: ICD-10-PCS | Mod: CPTII,S$GLB,, | Performed by: NURSE PRACTITIONER

## 2021-01-14 PROCEDURE — 3078F DIAST BP <80 MM HG: CPT | Mod: CPTII,S$GLB,, | Performed by: NURSE PRACTITIONER

## 2021-01-14 PROCEDURE — 3074F PR MOST RECENT SYSTOLIC BLOOD PRESSURE < 130 MM HG: ICD-10-PCS | Mod: CPTII,S$GLB,, | Performed by: NURSE PRACTITIONER

## 2021-01-14 PROCEDURE — 3008F BODY MASS INDEX DOCD: CPT | Mod: CPTII,S$GLB,, | Performed by: NURSE PRACTITIONER

## 2021-01-14 RX ORDER — METOPROLOL SUCCINATE 25 MG/1
50 TABLET, EXTENDED RELEASE ORAL
COMMUNITY
End: 2021-12-29 | Stop reason: SDUPTHER

## 2021-01-18 DIAGNOSIS — R10.9 RIGHT FLANK PAIN: ICD-10-CM

## 2021-01-19 ENCOUNTER — PATIENT MESSAGE (OUTPATIENT)
Dept: INTERNAL MEDICINE | Facility: CLINIC | Age: 51
End: 2021-01-19

## 2021-01-19 RX ORDER — TIZANIDINE 4 MG/1
4 TABLET ORAL EVERY 6 HOURS PRN
Qty: 30 TABLET | Refills: 0 | Status: SHIPPED | OUTPATIENT
Start: 2021-01-19 | End: 2021-03-10

## 2021-01-19 RX ORDER — IBUPROFEN 800 MG/1
800 TABLET ORAL 3 TIMES DAILY
Qty: 30 TABLET | Refills: 0 | Status: SHIPPED | OUTPATIENT
Start: 2021-01-19 | End: 2023-04-20

## 2021-01-20 DIAGNOSIS — Z91.89 AT RISK FOR SLEEP APNEA: Primary | ICD-10-CM

## 2021-01-26 ENCOUNTER — PATIENT MESSAGE (OUTPATIENT)
Dept: PULMONOLOGY | Facility: CLINIC | Age: 51
End: 2021-01-26

## 2021-02-02 ENCOUNTER — LAB VISIT (OUTPATIENT)
Dept: URGENT CARE | Facility: CLINIC | Age: 51
End: 2021-02-02
Payer: MEDICARE

## 2021-02-02 DIAGNOSIS — Z91.89 AT RISK FOR SLEEP APNEA: ICD-10-CM

## 2021-02-02 PROCEDURE — U0003 INFECTIOUS AGENT DETECTION BY NUCLEIC ACID (DNA OR RNA); SEVERE ACUTE RESPIRATORY SYNDROME CORONAVIRUS 2 (SARS-COV-2) (CORONAVIRUS DISEASE [COVID-19]), AMPLIFIED PROBE TECHNIQUE, MAKING USE OF HIGH THROUGHPUT TECHNOLOGIES AS DESCRIBED BY CMS-2020-01-R: HCPCS

## 2021-02-04 LAB — SARS-COV-2 RNA RESP QL NAA+PROBE: NOT DETECTED

## 2021-02-05 ENCOUNTER — HOSPITAL ENCOUNTER (OUTPATIENT)
Dept: SLEEP MEDICINE | Facility: HOSPITAL | Age: 51
Discharge: HOME | End: 2021-02-05
Attending: NURSE PRACTITIONER
Payer: MEDICARE

## 2021-02-05 DIAGNOSIS — G47.33 OBSTRUCTIVE SLEEP APNEA: Primary | ICD-10-CM

## 2021-02-05 DIAGNOSIS — G47.30 SLEEP-DISORDERED BREATHING: ICD-10-CM

## 2021-02-05 DIAGNOSIS — F51.04 PSYCHOPHYSIOLOGICAL INSOMNIA: ICD-10-CM

## 2021-02-05 DIAGNOSIS — R06.89 GASPING FOR BREATH: ICD-10-CM

## 2021-02-05 DIAGNOSIS — G61.0 GUILLAIN-BARRE: ICD-10-CM

## 2021-02-05 DIAGNOSIS — R40.0 DAYTIME SLEEPINESS: ICD-10-CM

## 2021-02-05 DIAGNOSIS — R06.83 SNORING: ICD-10-CM

## 2021-02-05 DIAGNOSIS — Z72.821 INADEQUATE SLEEP HYGIENE: ICD-10-CM

## 2021-02-05 PROCEDURE — 95810 POLYSOM 6/> YRS 4/> PARAM: CPT | Mod: 26,,, | Performed by: PSYCHOLOGIST

## 2021-02-05 PROCEDURE — 95810 POLYSOM 6/> YRS 4/> PARAM: CPT

## 2021-02-05 PROCEDURE — 95810 PR POLYSOMNOGRAPHY, 4 OR MORE: ICD-10-PCS | Mod: 26,,, | Performed by: PSYCHOLOGIST

## 2021-02-11 DIAGNOSIS — G47.33 OBSTRUCTIVE SLEEP APNEA: Primary | ICD-10-CM

## 2021-02-18 DIAGNOSIS — G47.33 OBSTRUCTIVE SLEEP APNEA: Primary | ICD-10-CM

## 2021-02-22 ENCOUNTER — PATIENT OUTREACH (OUTPATIENT)
Dept: ADMINISTRATIVE | Facility: OTHER | Age: 51
End: 2021-02-22

## 2021-03-10 DIAGNOSIS — R10.9 RIGHT FLANK PAIN: ICD-10-CM

## 2021-03-10 RX ORDER — TIZANIDINE 4 MG/1
TABLET ORAL
Qty: 30 TABLET | Refills: 0 | Status: SHIPPED | OUTPATIENT
Start: 2021-03-10 | End: 2021-10-20

## 2021-04-09 ENCOUNTER — TELEPHONE (OUTPATIENT)
Dept: INTERNAL MEDICINE | Facility: CLINIC | Age: 51
End: 2021-04-09

## 2021-04-09 ENCOUNTER — PATIENT OUTREACH (OUTPATIENT)
Dept: ADMINISTRATIVE | Facility: HOSPITAL | Age: 51
End: 2021-04-09

## 2021-04-09 DIAGNOSIS — I10 ESSENTIAL HYPERTENSION: Primary | ICD-10-CM

## 2021-04-12 ENCOUNTER — TELEPHONE (OUTPATIENT)
Dept: NEUROLOGY | Facility: CLINIC | Age: 51
End: 2021-04-12

## 2021-04-12 ENCOUNTER — PATIENT OUTREACH (OUTPATIENT)
Dept: ADMINISTRATIVE | Facility: OTHER | Age: 51
End: 2021-04-12

## 2021-04-13 ENCOUNTER — OFFICE VISIT (OUTPATIENT)
Dept: NEUROLOGY | Facility: CLINIC | Age: 51
End: 2021-04-13
Payer: MEDICARE

## 2021-04-13 VITALS
SYSTOLIC BLOOD PRESSURE: 130 MMHG | HEART RATE: 72 BPM | HEIGHT: 77 IN | WEIGHT: 292.75 LBS | RESPIRATION RATE: 16 BRPM | BODY MASS INDEX: 34.57 KG/M2 | DIASTOLIC BLOOD PRESSURE: 80 MMHG

## 2021-04-13 DIAGNOSIS — Z72.821 INADEQUATE SLEEP HYGIENE: ICD-10-CM

## 2021-04-13 DIAGNOSIS — R61 DIAPHORESIS: ICD-10-CM

## 2021-04-13 DIAGNOSIS — R00.1 SINUS BRADYCARDIA: ICD-10-CM

## 2021-04-13 DIAGNOSIS — E53.8 DEFICIENCY OF OTHER SPECIFIED B GROUP VITAMINS: ICD-10-CM

## 2021-04-13 DIAGNOSIS — Z91.89 AT RISK FOR SLEEP APNEA: ICD-10-CM

## 2021-04-13 DIAGNOSIS — I20.89 ATYPICAL ANGINA: ICD-10-CM

## 2021-04-13 DIAGNOSIS — I25.10 CAD IN NATIVE ARTERY: ICD-10-CM

## 2021-04-13 DIAGNOSIS — K80.20 SYMPTOMATIC CHOLELITHIASIS: ICD-10-CM

## 2021-04-13 DIAGNOSIS — E87.6 HYPOKALEMIA: ICD-10-CM

## 2021-04-13 DIAGNOSIS — G62.9 POLYNEUROPATHY: ICD-10-CM

## 2021-04-13 DIAGNOSIS — M75.21 TENDONITIS OF LONG HEAD OF BICEPS BRACHII OF RIGHT SHOULDER: ICD-10-CM

## 2021-04-13 DIAGNOSIS — R07.89 ATYPICAL CHEST PAIN: ICD-10-CM

## 2021-04-13 DIAGNOSIS — M17.12 PRIMARY OSTEOARTHRITIS OF LEFT KNEE: ICD-10-CM

## 2021-04-13 DIAGNOSIS — R93.1 ELEVATED CORONARY ARTERY CALCIUM SCORE: ICD-10-CM

## 2021-04-13 DIAGNOSIS — F51.04 PSYCHOPHYSIOLOGICAL INSOMNIA: ICD-10-CM

## 2021-04-13 DIAGNOSIS — F10.10 ETOH ABUSE: ICD-10-CM

## 2021-04-13 DIAGNOSIS — I25.118 CORONARY ARTERY DISEASE OF NATIVE ARTERY OF NATIVE HEART WITH STABLE ANGINA PECTORIS: ICD-10-CM

## 2021-04-13 DIAGNOSIS — I11.9 HYPERTENSIVE LEFT VENTRICULAR HYPERTROPHY, WITHOUT HEART FAILURE: ICD-10-CM

## 2021-04-13 DIAGNOSIS — G47.30 SLEEP-DISORDERED BREATHING: ICD-10-CM

## 2021-04-13 DIAGNOSIS — Z82.49 FAMILY HISTORY OF CARDIOVASCULAR DISEASE: ICD-10-CM

## 2021-04-13 DIAGNOSIS — I34.0 NONRHEUMATIC MITRAL VALVE REGURGITATION: ICD-10-CM

## 2021-04-13 DIAGNOSIS — I10 ESSENTIAL HYPERTENSION: ICD-10-CM

## 2021-04-13 DIAGNOSIS — I25.10 CAD, MULTIPLE VESSEL: ICD-10-CM

## 2021-04-13 DIAGNOSIS — R74.8 ABNORMAL CK: ICD-10-CM

## 2021-04-13 DIAGNOSIS — G47.33 OBSTRUCTIVE SLEEP APNEA: ICD-10-CM

## 2021-04-13 DIAGNOSIS — R42 DIZZINESS: ICD-10-CM

## 2021-04-13 DIAGNOSIS — Z87.891 FORMER CIGARETTE SMOKER: ICD-10-CM

## 2021-04-13 DIAGNOSIS — G61.0 GUILLAIN-BARRE: Primary | ICD-10-CM

## 2021-04-13 DIAGNOSIS — E78.2 MIXED HYPERLIPIDEMIA: ICD-10-CM

## 2021-04-13 DIAGNOSIS — R00.2 PALPITATIONS: ICD-10-CM

## 2021-04-13 DIAGNOSIS — G57.72 COMPLEX REGIONAL PAIN SYNDROME TYPE 2 OF BOTH LOWER EXTREMITIES: ICD-10-CM

## 2021-04-13 DIAGNOSIS — G57.71 COMPLEX REGIONAL PAIN SYNDROME TYPE 2 OF BOTH LOWER EXTREMITIES: ICD-10-CM

## 2021-04-13 PROCEDURE — 1125F AMNT PAIN NOTED PAIN PRSNT: CPT | Mod: S$GLB,,, | Performed by: NURSE PRACTITIONER

## 2021-04-13 PROCEDURE — 99999 PR PBB SHADOW E&M-EST. PATIENT-LVL IV: ICD-10-PCS | Mod: PBBFAC,,, | Performed by: NURSE PRACTITIONER

## 2021-04-13 PROCEDURE — 99215 OFFICE O/P EST HI 40 MIN: CPT | Mod: S$GLB,,, | Performed by: NURSE PRACTITIONER

## 2021-04-13 PROCEDURE — 3008F PR BODY MASS INDEX (BMI) DOCUMENTED: ICD-10-PCS | Mod: CPTII,S$GLB,, | Performed by: NURSE PRACTITIONER

## 2021-04-13 PROCEDURE — 3008F BODY MASS INDEX DOCD: CPT | Mod: CPTII,S$GLB,, | Performed by: NURSE PRACTITIONER

## 2021-04-13 PROCEDURE — 99999 PR PBB SHADOW E&M-EST. PATIENT-LVL IV: CPT | Mod: PBBFAC,,, | Performed by: NURSE PRACTITIONER

## 2021-04-13 PROCEDURE — 99499 UNLISTED E&M SERVICE: CPT | Mod: S$GLB,,, | Performed by: NURSE PRACTITIONER

## 2021-04-13 PROCEDURE — 99215 PR OFFICE/OUTPT VISIT, EST, LEVL V, 40-54 MIN: ICD-10-PCS | Mod: S$GLB,,, | Performed by: NURSE PRACTITIONER

## 2021-04-13 PROCEDURE — 1125F PR PAIN SEVERITY QUANTIFIED, PAIN PRESENT: ICD-10-PCS | Mod: S$GLB,,, | Performed by: NURSE PRACTITIONER

## 2021-04-13 PROCEDURE — 99499 RISK ADDL DX/OHS AUDIT: ICD-10-PCS | Mod: S$GLB,,, | Performed by: NURSE PRACTITIONER

## 2021-05-07 ENCOUNTER — PATIENT MESSAGE (OUTPATIENT)
Dept: GASTROENTEROLOGY | Facility: CLINIC | Age: 51
End: 2021-05-07

## 2021-05-07 ENCOUNTER — LAB VISIT (OUTPATIENT)
Dept: LAB | Facility: HOSPITAL | Age: 51
End: 2021-05-07
Attending: FAMILY MEDICINE
Payer: MEDICARE

## 2021-05-07 DIAGNOSIS — I10 ESSENTIAL HYPERTENSION: ICD-10-CM

## 2021-05-07 DIAGNOSIS — K22.70 BARRETT'S ESOPHAGUS WITHOUT DYSPLASIA: Primary | ICD-10-CM

## 2021-05-07 LAB
ALBUMIN SERPL BCP-MCNC: 4.1 G/DL (ref 3.5–5.2)
ALP SERPL-CCNC: 63 U/L (ref 55–135)
ALT SERPL W/O P-5'-P-CCNC: 36 U/L (ref 10–44)
ANION GAP SERPL CALC-SCNC: 12 MMOL/L (ref 8–16)
AST SERPL-CCNC: 38 U/L (ref 10–40)
BILIRUB SERPL-MCNC: 0.6 MG/DL (ref 0.1–1)
BUN SERPL-MCNC: 9 MG/DL (ref 6–20)
CALCIUM SERPL-MCNC: 9.1 MG/DL (ref 8.7–10.5)
CHLORIDE SERPL-SCNC: 103 MMOL/L (ref 95–110)
CHOLEST SERPL-MCNC: 164 MG/DL (ref 120–199)
CHOLEST/HDLC SERPL: 2.8 {RATIO} (ref 2–5)
CO2 SERPL-SCNC: 26 MMOL/L (ref 23–29)
CREAT SERPL-MCNC: 0.7 MG/DL (ref 0.5–1.4)
EST. GFR  (AFRICAN AMERICAN): >60 ML/MIN/1.73 M^2
EST. GFR  (NON AFRICAN AMERICAN): >60 ML/MIN/1.73 M^2
GLUCOSE SERPL-MCNC: 88 MG/DL (ref 70–110)
HDLC SERPL-MCNC: 58 MG/DL (ref 40–75)
HDLC SERPL: 35.4 % (ref 20–50)
LDLC SERPL CALC-MCNC: 72.4 MG/DL (ref 63–159)
NONHDLC SERPL-MCNC: 106 MG/DL
POTASSIUM SERPL-SCNC: 4.2 MMOL/L (ref 3.5–5.1)
PROT SERPL-MCNC: 7.7 G/DL (ref 6–8.4)
SODIUM SERPL-SCNC: 141 MMOL/L (ref 136–145)
TRIGL SERPL-MCNC: 168 MG/DL (ref 30–150)

## 2021-05-07 PROCEDURE — 80053 COMPREHEN METABOLIC PANEL: CPT | Performed by: FAMILY MEDICINE

## 2021-05-07 PROCEDURE — 80061 LIPID PANEL: CPT | Performed by: FAMILY MEDICINE

## 2021-05-07 PROCEDURE — 36415 COLL VENOUS BLD VENIPUNCTURE: CPT | Performed by: FAMILY MEDICINE

## 2021-05-10 ENCOUNTER — OFFICE VISIT (OUTPATIENT)
Dept: INTERNAL MEDICINE | Facility: CLINIC | Age: 51
End: 2021-05-10
Payer: MEDICARE

## 2021-05-10 ENCOUNTER — PATIENT MESSAGE (OUTPATIENT)
Dept: INTERNAL MEDICINE | Facility: CLINIC | Age: 51
End: 2021-05-10

## 2021-05-10 VITALS
DIASTOLIC BLOOD PRESSURE: 78 MMHG | HEIGHT: 77 IN | BODY MASS INDEX: 34.7 KG/M2 | WEIGHT: 293.88 LBS | OXYGEN SATURATION: 95 % | HEART RATE: 73 BPM | TEMPERATURE: 99 F | SYSTOLIC BLOOD PRESSURE: 118 MMHG | RESPIRATION RATE: 18 BRPM

## 2021-05-10 DIAGNOSIS — G61.0 GUILLAIN-BARRE: ICD-10-CM

## 2021-05-10 DIAGNOSIS — Z00.00 ROUTINE PHYSICAL EXAMINATION: Primary | ICD-10-CM

## 2021-05-10 PROCEDURE — 99213 PR OFFICE/OUTPT VISIT, EST, LEVL III, 20-29 MIN: ICD-10-PCS | Mod: S$GLB,,, | Performed by: FAMILY MEDICINE

## 2021-05-10 PROCEDURE — 3008F BODY MASS INDEX DOCD: CPT | Mod: CPTII,S$GLB,, | Performed by: FAMILY MEDICINE

## 2021-05-10 PROCEDURE — 99499 RISK ADDL DX/OHS AUDIT: ICD-10-PCS | Mod: S$GLB,,, | Performed by: FAMILY MEDICINE

## 2021-05-10 PROCEDURE — 99999 PR PBB SHADOW E&M-EST. PATIENT-LVL V: CPT | Mod: PBBFAC,,, | Performed by: FAMILY MEDICINE

## 2021-05-10 PROCEDURE — 99999 PR PBB SHADOW E&M-EST. PATIENT-LVL V: ICD-10-PCS | Mod: PBBFAC,,, | Performed by: FAMILY MEDICINE

## 2021-05-10 PROCEDURE — 99499 UNLISTED E&M SERVICE: CPT | Mod: S$GLB,,, | Performed by: FAMILY MEDICINE

## 2021-05-10 PROCEDURE — 3008F PR BODY MASS INDEX (BMI) DOCUMENTED: ICD-10-PCS | Mod: CPTII,S$GLB,, | Performed by: FAMILY MEDICINE

## 2021-05-10 PROCEDURE — 1126F PR PAIN SEVERITY QUANTIFIED, NO PAIN PRESENT: ICD-10-PCS | Mod: S$GLB,,, | Performed by: FAMILY MEDICINE

## 2021-05-10 PROCEDURE — 99213 OFFICE O/P EST LOW 20 MIN: CPT | Mod: S$GLB,,, | Performed by: FAMILY MEDICINE

## 2021-05-10 PROCEDURE — 1126F AMNT PAIN NOTED NONE PRSNT: CPT | Mod: S$GLB,,, | Performed by: FAMILY MEDICINE

## 2021-05-10 RX ORDER — CYANOCOBALAMIN 1000 UG/ML
1000 INJECTION, SOLUTION INTRAMUSCULAR; SUBCUTANEOUS
Qty: 10 ML | Refills: 0 | Status: SHIPPED | OUTPATIENT
Start: 2021-05-10 | End: 2022-05-02 | Stop reason: SDUPTHER

## 2021-05-10 RX ORDER — MELOXICAM 15 MG/1
15 TABLET ORAL DAILY
COMMUNITY
Start: 2021-04-19 | End: 2022-05-18

## 2021-05-11 ENCOUNTER — PATIENT MESSAGE (OUTPATIENT)
Dept: GASTROENTEROLOGY | Facility: CLINIC | Age: 51
End: 2021-05-11

## 2021-05-11 ENCOUNTER — TELEPHONE (OUTPATIENT)
Dept: GASTROENTEROLOGY | Facility: CLINIC | Age: 51
End: 2021-05-11

## 2021-05-11 DIAGNOSIS — Z01.818 PRE-OP TESTING: ICD-10-CM

## 2021-05-21 ENCOUNTER — PATIENT MESSAGE (OUTPATIENT)
Dept: ENDOSCOPY | Facility: HOSPITAL | Age: 51
End: 2021-05-21

## 2021-06-17 ENCOUNTER — ANESTHESIA EVENT (OUTPATIENT)
Dept: ENDOSCOPY | Facility: HOSPITAL | Age: 51
End: 2021-06-17
Payer: MEDICARE

## 2021-06-17 ENCOUNTER — HOSPITAL ENCOUNTER (OUTPATIENT)
Facility: HOSPITAL | Age: 51
Discharge: HOME | End: 2021-06-17
Attending: INTERNAL MEDICINE | Admitting: INTERNAL MEDICINE
Payer: MEDICARE

## 2021-06-17 ENCOUNTER — ANESTHESIA (OUTPATIENT)
Dept: ENDOSCOPY | Facility: HOSPITAL | Age: 51
End: 2021-06-17
Payer: MEDICARE

## 2021-06-17 DIAGNOSIS — K22.70 BARRETT'S ESOPHAGUS WITHOUT DYSPLASIA: Primary | ICD-10-CM

## 2021-06-17 PROCEDURE — 88305 TISSUE EXAM BY PATHOLOGIST: CPT | Mod: 26,,, | Performed by: PATHOLOGY

## 2021-06-17 PROCEDURE — 37000009 HC ANESTHESIA EA ADD 15 MINS: Performed by: INTERNAL MEDICINE

## 2021-06-17 PROCEDURE — 88305 TISSUE EXAM BY PATHOLOGIST: ICD-10-PCS | Mod: 26,,, | Performed by: PATHOLOGY

## 2021-06-17 PROCEDURE — 88305 TISSUE EXAM BY PATHOLOGIST: CPT | Performed by: PATHOLOGY

## 2021-06-17 PROCEDURE — 43239 EGD BIOPSY SINGLE/MULTIPLE: CPT | Mod: ,,, | Performed by: INTERNAL MEDICINE

## 2021-06-17 PROCEDURE — 63600175 PHARM REV CODE 636 W HCPCS: Performed by: NURSE ANESTHETIST, CERTIFIED REGISTERED

## 2021-06-17 PROCEDURE — 43239 PR EGD, FLEX, W/BIOPSY, SGL/MULTI: ICD-10-PCS | Mod: ,,, | Performed by: INTERNAL MEDICINE

## 2021-06-17 PROCEDURE — 37000008 HC ANESTHESIA 1ST 15 MINUTES: Performed by: INTERNAL MEDICINE

## 2021-06-17 PROCEDURE — 43239 EGD BIOPSY SINGLE/MULTIPLE: CPT | Performed by: INTERNAL MEDICINE

## 2021-06-17 PROCEDURE — 27201012 HC FORCEPS, HOT/COLD, DISP: Performed by: INTERNAL MEDICINE

## 2021-06-17 RX ORDER — PROPOFOL 10 MG/ML
VIAL (ML) INTRAVENOUS
Status: DISCONTINUED | OUTPATIENT
Start: 2021-06-17 | End: 2021-06-17

## 2021-06-17 RX ADMIN — PROPOFOL 30 MG: 10 INJECTION, EMULSION INTRAVENOUS at 11:06

## 2021-06-17 RX ADMIN — PROPOFOL 40 MG: 10 INJECTION, EMULSION INTRAVENOUS at 11:06

## 2021-06-17 RX ADMIN — PROPOFOL 120 MG: 10 INJECTION, EMULSION INTRAVENOUS at 11:06

## 2021-06-17 RX ADMIN — PROPOFOL 50 MG: 10 INJECTION, EMULSION INTRAVENOUS at 11:06

## 2021-06-18 VITALS
OXYGEN SATURATION: 96 % | SYSTOLIC BLOOD PRESSURE: 117 MMHG | DIASTOLIC BLOOD PRESSURE: 69 MMHG | HEART RATE: 51 BPM | HEIGHT: 77 IN | TEMPERATURE: 98 F | RESPIRATION RATE: 18 BRPM | WEIGHT: 291.25 LBS | BODY MASS INDEX: 34.39 KG/M2

## 2021-06-21 LAB
FINAL PATHOLOGIC DIAGNOSIS: NORMAL
GROSS: NORMAL
Lab: NORMAL

## 2021-07-19 DIAGNOSIS — R93.1 ELEVATED CORONARY ARTERY CALCIUM SCORE: ICD-10-CM

## 2021-07-19 DIAGNOSIS — E78.2 MIXED HYPERLIPIDEMIA: ICD-10-CM

## 2021-07-19 DIAGNOSIS — Z82.49 FAMILY HISTORY OF CARDIOVASCULAR DISEASE: ICD-10-CM

## 2021-07-19 DIAGNOSIS — I25.10 CAD IN NATIVE ARTERY: ICD-10-CM

## 2021-07-19 DIAGNOSIS — R74.8 ABNORMAL CK: ICD-10-CM

## 2021-07-24 ENCOUNTER — PATIENT MESSAGE (OUTPATIENT)
Dept: CARDIOLOGY | Facility: CLINIC | Age: 51
End: 2021-07-24

## 2021-07-26 DIAGNOSIS — E78.2 MIXED HYPERLIPIDEMIA: ICD-10-CM

## 2021-07-26 DIAGNOSIS — R74.8 ABNORMAL CK: ICD-10-CM

## 2021-07-26 DIAGNOSIS — Z82.49 FAMILY HISTORY OF CARDIOVASCULAR DISEASE: ICD-10-CM

## 2021-07-26 DIAGNOSIS — I25.10 CAD IN NATIVE ARTERY: ICD-10-CM

## 2021-07-26 DIAGNOSIS — R93.1 ELEVATED CORONARY ARTERY CALCIUM SCORE: ICD-10-CM

## 2021-07-27 DIAGNOSIS — R74.8 ABNORMAL CK: ICD-10-CM

## 2021-07-27 DIAGNOSIS — R93.1 ELEVATED CORONARY ARTERY CALCIUM SCORE: ICD-10-CM

## 2021-07-27 DIAGNOSIS — I25.10 CAD IN NATIVE ARTERY: ICD-10-CM

## 2021-07-27 DIAGNOSIS — Z82.49 FAMILY HISTORY OF CARDIOVASCULAR DISEASE: ICD-10-CM

## 2021-07-27 DIAGNOSIS — E78.2 MIXED HYPERLIPIDEMIA: ICD-10-CM

## 2021-08-09 DIAGNOSIS — I10 ESSENTIAL HYPERTENSION: ICD-10-CM

## 2021-08-10 DIAGNOSIS — R60.9 SWELLING: ICD-10-CM

## 2021-08-11 RX ORDER — FUROSEMIDE 20 MG/1
20 TABLET ORAL DAILY
Qty: 180 TABLET | Refills: 1 | Status: SHIPPED | OUTPATIENT
Start: 2021-08-11 | End: 2022-05-18 | Stop reason: SDUPTHER

## 2021-08-12 RX ORDER — LISINOPRIL AND HYDROCHLOROTHIAZIDE 12.5; 2 MG/1; MG/1
TABLET ORAL
Qty: 180 TABLET | Refills: 3 | Status: SHIPPED | OUTPATIENT
Start: 2021-08-12 | End: 2022-05-18 | Stop reason: SDUPTHER

## 2021-10-18 ENCOUNTER — PATIENT OUTREACH (OUTPATIENT)
Dept: ADMINISTRATIVE | Facility: OTHER | Age: 51
End: 2021-10-18

## 2021-10-20 ENCOUNTER — OFFICE VISIT (OUTPATIENT)
Dept: NEUROLOGY | Facility: CLINIC | Age: 51
End: 2021-10-20
Payer: MEDICARE

## 2021-10-20 VITALS
SYSTOLIC BLOOD PRESSURE: 134 MMHG | WEIGHT: 300.06 LBS | BODY MASS INDEX: 35.43 KG/M2 | RESPIRATION RATE: 16 BRPM | OXYGEN SATURATION: 96 % | HEART RATE: 63 BPM | DIASTOLIC BLOOD PRESSURE: 78 MMHG | HEIGHT: 77 IN

## 2021-10-20 DIAGNOSIS — M75.21 TENDONITIS OF LONG HEAD OF BICEPS BRACHII OF RIGHT SHOULDER: ICD-10-CM

## 2021-10-20 DIAGNOSIS — Z82.49 FAMILY HISTORY OF CARDIOVASCULAR DISEASE: ICD-10-CM

## 2021-10-20 DIAGNOSIS — I20.89 ATYPICAL ANGINA: ICD-10-CM

## 2021-10-20 DIAGNOSIS — K80.20 SYMPTOMATIC CHOLELITHIASIS: ICD-10-CM

## 2021-10-20 DIAGNOSIS — G96.00 CSF LEAK: ICD-10-CM

## 2021-10-20 DIAGNOSIS — G47.33 OBSTRUCTIVE SLEEP APNEA: ICD-10-CM

## 2021-10-20 DIAGNOSIS — G90.1 DYSAUTONOMIA: ICD-10-CM

## 2021-10-20 DIAGNOSIS — G57.71 COMPLEX REGIONAL PAIN SYNDROME TYPE 2 OF BOTH LOWER EXTREMITIES: ICD-10-CM

## 2021-10-20 DIAGNOSIS — G65.0 SEQUELAE OF GUILLAIN-BARRE SYNDROME: ICD-10-CM

## 2021-10-20 DIAGNOSIS — R07.89 ATYPICAL CHEST PAIN: ICD-10-CM

## 2021-10-20 DIAGNOSIS — G47.30 SLEEP-DISORDERED BREATHING: ICD-10-CM

## 2021-10-20 DIAGNOSIS — R27.8 SENSORY ATAXIA: ICD-10-CM

## 2021-10-20 DIAGNOSIS — R61 DIAPHORESIS: ICD-10-CM

## 2021-10-20 DIAGNOSIS — R74.8 ABNORMAL CK: ICD-10-CM

## 2021-10-20 DIAGNOSIS — I25.118 CORONARY ARTERY DISEASE OF NATIVE ARTERY OF NATIVE HEART WITH STABLE ANGINA PECTORIS: ICD-10-CM

## 2021-10-20 DIAGNOSIS — R00.2 PALPITATIONS: ICD-10-CM

## 2021-10-20 DIAGNOSIS — I10 PRIMARY HYPERTENSION: ICD-10-CM

## 2021-10-20 DIAGNOSIS — R93.1 ELEVATED CORONARY ARTERY CALCIUM SCORE: ICD-10-CM

## 2021-10-20 DIAGNOSIS — I11.9 HYPERTENSIVE LEFT VENTRICULAR HYPERTROPHY, WITHOUT HEART FAILURE: ICD-10-CM

## 2021-10-20 DIAGNOSIS — E78.2 MIXED HYPERLIPIDEMIA: ICD-10-CM

## 2021-10-20 DIAGNOSIS — R68.89 HEAT INTOLERANCE: ICD-10-CM

## 2021-10-20 DIAGNOSIS — R20.2 NUMBNESS AND TINGLING: ICD-10-CM

## 2021-10-20 DIAGNOSIS — E87.6 HYPOKALEMIA: ICD-10-CM

## 2021-10-20 DIAGNOSIS — R10.13 EPIGASTRIC PAIN: ICD-10-CM

## 2021-10-20 DIAGNOSIS — F51.04 PSYCHOPHYSIOLOGICAL INSOMNIA: ICD-10-CM

## 2021-10-20 DIAGNOSIS — K22.70 BARRETT'S ESOPHAGUS WITHOUT DYSPLASIA: ICD-10-CM

## 2021-10-20 DIAGNOSIS — Z91.89 AT RISK FOR SLEEP APNEA: ICD-10-CM

## 2021-10-20 DIAGNOSIS — Z86.69 HISTORY OF GUILLAIN-BARRE SYNDROME: Primary | ICD-10-CM

## 2021-10-20 DIAGNOSIS — G82.50 QUADRIPARESIS: ICD-10-CM

## 2021-10-20 DIAGNOSIS — R00.1 SINUS BRADYCARDIA: ICD-10-CM

## 2021-10-20 DIAGNOSIS — K27.9 PEPTIC ULCER DISEASE: ICD-10-CM

## 2021-10-20 DIAGNOSIS — R42 DIZZINESS: ICD-10-CM

## 2021-10-20 DIAGNOSIS — M17.12 PRIMARY OSTEOARTHRITIS OF LEFT KNEE: ICD-10-CM

## 2021-10-20 DIAGNOSIS — I48.3 TYPICAL ATRIAL FLUTTER: ICD-10-CM

## 2021-10-20 DIAGNOSIS — I34.0 NONRHEUMATIC MITRAL VALVE REGURGITATION: ICD-10-CM

## 2021-10-20 DIAGNOSIS — Z87.891 FORMER CIGARETTE SMOKER: ICD-10-CM

## 2021-10-20 DIAGNOSIS — R20.0 NUMBNESS AND TINGLING: ICD-10-CM

## 2021-10-20 DIAGNOSIS — I25.10 CAD, MULTIPLE VESSEL: ICD-10-CM

## 2021-10-20 DIAGNOSIS — I25.10 CAD IN NATIVE ARTERY: ICD-10-CM

## 2021-10-20 DIAGNOSIS — G57.72 COMPLEX REGIONAL PAIN SYNDROME TYPE 2 OF BOTH LOWER EXTREMITIES: ICD-10-CM

## 2021-10-20 DIAGNOSIS — G62.9 POLYNEUROPATHY: ICD-10-CM

## 2021-10-20 DIAGNOSIS — K22.719 BARRETT'S ESOPHAGUS WITH DYSPLASIA: ICD-10-CM

## 2021-10-20 DIAGNOSIS — Z72.821 INADEQUATE SLEEP HYGIENE: ICD-10-CM

## 2021-10-20 PROCEDURE — 3078F DIAST BP <80 MM HG: CPT | Mod: CPTII,S$GLB,, | Performed by: PSYCHIATRY & NEUROLOGY

## 2021-10-20 PROCEDURE — 99417 PR PROLONGED SVC, OUTPT, W/WO DIRECT PT CONTACT,  EA ADDTL 15 MIN: ICD-10-PCS | Mod: S$GLB,,, | Performed by: PSYCHIATRY & NEUROLOGY

## 2021-10-20 PROCEDURE — 99215 OFFICE O/P EST HI 40 MIN: CPT | Mod: S$GLB,,, | Performed by: PSYCHIATRY & NEUROLOGY

## 2021-10-20 PROCEDURE — 3008F BODY MASS INDEX DOCD: CPT | Mod: CPTII,S$GLB,, | Performed by: PSYCHIATRY & NEUROLOGY

## 2021-10-20 PROCEDURE — 3008F PR BODY MASS INDEX (BMI) DOCUMENTED: ICD-10-PCS | Mod: CPTII,S$GLB,, | Performed by: PSYCHIATRY & NEUROLOGY

## 2021-10-20 PROCEDURE — 99215 PR OFFICE/OUTPT VISIT, EST, LEVL V, 40-54 MIN: ICD-10-PCS | Mod: S$GLB,,, | Performed by: PSYCHIATRY & NEUROLOGY

## 2021-10-20 PROCEDURE — 99499 RISK ADDL DX/OHS AUDIT: ICD-10-PCS | Mod: S$GLB,,, | Performed by: PSYCHIATRY & NEUROLOGY

## 2021-10-20 PROCEDURE — 99417 PROLNG OP E/M EACH 15 MIN: CPT | Mod: S$GLB,,, | Performed by: PSYCHIATRY & NEUROLOGY

## 2021-10-20 PROCEDURE — 99499 UNLISTED E&M SERVICE: CPT | Mod: S$GLB,,, | Performed by: PSYCHIATRY & NEUROLOGY

## 2021-10-20 PROCEDURE — 4010F PR ACE/ARB THEARPY RXD/TAKEN: ICD-10-PCS | Mod: CPTII,S$GLB,, | Performed by: PSYCHIATRY & NEUROLOGY

## 2021-10-20 PROCEDURE — 99999 PR PBB SHADOW E&M-EST. PATIENT-LVL IV: ICD-10-PCS | Mod: PBBFAC,,, | Performed by: PSYCHIATRY & NEUROLOGY

## 2021-10-20 PROCEDURE — 4010F ACE/ARB THERAPY RXD/TAKEN: CPT | Mod: CPTII,S$GLB,, | Performed by: PSYCHIATRY & NEUROLOGY

## 2021-10-20 PROCEDURE — 3078F PR MOST RECENT DIASTOLIC BLOOD PRESSURE < 80 MM HG: ICD-10-PCS | Mod: CPTII,S$GLB,, | Performed by: PSYCHIATRY & NEUROLOGY

## 2021-10-20 PROCEDURE — 99999 PR PBB SHADOW E&M-EST. PATIENT-LVL IV: CPT | Mod: PBBFAC,,, | Performed by: PSYCHIATRY & NEUROLOGY

## 2021-10-20 PROCEDURE — 3075F PR MOST RECENT SYSTOLIC BLOOD PRESS GE 130-139MM HG: ICD-10-PCS | Mod: CPTII,S$GLB,, | Performed by: PSYCHIATRY & NEUROLOGY

## 2021-10-20 PROCEDURE — 3075F SYST BP GE 130 - 139MM HG: CPT | Mod: CPTII,S$GLB,, | Performed by: PSYCHIATRY & NEUROLOGY

## 2021-10-28 ENCOUNTER — TELEPHONE (OUTPATIENT)
Dept: ADMINISTRATIVE | Facility: HOSPITAL | Age: 51
End: 2021-10-28
Payer: MEDICARE

## 2021-11-17 ENCOUNTER — HOSPITAL ENCOUNTER (OUTPATIENT)
Dept: RADIOLOGY | Facility: HOSPITAL | Age: 51
Discharge: HOME | End: 2021-11-17
Attending: PSYCHIATRY & NEUROLOGY
Payer: MEDICARE

## 2021-11-17 DIAGNOSIS — G96.00 CSF LEAK: ICD-10-CM

## 2021-11-22 ENCOUNTER — TELEPHONE (OUTPATIENT)
Dept: OTOLARYNGOLOGY | Facility: CLINIC | Age: 51
End: 2021-11-22
Payer: MEDICARE

## 2021-11-22 ENCOUNTER — TELEPHONE (OUTPATIENT)
Dept: NEUROLOGY | Facility: CLINIC | Age: 51
End: 2021-11-22
Payer: MEDICARE

## 2021-11-22 ENCOUNTER — PATIENT OUTREACH (OUTPATIENT)
Dept: ADMINISTRATIVE | Facility: OTHER | Age: 51
End: 2021-11-22
Payer: MEDICARE

## 2021-11-22 ENCOUNTER — HOSPITAL ENCOUNTER (OUTPATIENT)
Dept: RADIOLOGY | Facility: HOSPITAL | Age: 51
Discharge: HOME | End: 2021-11-22
Attending: PSYCHIATRY & NEUROLOGY
Payer: MEDICARE

## 2021-11-22 ENCOUNTER — PATIENT MESSAGE (OUTPATIENT)
Dept: NEUROLOGY | Facility: CLINIC | Age: 51
End: 2021-11-22
Payer: MEDICARE

## 2021-11-22 DIAGNOSIS — H53.8 BLURRY VISION: Primary | ICD-10-CM

## 2021-11-22 DIAGNOSIS — R09.89 RUNNY NOSE: ICD-10-CM

## 2021-11-22 DIAGNOSIS — H93.19 TINNITUS, UNSPECIFIED LATERALITY: ICD-10-CM

## 2021-11-22 PROCEDURE — 70551 MRI BRAIN STEM W/O DYE: CPT | Mod: TC

## 2021-11-23 ENCOUNTER — OFFICE VISIT (OUTPATIENT)
Dept: OTOLARYNGOLOGY | Facility: CLINIC | Age: 51
End: 2021-11-23
Payer: MEDICARE

## 2021-11-23 ENCOUNTER — CLINICAL SUPPORT (OUTPATIENT)
Dept: AUDIOLOGY | Facility: CLINIC | Age: 51
End: 2021-11-23
Payer: MEDICARE

## 2021-11-23 VITALS
WEIGHT: 304.44 LBS | BODY MASS INDEX: 36.1 KG/M2 | TEMPERATURE: 98 F | SYSTOLIC BLOOD PRESSURE: 124 MMHG | DIASTOLIC BLOOD PRESSURE: 83 MMHG | HEART RATE: 66 BPM

## 2021-11-23 DIAGNOSIS — H90.3 SENSORINEURAL HEARING LOSS (SNHL) OF BOTH EARS: ICD-10-CM

## 2021-11-23 DIAGNOSIS — H93.13 TINNITUS AURIUM, BILATERAL: Primary | ICD-10-CM

## 2021-11-23 DIAGNOSIS — G96.01 CSF RHINORRHEA: ICD-10-CM

## 2021-11-23 DIAGNOSIS — H93.13 TINNITUS, BILATERAL: ICD-10-CM

## 2021-11-23 DIAGNOSIS — H90.3 SENSORY HEARING LOSS, BILATERAL: Primary | ICD-10-CM

## 2021-11-23 PROCEDURE — 99999 PR PBB SHADOW E&M-EST. PATIENT-LVL IV: ICD-10-PCS | Mod: PBBFAC,,, | Performed by: OTOLARYNGOLOGY

## 2021-11-23 PROCEDURE — 92557 PR COMPREHENSIVE HEARING TEST: ICD-10-PCS | Mod: S$GLB,,, | Performed by: OTOLARYNGOLOGY

## 2021-11-23 PROCEDURE — 31231 NASAL ENDOSCOPY DX: CPT | Mod: S$GLB,,, | Performed by: OTOLARYNGOLOGY

## 2021-11-23 PROCEDURE — 4010F ACE/ARB THERAPY RXD/TAKEN: CPT | Mod: CPTII,S$GLB,, | Performed by: OTOLARYNGOLOGY

## 2021-11-23 PROCEDURE — 92557 COMPREHENSIVE HEARING TEST: CPT | Mod: S$GLB,,, | Performed by: OTOLARYNGOLOGY

## 2021-11-23 PROCEDURE — 99204 PR OFFICE/OUTPT VISIT, NEW, LEVL IV, 45-59 MIN: ICD-10-PCS | Mod: 25,S$GLB,, | Performed by: OTOLARYNGOLOGY

## 2021-11-23 PROCEDURE — 4010F PR ACE/ARB THEARPY RXD/TAKEN: ICD-10-PCS | Mod: CPTII,S$GLB,, | Performed by: OTOLARYNGOLOGY

## 2021-11-23 PROCEDURE — 92567 PR TYMPA2METRY: ICD-10-PCS | Mod: S$GLB,,, | Performed by: OTOLARYNGOLOGY

## 2021-11-23 PROCEDURE — 99204 OFFICE O/P NEW MOD 45 MIN: CPT | Mod: 25,S$GLB,, | Performed by: OTOLARYNGOLOGY

## 2021-11-23 PROCEDURE — 31231 PR NASAL ENDOSCOPY, DX: ICD-10-PCS | Mod: S$GLB,,, | Performed by: OTOLARYNGOLOGY

## 2021-11-23 PROCEDURE — 99999 PR PBB SHADOW E&M-EST. PATIENT-LVL IV: CPT | Mod: PBBFAC,,, | Performed by: OTOLARYNGOLOGY

## 2021-11-23 PROCEDURE — 99999 PR PBB SHADOW E&M-EST. PATIENT-LVL I: CPT | Mod: PBBFAC,,,

## 2021-11-23 PROCEDURE — 99999 PR PBB SHADOW E&M-EST. PATIENT-LVL I: ICD-10-PCS | Mod: PBBFAC,,,

## 2021-11-23 PROCEDURE — 92567 TYMPANOMETRY: CPT | Mod: S$GLB,,, | Performed by: OTOLARYNGOLOGY

## 2021-11-26 ENCOUNTER — TELEPHONE (OUTPATIENT)
Dept: OTOLARYNGOLOGY | Facility: CLINIC | Age: 51
End: 2021-11-26
Payer: MEDICARE

## 2021-12-06 ENCOUNTER — PATIENT MESSAGE (OUTPATIENT)
Dept: NEUROLOGY | Facility: CLINIC | Age: 51
End: 2021-12-06

## 2021-12-06 ENCOUNTER — PROCEDURE VISIT (OUTPATIENT)
Dept: NEUROLOGY | Facility: CLINIC | Age: 51
End: 2021-12-06
Payer: MEDICARE

## 2021-12-06 ENCOUNTER — TELEPHONE (OUTPATIENT)
Dept: NEUROLOGY | Facility: CLINIC | Age: 51
End: 2021-12-06

## 2021-12-06 ENCOUNTER — TELEPHONE (OUTPATIENT)
Dept: PAIN MEDICINE | Facility: CLINIC | Age: 51
End: 2021-12-06
Payer: MEDICARE

## 2021-12-06 DIAGNOSIS — G89.29 CHRONIC LOW BACK PAIN, UNSPECIFIED BACK PAIN LATERALITY, UNSPECIFIED WHETHER SCIATICA PRESENT: ICD-10-CM

## 2021-12-06 DIAGNOSIS — Z86.69 HISTORY OF GUILLAIN-BARRE SYNDROME: Primary | ICD-10-CM

## 2021-12-06 DIAGNOSIS — R20.0 NUMBNESS AND TINGLING: Primary | ICD-10-CM

## 2021-12-06 DIAGNOSIS — M54.50 CHRONIC LOW BACK PAIN, UNSPECIFIED BACK PAIN LATERALITY, UNSPECIFIED WHETHER SCIATICA PRESENT: ICD-10-CM

## 2021-12-06 DIAGNOSIS — G82.50 QUADRIPARESIS: ICD-10-CM

## 2021-12-06 DIAGNOSIS — R20.2 NUMBNESS AND TINGLING: Primary | ICD-10-CM

## 2021-12-06 DIAGNOSIS — G57.71 COMPLEX REGIONAL PAIN SYNDROME TYPE 2 OF BOTH LOWER EXTREMITIES: ICD-10-CM

## 2021-12-06 DIAGNOSIS — G62.9 POLYNEUROPATHY: ICD-10-CM

## 2021-12-06 DIAGNOSIS — G61.0 GUILLAIN-BARRE: Primary | ICD-10-CM

## 2021-12-06 DIAGNOSIS — Z86.69 HISTORY OF GUILLAIN-BARRE SYNDROME: ICD-10-CM

## 2021-12-06 DIAGNOSIS — G57.72 COMPLEX REGIONAL PAIN SYNDROME TYPE 2 OF BOTH LOWER EXTREMITIES: ICD-10-CM

## 2021-12-06 PROCEDURE — 95913 NRV CNDJ TEST 13/> STUDIES: CPT | Mod: S$GLB,,, | Performed by: PSYCHIATRY & NEUROLOGY

## 2021-12-06 PROCEDURE — 95913 PR NERVE CONDUCTION STUDY; 13 OR MORE STUDIES: ICD-10-PCS | Mod: S$GLB,,, | Performed by: PSYCHIATRY & NEUROLOGY

## 2021-12-22 ENCOUNTER — PATIENT OUTREACH (OUTPATIENT)
Dept: ADMINISTRATIVE | Facility: HOSPITAL | Age: 51
End: 2021-12-22
Payer: MEDICARE

## 2021-12-23 ENCOUNTER — TELEPHONE (OUTPATIENT)
Dept: NEUROLOGY | Facility: CLINIC | Age: 51
End: 2021-12-23
Payer: MEDICARE

## 2021-12-23 ENCOUNTER — PATIENT MESSAGE (OUTPATIENT)
Dept: NEUROLOGY | Facility: CLINIC | Age: 51
End: 2021-12-23
Payer: MEDICARE

## 2021-12-23 ENCOUNTER — TELEPHONE (OUTPATIENT)
Dept: SPINE | Facility: CLINIC | Age: 51
End: 2021-12-23
Payer: MEDICARE

## 2021-12-23 DIAGNOSIS — G65.0 SEQUELAE OF GUILLAIN-BARRE SYNDROME: Primary | ICD-10-CM

## 2021-12-27 ENCOUNTER — TELEPHONE (OUTPATIENT)
Dept: PAIN MEDICINE | Facility: CLINIC | Age: 51
End: 2021-12-27
Payer: MEDICARE

## 2021-12-27 ENCOUNTER — PATIENT MESSAGE (OUTPATIENT)
Dept: PHYSICAL MEDICINE AND REHAB | Facility: CLINIC | Age: 51
End: 2021-12-27
Payer: MEDICARE

## 2021-12-28 ENCOUNTER — TELEPHONE (OUTPATIENT)
Dept: PAIN MEDICINE | Facility: CLINIC | Age: 51
End: 2021-12-28
Payer: MEDICARE

## 2021-12-28 ENCOUNTER — PATIENT MESSAGE (OUTPATIENT)
Dept: INTERNAL MEDICINE | Facility: CLINIC | Age: 51
End: 2021-12-28
Payer: MEDICARE

## 2021-12-29 ENCOUNTER — OFFICE VISIT (OUTPATIENT)
Dept: INTERNAL MEDICINE | Facility: CLINIC | Age: 51
End: 2021-12-29
Payer: MEDICARE

## 2021-12-29 ENCOUNTER — TELEPHONE (OUTPATIENT)
Dept: PRIMARY CARE CLINIC | Facility: CLINIC | Age: 51
End: 2021-12-29
Payer: MEDICARE

## 2021-12-29 DIAGNOSIS — Z78.9 STATIN INTOLERANCE: ICD-10-CM

## 2021-12-29 DIAGNOSIS — Z00.00 ROUTINE PHYSICAL EXAMINATION: ICD-10-CM

## 2021-12-29 DIAGNOSIS — T46.6X5S: ICD-10-CM

## 2021-12-29 DIAGNOSIS — R74.8 ELEVATED CK: ICD-10-CM

## 2021-12-29 DIAGNOSIS — E78.2 MIXED HYPERLIPIDEMIA: Primary | ICD-10-CM

## 2021-12-29 DIAGNOSIS — E66.01 SEVERE OBESITY (BMI 35.0-39.9) WITH COMORBIDITY: ICD-10-CM

## 2021-12-29 PROBLEM — T46.6X5A COMPLICATION OF STATIN THERAPY: Status: ACTIVE | Noted: 2021-12-29

## 2021-12-29 PROBLEM — Z93.0 TRACHEOSTOMY STATUS: Status: ACTIVE | Noted: 2021-12-29

## 2021-12-29 PROBLEM — Z93.0 TRACHEOSTOMY STATUS: Status: RESOLVED | Noted: 2021-12-29 | Resolved: 2021-12-29

## 2021-12-29 PROCEDURE — 4010F PR ACE/ARB THEARPY RXD/TAKEN: ICD-10-PCS | Mod: CPTII,95,, | Performed by: FAMILY MEDICINE

## 2021-12-29 PROCEDURE — 99213 PR OFFICE/OUTPT VISIT, EST, LEVL III, 20-29 MIN: ICD-10-PCS | Mod: 95,,, | Performed by: FAMILY MEDICINE

## 2021-12-29 PROCEDURE — 99499 RISK ADDL DX/OHS AUDIT: ICD-10-PCS | Mod: 95,,, | Performed by: FAMILY MEDICINE

## 2021-12-29 PROCEDURE — 99499 UNLISTED E&M SERVICE: CPT | Mod: 95,,, | Performed by: FAMILY MEDICINE

## 2021-12-29 PROCEDURE — 4010F ACE/ARB THERAPY RXD/TAKEN: CPT | Mod: CPTII,95,, | Performed by: FAMILY MEDICINE

## 2021-12-29 PROCEDURE — 99213 OFFICE O/P EST LOW 20 MIN: CPT | Mod: 95,,, | Performed by: FAMILY MEDICINE

## 2021-12-29 RX ORDER — METOPROLOL SUCCINATE 25 MG/1
25 TABLET, EXTENDED RELEASE ORAL DAILY
Qty: 90 TABLET | Refills: 1 | Status: SHIPPED | OUTPATIENT
Start: 2021-12-29 | End: 2022-05-18 | Stop reason: SDUPTHER

## 2021-12-29 RX ORDER — LEVOCETIRIZINE DIHYDROCHLORIDE 5 MG/1
5 TABLET, FILM COATED ORAL NIGHTLY
Qty: 30 TABLET | Refills: 0 | Status: SHIPPED | OUTPATIENT
Start: 2021-12-29 | End: 2023-04-20

## 2021-12-29 NOTE — TELEPHONE ENCOUNTER
Patient wants to know if he need labs work? If so, he can do them before his virtual appt this morning?

## 2022-01-18 ENCOUNTER — PES CALL (OUTPATIENT)
Dept: ADMINISTRATIVE | Facility: CLINIC | Age: 52
End: 2022-01-18
Payer: MEDICARE

## 2022-02-06 ENCOUNTER — PATIENT OUTREACH (OUTPATIENT)
Dept: ADMINISTRATIVE | Facility: OTHER | Age: 52
End: 2022-02-06
Payer: MEDICARE

## 2022-02-07 ENCOUNTER — TELEPHONE (OUTPATIENT)
Dept: PAIN MEDICINE | Facility: CLINIC | Age: 52
End: 2022-02-07
Payer: MEDICARE

## 2022-02-07 NOTE — TELEPHONE ENCOUNTER
I spoke with the patient.  He states he has arthritis back pain and was told you could possibly do an injection in his back.  That is what he is interested in.  I left him on your schedule for tomorrow. Is this ok?

## 2022-02-07 NOTE — PROGRESS NOTES
Health Maintenance Due   Topic Date Due    Pneumococcal Vaccines (Age 0-64) (1 of 3 - PCV13) Never done    TETANUS VACCINE  Never done    LDCT Lung Screen  Never done    Shingles Vaccine (1 of 2) Never done     Updates were requested from care everywhere.  Chart was reviewed for overdue Proactive [facility] Encounters (JACLYN) topics (CRS, Breast Cancer Screening, Eye exam)  Health Maintenance has been updated.  LINKS immunization registry triggered.  Immunizations were reconciled.

## 2022-02-08 DIAGNOSIS — Z82.49 FAMILY HISTORY OF CARDIOVASCULAR DISEASE: ICD-10-CM

## 2022-02-08 DIAGNOSIS — I25.10 CAD IN NATIVE ARTERY: ICD-10-CM

## 2022-02-08 DIAGNOSIS — R74.8 ABNORMAL CK: ICD-10-CM

## 2022-02-08 DIAGNOSIS — R93.1 ELEVATED CORONARY ARTERY CALCIUM SCORE: ICD-10-CM

## 2022-02-08 DIAGNOSIS — E78.2 MIXED HYPERLIPIDEMIA: ICD-10-CM

## 2022-02-08 RX ORDER — ALIROCUMAB 150 MG/ML
INJECTION, SOLUTION SUBCUTANEOUS
Qty: 2 ML | Refills: 6 | OUTPATIENT
Start: 2022-02-08 | End: 2022-02-09 | Stop reason: SDUPTHER

## 2022-02-09 ENCOUNTER — PATIENT MESSAGE (OUTPATIENT)
Dept: CARDIOLOGY | Facility: CLINIC | Age: 52
End: 2022-02-09
Payer: MEDICARE

## 2022-02-09 DIAGNOSIS — R74.8 ABNORMAL CK: ICD-10-CM

## 2022-02-09 DIAGNOSIS — Z82.49 FAMILY HISTORY OF CARDIOVASCULAR DISEASE: ICD-10-CM

## 2022-02-09 DIAGNOSIS — R93.1 ELEVATED CORONARY ARTERY CALCIUM SCORE: ICD-10-CM

## 2022-02-09 DIAGNOSIS — I25.10 CAD IN NATIVE ARTERY: ICD-10-CM

## 2022-02-09 DIAGNOSIS — E78.2 MIXED HYPERLIPIDEMIA: ICD-10-CM

## 2022-02-09 RX ORDER — ALIROCUMAB 150 MG/ML
INJECTION, SOLUTION SUBCUTANEOUS
Qty: 2 ML | Refills: 6 | OUTPATIENT
Start: 2022-02-09 | End: 2022-02-11 | Stop reason: SDUPTHER

## 2022-02-11 DIAGNOSIS — R74.8 ABNORMAL CK: ICD-10-CM

## 2022-02-11 DIAGNOSIS — R93.1 ELEVATED CORONARY ARTERY CALCIUM SCORE: ICD-10-CM

## 2022-02-11 DIAGNOSIS — E78.2 MIXED HYPERLIPIDEMIA: ICD-10-CM

## 2022-02-11 DIAGNOSIS — I25.10 CAD IN NATIVE ARTERY: ICD-10-CM

## 2022-02-11 DIAGNOSIS — Z82.49 FAMILY HISTORY OF CARDIOVASCULAR DISEASE: ICD-10-CM

## 2022-02-11 RX ORDER — ALIROCUMAB 150 MG/ML
INJECTION, SOLUTION SUBCUTANEOUS
Qty: 2 ML | Refills: 6 | OUTPATIENT
Start: 2022-02-11 | End: 2022-03-28

## 2022-02-22 DIAGNOSIS — I25.10 CAD IN NATIVE ARTERY: ICD-10-CM

## 2022-02-22 DIAGNOSIS — R74.8 ABNORMAL CK: ICD-10-CM

## 2022-02-22 DIAGNOSIS — E78.2 MIXED HYPERLIPIDEMIA: ICD-10-CM

## 2022-02-22 DIAGNOSIS — R93.1 ELEVATED CORONARY ARTERY CALCIUM SCORE: ICD-10-CM

## 2022-02-22 DIAGNOSIS — Z82.49 FAMILY HISTORY OF CARDIOVASCULAR DISEASE: ICD-10-CM

## 2022-02-22 RX ORDER — ALIROCUMAB 150 MG/ML
INJECTION, SOLUTION SUBCUTANEOUS
Qty: 2 ML | Refills: 6 | OUTPATIENT
Start: 2022-02-22

## 2022-02-23 DIAGNOSIS — D84.9 IMMUNOSUPPRESSED STATUS: ICD-10-CM

## 2022-04-13 DIAGNOSIS — I25.10 CAD IN NATIVE ARTERY: ICD-10-CM

## 2022-04-13 DIAGNOSIS — R93.1 ELEVATED CORONARY ARTERY CALCIUM SCORE: ICD-10-CM

## 2022-04-13 DIAGNOSIS — R74.8 ABNORMAL CK: ICD-10-CM

## 2022-04-13 DIAGNOSIS — Z82.49 FAMILY HISTORY OF CARDIOVASCULAR DISEASE: ICD-10-CM

## 2022-04-13 DIAGNOSIS — E78.2 MIXED HYPERLIPIDEMIA: ICD-10-CM

## 2022-04-13 RX ORDER — ALIROCUMAB 150 MG/ML
INJECTION, SOLUTION SUBCUTANEOUS
Qty: 2 ML | Refills: 0 | OUTPATIENT
Start: 2022-04-13

## 2022-04-13 NOTE — TELEPHONE ENCOUNTER
Patient is unable to take Lipitor, reports that he does not want to have to call every month for a refill, he would like more than one months supply at a time.     Valerio Yan Staff  Phone Number: [phone]     Refills have been requested for the following medications:         PRALUENT  mg/mL PnIj [Tomi Mir MD]     Preferred pharmacy: [facility] [address] [place] [address] [address] [facility]   Delivery method: Pickup

## 2022-04-14 ENCOUNTER — PATIENT MESSAGE (OUTPATIENT)
Dept: CARDIOLOGY | Facility: CLINIC | Age: 52
End: 2022-04-14
Payer: MEDICARE

## 2022-05-02 DIAGNOSIS — G61.0 GUILLAIN-BARRE: ICD-10-CM

## 2022-05-03 RX ORDER — CYANOCOBALAMIN 1000 UG/ML
1000 INJECTION, SOLUTION INTRAMUSCULAR; SUBCUTANEOUS
Qty: 10 ML | Refills: 0 | Status: SHIPPED | OUTPATIENT
Start: 2022-05-03

## 2022-05-04 ENCOUNTER — LAB VISIT (OUTPATIENT)
Dept: LAB | Facility: HOSPITAL | Age: 52
End: 2022-05-04
Attending: FAMILY MEDICINE
Payer: MEDICARE

## 2022-05-04 DIAGNOSIS — Z00.00 ROUTINE PHYSICAL EXAMINATION: ICD-10-CM

## 2022-05-04 DIAGNOSIS — E78.2 MIXED HYPERLIPIDEMIA: ICD-10-CM

## 2022-05-04 DIAGNOSIS — E66.01 SEVERE OBESITY (BMI 35.0-39.9) WITH COMORBIDITY: ICD-10-CM

## 2022-05-04 DIAGNOSIS — R74.8 ELEVATED CK: ICD-10-CM

## 2022-05-04 LAB
ALBUMIN SERPL BCP-MCNC: 3.5 G/DL (ref 3.5–5.2)
ALP SERPL-CCNC: 93 U/L (ref 55–135)
ALT SERPL W/O P-5'-P-CCNC: 68 U/L (ref 10–44)
ANION GAP SERPL CALC-SCNC: 10 MMOL/L (ref 8–16)
AST SERPL-CCNC: 59 U/L (ref 10–40)
BASOPHILS # BLD AUTO: 0.04 K/UL (ref 0–0.2)
BASOPHILS NFR BLD: 0.6 % (ref 0–1.9)
BILIRUB SERPL-MCNC: 0.6 MG/DL (ref 0.1–1)
BUN SERPL-MCNC: 8 MG/DL (ref 6–20)
CALCIUM SERPL-MCNC: 8.4 MG/DL (ref 8.7–10.5)
CHLORIDE SERPL-SCNC: 102 MMOL/L (ref 95–110)
CHOLEST SERPL-MCNC: 148 MG/DL (ref 120–199)
CHOLEST/HDLC SERPL: 3.4 {RATIO} (ref 2–5)
CK SERPL-CCNC: 327 U/L (ref 20–200)
CO2 SERPL-SCNC: 29 MMOL/L (ref 23–29)
CREAT SERPL-MCNC: 0.7 MG/DL (ref 0.5–1.4)
DIFFERENTIAL METHOD: ABNORMAL
EOSINOPHIL # BLD AUTO: 0.1 K/UL (ref 0–0.5)
EOSINOPHIL NFR BLD: 2.2 % (ref 0–8)
ERYTHROCYTE [DISTWIDTH] IN BLOOD BY AUTOMATED COUNT: 14.6 % (ref 11.5–14.5)
EST. GFR  (AFRICAN AMERICAN): >60 ML/MIN/1.73 M^2
EST. GFR  (NON AFRICAN AMERICAN): >60 ML/MIN/1.73 M^2
GLUCOSE SERPL-MCNC: 96 MG/DL (ref 70–110)
HCT VFR BLD AUTO: 45.2 % (ref 40–54)
HDLC SERPL-MCNC: 44 MG/DL (ref 40–75)
HDLC SERPL: 29.7 % (ref 20–50)
HGB BLD-MCNC: 15.1 G/DL (ref 14–18)
IMM GRANULOCYTES # BLD AUTO: 0.03 K/UL (ref 0–0.04)
IMM GRANULOCYTES NFR BLD AUTO: 0.5 % (ref 0–0.5)
LDLC SERPL CALC-MCNC: 75.2 MG/DL (ref 63–159)
LYMPHOCYTES # BLD AUTO: 1.9 K/UL (ref 1–4.8)
LYMPHOCYTES NFR BLD: 29.8 % (ref 18–48)
MCH RBC QN AUTO: 31.7 PG (ref 27–31)
MCHC RBC AUTO-ENTMCNC: 33.4 G/DL (ref 32–36)
MCV RBC AUTO: 95 FL (ref 82–98)
MONOCYTES # BLD AUTO: 0.6 K/UL (ref 0.3–1)
MONOCYTES NFR BLD: 9.9 % (ref 4–15)
NEUTROPHILS # BLD AUTO: 3.6 K/UL (ref 1.8–7.7)
NEUTROPHILS NFR BLD: 57 % (ref 38–73)
NONHDLC SERPL-MCNC: 104 MG/DL
NRBC BLD-RTO: 0 /100 WBC
PLATELET # BLD AUTO: 155 K/UL (ref 150–450)
PMV BLD AUTO: 11.2 FL (ref 9.2–12.9)
POTASSIUM SERPL-SCNC: 3.7 MMOL/L (ref 3.5–5.1)
PROT SERPL-MCNC: 6.7 G/DL (ref 6–8.4)
RBC # BLD AUTO: 4.76 M/UL (ref 4.6–6.2)
SODIUM SERPL-SCNC: 141 MMOL/L (ref 136–145)
TRIGL SERPL-MCNC: 144 MG/DL (ref 30–150)
WBC # BLD AUTO: 6.38 K/UL (ref 3.9–12.7)

## 2022-05-04 PROCEDURE — 36415 COLL VENOUS BLD VENIPUNCTURE: CPT | Performed by: FAMILY MEDICINE

## 2022-05-04 PROCEDURE — 80053 COMPREHEN METABOLIC PANEL: CPT | Performed by: FAMILY MEDICINE

## 2022-05-04 PROCEDURE — 80061 LIPID PANEL: CPT | Performed by: FAMILY MEDICINE

## 2022-05-04 PROCEDURE — 85025 COMPLETE CBC W/AUTO DIFF WBC: CPT | Performed by: FAMILY MEDICINE

## 2022-05-04 PROCEDURE — 82550 ASSAY OF CK (CPK): CPT | Performed by: FAMILY MEDICINE

## 2022-05-06 ENCOUNTER — PATIENT OUTREACH (OUTPATIENT)
Dept: ADMINISTRATIVE | Facility: HOSPITAL | Age: 52
End: 2022-05-06
Payer: MEDICARE

## 2022-05-11 ENCOUNTER — PATIENT MESSAGE (OUTPATIENT)
Dept: INTERNAL MEDICINE | Facility: CLINIC | Age: 52
End: 2022-05-11
Payer: MEDICARE

## 2022-05-18 ENCOUNTER — OFFICE VISIT (OUTPATIENT)
Dept: INTERNAL MEDICINE | Facility: CLINIC | Age: 52
End: 2022-05-18
Payer: MEDICARE

## 2022-05-18 VITALS
WEIGHT: 295.88 LBS | BODY MASS INDEX: 34.94 KG/M2 | RESPIRATION RATE: 18 BRPM | DIASTOLIC BLOOD PRESSURE: 84 MMHG | SYSTOLIC BLOOD PRESSURE: 138 MMHG | OXYGEN SATURATION: 98 % | HEIGHT: 77 IN | HEART RATE: 80 BPM

## 2022-05-18 DIAGNOSIS — I10 ESSENTIAL HYPERTENSION: ICD-10-CM

## 2022-05-18 DIAGNOSIS — G65.0 SEQUELAE OF GUILLAIN-BARRE SYNDROME: ICD-10-CM

## 2022-05-18 DIAGNOSIS — E66.01 SEVERE OBESITY (BMI 35.0-39.9) WITH COMORBIDITY: ICD-10-CM

## 2022-05-18 DIAGNOSIS — R60.9 SWELLING: ICD-10-CM

## 2022-05-18 DIAGNOSIS — I48.3 TYPICAL ATRIAL FLUTTER: ICD-10-CM

## 2022-05-18 DIAGNOSIS — I25.10 CAD IN NATIVE ARTERY: ICD-10-CM

## 2022-05-18 DIAGNOSIS — I20.89 ATYPICAL ANGINA: ICD-10-CM

## 2022-05-18 DIAGNOSIS — G82.50 QUADRIPARESIS: ICD-10-CM

## 2022-05-18 DIAGNOSIS — R07.89 ATYPICAL CHEST PAIN: ICD-10-CM

## 2022-05-18 DIAGNOSIS — Z00.00 ROUTINE PHYSICAL EXAMINATION: Primary | ICD-10-CM

## 2022-05-18 PROCEDURE — 3008F BODY MASS INDEX DOCD: CPT | Mod: CPTII,S$GLB,, | Performed by: FAMILY MEDICINE

## 2022-05-18 PROCEDURE — 99396 PREV VISIT EST AGE 40-64: CPT | Mod: GY,S$GLB,, | Performed by: FAMILY MEDICINE

## 2022-05-18 PROCEDURE — 1159F PR MEDICATION LIST DOCUMENTED IN MEDICAL RECORD: ICD-10-PCS | Mod: CPTII,S$GLB,, | Performed by: FAMILY MEDICINE

## 2022-05-18 PROCEDURE — 99999 PR PBB SHADOW E&M-EST. PATIENT-LVL III: CPT | Mod: PBBFAC,,, | Performed by: FAMILY MEDICINE

## 2022-05-18 PROCEDURE — 99999 PR PBB SHADOW E&M-EST. PATIENT-LVL III: ICD-10-PCS | Mod: PBBFAC,,, | Performed by: FAMILY MEDICINE

## 2022-05-18 PROCEDURE — 3079F PR MOST RECENT DIASTOLIC BLOOD PRESSURE 80-89 MM HG: ICD-10-PCS | Mod: CPTII,S$GLB,, | Performed by: FAMILY MEDICINE

## 2022-05-18 PROCEDURE — 3075F PR MOST RECENT SYSTOLIC BLOOD PRESS GE 130-139MM HG: ICD-10-PCS | Mod: CPTII,S$GLB,, | Performed by: FAMILY MEDICINE

## 2022-05-18 PROCEDURE — 1159F MED LIST DOCD IN RCRD: CPT | Mod: CPTII,S$GLB,, | Performed by: FAMILY MEDICINE

## 2022-05-18 PROCEDURE — 3008F PR BODY MASS INDEX (BMI) DOCUMENTED: ICD-10-PCS | Mod: CPTII,S$GLB,, | Performed by: FAMILY MEDICINE

## 2022-05-18 PROCEDURE — 99396 PR PREVENTIVE VISIT,EST,40-64: ICD-10-PCS | Mod: GY,S$GLB,, | Performed by: FAMILY MEDICINE

## 2022-05-18 PROCEDURE — 4010F PR ACE/ARB THEARPY RXD/TAKEN: ICD-10-PCS | Mod: CPTII,S$GLB,, | Performed by: FAMILY MEDICINE

## 2022-05-18 PROCEDURE — 3079F DIAST BP 80-89 MM HG: CPT | Mod: CPTII,S$GLB,, | Performed by: FAMILY MEDICINE

## 2022-05-18 PROCEDURE — 3075F SYST BP GE 130 - 139MM HG: CPT | Mod: CPTII,S$GLB,, | Performed by: FAMILY MEDICINE

## 2022-05-18 PROCEDURE — 4010F ACE/ARB THERAPY RXD/TAKEN: CPT | Mod: CPTII,S$GLB,, | Performed by: FAMILY MEDICINE

## 2022-05-18 RX ORDER — AMLODIPINE BESYLATE 5 MG/1
5 TABLET ORAL DAILY
Qty: 90 TABLET | Refills: 4 | Status: ON HOLD | OUTPATIENT
Start: 2022-05-18 | End: 2023-07-21 | Stop reason: HOSPADM

## 2022-05-18 RX ORDER — FUROSEMIDE 20 MG/1
20 TABLET ORAL DAILY
Qty: 180 TABLET | Refills: 1 | Status: ON HOLD | OUTPATIENT
Start: 2022-05-18 | End: 2023-07-21 | Stop reason: HOSPADM

## 2022-05-18 RX ORDER — ISOSORBIDE MONONITRATE 30 MG/1
30 TABLET, EXTENDED RELEASE ORAL DAILY
Qty: 90 TABLET | Refills: 3 | Status: SHIPPED | OUTPATIENT
Start: 2022-05-18 | End: 2023-08-24 | Stop reason: ALTCHOICE

## 2022-05-18 RX ORDER — METOPROLOL SUCCINATE 25 MG/1
25 TABLET, EXTENDED RELEASE ORAL DAILY
Qty: 90 TABLET | Refills: 1 | Status: SHIPPED | OUTPATIENT
Start: 2022-05-18 | End: 2023-02-06 | Stop reason: SDUPTHER

## 2022-05-18 RX ORDER — LISINOPRIL AND HYDROCHLOROTHIAZIDE 12.5; 2 MG/1; MG/1
2 TABLET ORAL DAILY
Qty: 180 TABLET | Refills: 3 | Status: SHIPPED | OUTPATIENT
Start: 2022-05-18

## 2022-05-18 NOTE — PROGRESS NOTES
Valerio Yan  05/18/2022  [id number]    Therese Lala MD  Patient Care Team:  Therese Lala MD as PCP - General (Internal Medicine)  [facility] (Pain Medicine)  Delicia Mcginnis LPN as Care Coordinator (Internal Medicine)  Tomi Mir MD as Consulting Physician (Cardiology)  Saul Hogue MD as Consulting Physician (Neurology)  Hi Adan MD as Consulting Physician (Rheumatology)  Ilan Araya III, MD as Consulting Physician (Gastroenterology)    Has the patient seen any provider outside of the network since the last visit ? (no). If yes, HIPPA forms completed and records requested.      Visit Type:a scheduled routine follow-up visit    Chief Complaint:  Chief Complaint   Patient presents with    Annual Exam       History of Present Illness:  HPI Mr. aYn presents today for his routine annual exam. No change in medical, surgical or family history.     Restarted smoking but plans to start patches to quit again    Constipation used milk of magnesia   He has had a day of using restroom.       Review of Systems   Constitutional: Negative for chills and fever.   HENT: Negative for congestion and tinnitus.    Eyes: Negative for blurred vision, pain and discharge.   Respiratory: Negative for cough and wheezing.    Cardiovascular: Negative for chest pain, palpitations, orthopnea and leg swelling.   Gastrointestinal: Negative for abdominal pain, blood in stool, constipation, diarrhea, heartburn, nausea and vomiting.   Genitourinary: Negative for dysuria, flank pain, frequency, hematuria and urgency.   Skin: Negative for itching and rash.   Neurological: Negative for dizziness, tingling and headaches.   Psychiatric/Behavioral: Negative for depression.         Screening Questionnaires:    In the last two weeks how often have you felt down, depressed, or hopeless ( no )    In the last two weeks how often have you had little interest or pleasure in doing  (no )    In the  last two weeks how often have you been bothered by the following problems:  1. Feeling nervous, anxious, or on edge ( no )    2. Not being able to stop or control worrying ( no)    3. Worrying too much about different things ( no)    4. Trouble relaxing ( no )    5. Being so restless that it is hard to sit still  (no )    6. Becoming easily annoyed or irritable (intermittent)    7. Feeling afraid as if something awful might happen (no )    How often do you have a drink containing Alcohol? occasional, social use     Do you exercise  (no ) moderately active    Do you take a baby Aspirin daily ( no)    Do you have an advance directive ( no ) The patient was given information regarding Living Will/Durable Power-of- if requested.     The following were reviewed: Active problem list, medication list, allergies, family history, social history, and Health Maintenance.     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 CATH LAB;  Service: Cardiology;;    barrette esophagus      COLONOSCOPY N/A 5/17/2018    Procedure: COLONOSCOPY;  Surgeon: Ilan Araya III, MD;  Location: [facility] ENDO;  Service: Endoscopy;   Laterality: N/A;    decubitus surgery      to sacral    ESOPHAGOGASTRODUODENOSCOPY      ESOPHAGOGASTRODUODENOSCOPY N/A 2021    Procedure: EGD (ESOPHAGOGASTRODUODENOSCOPY);  Surgeon: Ban Zheng MD;  Location: [facility] ENDO;  Service: Endoscopy;  Laterality: N/A;    GASTROSTOMY TUBE PLACEMENT      KNEE ARTHROSCOPY Left     LEFT HEART CATHETERIZATION Left 12/10/2020    Procedure: CATHETERIZATION, HEART, LEFT;  Surgeon: Tomi Mir MD;  Location: [facility] CATH LAB;  Service: Cardiology;  Laterality: Left;  730 start time    PEG TUBE REMOVAL      RADIOFREQUENCY ABLATION      RFA      ROBOT-ASSISTED CHOLECYSTECTOMY USING DA GABRIELA XI N/A 2019    Procedure: XI ROBOTIC CHOLECYSTECTOMY;  Surgeon: Valerio Lai MD;  Location: [facility] OR;  Service: General;  Laterality: N/A;    JUAN-EN-Y PROCEDURE      TONSILLECTOMY      TRACHEAL SURGERY       Family History   Problem Relation Age of Onset    Heart attack Mother     Heart disease Mother     Heart disease Father     Heart attack Father      Social History     Socioeconomic History    Marital status:     Number of children: 2   Tobacco Use    Smoking status: Former Smoker     Packs/day: 1.00     Years: 32.00     Pack years: 32.00     Types: Cigarettes     Start date: 1988     Quit date: 2020     Years since quittin.0    Smokeless tobacco: Former User     Types: Snuff     Quit date: 1999   Substance and Sexual Activity    Alcohol use: Yes     Alcohol/week: 14.0 standard drinks     Types: 14 Glasses of wine per week     Comment: no alcohol prior to surgery    Drug use: No    Sexual activity: Yes     Partners: Female     Social Determinants of Health     Financial Resource Strain: Low Risk     Difficulty of Paying Living Expenses: Not hard at all   Food Insecurity: No Food Insecurity    Worried About Running Out of Food in the Last Year: Never true    Ran Out of Food in the Last Year: Never true   Transportation Needs: No  Transportation Needs    Lack of Transportation (Medical): No    Lack of Transportation (Non-Medical): No   Physical Activity: Insufficiently Active    Days of Exercise per Week: 1 day    Minutes of Exercise per Session: 30 min   Stress: No Stress Concern Present    Feeling of Stress : Not at all   Social Connections: Unknown    Frequency of Communication with Friends and Family: Once a week    Frequency of Social Gatherings with Friends and Family: Never    Active Member of Clubs or Organizations: No    Attends Club or Organization Meetings: Never    Marital Status:    Housing Stability: Low Risk     Unable to Pay for Housing in the Last Year: No    Number of Places Lived in the Last Year: 1    Unstable Housing in the Last Year: No     Patient Active Problem List   Diagnosis    Epigastric pain    Polyneuropathy    Guillain-[place]    Ozuna esophagus    Peptic ulcer disease    Ozuna's esophagus    Typical atrial flutter    Ozuna's esophagus without dysplasia    Mitral regurgitation    Palpitations    Former cigarette smoker    Diaphoresis    Hypertension    LVH (left ventricular hypertrophy) due to hypertensive disease    Mixed hyperlipidemia    Complex regional pain syndrome type 2 of both lower extremities    Rotator cuff syndrome of right shoulder and allied disorders    Tendonitis of long head of biceps brachii of right shoulder    Primary osteoarthritis of left knee    Atypical chest pain    Abnormal CK    Dizziness    Symptomatic cholelithiasis    Hypokalemia    Family history of cardiovascular disease    CAD in native artery    Elevated coronary artery calcium score (2893)    Atypical angina    Coronary artery disease of native artery of native heart with stable angina pectoris    Sinus bradycardia    At risk for sleep apnea    CAD, multiple vessel    Sleep-disordered breathing    Psychophysiological insomnia    Inadequate sleep hygiene    Obstructive  "sleep apnea    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 Was in coma so is not aware of the reaction  Pt states "he don't know, was in coma"      Metoclopramide (bulk)      Other reaction(s): Unable to obtain    Reglan  [metoclopramide hcl] Other (See Comments)     Pt. Was in coma so is not aware of the reaction  Other reaction(s): Unable to obtain    Statins-hmg-coa reductase inhibitors      Myalgia      Sulfa (sulfonamide antibiotics) Other (See Comments)     Never taken  States son is allergic    Latex Hives, Itching and Rash           Latex, natural rubber Rash     Blisters, adhesives          Health Maintenance  Health Maintenance Topics with due status: Not Due       Topic Last Completion Date    Colorectal Cancer Screening 05/17/2018    LDCT Lung Screen 02/25/2022    Lipid Panel 05/04/2022    Aspirin/Antiplatelet Therapy 05/18/2022     Health Maintenance Due   Topic Date Due    Pneumococcal Vaccines (Age 0-64) (1 - PCV) Never done    TETANUS VACCINE  Never done    Shingles Vaccine (1 of 2) Never done    COVID-19 Vaccine (4 - Booster for Moderna series) 02/27/2022       Medications:  Current Outpatient Medications on File Prior to Visit   Medication Sig Dispense Refill    alirocumab (PRALUENT PEN) 150 mg/mL PnIj 1.06 mLs (159 mg total) by abdominal subcutaneous route every 14 (fourteen) days. 2 mL 12    aspirin (ECOTRIN) 81 MG EC tablet Take 81 mg by mouth once daily.      calcium-vitamin D 600 mg, 1,500mg,-200 unit 600 mg(1,500mg) -200 unit Tab Take 1 tablet by mouth once daily.       cyanocobalamin (VITAMIN B-12) 1,000 " mcg/mL injection Inject 1 mL (1,000 mcg total) into the muscle every 30 days. 10 mL 0    esomeprazole (NEXIUM) 20 MG capsule Take 2 capsules (40 mg total) by mouth before breakfast. (Patient taking differently: Take 20 mg by mouth before breakfast.) 60 capsule 11    ibuprofen (ADVIL,MOTRIN) 800 MG tablet Take 1 tablet (800 mg total) by mouth 3 (three) times daily. 30 tablet 0    levocetirizine (XYZAL) 5 MG tablet Take 1 tablet (5 mg total) by mouth every evening. 30 tablet 0    multivitamin (THERAGRAN) per tablet Take 1 tablet by mouth nightly.       [DISCONTINUED] amLODIPine (NORVASC) 5 MG tablet Take 1 tablet (5 mg total) by mouth once daily. 90 tablet 4    [DISCONTINUED] furosemide (LASIX) 20 MG tablet Take 1 tablet (20 mg total) by mouth once daily. 180 tablet 1    [DISCONTINUED] isosorbide mononitrate (IMDUR) 30 MG 24 hr tablet Take 1 tablet (30 mg total) by mouth once daily. 30 tablet 11    [DISCONTINUED] lisinopriL-hydrochlorothiazide (PRINZIDE,ZESTORETIC) 20-12.5 mg per tablet Take 2 tablets by mouth once daily 180 tablet 3    [DISCONTINUED] meloxicam (MOBIC) 15 MG tablet Take 15 mg by mouth once daily.      [DISCONTINUED] metoprolol succinate (TOPROL-XL) 25 MG 24 hr tablet Take 1 tablet (25 mg total) by mouth once daily. 90 tablet 1     Current Facility-Administered Medications on File Prior to Visit   Medication Dose Route Frequency Provider Last Rate Last Admin    lactated ringers infusion   Intravenous Continuous Van Gandhi MD   Stopped at 05/02/19 1551       Medications have been reviewed and reconciled with patient at visit today.    Barriers to medications present (no )    Adverse reactions to current medications (no)    Over the counter medications reviewed (Yes) and if needed added to active Medication list.    Exam:  Vitals:    05/18/22 0820   BP: (!) 142/90   Pulse: 80   Resp: 18     Weight: 134.2 kg (295 lb 13.7 oz)   Body mass index is 35.08 kg/m².      Physical Exam  Vitals  reviewed.   Constitutional:       Appearance: He is not ill-appearing.   HENT:      Head: Normocephalic and atraumatic.      Right Ear: External ear normal.      Left Ear: External ear normal.   Eyes:      General: No scleral icterus.        Right eye: No discharge.         Left eye: No discharge.      Pupils: Pupils are equal, round, and reactive to light.   Neck:      Thyroid: No thyromegaly.   Cardiovascular:      Rate and Rhythm: Normal rate and regular rhythm.      Heart sounds: Normal heart sounds. No murmur heard.  Pulmonary:      Effort: Pulmonary effort is normal. No respiratory distress.      Breath sounds: Normal breath sounds. No wheezing.   Abdominal:      General: Bowel sounds are normal. There is no distension.      Palpations: Abdomen is soft.      Tenderness: There is no abdominal tenderness.   Musculoskeletal:         General: Normal range of motion.      Cervical back: Normal range of motion and neck supple.      Comments: Bilateral braces lower extremity  Weakness on right side secondary to guillian barre years ago   Skin:     General: Skin is warm.      Findings: No erythema or rash.   Neurological:      Mental Status: He is alert and oriented to person, place, and time.      Coordination: Coordination normal.      Deep Tendon Reflexes: Reflexes are normal and symmetric.   Psychiatric:         Behavior: Behavior normal.         Laboratory Reviewed: (Yes)  Lab Results   Component Value Date    WBC 6.38 05/04/2022    HGB 15.1 05/04/2022    HCT 45.2 05/04/2022     05/04/2022    CHOL 148 05/04/2022    TRIG 144 05/04/2022    HDL 44 05/04/2022    ALT 68 (H) 05/04/2022    AST 59 (H) 05/04/2022     05/04/2022    K 3.7 05/04/2022     05/04/2022    CREATININE 0.7 05/04/2022    BUN 8 05/04/2022    CO2 29 05/04/2022    TSH 1.868 01/05/2021    INR 1.0 12/07/2020    HGBA1C 4.8 05/31/2017       Assessment:  The primary encounter diagnosis was Routine physical examination. 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  Dispense: 90 tablet; Refill: 4  -     lisinopriL-hydrochlorothiazide (PRINZIDE,ZESTORETIC) 20-12.5 mg per tablet; Take 2 tablets by mouth once daily.  Dispense: 180 tablet; Refill: 3    Atypical chest pain  -     isosorbide mononitrate (IMDUR) 30 MG 24 hr tablet; Take 1 tablet (30 mg total) by mouth once daily.  Dispense: 90 tablet; Refill: 3    CAD in native artery  -     isosorbide mononitrate (IMDUR) 30 MG 24 hr tablet; Take 1 tablet (30 mg total) by mouth once daily.  Dispense: 90 tablet; Refill: 3    Swelling  -     furosemide (LASIX) 20 MG tablet; Take 1 tablet (20 mg total) by mouth once daily.  Dispense: 180 tablet; Refill: 1    Other orders  -     metoprolol succinate (TOPROL-XL) 25 MG 24 hr tablet; Take 1 tablet (25 mg total) by mouth once daily.  Dispense: 90 tablet; Refill: 1        -Patient's lab results were reviewed and discussed with patient  -Treatment options and alternatives were discussed with the patient. Patient expressed understanding. Patient was given the opportunity to ask questions and be an active participant in their medical care. Patient had no further questions or concerns at this time.   -Documentation of patient's health and condition was obtained from family member who was present during visit.  -Patient is an overall moderate risk for  health complications from their medical conditions.     Follow up: follow up 1 year sooner if needed      Care Plan/Goals: Reviewed Yes   Goals        Patient Stated      Blood Pressure < 130/80 (pt-stated)               After visit summary printed and given to patient upon discharge.  Patient goals and care plan are included in After visit summary.

## 2022-07-22 ENCOUNTER — PATIENT OUTREACH (OUTPATIENT)
Dept: ADMINISTRATIVE | Facility: HOSPITAL | Age: 52
End: 2022-07-22
Payer: MEDICARE

## 2022-07-22 DIAGNOSIS — T46.6X5A STATIN-INDUCED MYOSITIS: Primary | ICD-10-CM

## 2022-07-22 DIAGNOSIS — T46.6X5A ADVERSE REACTION TO STATIN MEDICATION: ICD-10-CM

## 2022-07-22 DIAGNOSIS — M60.9 STATIN-INDUCED MYOSITIS: Primary | ICD-10-CM

## 2022-07-22 NOTE — Clinical Note
[facility] is indicating patient needs a statin. Please update Problem List on next visit. If no upcoming appt, have staff schedule Pt for a visit to address statin. If patient cannot take statin medications due to allergy or intolerance the PCP must drop that code every year in a visit.  A few codes to place on Problem list instead of insensitivity/intolerance/allergy:  G72.0 Drug induced myopathy G72.2 Myopathy due to other toxic agent G72.9 Myopathy, unspecified M62.82 Rhabdomyolysis M60.80 Other myositis unspecified M79.1 Myalgia Elevated LFT (enzymes)  T.46.6 adverse reaction to statin (Elevated LFT's) K71.9 Hepatotoxicity due to statin (Elevated LFT's)

## 2022-07-22 NOTE — PROGRESS NOTES
[facility]N Statin Report: Patient is currently not on any statin due to intolerance, chart routed to PCP to update Problem List on next visit.

## 2022-07-29 PROBLEM — M60.9 STATIN-INDUCED MYOSITIS: Status: ACTIVE | Noted: 2022-07-29

## 2022-07-29 PROBLEM — T46.6X5A STATIN-INDUCED MYOSITIS: Status: ACTIVE | Noted: 2022-07-29

## 2022-08-08 ENCOUNTER — PATIENT MESSAGE (OUTPATIENT)
Dept: INTERNAL MEDICINE | Facility: CLINIC | Age: 52
End: 2022-08-08
Payer: MEDICARE

## 2022-08-29 ENCOUNTER — PATIENT OUTREACH (OUTPATIENT)
Dept: ADMINISTRATIVE | Facility: HOSPITAL | Age: 52
End: 2022-08-29
Payer: MEDICARE

## 2022-08-29 NOTE — PROGRESS NOTES
Working Statin Report:     Patient has statin intolerance. Approved intolerance code due to be dropped in 2022. Sent to PCP to review.       Approved Allergy/Intolerance Codes  G72.0 Drug Induce myopathy  G72.2 Myopathy due to other toxic agents  G72.9 Myopathy, unspecified  M62.82 Rhabdomyolysis  M79.1 Myalgia

## 2022-09-12 ENCOUNTER — HOSPITAL ENCOUNTER (EMERGENCY)
Facility: HOSPITAL | Age: 52
Discharge: HOME | End: 2022-09-12
Attending: EMERGENCY MEDICINE
Payer: MEDICARE

## 2022-09-12 VITALS
HEART RATE: 64 BPM | SYSTOLIC BLOOD PRESSURE: 166 MMHG | DIASTOLIC BLOOD PRESSURE: 80 MMHG | RESPIRATION RATE: 16 BRPM | BODY MASS INDEX: 33.2 KG/M2 | WEIGHT: 280 LBS | TEMPERATURE: 98 F | OXYGEN SATURATION: 96 %

## 2022-09-12 DIAGNOSIS — N39.0 ACUTE LOWER UTI: ICD-10-CM

## 2022-09-12 DIAGNOSIS — R07.9 CHEST PAIN: Primary | ICD-10-CM

## 2022-09-12 LAB
ALBUMIN SERPL BCP-MCNC: 3.4 G/DL (ref 3.5–5.2)
ALP SERPL-CCNC: 122 U/L (ref 55–135)
ALT SERPL W/O P-5'-P-CCNC: 40 U/L (ref 10–44)
ANION GAP SERPL CALC-SCNC: 14 MMOL/L (ref 8–16)
AST SERPL-CCNC: 50 U/L (ref 10–40)
BACTERIA #/AREA URNS HPF: ABNORMAL /HPF
BASOPHILS # BLD AUTO: 0.05 K/UL (ref 0–0.2)
BASOPHILS NFR BLD: 0.8 % (ref 0–1.9)
BILIRUB SERPL-MCNC: 0.8 MG/DL (ref 0.1–1)
BILIRUB UR QL STRIP: NEGATIVE
BUN SERPL-MCNC: 5 MG/DL (ref 6–20)
CALCIUM SERPL-MCNC: 8.6 MG/DL (ref 8.7–10.5)
CHLORIDE SERPL-SCNC: 105 MMOL/L (ref 95–110)
CLARITY UR: ABNORMAL
CO2 SERPL-SCNC: 23 MMOL/L (ref 23–29)
COLOR UR: YELLOW
CREAT SERPL-MCNC: 0.7 MG/DL (ref 0.5–1.4)
DIFFERENTIAL METHOD: ABNORMAL
EOSINOPHIL # BLD AUTO: 0.1 K/UL (ref 0–0.5)
EOSINOPHIL NFR BLD: 1.5 % (ref 0–8)
ERYTHROCYTE [DISTWIDTH] IN BLOOD BY AUTOMATED COUNT: 14.4 % (ref 11.5–14.5)
EST. GFR  (NO RACE VARIABLE): >60 ML/MIN/1.73 M^2
GLUCOSE SERPL-MCNC: 118 MG/DL (ref 70–110)
GLUCOSE UR QL STRIP: NEGATIVE
HCT VFR BLD AUTO: 46.4 % (ref 40–54)
HGB BLD-MCNC: 16.1 G/DL (ref 14–18)
HGB UR QL STRIP: NEGATIVE
HYALINE CASTS #/AREA URNS LPF: 4 /LPF
IMM GRANULOCYTES # BLD AUTO: 0.01 K/UL (ref 0–0.04)
IMM GRANULOCYTES NFR BLD AUTO: 0.2 % (ref 0–0.5)
KETONES UR QL STRIP: NEGATIVE
LEUKOCYTE ESTERASE UR QL STRIP: ABNORMAL
LIPASE SERPL-CCNC: 51 U/L (ref 4–60)
LYMPHOCYTES # BLD AUTO: 1 K/UL (ref 1–4.8)
LYMPHOCYTES NFR BLD: 14.9 % (ref 18–48)
MCH RBC QN AUTO: 30.3 PG (ref 27–31)
MCHC RBC AUTO-ENTMCNC: 34.7 G/DL (ref 32–36)
MCV RBC AUTO: 87 FL (ref 82–98)
MICROSCOPIC COMMENT: ABNORMAL
MONOCYTES # BLD AUTO: 0.7 K/UL (ref 0.3–1)
MONOCYTES NFR BLD: 9.9 % (ref 4–15)
NEUTROPHILS # BLD AUTO: 4.8 K/UL (ref 1.8–7.7)
NEUTROPHILS NFR BLD: 72.7 % (ref 38–73)
NITRITE UR QL STRIP: POSITIVE
NRBC BLD-RTO: 0 /100 WBC
PH UR STRIP: 6 [PH] (ref 5–8)
PLATELET # BLD AUTO: 152 K/UL (ref 150–450)
PMV BLD AUTO: 10.2 FL (ref 9.2–12.9)
POTASSIUM SERPL-SCNC: 3.4 MMOL/L (ref 3.5–5.1)
PROT SERPL-MCNC: 6.7 G/DL (ref 6–8.4)
PROT UR QL STRIP: ABNORMAL
RBC # BLD AUTO: 5.31 M/UL (ref 4.6–6.2)
RBC #/AREA URNS HPF: 3 /HPF (ref 0–4)
SODIUM SERPL-SCNC: 142 MMOL/L (ref 136–145)
SP GR UR STRIP: 1.01 (ref 1–1.03)
TROPONIN I SERPL DL<=0.01 NG/ML-MCNC: 0.01 NG/ML (ref 0–0.03)
TROPONIN I SERPL DL<=0.01 NG/ML-MCNC: 0.01 NG/ML (ref 0–0.03)
URN SPEC COLLECT METH UR: ABNORMAL
UROBILINOGEN UR STRIP-ACNC: NEGATIVE EU/DL
WBC # BLD AUTO: 6.57 K/UL (ref 3.9–12.7)
WBC #/AREA URNS HPF: 16 /HPF (ref 0–5)
WBC CLUMPS URNS QL MICRO: ABNORMAL

## 2022-09-12 PROCEDURE — 81000 URINALYSIS NONAUTO W/SCOPE: CPT | Performed by: EMERGENCY MEDICINE

## 2022-09-12 PROCEDURE — 84484 ASSAY OF TROPONIN QUANT: CPT | Performed by: EMERGENCY MEDICINE

## 2022-09-12 PROCEDURE — 96361 HYDRATE IV INFUSION ADD-ON: CPT

## 2022-09-12 PROCEDURE — 63600175 PHARM REV CODE 636 W HCPCS: Performed by: EMERGENCY MEDICINE

## 2022-09-12 PROCEDURE — 93010 EKG 12-LEAD: ICD-10-PCS | Mod: ,,, | Performed by: INTERNAL MEDICINE

## 2022-09-12 PROCEDURE — 87086 URINE CULTURE/COLONY COUNT: CPT | Performed by: EMERGENCY MEDICINE

## 2022-09-12 PROCEDURE — 85025 COMPLETE CBC W/AUTO DIFF WBC: CPT | Performed by: EMERGENCY MEDICINE

## 2022-09-12 PROCEDURE — 96365 THER/PROPH/DIAG IV INF INIT: CPT

## 2022-09-12 PROCEDURE — 93010 ELECTROCARDIOGRAM REPORT: CPT | Mod: ,,, | Performed by: INTERNAL MEDICINE

## 2022-09-12 PROCEDURE — 83690 ASSAY OF LIPASE: CPT | Performed by: EMERGENCY MEDICINE

## 2022-09-12 PROCEDURE — 25000003 PHARM REV CODE 250: Performed by: EMERGENCY MEDICINE

## 2022-09-12 PROCEDURE — 96376 TX/PRO/DX INJ SAME DRUG ADON: CPT

## 2022-09-12 PROCEDURE — 99285 EMERGENCY DEPT VISIT HI MDM: CPT | Mod: 25

## 2022-09-12 PROCEDURE — 96375 TX/PRO/DX INJ NEW DRUG ADDON: CPT

## 2022-09-12 PROCEDURE — 93005 ELECTROCARDIOGRAM TRACING: CPT

## 2022-09-12 PROCEDURE — 80053 COMPREHEN METABOLIC PANEL: CPT | Performed by: EMERGENCY MEDICINE

## 2022-09-12 RX ORDER — MORPHINE SULFATE 4 MG/ML
4 INJECTION, SOLUTION INTRAMUSCULAR; INTRAVENOUS
Status: COMPLETED | OUTPATIENT
Start: 2022-09-12 | End: 2022-09-12

## 2022-09-12 RX ORDER — ONDANSETRON 2 MG/ML
4 INJECTION INTRAMUSCULAR; INTRAVENOUS
Status: COMPLETED | OUTPATIENT
Start: 2022-09-12 | End: 2022-09-12

## 2022-09-12 RX ORDER — ONDANSETRON 4 MG/1
4 TABLET, FILM COATED ORAL EVERY 6 HOURS PRN
Qty: 12 TABLET | Refills: 0 | Status: SHIPPED | OUTPATIENT
Start: 2022-09-12 | End: 2023-07-19

## 2022-09-12 RX ORDER — FAMOTIDINE 10 MG/ML
20 INJECTION INTRAVENOUS
Status: COMPLETED | OUTPATIENT
Start: 2022-09-12 | End: 2022-09-12

## 2022-09-12 RX ORDER — CLONIDINE HYDROCHLORIDE 0.1 MG/1
0.1 TABLET ORAL
Status: COMPLETED | OUTPATIENT
Start: 2022-09-12 | End: 2022-09-12

## 2022-09-12 RX ORDER — NAPROXEN SODIUM 220 MG/1
324 TABLET, FILM COATED ORAL
Status: CANCELLED | OUTPATIENT
Start: 2022-09-12 | End: 2022-09-12

## 2022-09-12 RX ORDER — CEPHALEXIN 500 MG/1
500 CAPSULE ORAL 4 TIMES DAILY
Qty: 28 CAPSULE | Refills: 0 | Status: SHIPPED | OUTPATIENT
Start: 2022-09-12 | End: 2022-09-19

## 2022-09-12 RX ADMIN — MORPHINE SULFATE 4 MG: 4 INJECTION INTRAVENOUS at 10:09

## 2022-09-12 RX ADMIN — CEFTRIAXONE 1 G: 1 INJECTION, SOLUTION INTRAVENOUS at 12:09

## 2022-09-12 RX ADMIN — SODIUM CHLORIDE 1000 ML: 0.9 INJECTION, SOLUTION INTRAVENOUS at 07:09

## 2022-09-12 RX ADMIN — FAMOTIDINE 20 MG: 10 INJECTION INTRAVENOUS at 07:09

## 2022-09-12 RX ADMIN — ONDANSETRON 4 MG: 2 INJECTION INTRAMUSCULAR; INTRAVENOUS at 07:09

## 2022-09-12 RX ADMIN — ONDANSETRON 4 MG: 2 INJECTION INTRAMUSCULAR; INTRAVENOUS at 10:09

## 2022-09-12 RX ADMIN — CLONIDINE HYDROCHLORIDE 0.1 MG: 0.1 TABLET ORAL at 07:09

## 2022-09-12 NOTE — PROGRESS NOTES
Sent secure chat message from the emergency room provider with concerns for partial bowel obstruction on CT scan.      Reviewed CT scan and the report showed no evidence of a bowel obstruction.      I recommended that the patient was having GI symptoms given his history of gastric bypass he should undergo a CT scan with oral contrast.      Surgery should be recalled as needed

## 2022-09-12 NOTE — ED PROVIDER NOTES
"SCRIBE #1 NOTE: I, Cecil Felicianou, am scribing for, and in the presence of, Sivakumar Mcgovern Jr., MD. I have scribed the entire note.      History      Chief Complaint   Patient presents with    Chest Pain     Chest pain since 3am. Weakness        Review of patient's allergies indicates:   Allergen Reactions    Metoclopramide Other (See Comments) and Hives     Pt. Was in coma so is not aware of the reaction  Pt states "he don't know, was in coma"      Metoclopramide (bulk)      Other reaction(s): Unable to obtain    Reglan  [metoclopramide hcl] Other (See Comments)     Pt. Was in coma so is not aware of the reaction  Other reaction(s): Unable to obtain    Statins-hmg-coa reductase inhibitors      Myalgia      Sulfa (sulfonamide antibiotics) Other (See Comments)     Never taken  States son is allergic    Latex Hives, Itching and Rash           Latex, natural rubber Rash     Blisters, adhesives           HPI   HPI    9/12/2022, 7:23 AM   History obtained from the patient      History of Present Illness: Valerio Yan is a 51 y.o. male patient with a PMHx of atrial flutter, HTN, Guillain-Compton syndrome who presents to the Emergency Department for chest pain, onset this morning at 3 AM. Pt describes the pain as a pressure and a tightness. Symptoms are constant and moderate in severity. No mitigating or exacerbating factors reported. Associated sxs include n/v and generalized weakness. Pt also reports wounds to his bilateral toes, after his dog accidentally scratched him while running. Patient denies any fever, chills, SOB, dizziness, headache, and all other sxs at this time. No prior Tx reported. No further complaints or concerns at this time.     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    Hyperlipidemia     Hypertension     Joint pain     knees    LVH (left ventricular hypertrophy) due to hypertensive disease 2/10/2017    Mitral regurgitation 6/9/2016    KIA (obstructive sleep apnea)     Primary osteoarthritis of left knee 1/7/2019    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    Tracheostomy status 12/29/2021    Transfusion reaction     1 x  to PRBC fever       Past Surgical History:  Past Surgical History:   Procedure Laterality Date    ANGIOGRAM, CORONARY, WITH LEFT HEART CATHETERIZATION  12/10/2020    Procedure: Angiogram, Coronary, with Left Heart Cath;  Surgeon: Tomi Mir MD;  Location: [facility] CATH LAB;  Service: Cardiology;;    barrette esophagus      COLONOSCOPY N/A 5/17/2018    Procedure: COLONOSCOPY;  Surgeon: Ilan Araya III, MD;  Location: [facility] ENDO;  Service: Endoscopy;  Laterality: N/A;    decubitus surgery      to sacral    ESOPHAGOGASTRODUODENOSCOPY      ESOPHAGOGASTRODUODENOSCOPY N/A 6/17/2021    Procedure: EGD (ESOPHAGOGASTRODUODENOSCOPY);  Surgeon: Ban Zheng MD;  Location: [facility] ENDO;  Service: Endoscopy;  Laterality: N/A;    GASTROSTOMY TUBE PLACEMENT      KNEE ARTHROSCOPY Left 2002    LEFT HEART CATHETERIZATION Left 12/10/2020    Procedure: CATHETERIZATION, HEART, LEFT;  Surgeon: Tomi Mir MD;  Location: [facility] CATH LAB;  Service: Cardiology;  Laterality: Left;  730 start time    PEG TUBE REMOVAL      RADIOFREQUENCY ABLATION      RFA      ROBOT-ASSISTED CHOLECYSTECTOMY USING DA GABRIELA XI N/A 5/2/2019    Procedure: XI ROBOTIC CHOLECYSTECTOMY;  Surgeon: Valerio Lai MD;  Location: [facility] OR;  Service: General;  Laterality: N/A;    JUAN-EN-Y PROCEDURE      TONSILLECTOMY      TRACHEAL SURGERY           Family History:  Family History   Problem Relation Age of Onset    Heart attack Mother     Heart disease Mother     Heart disease Father     Heart attack Father        Social  History:  Social History     Tobacco Use    Smoking status: Former     Packs/day: 1.00     Years: 32.00     Pack years: 32.00     Types: Cigarettes     Start date: 1988     Quit date: 2020     Years since quittin.3    Smokeless tobacco: Former     Types: Snuff     Quit date: 1999   Substance and Sexual Activity    Alcohol use: Yes     Alcohol/week: 14.0 standard drinks     Types: 14 Glasses of wine per week     Comment: no alcohol prior to surgery    Drug use: No    Sexual activity: Yes     Partners: Female       ROS   Review of Systems   Constitutional:  Negative for chills and fever.   HENT:  Negative for sore throat.    Respiratory:  Negative for shortness of breath.    Cardiovascular:  Positive for chest pain.   Gastrointestinal:  Positive for nausea and vomiting. Negative for diarrhea.   Genitourinary:  Negative for dysuria.   Musculoskeletal:  Negative for back pain.   Skin:  Positive for wound (bilateral toes). Negative for rash.   Neurological:  Positive for weakness (generalized). Negative for dizziness, light-headedness, numbness and headaches.   Hematological:  Does not bruise/bleed easily.   All other systems reviewed and are negative.    Physical Exam      Initial Vitals [22 0658]   BP Pulse Resp Temp SpO2   (!) 166/100 98 18 98.1 °F (36.7 °C) 98 %      MAP       --          Physical Exam  Nursing Notes and Vital Signs Reviewed.  Constitutional: Patient is in no acute distress. Well-developed and well-nourished.  Head: Atraumatic. Normocephalic.  Eyes:  EOM intact.  No scleral icterus.  ENT: Mucous membranes are moist.  Nares clear   Neck:  Full ROM. No JVD.  Cardiovascular: Regular rate. Regular rhythm No murmurs, rubs, or gallops. Distal pulses are 2+ and symmetric  Pulmonary/Chest: No respiratory distress. Clear to auscultation bilaterally. No wheezing or rales.  Equal chest wall rise bilaterally  Abdominal: Soft and non-distended.  There is no tenderness.  No rebound, guarding,  or rigidity. Good bowel sounds.  Genitourinary: No CVA tenderness.  No suprapubic tenderness  Musculoskeletal: Moves all extremities. No obvious deformities.  5 x 5 strength in all extremities   Skin: Warm and dry.  Neurological:  Alert, awake, and appropriate.  Normal speech.  No acute focal neurological deficits are appreciated.  Two through 12 intact bilaterally.  Psychiatric: Normal affect. Good eye contact. Appropriate in content.    ED Course    Procedures  ED Vital Signs:  Vitals:    09/12/22 0658 09/12/22 0731 09/12/22 0736 09/12/22 0748   BP: (!) 166/100  (!) 189/96 (!) 218/95   Pulse: 98 61  (!) 58   Resp: 18      Temp: 98.1 °F (36.7 °C)      TempSrc: Oral      SpO2: 98%   95%   Weight: 127 kg (280 lb)       09/12/22 0816 09/12/22 0831 09/12/22 0847 09/12/22 1002   BP: (!) 179/93 (!) 177/101 (!) 158/89 (!) 165/79   Pulse: (!) 56 60 (!) 58 (!) 56   Resp: (!) 21 (!) 22 20    Temp:       TempSrc:       SpO2: 97% 96% 96% 96%   Weight:        09/12/22 1024 09/12/22 1102   BP:  (!) 153/71   Pulse:  62   Resp: 18 12   Temp:     TempSrc:     SpO2:  96%   Weight:         Abnormal Lab Results:  Labs Reviewed   CBC W/ AUTO DIFFERENTIAL - Abnormal; Notable for the following components:       Result Value    Lymph % 14.9 (*)     All other components within normal limits   COMPREHENSIVE METABOLIC PANEL - Abnormal; Notable for the following components:    Potassium 3.4 (*)     Glucose 118 (*)     BUN 5 (*)     Calcium 8.6 (*)     Albumin 3.4 (*)     AST 50 (*)     All other components within normal limits   URINALYSIS, REFLEX TO URINE CULTURE - Abnormal; Notable for the following components:    Appearance, UA Hazy (*)     Protein, UA 1+ (*)     Nitrite, UA Positive (*)     Leukocytes, UA 1+ (*)     All other components within normal limits    Narrative:     Specimen Source->Urine   URINALYSIS MICROSCOPIC - Abnormal; Notable for the following components:    WBC, UA 16 (*)     WBC Clumps, UA Occasional (*)     Hyaline  Casts, UA 4 (*)     All other components within normal limits    Narrative:     Specimen Source->Urine   CULTURE, URINE   LIPASE   TROPONIN I   TROPONIN I        All Lab Results:  Results for orders placed or performed during the hospital encounter of 09/12/22   CBC auto differential   Result Value Ref Range    WBC 6.57 3.90 - 12.70 K/uL    RBC 5.31 4.60 - 6.20 M/uL    Hemoglobin 16.1 14.0 - 18.0 g/dL    Hematocrit 46.4 40.0 - 54.0 %    MCV 87 82 - 98 fL    MCH 30.3 27.0 - 31.0 pg    MCHC 34.7 32.0 - 36.0 g/dL    RDW 14.4 11.5 - 14.5 %    Platelets 152 150 - 450 K/uL    MPV 10.2 9.2 - 12.9 fL    Immature Granulocytes 0.2 0.0 - 0.5 %    Gran # (ANC) 4.8 1.8 - 7.7 K/uL    Immature Grans (Abs) 0.01 0.00 - 0.04 K/uL    Lymph # 1.0 1.0 - 4.8 K/uL    Mono # 0.7 0.3 - 1.0 K/uL    Eos # 0.1 0.0 - 0.5 K/uL    Baso # 0.05 0.00 - 0.20 K/uL    nRBC 0 0 /100 WBC    Gran % 72.7 38.0 - 73.0 %    Lymph % 14.9 (L) 18.0 - 48.0 %    Mono % 9.9 4.0 - 15.0 %    Eosinophil % 1.5 0.0 - 8.0 %    Basophil % 0.8 0.0 - 1.9 %    Differential Method Automated    Comprehensive metabolic panel   Result Value Ref Range    Sodium 142 136 - 145 mmol/L    Potassium 3.4 (L) 3.5 - 5.1 mmol/L    Chloride 105 95 - 110 mmol/L    CO2 23 23 - 29 mmol/L    Glucose 118 (H) 70 - 110 mg/dL    BUN 5 (L) 6 - 20 mg/dL    Creatinine 0.7 0.5 - 1.4 mg/dL    Calcium 8.6 (L) 8.7 - 10.5 mg/dL    Total Protein 6.7 6.0 - 8.4 g/dL    Albumin 3.4 (L) 3.5 - 5.2 g/dL    Total Bilirubin 0.8 0.1 - 1.0 mg/dL    Alkaline Phosphatase 122 55 - 135 U/L    AST 50 (H) 10 - 40 U/L    ALT 40 10 - 44 U/L    Anion Gap 14 8 - 16 mmol/L    eGFR >60 >60 mL/min/1.73 m^2   Lipase   Result Value Ref Range    Lipase 51 4 - 60 U/L   Troponin I   Result Value Ref Range    Troponin I 0.008 0.000 - 0.026 ng/mL   Urinalysis, Reflex to Urine Culture Urine, Clean Catch    Specimen: Urine   Result Value Ref Range    Specimen UA Urine, Clean Catch     Color, UA Yellow Yellow, Straw, Beatris    Appearance,  UA Hazy (A) Clear    pH, UA 6.0 5.0 - 8.0    Specific Gravity, UA 1.010 1.005 - 1.030    Protein, UA 1+ (A) Negative    Glucose, UA Negative Negative    Ketones, UA Negative Negative    Bilirubin (UA) Negative Negative    Occult Blood UA Negative Negative    Nitrite, UA Positive (A) Negative    Urobilinogen, UA Negative <2.0 EU/dL    Leukocytes, UA 1+ (A) Negative   Urinalysis Microscopic   Result Value Ref Range    RBC, UA 3 0 - 4 /hpf    WBC, UA 16 (H) 0 - 5 /hpf    WBC Clumps, UA Occasional (A) None-Rare    Bacteria Rare None-Occ /hpf    Hyaline Casts, UA 4 (A) 0-1/lpf /lpf    Microscopic Comment SEE COMMENT    Troponin I   Result Value Ref Range    Troponin I 0.008 0.000 - 0.026 ng/mL       Imaging Results:  Imaging Results              CT Abdomen Pelvis  Without Contrast (Final result)  Result time 09/12/22 10:39:04      Final result by Vivek Hernandez MD (09/12/22 10:39:04)                   Impression:      No evidence of bowel obstruction.    Mild nonspecific bladder wall thickening can be seen with cystitis.    All CT scans at this facility use dose modulation, iterative reconstruction, and/or weight based dosing when appropriate to reduce radiation dose to as low as reasonable achievable.      Electronically signed by: Vivek Hernandez MD  Date:    09/12/2022  Time:    10:39               Narrative:    EXAMINATION:  CT ABDOMEN PELVIS WITHOUT CONTRAST    CLINICAL HISTORY:  Bowel obstruction suspected;    TECHNIQUE:  Low dose axial images, sagittal and coronal reformations were obtained from the lung bases to the pubic symphysis.  Oral contrast was not administered.    COMPARISON:  02/01/2019    FINDINGS:  Heart: Normal size. No effusion.  Moderate coronary artery disease.    Lung Bases: Clear.  2 mm punctate granuloma left lower lobe.    Liver: Normal size.  Mild hepatic steatosis.  Focal lesions.    Gallbladder: No calcified gallstones.    Bile Ducts: No dilatation.    Pancreas: No obvious mass. No  peripancreatic fat stranding.    Spleen: Normal.    Adrenals: Normal.    Kidneys/Ureters: Mild cortical thinning.  Mild nonspecific perinephric stranding.  No mass, hydroureteronephrosis, or nephroureterolithiasis.    Bladder: Mild nonspecific wall thickening.    Reproductive organs: Normal.    GI Tract/Mesentery: Changes of gastric bypass noted.  No evidence of bowel obstruction or inflammation.  No evidence of appendicitis.    Peritoneal Space: No ascites or free air.    Retroperitoneum: No significant adenopathy.    Abdominal wall: Small fat containing left inguinal hernia.  Small fat containing umbilical hernia.    Vasculature: No aneurysm.  Moderate atherosclerotic disease.    Bones: No acute fracture. No suspicious lytic or sclerotic lesions.  Moderate degenerative changes lower lumbar spine                                       X-Ray Chest AP Portable (Final result)  Result time 09/12/22 09:38:04      Final result by Vivek Hernandez MD (09/12/22 09:38:04)                   Impression:      No acute process seen.      Electronically signed by: Vivek Hernandez MD  Date:    09/12/2022  Time:    09:38               Narrative:    EXAMINATION:  XR CHEST AP PORTABLE    CLINICAL HISTORY:  chest pain;    FINDINGS:  Single view of the chest.  Comparison 01/05/2021    Cardiac silhouette is normal.  The lungs demonstrate no evidence of active disease.  No evidence of pleural effusion or pneumothorax.  Bones appear intact.                                     The EKG was ordered, reviewed, and independently interpreted by the ED provider.  Interpretation time: 06:54  Rate: 63 BPM  Rhythm: normal sinus rhythm  Interpretation: Left axis deviation. No STEMI.           The Emergency Provider reviewed the vital signs and test results, which are outlined above.    ED Discussion     11:42 AM: Discussed pt's case with Dr. Hassan (General Surgery), who reviewed the pt's CT and does not see any evidence of small bowel obstruction.      11:59 AM: Reassessed pt at this time. Discussed with pt all pertinent ED information and results. Discussed pt dx and plan of tx. Gave pt all f/u and return to the ED instructions. All questions and concerns were addressed at this time. Pt expresses understanding of information and instructions, and is comfortable with plan to discharge. Pt is stable for discharge.    I discussed with patient and/or family/caretaker that evaluation in the ED does not suggest any emergent or life threatening medical conditions requiring immediate intervention beyond what was provided in the ED, and I believe patient is safe for discharge.  Regardless, an unremarkable evaluation in the ED does not preclude the development or presence of a serious of life threatening condition. As such, patient was instructed to return immediately for any worsening or change in current symptoms.    12:07 PM  Patient is stable nontoxic with x2 negative troponins and normal EKG.  Patient has what appears to be urinary tract infection causing him to have nausea.  The nausea is cause of his chest tightness.  Will treat with antibiotics close follow-up at home.  Move I would Zofran.  Patient is tear return as needed for any worsening symptoms, problems, questions or concerns or concerns.       ED Medication(s):  Medications   cefTRIAXone (ROCEPHIN) 1 g/50 mL D5W IVPB (has no administration in time range)   sodium chloride 0.9% bolus 1,000 mL (0 mLs Intravenous Stopped 9/12/22 0834)   ondansetron injection 4 mg (4 mg Intravenous Given 9/12/22 0735)   famotidine (PF) injection 20 mg (20 mg Intravenous Given 9/12/22 0735)   cloNIDine tablet 0.1 mg (0.1 mg Oral Given 9/12/22 0736)   morphine injection 4 mg (4 mg Intravenous Given 9/12/22 1024)   ondansetron injection 4 mg (4 mg Intravenous Given 9/12/22 1023)        Follow-up Information       Therese Lala MD.    Specialty: Internal Medicine  Contact information:  [address] [place] [address] [address] Selam [place]EN  [id number]  [phone]                           New Prescriptions    CEPHALEXIN (KEFLEX) 500 MG CAPSULE    Take 1 capsule (500 mg total) by mouth 4 (four) times daily. for 7 days    ONDANSETRON (ZOFRAN) 4 MG TABLET    Take 1 tablet (4 mg total) by mouth every 6 (six) hours as needed for Nausea.         Medical Decision Making    Medical Decision Making:   Clinical Tests:   Lab Tests: Ordered and Reviewed  Radiological Study: Ordered and Reviewed  Medical Tests: Ordered and Reviewed         Scribe Attestation:   Scribe #1: I performed the above scribed service and the documentation accurately describes the services I performed. I attest to the accuracy of the note.    Attending:   Physician Attestation Statement for Scribe #1: I, Sivakumar Mcgovern Jr., MD, personally performed the services described in this documentation, as scribed by Cecil Rojas, in my presence, and it is both accurate and complete.          Clinical Impression       ICD-10-CM ICD-9-CM   1. Chest pain  R07.9 786.50   2. Acute lower UTI  N39.0 599.0       Disposition:   Disposition: Discharged  Condition: Stable       Sivakumar Mcgovern Jr., MD  09/12/22 [id number]

## 2022-10-09 ENCOUNTER — PATIENT MESSAGE (OUTPATIENT)
Dept: INTERNAL MEDICINE | Facility: CLINIC | Age: 52
End: 2022-10-09
Payer: MEDICARE

## 2022-10-09 DIAGNOSIS — K76.0 FATTY INFILTRATION OF LIVER: ICD-10-CM

## 2022-10-09 DIAGNOSIS — R39.9 UTI SYMPTOMS: Primary | ICD-10-CM

## 2022-10-10 ENCOUNTER — PATIENT MESSAGE (OUTPATIENT)
Dept: INTERNAL MEDICINE | Facility: CLINIC | Age: 52
End: 2022-10-10
Payer: MEDICARE

## 2022-10-10 ENCOUNTER — LAB VISIT (OUTPATIENT)
Dept: LAB | Facility: HOSPITAL | Age: 52
End: 2022-10-10
Attending: FAMILY MEDICINE
Payer: MEDICARE

## 2022-10-10 DIAGNOSIS — R39.9 UTI SYMPTOMS: ICD-10-CM

## 2022-10-10 LAB
BILIRUB UR QL STRIP: NEGATIVE
CLARITY UR: CLEAR
COLOR UR: COLORLESS
GLUCOSE UR QL STRIP: NEGATIVE
HGB UR QL STRIP: NEGATIVE
KETONES UR QL STRIP: ABNORMAL
LEUKOCYTE ESTERASE UR QL STRIP: NEGATIVE
NITRITE UR QL STRIP: NEGATIVE
PH UR STRIP: 7 [PH] (ref 5–8)
PROT UR QL STRIP: NEGATIVE
SP GR UR STRIP: <1.005 (ref 1–1.03)
URN SPEC COLLECT METH UR: ABNORMAL

## 2022-10-10 PROCEDURE — 81003 URINALYSIS AUTO W/O SCOPE: CPT | Performed by: FAMILY MEDICINE

## 2022-11-01 ENCOUNTER — PATIENT MESSAGE (OUTPATIENT)
Dept: INTERNAL MEDICINE | Facility: CLINIC | Age: 52
End: 2022-11-01
Payer: MEDICARE

## 2022-11-01 ENCOUNTER — LAB VISIT (OUTPATIENT)
Dept: LAB | Facility: HOSPITAL | Age: 52
End: 2022-11-01
Attending: FAMILY MEDICINE
Payer: MEDICARE

## 2022-11-01 DIAGNOSIS — Z78.9 DIETING: Primary | ICD-10-CM

## 2022-11-01 DIAGNOSIS — Z78.9 DIETING: ICD-10-CM

## 2022-11-01 LAB
ALBUMIN SERPL BCP-MCNC: 3.7 G/DL (ref 3.5–5.2)
ALP SERPL-CCNC: 82 U/L (ref 55–135)
ALT SERPL W/O P-5'-P-CCNC: 26 U/L (ref 10–44)
ANION GAP SERPL CALC-SCNC: 8 MMOL/L (ref 8–16)
AST SERPL-CCNC: 25 U/L (ref 10–40)
BILIRUB SERPL-MCNC: 0.5 MG/DL (ref 0.1–1)
BUN SERPL-MCNC: 6 MG/DL (ref 6–20)
CALCIUM SERPL-MCNC: 9.3 MG/DL (ref 8.7–10.5)
CHLORIDE SERPL-SCNC: 102 MMOL/L (ref 95–110)
CO2 SERPL-SCNC: 25 MMOL/L (ref 23–29)
CREAT SERPL-MCNC: 0.6 MG/DL (ref 0.5–1.4)
EST. GFR  (NO RACE VARIABLE): >60 ML/MIN/1.73 M^2
GLUCOSE SERPL-MCNC: 89 MG/DL (ref 70–110)
MAGNESIUM SERPL-MCNC: 1.8 MG/DL (ref 1.6–2.6)
POTASSIUM SERPL-SCNC: 3.8 MMOL/L (ref 3.5–5.1)
PROT SERPL-MCNC: 6.6 G/DL (ref 6–8.4)
SODIUM SERPL-SCNC: 135 MMOL/L (ref 136–145)

## 2022-11-01 PROCEDURE — 83735 ASSAY OF MAGNESIUM: CPT | Performed by: FAMILY MEDICINE

## 2022-11-01 PROCEDURE — 36415 COLL VENOUS BLD VENIPUNCTURE: CPT | Mod: PO | Performed by: FAMILY MEDICINE

## 2022-11-01 PROCEDURE — 80053 COMPREHEN METABOLIC PANEL: CPT | Performed by: FAMILY MEDICINE

## 2022-11-02 ENCOUNTER — PATIENT MESSAGE (OUTPATIENT)
Dept: GASTROENTEROLOGY | Facility: CLINIC | Age: 52
End: 2022-11-02
Payer: MEDICARE

## 2022-11-02 ENCOUNTER — PATIENT MESSAGE (OUTPATIENT)
Dept: INTERNAL MEDICINE | Facility: CLINIC | Age: 52
End: 2022-11-02
Payer: MEDICARE

## 2023-02-07 RX ORDER — METOPROLOL SUCCINATE 25 MG/1
25 TABLET, EXTENDED RELEASE ORAL DAILY
Qty: 90 TABLET | Refills: 1 | Status: ON HOLD | OUTPATIENT
Start: 2023-02-07 | End: 2023-07-21 | Stop reason: HOSPADM

## 2023-03-07 ENCOUNTER — PATIENT OUTREACH (OUTPATIENT)
Dept: ADMINISTRATIVE | Facility: HOSPITAL | Age: 53
End: 2023-03-07
Payer: MEDICARE

## 2023-03-08 NOTE — PROGRESS NOTES
PHN STATIN REPORT: no appt at this time - due in May - needs dx code dropped - will set remind me to follow back up on provider dropping code.

## 2023-03-15 ENCOUNTER — PES CALL (OUTPATIENT)
Dept: ADMINISTRATIVE | Facility: CLINIC | Age: 53
End: 2023-03-15
Payer: MEDICARE

## 2023-04-18 ENCOUNTER — PATIENT MESSAGE (OUTPATIENT)
Dept: INTERNAL MEDICINE | Facility: CLINIC | Age: 53
End: 2023-04-18
Payer: MEDICARE

## 2023-04-20 ENCOUNTER — OFFICE VISIT (OUTPATIENT)
Dept: INTERNAL MEDICINE | Facility: CLINIC | Age: 53
End: 2023-04-20
Payer: MEDICARE

## 2023-04-20 VITALS
HEART RATE: 60 BPM | HEIGHT: 77 IN | DIASTOLIC BLOOD PRESSURE: 80 MMHG | WEIGHT: 265.63 LBS | SYSTOLIC BLOOD PRESSURE: 128 MMHG | BODY MASS INDEX: 31.36 KG/M2 | RESPIRATION RATE: 16 BRPM | OXYGEN SATURATION: 95 %

## 2023-04-20 DIAGNOSIS — J32.9 CHRONIC SINUSITIS, UNSPECIFIED LOCATION: ICD-10-CM

## 2023-04-20 DIAGNOSIS — R51.9 FREQUENT HEADACHES: Primary | ICD-10-CM

## 2023-04-20 PROCEDURE — 1160F RVW MEDS BY RX/DR IN RCRD: CPT | Mod: CPTII,S$GLB,, | Performed by: NURSE PRACTITIONER

## 2023-04-20 PROCEDURE — 3079F DIAST BP 80-89 MM HG: CPT | Mod: CPTII,S$GLB,, | Performed by: NURSE PRACTITIONER

## 2023-04-20 PROCEDURE — 99999 PR PBB SHADOW E&M-EST. PATIENT-LVL IV: CPT | Mod: PBBFAC,,, | Performed by: NURSE PRACTITIONER

## 2023-04-20 PROCEDURE — 4010F PR ACE/ARB THEARPY RXD/TAKEN: ICD-10-PCS | Mod: CPTII,S$GLB,, | Performed by: NURSE PRACTITIONER

## 2023-04-20 PROCEDURE — 4010F ACE/ARB THERAPY RXD/TAKEN: CPT | Mod: CPTII,S$GLB,, | Performed by: NURSE PRACTITIONER

## 2023-04-20 PROCEDURE — 3008F BODY MASS INDEX DOCD: CPT | Mod: CPTII,S$GLB,, | Performed by: NURSE PRACTITIONER

## 2023-04-20 PROCEDURE — 1159F MED LIST DOCD IN RCRD: CPT | Mod: CPTII,S$GLB,, | Performed by: NURSE PRACTITIONER

## 2023-04-20 PROCEDURE — 3074F SYST BP LT 130 MM HG: CPT | Mod: CPTII,S$GLB,, | Performed by: NURSE PRACTITIONER

## 2023-04-20 PROCEDURE — 99999 PR PBB SHADOW E&M-EST. PATIENT-LVL IV: ICD-10-PCS | Mod: PBBFAC,,, | Performed by: NURSE PRACTITIONER

## 2023-04-20 PROCEDURE — 3008F PR BODY MASS INDEX (BMI) DOCUMENTED: ICD-10-PCS | Mod: CPTII,S$GLB,, | Performed by: NURSE PRACTITIONER

## 2023-04-20 PROCEDURE — 1159F PR MEDICATION LIST DOCUMENTED IN MEDICAL RECORD: ICD-10-PCS | Mod: CPTII,S$GLB,, | Performed by: NURSE PRACTITIONER

## 2023-04-20 PROCEDURE — 99214 OFFICE O/P EST MOD 30 MIN: CPT | Mod: S$GLB,,, | Performed by: NURSE PRACTITIONER

## 2023-04-20 PROCEDURE — 99214 PR OFFICE/OUTPT VISIT, EST, LEVL IV, 30-39 MIN: ICD-10-PCS | Mod: S$GLB,,, | Performed by: NURSE PRACTITIONER

## 2023-04-20 PROCEDURE — 3079F PR MOST RECENT DIASTOLIC BLOOD PRESSURE 80-89 MM HG: ICD-10-PCS | Mod: CPTII,S$GLB,, | Performed by: NURSE PRACTITIONER

## 2023-04-20 PROCEDURE — 1160F PR REVIEW ALL MEDS BY PRESCRIBER/CLIN PHARMACIST DOCUMENTED: ICD-10-PCS | Mod: CPTII,S$GLB,, | Performed by: NURSE PRACTITIONER

## 2023-04-20 PROCEDURE — 3074F PR MOST RECENT SYSTOLIC BLOOD PRESSURE < 130 MM HG: ICD-10-PCS | Mod: CPTII,S$GLB,, | Performed by: NURSE PRACTITIONER

## 2023-04-20 RX ORDER — AZITHROMYCIN 250 MG/1
TABLET, FILM COATED ORAL
Qty: 6 TABLET | Refills: 0 | Status: SHIPPED | OUTPATIENT
Start: 2023-04-20 | End: 2023-05-10

## 2023-04-20 RX ORDER — CETIRIZINE HYDROCHLORIDE 10 MG/1
10 TABLET ORAL DAILY
Qty: 30 TABLET | Refills: 5 | Status: SHIPPED | OUTPATIENT
Start: 2023-04-20 | End: 2024-04-19

## 2023-04-20 RX ORDER — FLUTICASONE PROPIONATE 50 MCG
1 SPRAY, SUSPENSION (ML) NASAL DAILY
Qty: 16 G | Refills: 1 | Status: SHIPPED | OUTPATIENT
Start: 2023-04-20

## 2023-04-20 RX ORDER — IBUPROFEN 800 MG/1
800 TABLET ORAL EVERY 6 HOURS PRN
Qty: 30 TABLET | Refills: 1 | Status: ON HOLD | OUTPATIENT
Start: 2023-04-20 | End: 2023-07-21 | Stop reason: HOSPADM

## 2023-04-20 NOTE — PROGRESS NOTES
Valerio Yan  04/20/2023  [id number]    Therese Lala MD  Patient Care Team:  Therese Lala MD as PCP - General (Internal Medicine)  [facility] (Pain Medicine)  Delicia Mcginnis LPN as Care Coordinator (Internal Medicine)  Tomi Mir MD as Consulting Physician (Cardiology)  Saul Hogue MD as Consulting Physician (Neurology)  Hi Adan MD as Consulting Physician (Rheumatology)  Ilan Araya III, MD (Inactive) as Consulting Physician (Gastroenterology)          Visit Type:an urgent visit for a new problem    Chief Complaint:  Chief Complaint   Patient presents with    Headache     2 months       History of Present Illness:    53 yo male present with co HA for the past two months   Pt reports pain over his right eye when his eyes are open and closed.   Pt has mild concerned due to hx of CSF leak.   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    Hyperlipidemia     Hypertension     Joint pain     knees    LVH (left ventricular hypertrophy) due to hypertensive disease 2/10/2017    Mitral regurgitation 6/9/2016    KIA (obstructive sleep apnea)     Primary osteoarthritis of left knee 1/7/2019    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    Tracheostomy status 12/29/2021    Transfusion reaction     1 x  to PRBC fever     Past Surgical History:   Procedure Laterality Date    ANGIOGRAM, CORONARY, WITH LEFT HEART CATHETERIZATION  12/10/2020    Procedure: Angiogram, Coronary, with Left Heart Cath;  Surgeon: Tomi Mir MD;  Location: [facility] CATH LAB;  Service: Cardiology;;    barrette esophagus      COLONOSCOPY N/A 5/17/2018    Procedure: COLONOSCOPY;  Surgeon: Ilan Araya III, MD;  Location:  [facility] ENDO;  Service: Endoscopy;  Laterality: N/A;    decubitus surgery      to sacral    ESOPHAGOGASTRODUODENOSCOPY      ESOPHAGOGASTRODUODENOSCOPY N/A 2021    Procedure: EGD (ESOPHAGOGASTRODUODENOSCOPY);  Surgeon: Ban Zheng MD;  Location: [facility] ENDO;  Service: Endoscopy;  Laterality: N/A;    GASTROSTOMY TUBE PLACEMENT      KNEE ARTHROSCOPY Left     LEFT HEART CATHETERIZATION Left 12/10/2020    Procedure: CATHETERIZATION, HEART, LEFT;  Surgeon: Tomi Mir MD;  Location: [facility] CATH LAB;  Service: Cardiology;  Laterality: Left;  730 start time    PEG TUBE REMOVAL      RADIOFREQUENCY ABLATION      RFA      ROBOT-ASSISTED CHOLECYSTECTOMY USING DA GABRIELA XI N/A 2019    Procedure: XI ROBOTIC CHOLECYSTECTOMY;  Surgeon: Valerio Lai MD;  Location: [facility] OR;  Service: General;  Laterality: N/A;    JUAN-EN-Y PROCEDURE      TONSILLECTOMY      TRACHEAL SURGERY       Family History   Problem Relation Age of Onset    Heart attack Mother     Heart disease Mother     Heart disease Father     Heart attack Father      Social History     Socioeconomic History    Marital status:     Number of children: 2   Tobacco Use    Smoking status: Every Day     Packs/day: 1.00     Years: 32.00     Pack years: 32.00     Types: Cigarettes     Start date: 1988     Last attempt to quit: 2020     Years since quittin.9    Smokeless tobacco: Former     Types: Snuff     Quit date: 1999   Substance and Sexual Activity    Alcohol use: Yes     Alcohol/week: 14.0 standard drinks     Types: 14 Glasses of wine per week     Comment: no alcohol prior to surgery    Drug use: No    Sexual activity: Yes     Partners: Female     Social Determinants of Health     Financial Resource Strain: Low Risk     Difficulty of Paying Living Expenses: Not hard at all   Food Insecurity: No Food Insecurity    Worried About Running Out of Food in the Last Year: Never true    Ran Out of Food in the Last Year: Never true   Transportation  Needs: No Transportation Needs    Lack of Transportation (Medical): No    Lack of Transportation (Non-Medical): No   Physical Activity: Insufficiently Active    Days of Exercise per Week: 1 day    Minutes of Exercise per Session: 30 min   Stress: No Stress Concern Present    Feeling of Stress : Not at all   Social Connections: Unknown    Frequency of Communication with Friends and Family: Once a week    Frequency of Social Gatherings with Friends and Family: Never    Attends Club or Organization Meetings: Never    Marital Status:    Housing Stability: Low Risk     Unable to Pay for Housing in the Last Year: No    Number of Places Lived in the Last Year: 1    Unstable Housing in the Last Year: No     Patient Active Problem List   Diagnosis    Epigastric pain    Polyneuropathy    Guillain-[place]    Ozuna esophagus    Peptic ulcer disease    Ozuna's esophagus    Typical atrial flutter    Ozuna's esophagus without dysplasia    Mitral regurgitation    Palpitations    Former cigarette smoker    Diaphoresis    Hypertension    LVH (left ventricular hypertrophy) due to hypertensive disease    Mixed hyperlipidemia    Complex regional pain syndrome type 2 of both lower extremities    Rotator cuff syndrome of right shoulder and allied disorders    Tendonitis of long head of biceps brachii of right shoulder    Primary osteoarthritis of left knee    Atypical chest pain    Abnormal CK    Dizziness    Symptomatic cholelithiasis    Hypokalemia    Family history of cardiovascular disease    CAD in native artery    Elevated coronary artery calcium score (2893)    Atypical angina    Coronary artery disease of native artery of native heart with stable angina pectoris    Sinus bradycardia    At risk for sleep apnea    CAD, multiple vessel    Sleep-disordered breathing    Psychophysiological insomnia    Inadequate sleep hygiene    Obstructive sleep apnea    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 Was in coma so is not aware of the reaction  Pt states "he don't know, was in coma"      Metoclopramide (bulk)      Other reaction(s): Unable to obtain    Reglan  [metoclopramide hcl] Other (See Comments)     Pt. Was in coma so is not aware of the reaction  Other reaction(s): Unable to obtain    Statins-hmg-coa reductase inhibitors      Myalgia      Sulfa (sulfonamide antibiotics) Other (See Comments)     Never taken  States son is allergic    Latex Hives, Itching and Rash           Latex, natural rubber Rash     Blisters, adhesives          The following were reviewed at this visit: active problem list, medication list, allergies, family history, social history, and health maintenance.    Medications:  Current Outpatient Medications on File Prior to Visit   Medication Sig Dispense Refill    alirocumab (PRALUENT PEN) 150 mg/mL PnIj 1.06 mLs (159 mg total) by abdominal subcutaneous route every 14 (fourteen) days. 2 mL 12    amLODIPine (NORVASC) 5 MG tablet Take 1 tablet (5 mg total) by mouth once daily. 90 tablet 4    aspirin (ECOTRIN) 81 MG EC tablet Take 81 mg by mouth once daily.      calcium-vitamin D 600 mg, 1,500mg,-200 unit 600 mg(1,500mg) -200 unit Tab Take 1 tablet by mouth once daily.       cyanocobalamin (VITAMIN B-12) 1,000 mcg/mL injection Inject 1 mL (1,000 mcg total) into the muscle every 30 days. 10 mL 0    furosemide (LASIX) 20 MG tablet Take 1 tablet (20 mg total) by mouth once daily. 180 tablet 1    isosorbide mononitrate (IMDUR) 30 MG 24 hr tablet Take 1 tablet (30 mg total) by mouth once daily. 90 tablet 3    " lisinopriL-hydrochlorothiazide (PRINZIDE,ZESTORETIC) 20-12.5 mg per tablet Take 2 tablets by mouth once daily. 180 tablet 3    metoprolol succinate (TOPROL-XL) 25 MG 24 hr tablet Take 1 tablet (25 mg total) by mouth once daily. 90 tablet 1    multivitamin (THERAGRAN) per tablet Take 1 tablet by mouth nightly.       ondansetron (ZOFRAN) 4 MG tablet Take 1 tablet (4 mg total) by mouth every 6 (six) hours as needed for Nausea. 12 tablet 0    [DISCONTINUED] ibuprofen (ADVIL,MOTRIN) 800 MG tablet Take 1 tablet (800 mg total) by mouth 3 (three) times daily. 30 tablet 0    esomeprazole (NEXIUM) 20 MG capsule Take 2 capsules (40 mg total) by mouth before breakfast. (Patient taking differently: Take 20 mg by mouth before breakfast.) 60 capsule 11    [DISCONTINUED] levocetirizine (XYZAL) 5 MG tablet Take 1 tablet (5 mg total) by mouth every evening. 30 tablet 0     Current Facility-Administered Medications on File Prior to Visit   Medication Dose Route Frequency Provider Last Rate Last Admin    lactated ringers infusion   Intravenous Continuous Van Gandhi MD   Stopped at 05/02/19 1551       Medications have been reviewed and reconciled with patient at this visit.  Barriers to medications reviewed with patient.    Adverse reactions to current medications reviewed with patient..    Over the counter medications reviewed and reconciled with patient.    Exam:  Wt Readings from Last 3 Encounters:   04/20/23 120.5 kg (265 lb 10.5 oz)   09/12/22 127 kg (280 lb)   05/18/22 134.2 kg (295 lb 13.7 oz)     Temp Readings from Last 3 Encounters:   09/12/22 98.4 °F (36.9 °C) (Oral)   11/23/21 97.9 °F (36.6 °C) (Temporal)   06/17/21 98.1 °F (36.7 °C) (Temporal)     BP Readings from Last 3 Encounters:   04/20/23 128/80   09/12/22 (!) 166/80   05/18/22 138/84     Pulse Readings from Last 3 Encounters:   04/20/23 60   09/12/22 64   05/18/22 80     Body mass index is 31.5 kg/m².      Review of Systems   Constitutional:  Negative for  chills, fever and malaise/fatigue.   HENT:  Negative for congestion, ear pain and sore throat.    Eyes:  Positive for pain.   Respiratory:  Negative for cough, sputum production, shortness of breath and wheezing.    Neurological:  Positive for headaches.   Physical Exam  Vitals and nursing note reviewed.   Constitutional:       Appearance: Normal appearance. He is obese.   HENT:      Head: Normocephalic and atraumatic.      Right Ear: Tympanic membrane, ear canal and external ear normal.      Left Ear: Tympanic membrane, ear canal and external ear normal.      Nose: Nose normal.      Mouth/Throat:      Mouth: Mucous membranes are moist.      Pharynx: Oropharynx is clear.   Eyes:      Extraocular Movements: Extraocular movements intact.      Conjunctiva/sclera: Conjunctivae normal.      Pupils: Pupils are equal, round, and reactive to light.   Cardiovascular:      Rate and Rhythm: Normal rate and regular rhythm.      Pulses: Normal pulses.      Heart sounds: Normal heart sounds.   Pulmonary:      Effort: Pulmonary effort is normal.      Breath sounds: Normal breath sounds.   Abdominal:      General: Bowel sounds are normal.      Palpations: Abdomen is soft.   Musculoskeletal:         General: Normal range of motion.      Cervical back: Normal range of motion and neck supple.   Skin:     General: Skin is warm and dry.      Capillary Refill: Capillary refill takes less than 2 seconds.   Neurological:      General: No focal deficit present.      Mental Status: He is alert and oriented to person, place, and time.   Psychiatric:         Mood and Affect: Mood normal.         Behavior: Behavior normal.         Thought Content: Thought content normal.         Judgment: Judgment normal.       Laboratory Reviewed ({Yes)  Lab Results   Component Value Date    WBC 6.57 09/12/2022    HGB 16.1 09/12/2022    HCT 46.4 09/12/2022     09/12/2022    CHOL 148 05/04/2022    TRIG 144 05/04/2022    HDL 44 05/04/2022    ALT 26  11/01/2022    AST 25 11/01/2022     (L) 11/01/2022    K 3.8 11/01/2022     11/01/2022    CREATININE 0.6 11/01/2022    BUN 6 11/01/2022    CO2 25 11/01/2022    TSH 1.868 01/05/2021    INR 1.0 12/07/2020    HGBA1C 4.8 05/31/2017       Valerio was seen today for headache.    Diagnoses and all orders for this visit:    Frequent headaches  -     ibuprofen (ADVIL,MOTRIN) 800 MG tablet; Take 1 tablet (800 mg total) by mouth every 6 (six) hours as needed.    Chronic sinusitis, unspecified location  -     fluticasone propionate (FLONASE) 50 mcg/actuation nasal spray; 1 spray (50 mcg total) by Each Nostril route once daily.  -     azithromycin (Z-SURAJ) 250 MG tablet; Take as directed  -     cetirizine (ZYRTEC) 10 MG tablet; Take 1 tablet (10 mg total) by mouth once daily.      Care Plan/Goals: Reviewed    Goals         Blood Pressure < 130/80 (pt-stated)         Valerio was seen today for headache.    Diagnoses and all orders for this visit:    Frequent headaches  -     ibuprofen (ADVIL,MOTRIN) 800 MG tablet; Take 1 tablet (800 mg total) by mouth every 6 (six) hours as needed.    Chronic sinusitis, unspecified location  -     fluticasone propionate (FLONASE) 50 mcg/actuation nasal spray; 1 spray (50 mcg total) by Each Nostril route once daily.  -     azithromycin (Z-SURAJ) 250 MG tablet; Take as directed  -     cetirizine (ZYRTEC) 10 MG tablet; Take 1 tablet (10 mg total) by mouth once daily.         Follow up: Follow up if symptoms worsen or fail to improve, for ent .    After visit summary was printed and given to patient upon discharge today.  Patient goals and care plan are included in After Visit Summary.

## 2023-05-10 ENCOUNTER — OFFICE VISIT (OUTPATIENT)
Dept: FAMILY MEDICINE | Facility: CLINIC | Age: 53
End: 2023-05-10
Payer: MEDICARE

## 2023-05-10 ENCOUNTER — HOSPITAL ENCOUNTER (OUTPATIENT)
Dept: RADIOLOGY | Facility: HOSPITAL | Age: 53
Discharge: HOME | End: 2023-05-10
Attending: NURSE PRACTITIONER
Payer: MEDICARE

## 2023-05-10 VITALS
HEIGHT: 77 IN | HEART RATE: 75 BPM | DIASTOLIC BLOOD PRESSURE: 80 MMHG | WEIGHT: 267.19 LBS | BODY MASS INDEX: 31.55 KG/M2 | TEMPERATURE: 99 F | SYSTOLIC BLOOD PRESSURE: 130 MMHG | OXYGEN SATURATION: 96 %

## 2023-05-10 DIAGNOSIS — M25.552 LEFT HIP PAIN: ICD-10-CM

## 2023-05-10 DIAGNOSIS — T14.8XXA BRUISING: ICD-10-CM
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
## 2023-05-10 PROCEDURE — 99214 PR OFFICE/OUTPT VISIT, EST, LEVL IV, 30-39 MIN: ICD-10-PCS | Mod: S$GLB,,, | Performed by: NURSE PRACTITIONER

## 2023-05-10 PROCEDURE — 1160F PR REVIEW ALL MEDS BY PRESCRIBER/CLIN PHARMACIST DOCUMENTED: ICD-10-PCS | Mod: CPTII,S$GLB,, | Performed by: NURSE PRACTITIONER

## 2023-05-10 PROCEDURE — 4010F PR ACE/ARB THEARPY RXD/TAKEN: ICD-10-PCS | Mod: CPTII,S$GLB,, | Performed by: NURSE PRACTITIONER

## 2023-05-10 PROCEDURE — 1160F RVW MEDS BY RX/DR IN RCRD: CPT | Mod: CPTII,S$GLB,, | Performed by: NURSE PRACTITIONER

## 2023-05-10 PROCEDURE — 3079F DIAST BP 80-89 MM HG: CPT | Mod: CPTII,S$GLB,, | Performed by: NURSE PRACTITIONER

## 2023-05-10 PROCEDURE — 1159F MED LIST DOCD IN RCRD: CPT | Mod: CPTII,S$GLB,, | Performed by: NURSE PRACTITIONER

## 2023-05-10 PROCEDURE — 3008F PR BODY MASS INDEX (BMI) DOCUMENTED: ICD-10-PCS | Mod: CPTII,S$GLB,, | Performed by: NURSE PRACTITIONER

## 2023-05-10 PROCEDURE — 73502 X-RAY EXAM HIP UNI 2-3 VIEWS: CPT | Mod: TC,PO,LT

## 2023-05-10 PROCEDURE — 3079F PR MOST RECENT DIASTOLIC BLOOD PRESSURE 80-89 MM HG: ICD-10-PCS | Mod: CPTII,S$GLB,, | Performed by: NURSE PRACTITIONER

## 2023-05-10 PROCEDURE — 99214 OFFICE O/P EST MOD 30 MIN: CPT | Mod: S$GLB,,, | Performed by: NURSE PRACTITIONER

## 2023-05-10 PROCEDURE — 3075F PR MOST RECENT SYSTOLIC BLOOD PRESS GE 130-139MM HG: ICD-10-PCS | Mod: CPTII,S$GLB,, | Performed by: NURSE PRACTITIONER

## 2023-05-10 PROCEDURE — 3008F BODY MASS INDEX DOCD: CPT | Mod: CPTII,S$GLB,, | Performed by: NURSE PRACTITIONER

## 2023-05-10 PROCEDURE — 1159F PR MEDICATION LIST DOCUMENTED IN MEDICAL RECORD: ICD-10-PCS | Mod: CPTII,S$GLB,, | Performed by: NURSE PRACTITIONER

## 2023-05-10 PROCEDURE — 99999 PR PBB SHADOW E&M-EST. PATIENT-LVL IV: CPT | Mod: PBBFAC,,, | Performed by: NURSE PRACTITIONER

## 2023-05-10 PROCEDURE — 3075F SYST BP GE 130 - 139MM HG: CPT | Mod: CPTII,S$GLB,, | Performed by: NURSE PRACTITIONER

## 2023-05-10 PROCEDURE — 99999 PR PBB SHADOW E&M-EST. PATIENT-LVL IV: ICD-10-PCS | Mod: PBBFAC,,, | Performed by: NURSE PRACTITIONER

## 2023-05-10 PROCEDURE — 73502 X-RAY EXAM HIP UNI 2-3 VIEWS: CPT | Mod: 26,LT,, | Performed by: RADIOLOGY

## 2023-05-10 PROCEDURE — 73502 XR HIP WITH PELVIS WHEN PERFORMED, 2 OR 3 VIEWS LEFT: ICD-10-PCS | Mod: 26,LT,, | Performed by: RADIOLOGY

## 2023-05-10 PROCEDURE — 4010F ACE/ARB THERAPY RXD/TAKEN: CPT | Mod: CPTII,S$GLB,, | Performed by: NURSE PRACTITIONER

## 2023-05-10 RX ORDER — HYDROCODONE BITARTRATE AND ACETAMINOPHEN 5; 325 MG/1; MG/1
1 TABLET ORAL EVERY 6 HOURS PRN
Qty: 20 TABLET | Refills: 0 | Status: SHIPPED | OUTPATIENT
Start: 2023-05-10 | End: 2023-05-20

## 2023-05-10 NOTE — PROGRESS NOTES
Subjective:       Patient ID: Valerio Yan is a 52 y.o. male.    Chief Complaint: Fall  Pt reports to clinic with chief complaint of left hip pain.  Reports falling 8 days ago, hit hip on bath tub.  Notes worsening bruising and pain.  Pain worsens with hip flexion.  Does not radiate to hip.  Taking ibuprofen without relief.      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 2 mL 12    amLODIPine (NORVASC) 5 MG tablet Take 1 tablet (5 mg total) by mouth once daily. 90 tablet 4    aspirin (ECOTRIN) 81 MG EC tablet Take 81 mg by mouth once daily.      calcium-vitamin D 600 mg, 1,500mg,-200 unit 600 mg(1,500mg) -200 unit Tab Take 1 tablet by mouth once daily.       cetirizine (ZYRTEC) 10 MG tablet Take 1 tablet (10 mg total) by mouth once daily. 30 tablet 5    fluticasone propionate (FLONASE) 50 mcg/actuation nasal spray 1 spray (50 mcg total) by Each Nostril route once daily. 16 g 1    furosemide (LASIX) 20 MG tablet Take 1 tablet (20 mg total) by mouth once daily. 180 tablet 1    ibuprofen (ADVIL,MOTRIN) 800 MG tablet Take 1 tablet (800 mg total) by mouth every 6 (six) hours as needed. 30 tablet 1     isosorbide mononitrate (IMDUR) 30 MG 24 hr tablet Take 1 tablet (30 mg total) by mouth once daily. 90 tablet 3    lisinopriL-hydrochlorothiazide (PRINZIDE,ZESTORETIC) 20-12.5 mg per tablet Take 2 tablets by mouth once daily. 180 tablet 3    metoprolol succinate (TOPROL-XL) 25 MG 24 hr tablet Take 1 tablet (25 mg total) by mouth once daily. 90 tablet 1    multivitamin (THERAGRAN) per tablet Take 1 tablet by mouth nightly.       cyanocobalamin (VITAMIN B-12) 1,000 mcg/mL injection Inject 1 mL (1,000 mcg total) into the muscle every 30 days. (Patient not taking: Reported on 5/10/2023) 10 mL 0    esomeprazole (NEXIUM) 20 MG capsule Take 2 capsules (40 mg total) by mouth before breakfast. (Patient taking differently: Take 20 mg by mouth before breakfast.) 60 capsule 11    ondansetron (ZOFRAN) 4 MG tablet Take 1 tablet (4 mg total) by mouth every 6 (six) hours as needed for Nausea. (Patient not taking: Reported on 5/10/2023) 12 tablet 0    [DISCONTINUED] azithromycin (Z-SURAJ) 250 MG tablet Take as directed 6 tablet 0     Current Facility-Administered Medications on File Prior to Visit   Medication Dose Route Frequency Provider Last Rate Last Admin    lactated ringers infusion   Intravenous Continuous Van Gandhi MD   Stopped at 05/02/19 1551      Fall  The accident occurred More than 1 week ago. The fall occurred while standing. He fell from a height of 1 to 2 ft. He landed on Hard floor. The point of impact was the left hip. The pain is present in the left hip. The symptoms are aggravated by flexion. Pertinent negatives include no bowel incontinence, loss of consciousness, visual change or vomiting. He has tried NSAID for the symptoms. The treatment provided no relief.   Review of Systems   Constitutional: Negative.    HENT: Negative.     Respiratory: Negative.     Gastrointestinal:  Negative for bowel incontinence and vomiting.   Musculoskeletal:  Positive for arthralgias and myalgias.   Skin:  Positive for  color change.   Neurological:  Negative for loss of consciousness.     Objective:      Physical Exam  Vitals reviewed.   HENT:      Head: Normocephalic.      Right Ear: External ear normal.      Left Ear: External ear normal.      Mouth/Throat:      Mouth: Mucous membranes are dry.   Eyes:      Extraocular Movements: Extraocular movements intact.   Cardiovascular:      Rate and Rhythm: Normal rate.   Pulmonary:      Effort: Pulmonary effort is normal. No respiratory distress.   Musculoskeletal:      Left hip: Tenderness and bony tenderness present. Decreased range of motion.        Legs:       Comments: Ecchymosis    Skin:     Findings: Bruising present.          Neurological:      Mental Status: He is alert and oriented to person, place, and time.       Assessment:       1. Fall, initial encounter    2. Bruising    3. Left hip pain        Plan:   Fall, initial encounter    Bruising    Left hip pain  -     X-Ray Hip 2 or 3 views Left (with Pelvis when performed); Future; Expected date: 05/10/2023    Other orders  -     HYDROcodone-acetaminophen (NORCO) 5-325 mg per tablet; Take 1 tablet by mouth every 6 (six) hours as needed for Pain.  Dispense: 20 tablet; Refill: 0      No follow-ups on file.

## 2023-05-24 DIAGNOSIS — I25.10 CAD IN NATIVE ARTERY: ICD-10-CM

## 2023-05-24 DIAGNOSIS — R93.1 ELEVATED CORONARY ARTERY CALCIUM SCORE: ICD-10-CM

## 2023-05-24 DIAGNOSIS — E78.2 MIXED HYPERLIPIDEMIA: ICD-10-CM

## 2023-05-24 DIAGNOSIS — Z82.49 FAMILY HISTORY OF CARDIOVASCULAR DISEASE: ICD-10-CM

## 2023-05-24 DIAGNOSIS — R74.8 ABNORMAL CK: ICD-10-CM

## 2023-05-24 RX ORDER — ALIROCUMAB 150 MG/ML
INJECTION, SOLUTION SUBCUTANEOUS
Qty: 2 ML | Refills: 12 | Status: SHIPPED | OUTPATIENT
Start: 2023-05-24

## 2023-05-31 ENCOUNTER — TELEPHONE (OUTPATIENT)
Dept: NEUROLOGY | Facility: CLINIC | Age: 53
End: 2023-05-31
Payer: MEDICARE

## 2023-05-31 ENCOUNTER — PATIENT MESSAGE (OUTPATIENT)
Dept: NEUROLOGY | Facility: CLINIC | Age: 53
End: 2023-05-31
Payer: MEDICARE

## 2023-05-31 ENCOUNTER — PATIENT MESSAGE (OUTPATIENT)
Dept: GASTROENTEROLOGY | Facility: CLINIC | Age: 53
End: 2023-05-31
Payer: MEDICARE

## 2023-05-31 DIAGNOSIS — K22.70 BARRETT'S ESOPHAGUS WITHOUT DYSPLASIA: Primary | ICD-10-CM

## 2023-05-31 DIAGNOSIS — R27.8 SENSORY ATAXIA: ICD-10-CM

## 2023-05-31 DIAGNOSIS — M21.372 BILATERAL FOOT-DROP: Primary | ICD-10-CM

## 2023-05-31 DIAGNOSIS — M21.372 BILATERAL FOOT-DROP: ICD-10-CM

## 2023-05-31 DIAGNOSIS — G65.0 SEQUELAE OF GUILLAIN-BARRE SYNDROME: ICD-10-CM

## 2023-05-31 DIAGNOSIS — M21.371 BILATERAL FOOT-DROP: ICD-10-CM

## 2023-05-31 DIAGNOSIS — G61.0 GUILLAIN-BARRE: ICD-10-CM

## 2023-05-31 DIAGNOSIS — G82.50 QUADRIPARESIS: ICD-10-CM

## 2023-05-31 DIAGNOSIS — G62.9 POLYNEUROPATHY: Primary | ICD-10-CM

## 2023-05-31 DIAGNOSIS — M21.371 BILATERAL FOOT-DROP: Primary | ICD-10-CM

## 2023-05-31 DIAGNOSIS — Z12.11 ENCOUNTER FOR SCREENING COLONOSCOPY: ICD-10-CM

## 2023-06-01 ENCOUNTER — HOSPITAL ENCOUNTER (OUTPATIENT)
Dept: PREADMISSION TESTING | Facility: HOSPITAL | Age: 53
Discharge: HOME | End: 2023-06-01
Attending: FAMILY MEDICINE
Payer: MEDICARE

## 2023-06-01 DIAGNOSIS — Z12.11 ENCOUNTER FOR SCREENING COLONOSCOPY: ICD-10-CM

## 2023-06-01 DIAGNOSIS — K22.70 BARRETT'S ESOPHAGUS WITHOUT DYSPLASIA: Primary | ICD-10-CM

## 2023-06-01 RX ORDER — POLYETHYLENE GLYCOL 3350, SODIUM SULFATE ANHYDROUS, SODIUM BICARBONATE, SODIUM CHLORIDE, POTASSIUM CHLORIDE 236; 22.74; 6.74; 5.86; 2.97 G/4L; G/4L; G/4L; G/4L; G/4L
4 POWDER, FOR SOLUTION ORAL ONCE
Qty: 4000 ML | Refills: 0 | Status: SHIPPED | OUTPATIENT
Start: 2023-06-01 | End: 2023-06-01

## 2023-06-06 ENCOUNTER — OFFICE VISIT (OUTPATIENT)
Dept: INTERNAL MEDICINE | Facility: CLINIC | Age: 53
End: 2023-06-06
Payer: MEDICARE

## 2023-06-06 VITALS
TEMPERATURE: 98 F | WEIGHT: 268.94 LBS | HEIGHT: 77 IN | RESPIRATION RATE: 18 BRPM | DIASTOLIC BLOOD PRESSURE: 82 MMHG | OXYGEN SATURATION: 97 % | SYSTOLIC BLOOD PRESSURE: 138 MMHG | HEART RATE: 54 BPM | BODY MASS INDEX: 31.75 KG/M2

## 2023-06-06 DIAGNOSIS — L02.91 ABSCESS: Primary | ICD-10-CM

## 2023-06-06 DIAGNOSIS — G82.50 QUADRIPARESIS: ICD-10-CM

## 2023-06-06 DIAGNOSIS — E66.01 SEVERE OBESITY (BMI 35.0-39.9) WITH COMORBIDITY: ICD-10-CM

## 2023-06-06 DIAGNOSIS — D22.9 ATYPICAL MOLE: ICD-10-CM

## 2023-06-06 PROCEDURE — 3008F PR BODY MASS INDEX (BMI) DOCUMENTED: ICD-10-PCS | Mod: CPTII,S$GLB,, | Performed by: NURSE PRACTITIONER

## 2023-06-06 PROCEDURE — 1159F MED LIST DOCD IN RCRD: CPT | Mod: CPTII,S$GLB,, | Performed by: NURSE PRACTITIONER

## 2023-06-06 PROCEDURE — 3075F PR MOST RECENT SYSTOLIC BLOOD PRESS GE 130-139MM HG: ICD-10-PCS | Mod: CPTII,S$GLB,, | Performed by: NURSE PRACTITIONER

## 2023-06-06 PROCEDURE — 3008F BODY MASS INDEX DOCD: CPT | Mod: CPTII,S$GLB,, | Performed by: NURSE PRACTITIONER

## 2023-06-06 PROCEDURE — 4010F ACE/ARB THERAPY RXD/TAKEN: CPT | Mod: CPTII,S$GLB,, | Performed by: NURSE PRACTITIONER

## 2023-06-06 PROCEDURE — 1160F PR REVIEW ALL MEDS BY PRESCRIBER/CLIN PHARMACIST DOCUMENTED: ICD-10-PCS | Mod: CPTII,S$GLB,, | Performed by: NURSE PRACTITIONER

## 2023-06-06 PROCEDURE — 99214 OFFICE O/P EST MOD 30 MIN: CPT | Mod: S$GLB,,, | Performed by: NURSE PRACTITIONER

## 2023-06-06 PROCEDURE — 3079F PR MOST RECENT DIASTOLIC BLOOD PRESSURE 80-89 MM HG: ICD-10-PCS | Mod: CPTII,S$GLB,, | Performed by: NURSE PRACTITIONER

## 2023-06-06 PROCEDURE — 3079F DIAST BP 80-89 MM HG: CPT | Mod: CPTII,S$GLB,, | Performed by: NURSE PRACTITIONER

## 2023-06-06 PROCEDURE — 99999 PR PBB SHADOW E&M-EST. PATIENT-LVL V: ICD-10-PCS | Mod: PBBFAC,,, | Performed by: NURSE PRACTITIONER

## 2023-06-06 PROCEDURE — 99999 PR PBB SHADOW E&M-EST. PATIENT-LVL V: CPT | Mod: PBBFAC,,, | Performed by: NURSE PRACTITIONER

## 2023-06-06 PROCEDURE — 4010F PR ACE/ARB THEARPY RXD/TAKEN: ICD-10-PCS | Mod: CPTII,S$GLB,, | Performed by: NURSE PRACTITIONER

## 2023-06-06 PROCEDURE — 1159F PR MEDICATION LIST DOCUMENTED IN MEDICAL RECORD: ICD-10-PCS | Mod: CPTII,S$GLB,, | Performed by: NURSE PRACTITIONER

## 2023-06-06 PROCEDURE — 99214 PR OFFICE/OUTPT VISIT, EST, LEVL IV, 30-39 MIN: ICD-10-PCS | Mod: S$GLB,,, | Performed by: NURSE PRACTITIONER

## 2023-06-06 PROCEDURE — 3075F SYST BP GE 130 - 139MM HG: CPT | Mod: CPTII,S$GLB,, | Performed by: NURSE PRACTITIONER

## 2023-06-06 PROCEDURE — 99499 UNLISTED E&M SERVICE: CPT | Mod: S$GLB,,, | Performed by: NURSE PRACTITIONER

## 2023-06-06 PROCEDURE — 1160F RVW MEDS BY RX/DR IN RCRD: CPT | Mod: CPTII,S$GLB,, | Performed by: NURSE PRACTITIONER

## 2023-06-06 RX ORDER — MUPIROCIN 20 MG/G
OINTMENT TOPICAL 2 TIMES DAILY
Qty: 15 G | Refills: 0 | Status: SHIPPED | OUTPATIENT
Start: 2023-06-06 | End: 2023-07-19

## 2023-06-06 RX ORDER — CEPHALEXIN 500 MG/1
500 CAPSULE ORAL EVERY 12 HOURS
Qty: 14 CAPSULE | Refills: 0 | Status: SHIPPED | OUTPATIENT
Start: 2023-06-06 | End: 2023-06-13

## 2023-06-06 NOTE — PROGRESS NOTES
Valerio Yan  06/07/2023  [id number]    Therese Lala MD  Patient Care Team:  Therese Lala MD as PCP - General (Internal Medicine)  [facility] (Pain Medicine)  Delicia Mcginnis LPN as Care Coordinator (Internal Medicine)  Tomi Mir MD as Consulting Physician (Cardiology)  Saul Hogue MD as Consulting Physician (Neurology)  Hi Adan MD as Consulting Physician (Rheumatology)  Ilan Araya III, MD (Inactive) as Consulting Physician (Gastroenterology)          Visit Type:an urgent visit for a new problem    Chief Complaint:  Chief Complaint   Patient presents with    Cyst       History of Present Illness:    51 yo male presents with co abscess to left lower abd for four days. Reports redness, swelling and fever.   Pt also co mole to the back of left calf for two months. Pts father has a hx of skin cancer.     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    Tracheostomy status 12/29/2021    Transfusion reaction     1 x  to PRBC fever     Past Surgical History:   Procedure Laterality Date    ANGIOGRAM, CORONARY, WITH LEFT HEART CATHETERIZATION  12/10/2020    Procedure: Angiogram, Coronary, with Left Heart Cath;  Surgeon: Tomi Mir MD;  Location: [facility] CATH LAB;  Service: Cardiology;;    barrette esophagus      COLONOSCOPY N/A 5/17/2018    Procedure: COLONOSCOPY;  Surgeon: Ilan Araya III, MD;  Location: [facility] ENDO;  Service:  Endoscopy;  Laterality: N/A;    decubitus surgery      to sacral    ESOPHAGOGASTRODUODENOSCOPY      ESOPHAGOGASTRODUODENOSCOPY N/A 6/17/2021    Procedure: EGD (ESOPHAGOGASTRODUODENOSCOPY);  Surgeon: Ban Zheng MD;  Location: [facility] ENDO;  Service: Endoscopy;  Laterality: N/A;    GASTROSTOMY TUBE PLACEMENT      KNEE ARTHROSCOPY Left 2002    LEFT HEART CATHETERIZATION Left 12/10/2020    Procedure: CATHETERIZATION, HEART, LEFT;  Surgeon: Tomi Mir MD;  Location: [facility] CATH LAB;  Service: Cardiology;  Laterality: Left;  730 start time    PEG TUBE REMOVAL      RADIOFREQUENCY ABLATION      RFA      ROBOT-ASSISTED CHOLECYSTECTOMY USING DA GABRIELA XI N/A 5/2/2019    Procedure: XI ROBOTIC CHOLECYSTECTOMY;  Surgeon: Valerio Lai MD;  Location: [facility] OR;  Service: General;  Laterality: N/A;    JUAN-EN-Y PROCEDURE      TONSILLECTOMY      TRACHEAL SURGERY       Family History   Problem Relation Age of Onset    Heart attack Mother     Heart disease Mother     Heart disease Father     Heart attack Father      Social History     Socioeconomic History    Marital status:     Number of children: 2   Tobacco Use    Smoking status: Every Day     Packs/day: 1.00     Years: 32.00     Pack years: 32.00     Types: Cigarettes     Start date: 4/4/1988     Last attempt to quit: 5/1/2020     Years since quitting: 3.1    Smokeless tobacco: Former     Types: Snuff     Quit date: 1/6/1999   Substance and Sexual Activity    Alcohol use: Yes     Alcohol/week: 14.0 standard drinks     Types: 14 Glasses of wine per week     Comment: no alcohol prior to surgery    Drug use: No    Sexual activity: Yes     Partners: Female     Social Determinants of Health     Financial Resource Strain: Low Risk     Difficulty of Paying Living Expenses: Not very hard   Food Insecurity: No Food Insecurity    Worried About Running Out of Food in the Last Year: Never true    Ran Out of Food in the Last Year: Never true   Transportation Needs: No Transportation  Needs    Lack of Transportation (Medical): No    Lack of Transportation (Non-Medical): No   Physical Activity: Sufficiently Active    Days of Exercise per Week: 5 days    Minutes of Exercise per Session: 50 min   Stress: No Stress Concern Present    Feeling of Stress : Only a little   Social Connections: Unknown    Frequency of Communication with Friends and Family: More than three times a week    Frequency of Social Gatherings with Friends and Family: Once a week    Active Member of Clubs or Organizations: No    Attends Club or Organization Meetings: Never    Marital Status:    Housing Stability: High Risk    Unable to Pay for Housing in the Last Year: Yes    Number of Places Lived in the Last Year: 1    Unstable Housing in the Last Year: No     Patient Active Problem List   Diagnosis    Epigastric pain    Polyneuropathy    Ozuna esophagus    Peptic ulcer disease    Ozuna's esophagus    Typical atrial flutter    Ozuna's esophagus without dysplasia    Mitral regurgitation    Palpitations    Former cigarette smoker    Diaphoresis    Hypertension    LVH (left ventricular hypertrophy) due to hypertensive disease    Mixed hyperlipidemia    Complex regional pain syndrome type 2 of both lower extremities    Rotator cuff syndrome of right shoulder and allied disorders    Tendonitis of long head of biceps brachii of right shoulder    Primary osteoarthritis of left knee    Atypical chest pain    Abnormal CK    Dizziness    Symptomatic cholelithiasis    Hypokalemia    Family history of cardiovascular disease    CAD in native artery    Elevated coronary artery calcium score (2893)    Sinus bradycardia    At risk for sleep apnea    CAD, multiple vessel    Sleep-disordered breathing    Psychophysiological insomnia    Inadequate sleep hygiene    Obstructive sleep apnea    CSF leak    Quadriparesis    Numbness and tingling    Dysautonomia    Sensory ataxia    Heat intolerance    History of Guillain-[place] syndrome     "Severe obesity (BMI 35.0-39.9) with comorbidity    Complication of statin therapy    Statin intolerance    Statin-induced myositis    Aortic atherosclerosis     Review of patient's allergies indicates:   Allergen Reactions    Metoclopramide Other (See Comments) and Hives     Pt. Was in coma so is not aware of the reaction  Pt states "he don't know, was in coma"      Metoclopramide (bulk)      Other reaction(s): Unable to obtain    Reglan  [metoclopramide hcl] Other (See Comments)     Pt. Was in coma so is not aware of the reaction  Other reaction(s): Unable to obtain    Statins-hmg-coa reductase inhibitors      Myalgia      Sulfa (sulfonamide antibiotics) Other (See Comments)     Never taken  States son is allergic    Latex Hives, Itching and Rash           Latex, natural rubber Rash     Blisters, adhesives          The following were reviewed at this visit: active problem list, medication list, allergies, family history, social history, and health maintenance.    Medications:  Current Outpatient Medications on File Prior to Visit   Medication Sig Dispense Refill    alirocumab (PRALUENT PEN) 150 mg/mL PnIj INJECT 1.06MLS ( 159 MG TOTAL ) BY ABDOMINAL SUBCUTANEOUS ROUTE EVERY 14 DAYS 2 mL 12    aspirin (ECOTRIN) 81 MG EC tablet Take 81 mg by mouth once daily.      calcium-vitamin D 600 mg, 1,500mg,-200 unit 600 mg(1,500mg) -200 unit Tab Take 1 tablet by mouth once daily.       cetirizine (ZYRTEC) 10 MG tablet Take 1 tablet (10 mg total) by mouth once daily. 30 tablet 5    fluticasone propionate (FLONASE) 50 mcg/actuation nasal spray 1 spray (50 mcg total) by Each Nostril route once daily. 16 g 1    furosemide (LASIX) 20 MG tablet Take 1 tablet (20 mg total) by mouth once daily. 180 tablet 1    ibuprofen (ADVIL,MOTRIN) 800 MG tablet Take 1 tablet (800 mg total) by mouth every 6 (six) hours as needed. 30 tablet 1    lisinopriL-hydrochlorothiazide (PRINZIDE,ZESTORETIC) 20-12.5 mg per tablet Take 2 tablets by mouth once " daily. 180 tablet 3    metoprolol succinate (TOPROL-XL) 25 MG 24 hr tablet Take 1 tablet (25 mg total) by mouth once daily. 90 tablet 1    multivitamin (THERAGRAN) per tablet Take 1 tablet by mouth nightly.       amLODIPine (NORVASC) 5 MG tablet Take 1 tablet (5 mg total) by mouth once daily. 90 tablet 4    cyanocobalamin (VITAMIN B-12) 1,000 mcg/mL injection Inject 1 mL (1,000 mcg total) into the muscle every 30 days. (Patient not taking: Reported on 5/10/2023) 10 mL 0    esomeprazole (NEXIUM) 20 MG capsule Take 2 capsules (40 mg total) by mouth before breakfast. (Patient taking differently: Take 20 mg by mouth before breakfast.) 60 capsule 11    isosorbide mononitrate (IMDUR) 30 MG 24 hr tablet Take 1 tablet (30 mg total) by mouth once daily. 90 tablet 3    ondansetron (ZOFRAN) 4 MG tablet Take 1 tablet (4 mg total) by mouth every 6 (six) hours as needed for Nausea. (Patient not taking: Reported on 5/10/2023) 12 tablet 0     Current Facility-Administered Medications on File Prior to Visit   Medication Dose Route Frequency Provider Last Rate Last Admin    lactated ringers infusion   Intravenous Continuous Van Gandhi MD   Stopped at 05/02/19 1551       Medications have been reviewed and reconciled with patient at this visit.  Barriers to medications reviewed with patient.    Adverse reactions to current medications reviewed with patient..    Over the counter medications reviewed and reconciled with patient.    Exam:  Wt Readings from Last 3 Encounters:   06/06/23 122 kg (268 lb 15.4 oz)   05/10/23 121.2 kg (267 lb 3.2 oz)   04/20/23 120.5 kg (265 lb 10.5 oz)     Temp Readings from Last 3 Encounters:   06/06/23 97.5 °F (36.4 °C) (Tympanic)   05/10/23 98.6 °F (37 °C)   09/12/22 98.4 °F (36.9 °C) (Oral)     BP Readings from Last 3 Encounters:   06/06/23 138/82   05/10/23 130/80   04/20/23 128/80     Pulse Readings from Last 3 Encounters:   06/06/23 (!) 54   05/10/23 75   04/20/23 60     Body mass index is  31.89 kg/m².      Review of Systems   Constitutional:  Negative for fever.   HENT:  Negative for hearing loss.    Eyes:  Negative for discharge.   Respiratory:  Negative for cough, shortness of breath and wheezing.    Cardiovascular:  Negative for chest pain and palpitations.   Gastrointestinal:  Negative for blood in stool, constipation, diarrhea, nausea and vomiting.   Genitourinary:  Negative for hematuria and urgency.   Musculoskeletal:  Negative for neck pain.   Skin:         Abscess     Neurological:  Negative for speech change, weakness and headaches.   Endo/Heme/Allergies:  Negative for polydipsia.   All other systems reviewed and are negative.  Physical Exam  Vitals and nursing note reviewed.   Constitutional:       Appearance: Normal appearance. He is obese.   HENT:      Head: Normocephalic and atraumatic.      Right Ear: Tympanic membrane, ear canal and external ear normal.      Left Ear: Tympanic membrane, ear canal and external ear normal.      Nose: Nose normal.      Mouth/Throat:      Mouth: Mucous membranes are moist.      Pharynx: Oropharynx is clear.   Eyes:      Extraocular Movements: Extraocular movements intact.      Conjunctiva/sclera: Conjunctivae normal.      Pupils: Pupils are equal, round, and reactive to light.   Cardiovascular:      Rate and Rhythm: Normal rate and regular rhythm.      Pulses: Normal pulses.      Heart sounds: Normal heart sounds.   Pulmonary:      Effort: Pulmonary effort is normal.      Breath sounds: Normal breath sounds.   Abdominal:      General: Bowel sounds are normal.      Palpations: Abdomen is soft.   Musculoskeletal:         General: Normal range of motion.      Cervical back: Normal range of motion and neck supple.   Skin:     General: Skin is warm and dry.      Capillary Refill: Capillary refill takes less than 2 seconds.          Neurological:      General: No focal deficit present.      Mental Status: He is alert and oriented to person, place, and time.    Psychiatric:         Mood and Affect: Mood normal.         Behavior: Behavior normal.         Thought Content: Thought content normal.         Judgment: Judgment normal.       Laboratory Reviewed ({Yes)  Lab Results   Component Value Date    WBC 6.57 09/12/2022    HGB 16.1 09/12/2022    HCT 46.4 09/12/2022     09/12/2022    CHOL 148 05/04/2022    TRIG 144 05/04/2022    HDL 44 05/04/2022    ALT 26 11/01/2022    AST 25 11/01/2022     (L) 11/01/2022    K 3.8 11/01/2022     11/01/2022    CREATININE 0.6 11/01/2022    BUN 6 11/01/2022    CO2 25 11/01/2022    TSH 1.868 01/05/2021    INR 1.0 12/07/2020    HGBA1C 4.8 05/31/2017     .[email or web address]  Valerio was seen today for cyst.    Diagnoses and all orders for this visit:    Abscess  -     cephALEXin (KEFLEX) 500 MG capsule; Take 1 capsule (500 mg total) by mouth every 12 (twelve) hours. for 7 days  -     mupirocin (BACTROBAN) 2 % ointment; Apply topically 2 (two) times daily.    Atypical mole  -     Ambulatory referral/consult to Dermatology; Future    Severe obesity (BMI 35.0-39.9) with comorbidity    Quadriparesis      - follow up with derm for evaluation of mole   -Take all abx until complete         Care Plan/Goals: Reviewed    Goals         Blood Pressure < 130/80 (pt-stated)           Valerio was seen today for cyst.    Diagnoses and all orders for this visit:    Abscess  -     cephALEXin (KEFLEX) 500 MG capsule; Take 1 capsule (500 mg total) by mouth every 12 (twelve) hours. for 7 days  -     mupirocin (BACTROBAN) 2 % ointment; Apply topically 2 (two) times daily.    Atypical mole  -     Ambulatory referral/consult to Dermatology; Future    Severe obesity (BMI 35.0-39.9) with comorbidity    Quadriparesis       Follow up: Follow up in 2 weeks (on 6/20/2023).    After visit summary was printed and given to patient upon discharge today.  Patient goals and care plan are included in After Visit Summary.

## 2023-06-07 PROBLEM — G65.0 SEQUELAE OF GUILLAIN-BARRE SYNDROME: Status: RESOLVED | Noted: 2021-10-20 | Resolved: 2023-06-07

## 2023-06-07 PROBLEM — I70.0 AORTIC ATHEROSCLEROSIS: Status: ACTIVE | Noted: 2023-06-07

## 2023-06-07 PROBLEM — I20.89 ATYPICAL ANGINA: Status: RESOLVED | Noted: 2020-12-07 | Resolved: 2023-06-07

## 2023-06-07 PROBLEM — I25.118 CORONARY ARTERY DISEASE OF NATIVE ARTERY OF NATIVE HEART WITH STABLE ANGINA PECTORIS: Status: RESOLVED | Noted: 2021-01-11 | Resolved: 2023-06-07

## 2023-06-27 ENCOUNTER — PATIENT MESSAGE (OUTPATIENT)
Dept: INTERNAL MEDICINE | Facility: CLINIC | Age: 53
End: 2023-06-27
Payer: MEDICARE

## 2023-06-28 ENCOUNTER — TELEPHONE (OUTPATIENT)
Dept: INTERNAL MEDICINE | Facility: CLINIC | Age: 53
End: 2023-06-28
Payer: MEDICARE

## 2023-06-28 ENCOUNTER — ANESTHESIA (OUTPATIENT)
Dept: ENDOSCOPY | Facility: HOSPITAL | Age: 53
End: 2023-06-28
Payer: MEDICARE

## 2023-06-28 ENCOUNTER — HOSPITAL ENCOUNTER (OUTPATIENT)
Facility: HOSPITAL | Age: 53
Discharge: HOME | End: 2023-06-28
Attending: INTERNAL MEDICINE | Admitting: INTERNAL MEDICINE
Payer: MEDICARE

## 2023-06-28 ENCOUNTER — OFFICE VISIT (OUTPATIENT)
Dept: INTERNAL MEDICINE | Facility: CLINIC | Age: 53
End: 2023-06-28
Payer: MEDICARE

## 2023-06-28 ENCOUNTER — HOSPITAL ENCOUNTER (OUTPATIENT)
Dept: RADIOLOGY | Facility: HOSPITAL | Age: 53
Discharge: HOME | End: 2023-06-28
Attending: NURSE PRACTITIONER
Payer: MEDICARE

## 2023-06-28 ENCOUNTER — ANESTHESIA EVENT (OUTPATIENT)
Dept: ENDOSCOPY | Facility: HOSPITAL | Age: 53
End: 2023-06-28
Payer: MEDICARE

## 2023-06-28 VITALS
HEIGHT: 77 IN | RESPIRATION RATE: 18 BRPM | SYSTOLIC BLOOD PRESSURE: 116 MMHG | DIASTOLIC BLOOD PRESSURE: 74 MMHG | BODY MASS INDEX: 31.08 KG/M2 | HEART RATE: 53 BPM | TEMPERATURE: 99 F | WEIGHT: 263.25 LBS | OXYGEN SATURATION: 97 %

## 2023-06-28 DIAGNOSIS — K22.70 BARRETT'S ESOPHAGUS WITHOUT DYSPLASIA: Primary | ICD-10-CM

## 2023-06-28 DIAGNOSIS — R30.0 DYSURIA: ICD-10-CM

## 2023-06-28 DIAGNOSIS — R10.9 FLANK PAIN: ICD-10-CM

## 2023-06-28 DIAGNOSIS — R10.9 FLANK PAIN: Primary | ICD-10-CM

## 2023-06-28 DIAGNOSIS — K22.70 BARRETT'S ESOPHAGUS: ICD-10-CM

## 2023-06-28 LAB
BACTERIA #/AREA URNS HPF: ABNORMAL /HPF
BILIRUB UR QL STRIP: NEGATIVE
CLARITY UR: ABNORMAL
COLOR UR: YELLOW
GLUCOSE UR QL STRIP: NEGATIVE
HGB UR QL STRIP: ABNORMAL
HYALINE CASTS #/AREA URNS LPF: ABNORMAL /LPF
KETONES UR QL STRIP: ABNORMAL
LEUKOCYTE ESTERASE UR QL STRIP: ABNORMAL
MICROSCOPIC COMMENT: ABNORMAL
NITRITE UR QL STRIP: POSITIVE
NON-SQ EPI CELLS #/AREA URNS HPF: 1 /HPF
PH UR STRIP: 7 [PH] (ref 5–8)
PROT UR QL STRIP: ABNORMAL
RBC #/AREA URNS HPF: 5 /HPF (ref 0–4)
SP GR UR STRIP: 1.01 (ref 1–1.03)
SQUAMOUS #/AREA URNS HPF: ABNORMAL /HPF
URN SPEC COLLECT METH UR: ABNORMAL
UROBILINOGEN UR STRIP-ACNC: NEGATIVE EU/DL
WBC #/AREA URNS HPF: >100 /HPF (ref 0–5)

## 2023-06-28 PROCEDURE — 87186 SC STD MICRODIL/AGAR DIL: CPT | Performed by: NURSE PRACTITIONER

## 2023-06-28 PROCEDURE — 43239 EGD BIOPSY SINGLE/MULTIPLE: CPT | Performed by: INTERNAL MEDICINE

## 2023-06-28 PROCEDURE — G0121 COLON CA SCRN NOT HI RSK IND: HCPCS | Mod: ,,, | Performed by: INTERNAL MEDICINE

## 2023-06-28 PROCEDURE — 99999 PR PBB SHADOW E&M-EST. PATIENT-LVL V: ICD-10-PCS | Mod: PBBFAC,,, | Performed by: NURSE PRACTITIONER

## 2023-06-28 PROCEDURE — 3078F PR MOST RECENT DIASTOLIC BLOOD PRESSURE < 80 MM HG: ICD-10-PCS | Mod: CPTII,S$GLB,, | Performed by: NURSE PRACTITIONER

## 2023-06-28 PROCEDURE — 43239 EGD BIOPSY SINGLE/MULTIPLE: CPT | Mod: 51,,, | Performed by: INTERNAL MEDICINE

## 2023-06-28 PROCEDURE — G0121 COLON CA SCRN NOT HI RSK IND: HCPCS | Performed by: INTERNAL MEDICINE

## 2023-06-28 PROCEDURE — 74019 XR ABDOMEN FLAT AND ERECT: ICD-10-PCS | Mod: 26,,, | Performed by: RADIOLOGY

## 2023-06-28 PROCEDURE — G0121 COLON CA SCRN NOT HI RSK IND: ICD-10-PCS | Mod: ,,, | Performed by: INTERNAL MEDICINE

## 2023-06-28 PROCEDURE — 3074F SYST BP LT 130 MM HG: CPT | Mod: CPTII,S$GLB,, | Performed by: NURSE PRACTITIONER

## 2023-06-28 PROCEDURE — 3008F BODY MASS INDEX DOCD: CPT | Mod: CPTII,S$GLB,, | Performed by: NURSE PRACTITIONER

## 2023-06-28 PROCEDURE — 87077 CULTURE AEROBIC IDENTIFY: CPT | Performed by: NURSE PRACTITIONER

## 2023-06-28 PROCEDURE — 25000003 PHARM REV CODE 250: Performed by: NURSE ANESTHETIST, CERTIFIED REGISTERED

## 2023-06-28 PROCEDURE — 99999 PR PBB SHADOW E&M-EST. PATIENT-LVL V: CPT | Mod: PBBFAC,,, | Performed by: NURSE PRACTITIONER

## 2023-06-28 PROCEDURE — 63600175 PHARM REV CODE 636 W HCPCS: Performed by: NURSE ANESTHETIST, CERTIFIED REGISTERED

## 2023-06-28 PROCEDURE — 4010F PR ACE/ARB THEARPY RXD/TAKEN: ICD-10-PCS | Mod: CPTII,S$GLB,, | Performed by: NURSE PRACTITIONER

## 2023-06-28 PROCEDURE — 3078F DIAST BP <80 MM HG: CPT | Mod: CPTII,S$GLB,, | Performed by: NURSE PRACTITIONER

## 2023-06-28 PROCEDURE — 87088 URINE BACTERIA CULTURE: CPT | Performed by: NURSE PRACTITIONER

## 2023-06-28 PROCEDURE — 74019 RADEX ABDOMEN 2 VIEWS: CPT | Mod: TC

## 2023-06-28 PROCEDURE — 99214 OFFICE O/P EST MOD 30 MIN: CPT | Mod: S$GLB,,, | Performed by: NURSE PRACTITIONER

## 2023-06-28 PROCEDURE — 87086 URINE CULTURE/COLONY COUNT: CPT | Performed by: NURSE PRACTITIONER

## 2023-06-28 PROCEDURE — 37000009 HC ANESTHESIA EA ADD 15 MINS: Performed by: INTERNAL MEDICINE

## 2023-06-28 PROCEDURE — 99214 PR OFFICE/OUTPT VISIT, EST, LEVL IV, 30-39 MIN: ICD-10-PCS | Mod: S$GLB,,, | Performed by: NURSE PRACTITIONER

## 2023-06-28 PROCEDURE — 88305 TISSUE EXAM BY PATHOLOGIST: ICD-10-PCS | Mod: 26,,, | Performed by: STUDENT IN AN ORGANIZED HEALTH CARE EDUCATION/TRAINING PROGRAM

## 2023-06-28 PROCEDURE — 3074F PR MOST RECENT SYSTOLIC BLOOD PRESSURE < 130 MM HG: ICD-10-PCS | Mod: CPTII,S$GLB,, | Performed by: NURSE PRACTITIONER

## 2023-06-28 PROCEDURE — 4010F ACE/ARB THERAPY RXD/TAKEN: CPT | Mod: CPTII,S$GLB,, | Performed by: NURSE PRACTITIONER

## 2023-06-28 PROCEDURE — 74019 RADEX ABDOMEN 2 VIEWS: CPT | Mod: 26,,, | Performed by: RADIOLOGY

## 2023-06-28 PROCEDURE — 88305 TISSUE EXAM BY PATHOLOGIST: CPT | Mod: 26,,, | Performed by: STUDENT IN AN ORGANIZED HEALTH CARE EDUCATION/TRAINING PROGRAM

## 2023-06-28 PROCEDURE — 43239 PR EGD, FLEX, W/BIOPSY, SGL/MULTI: ICD-10-PCS | Mod: 51,,, | Performed by: INTERNAL MEDICINE

## 2023-06-28 PROCEDURE — 81000 URINALYSIS NONAUTO W/SCOPE: CPT | Performed by: NURSE PRACTITIONER

## 2023-06-28 PROCEDURE — 27201012 HC FORCEPS, HOT/COLD, DISP: Performed by: INTERNAL MEDICINE

## 2023-06-28 PROCEDURE — 3008F PR BODY MASS INDEX (BMI) DOCUMENTED: ICD-10-PCS | Mod: CPTII,S$GLB,, | Performed by: NURSE PRACTITIONER

## 2023-06-28 PROCEDURE — 25000003 PHARM REV CODE 250: Performed by: INTERNAL MEDICINE

## 2023-06-28 PROCEDURE — 88305 TISSUE EXAM BY PATHOLOGIST: CPT | Performed by: STUDENT IN AN ORGANIZED HEALTH CARE EDUCATION/TRAINING PROGRAM

## 2023-06-28 PROCEDURE — 37000008 HC ANESTHESIA 1ST 15 MINUTES: Performed by: INTERNAL MEDICINE

## 2023-06-28 RX ORDER — NITROFURANTOIN 25; 75 MG/1; MG/1
100 CAPSULE ORAL 2 TIMES DAILY
Qty: 14 CAPSULE | Refills: 0 | Status: SHIPPED | OUTPATIENT
Start: 2023-06-28 | End: 2023-07-05

## 2023-06-28 RX ORDER — LIDOCAINE HYDROCHLORIDE 10 MG/ML
INJECTION, SOLUTION EPIDURAL; INFILTRATION; INTRACAUDAL; PERINEURAL
Status: DISCONTINUED | OUTPATIENT
Start: 2023-06-28 | End: 2023-06-28

## 2023-06-28 RX ORDER — KETOROLAC TROMETHAMINE 10 MG/1
10 TABLET, FILM COATED ORAL EVERY 6 HOURS
Qty: 20 TABLET | Refills: 0 | Status: SHIPPED | OUTPATIENT
Start: 2023-06-28 | End: 2023-07-03

## 2023-06-28 RX ORDER — TAMSULOSIN HYDROCHLORIDE 0.4 MG/1
0.4 CAPSULE ORAL DAILY
Qty: 20 CAPSULE | Refills: 0 | Status: SHIPPED | OUTPATIENT
Start: 2023-06-28 | End: 2023-08-04 | Stop reason: SDUPTHER

## 2023-06-28 RX ORDER — DEXTROMETHORPHAN/PSEUDOEPHED 2.5-7.5/.8
DROPS ORAL
Status: COMPLETED | OUTPATIENT
Start: 2023-06-28 | End: 2023-06-28

## 2023-06-28 RX ORDER — SODIUM CHLORIDE, SODIUM LACTATE, POTASSIUM CHLORIDE, CALCIUM CHLORIDE 600; 310; 30; 20 MG/100ML; MG/100ML; MG/100ML; MG/100ML
INJECTION, SOLUTION INTRAVENOUS CONTINUOUS PRN
Status: DISCONTINUED | OUTPATIENT
Start: 2023-06-28 | End: 2023-06-28

## 2023-06-28 RX ORDER — PROPOFOL 10 MG/ML
VIAL (ML) INTRAVENOUS
Status: DISCONTINUED | OUTPATIENT
Start: 2023-06-28 | End: 2023-06-28

## 2023-06-28 RX ADMIN — PROPOFOL 70 MG: 10 INJECTION, EMULSION INTRAVENOUS at 08:06

## 2023-06-28 RX ADMIN — SODIUM CHLORIDE, SODIUM LACTATE, POTASSIUM CHLORIDE, AND CALCIUM CHLORIDE: .6; .31; .03; .02 INJECTION, SOLUTION INTRAVENOUS at 08:06

## 2023-06-28 RX ADMIN — PROPOFOL 50 MG: 10 INJECTION, EMULSION INTRAVENOUS at 08:06

## 2023-06-28 RX ADMIN — PROPOFOL 100 MG: 10 INJECTION, EMULSION INTRAVENOUS at 08:06

## 2023-06-28 RX ADMIN — PROPOFOL 30 MG: 10 INJECTION, EMULSION INTRAVENOUS at 08:06

## 2023-06-28 RX ADMIN — LIDOCAINE HYDROCHLORIDE 100 MG: 10 SOLUTION INTRAVENOUS at 08:06

## 2023-06-28 NOTE — PROVATION PATIENT INSTRUCTIONS
Discharge Summary/Instructions after an Endoscopic Procedure  Patient Name: Valerio Yan  Patient MRN: [id number]  Patient YOB: 1970  Wednesday, June 28, 2023 Colby Rodriguez MD  Dear patient,  As a result of recent federal legislation (The Federal Cures Act), you may   receive lab or pathology results from your procedure in your [facility]   account before your physician is able to contact you. Your physician or   their representative will relay the results to you with their   recommendations at their soonest availability.  Thank you,  RESTRICTIONS:  During your procedure today, you received medications for sedation.  These   medications may affect your judgment, balance and coordination.  Therefore,   for 24 hours, you have the following restrictions:   - DO NOT drive a car, operate machinery, make legal/financial decisions,   sign important papers or drink alcohol.    ACTIVITY:  Today: no heavy lifting, straining or running due to procedural   sedation/anesthesia.  The following day: return to full activity including work.  DIET:  Eat and drink normally unless instructed otherwise.     TREATMENT FOR COMMON SIDE EFFECTS:  - Mild abdominal pain, nausea, belching, bloating or excessive gas:  rest,   eat lightly and use a heating pad.  - Sore Throat: treat with throat lozenges and/or gargle with warm salt   water.  - Because air was used during the procedure, expelling large amounts of air   from your rectum or belching is normal.  - If a bowel prep was taken, you may not have a bowel movement for 1-3 days.    This is normal.  SYMPTOMS TO WATCH FOR AND REPORT TO YOUR PHYSICIAN:  1. Abdominal pain or bloating, other than gas cramps.  2. Chest pain.  3. Back pain.  4. Signs of infection such as: chills or fever occurring within 24 hours   after the procedure.  5. Rectal bleeding, which would show as bright red, maroon, or black stools.   (A tablespoon of blood from the rectum is not serious, especially  if   hemorrhoids are present.)  6. Vomiting.  7. Weakness or dizziness.  GO DIRECTLY TO THE NEAREST EMERGENCY ROOM IF YOU HAVE ANY OF THE FOLLOWING:      Difficulty breathing              Chills and/or fever over 101 F   Persistent vomiting and/or vomiting blood   Severe abdominal pain   Severe chest pain   Black, tarry stools   Bleeding- more than one tablespoon   Any other symptom or condition that you feel may need urgent attention  Your doctor recommends these additional instructions:  If any biopsies were taken, your doctors clinic will contact you in 1 to 2   weeks with any results.  - Discharge patient to home (via wheelchair).   - Resume previous diet.   - Continue present medications.   - Repeat colonoscopy in 3 years because the bowel preparation was fair.   - Return to referring physician as previously scheduled.   - Patient has a contact number available for emergencies.  The signs and   symptoms of potential delayed complications were discussed with the   patient.  Return to normal activities tomorrow.  Written discharge   instructions were provided to the patient.  For questions, problems or results please call your physician Colby Rodriguez MD at Work:  [phone]  If you have any questions about the above instructions, call the GI   department at [phone] or call the endoscopy unit at [phone]   from 7am until 3 pm.  [facility] [place], EMERGENCY ROOM PHONE NUMBER:   [phone]  IF A COMPLICATION OR EMERGENCY SITUATION ARISES AND YOU ARE UNABLE TO REACH   YOUR PHYSICIAN - GO DIRECTLY TO THE EMERGENCY ROOM.  I have read or have had read to me these discharge instructions for my   procedure and have received a written copy.  I understand these   instructions and will follow-up with my physician if I have any questions.     __________________________________       _____________________________________  Nurse Signature                                           Patient/Designated   Responsible Party Signature  MD Colby Chi MD  6/28/2023 8:57:26 AM  This report has been verified and signed electronically.  Dear patient,  As a result of recent federal legislation (The Federal Cures Act), you may   receive lab or pathology results from your procedure in your [facility]   account before your physician is able to contact you. Your physician or   their representative will relay the results to you with their   recommendations at their soonest availability.  Thank you,  PROVATION

## 2023-06-28 NOTE — PROGRESS NOTES
Valerio Yan  07/10/2023  [id number]    Therese Lala MD  Patient Care Team:  Therese Lala MD as PCP - General (Internal Medicine)  [facility] [place] (Pain Medicine)  Delicia Mcginnis LPN as Care Coordinator (Internal Medicine)  Tomi Mir MD as Consulting Physician (Cardiology)  Saul Hogue MD as Consulting Physician (Neurology)  Hi Adan MD as Consulting Physician (Rheumatology)  Ilan Araya III, MD (Inactive) as Consulting Physician (Gastroenterology)          Visit Type:an urgent visit for a new problem    Chief Complaint:  No chief complaint on file.      History of Present Illness:    52-year-old male presents today with a complaint of Left flank pain for 3 days.  Patient also has a complaint of dysuria.  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    Tracheostomy status 12/29/2021    Transfusion reaction     1 x  to PRBC fever     Past Surgical History:   Procedure Laterality Date    ANGIOGRAM, CORONARY, WITH LEFT HEART CATHETERIZATION  12/10/2020    Procedure: Angiogram, Coronary, with Left Heart Cath;  Surgeon: Tomi Mir MD;  Location: [facility] CATH LAB;  Service: Cardiology;;    barrette esophagus      COLONOSCOPY N/A 5/17/2018    Procedure: COLONOSCOPY;  Surgeon: Ilan Araya III, MD;  Location: [facility] ENDO;  Service: Endoscopy;  Laterality: N/A;    COLONOSCOPY N/A  6/28/2023    Procedure: COLONOSCOPY;  Surgeon: Colby Rodriguez MD;  Location: [facility] ENDO;  Service: Endoscopy;  Laterality: N/A;    decubitus surgery      to sacral    ESOPHAGOGASTRODUODENOSCOPY      ESOPHAGOGASTRODUODENOSCOPY N/A 6/17/2021    Procedure: EGD (ESOPHAGOGASTRODUODENOSCOPY);  Surgeon: Ban Zheng MD;  Location: [facility] ENDO;  Service: Endoscopy;  Laterality: N/A;    ESOPHAGOGASTRODUODENOSCOPY N/A 6/28/2023    Procedure: EGD (ESOPHAGOGASTRODUODENOSCOPY);  Surgeon: Colby Rodriguez MD;  Location: [facility] ENDO;  Service: Endoscopy;  Laterality: N/A;    GASTROSTOMY TUBE PLACEMENT      KNEE ARTHROSCOPY Left 2002    LEFT HEART CATHETERIZATION Left 12/10/2020    Procedure: CATHETERIZATION, HEART, LEFT;  Surgeon: Tomi Mir MD;  Location: [facility] CATH LAB;  Service: Cardiology;  Laterality: Left;  730 start time    PEG TUBE REMOVAL      RADIOFREQUENCY ABLATION      RFA      ROBOT-ASSISTED CHOLECYSTECTOMY USING DA GABRIELA XI N/A 5/2/2019    Procedure: XI ROBOTIC CHOLECYSTECTOMY;  Surgeon: Valerio Lai MD;  Location: [facility] OR;  Service: General;  Laterality: N/A;    JUAN-EN-Y PROCEDURE      TONSILLECTOMY      TRACHEAL SURGERY       Family History   Problem Relation Age of Onset    Heart attack Mother     Heart disease Mother     Heart disease Father     Heart attack Father      Social History     Socioeconomic History    Marital status:     Number of children: 2   Tobacco Use    Smoking status: Every Day     Packs/day: 1.00     Years: 32.00     Pack years: 32.00     Types: Cigarettes     Start date: 4/4/1988     Last attempt to quit: 5/1/2020     Years since quitting: 3.1    Smokeless tobacco: Former     Types: Snuff     Quit date: 1/6/1999   Substance and Sexual Activity    Alcohol use: Not Currently     Alcohol/week: 14.0 standard drinks     Types: 14 Glasses of wine per week     Comment: no alcohol for past week, usually 7 drinks/week    Drug use: No    Sexual activity: Yes     Partners: Female      Social Determinants of Health     Financial Resource Strain: Low Risk     Difficulty of Paying Living Expenses: Not very hard   Food Insecurity: No Food Insecurity    Worried About Running Out of Food in the Last Year: Never true    Ran Out of Food in the Last Year: Never true   Transportation Needs: No Transportation Needs    Lack of Transportation (Medical): No    Lack of Transportation (Non-Medical): No   Physical Activity: Sufficiently Active    Days of Exercise per Week: 5 days    Minutes of Exercise per Session: 50 min   Stress: No Stress Concern Present    Feeling of Stress : Only a little   Social Connections: Unknown    Frequency of Communication with Friends and Family: More than three times a week    Frequency of Social Gatherings with Friends and Family: Once a week    Active Member of Clubs or Organizations: No    Attends Club or Organization Meetings: Never    Marital Status:    Housing Stability: High Risk    Unable to Pay for Housing in the Last Year: Yes    Number of Places Lived in the Last Year: 1    Unstable Housing in the Last Year: No     Patient Active Problem List   Diagnosis    Epigastric pain    Polyneuropathy    Ozuna esophagus    Peptic ulcer disease    Ozuna's esophagus    Typical atrial flutter    Ozuna's esophagus without dysplasia    Mitral regurgitation    Palpitations    Former cigarette smoker    Diaphoresis    Hypertension    LVH (left ventricular hypertrophy) due to hypertensive disease    Mixed hyperlipidemia    Complex regional pain syndrome type 2 of both lower extremities    Rotator cuff syndrome of right shoulder and allied disorders    Tendonitis of long head of biceps brachii of right shoulder    Primary osteoarthritis of left knee    Atypical chest pain    Abnormal CK    Dizziness    Symptomatic cholelithiasis    Hypokalemia    Family history of cardiovascular disease    CAD in native artery    Elevated coronary artery calcium score (2893)    Sinus  "bradycardia    At risk for sleep apnea    CAD, multiple vessel    Sleep-disordered breathing    Psychophysiological insomnia    Inadequate sleep hygiene    Obstructive sleep apnea    CSF leak    Quadriparesis    Numbness and tingling    Dysautonomia    Sensory ataxia    Heat intolerance    History of Guillain-[place] syndrome    Severe obesity (BMI 35.0-39.9) with comorbidity    Complication of statin therapy    Statin intolerance    Statin-induced myositis    Aortic atherosclerosis    Sepsis due to urinary tract infection     Review of patient's allergies indicates:   Allergen Reactions    Metoclopramide Other (See Comments) and Hives     Pt. Was in coma so is not aware of the reaction  Pt states "he don't know, was in coma"      Metoclopramide (bulk)      Other reaction(s): Unable to obtain    Reglan  [metoclopramide hcl] Other (See Comments)     Pt. Was in coma so is not aware of the reaction  Other reaction(s): Unable to obtain    Statins-hmg-coa reductase inhibitors      Myalgia      Sulfa (sulfonamide antibiotics) Other (See Comments)     Never taken  States son is allergic    Latex Hives, Itching and Rash           Latex, natural rubber Rash     Blisters, adhesives          The following were reviewed at this visit: active problem list, medication list, allergies, family history, social history, and health maintenance.    Medications:  Current Outpatient Medications on File Prior to Visit   Medication Sig Dispense Refill    alirocumab (PRALUENT PEN) 150 mg/mL PnIj INJECT 1.06MLS ( 159 MG TOTAL ) BY ABDOMINAL SUBCUTANEOUS ROUTE EVERY 14 DAYS 2 mL 12    amLODIPine (NORVASC) 5 MG tablet Take 1 tablet (5 mg total) by mouth once daily. 90 tablet 4    aspirin (ECOTRIN) 81 MG EC tablet Take 81 mg by mouth once daily.      calcium-vitamin D 600 mg, 1,500mg,-200 unit 600 mg(1,500mg) -200 unit Tab Take 1 tablet by mouth once daily.       cetirizine (ZYRTEC) 10 MG tablet Take 1 tablet (10 mg total) by mouth once daily. 30 " tablet 5    cyanocobalamin (VITAMIN B-12) 1,000 mcg/mL injection Inject 1 mL (1,000 mcg total) into the muscle every 30 days. 10 mL 0    esomeprazole (NEXIUM) 20 MG capsule Take 2 capsules (40 mg total) by mouth before breakfast. 60 capsule 11    fluticasone propionate (FLONASE) 50 mcg/actuation nasal spray 1 spray (50 mcg total) by Each Nostril route once daily. 16 g 1    furosemide (LASIX) 20 MG tablet Take 1 tablet (20 mg total) by mouth once daily. 180 tablet 1    ibuprofen (ADVIL,MOTRIN) 800 MG tablet Take 1 tablet (800 mg total) by mouth every 6 (six) hours as needed. 30 tablet 1    isosorbide mononitrate (IMDUR) 30 MG 24 hr tablet Take 1 tablet (30 mg total) by mouth once daily. 90 tablet 3    lisinopriL-hydrochlorothiazide (PRINZIDE,ZESTORETIC) 20-12.5 mg per tablet Take 2 tablets by mouth once daily. 180 tablet 3    metoprolol succinate (TOPROL-XL) 25 MG 24 hr tablet Take 1 tablet (25 mg total) by mouth once daily. 90 tablet 1    multivitamin (THERAGRAN) per tablet Take 1 tablet by mouth nightly.       mupirocin (BACTROBAN) 2 % ointment Apply topically 2 (two) times daily. 15 g 0    ondansetron (ZOFRAN) 4 MG tablet Take 1 tablet (4 mg total) by mouth every 6 (six) hours as needed for Nausea. 12 tablet 0     Current Facility-Administered Medications on File Prior to Visit   Medication Dose Route Frequency Provider Last Rate Last Admin    lactated ringers infusion   Intravenous Continuous Van Gandhi MD   Stopped at 05/02/19 1551       Medications have been reviewed and reconciled with patient at this visit.  Barriers to medications reviewed with patient.    Adverse reactions to current medications reviewed with patient..    Over the counter medications reviewed and reconciled with patient.    Exam:  Wt Readings from Last 3 Encounters:   07/03/23 122.6 kg (270 lb 4.5 oz)   06/28/23 119.4 kg (263 lb 3.7 oz)   06/28/23 117 kg (258 lb)     Temp Readings from Last 3 Encounters:   07/03/23 98.7 °F (37.1  °C) (Oral)   06/28/23 98.9 °F (37.2 °C) (Temporal)   06/28/23 98.5 °F (36.9 °C) (Temporal)     BP Readings from Last 3 Encounters:   07/03/23 (!) 151/70   06/28/23 116/74   06/28/23 (!) 147/80     Pulse Readings from Last 3 Encounters:   07/03/23 (!) 58   06/28/23 (!) 53   06/28/23 (!) 55     Body mass index is 31.21 kg/m².      Review of Systems   Constitutional:  Negative for fever.   Respiratory:  Negative for cough, shortness of breath and wheezing.    Cardiovascular:  Negative for chest pain and palpitations.   Gastrointestinal:  Negative for nausea.   Genitourinary:  Positive for dysuria, flank pain, frequency and urgency.   Neurological:  Negative for speech change, weakness and headaches.   All other systems reviewed and are negative.  Physical Exam  Vitals and nursing note reviewed.   Constitutional:       Appearance: Normal appearance. He is normal weight.   HENT:      Head: Normocephalic and atraumatic.      Right Ear: Tympanic membrane, ear canal and external ear normal.      Left Ear: Tympanic membrane, ear canal and external ear normal.      Nose: Nose normal.      Mouth/Throat:      Mouth: Mucous membranes are moist.      Pharynx: Oropharynx is clear.   Eyes:      Extraocular Movements: Extraocular movements intact.      Conjunctiva/sclera: Conjunctivae normal.      Pupils: Pupils are equal, round, and reactive to light.   Cardiovascular:      Rate and Rhythm: Normal rate and regular rhythm.      Pulses: Normal pulses.      Heart sounds: Normal heart sounds.   Pulmonary:      Effort: Pulmonary effort is normal.      Breath sounds: Normal breath sounds.   Abdominal:      General: Bowel sounds are normal.      Palpations: Abdomen is soft.   Musculoskeletal:         General: Normal range of motion.      Cervical back: Normal range of motion and neck supple.   Skin:     General: Skin is warm and dry.      Capillary Refill: Capillary refill takes less than 2 seconds.   Neurological:      General: No focal  deficit present.      Mental Status: He is alert and oriented to person, place, and time.   Psychiatric:         Mood and Affect: Mood normal.         Behavior: Behavior normal.         Thought Content: Thought content normal.         Judgment: Judgment normal.       Laboratory Reviewed ({Yes)  Lab Results   Component Value Date    WBC 4.52 07/03/2023    HGB 13.3 (L) 07/03/2023    HCT 37.4 (L) 07/03/2023     07/03/2023    CHOL 148 05/04/2022    TRIG 144 05/04/2022    HDL 44 05/04/2022    ALT 12 07/01/2023    AST 14 07/01/2023     07/03/2023    K 3.4 (L) 07/03/2023     07/03/2023    CREATININE 0.5 07/03/2023    BUN 5 (L) 07/03/2023    CO2 23 07/03/2023    TSH 1.868 01/05/2021    INR 1.0 12/07/2020    HGBA1C 4.8 05/31/2017       Diagnoses and all orders for this visit:    Flank pain  -     X-Ray Abdomen Flat And Erect; Future  -     ketorolac (TORADOL) 10 mg tablet; Take 1 tablet (10 mg total) by mouth every 6 (six) hours. for 5 days  -     tamsulosin (FLOMAX) 0.4 mg Cap; Take 1 capsule (0.4 mg total) by mouth once daily. for 20 days    Dysuria  -     X-Ray Abdomen Flat And Erect; Future  -     Urinalysis, Reflex to Urine Culture Urine, Clean Catch  -     nitrofurantoin, macrocrystal-monohydrate, (MACROBID) 100 MG capsule; Take 1 capsule (100 mg total) by mouth 2 (two) times daily. for 7 days    Other orders  -     Urinalysis Microscopic  -     Urine culture      -start macrobid and take until complete          Care Plan/Goals: Reviewed    Goals         Blood Pressure < 130/80 (pt-stated)           Diagnoses and all orders for this visit:    Flank pain  -     X-Ray Abdomen Flat And Erect; Future  -     ketorolac (TORADOL) 10 mg tablet; Take 1 tablet (10 mg total) by mouth every 6 (six) hours. for 5 days  -     tamsulosin (FLOMAX) 0.4 mg Cap; Take 1 capsule (0.4 mg total) by mouth once daily. for 20 days    Dysuria  -     X-Ray Abdomen Flat And Erect; Future  -     Urinalysis, Reflex to Urine Culture  Urine, Clean Catch  -     nitrofurantoin, macrocrystal-monohydrate, (MACROBID) 100 MG capsule; Take 1 capsule (100 mg total) by mouth 2 (two) times daily. for 7 days    Other orders  -     Urinalysis Microscopic  -     Urine culture       Follow up: Follow up if symptoms worsen or fail to improve.    After visit summary was printed and given to patient upon discharge today.  Patient goals and care plan are included in After Visit Summary.

## 2023-06-28 NOTE — TRANSFER OF CARE
"Anesthesia Transfer of Care Note    Patient: Valerio Yan    Procedure(s) Performed: Procedure(s) (LRB):  EGD (ESOPHAGOGASTRODUODENOSCOPY) (N/A)  COLONOSCOPY (N/A)    Patient location: GI    Anesthesia Type: MAC    Transport from OR: Transported from OR on room air with adequate spontaneous ventilation    Post pain: adequate analgesia    Post assessment: no apparent anesthetic complications and tolerated procedure well    Post vital signs: stable    Level of consciousness: awake    Nausea/Vomiting: no nausea/vomiting    Complications: none    Transfer of care protocol was followed      Last vitals:   Visit Vitals  /73 (BP Location: Left arm, Patient Position: Lying)   Pulse 60   Temp 36.5 °C (97.7 °F) (Temporal)   Resp 20   Ht 6' 5" (1.956 m)   Wt 117 kg (258 lb)   SpO2 95%   BMI 30.59 kg/m²     "

## 2023-06-28 NOTE — [employer]ATION PATIENT INSTRUCTIONS
Discharge Summary/Instructions after an Endoscopic Procedure  Patient Name: Valerio Yan  Patient MRN: [id number]  Patient YOB: 1970  Wednesday, June 28, 2023 Colby Rodriguez MD  Dear patient,  As a result of recent federal legislation (The Federal Cures Act), you may   receive lab or pathology results from your procedure in your [facility]   account before your physician is able to contact you. Your physician or   their representative will relay the results to you with their   recommendations at their soonest availability.  Thank you,  RESTRICTIONS:  During your procedure today, you received medications for sedation.  These   medications may affect your judgment, balance and coordination.  Therefore,   for 24 hours, you have the following restrictions:   - DO NOT drive a car, operate machinery, make legal/financial decisions,   sign important papers or drink alcohol.    ACTIVITY:  Today: no heavy lifting, straining or running due to procedural   sedation/anesthesia.  The following day: return to full activity including work.  DIET:  Eat and drink normally unless instructed otherwise.     TREATMENT FOR COMMON SIDE EFFECTS:  - Mild abdominal pain, nausea, belching, bloating or excessive gas:  rest,   eat lightly and use a heating pad.  - Sore Throat: treat with throat lozenges and/or gargle with warm salt   water.  - Because air was used during the procedure, expelling large amounts of air   from your rectum or belching is normal.  - If a bowel prep was taken, you may not have a bowel movement for 1-3 days.    This is normal.  SYMPTOMS TO WATCH FOR AND REPORT TO YOUR PHYSICIAN:  1. Abdominal pain or bloating, other than gas cramps.  2. Chest pain.  3. Back pain.  4. Signs of infection such as: chills or fever occurring within 24 hours   after the procedure.  5. Rectal bleeding, which would show as bright red, maroon, or black stools.   (A tablespoon of blood from the rectum is not serious, especially  if   hemorrhoids are present.)  6. Vomiting.  7. Weakness or dizziness.  GO DIRECTLY TO THE NEAREST EMERGENCY ROOM IF YOU HAVE ANY OF THE FOLLOWING:      Difficulty breathing              Chills and/or fever over 101 F   Persistent vomiting and/or vomiting blood   Severe abdominal pain   Severe chest pain   Black, tarry stools   Bleeding- more than one tablespoon   Any other symptom or condition that you feel may need urgent attention  Your doctor recommends these additional instructions:  If any biopsies were taken, your doctors clinic will contact you in 1 to 2   weeks with any results.  - Discharge patient to home (via wheelchair).   - Resume previous diet.   - Continue present medications.   - Await pathology results.   - Repeat upper endoscopy in 3 years for surveillance.   - Return to referring physician as previously scheduled.  For questions, problems or results please call your physician Colby Rodriguez MD at Work:  [phone]  If you have any questions about the above instructions, call the GI   department at [phone] or call the endoscopy unit at [phone]   from 7am until 3 pm.  [facility] [place], EMERGENCY ROOM PHONE NUMBER:   [phone]  IF A COMPLICATION OR EMERGENCY SITUATION ARISES AND YOU ARE UNABLE TO REACH   YOUR PHYSICIAN - GO DIRECTLY TO THE EMERGENCY ROOM.  I have read or have had read to me these discharge instructions for my   procedure and have received a written copy.  I understand these   instructions and will follow-up with my physician if I have any questions.     __________________________________       _____________________________________  Nurse Signature                                          Patient/Designated   Responsible Party Signature  MD Colby Chi MD  6/28/2023 8:38:59 AM  This report has been verified and signed electronically.  Dear patient,  As a result of recent federal legislation (The Federal  Cures Act), you may   receive lab or pathology results from your procedure in your [facility]   account before your physician is able to contact you. Your physician or   their representative will relay the results to you with their   recommendations at their soonest availability.  Thank you,  [employer]OVATION

## 2023-06-28 NOTE — DISCHARGE SUMMARY
[facility] - Endoscopy (Hospital)  Discharge Note  Short Stay    Procedure(s) (LRB):  EGD (ESOPHAGOGASTRODUODENOSCOPY) (N/A)  COLONOSCOPY (N/A)      OUTCOME: Patient tolerated treatment/procedure well without complication and is now ready for discharge.    DISPOSITION: Home or Self Care    FINAL DIAGNOSIS:  <principal problem not specified>    FOLLOWUP: With primary care provider    DISCHARGE INSTRUCTIONS:  No discharge procedures on file.     TIME SPENT ON DISCHARGE: 20 minutes

## 2023-06-28 NOTE — H&P
Endoscopy History and Physical    PCP - Therese Lala MD  Referring Physician - Therese Lala MD  [id number] [facility] [place] [id number]      ASA - per anesthesia  Mallampati - per anesthesia  History of Anesthesia problems - no  Family history Anesthesia problems -  no   Plan of anesthesia - General    HPI  52 y.o. 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    Surgical History:  has a past surgical history that includes Megha-en-y procedure; Tracheal surgery; Gastrostomy tube placement; PEG tube removal; decubitus surgery; barrette esophagus; Esophagogastroduodenoscopy; Knee arthroscopy (Left, 2002); Radiofrequency ablation; Colonoscopy (N/A, 5/17/2018); RFA; Tonsillectomy; Robot-assisted cholecystectomy using da Kameron Xi (N/A, 5/2/2019); Left heart catheterization (Left, 12/10/2020); ANGIOGRAM, CORONARY, WITH LEFT HEART CATHETERIZATION (12/10/2020); and Esophagogastroduodenoscopy (N/A, 6/17/2021).    Family History: family history includes Heart attack in his father and mother; Heart disease in his father and mother..    Social History:  reports that he has been smoking cigarettes. He  "started smoking about 35 years ago. He has a 32.00 pack-year smoking history. He quit smokeless tobacco use about 24 years ago.  His smokeless tobacco use included snuff. He reports that he does not currently use alcohol after a past usage of about 14.0 standard drinks per week. He reports that he does not use drugs.    Review of patient's allergies indicates:   Allergen Reactions    Metoclopramide Other (See Comments) and Hives     Pt. Was in coma so is not aware of the reaction  Pt states "he don't know, was in coma"      Metoclopramide (bulk)      Other reaction(s): Unable to obtain    Reglan  [metoclopramide hcl] Other (See Comments)     Pt. Was in coma so is not aware of the reaction  Other reaction(s): Unable to obtain    Statins-hmg-coa reductase inhibitors      Myalgia      Sulfa (sulfonamide antibiotics) Other (See Comments)     Never taken  States son is allergic    Latex Hives, Itching and Rash           Latex, natural rubber Rash     Blisters, adhesives          Medications:   Medications Prior to Admission   Medication Sig Dispense Refill Last Dose    alirocumab (PRALUENT PEN) 150 mg/mL PnIj INJECT 1.06MLS ( 159 MG TOTAL ) BY ABDOMINAL SUBCUTANEOUS ROUTE EVERY 14 DAYS 2 mL 12 Past Month    amLODIPine (NORVASC) 5 MG tablet Take 1 tablet (5 mg total) by mouth once daily. 90 tablet 4 6/27/2023    aspirin (ECOTRIN) 81 MG EC tablet Take 81 mg by mouth once daily.   Past Month    calcium-vitamin D 600 mg, 1,500mg,-200 unit 600 mg(1,500mg) -200 unit Tab Take 1 tablet by mouth once daily.    6/27/2023    cetirizine (ZYRTEC) 10 MG tablet Take 1 tablet (10 mg total) by mouth once daily. 30 tablet 5 Past Week    cyanocobalamin (VITAMIN B-12) 1,000 mcg/mL injection Inject 1 mL (1,000 mcg total) into the muscle every 30 days. 10 mL 0 Past Month    esomeprazole (NEXIUM) 20 MG capsule Take 2 capsules (40 mg total) by mouth before breakfast. (Patient taking differently: Take 20 mg by mouth before breakfast.) 60 capsule " 11 6/27/2023    fluticasone propionate (FLONASE) 50 mcg/actuation nasal spray 1 spray (50 mcg total) by Each Nostril route once daily. 16 g 1 Past Week    furosemide (LASIX) 20 MG tablet Take 1 tablet (20 mg total) by mouth once daily. 180 tablet 1 6/27/2023    isosorbide mononitrate (IMDUR) 30 MG 24 hr tablet Take 1 tablet (30 mg total) by mouth once daily. 90 tablet 3 6/27/2023    lisinopriL-hydrochlorothiazide (PRINZIDE,ZESTORETIC) 20-12.5 mg per tablet Take 2 tablets by mouth once daily. 180 tablet 3 6/28/2023    metoprolol succinate (TOPROL-XL) 25 MG 24 hr tablet Take 1 tablet (25 mg total) by mouth once daily. 90 tablet 1 6/27/2023    multivitamin (THERAGRAN) per tablet Take 1 tablet by mouth nightly.    6/27/2023    mupirocin (BACTROBAN) 2 % ointment Apply topically 2 (two) times daily. 15 g 0 Past Month    ibuprofen (ADVIL,MOTRIN) 800 MG tablet Take 1 tablet (800 mg total) by mouth every 6 (six) hours as needed. 30 tablet 1 More than a month    ondansetron (ZOFRAN) 4 MG tablet Take 1 tablet (4 mg total) by mouth every 6 (six) hours as needed for Nausea. (Patient not taking: Reported on 5/10/2023) 12 tablet 0 More than a month       Physical Exam:    Vital Signs:   Vitals:    06/28/23 0732   BP: 138/73   Pulse: 60   Resp: 20   Temp: 97.7 °F (36.5 °C)       General Appearance: Well appearing in no acute distress  Abdomen: Soft, non tender, non distended with normal bowel sounds, no masses    Labs:  Lab Results   Component Value Date    WBC 6.57 09/12/2022    HGB 16.1 09/12/2022    HCT 46.4 09/12/2022     09/12/2022    CHOL 148 05/04/2022    TRIG 144 05/04/2022    HDL 44 05/04/2022    ALT 26 11/01/2022    AST 25 11/01/2022     (L) 11/01/2022    K 3.8 11/01/2022     11/01/2022    CREATININE 0.6 11/01/2022    BUN 6 11/01/2022    CO2 25 11/01/2022    TSH 1.868 01/05/2021    INR 1.0 12/07/2020    HGBA1C 4.8 05/31/2017       I have explained the risks and benefits of this endoscopic procedure to  the patient including but not limited to bleeding, inflammation, infection, perforation, and death.    SEDATION PLAN: per anesthesia       History reviewed, vital signs satisfactory, cardiopulmonary status satisfactory, sedation options, risks and plans have been discussed with the patient  All their questions were answered and the patient agrees to the sedation procedures as planned and the patient is deemed an appropriate candidate for the sedation as planned.     The risks, benefits and alternatives of the procedure were discussed with the patient in detail. This discussion was had in the presence of endoscopy staff. The risks include, risks of adverse reaction to sedation requiring the use of reversal agents, bleeding requiring blood transfusion, perforation requiring surgical intervention and technical failure. Other risks include aspiration leading to respiratory distress and respiratory failure resulting in endotracheal intubation and mechanical ventilation including death. If anesthesia is being utilized for this procedure, it is up to the anesthesiologist to determine airway safety including elective endotracheal intubation. Questions were answered, they agree to proceed. There was no language barriers.       Procedure explained to patient, informed consent obtained and placed in chart.       Colby Rodriguez MD

## 2023-06-28 NOTE — TELEPHONE ENCOUNTER
----- Message from Lizzy Jay NP sent at 6/28/2023  2:20 PM CDT -----  Can you let home know its a UTI xay was negative

## 2023-06-28 NOTE — ANESTHESIA PREPROCEDURE EVALUATION
06/28/2023  Valerio Yan is a 52 y.o., male.    Patient Active Problem List   Diagnosis    Epigastric pain    Polyneuropathy    Ozuna esophagus    Peptic ulcer disease    Ozuna's esophagus    Typical atrial flutter    Ozuna's esophagus without dysplasia    Mitral regurgitation    Palpitations    Former cigarette smoker    Diaphoresis    Hypertension    LVH (left ventricular hypertrophy) due to hypertensive disease    Mixed hyperlipidemia    Complex regional pain syndrome type 2 of both lower extremities    Rotator cuff syndrome of right shoulder and allied disorders    Tendonitis of long head of biceps brachii of right shoulder    Primary osteoarthritis of left knee    Atypical chest pain    Abnormal CK    Dizziness    Symptomatic cholelithiasis    Hypokalemia    Family history of cardiovascular disease    CAD in native artery    Elevated coronary artery calcium score (2893)    Sinus bradycardia    At risk for sleep apnea    CAD, multiple vessel    Sleep-disordered breathing    Psychophysiological insomnia    Inadequate sleep hygiene    Obstructive sleep apnea    CSF leak    Quadriparesis    Numbness and tingling    Dysautonomia    Sensory ataxia    Heat intolerance    History of Guillain-Lolo syndrome    Severe obesity (BMI 35.0-39.9) with comorbidity    Complication of statin therapy    Statin intolerance    Statin-induced myositis    Aortic atherosclerosis           Pre-op Assessment    I have reviewed the Patient Summary Reports.     I have reviewed the Nursing Notes. I have reviewed the NPO Status.   I have reviewed the Medications.     Review of Systems  Anesthesia Hx:  No problems with previous Anesthesia  Denies Family Hx of Anesthesia complications.   Denies Personal Hx of Anesthesia complications.   Social:  Non-Smoker     Hematology/Oncology:  Hematology Normal   Oncology Normal     EENT/Dental:EENT/Dental Normal   Cardiovascular:   Hypertension CAD   ECG has been reviewed. Echo 2019    CONCLUSIONS     1 - Normal left ventricular systolic function (EF 55%).     2 - Normal left ventricular diastolic function.     3 - Normal right ventricular systolic function .    Pulmonary:   Sleep Apnea    Renal/:  Renal/ Normal     Hepatic/GI:   PUD,    Musculoskeletal:   Arthritis   Muscle Disorders:    Neurological:   Neuromuscular Disease,  Neuromuscular Disease, Guillain Lilly   Endocrine:  Endocrine Normal    Dermatological:  Skin Normal    Psych:   Psychiatric History depression          Physical Exam  General: Well nourished, Cooperative, Alert and Oriented    Airway:  Mallampati: II   Mouth Opening: Normal  TM Distance: Normal  Tongue: Normal  Neck ROM: Normal ROM    Dental:  Intact  Pt denies loose or removable dentition  Heart:  Rate: Normal  Rhythm: Regular Rhythm        Anesthesia Plan  Type of Anesthesia, risks & benefits discussed:    Anesthesia Type: MAC, Gen Natural Airway  Intra-op Monitoring Plan: Standard ASA Monitors  Post Op Pain Control Plan: multimodal analgesia  Induction:  IV  Informed Consent: Informed consent signed with the Patient and all parties understand the risks and agree with anesthesia plan.  All questions answered.   ASA Score: 3  Day of Surgery Review of History & Physical: H&P Update referred to the surgeon/provider.I have interviewed and examined the patient. I have reviewed the patient's H&P dated: There are no significant changes.     Ready For Surgery From Anesthesia Perspective.     .

## 2023-06-28 NOTE — ANESTHESIA POSTPROCEDURE EVALUATION
Anesthesia Post Evaluation    Patient: Valerio Yan    Procedure(s) Performed: Procedure(s) (LRB):  EGD (ESOPHAGOGASTRODUODENOSCOPY) (N/A)  COLONOSCOPY (N/A)    Final Anesthesia Type: MAC      Patient location during evaluation: GI PACU  Patient participation: Yes- Able to Participate  Level of consciousness: awake and alert  Post-procedure vital signs: reviewed and stable  Pain management: adequate  Airway patency: patent    PONV status at discharge: No PONV  Anesthetic complications: no      Cardiovascular status: blood pressure returned to baseline and hemodynamically stable  Respiratory status: unassisted, spontaneous ventilation and room air  Hydration status: euvolemic  Follow-up not needed.          Vitals Value Taken Time   /65 06/28/23 0857   Temp 36.9 °C (98.5 °F) 06/28/23 0857   Pulse 57 06/28/23 0857   Resp 20 06/28/23 0857   SpO2 99 % 06/28/23 0857         No case tracking events are documented in the log.      Pain/Sridevi Score: Sridevi Score: 10 (6/28/2023  8:57 AM)

## 2023-06-28 NOTE — PLAN OF CARE
MD at bedside to discuss results with pt and family. TERRENCE BRUSH.   Denies nausea/pain  Discharge orders noted

## 2023-06-29 VITALS
OXYGEN SATURATION: 99 % | TEMPERATURE: 99 F | RESPIRATION RATE: 18 BRPM | HEART RATE: 55 BPM | HEIGHT: 77 IN | BODY MASS INDEX: 30.46 KG/M2 | SYSTOLIC BLOOD PRESSURE: 147 MMHG | DIASTOLIC BLOOD PRESSURE: 80 MMHG | WEIGHT: 258 LBS

## 2023-06-30 ENCOUNTER — HOSPITAL ENCOUNTER (OUTPATIENT)
Facility: HOSPITAL | Age: 53
Discharge: HOME | End: 2023-07-03
Attending: FAMILY MEDICINE | Admitting: INTERNAL MEDICINE
Payer: MEDICARE

## 2023-06-30 DIAGNOSIS — A41.9 SEPSIS DUE TO URINARY TRACT INFECTION: ICD-10-CM

## 2023-06-30 DIAGNOSIS — N39.0 SEPSIS DUE TO URINARY TRACT INFECTION: ICD-10-CM

## 2023-06-30 DIAGNOSIS — A41.9 SEPSIS: ICD-10-CM

## 2023-06-30 DIAGNOSIS — N12 PYELONEPHRITIS: Primary | ICD-10-CM

## 2023-06-30 LAB
ALBUMIN SERPL BCP-MCNC: 3.1 G/DL (ref 3.5–5.2)
ALP SERPL-CCNC: 62 U/L (ref 55–135)
ALT SERPL W/O P-5'-P-CCNC: 13 U/L (ref 10–44)
ANION GAP SERPL CALC-SCNC: 15 MMOL/L (ref 8–16)
AST SERPL-CCNC: 16 U/L (ref 10–40)
BACTERIA #/AREA URNS HPF: ABNORMAL /HPF
BACTERIA UR CULT: ABNORMAL
BASOPHILS # BLD AUTO: 0.04 K/UL (ref 0–0.2)
BASOPHILS NFR BLD: 0.6 % (ref 0–1.9)
BILIRUB SERPL-MCNC: 0.7 MG/DL (ref 0.1–1)
BILIRUB UR QL STRIP: NEGATIVE
BUN SERPL-MCNC: 7 MG/DL (ref 6–20)
CALCIUM SERPL-MCNC: 8.5 MG/DL (ref 8.7–10.5)
CHLORIDE SERPL-SCNC: 99 MMOL/L (ref 95–110)
CLARITY UR: CLEAR
CO2 SERPL-SCNC: 21 MMOL/L (ref 23–29)
COLOR UR: YELLOW
CREAT SERPL-MCNC: 0.6 MG/DL (ref 0.5–1.4)
CTP QC/QA: YES
CTP QC/QA: YES
DIFFERENTIAL METHOD: ABNORMAL
EOSINOPHIL # BLD AUTO: 0 K/UL (ref 0–0.5)
EOSINOPHIL NFR BLD: 0.3 % (ref 0–8)
ERYTHROCYTE [DISTWIDTH] IN BLOOD BY AUTOMATED COUNT: 13.2 % (ref 11.5–14.5)
EST. GFR  (NO RACE VARIABLE): >60 ML/MIN/1.73 M^2
FINAL PATHOLOGIC DIAGNOSIS: NORMAL
GLUCOSE SERPL-MCNC: 100 MG/DL (ref 70–110)
GLUCOSE UR QL STRIP: NEGATIVE
GROSS: NORMAL
HCT VFR BLD AUTO: 38.2 % (ref 40–54)
HGB BLD-MCNC: 13.2 G/DL (ref 14–18)
HGB UR QL STRIP: ABNORMAL
HYALINE CASTS #/AREA URNS LPF: 0 /LPF
IMM GRANULOCYTES # BLD AUTO: 0.02 K/UL (ref 0–0.04)
IMM GRANULOCYTES NFR BLD AUTO: 0.3 % (ref 0–0.5)
KETONES UR QL STRIP: ABNORMAL
LACTATE SERPL-SCNC: 0.6 MMOL/L (ref 0.5–2.2)
LEUKOCYTE ESTERASE UR QL STRIP: ABNORMAL
LYMPHOCYTES # BLD AUTO: 1 K/UL (ref 1–4.8)
LYMPHOCYTES NFR BLD: 14.1 % (ref 18–48)
Lab: NORMAL
MCH RBC QN AUTO: 31.4 PG (ref 27–31)
MCHC RBC AUTO-ENTMCNC: 34.6 G/DL (ref 32–36)
MCV RBC AUTO: 91 FL (ref 82–98)
MICROSCOPIC COMMENT: ABNORMAL
MICROSCOPIC EXAM: NORMAL
MONOCYTES # BLD AUTO: 1.2 K/UL (ref 0.3–1)
MONOCYTES NFR BLD: 16.8 % (ref 4–15)
NEUTROPHILS # BLD AUTO: 4.8 K/UL (ref 1.8–7.7)
NEUTROPHILS NFR BLD: 67.9 % (ref 38–73)
NITRITE UR QL STRIP: NEGATIVE
NRBC BLD-RTO: 0 /100 WBC
PH UR STRIP: 6 [PH] (ref 5–8)
PLATELET # BLD AUTO: 151 K/UL (ref 150–450)
PMV BLD AUTO: 10.7 FL (ref 9.2–12.9)
POC MOLECULAR INFLUENZA A AGN: NEGATIVE
POC MOLECULAR INFLUENZA B AGN: NEGATIVE
POTASSIUM SERPL-SCNC: 2.9 MMOL/L (ref 3.5–5.1)
PROT SERPL-MCNC: 6.3 G/DL (ref 6–8.4)
PROT UR QL STRIP: ABNORMAL
RBC # BLD AUTO: 4.21 M/UL (ref 4.6–6.2)
RBC #/AREA URNS HPF: 8 /HPF (ref 0–4)
SARS-COV-2 RDRP RESP QL NAA+PROBE: NEGATIVE
SODIUM SERPL-SCNC: 135 MMOL/L (ref 136–145)
SP GR UR STRIP: 1.02 (ref 1–1.03)
SQUAMOUS #/AREA URNS HPF: 1 /HPF
URN SPEC COLLECT METH UR: ABNORMAL
UROBILINOGEN UR STRIP-ACNC: ABNORMAL EU/DL
WBC # BLD AUTO: 7.04 K/UL (ref 3.9–12.7)
WBC #/AREA URNS HPF: 45 /HPF (ref 0–5)
WBC CLUMPS URNS QL MICRO: ABNORMAL

## 2023-06-30 PROCEDURE — 63600175 PHARM REV CODE 636 W HCPCS: Performed by: INTERNAL MEDICINE

## 2023-06-30 PROCEDURE — 87086 URINE CULTURE/COLONY COUNT: CPT | Performed by: PHYSICIAN ASSISTANT

## 2023-06-30 PROCEDURE — 96361 HYDRATE IV INFUSION ADD-ON: CPT

## 2023-06-30 PROCEDURE — G0378 HOSPITAL OBSERVATION PER HR: HCPCS

## 2023-06-30 PROCEDURE — 87077 CULTURE AEROBIC IDENTIFY: CPT | Performed by: PHYSICIAN ASSISTANT

## 2023-06-30 PROCEDURE — 25000003 PHARM REV CODE 250: Performed by: INTERNAL MEDICINE

## 2023-06-30 PROCEDURE — 63600175 PHARM REV CODE 636 W HCPCS: Performed by: PHYSICIAN ASSISTANT

## 2023-06-30 PROCEDURE — 83605 ASSAY OF LACTIC ACID: CPT | Performed by: PHYSICIAN ASSISTANT

## 2023-06-30 PROCEDURE — 93010 ELECTROCARDIOGRAM REPORT: CPT | Mod: ,,, | Performed by: INTERNAL MEDICINE

## 2023-06-30 PROCEDURE — 99285 EMERGENCY DEPT VISIT HI MDM: CPT | Mod: 25

## 2023-06-30 PROCEDURE — 96372 THER/PROPH/DIAG INJ SC/IM: CPT | Performed by: INTERNAL MEDICINE

## 2023-06-30 PROCEDURE — 25000003 PHARM REV CODE 250: Performed by: FAMILY MEDICINE

## 2023-06-30 PROCEDURE — 87186 SC STD MICRODIL/AGAR DIL: CPT | Performed by: PHYSICIAN ASSISTANT

## 2023-06-30 PROCEDURE — 25000003 PHARM REV CODE 250: Performed by: PHYSICIAN ASSISTANT

## 2023-06-30 PROCEDURE — 87040 BLOOD CULTURE FOR BACTERIA: CPT | Mod: 59 | Performed by: PHYSICIAN ASSISTANT

## 2023-06-30 PROCEDURE — 96365 THER/PROPH/DIAG IV INF INIT: CPT | Mod: 59

## 2023-06-30 PROCEDURE — 63600175 PHARM REV CODE 636 W HCPCS: Performed by: FAMILY MEDICINE

## 2023-06-30 PROCEDURE — 87635 SARS-COV-2 COVID-19 AMP PRB: CPT | Performed by: FAMILY MEDICINE

## 2023-06-30 PROCEDURE — 80053 COMPREHEN METABOLIC PANEL: CPT | Performed by: PHYSICIAN ASSISTANT

## 2023-06-30 PROCEDURE — 96375 TX/PRO/DX INJ NEW DRUG ADDON: CPT

## 2023-06-30 PROCEDURE — 85025 COMPLETE CBC W/AUTO DIFF WBC: CPT | Performed by: PHYSICIAN ASSISTANT

## 2023-06-30 PROCEDURE — 81000 URINALYSIS NONAUTO W/SCOPE: CPT | Performed by: PHYSICIAN ASSISTANT

## 2023-06-30 PROCEDURE — 87088 URINE BACTERIA CULTURE: CPT | Performed by: PHYSICIAN ASSISTANT

## 2023-06-30 PROCEDURE — 93005 ELECTROCARDIOGRAM TRACING: CPT

## 2023-06-30 PROCEDURE — 93010 EKG 12-LEAD: ICD-10-PCS | Mod: ,,, | Performed by: INTERNAL MEDICINE

## 2023-06-30 RX ORDER — OXYCODONE AND ACETAMINOPHEN 5; 325 MG/1; MG/1
1 TABLET ORAL EVERY 4 HOURS PRN
Status: DISCONTINUED | OUTPATIENT
Start: 2023-07-01 | End: 2023-07-03 | Stop reason: HOSPADM

## 2023-06-30 RX ORDER — MAG HYDROX/ALUMINUM HYD/SIMETH 200-200-20
30 SUSPENSION, ORAL (FINAL DOSE FORM) ORAL EVERY 6 HOURS PRN
Status: DISCONTINUED | OUTPATIENT
Start: 2023-06-30 | End: 2023-07-03 | Stop reason: HOSPADM

## 2023-06-30 RX ORDER — ACETAMINOPHEN 500 MG
1000 TABLET ORAL
Status: COMPLETED | OUTPATIENT
Start: 2023-06-30 | End: 2023-06-30

## 2023-06-30 RX ORDER — ONDANSETRON 2 MG/ML
4 INJECTION INTRAMUSCULAR; INTRAVENOUS ONCE
Status: COMPLETED | OUTPATIENT
Start: 2023-06-30 | End: 2023-06-30

## 2023-06-30 RX ORDER — OXYCODONE AND ACETAMINOPHEN 10; 325 MG/1; MG/1
1 TABLET ORAL EVERY 4 HOURS PRN
Status: DISCONTINUED | OUTPATIENT
Start: 2023-07-01 | End: 2023-07-03 | Stop reason: HOSPADM

## 2023-06-30 RX ORDER — MAG HYDROX/ALUMINUM HYD/SIMETH 200-200-20
30 SUSPENSION, ORAL (FINAL DOSE FORM) ORAL ONCE
Status: COMPLETED | OUTPATIENT
Start: 2023-07-01 | End: 2023-06-30

## 2023-06-30 RX ORDER — METOPROLOL SUCCINATE 25 MG/1
25 TABLET, EXTENDED RELEASE ORAL DAILY
Status: DISCONTINUED | OUTPATIENT
Start: 2023-07-01 | End: 2023-07-03 | Stop reason: HOSPADM

## 2023-06-30 RX ORDER — ENOXAPARIN SODIUM 100 MG/ML
40 INJECTION SUBCUTANEOUS EVERY 24 HOURS
Status: DISCONTINUED | OUTPATIENT
Start: 2023-06-30 | End: 2023-07-02

## 2023-06-30 RX ORDER — POTASSIUM CHLORIDE 20 MEQ/1
40 TABLET, EXTENDED RELEASE ORAL ONCE
Status: COMPLETED | OUTPATIENT
Start: 2023-06-30 | End: 2023-06-30

## 2023-06-30 RX ORDER — GUAIFENESIN 100 MG/5ML
200 SOLUTION ORAL EVERY 4 HOURS PRN
Status: DISCONTINUED | OUTPATIENT
Start: 2023-06-30 | End: 2023-07-03 | Stop reason: HOSPADM

## 2023-06-30 RX ORDER — LIDOCAINE HYDROCHLORIDE 20 MG/ML
15 SOLUTION OROPHARYNGEAL ONCE
Status: COMPLETED | OUTPATIENT
Start: 2023-07-01 | End: 2023-06-30

## 2023-06-30 RX ORDER — LISINOPRIL 20 MG/1
20 TABLET ORAL DAILY
Status: DISCONTINUED | OUTPATIENT
Start: 2023-06-30 | End: 2023-07-03 | Stop reason: HOSPADM

## 2023-06-30 RX ORDER — IPRATROPIUM BROMIDE AND ALBUTEROL SULFATE 2.5; .5 MG/3ML; MG/3ML
3 SOLUTION RESPIRATORY (INHALATION) EVERY 4 HOURS PRN
Status: DISCONTINUED | OUTPATIENT
Start: 2023-06-30 | End: 2023-07-03 | Stop reason: HOSPADM

## 2023-06-30 RX ORDER — ACETAMINOPHEN 325 MG/1
650 TABLET ORAL EVERY 6 HOURS PRN
Status: DISCONTINUED | OUTPATIENT
Start: 2023-06-30 | End: 2023-07-03 | Stop reason: HOSPADM

## 2023-06-30 RX ORDER — PANTOPRAZOLE SODIUM 40 MG/1
40 TABLET, DELAYED RELEASE ORAL DAILY
Status: DISCONTINUED | OUTPATIENT
Start: 2023-06-30 | End: 2023-07-03 | Stop reason: HOSPADM

## 2023-06-30 RX ORDER — ASPIRIN 81 MG/1
81 TABLET ORAL DAILY
Status: DISCONTINUED | OUTPATIENT
Start: 2023-07-01 | End: 2023-07-03 | Stop reason: HOSPADM

## 2023-06-30 RX ORDER — TALC
6 POWDER (GRAM) TOPICAL NIGHTLY PRN
Status: DISCONTINUED | OUTPATIENT
Start: 2023-06-30 | End: 2023-07-03 | Stop reason: HOSPADM

## 2023-06-30 RX ORDER — AMLODIPINE BESYLATE 10 MG/1
10 TABLET ORAL DAILY
Status: DISCONTINUED | OUTPATIENT
Start: 2023-07-01 | End: 2023-07-03 | Stop reason: HOSPADM

## 2023-06-30 RX ORDER — TAMSULOSIN HYDROCHLORIDE 0.4 MG/1
0.4 CAPSULE ORAL DAILY
Status: DISCONTINUED | OUTPATIENT
Start: 2023-07-01 | End: 2023-07-03 | Stop reason: HOSPADM

## 2023-06-30 RX ORDER — HYDRALAZINE HYDROCHLORIDE 20 MG/ML
10 INJECTION INTRAMUSCULAR; INTRAVENOUS EVERY 8 HOURS PRN
Status: DISCONTINUED | OUTPATIENT
Start: 2023-06-30 | End: 2023-07-03 | Stop reason: HOSPADM

## 2023-06-30 RX ORDER — ONDANSETRON 2 MG/ML
4 INJECTION INTRAMUSCULAR; INTRAVENOUS EVERY 8 HOURS PRN
Status: DISCONTINUED | OUTPATIENT
Start: 2023-06-30 | End: 2023-07-03 | Stop reason: HOSPADM

## 2023-06-30 RX ORDER — SODIUM CHLORIDE AND POTASSIUM CHLORIDE 150; 900 MG/100ML; MG/100ML
INJECTION, SOLUTION INTRAVENOUS CONTINUOUS
Status: DISCONTINUED | OUTPATIENT
Start: 2023-06-30 | End: 2023-07-01

## 2023-06-30 RX ADMIN — SODIUM CHLORIDE AND POTASSIUM CHLORIDE: 9; 1.49 INJECTION, SOLUTION INTRAVENOUS at 10:06

## 2023-06-30 RX ADMIN — POTASSIUM BICARBONATE 40 MEQ: 391 TABLET, EFFERVESCENT ORAL at 07:06

## 2023-06-30 RX ADMIN — PANTOPRAZOLE SODIUM 40 MG: 40 TABLET, DELAYED RELEASE ORAL at 11:06

## 2023-06-30 RX ADMIN — ENOXAPARIN SODIUM 40 MG: 40 INJECTION SUBCUTANEOUS at 09:06

## 2023-06-30 RX ADMIN — CEFTRIAXONE 1 G: 1 INJECTION, POWDER, FOR SOLUTION INTRAMUSCULAR; INTRAVENOUS at 06:06

## 2023-06-30 RX ADMIN — POTASSIUM CHLORIDE 40 MEQ: 1500 TABLET, EXTENDED RELEASE ORAL at 10:06

## 2023-06-30 RX ADMIN — ACETAMINOPHEN 1000 MG: 500 TABLET ORAL at 05:06

## 2023-06-30 RX ADMIN — SODIUM CHLORIDE 2673 ML: 9 INJECTION, SOLUTION INTRAVENOUS at 06:06

## 2023-06-30 RX ADMIN — ALUMINUM HYDROXIDE, MAGNESIUM HYDROXIDE, AND DIMETHICONE 30 ML: 200; 20; 200 SUSPENSION ORAL at 11:06

## 2023-06-30 RX ADMIN — LIDOCAINE HYDROCHLORIDE 15 ML: 20 SOLUTION ORAL at 11:06

## 2023-06-30 RX ADMIN — ONDANSETRON 4 MG: 2 INJECTION INTRAMUSCULAR; INTRAVENOUS at 06:06

## 2023-06-30 NOTE — ED PROVIDER NOTES
"SCRIBE #1 NOTE: I, Juan Hodge, am scribing for, and in the presence of, Yareli Jaquez MD. I have scribed the entire note.       History     Chief Complaint   Patient presents with    Fever     Pt c/o fever, SOB, CP, reported urinary incontinence, dysuria onset of Wed. Pt states that he had an EGD/Colonoscopy on wed as well. Pt was dx with UTI same day.    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 Was in coma so is not aware of the reaction  Other reaction(s): Unable to obtain    Statins-hmg-coa reductase inhibitors      Myalgia      Sulfa (sulfonamide antibiotics) Other (See Comments)     Never taken  States son is allergic    Latex Hives, Itching and Rash           Latex, natural rubber Rash     Blisters, adhesives            History of Present Illness     HPI    6/30/2023, 4:43 PM  History obtained from the patient      History of Present Illness: Valerio Yan is a 52 y.o. male patient with a PMHx of HTN, HLD, LVH, and atrial flutter who presents to the Emergency Department for evaluation of fever which onset gradually within the past week. Pt had EGD and colonoscopy on Wednesday; pt was dx with UTI the same day. Symptoms are constant and moderate in severity. No mitigating or exacerbating factors reported. Associated sxs include SOB, HA, nausea, back pain, chills, chest tightness, and dysuria. Patient denies any abd pain, cough, congestion, weakness, and all other sxs at this time. Prior Tx includes Ibuprofen at 4:00 PM and Tylenol 6 hours before the dose of Ibuprofen. No further complaints or concerns at this time.       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    Hyperlipidemia     Hypertension     Joint pain     knees    LVH (left ventricular hypertrophy) due to hypertensive disease 2/10/2017    Mitral regurgitation 6/9/2016    KIA (obstructive sleep apnea)     Primary osteoarthritis of left knee 1/7/2019    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    Tracheostomy status 12/29/2021    Transfusion reaction     1 x  to PRBC fever       Past Surgical History:  Past Surgical History:   Procedure Laterality Date    ANGIOGRAM, CORONARY, WITH LEFT HEART CATHETERIZATION  12/10/2020    Procedure: Angiogram, Coronary, with Left Heart Cath;  Surgeon: Tomi Mir MD;  Location: [facility] CATH LAB;  Service: Cardiology;;    barrette esophagus      COLONOSCOPY N/A 5/17/2018    Procedure: COLONOSCOPY;  Surgeon: Ilan Araya III, MD;  Location: [facility];  Service: Endoscopy;  Laterality: N/A;    COLONOSCOPY N/A 6/28/2023    Procedure: COLONOSCOPY;  Surgeon: Colby Rodriguez MD;  Location: [facility];  Service: Endoscopy;  Laterality: N/A;    decubitus surgery      to sacral    ESOPHAGOGASTRODUODENOSCOPY      ESOPHAGOGASTRODUODENOSCOPY N/A 6/17/2021    Procedure: EGD (ESOPHAGOGASTRODUODENOSCOPY);  Surgeon: Ban Zheng MD;  Location: [facility];  Service: Endoscopy;  Laterality: N/A;    ESOPHAGOGASTRODUODENOSCOPY N/A 6/28/2023    Procedure: EGD (ESOPHAGOGASTRODUODENOSCOPY);  Surgeon: Colby Rodriguez MD;  Location: [facility];  Service: Endoscopy;  Laterality: N/A;    GASTROSTOMY TUBE PLACEMENT      KNEE ARTHROSCOPY Left 2002    LEFT HEART CATHETERIZATION Left 12/10/2020    Procedure: CATHETERIZATION, HEART, LEFT;  Surgeon: Tomi VALDEZ  MD Clarke;  Location: [facility] CATH LAB;  Service: Cardiology;  Laterality: Left;  730 start time    PEG TUBE REMOVAL      RADIOFREQUENCY ABLATION      RFA      ROBOT-ASSISTED CHOLECYSTECTOMY USING DA GABRIELA XI N/A 5/2/2019    Procedure: XI ROBOTIC CHOLECYSTECTOMY;  Surgeon: Valerio Lai MD;  Location: [facility] OR;  Service: General;  Laterality: N/A;    JUAN-EN-Y PROCEDURE      TONSILLECTOMY      TRACHEAL SURGERY           Family History:  Family History   Problem Relation Age of Onset    Heart attack Mother     Heart disease Mother     Heart disease Father     Heart attack Father        Social History:  Social History     Tobacco Use    Smoking status: Every Day     Packs/day: 1.00     Years: 32.00     Pack years: 32.00     Types: Cigarettes     Start date: 4/4/1988     Last attempt to quit: 5/1/2020     Years since quitting: 3.1    Smokeless tobacco: Former     Types: Snuff     Quit date: 1/6/1999   Substance and Sexual Activity    Alcohol use: Not Currently     Alcohol/week: 14.0 standard drinks     Types: 14 Glasses of wine per week     Comment: no alcohol for past week, usually 7 drinks/week    Drug use: No    Sexual activity: Yes     Partners: Female        Review of Systems     Review of Systems   Constitutional:  Positive for chills and fever.   HENT:  Negative for congestion and sore throat.    Respiratory:  Positive for chest tightness and shortness of breath. Negative for cough.    Cardiovascular:  Positive for chest pain.   Gastrointestinal:  Positive for nausea. Negative for abdominal pain and vomiting.   Genitourinary:  Positive for dysuria.        (+) urinary incontinence   Skin:  Negative for rash.   Neurological:  Positive for headaches. Negative for weakness.   Hematological:  Does not bruise/bleed easily.   All other systems reviewed and are negative.     Physical Exam     Initial Vitals [06/30/23 1622]   BP Pulse Resp Temp SpO2   (!) 184/83 92 (!) 22 (!) 102.6 °F (39.2 °C) 96 %      MAP       --           Physical Exam  Nursing Notes and Vital Signs Reviewed.  Constitutional: Patient is in no acute distress. Febrile.  Head: Atraumatic. Normocephalic.  Eyes: PERRL. EOM intact. Conjunctivae are not pale. No scleral icterus.  ENT: Mucous membranes are moist. Oropharynx is clear and symmetric.    Neck: Supple. Full ROM. No lymphadenopathy.  Cardiovascular: Tachycardic. Regular rhythm. No murmurs, rubs, or gallops. Distal pulses are 2+ and symmetric.  Pulmonary/Chest: No respiratory distress. Clear to auscultation bilaterally. No wheezing or rales.  Abdominal: Soft and non-distended.  There is no tenderness.  No rebound, guarding, or rigidity. Good bowel sounds.  Genitourinary: No CVA tenderness  Musculoskeletal: Moves all extremities. No obvious deformities. No edema. No calf tenderness.Posterior splints for footdrop bilaterally.  Skin: Warm and dry.  Neurological:  Alert, awake, and appropriate.  Normal speech.  No acute focal neurological deficits are appreciated.  Psychiatric: Normal affect. Good eye contact. Appropriate in content.     ED Course   Critical Care    Date/Time: 6/30/2023 8:40 PM  Performed by: Yareli Jaquez MD  Authorized by: Yareli Jaquez MD   Direct patient critical care time: 7 minutes  Additional history critical care time: 5 minutes  Ordering / reviewing critical care time: 6 minutes  Documentation critical care time: 7 minutes  Consulting other physicians critical care time: 6 minutes  Consult with family critical care time: 4 minutes  Total critical care time (exclusive of procedural time) : 35 minutes  Critical care time was exclusive of separately billable procedures and treating other patients and teaching time.  Critical care was necessary to treat or prevent imminent or life-threatening deterioration of the following conditions: sepsis (Nephritis).  Critical care was time spent personally by me on the following activities: blood draw for specimens, development of treatment plan  with patient or surrogate, discussions with consultants, interpretation of cardiac output measurements, evaluation of patient's response to treatment, examination of patient, obtaining history from patient or surrogate, ordering and performing treatments and interventions, ordering and review of laboratory studies, ordering and review of radiographic studies, pulse oximetry, re-evaluation of patient's condition and review of old charts.      ED Vital Signs:  Vitals:    07/02/23 0930 07/02/23 1100 07/02/23 1202 07/02/23 1300   BP:   114/65    Pulse: 62 60 (!) 58 (!) 58   Resp:   18    Temp:   98 °F (36.7 °C)    TempSrc:       SpO2:   (!) 93%    Weight:       Height:        07/02/23 1530 07/02/23 1542 07/02/23 1730 07/02/23 2125   BP:  (!) 150/72  (!) 162/84   Pulse: (!) 58 (!) 56 (!) 58 (!) 57   Resp:  18  18   Temp:  98.4 °F (36.9 °C)  98.8 °F (37.1 °C)   TempSrc:    Oral   SpO2:  97%  98%   Weight:       Height:        07/02/23 2159 07/02/23 2312 07/03/23 0008 07/03/23 0136   BP:   124/61    Pulse:  63 (!) 50 (!) 56   Resp: 18  18    Temp:   97.7 °F (36.5 °C)    TempSrc:   Oral    SpO2:   97%    Weight:   122.6 kg (270 lb 4.5 oz)    Height:        07/03/23 0429 07/03/23 0723 07/03/23 0930   BP: (!) 161/85 (!) 151/70    Pulse: (!) 55 (!) 53 (!) 58   Resp: 18 16    Temp: 98 °F (36.7 °C) 98.7 °F (37.1 °C)    TempSrc: Oral Oral    SpO2: 100% (!) 94%    Weight:      Height:          Abnormal Lab Results:  Labs Reviewed   CBC W/ AUTO DIFFERENTIAL - Abnormal; Notable for the following components:       Result Value    RBC 4.21 (*)     Hemoglobin 13.2 (*)     Hematocrit 38.2 (*)     MCH 31.4 (*)     Mono # 1.2 (*)     Lymph % 14.1 (*)     Mono % 16.8 (*)     All other components within normal limits    Narrative:     Release to patient->Immediate   COMPREHENSIVE METABOLIC PANEL - Abnormal; Notable for the following components:    Sodium 135 (*)     Potassium 2.9 (*)     CO2 21 (*)     Calcium 8.5 (*)     Albumin 3.1 (*)      All other components within normal limits    Narrative:     Release to patient->Immediate   URINALYSIS, REFLEX TO URINE CULTURE - Abnormal; Notable for the following components:    Protein, UA 1+ (*)     Ketones, UA 3+ (*)     Occult Blood UA 1+ (*)     Urobilinogen, UA 2.0-3.0 (*)     Leukocytes, UA 2+ (*)     All other components within normal limits    Narrative:     Specimen Source->Urine   URINALYSIS MICROSCOPIC - Abnormal; Notable for the following components:    RBC, UA 8 (*)     WBC, UA 45 (*)     All other components within normal limits    Narrative:     Specimen Source->Urine   LACTIC ACID, PLASMA    Narrative:     Release to patient->Immediate   POCT INFLUENZA A/B MOLECULAR   SARS-COV-2 RDRP GENE    Narrative:     This test utilizes isothermal nucleic acid amplification technology to detect the SARS-CoV-2 RdRp nucleic acid segment. The analytical sensitivity (limit of detection) is 500 copies/swab.     A POSITIVE result is indicative of the presence of SARS-CoV-2 RNA; clinical correlation with patient history and other diagnostic information is necessary to determine patient infection status.    A NEGATIVE result means that SARS-CoV-2 nucleic acids are not present above the limit of detection. A NEGATIVE result should be treated as presumptive. It does not rule out the possibility of COVID-19 and should not be the sole basis for treatment decisions. If COVID-19 is strongly suspected based on clinical and exposure history, re-testing using an alternate molecular assay should be considered.     This test is only for use under the Food and Drug Administration s Emergency Use Authorization (EUA).     Commercial kits are provided by [employer]. Performance characteristics of the EUA have been independently verified by [facility] Department of Pathology and Laboratory Medicine.   _________________________________________________________________   The authorized Fact Sheet for Healthcare  Providers and the authorized Fact Sheet for Patients of the ID NOW COVID-19 are available on the FDA website:    https://www.fda.gov/media/652298/download      https://www.fda.gov/media/547451/download           All Lab Results:  Results for orders placed or performed during the hospital encounter of 06/30/23   Blood culture x two cultures. Draw prior to antibiotics.    Specimen: Peripheral, Antecubital, Left; Blood   Result Value Ref Range    Blood Culture, Routine No Growth to date     Blood Culture, Routine No Growth to date     Blood Culture, Routine No Growth to date    Blood culture x two cultures. Draw prior to antibiotics.    Specimen: Peripheral, Hand, Right; Blood   Result Value Ref Range    Blood Culture, Routine No Growth to date     Blood Culture, Routine No Growth to date     Blood Culture, Routine No Growth to date    Urine culture    Specimen: Urine   Result Value Ref Range    Urine Culture, Routine (A)      ENTEROCOCCUS FAECALIS  10,000 - 49,999 cfu/ml  No other significant isolate         Susceptibility    Enterococcus faecalis - CULTURE, URINE     Ampicillin <=2 Sensitive mcg/mL     Nitrofurantoin <=32 Sensitive mcg/mL     Vancomycin 1 Sensitive mcg/mL   CBC auto differential   Result Value Ref Range    WBC 7.04 3.90 - 12.70 K/uL    RBC 4.21 (L) 4.60 - 6.20 M/uL    Hemoglobin 13.2 (L) 14.0 - 18.0 g/dL    Hematocrit 38.2 (L) 40.0 - 54.0 %    MCV 91 82 - 98 fL    MCH 31.4 (H) 27.0 - 31.0 pg    MCHC 34.6 32.0 - 36.0 g/dL    RDW 13.2 11.5 - 14.5 %    Platelets 151 150 - 450 K/uL    MPV 10.7 9.2 - 12.9 fL    Immature Granulocytes 0.3 0.0 - 0.5 %    Gran # (ANC) 4.8 1.8 - 7.7 K/uL    Immature Grans (Abs) 0.02 0.00 - 0.04 K/uL    Lymph # 1.0 1.0 - 4.8 K/uL    Mono # 1.2 (H) 0.3 - 1.0 K/uL    Eos # 0.0 0.0 - 0.5 K/uL    Baso # 0.04 0.00 - 0.20 K/uL    nRBC 0 0 /100 WBC    Gran % 67.9 38.0 - 73.0 %    Lymph % 14.1 (L) 18.0 - 48.0 %    Mono % 16.8 (H) 4.0 - 15.0 %    Eosinophil % 0.3 0.0 - 8.0 %    Basophil  % 0.6 0.0 - 1.9 %    Differential Method Automated    Comprehensive metabolic panel   Result Value Ref Range    Sodium 135 (L) 136 - 145 mmol/L    Potassium 2.9 (L) 3.5 - 5.1 mmol/L    Chloride 99 95 - 110 mmol/L    CO2 21 (L) 23 - 29 mmol/L    Glucose 100 70 - 110 mg/dL    BUN 7 6 - 20 mg/dL    Creatinine 0.6 0.5 - 1.4 mg/dL    Calcium 8.5 (L) 8.7 - 10.5 mg/dL    Total Protein 6.3 6.0 - 8.4 g/dL    Albumin 3.1 (L) 3.5 - 5.2 g/dL    Total Bilirubin 0.7 0.1 - 1.0 mg/dL    Alkaline Phosphatase 62 55 - 135 U/L    AST 16 10 - 40 U/L    ALT 13 10 - 44 U/L    eGFR >60 >60 mL/min/1.73 m^2    Anion Gap 15 8 - 16 mmol/L   Lactic acid, plasma #1   Result Value Ref Range    Lactate (Lactic Acid) 0.6 0.5 - 2.2 mmol/L   Urinalysis, Reflex to Urine Culture Urine, Clean Catch    Specimen: Urine   Result Value Ref Range    Specimen UA Urine, Clean Catch     Color, UA Yellow Yellow, Straw, Beatris    Appearance, UA Clear Clear    pH, UA 6.0 5.0 - 8.0    Specific Gravity, UA 1.020 1.005 - 1.030    Protein, UA 1+ (A) Negative    Glucose, UA Negative Negative    Ketones, UA 3+ (A) Negative    Bilirubin (UA) Negative Negative    Occult Blood UA 1+ (A) Negative    Nitrite, UA Negative Negative    Urobilinogen, UA 2.0-3.0 (A) <2.0 EU/dL    Leukocytes, UA 2+ (A) Negative   Urinalysis Microscopic   Result Value Ref Range    RBC, UA 8 (H) 0 - 4 /hpf    WBC, UA 45 (H) 0 - 5 /hpf    WBC Clumps, UA Rare None-Rare    Bacteria None None-Occ /hpf    Squam Epithel, UA 1 /hpf    Hyaline Casts, UA 0 0-1/lpf /lpf    Microscopic Comment SEE COMMENT    CBC Auto Differential   Result Value Ref Range    WBC 6.67 3.90 - 12.70 K/uL    RBC 4.15 (L) 4.60 - 6.20 M/uL    Hemoglobin 13.0 (L) 14.0 - 18.0 g/dL    Hematocrit 37.6 (L) 40.0 - 54.0 %    MCV 91 82 - 98 fL    MCH 31.3 (H) 27.0 - 31.0 pg    MCHC 34.6 32.0 - 36.0 g/dL    RDW 13.3 11.5 - 14.5 %    Platelets 149 (L) 150 - 450 K/uL    MPV 11.6 9.2 - 12.9 fL    Immature Granulocytes 0.3 0.0 - 0.5 %    Gran #  (ANC) 4.4 1.8 - 7.7 K/uL    Immature Grans (Abs) 0.02 0.00 - 0.04 K/uL    Lymph # 1.0 1.0 - 4.8 K/uL    Mono # 1.3 (H) 0.3 - 1.0 K/uL    Eos # 0.0 0.0 - 0.5 K/uL    Baso # 0.03 0.00 - 0.20 K/uL    nRBC 0 0 /100 WBC    Gran % 65.8 38.0 - 73.0 %    Lymph % 14.7 (L) 18.0 - 48.0 %    Mono % 18.7 (H) 4.0 - 15.0 %    Eosinophil % 0.1 0.0 - 8.0 %    Basophil % 0.4 0.0 - 1.9 %    Differential Method Automated    Magnesium   Result Value Ref Range    Magnesium 1.7 1.6 - 2.6 mg/dL   Comprehensive Metabolic Panel   Result Value Ref Range    Sodium 138 136 - 145 mmol/L    Potassium 3.7 3.5 - 5.1 mmol/L    Chloride 104 95 - 110 mmol/L    CO2 23 23 - 29 mmol/L    Glucose 105 70 - 110 mg/dL    BUN 5 (L) 6 - 20 mg/dL    Creatinine 0.6 0.5 - 1.4 mg/dL    Calcium 8.3 (L) 8.7 - 10.5 mg/dL    Total Protein 6.0 6.0 - 8.4 g/dL    Albumin 2.9 (L) 3.5 - 5.2 g/dL    Total Bilirubin 0.5 0.1 - 1.0 mg/dL    Alkaline Phosphatase 59 55 - 135 U/L    AST 14 10 - 40 U/L    ALT 12 10 - 44 U/L    eGFR >60 >60 mL/min/1.73 m^2    Anion Gap 11 8 - 16 mmol/L   CBC auto differential   Result Value Ref Range    WBC 6.09 3.90 - 12.70 K/uL    RBC 4.00 (L) 4.60 - 6.20 M/uL    Hemoglobin 12.7 (L) 14.0 - 18.0 g/dL    Hematocrit 36.2 (L) 40.0 - 54.0 %    MCV 91 82 - 98 fL    MCH 31.8 (H) 27.0 - 31.0 pg    MCHC 35.1 32.0 - 36.0 g/dL    RDW 13.2 11.5 - 14.5 %    Platelets 159 150 - 450 K/uL    MPV 10.7 9.2 - 12.9 fL    Immature Granulocytes 0.3 0.0 - 0.5 %    Gran # (ANC) 4.3 1.8 - 7.7 K/uL    Immature Grans (Abs) 0.02 0.00 - 0.04 K/uL    Lymph # 0.9 (L) 1.0 - 4.8 K/uL    Mono # 0.9 0.3 - 1.0 K/uL    Eos # 0.1 0.0 - 0.5 K/uL    Baso # 0.03 0.00 - 0.20 K/uL    nRBC 0 0 /100 WBC    Gran % 70.0 38.0 - 73.0 %    Lymph % 14.4 (L) 18.0 - 48.0 %    Mono % 14.0 4.0 - 15.0 %    Eosinophil % 0.8 0.0 - 8.0 %    Basophil % 0.5 0.0 - 1.9 %    Differential Method Automated    Basic metabolic panel   Result Value Ref Range    Sodium 135 (L) 136 - 145 mmol/L    Potassium 3.4 (L)  3.5 - 5.1 mmol/L    Chloride 102 95 - 110 mmol/L    CO2 23 23 - 29 mmol/L    Glucose 136 (H) 70 - 110 mg/dL    BUN 6 6 - 20 mg/dL    Creatinine 0.6 0.5 - 1.4 mg/dL    Calcium 8.2 (L) 8.7 - 10.5 mg/dL    Anion Gap 10 8 - 16 mmol/L    eGFR >60 >60 mL/min/1.73 m^2   CBC auto differential   Result Value Ref Range    WBC 4.52 3.90 - 12.70 K/uL    RBC 4.13 (L) 4.60 - 6.20 M/uL    Hemoglobin 13.3 (L) 14.0 - 18.0 g/dL    Hematocrit 37.4 (L) 40.0 - 54.0 %    MCV 91 82 - 98 fL    MCH 32.2 (H) 27.0 - 31.0 pg    MCHC 35.6 32.0 - 36.0 g/dL    RDW 13.2 11.5 - 14.5 %    Platelets 157 150 - 450 K/uL    MPV 10.9 9.2 - 12.9 fL    Immature Granulocytes 0.4 0.0 - 0.5 %    Gran # (ANC) 2.4 1.8 - 7.7 K/uL    Immature Grans (Abs) 0.02 0.00 - 0.04 K/uL    Lymph # 1.3 1.0 - 4.8 K/uL    Mono # 0.7 0.3 - 1.0 K/uL    Eos # 0.1 0.0 - 0.5 K/uL    Baso # 0.03 0.00 - 0.20 K/uL    nRBC 0 0 /100 WBC    Gran % 54.0 38.0 - 73.0 %    Lymph % 27.9 18.0 - 48.0 %    Mono % 14.8 4.0 - 15.0 %    Eosinophil % 2.2 0.0 - 8.0 %    Basophil % 0.7 0.0 - 1.9 %    Differential Method Automated    Basic metabolic panel   Result Value Ref Range    Sodium 138 136 - 145 mmol/L    Potassium 3.4 (L) 3.5 - 5.1 mmol/L    Chloride 104 95 - 110 mmol/L    CO2 23 23 - 29 mmol/L    Glucose 103 70 - 110 mg/dL    BUN 5 (L) 6 - 20 mg/dL    Creatinine 0.5 0.5 - 1.4 mg/dL    Calcium 8.2 (L) 8.7 - 10.5 mg/dL    Anion Gap 11 8 - 16 mmol/L    eGFR >60 >60 mL/min/1.73 m^2   POCT Influenza A/B Molecular   Result Value Ref Range    POC Molecular Influenza A Ag Negative Negative, Not Reported    POC Molecular Influenza B Ag Negative Negative, Not Reported     Acceptable Yes    POCT COVID-19 Rapid Screening   Result Value Ref Range    POC Rapid COVID Negative Negative     Acceptable Yes         Imaging Results:  Imaging Results              CT Renal Stone Study ABD Pelvis WO (Final result)  Result time 06/30/23 20:09:17      Final result by Mayur Welsh MD  (06/30/23 20:09:17)                   Impression:      Findings concerning for genitourinary tract infection.  Correlation with urinalysis advised.    Complete findings as above.    All CT scans at this facility are performed  using dose modulation techniques as appropriate to performed exam including the following:  automated exposure control; adjustment of mA and/or kV according to the patients size (this includes techniques or standardized protocols for targeted exams where dose is matched to indication/reason for exam: i.e. extremities or head);  iterative reconstruction technique.      Electronically signed by: Mayur Welsh  Date:    06/30/2023  Time:    20:09               Narrative:    EXAMINATION:  CT RENAL STONE STUDY ABD PELVIS WO    CLINICAL HISTORY:  Flank pain, kidney stone suspected;    TECHNIQUE:  Low dose axial images, sagittal and coronal reformations were obtained from the lung bases to the pubic symphysis.  Contrast was not administered.    COMPARISON:  None    FINDINGS:  Heart: Normal in size. No pericardial effusion.    Lung Bases: Well aerated, without consolidation or pleural fluid.    Liver: Normal in size and attenuation, with no focal hepatic lesions.    Gallbladder: Gallbladder surgically absent.    Bile Ducts: No evidence of dilated ducts.    Pancreas: No mass or peripancreatic fat stranding.    Spleen: Mild splenomegaly.    Adrenals: Unremarkable.    Kidneys/ Ureters: Borderline bilateral hydroureteronephrosis possibly related to reflux or infection.  Ureteral wall thickening.    Bladder: Bladder wall thickening.    Reproductive organs: Unremarkable.    GI Tract/Mesentery: No evidence of bowel obstruction or inflammation.  No evidence of appendicitis.  Prior sleeve gastrectomy.    Peritoneal Space: No ascites. No free air.    Retroperitoneum: No significant adenopathy.    Abdominal wall: Unremarkable.    Vasculature: Mild aortoiliac atherosclerosis.  No aneurysm.    Bones: No acute  fracture.  Osseous degenerative changes.                                       X-Ray Chest AP Portable (Final result)  Result time 06/30/23 17:03:45      Final result by Mayur Welsh MD (06/30/23 17:03:45)                   Impression:      No acute abnormality.      Electronically signed by: Mayur Welsh  Date:    06/30/2023  Time:    17:03               Narrative:    EXAMINATION:  XR CHEST AP PORTABLE    CLINICAL HISTORY:  Sepsis;    TECHNIQUE:  Single frontal view of the chest was performed.    COMPARISON:  Multiple priors.    FINDINGS:  The lungs are clear, with normal appearance of pulmonary vasculature and no pleural effusion or pneumothorax.    The cardiac silhouette is normal in size. The hilar and mediastinal contours are unremarkable.    Bones are intact.                                       The EKG was ordered, reviewed, and independently interpreted by the ED provider.  Interpretation time: 16:26  Rate: 85 BPM  Rhythm: Sinus rhythm with Premature atrial complexes  Interpretation: Left axis deviation. No STEMI.             The Emergency Provider reviewed the vital signs and test results, which are outlined above.     ED Discussion       8:41 PM: Discussed case with Dr. Terry ([facility]). Dr. Terry agrees with current care and management of pt and accepts admission.   Admitting Service:   Admitting Physician: Dr. Terry  Admit to: OBS Med/Surg     8:41 PM: Re-evaluated pt. I have discussed test results, shared treatment plan, and the need for admission with patient and family at bedside. Pt and family express understanding at this time and agree with all information. All questions answered. Pt and family have no further questions or concerns at this time. Pt is ready for admit.        Medical Decision Making:   Clinical Tests:   Lab Tests: Ordered and Reviewed  Radiological Study: Ordered and Reviewed  Medical Tests: Ordered and Reviewed         ED Medication(s):  Medications   sodium  chloride 0.9% bolus 2,673 mL 2,673 mL (0 mLs Intravenous Stopped 6/30/23 1945)   cefTRIAXone (ROCEPHIN) 1 g in dextrose 5 % in water (D5W) 5 % 100 mL IVPB (MB+) (0 g Intravenous Stopped 6/30/23 1914)   acetaminophen tablet 1,000 mg (1,000 mg Oral Given 6/30/23 1757)   ondansetron injection 4 mg (4 mg Intravenous Given by Other 6/30/23 1845)   potassium bicarbonate disintegrating tablet 40 mEq (40 mEq Oral Given 6/30/23 1945)   potassium chloride SA CR tablet 40 mEq (40 mEq Oral Given 6/30/23 2247)   cefTRIAXone (ROCEPHIN) 1 g in dextrose 5 % in water (D5W) 5 % 100 mL IVPB (MB+) (0 g Intravenous Stopped 7/2/23 1828)   aluminum-magnesium hydroxide-simethicone 200-200-20 mg/5 mL suspension 30 mL (30 mLs Oral Given 6/30/23 2321)     And   LIDOcaine HCl 2% oral solution 15 mL (15 mLs Oral Given 6/30/23 2321)   ketorolac injection 15 mg (15 mg Intravenous Given 7/2/23 0941)   potassium bicarbonate disintegrating tablet 25 mEq (25 mEq Oral Given 7/2/23 1255)       Discharge Medication List as of 7/3/2023  9:46 AM        START taking these medications    Details   amoxicillin (AMOXIL) 500 MG Tab Take 1 tablet (500 mg total) by mouth every 8 (eight) hours. for 7 days, Starting Mon 7/3/2023, Until Mon 7/10/2023, Print              Follow-up Information       Therese Lala MD Follow up in 1 week(s).    Specialty: Internal Medicine  Contact information:  [address] [place] [address] DR Selam [place]EN [id number] [phone]                                 Scribe Attestation:   Scribe #1: I performed the above scribed service and the documentation accurately describes the services I performed. I attest to the accuracy of the note.     Attending:   Physician Attestation Statement for Scribe #1: I, Yareli Jaquez MD, personally performed the services described in this documentation, as scribed by Juan Hodge, in my presence, and it is both accurate and complete.           Clinical Impression       ICD-10-CM ICD-9-CM   1.  Pyelonephritis  N12 590.80   2. Sepsis  A41.9 038.9     995.91   3. Sepsis due to urinary tract infection  A41.9 038.9    N39.0 995.91     599.0       Disposition:   Disposition: Placed in Observation  Condition: Stable      Yareli Jaquez MD  07/03/23 1778

## 2023-06-30 NOTE — ED NOTES
Patient changed into hospital gown and placed on continuous pulse ox, blood pressure, and cardiac monitor. Call light within reach of patient.  Patient unable to give urine at this time. Will check back with patient to collect sample.

## 2023-06-30 NOTE — FIRST PROVIDER EVALUATION
" Emergency Department TeleTriage Encounter Note      CHIEF COMPLAINT    Chief Complaint   Patient presents with    Fever     Pt c/o fever, SOB, CP, reported urinary incontinence, dysuria onset of Wed. Pt states that he had an EGD/Colonoscopy on wed as well. Pt was dx with UTI same day.    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 Was in coma so is not aware of the reaction  Other reaction(s): Unable to obtain    Statins-hmg-coa reductase inhibitors      Myalgia      Sulfa (sulfonamide antibiotics) Other (See Comments)     Never taken  States son is allergic    Latex Hives, Itching and Rash           Latex, natural rubber Rash     Blisters, adhesives          PROVIDER TRIAGE NOTE  Patient presents with fever, chills, tightness in chest, shortness of breath and urinary incontinence. He has been on antibiotics for 3 days.  He has been on Macrobid. He had EGD and colonoscopy this week as well.       ORDERS  Labs Reviewed   HIV 1 / 2 ANTIBODY   HEPATITIS C ANTIBODY   HEP C VIRUS HOLD SPECIMEN       ED Orders (720h ago, onward)      Start Ordered     Status Ordering Provider    06/30/23 1624 06/30/23 1623  HIV 1/2 Ag/Ab (4th Gen)  STAT         Ordered RANDI JOSEPH    06/30/23 1624 06/30/23 1623  Hepatitis C Antibody  STAT        See Hyperspace for full Linked Orders Report.    Ordered RANDI JOSEPH    06/30/23 1624 06/30/23 1623  HCV Virus Hold Specimen  STAT        See Hyperspace for full Linked Orders Report. "    Ordered RANDI JOSEPH              Virtual Visit Note: The provider triage portion of this emergency department evaluation and documentation was performed via "[employer], a HIPAA-compliant telemedicine application, in concert with a tele-presenter in the room. A face to face patient evaluation with one of my colleagues will occur once the patient is placed in an emergency department room.      DISCLAIMER: This note was prepared with [employer] voice recognition transcription software. Garbled syntax, mangled pronouns, and other bizarre constructions may be attributed to that software system.

## 2023-07-01 LAB
ALBUMIN SERPL BCP-MCNC: 2.9 G/DL (ref 3.5–5.2)
ALP SERPL-CCNC: 59 U/L (ref 55–135)
ALT SERPL W/O P-5'-P-CCNC: 12 U/L (ref 10–44)
ANION GAP SERPL CALC-SCNC: 11 MMOL/L (ref 8–16)
AST SERPL-CCNC: 14 U/L (ref 10–40)
BASOPHILS # BLD AUTO: 0.03 K/UL (ref 0–0.2)
BASOPHILS NFR BLD: 0.4 % (ref 0–1.9)
BILIRUB SERPL-MCNC: 0.5 MG/DL (ref 0.1–1)
BUN SERPL-MCNC: 5 MG/DL (ref 6–20)
CALCIUM SERPL-MCNC: 8.3 MG/DL (ref 8.7–10.5)
CHLORIDE SERPL-SCNC: 104 MMOL/L (ref 95–110)
CO2 SERPL-SCNC: 23 MMOL/L (ref 23–29)
CREAT SERPL-MCNC: 0.6 MG/DL (ref 0.5–1.4)
DIFFERENTIAL METHOD: ABNORMAL
EOSINOPHIL # BLD AUTO: 0 K/UL (ref 0–0.5)
EOSINOPHIL NFR BLD: 0.1 % (ref 0–8)
ERYTHROCYTE [DISTWIDTH] IN BLOOD BY AUTOMATED COUNT: 13.3 % (ref 11.5–14.5)
EST. GFR  (NO RACE VARIABLE): >60 ML/MIN/1.73 M^2
GLUCOSE SERPL-MCNC: 105 MG/DL (ref 70–110)
HCT VFR BLD AUTO: 37.6 % (ref 40–54)
HGB BLD-MCNC: 13 G/DL (ref 14–18)
IMM GRANULOCYTES # BLD AUTO: 0.02 K/UL (ref 0–0.04)
IMM GRANULOCYTES NFR BLD AUTO: 0.3 % (ref 0–0.5)
LYMPHOCYTES # BLD AUTO: 1 K/UL (ref 1–4.8)
LYMPHOCYTES NFR BLD: 14.7 % (ref 18–48)
MAGNESIUM SERPL-MCNC: 1.7 MG/DL (ref 1.6–2.6)
MCH RBC QN AUTO: 31.3 PG (ref 27–31)
MCHC RBC AUTO-ENTMCNC: 34.6 G/DL (ref 32–36)
MCV RBC AUTO: 91 FL (ref 82–98)
MONOCYTES # BLD AUTO: 1.3 K/UL (ref 0.3–1)
MONOCYTES NFR BLD: 18.7 % (ref 4–15)
NEUTROPHILS # BLD AUTO: 4.4 K/UL (ref 1.8–7.7)
NEUTROPHILS NFR BLD: 65.8 % (ref 38–73)
NRBC BLD-RTO: 0 /100 WBC
PLATELET # BLD AUTO: 149 K/UL (ref 150–450)
PMV BLD AUTO: 11.6 FL (ref 9.2–12.9)
POTASSIUM SERPL-SCNC: 3.7 MMOL/L (ref 3.5–5.1)
PROT SERPL-MCNC: 6 G/DL (ref 6–8.4)
RBC # BLD AUTO: 4.15 M/UL (ref 4.6–6.2)
SODIUM SERPL-SCNC: 138 MMOL/L (ref 136–145)
WBC # BLD AUTO: 6.67 K/UL (ref 3.9–12.7)

## 2023-07-01 PROCEDURE — 25000003 PHARM REV CODE 250: Performed by: INTERNAL MEDICINE

## 2023-07-01 PROCEDURE — 36415 COLL VENOUS BLD VENIPUNCTURE: CPT | Performed by: INTERNAL MEDICINE

## 2023-07-01 PROCEDURE — 85025 COMPLETE CBC W/AUTO DIFF WBC: CPT | Performed by: INTERNAL MEDICINE

## 2023-07-01 PROCEDURE — 83735 ASSAY OF MAGNESIUM: CPT | Performed by: INTERNAL MEDICINE

## 2023-07-01 PROCEDURE — 96372 THER/PROPH/DIAG INJ SC/IM: CPT | Performed by: INTERNAL MEDICINE

## 2023-07-01 PROCEDURE — 63600175 PHARM REV CODE 636 W HCPCS: Performed by: INTERNAL MEDICINE

## 2023-07-01 PROCEDURE — 80053 COMPREHEN METABOLIC PANEL: CPT | Performed by: INTERNAL MEDICINE

## 2023-07-01 PROCEDURE — S4991 NICOTINE PATCH NONLEGEND: HCPCS | Performed by: INTERNAL MEDICINE

## 2023-07-01 PROCEDURE — 96365 THER/PROPH/DIAG IV INF INIT: CPT

## 2023-07-01 PROCEDURE — G0378 HOSPITAL OBSERVATION PER HR: HCPCS

## 2023-07-01 PROCEDURE — 96361 HYDRATE IV INFUSION ADD-ON: CPT

## 2023-07-01 RX ORDER — IBUPROFEN 200 MG
1 TABLET ORAL DAILY
Status: DISCONTINUED | OUTPATIENT
Start: 2023-07-01 | End: 2023-07-03 | Stop reason: HOSPADM

## 2023-07-01 RX ORDER — IBUPROFEN 400 MG/1
400 TABLET ORAL EVERY 6 HOURS PRN
Status: DISCONTINUED | OUTPATIENT
Start: 2023-07-01 | End: 2023-07-03 | Stop reason: HOSPADM

## 2023-07-01 RX ORDER — SODIUM CHLORIDE 9 MG/ML
INJECTION, SOLUTION INTRAVENOUS CONTINUOUS
Status: DISCONTINUED | OUTPATIENT
Start: 2023-07-01 | End: 2023-07-03

## 2023-07-01 RX ADMIN — IBUPROFEN 400 MG: 400 TABLET ORAL at 04:07

## 2023-07-01 RX ADMIN — CEFTRIAXONE 1 G: 1 INJECTION, POWDER, FOR SOLUTION INTRAMUSCULAR; INTRAVENOUS at 06:07

## 2023-07-01 RX ADMIN — TAMSULOSIN HYDROCHLORIDE 0.4 MG: 0.4 CAPSULE ORAL at 08:07

## 2023-07-01 RX ADMIN — ENOXAPARIN SODIUM 40 MG: 40 INJECTION SUBCUTANEOUS at 04:07

## 2023-07-01 RX ADMIN — SODIUM CHLORIDE: 9 INJECTION, SOLUTION INTRAVENOUS at 06:07

## 2023-07-01 RX ADMIN — METOPROLOL SUCCINATE 25 MG: 25 TABLET, EXTENDED RELEASE ORAL at 08:07

## 2023-07-01 RX ADMIN — SODIUM CHLORIDE AND POTASSIUM CHLORIDE: 9; 1.49 INJECTION, SOLUTION INTRAVENOUS at 06:07

## 2023-07-01 RX ADMIN — IBUPROFEN 400 MG: 400 TABLET ORAL at 10:07

## 2023-07-01 RX ADMIN — PANTOPRAZOLE SODIUM 40 MG: 40 TABLET, DELAYED RELEASE ORAL at 08:07

## 2023-07-01 RX ADMIN — LISINOPRIL 20 MG: 20 TABLET ORAL at 08:07

## 2023-07-01 RX ADMIN — ASPIRIN 81 MG: 81 TABLET, COATED ORAL at 08:07

## 2023-07-01 RX ADMIN — AMLODIPINE BESYLATE 10 MG: 10 TABLET ORAL at 08:07

## 2023-07-01 RX ADMIN — OXYCODONE AND ACETAMINOPHEN 1 TABLET: 10; 325 TABLET ORAL at 02:07

## 2023-07-01 RX ADMIN — ACETAMINOPHEN 650 MG: 325 TABLET ORAL at 02:07

## 2023-07-01 RX ADMIN — ACETAMINOPHEN 650 MG: 325 TABLET ORAL at 08:07

## 2023-07-01 RX ADMIN — NICOTINE 1 PATCH: 21 PATCH, EXTENDED RELEASE TRANSDERMAL at 02:07

## 2023-07-01 RX ADMIN — SODIUM CHLORIDE AND POTASSIUM CHLORIDE: 9; 1.49 INJECTION, SOLUTION INTRAVENOUS at 03:07

## 2023-07-01 RX ADMIN — OXYCODONE AND ACETAMINOPHEN 1 TABLET: 10; 325 TABLET ORAL at 08:07

## 2023-07-01 NOTE — HPI
Mr. Maldonado is a 52-year-old  male with PMH significant for Guillain-Manteno syndrome, seen by Dr. Hogue in the past, HTN, has been complaining of dysuria for the past 2-3 days.  Two days ago patient had EGD/colonoscopy, and was started on Macrobid the same day and discharged home after the procedure.  He presents to the ED today with high-grade fever 102.6, HR 60-92, /81.  WBC 7.0.  0% bands, lactic acid normal.  UA today reveals 45 WBCs.  Urine cultures obtained on 06/28 growing Gram-negative rods (final result still pending).  Patient is hemodynamically stable.  Started on IV Rocephin empirically in the ED.    Admitting diagnosis:  Sepsis due to UTI.

## 2023-07-01 NOTE — PLAN OF CARE
[facility] - Med Surg  Discharge Assessment    Primary Care Provider: Therese Lala MD     Discharge Assessment (most recent)       BRIEF DISCHARGE ASSESSMENT - 07/01/23 1123          Discharge Planning    Assessment Type Discharge Planning Brief Assessment     Resource/Environmental Concerns none     Support Systems Spouse/significant other     Equipment Currently Used at Home none     Current Living Arrangements home     Patient/Family Anticipates Transition to home     Patient/Family Anticipated Services at Transition none     DME Needed Upon Discharge  none     Discharge Plan A Home     Discharge Plan B Home Health                 SW intern met with pt at bedside to complete discharge planning brief assessment. Pt was alert and oriented at the time of assessment. Pt states that he uses AFO to ambulate as needed. Pt reports no needs at this time. No DME orders noted at this time.

## 2023-07-01 NOTE — SUBJECTIVE & OBJECTIVE
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    Tracheostomy status 12/29/2021    Transfusion reaction     1 x  to PRBC fever       Past Surgical History:   Procedure Laterality Date    ANGIOGRAM, CORONARY, WITH LEFT HEART CATHETERIZATION  12/10/2020    Procedure: Angiogram, Coronary, with Left Heart Cath;  Surgeon: Tomi Mir MD;  Location: [facility] CATH LAB;  Service: Cardiology;;    barrette esophagus      COLONOSCOPY N/A 5/17/2018    Procedure: COLONOSCOPY;  Surgeon: Ilan Araya III, MD;  Location: [facility];  Service: Endoscopy;  Laterality: N/A;    COLONOSCOPY N/A 6/28/2023    Procedure: COLONOSCOPY;  Surgeon: Colby Rodriguez MD;  Location: [facility];  Service: Endoscopy;  Laterality: N/A;    decubitus surgery      to sacral    ESOPHAGOGASTRODUODENOSCOPY      ESOPHAGOGASTRODUODENOSCOPY N/A 6/17/2021    Procedure: EGD (ESOPHAGOGASTRODUODENOSCOPY);  Surgeon: Ban Zheng MD;  Location: [facility];  Service: Endoscopy;  Laterality: N/A;    ESOPHAGOGASTRODUODENOSCOPY N/A 6/28/2023    Procedure: EGD (ESOPHAGOGASTRODUODENOSCOPY);  Surgeon: Colby Rodriguez MD;  Location: [facility];  Service: Endoscopy;  Laterality: N/A;    GASTROSTOMY TUBE PLACEMENT      KNEE ARTHROSCOPY Left 2002    LEFT HEART CATHETERIZATION Left 12/10/2020    Procedure: CATHETERIZATION, HEART, LEFT;  Surgeon: Tomi Mir MD;  Location: [facility] CATH LAB;  Service: Cardiology;  Laterality: Left;  730 start time  "   PEG TUBE REMOVAL      RADIOFREQUENCY ABLATION      RFA      ROBOT-ASSISTED CHOLECYSTECTOMY USING DA GABRIELA XI N/A 5/2/2019    Procedure: XI ROBOTIC CHOLECYSTECTOMY;  Surgeon: Valerio Lai MD;  Location: [facility];  Service: General;  Laterality: N/A;    JUAN-EN-Y PROCEDURE      TONSILLECTOMY      TRACHEAL SURGERY         Review of patient's allergies indicates:   Allergen Reactions    Metoclopramide Other (See Comments) and Hives     Pt. Was in coma so is not aware of the reaction  Pt states "he don't know, was in coma"      Metoclopramide (bulk)      Other reaction(s): Unable to obtain    Reglan  [metoclopramide hcl] Other (See Comments)     Pt. Was in coma so is not aware of the reaction  Other reaction(s): Unable to obtain    Statins-hmg-coa reductase inhibitors      Myalgia      Sulfa (sulfonamide antibiotics) Other (See Comments)     Never taken  States son is allergic    Latex Hives, Itching and Rash           Latex, natural rubber Rash     Blisters, adhesives          Current Facility-Administered Medications on File Prior to Encounter   Medication    lactated ringers infusion     Current Outpatient Medications on File Prior to Encounter   Medication Sig    alirocumab (PRALUENT PEN) 150 mg/mL PnIj INJECT 1.06MLS ( 159 MG TOTAL ) BY ABDOMINAL SUBCUTANEOUS ROUTE EVERY 14 DAYS    amLODIPine (NORVASC) 5 MG tablet Take 1 tablet (5 mg total) by mouth once daily.    aspirin (ECOTRIN) 81 MG EC tablet Take 81 mg by mouth once daily.    calcium-vitamin D 600 mg, 1,500mg,-200 unit 600 mg(1,500mg) -200 unit Tab Take 1 tablet by mouth once daily.     cetirizine (ZYRTEC) 10 MG tablet Take 1 tablet (10 mg total) by mouth once daily.    cyanocobalamin (VITAMIN B-12) 1,000 mcg/mL injection Inject 1 mL (1,000 mcg total) into the muscle every 30 days.    esomeprazole (NEXIUM) 20 MG capsule Take 2 capsules (40 mg total) by mouth before breakfast.    fluticasone propionate (FLONASE) 50 mcg/actuation nasal spray 1 spray (50 mcg " total) by Each Nostril route once daily.    furosemide (LASIX) 20 MG tablet Take 1 tablet (20 mg total) by mouth once daily.    ibuprofen (ADVIL,MOTRIN) 800 MG tablet Take 1 tablet (800 mg total) by mouth every 6 (six) hours as needed.    isosorbide mononitrate (IMDUR) 30 MG 24 hr tablet Take 1 tablet (30 mg total) by mouth once daily.    ketorolac (TORADOL) 10 mg tablet Take 1 tablet (10 mg total) by mouth every 6 (six) hours. for 5 days    lisinopriL-hydrochlorothiazide (PRINZIDE,ZESTORETIC) 20-12.5 mg per tablet Take 2 tablets by mouth once daily.    metoprolol succinate (TOPROL-XL) 25 MG 24 hr tablet Take 1 tablet (25 mg total) by mouth once daily.    multivitamin (THERAGRAN) per tablet Take 1 tablet by mouth nightly.     mupirocin (BACTROBAN) 2 % ointment Apply topically 2 (two) times daily.    nitrofurantoin, macrocrystal-monohydrate, (MACROBID) 100 MG capsule Take 1 capsule (100 mg total) by mouth 2 (two) times daily. for 7 days    ondansetron (ZOFRAN) 4 MG tablet Take 1 tablet (4 mg total) by mouth every 6 (six) hours as needed for Nausea.    tamsulosin (FLOMAX) 0.4 mg Cap Take 1 capsule (0.4 mg total) by mouth once daily. for 20 days     Family History       Problem Relation (Age of Onset)    Heart attack Mother, Father    Heart disease Mother, Father          Tobacco Use    Smoking status: Every Day     Packs/day: 1.00     Years: 32.00     Pack years: 32.00     Types: Cigarettes     Start date: 4/4/1988     Last attempt to quit: 5/1/2020     Years since quitting: 3.1    Smokeless tobacco: Former     Types: Snuff     Quit date: 1/6/1999   Substance and Sexual Activity    Alcohol use: Not Currently     Alcohol/week: 14.0 standard drinks     Types: 14 Glasses of wine per week     Comment: no alcohol for past week, usually 7 drinks/week    Drug use: No    Sexual activity: Yes     Partners: Female     Review of Systems   Constitutional:  Positive for activity change, chills, fatigue and fever.   HENT: Negative.   Negative for congestion, rhinorrhea, sore throat and trouble swallowing.    Eyes: Negative.    Respiratory: Negative.  Negative for cough, shortness of breath and wheezing.    Cardiovascular: Negative.  Negative for chest pain and palpitations.   Gastrointestinal: Negative.  Negative for abdominal pain, diarrhea, nausea and vomiting.   Endocrine: Negative.    Genitourinary:  Positive for dysuria. Negative for flank pain.   Musculoskeletal: Negative.  Negative for back pain.   Skin: Negative.  Negative for rash.   Allergic/Immunologic: Negative.    Neurological:  Positive for weakness. Negative for speech difficulty, numbness and headaches.   Hematological: Negative.    Psychiatric/Behavioral: Negative.  Negative for hallucinations.    All other systems reviewed and are negative.  Objective:     Vital Signs (Most Recent):  Temp: 98 °F (36.7 °C) (06/30/23 2000)  Pulse: 67 (06/30/23 2000)  Resp: (!) 22 (06/30/23 2000)  BP: (!) 165/77 (06/30/23 2000)  SpO2: 95 % (06/30/23 2000) Vital Signs (24h Range):  Temp:  [98 °F (36.7 °C)-102.6 °F (39.2 °C)] 98 °F (36.7 °C)  Pulse:  [56-92] 67  Resp:  [18-22] 22  SpO2:  [95 %-96 %] 95 %  BP: (123-184)/(64-83) 165/77     Weight: 121 kg (266 lb 12.1 oz)  Body mass index is 31.63 kg/m².     Physical Exam  Vitals and nursing note reviewed.   Constitutional:       General: He is awake. He is not in acute distress.     Appearance: He is ill-appearing.   HENT:      Head: Normocephalic and atraumatic.      Mouth/Throat:      Mouth: Mucous membranes are moist.   Eyes:      General: No scleral icterus.     Conjunctiva/sclera: Conjunctivae normal.   Cardiovascular:      Rate and Rhythm: Normal rate and regular rhythm.      Heart sounds: No murmur heard.  Pulmonary:      Effort: Pulmonary effort is normal. No respiratory distress.      Breath sounds: Normal breath sounds. No wheezing.   Abdominal:      Palpations: Abdomen is soft.      Tenderness: There is no abdominal tenderness.    Musculoskeletal:         General: No swelling. Normal range of motion.      Cervical back: Normal range of motion and neck supple.   Skin:     General: Skin is warm.      Coloration: Skin is not jaundiced.   Neurological:      General: No focal deficit present.      Mental Status: He is alert and oriented to person, place, and time. Mental status is at baseline.   Psychiatric:         Attention and Perception: Attention normal.         Mood and Affect: Mood normal.         Speech: Speech normal.         Behavior: Behavior normal. Behavior is cooperative.              Significant Labs: All pertinent labs within the past 24 hours have been reviewed.  CBC:   Recent Labs   Lab 06/30/23  1833   WBC 7.04   HGB 13.2*   HCT 38.2*        CMP:   Recent Labs   Lab 06/30/23  1833   *   K 2.9*   CL 99   CO2 21*      BUN 7   CREATININE 0.6   CALCIUM 8.5*   PROT 6.3   ALBUMIN 3.1*   BILITOT 0.7   ALKPHOS 62   AST 16   ALT 13   ANIONGAP 15     Magnesium: No results for input(s): MG in the last 48 hours.  Urine Studies:   Recent Labs   Lab 06/30/23  1706   COLORU Yellow   APPEARANCEUA Clear   PHUR 6.0   SPECGRAV 1.020   PROTEINUA 1+*   GLUCUA Negative   KETONESU 3+*   BILIRUBINUA Negative   OCCULTUA 1+*   NITRITE Negative   UROBILINOGEN 2.0-3.0*   LEUKOCYTESUR 2+*   RBCUA 8*   WBCUA 45*   BACTERIA None   SQUAMEPITHEL 1   HYALINECASTS 0       Significant Imaging: I have reviewed all pertinent imaging results/findings within the past 24 hours.  I have reviewed and interpreted all pertinent imaging results/findings within the past 24 hours.    Imaging Results              CT Renal Stone Study ABD Pelvis WO (Final result)  Result time 06/30/23 20:09:17      Final result by Mayur Welsh MD (06/30/23 20:09:17)                   Impression:      Findings concerning for genitourinary tract infection.  Correlation with urinalysis advised.    Complete findings as above.    All CT scans at this facility are performed   using dose modulation techniques as appropriate to performed exam including the following:  automated exposure control; adjustment of mA and/or kV according to the patients size (this includes techniques or standardized protocols for targeted exams where dose is matched to indication/reason for exam: i.e. extremities or head);  iterative reconstruction technique.      Electronically signed by: Mayur Welsh  Date:    06/30/2023  Time:    20:09               Narrative:    EXAMINATION:  CT RENAL STONE STUDY ABD PELVIS WO    CLINICAL HISTORY:  Flank pain, kidney stone suspected;    TECHNIQUE:  Low dose axial images, sagittal and coronal reformations were obtained from the lung bases to the pubic symphysis.  Contrast was not administered.    COMPARISON:  None    FINDINGS:  Heart: Normal in size. No pericardial effusion.    Lung Bases: Well aerated, without consolidation or pleural fluid.    Liver: Normal in size and attenuation, with no focal hepatic lesions.    Gallbladder: Gallbladder surgically absent.    Bile Ducts: No evidence of dilated ducts.    Pancreas: No mass or peripancreatic fat stranding.    Spleen: Mild splenomegaly.    Adrenals: Unremarkable.    Kidneys/ Ureters: Borderline bilateral hydroureteronephrosis possibly related to reflux or infection.  Ureteral wall thickening.    Bladder: Bladder wall thickening.    Reproductive organs: Unremarkable.    GI Tract/Mesentery: No evidence of bowel obstruction or inflammation.  No evidence of appendicitis.  Prior sleeve gastrectomy.    Peritoneal Space: No ascites. No free air.    Retroperitoneum: No significant adenopathy.    Abdominal wall: Unremarkable.    Vasculature: Mild aortoiliac atherosclerosis.  No aneurysm.    Bones: No acute fracture.  Osseous degenerative changes.                                       X-Ray Chest AP Portable (Final result)  Result time 06/30/23 17:03:45      Final result by Mayur Welsh MD (06/30/23 17:03:45)                    Impression:      No acute abnormality.      Electronically signed by: Mayur Welsh  Date:    06/30/2023  Time:    17:03               Narrative:    EXAMINATION:  XR CHEST AP PORTABLE    CLINICAL HISTORY:  Sepsis;    TECHNIQUE:  Single frontal view of the chest was performed.    COMPARISON:  Multiple priors.    FINDINGS:  The lungs are clear, with normal appearance of pulmonary vasculature and no pleural effusion or pneumothorax.    The cardiac silhouette is normal in size. The hilar and mediastinal contours are unremarkable.    Bones are intact.                                    I have independently reviewed and interpreted the EKG.     I have independently reviewed all pertinent labs within the past 24 hours.    I have independently reviewed, visualized and interpreted all pertinent imaging results within the past 24 hours and discussed the findings with the ED physician, Dr. Jaquez

## 2023-07-01 NOTE — HOSPITAL COURSE
53 y/o male admitted with a dx of  UTI  and hypokalemia . The urine cx is (+) for  e.coli sensitive to rocephin and flouroquinolones . He had a high fever yesterday . The blood cx is NGTD.     7/2 He cont with high fever . The  repeated urine cx show enterococcus .  He  will be treated with  rocephin 1 gr iv qd x 3 dose for e.coli uti  . Started on IV zyvox for enterococcus uti. He is complaining of HA which improved with NSAID.  7/3 Pt was seen and examined at bedside . He was determined to be suitable for d/c  He has been afebrile for more than 32 hrs . He is tolerating po intake .  UTI due to e.coli  s/p IV rocephin 1 gr qd x 3 doses   . Enterococcus UTI  sensitive to PCN = s/p zyvox  600 ,g BID x 3 doses . Plan to d/c on po amoxicillin 500 mg po tid . He denies any weakness , numbness  or new changes . Pt will f/u with his  PCP and neurology in 1 to 2 weeks . There was no acute event since admission . The blood cx are NGTD.

## 2023-07-01 NOTE — ASSESSMENT & PLAN NOTE
This patient does have evidence of infective focus  My overall impression is sepsis.  Source: Urinary Tract  Antibiotics given-   Antibiotics (72h ago, onward)    Start     Stop Route Frequency Ordered    07/01/23 1800  cefTRIAXone (ROCEPHIN) 1 g in dextrose 5 % in water (D5W) 5 % 100 mL IVPB (MB+)         -- IV Every 24 hours (non-standard times) 06/30/23 2103        Latest lactate reviewed-  Recent Labs   Lab 06/30/23  1833   LACTATE 0.6     Organ dysfunction indicated by Acute kidney injury    Fluid challenge Ideal Body Weight- The patient's ideal body weight is Ideal body weight: 89.1 kg (196 lb 6.9 oz) which will be used to calculate fluid bolus of 30 ml/kg for treatment of septic shock.      Post- resuscitation assessment No - Post resuscitation assessment not needed     Source control achieved by: IV rocephin

## 2023-07-01 NOTE — PLAN OF CARE
Febrile -managed with tylenol. Otherwise VSS.   Fall precautions maintained.   Pain is well controlled. IV fluids maintained.  Tolerating IV antibiotic.  NSR on cardiac monitor.  Family at bedside.   All needs met. Will continue to monitor.

## 2023-07-01 NOTE — PROGRESS NOTES
[facility] Medicine  Progress Note    Patient Name: Valerio Yan  MRN: [id number]  Patient Class: OP- Observation   Admission Date: 6/30/2023  Length of Stay: 0 days  Attending Physician: Frank Hunter, *  Primary Care Provider: Therese Lala MD        Subjective:     Principal Problem:Sepsis due to urinary tract infection        HPI:  Mr. Maldonado is a 52-year-old  male with PMH significant for Guillain-[place] syndrome, seen by Dr. Hogue in the past, HTN, has been complaining of dysuria for the past 2-3 days.  Two days ago patient had EGD/colonoscopy, and was started on Macrobid the same day and discharged home after the procedure.  He presents to the ED today with high-grade fever 102.6, HR 60-92, /81.  WBC 7.0.  0% bands, lactic acid normal.  UA today reveals 45 WBCs.  Urine cultures obtained on 06/28 growing Gram-negative rods (final result still pending).  Patient is hemodynamically stable.  Started on IV Rocephin empirically in the ED.    Admitting diagnosis:  Sepsis due to UTI.      Overview/Hospital Course:  53 y/o male admitted with a dx of  UTI  and hypokalemia . The urine cx is (+) for  e.coli sensitive to rocephin and flouroquinolones . He had a high fever yesterday . The blood cx is NGTD.         Interval History:     Review of Systems   Constitutional:  Positive for activity change, chills, fatigue and fever.   HENT: Negative.  Negative for congestion, rhinorrhea, sore throat and trouble swallowing.    Eyes: Negative.    Respiratory: Negative.  Negative for cough, shortness of breath and wheezing.    Cardiovascular: Negative.  Negative for chest pain and palpitations.   Gastrointestinal: Negative.  Negative for abdominal pain, diarrhea, nausea and vomiting.   Endocrine: Negative.    Genitourinary:  Positive for dysuria. Negative for flank pain.   Musculoskeletal: Negative.  Negative for back pain.   Skin: Negative.  Negative for rash.    Allergic/Immunologic: Negative.    Neurological:  Positive for weakness. Negative for speech difficulty, numbness and headaches.   Hematological: Negative.    Psychiatric/Behavioral: Negative.  Negative for hallucinations.    All other systems reviewed and are negative.  Objective:     Vital Signs (Most Recent):  Temp: 99.5 °F (37.5 °C) (07/01/23 0726)  Pulse: 63 (07/01/23 0726)  Resp: 18 (07/01/23 0849)  BP: (!) 148/76 (07/01/23 0726)  SpO2: (!) 94 % (07/01/23 0726) Vital Signs (24h Range):  Temp:  [96.6 °F (35.9 °C)-102.6 °F (39.2 °C)] 99.5 °F (37.5 °C)  Pulse:  [52-94] 63  Resp:  [17-22] 18  SpO2:  [92 %-98 %] 94 %  BP: (123-189)/(64-83) 148/76     Weight: 121 kg (266 lb 12.1 oz)  Body mass index is 31.63 kg/m².  No intake or output data in the 24 hours ending 07/01/23 1131      Physical Exam  Vitals and nursing note reviewed.   Constitutional:       General: He is awake. He is not in acute distress.     Appearance: He is ill-appearing.   HENT:      Head: Normocephalic and atraumatic.      Mouth/Throat:      Mouth: Mucous membranes are moist.   Eyes:      General: No scleral icterus.     Conjunctiva/sclera: Conjunctivae normal.   Cardiovascular:      Rate and Rhythm: Normal rate and regular rhythm.      Heart sounds: No murmur heard.  Pulmonary:      Effort: Pulmonary effort is normal. No respiratory distress.      Breath sounds: Normal breath sounds. No wheezing.   Abdominal:      Palpations: Abdomen is soft.      Tenderness: There is no abdominal tenderness.   Musculoskeletal:         General: No swelling. Normal range of motion.      Cervical back: Normal range of motion and neck supple.   Skin:     General: Skin is warm.      Coloration: Skin is not jaundiced.   Neurological:      General: No focal deficit present.      Mental Status: He is alert and oriented to person, place, and time. Mental status is at baseline.   Psychiatric:         Attention and Perception: Attention normal.         Mood and Affect:  Mood normal.         Speech: Speech normal.         Behavior: Behavior normal. Behavior is cooperative.           Significant Labs: All pertinent labs within the past 24 hours have been reviewed.  Recent Lab Results  (Last 5 results in the past 24 hours)        07/01/23  0617   07/01/23  0616   06/30/23 2134 06/30/23 2134 06/30/23  1835        POC Molecular Influenza A Ag     Negative           POC Molecular Influenza B Ag     Negative           Albumin   2.9             Alkaline Phosphatase   59             ALT   12             Anion Gap   11             AST   14             Baso # 0.03               Basophil % 0.4               BILIRUBIN TOTAL   0.5  Comment: For infants and newborns, interpretation of results should be based  on gestational age, weight and in agreement with clinical  observations.    Premature Infant recommended reference ranges:  Up to 24 hours.............<8.0 mg/dL  Up to 48 hours............<12.0 mg/dL  3-5 days..................<15.0 mg/dL  6-29 days.................<15.0 mg/dL               Blood Culture, Routine         No Growth to date  [P]                No Growth to date  [P]       BUN   5             Calcium   8.3             Chloride   104             CO2   23             Creatinine   0.6             Differential Method Automated               eGFR   >60             Eos # 0.0               Eosinophil % 0.1               Glucose   105             Gran # (ANC) 4.4               Gran % 65.8               Hematocrit 37.6               Hemoglobin 13.0               Immature Grans (Abs) 0.02  Comment: Mild elevation in immature granulocytes is non specific and   can be seen in a variety of conditions including stress response,   acute inflammation, trauma and pregnancy. Correlation with other   laboratory and clinical findings is essential.                 Immature Granulocytes 0.3               Lymph # 1.0               Lymph % 14.7               Magnesium   1.7             MCH  31.3               MCHC 34.6               MCV 91               Mono # 1.3               Mono % 18.7               MPV 11.6               nRBC 0               Platelets 149               Potassium   3.7             PROTEIN TOTAL   6.0              Acceptable     Yes   Yes         RBC 4.15               RDW 13.3               SARS-CoV-2 RNA, Amplification, Qual       Negative         Sodium   138             WBC 6.67                                       [P] - Preliminary Result               Significant Imaging: I have reviewed all pertinent imaging results/findings within the past 24 hours.      Assessment/Plan:      * Sepsis due to urinary tract infection  This patient does have evidence of infective focus  My overall impression is sepsis.  Source: Urinary Tract  Antibiotics given-   Antibiotics (72h ago, onward)    Start     Stop Route Frequency Ordered    07/01/23 1800  cefTRIAXone (ROCEPHIN) 1 g in dextrose 5 % in water (D5W) 5 % 100 mL IVPB (MB+)         -- IV Every 24 hours (non-standard times) 06/30/23 2103        Latest lactate reviewed-  Recent Labs   Lab 06/30/23  1833   LACTATE 0.6     Organ dysfunction indicated by Acute kidney injury    Fluid challenge Ideal Body Weight- The patient's ideal body weight is Ideal body weight: 89.1 kg (196 lb 6.9 oz) which will be used to calculate fluid bolus of 30 ml/kg for treatment of septic shock.      Post- resuscitation assessment No - Post resuscitation assessment not needed     Source control achieved by: IV rocephin    History of Guillain-[place] syndrome  -not on any treatment at this time.    -followed by Dr. Hogue in the past.      CAD in native artery  -denies chest pain, SOB.  -continue home dose aspirin.      Hypokalemia  -monitor   -replace      Mixed hyperlipidemia  -statin      Hypertension  -hold HCTZ due to hypokalmeia  -continue ACEI, amlodipine, metoprolol.        VTE Risk Mitigation (From admission, onward)         Ordered      enoxaparin injection 40 mg  Daily         06/30/23 2103     Place sequential compression device  Until discontinued         06/30/23 2103                Discharge Planning   ELSI:      Code Status: Full Code   Is the patient medically ready for discharge?:     Reason for patient still in hospital (select all that apply): Treatment  Discharge Plan A: Home                  Frank Hunter MD  Department of Hospital Medicine   [facility]

## 2023-07-01 NOTE — H&P
[facility] Medicine  History & Physical    Patient Name: Valerio Yan  MRN: [id number]  Patient Class: OP- Observation  Admission Date: 6/30/2023  Attending Physician: Lloyd Terry MD   Primary Care Provider: Therese Lala MD         Patient information was obtained from patient, past medical records and ER records.     Subjective:     Principal Problem:Sepsis due to urinary tract infection    Chief Complaint:   Chief Complaint   Patient presents with    Fever     Pt c/o fever, SOB, CP, reported urinary incontinence, dysuria onset of Wed. Pt states that he had an EGD/Colonoscopy on wed as well. Pt was dx with UTI same day.    Chills    Shortness of Breath    Headache    Nausea        HPI: Mr. Maldonado is a 52-year-old  male with PMH significant for Guillain-[place] syndrome, seen by Dr. Hogue in the past, HTN, has been complaining of dysuria for the past 2-3 days.  Two days ago patient had EGD/colonoscopy, and was started on Macrobid the same day and discharged home after the procedure.  He presents to the ED today with high-grade fever 102.6, HR 60-92, /81.  WBC 7.0.  0% bands, lactic acid normal.  UA today reveals 45 WBCs.  Urine cultures obtained on 06/28 growing Gram-negative rods (final result still pending).  Patient is hemodynamically stable.  Started on IV Rocephin empirically in the ED.    Admitting diagnosis:  Sepsis due to UTI.      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    Tracheostomy status 12/29/2021    Transfusion reaction     1 x  to PRBC fever       Past Surgical History:   Procedure Laterality Date    ANGIOGRAM, CORONARY, WITH LEFT HEART CATHETERIZATION  12/10/2020    Procedure: Angiogram, Coronary, with Left Heart Cath;  Surgeon: Tomi Mir MD;  Location: [facility] CATH LAB;  Service: Cardiology;;    barrette esophagus      COLONOSCOPY N/A 5/17/2018    Procedure: COLONOSCOPY;  Surgeon: Ilan Araya III, MD;  Location: [facility] ENDO;  Service: Endoscopy;  Laterality: N/A;    COLONOSCOPY N/A 6/28/2023    Procedure: COLONOSCOPY;  Surgeon: Colby Rodriguez MD;  Location: [facility];  Service: Endoscopy;  Laterality: N/A;    decubitus surgery      to sacral    ESOPHAGOGASTRODUODENOSCOPY      ESOPHAGOGASTRODUODENOSCOPY N/A 6/17/2021    Procedure: EGD (ESOPHAGOGASTRODUODENOSCOPY);  Surgeon: Ban Zheng MD;  Location: [facility];  Service: Endoscopy;  Laterality: N/A;    ESOPHAGOGASTRODUODENOSCOPY N/A 6/28/2023    Procedure: EGD (ESOPHAGOGASTRODUODENOSCOPY);  Surgeon: Colby Rodriguez MD;  Location: [facility];  Service: Endoscopy;  Laterality: N/A;    GASTROSTOMY TUBE PLACEMENT      KNEE ARTHROSCOPY Left 2002    LEFT HEART CATHETERIZATION Left 12/10/2020    Procedure: CATHETERIZATION, HEART, LEFT;  Surgeon: Tomi Mir MD;  Location: [facility] CATH LAB;  Service: Cardiology;  Laterality: Left;  730 start time    PEG TUBE REMOVAL      RADIOFREQUENCY ABLATION      RFA      ROBOT-ASSISTED CHOLECYSTECTOMY USING DA GABRIELA XI N/A 5/2/2019    Procedure: XI ROBOTIC CHOLECYSTECTOMY;  Surgeon: Valerio Lai MD;  Location: [facility] OR;  Service: General;  Laterality: N/A;    JUAN-EN-Y PROCEDURE      TONSILLECTOMY      TRACHEAL SURGERY         Review of patient's allergies indicates:   Allergen Reactions    Metoclopramide Other (See Comments) and Hives     Pt. Was in coma so is not aware of the reaction  Pt states "he " "don't know, was in coma"      Metoclopramide (bulk)      Other reaction(s): Unable to obtain    Reglan  [metoclopramide hcl] Other (See Comments)     Pt. Was in coma so is not aware of the reaction  Other reaction(s): Unable to obtain    Statins-hmg-coa reductase inhibitors      Myalgia      Sulfa (sulfonamide antibiotics) Other (See Comments)     Never taken  States son is allergic    Latex Hives, Itching and Rash           Latex, natural rubber Rash     Blisters, adhesives          Current Facility-Administered Medications on File Prior to Encounter   Medication    lactated ringers infusion     Current Outpatient Medications on File Prior to Encounter   Medication Sig    alirocumab (PRALUENT PEN) 150 mg/mL PnIj INJECT 1.06MLS ( 159 MG TOTAL ) BY ABDOMINAL SUBCUTANEOUS ROUTE EVERY 14 DAYS    amLODIPine (NORVASC) 5 MG tablet Take 1 tablet (5 mg total) by mouth once daily.    aspirin (ECOTRIN) 81 MG EC tablet Take 81 mg by mouth once daily.    calcium-vitamin D 600 mg, 1,500mg,-200 unit 600 mg(1,500mg) -200 unit Tab Take 1 tablet by mouth once daily.     cetirizine (ZYRTEC) 10 MG tablet Take 1 tablet (10 mg total) by mouth once daily.    cyanocobalamin (VITAMIN B-12) 1,000 mcg/mL injection Inject 1 mL (1,000 mcg total) into the muscle every 30 days.    esomeprazole (NEXIUM) 20 MG capsule Take 2 capsules (40 mg total) by mouth before breakfast.    fluticasone propionate (FLONASE) 50 mcg/actuation nasal spray 1 spray (50 mcg total) by Each Nostril route once daily.    furosemide (LASIX) 20 MG tablet Take 1 tablet (20 mg total) by mouth once daily.    ibuprofen (ADVIL,MOTRIN) 800 MG tablet Take 1 tablet (800 mg total) by mouth every 6 (six) hours as needed.    isosorbide mononitrate (IMDUR) 30 MG 24 hr tablet Take 1 tablet (30 mg total) by mouth once daily.    ketorolac (TORADOL) 10 mg tablet Take 1 tablet (10 mg total) by mouth every 6 (six) hours. for 5 days    lisinopriL-hydrochlorothiazide " (PRINZIDE,ZESTORETIC) 20-12.5 mg per tablet Take 2 tablets by mouth once daily.    metoprolol succinate (TOPROL-XL) 25 MG 24 hr tablet Take 1 tablet (25 mg total) by mouth once daily.    multivitamin (THERAGRAN) per tablet Take 1 tablet by mouth nightly.     mupirocin (BACTROBAN) 2 % ointment Apply topically 2 (two) times daily.    nitrofurantoin, macrocrystal-monohydrate, (MACROBID) 100 MG capsule Take 1 capsule (100 mg total) by mouth 2 (two) times daily. for 7 days    ondansetron (ZOFRAN) 4 MG tablet Take 1 tablet (4 mg total) by mouth every 6 (six) hours as needed for Nausea.    tamsulosin (FLOMAX) 0.4 mg Cap Take 1 capsule (0.4 mg total) by mouth once daily. for 20 days     Family History       Problem Relation (Age of Onset)    Heart attack Mother, Father    Heart disease Mother, Father          Tobacco Use    Smoking status: Every Day     Packs/day: 1.00     Years: 32.00     Pack years: 32.00     Types: Cigarettes     Start date: 4/4/1988     Last attempt to quit: 5/1/2020     Years since quitting: 3.1    Smokeless tobacco: Former     Types: Snuff     Quit date: 1/6/1999   Substance and Sexual Activity    Alcohol use: Not Currently     Alcohol/week: 14.0 standard drinks     Types: 14 Glasses of wine per week     Comment: no alcohol for past week, usually 7 drinks/week    Drug use: No    Sexual activity: Yes     Partners: Female     Review of Systems   Constitutional:  Positive for activity change, chills, fatigue and fever.   HENT: Negative.  Negative for congestion, rhinorrhea, sore throat and trouble swallowing.    Eyes: Negative.    Respiratory: Negative.  Negative for cough, shortness of breath and wheezing.    Cardiovascular: Negative.  Negative for chest pain and palpitations.   Gastrointestinal: Negative.  Negative for abdominal pain, diarrhea, nausea and vomiting.   Endocrine: Negative.    Genitourinary:  Positive for dysuria. Negative for flank pain.   Musculoskeletal: Negative.  Negative  for back pain.   Skin: Negative.  Negative for rash.   Allergic/Immunologic: Negative.    Neurological:  Positive for weakness. Negative for speech difficulty, numbness and headaches.   Hematological: Negative.    Psychiatric/Behavioral: Negative.  Negative for hallucinations.    All other systems reviewed and are negative.  Objective:     Vital Signs (Most Recent):  Temp: 98 °F (36.7 °C) (06/30/23 2000)  Pulse: 67 (06/30/23 2000)  Resp: (!) 22 (06/30/23 2000)  BP: (!) 165/77 (06/30/23 2000)  SpO2: 95 % (06/30/23 2000) Vital Signs (24h Range):  Temp:  [98 °F (36.7 °C)-102.6 °F (39.2 °C)] 98 °F (36.7 °C)  Pulse:  [56-92] 67  Resp:  [18-22] 22  SpO2:  [95 %-96 %] 95 %  BP: (123-184)/(64-83) 165/77     Weight: 121 kg (266 lb 12.1 oz)  Body mass index is 31.63 kg/m².     Physical Exam  Vitals and nursing note reviewed.   Constitutional:       General: He is awake. He is not in acute distress.     Appearance: He is ill-appearing.   HENT:      Head: Normocephalic and atraumatic.      Mouth/Throat:      Mouth: Mucous membranes are moist.   Eyes:      General: No scleral icterus.     Conjunctiva/sclera: Conjunctivae normal.   Cardiovascular:      Rate and Rhythm: Normal rate and regular rhythm.      Heart sounds: No murmur heard.  Pulmonary:      Effort: Pulmonary effort is normal. No respiratory distress.      Breath sounds: Normal breath sounds. No wheezing.   Abdominal:      Palpations: Abdomen is soft.      Tenderness: There is no abdominal tenderness.   Musculoskeletal:         General: No swelling. Normal range of motion.      Cervical back: Normal range of motion and neck supple.   Skin:     General: Skin is warm.      Coloration: Skin is not jaundiced.   Neurological:      General: No focal deficit present.      Mental Status: He is alert and oriented to person, place, and time. Mental status is at baseline.   Psychiatric:         Attention and Perception: Attention normal.         Mood and Affect: Mood normal.          Speech: Speech normal.         Behavior: Behavior normal. Behavior is cooperative.              Significant Labs: All pertinent labs within the past 24 hours have been reviewed.  CBC:   Recent Labs   Lab 06/30/23  1833   WBC 7.04   HGB 13.2*   HCT 38.2*        CMP:   Recent Labs   Lab 06/30/23  1833   *   K 2.9*   CL 99   CO2 21*      BUN 7   CREATININE 0.6   CALCIUM 8.5*   PROT 6.3   ALBUMIN 3.1*   BILITOT 0.7   ALKPHOS 62   AST 16   ALT 13   ANIONGAP 15     Magnesium: No results for input(s): MG in the last 48 hours.  Urine Studies:   Recent Labs   Lab 06/30/23  1706   COLORU Yellow   APPEARANCEUA Clear   PHUR 6.0   SPECGRAV 1.020   PROTEINUA 1+*   GLUCUA Negative   KETONESU 3+*   BILIRUBINUA Negative   OCCULTUA 1+*   NITRITE Negative   UROBILINOGEN 2.0-3.0*   LEUKOCYTESUR 2+*   RBCUA 8*   WBCUA 45*   BACTERIA None   SQUAMEPITHEL 1   HYALINECASTS 0       Significant Imaging: I have reviewed all pertinent imaging results/findings within the past 24 hours.  I have reviewed and interpreted all pertinent imaging results/findings within the past 24 hours.    Imaging Results              CT Renal Stone Study ABD Pelvis WO (Final result)  Result time 06/30/23 20:09:17      Final result by Mayur Welsh MD (06/30/23 20:09:17)                   Impression:      Findings concerning for genitourinary tract infection.  Correlation with urinalysis advised.    Complete findings as above.    All CT scans at this facility are performed  using dose modulation techniques as appropriate to performed exam including the following:  automated exposure control; adjustment of mA and/or kV according to the patients size (this includes techniques or standardized protocols for targeted exams where dose is matched to indication/reason for exam: i.e. extremities or head);  iterative reconstruction technique.      Electronically signed by: Mayur Welsh  Date:    06/30/2023  Time:    20:09               Narrative:     EXAMINATION:  CT RENAL STONE STUDY ABD PELVIS WO    CLINICAL HISTORY:  Flank pain, kidney stone suspected;    TECHNIQUE:  Low dose axial images, sagittal and coronal reformations were obtained from the lung bases to the pubic symphysis.  Contrast was not administered.    COMPARISON:  None    FINDINGS:  Heart: Normal in size. No pericardial effusion.    Lung Bases: Well aerated, without consolidation or pleural fluid.    Liver: Normal in size and attenuation, with no focal hepatic lesions.    Gallbladder: Gallbladder surgically absent.    Bile Ducts: No evidence of dilated ducts.    Pancreas: No mass or peripancreatic fat stranding.    Spleen: Mild splenomegaly.    Adrenals: Unremarkable.    Kidneys/ Ureters: Borderline bilateral hydroureteronephrosis possibly related to reflux or infection.  Ureteral wall thickening.    Bladder: Bladder wall thickening.    Reproductive organs: Unremarkable.    GI Tract/Mesentery: No evidence of bowel obstruction or inflammation.  No evidence of appendicitis.  Prior sleeve gastrectomy.    Peritoneal Space: No ascites. No free air.    Retroperitoneum: No significant adenopathy.    Abdominal wall: Unremarkable.    Vasculature: Mild aortoiliac atherosclerosis.  No aneurysm.    Bones: No acute fracture.  Osseous degenerative changes.                                       X-Ray Chest AP Portable (Final result)  Result time 06/30/23 17:03:45      Final result by Mayur Welsh MD (06/30/23 17:03:45)                   Impression:      No acute abnormality.      Electronically signed by: Mayur Welsh  Date:    06/30/2023  Time:    17:03               Narrative:    EXAMINATION:  XR CHEST AP PORTABLE    CLINICAL HISTORY:  Sepsis;    TECHNIQUE:  Single frontal view of the chest was performed.    COMPARISON:  Multiple priors.    FINDINGS:  The lungs are clear, with normal appearance of pulmonary vasculature and no pleural effusion or pneumothorax.    The cardiac silhouette is normal in  size. The hilar and mediastinal contours are unremarkable.    Bones are intact.                                    I have independently reviewed and interpreted the EKG.     I have independently reviewed all pertinent labs within the past 24 hours.    I have independently reviewed, visualized and interpreted all pertinent imaging results within the past 24 hours and discussed the findings with the ED physician, Dr. Jaquez          Assessment/Plan:     * Sepsis due to urinary tract infection  This patient does have evidence of infective focus  My overall impression is sepsis.  Source: Urinary Tract  Antibiotics given-   Antibiotics (72h ago, onward)    Start     Stop Route Frequency Ordered    07/01/23 1800  cefTRIAXone (ROCEPHIN) 1 g in dextrose 5 % in water (D5W) 5 % 100 mL IVPB (MB+)         -- IV Every 24 hours (non-standard times) 06/30/23 2103        Latest lactate reviewed-  Recent Labs   Lab 06/30/23 1833   LACTATE 0.6     Organ dysfunction indicated by Acute kidney injury    Fluid challenge Ideal Body Weight- The patient's ideal body weight is Ideal body weight: 89.1 kg (196 lb 6.9 oz) which will be used to calculate fluid bolus of 30 ml/kg for treatment of septic shock.      Post- resuscitation assessment No - Post resuscitation assessment not needed     Source control achieved by: IV rocephin    Hypokalemia  -K 2.9  -replace      Hypertension  -hold HCTZ due to hypokalmeia  -continue ACEI, amlodipine, metoprolol.      CAD in native artery  -denies chest pain, SOB.  -continue home dose aspirin.      History of Guillain-[place] syndrome  -not on any treatment at this time.    -followed by Dr. Hogue in the past.      Mixed hyperlipidemia  -statin        VTE Risk Mitigation (From admission, onward)         Ordered     enoxaparin injection 40 mg  Daily         06/30/23 2103     Place sequential compression device  Until discontinued         06/30/23 2103                   On 06/30/2023, patient should be placed  in hospital observation services under my care.        Lloyd Terry MD  Department of Hospital Medicine  [facility] Surg

## 2023-07-01 NOTE — PLAN OF CARE
Discussed plan of care with pt and spouse. Pt verbalized understanding. Q2h turns/repositioning performed. Bed alarm refused with bed at lowest position.  Tele monitor [id number] in place. Call light within reach. Purposeful rounding Q2h.      Pt has complaints of chest tightness. Hospital medicine notified, and pain deemed to be GI related. Medication ordered to treat pt's symptoms. Improvement noted. Pt instructed to notify nurse if pain persists.   Chart check complete.     Problem: Adult Inpatient Plan of Care  Goal: Plan of Care Review  Outcome: Ongoing, Progressing  Goal: Patient-Specific Goal (Individualized)  Outcome: Ongoing, Progressing  Goal: Absence of Hospital-Acquired Illness or Injury  Outcome: Ongoing, Progressing  Goal: Optimal Comfort and Wellbeing  Outcome: Ongoing, Progressing  Goal: Readiness for Transition of Care  Outcome: Ongoing, Progressing     Problem: Adjustment to Illness (Sepsis/Septic Shock)  Goal: Optimal Coping  Outcome: Ongoing, Progressing     Problem: Bleeding (Sepsis/Septic Shock)  Goal: Absence of Bleeding  Outcome: Ongoing, Progressing     Problem: Glycemic Control Impaired (Sepsis/Septic Shock)  Goal: Blood Glucose Level Within Desired Range  Outcome: Ongoing, Progressing     Problem: Infection Progression (Sepsis/Septic Shock)  Goal: Absence of Infection Signs and Symptoms  Outcome: Ongoing, Progressing     Problem: Nutrition Impaired (Sepsis/Septic Shock)  Goal: Optimal Nutrition Intake  Outcome: Ongoing, Progressing     Problem: Pain Acute  Goal: Acceptable Pain Control and Functional Ability  Outcome: Ongoing, Progressing     Problem: Fall Injury Risk  Goal: Absence of Fall and Fall-Related Injury  Outcome: Ongoing, Progressing     Problem: UTI (Urinary Tract Infection)  Goal: Improved Infection Symptoms  Outcome: Ongoing, Progressing

## 2023-07-01 NOTE — SUBJECTIVE & OBJECTIVE
Interval History:     Review of Systems   Constitutional:  Positive for activity change, chills, fatigue and fever.   HENT: Negative.  Negative for congestion, rhinorrhea, sore throat and trouble swallowing.    Eyes: Negative.    Respiratory: Negative.  Negative for cough, shortness of breath and wheezing.    Cardiovascular: Negative.  Negative for chest pain and palpitations.   Gastrointestinal: Negative.  Negative for abdominal pain, diarrhea, nausea and vomiting.   Endocrine: Negative.    Genitourinary:  Positive for dysuria. Negative for flank pain.   Musculoskeletal: Negative.  Negative for back pain.   Skin: Negative.  Negative for rash.   Allergic/Immunologic: Negative.    Neurological:  Positive for weakness. Negative for speech difficulty, numbness and headaches.   Hematological: Negative.    Psychiatric/Behavioral: Negative.  Negative for hallucinations.    All other systems reviewed and are negative.  Objective:     Vital Signs (Most Recent):  Temp: 99.5 °F (37.5 °C) (07/01/23 0726)  Pulse: 63 (07/01/23 0726)  Resp: 18 (07/01/23 0849)  BP: (!) 148/76 (07/01/23 0726)  SpO2: (!) 94 % (07/01/23 0726) Vital Signs (24h Range):  Temp:  [96.6 °F (35.9 °C)-102.6 °F (39.2 °C)] 99.5 °F (37.5 °C)  Pulse:  [52-94] 63  Resp:  [17-22] 18  SpO2:  [92 %-98 %] 94 %  BP: (123-189)/(64-83) 148/76     Weight: 121 kg (266 lb 12.1 oz)  Body mass index is 31.63 kg/m².  No intake or output data in the 24 hours ending 07/01/23 1131      Physical Exam  Vitals and nursing note reviewed.   Constitutional:       General: He is awake. He is not in acute distress.     Appearance: He is ill-appearing.   HENT:      Head: Normocephalic and atraumatic.      Mouth/Throat:      Mouth: Mucous membranes are moist.   Eyes:      General: No scleral icterus.     Conjunctiva/sclera: Conjunctivae normal.   Cardiovascular:      Rate and Rhythm: Normal rate and regular rhythm.      Heart sounds: No murmur heard.  Pulmonary:      Effort: Pulmonary  alert , pleasant, well nourished, in no acute distress effort is normal. No respiratory distress.      Breath sounds: Normal breath sounds. No wheezing.   Abdominal:      Palpations: Abdomen is soft.      Tenderness: There is no abdominal tenderness.   Musculoskeletal:         General: No swelling. Normal range of motion.      Cervical back: Normal range of motion and neck supple.   Skin:     General: Skin is warm.      Coloration: Skin is not jaundiced.   Neurological:      General: No focal deficit present.      Mental Status: He is alert and oriented to person, place, and time. Mental status is at baseline.   Psychiatric:         Attention and Perception: Attention normal.         Mood and Affect: Mood normal.         Speech: Speech normal.         Behavior: Behavior normal. Behavior is cooperative.           Significant Labs: All pertinent labs within the past 24 hours have been reviewed.  Recent Lab Results  (Last 5 results in the past 24 hours)        07/01/23  0617   07/01/23  0616   06/30/23  2134   06/30/23 2134 06/30/23  1835        POC Molecular Influenza A Ag     Negative           POC Molecular Influenza B Ag     Negative           Albumin   2.9             Alkaline Phosphatase   59             ALT   12             Anion Gap   11             AST   14             Baso # 0.03               Basophil % 0.4               BILIRUBIN TOTAL   0.5  Comment: For infants and newborns, interpretation of results should be based  on gestational age, weight and in agreement with clinical  observations.    Premature Infant recommended reference ranges:  Up to 24 hours.............<8.0 mg/dL  Up to 48 hours............<12.0 mg/dL  3-5 days..................<15.0 mg/dL  6-29 days.................<15.0 mg/dL               Blood Culture, Routine         No Growth to date  [P]                No Growth to date  [P]       BUN   5             Calcium   8.3             Chloride   104             CO2   23             Creatinine   0.6             Differential Method Automated                eGFR   >60             Eos # 0.0               Eosinophil % 0.1               Glucose   105             Gran # (ANC) 4.4               Gran % 65.8               Hematocrit 37.6               Hemoglobin 13.0               Immature Grans (Abs) 0.02  Comment: Mild elevation in immature granulocytes is non specific and   can be seen in a variety of conditions including stress response,   acute inflammation, trauma and pregnancy. Correlation with other   laboratory and clinical findings is essential.                 Immature Granulocytes 0.3               Lymph # 1.0               Lymph % 14.7               Magnesium   1.7             MCH 31.3               MCHC 34.6               MCV 91               Mono # 1.3               Mono % 18.7               MPV 11.6               nRBC 0               Platelets 149               Potassium   3.7             PROTEIN TOTAL   6.0              Acceptable     Yes   Yes         RBC 4.15               RDW 13.3               SARS-CoV-2 RNA, Amplification, Qual       Negative         Sodium   138             WBC 6.67                                       [P] - Preliminary Result               Significant Imaging: I have reviewed all pertinent imaging results/findings within the past 24 hours.

## 2023-07-02 LAB
ANION GAP SERPL CALC-SCNC: 10 MMOL/L (ref 8–16)
BASOPHILS # BLD AUTO: 0.03 K/UL (ref 0–0.2)
BASOPHILS NFR BLD: 0.5 % (ref 0–1.9)
BUN SERPL-MCNC: 6 MG/DL (ref 6–20)
CALCIUM SERPL-MCNC: 8.2 MG/DL (ref 8.7–10.5)
CHLORIDE SERPL-SCNC: 102 MMOL/L (ref 95–110)
CO2 SERPL-SCNC: 23 MMOL/L (ref 23–29)
CREAT SERPL-MCNC: 0.6 MG/DL (ref 0.5–1.4)
DIFFERENTIAL METHOD: ABNORMAL
EOSINOPHIL # BLD AUTO: 0.1 K/UL (ref 0–0.5)
EOSINOPHIL NFR BLD: 0.8 % (ref 0–8)
ERYTHROCYTE [DISTWIDTH] IN BLOOD BY AUTOMATED COUNT: 13.2 % (ref 11.5–14.5)
EST. GFR  (NO RACE VARIABLE): >60 ML/MIN/1.73 M^2
GLUCOSE SERPL-MCNC: 136 MG/DL (ref 70–110)
HCT VFR BLD AUTO: 36.2 % (ref 40–54)
HGB BLD-MCNC: 12.7 G/DL (ref 14–18)
IMM GRANULOCYTES # BLD AUTO: 0.02 K/UL (ref 0–0.04)
IMM GRANULOCYTES NFR BLD AUTO: 0.3 % (ref 0–0.5)
LYMPHOCYTES # BLD AUTO: 0.9 K/UL (ref 1–4.8)
LYMPHOCYTES NFR BLD: 14.4 % (ref 18–48)
MCH RBC QN AUTO: 31.8 PG (ref 27–31)
MCHC RBC AUTO-ENTMCNC: 35.1 G/DL (ref 32–36)
MCV RBC AUTO: 91 FL (ref 82–98)
MONOCYTES # BLD AUTO: 0.9 K/UL (ref 0.3–1)
MONOCYTES NFR BLD: 14 % (ref 4–15)
NEUTROPHILS # BLD AUTO: 4.3 K/UL (ref 1.8–7.7)
NEUTROPHILS NFR BLD: 70 % (ref 38–73)
NRBC BLD-RTO: 0 /100 WBC
PLATELET # BLD AUTO: 159 K/UL (ref 150–450)
PMV BLD AUTO: 10.7 FL (ref 9.2–12.9)
POTASSIUM SERPL-SCNC: 3.4 MMOL/L (ref 3.5–5.1)
RBC # BLD AUTO: 4 M/UL (ref 4.6–6.2)
SODIUM SERPL-SCNC: 135 MMOL/L (ref 136–145)
WBC # BLD AUTO: 6.09 K/UL (ref 3.9–12.7)

## 2023-07-02 PROCEDURE — 96367 TX/PROPH/DG ADDL SEQ IV INF: CPT

## 2023-07-02 PROCEDURE — 25000003 PHARM REV CODE 250: Performed by: INTERNAL MEDICINE

## 2023-07-02 PROCEDURE — 63600175 PHARM REV CODE 636 W HCPCS: Performed by: INTERNAL MEDICINE

## 2023-07-02 PROCEDURE — S4991 NICOTINE PATCH NONLEGEND: HCPCS | Performed by: INTERNAL MEDICINE

## 2023-07-02 PROCEDURE — G0378 HOSPITAL OBSERVATION PER HR: HCPCS

## 2023-07-02 PROCEDURE — 96361 HYDRATE IV INFUSION ADD-ON: CPT

## 2023-07-02 PROCEDURE — 96375 TX/PRO/DX INJ NEW DRUG ADDON: CPT

## 2023-07-02 PROCEDURE — 36415 COLL VENOUS BLD VENIPUNCTURE: CPT | Performed by: INTERNAL MEDICINE

## 2023-07-02 PROCEDURE — 85025 COMPLETE CBC W/AUTO DIFF WBC: CPT | Performed by: INTERNAL MEDICINE

## 2023-07-02 PROCEDURE — 80048 BASIC METABOLIC PNL TOTAL CA: CPT | Performed by: INTERNAL MEDICINE

## 2023-07-02 PROCEDURE — 96366 THER/PROPH/DIAG IV INF ADDON: CPT

## 2023-07-02 RX ORDER — KETOROLAC TROMETHAMINE 30 MG/ML
15 INJECTION, SOLUTION INTRAMUSCULAR; INTRAVENOUS ONCE
Status: COMPLETED | OUTPATIENT
Start: 2023-07-02 | End: 2023-07-02

## 2023-07-02 RX ORDER — LINEZOLID 2 MG/ML
600 INJECTION, SOLUTION INTRAVENOUS
Status: DISCONTINUED | OUTPATIENT
Start: 2023-07-02 | End: 2023-07-03

## 2023-07-02 RX ADMIN — SODIUM CHLORIDE: 9 INJECTION, SOLUTION INTRAVENOUS at 07:07

## 2023-07-02 RX ADMIN — LINEZOLID 600 MG: 600 INJECTION, SOLUTION INTRAVENOUS at 09:07

## 2023-07-02 RX ADMIN — KETOROLAC TROMETHAMINE 15 MG: 30 INJECTION, SOLUTION INTRAMUSCULAR; INTRAVENOUS at 09:07

## 2023-07-02 RX ADMIN — POTASSIUM BICARBONATE 25 MEQ: 978 TABLET, EFFERVESCENT ORAL at 12:07

## 2023-07-02 RX ADMIN — METOPROLOL SUCCINATE 25 MG: 25 TABLET, EXTENDED RELEASE ORAL at 09:07

## 2023-07-02 RX ADMIN — LISINOPRIL 20 MG: 20 TABLET ORAL at 09:07

## 2023-07-02 RX ADMIN — CEFTRIAXONE 1 G: 1 INJECTION, POWDER, FOR SOLUTION INTRAMUSCULAR; INTRAVENOUS at 05:07

## 2023-07-02 RX ADMIN — IBUPROFEN 400 MG: 400 TABLET ORAL at 07:07

## 2023-07-02 RX ADMIN — ASPIRIN 81 MG: 81 TABLET, COATED ORAL at 09:07

## 2023-07-02 RX ADMIN — PANTOPRAZOLE SODIUM 40 MG: 40 TABLET, DELAYED RELEASE ORAL at 09:07

## 2023-07-02 RX ADMIN — AMLODIPINE BESYLATE 10 MG: 10 TABLET ORAL at 09:07

## 2023-07-02 RX ADMIN — NICOTINE 1 PATCH: 21 PATCH, EXTENDED RELEASE TRANSDERMAL at 11:07

## 2023-07-02 RX ADMIN — TAMSULOSIN HYDROCHLORIDE 0.4 MG: 0.4 CAPSULE ORAL at 09:07

## 2023-07-02 RX ADMIN — OXYCODONE AND ACETAMINOPHEN 1 TABLET: 10; 325 TABLET ORAL at 09:07

## 2023-07-02 NOTE — PLAN OF CARE
Care plan reviewed with patient. Pain managed by PRN med. Afebrile this shift. Ambulated in the room unassisted.  Free from falls. No other complaints made. All orders checked and reviewed.

## 2023-07-02 NOTE — ASSESSMENT & PLAN NOTE
This patient does have evidence of infective focus  My overall impression is sepsis.  Source: Urinary Tract  Antibiotics given-   Antibiotics (72h ago, onward)    Start     Stop Route Frequency Ordered    07/02/23 0830  linezolid 600 mg/300 mL IVPB 600 mg         -- IV Every 12 hours (non-standard times) 07/02/23 0730    07/01/23 1800  cefTRIAXone (ROCEPHIN) 1 g in dextrose 5 % in water (D5W) 5 % 100 mL IVPB (MB+)         07/03 1759 IV Every 24 hours (non-standard times) 06/30/23 2103        Latest lactate reviewed-  Recent Labs   Lab 06/30/23  1833   LACTATE 0.6     Organ dysfunction indicated by Acute kidney injury    Fluid challenge Ideal Body Weight- The patient's ideal body weight is Ideal body weight: 89.1 kg (196 lb 6.9 oz) which will be used to calculate fluid bolus of 30 ml/kg for treatment of septic shock.      Post- resuscitation assessment No - Post resuscitation assessment not needed     Source control achieved by: IV rocephin    E.coli uti = rocephin 1 gr qd x 3 dose  Enterococcus UTI= start on zyvox . F/u sensitivity

## 2023-07-02 NOTE — SUBJECTIVE & OBJECTIVE
Interval History:     Review of Systems   Constitutional:  Positive for activity change, chills, fatigue and fever.   HENT: Negative.  Negative for congestion, rhinorrhea, sore throat and trouble swallowing.    Eyes: Negative.    Respiratory: Negative.  Negative for cough, shortness of breath and wheezing.    Cardiovascular: Negative.  Negative for chest pain and palpitations.   Gastrointestinal: Negative.  Negative for abdominal pain, diarrhea, nausea and vomiting.   Endocrine: Negative.    Genitourinary:  Positive for dysuria. Negative for flank pain.   Musculoskeletal: Negative.  Negative for back pain.   Skin: Negative.  Negative for rash.   Allergic/Immunologic: Negative.    Neurological:  Positive for weakness. Negative for speech difficulty, numbness and headaches.   Hematological: Negative.    Psychiatric/Behavioral: Negative.  Negative for hallucinations.    All other systems reviewed and are negative.  Objective:     Vital Signs (Most Recent):  Temp: 99 °F (37.2 °C) (07/02/23 0741)  Pulse: 72 (07/02/23 0741)  Resp: 16 (07/02/23 0741)  BP: (!) 177/70 (BP meds given) (07/02/23 0741)  SpO2: 96 % (07/02/23 0741) Vital Signs (24h Range):  Temp:  [96.9 °F (36.1 °C)-102.5 °F (39.2 °C)] 99 °F (37.2 °C)  Pulse:  [50-72] 72  Resp:  [16-18] 16  SpO2:  [93 %-96 %] 96 %  BP: (127-177)/(57-90) 177/70     Weight: 121 kg (266 lb 12.1 oz)  Body mass index is 31.63 kg/m².    Intake/Output Summary (Last 24 hours) at 7/2/2023 1201  Last data filed at 7/2/2023 0910  Gross per 24 hour   Intake 1446.86 ml   Output --   Net 1446.86 ml         Physical Exam  Vitals and nursing note reviewed.   Constitutional:       General: He is awake. He is not in acute distress.     Appearance: He is ill-appearing.   HENT:      Head: Normocephalic and atraumatic.      Mouth/Throat:      Mouth: Mucous membranes are moist.   Eyes:      General: No scleral icterus.     Conjunctiva/sclera: Conjunctivae normal.   Cardiovascular:      Rate and  Rhythm: Normal rate and regular rhythm.      Heart sounds: No murmur heard.  Pulmonary:      Effort: Pulmonary effort is normal. No respiratory distress.      Breath sounds: Normal breath sounds. No wheezing.   Abdominal:      Palpations: Abdomen is soft.      Tenderness: There is no abdominal tenderness.   Musculoskeletal:         General: No swelling. Normal range of motion.      Cervical back: Normal range of motion and neck supple.   Skin:     General: Skin is warm.      Coloration: Skin is not jaundiced.   Neurological:      General: No focal deficit present.      Mental Status: He is alert and oriented to person, place, and time. Mental status is at baseline.   Psychiatric:         Attention and Perception: Attention normal.         Mood and Affect: Mood normal.         Speech: Speech normal.         Behavior: Behavior normal. Behavior is cooperative.           Significant Labs: All pertinent labs within the past 24 hours have been reviewed.  Recent Lab Results         07/02/23  0950        Anion Gap 10       Baso # 0.03       Basophil % 0.5       BUN 6       Calcium 8.2       Chloride 102       CO2 23       Creatinine 0.6       Differential Method Automated       eGFR >60       Eos # 0.1       Eosinophil % 0.8       Glucose 136       Gran # (ANC) 4.3       Gran % 70.0       Hematocrit 36.2       Hemoglobin 12.7       Immature Grans (Abs) 0.02  Comment: Mild elevation in immature granulocytes is non specific and   can be seen in a variety of conditions including stress response,   acute inflammation, trauma and pregnancy. Correlation with other   laboratory and clinical findings is essential.         Immature Granulocytes 0.3       Lymph # 0.9       Lymph % 14.4       MCH 31.8       MCHC 35.1       MCV 91       Mono # 0.9       Mono % 14.0       MPV 10.7       nRBC 0       Platelets 159       Potassium 3.4       RBC 4.00       RDW 13.2       Sodium 135       WBC 6.09               Significant Imaging: I have  reviewed all pertinent imaging results/findings within the past 24 hours.

## 2023-07-02 NOTE — PROGRESS NOTES
[facility] Medicine  Progress Note    Patient Name: Valerio Yan  MRN: [id number]  Patient Class: OP- Observation   Admission Date: 6/30/2023  Length of Stay: 0 days  Attending Physician: Frank Hunter, *  Primary Care Provider: Therese Lala MD        Subjective:     Principal Problem:Sepsis due to urinary tract infection        HPI:  Mr. Maldonado is a 52-year-old  male with PMH significant for Guillain-[place] syndrome, seen by Dr. Hogue in the past, HTN, has been complaining of dysuria for the past 2-3 days.  Two days ago patient had EGD/colonoscopy, and was started on Macrobid the same day and discharged home after the procedure.  He presents to the ED today with high-grade fever 102.6, HR 60-92, /81.  WBC 7.0.  0% bands, lactic acid normal.  UA today reveals 45 WBCs.  Urine cultures obtained on 06/28 growing Gram-negative rods (final result still pending).  Patient is hemodynamically stable.  Started on IV Rocephin empirically in the ED.    Admitting diagnosis:  Sepsis due to UTI.      Overview/Hospital Course:  51 y/o male admitted with a dx of  UTI  and hypokalemia . The urine cx is (+) for  e.coli sensitive to rocephin and flouroquinolones . He had a high fever yesterday . The blood cx is NGTD.     7/2 He cont with high fever . The  repeated urine cx show enterococcus .  He  will be treated with  rocephin 1 gr iv qd x 3 dose for e.coli uti  . Started on IV zyvox for enterococcus uti. He is complaining of HA which improved with NSAID.      Interval History:     Review of Systems   Constitutional:  Positive for activity change, chills, fatigue and fever.   HENT: Negative.  Negative for congestion, rhinorrhea, sore throat and trouble swallowing.    Eyes: Negative.    Respiratory: Negative.  Negative for cough, shortness of breath and wheezing.    Cardiovascular: Negative.  Negative for chest pain and palpitations.   Gastrointestinal: Negative.  Negative for abdominal  pain, diarrhea, nausea and vomiting.   Endocrine: Negative.    Genitourinary:  Positive for dysuria. Negative for flank pain.   Musculoskeletal: Negative.  Negative for back pain.   Skin: Negative.  Negative for rash.   Allergic/Immunologic: Negative.    Neurological:  Positive for weakness. Negative for speech difficulty, numbness and headaches.   Hematological: Negative.    Psychiatric/Behavioral: Negative.  Negative for hallucinations.    All other systems reviewed and are negative.  Objective:     Vital Signs (Most Recent):  Temp: 99 °F (37.2 °C) (07/02/23 0741)  Pulse: 72 (07/02/23 0741)  Resp: 16 (07/02/23 0741)  BP: (!) 177/70 (BP meds given) (07/02/23 0741)  SpO2: 96 % (07/02/23 0741) Vital Signs (24h Range):  Temp:  [96.9 °F (36.1 °C)-102.5 °F (39.2 °C)] 99 °F (37.2 °C)  Pulse:  [50-72] 72  Resp:  [16-18] 16  SpO2:  [93 %-96 %] 96 %  BP: (127-177)/(57-90) 177/70     Weight: 121 kg (266 lb 12.1 oz)  Body mass index is 31.63 kg/m².    Intake/Output Summary (Last 24 hours) at 7/2/2023 1201  Last data filed at 7/2/2023 0910  Gross per 24 hour   Intake 1446.86 ml   Output --   Net 1446.86 ml         Physical Exam  Vitals and nursing note reviewed.   Constitutional:       General: He is awake. He is not in acute distress.     Appearance: He is ill-appearing.   HENT:      Head: Normocephalic and atraumatic.      Mouth/Throat:      Mouth: Mucous membranes are moist.   Eyes:      General: No scleral icterus.     Conjunctiva/sclera: Conjunctivae normal.   Cardiovascular:      Rate and Rhythm: Normal rate and regular rhythm.      Heart sounds: No murmur heard.  Pulmonary:      Effort: Pulmonary effort is normal. No respiratory distress.      Breath sounds: Normal breath sounds. No wheezing.   Abdominal:      Palpations: Abdomen is soft.      Tenderness: There is no abdominal tenderness.   Musculoskeletal:         General: No swelling. Normal range of motion.      Cervical back: Normal range of motion and neck supple.    Skin:     General: Skin is warm.      Coloration: Skin is not jaundiced.   Neurological:      General: No focal deficit present.      Mental Status: He is alert and oriented to person, place, and time. Mental status is at baseline.   Psychiatric:         Attention and Perception: Attention normal.         Mood and Affect: Mood normal.         Speech: Speech normal.         Behavior: Behavior normal. Behavior is cooperative.           Significant Labs: All pertinent labs within the past 24 hours have been reviewed.  Recent Lab Results         07/02/23  0950        Anion Gap 10       Baso # 0.03       Basophil % 0.5       BUN 6       Calcium 8.2       Chloride 102       CO2 23       Creatinine 0.6       Differential Method Automated       eGFR >60       Eos # 0.1       Eosinophil % 0.8       Glucose 136       Gran # (ANC) 4.3       Gran % 70.0       Hematocrit 36.2       Hemoglobin 12.7       Immature Grans (Abs) 0.02  Comment: Mild elevation in immature granulocytes is non specific and   can be seen in a variety of conditions including stress response,   acute inflammation, trauma and pregnancy. Correlation with other   laboratory and clinical findings is essential.         Immature Granulocytes 0.3       Lymph # 0.9       Lymph % 14.4       MCH 31.8       MCHC 35.1       MCV 91       Mono # 0.9       Mono % 14.0       MPV 10.7       nRBC 0       Platelets 159       Potassium 3.4       RBC 4.00       RDW 13.2       Sodium 135       WBC 6.09               Significant Imaging: I have reviewed all pertinent imaging results/findings within the past 24 hours.      Assessment/Plan:      * Sepsis due to urinary tract infection  This patient does have evidence of infective focus  My overall impression is sepsis.  Source: Urinary Tract  Antibiotics given-   Antibiotics (72h ago, onward)    Start     Stop Route Frequency Ordered    07/02/23 0830  linezolid 600 mg/300 mL IVPB 600 mg         -- IV Every 12 hours (non-standard  times) 07/02/23 0730    07/01/23 1800  cefTRIAXone (ROCEPHIN) 1 g in dextrose 5 % in water (D5W) 5 % 100 mL IVPB (MB+)         07/03 1759 IV Every 24 hours (non-standard times) 06/30/23 2103        Latest lactate reviewed-  Recent Labs   Lab 06/30/23  1833   LACTATE 0.6     Organ dysfunction indicated by Acute kidney injury    Fluid challenge Ideal Body Weight- The patient's ideal body weight is Ideal body weight: 89.1 kg (196 lb 6.9 oz) which will be used to calculate fluid bolus of 30 ml/kg for treatment of septic shock.      Post- resuscitation assessment No - Post resuscitation assessment not needed     Source control achieved by: IV rocephin    E.coli uti = rocephin 1 gr qd x 3 dose  Enterococcus UTI= start on zyvox . F/u sensitivity     History of Guillain-[place] syndrome  -not on any treatment at this time.    -followed by Dr. Hogue in the past.      CAD in native artery  -denies chest pain, SOB.  -continue home dose aspirin.      Hypokalemia  -monitor   -replace      Mixed hyperlipidemia  -statin      Hypertension  -hold HCTZ due to hypokalmeia  -continue ACEI, amlodipine, metoprolol.        VTE Risk Mitigation (From admission, onward)         Ordered     Place sequential compression device  Until discontinued         06/30/23 2103                Discharge Planning   ELSI:      Code Status: Full Code   Is the patient medically ready for discharge?:     Reason for patient still in hospital (select all that apply): Treatment  Discharge Plan A: Home                  Frank Hunter MD  Department of Hospital Medicine   [facility]

## 2023-07-03 VITALS
SYSTOLIC BLOOD PRESSURE: 151 MMHG | RESPIRATION RATE: 16 BRPM | BODY MASS INDEX: 31.92 KG/M2 | OXYGEN SATURATION: 94 % | TEMPERATURE: 99 F | DIASTOLIC BLOOD PRESSURE: 70 MMHG | WEIGHT: 270.31 LBS | HEART RATE: 58 BPM | HEIGHT: 77 IN

## 2023-07-03 LAB
ANION GAP SERPL CALC-SCNC: 11 MMOL/L (ref 8–16)
BACTERIA UR CULT: ABNORMAL
BASOPHILS # BLD AUTO: 0.03 K/UL (ref 0–0.2)
BASOPHILS NFR BLD: 0.7 % (ref 0–1.9)
BUN SERPL-MCNC: 5 MG/DL (ref 6–20)
CALCIUM SERPL-MCNC: 8.2 MG/DL (ref 8.7–10.5)
CHLORIDE SERPL-SCNC: 104 MMOL/L (ref 95–110)
CO2 SERPL-SCNC: 23 MMOL/L (ref 23–29)
CREAT SERPL-MCNC: 0.5 MG/DL (ref 0.5–1.4)
DIFFERENTIAL METHOD: ABNORMAL
EOSINOPHIL # BLD AUTO: 0.1 K/UL (ref 0–0.5)
EOSINOPHIL NFR BLD: 2.2 % (ref 0–8)
ERYTHROCYTE [DISTWIDTH] IN BLOOD BY AUTOMATED COUNT: 13.2 % (ref 11.5–14.5)
EST. GFR  (NO RACE VARIABLE): >60 ML/MIN/1.73 M^2
GLUCOSE SERPL-MCNC: 103 MG/DL (ref 70–110)
HCT VFR BLD AUTO: 37.4 % (ref 40–54)
HGB BLD-MCNC: 13.3 G/DL (ref 14–18)
IMM GRANULOCYTES # BLD AUTO: 0.02 K/UL (ref 0–0.04)
IMM GRANULOCYTES NFR BLD AUTO: 0.4 % (ref 0–0.5)
LYMPHOCYTES # BLD AUTO: 1.3 K/UL (ref 1–4.8)
LYMPHOCYTES NFR BLD: 27.9 % (ref 18–48)
MCH RBC QN AUTO: 32.2 PG (ref 27–31)
MCHC RBC AUTO-ENTMCNC: 35.6 G/DL (ref 32–36)
MCV RBC AUTO: 91 FL (ref 82–98)
MONOCYTES # BLD AUTO: 0.7 K/UL (ref 0.3–1)
MONOCYTES NFR BLD: 14.8 % (ref 4–15)
NEUTROPHILS # BLD AUTO: 2.4 K/UL (ref 1.8–7.7)
NEUTROPHILS NFR BLD: 54 % (ref 38–73)
NRBC BLD-RTO: 0 /100 WBC
PLATELET # BLD AUTO: 157 K/UL (ref 150–450)
PMV BLD AUTO: 10.9 FL (ref 9.2–12.9)
POTASSIUM SERPL-SCNC: 3.4 MMOL/L (ref 3.5–5.1)
RBC # BLD AUTO: 4.13 M/UL (ref 4.6–6.2)
SODIUM SERPL-SCNC: 138 MMOL/L (ref 136–145)
WBC # BLD AUTO: 4.52 K/UL (ref 3.9–12.7)

## 2023-07-03 PROCEDURE — 85025 COMPLETE CBC W/AUTO DIFF WBC: CPT | Performed by: INTERNAL MEDICINE

## 2023-07-03 PROCEDURE — 36415 COLL VENOUS BLD VENIPUNCTURE: CPT | Performed by: INTERNAL MEDICINE

## 2023-07-03 PROCEDURE — 96361 HYDRATE IV INFUSION ADD-ON: CPT

## 2023-07-03 PROCEDURE — 96366 THER/PROPH/DIAG IV INF ADDON: CPT

## 2023-07-03 PROCEDURE — 80048 BASIC METABOLIC PNL TOTAL CA: CPT | Performed by: INTERNAL MEDICINE

## 2023-07-03 PROCEDURE — 25000003 PHARM REV CODE 250: Performed by: INTERNAL MEDICINE

## 2023-07-03 PROCEDURE — 63600175 PHARM REV CODE 636 W HCPCS: Performed by: INTERNAL MEDICINE

## 2023-07-03 PROCEDURE — G0378 HOSPITAL OBSERVATION PER HR: HCPCS

## 2023-07-03 RX ORDER — AMOXICILLIN 500 MG/1
500 TABLET, FILM COATED ORAL EVERY 8 HOURS
Qty: 21 TABLET | Refills: 0 | Status: SHIPPED | OUTPATIENT
Start: 2023-07-03 | End: 2023-07-10

## 2023-07-03 RX ADMIN — PANTOPRAZOLE SODIUM 40 MG: 40 TABLET, DELAYED RELEASE ORAL at 08:07

## 2023-07-03 RX ADMIN — ASPIRIN 81 MG: 81 TABLET, COATED ORAL at 08:07

## 2023-07-03 RX ADMIN — AMLODIPINE BESYLATE 10 MG: 10 TABLET ORAL at 08:07

## 2023-07-03 RX ADMIN — TAMSULOSIN HYDROCHLORIDE 0.4 MG: 0.4 CAPSULE ORAL at 08:07

## 2023-07-03 RX ADMIN — LINEZOLID 600 MG: 600 INJECTION, SOLUTION INTRAVENOUS at 08:07

## 2023-07-03 RX ADMIN — LISINOPRIL 20 MG: 20 TABLET ORAL at 08:07

## 2023-07-03 NOTE — PLAN OF CARE
[facility]  Discharge Final Note    Primary Care Provider: Therese Lala MD    Expected Discharge Date: 7/3/2023    Final Discharge Note (most recent)       Final Note - 07/03/23 0904          Final Note    Assessment Type Final Discharge Note     Anticipated Discharge Disposition Home or Self Care     Hospital Resources/Appts/Education Provided Appointments scheduled by Navigator/Coordinator;Appointments scheduled and added to AVS        Post-Acute Status    Discharge Delays None known at this time                     Contact Info       Therese Lala MD   Specialty: Internal Medicine   Relationship: PCP - [address] [place] [address]   Phone: [phone]       Next Steps: Follow up in 1 week(s)          PCP appt scheduled and added to AVS.     No additional d/c needs at this time.

## 2023-07-03 NOTE — ASSESSMENT & PLAN NOTE
This patient does have evidence of infective focus  My overall impression is sepsis.  Source: Urinary Tract  Antibiotics given-   Antibiotics (72h ago, onward)    None        Latest lactate reviewed-  Recent Labs   Lab 06/30/23  1833   LACTATE 0.6     Organ dysfunction indicated by Acute kidney injury    Fluid challenge Ideal Body Weight- The patient's ideal body weight is Ideal body weight: 89.1 kg (196 lb 6.9 oz) which will be used to calculate fluid bolus of 30 ml/kg for treatment of septic shock.      Post- resuscitation assessment No - Post resuscitation assessment not needed     Source control achieved by: IV rocephin    E.coli uti = rocephin 1 gr qd x 3 dose  Enterococcus UTI= start on zyvox . F/u sensitivity

## 2023-07-03 NOTE — PLAN OF CARE
Problem: Adult Inpatient Plan of Care  Goal: Plan of Care Review  Outcome: Ongoing, Progressing  Patient had no adverse events during shift. Patient free of falls. Call light in reach. Side Rails x2. Pain well controlled with ordered medications. Patient repositions independently. IVF/ABX administered as ordered. VSS. Temperature monitored throughout shift, Pt afebrile during the night. Chart reviewed.

## 2023-07-03 NOTE — DISCHARGE SUMMARY
[facility] Medicine  Discharge Summary      Patient Name: Valerio Yan  MRN: [id number]  MARGI: [id number]  Patient Class: OP- Observation  Admission Date: 6/30/2023  Hospital Length of Stay: 0 days  Discharge Date and Time:  07/03/2023 9:03 AM  Attending Physician: Frank Hunter, *   Discharging Provider: Frank Hunter MD  Primary Care Provider: Therese Lala MD    Primary Care Team: [facility]    HPI:   Mr. 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  Two days ago patient had EGD/colonoscopy, and was started on Macrobid the same day and discharged home after the procedure.  He presents to the ED today with high-grade fever 102.6, HR 60-92, /81.  WBC 7.0.  0% bands, lactic acid normal.  UA today reveals 45 WBCs.  Urine cultures obtained on 06/28 growing Gram-negative rods (final result still pending).  Patient is hemodynamically stable.  Started on IV Rocephin empirically in the ED.    Admitting diagnosis:  Sepsis due to UTI.      * No surgery found *      Hospital Course:   53 y/o male admitted with a dx of  UTI  and hypokalemia . The urine cx is (+) for  e.coli sensitive to rocephin and flouroquinolones . He had a high fever yesterday . The blood cx is NGTD.     7/2 He cont with high fever . The  repeated urine cx show enterococcus .  He  will be treated with  rocephin 1 gr iv qd x 3 dose for e.coli uti  . Started on IV zyvox for enterococcus uti. He is complaining of HA which improved with NSAID.  7/3 Pt was seen and examined at bedside . He was determined to be suitable for d/c  He has been afebrile for more than 32 hrs . He is tolerating po intake .  UTI due to e.coli  s/p IV rocephin 1 gr qd x 3 doses   . Enterococcus UTI  sensitive to PCN = s/p zyvox  600 ,g BID x 3 doses . Plan to d/c on po amoxicillin 500 mg po  tid . He denies any weakness , numbness  or new changes . Pt will f/u with his  PCP and neurology in 1 to 2 weeks . There was no acute event since admission . The blood cx are NGTD.        Goals of Care Treatment Preferences:  Code Status: Full Code      Consults:   Consults (From admission, onward)        Status Ordering Provider     IP consult to case management  Once        Provider:  (Not yet assigned)    SKY Juares          Neuro  History of Guillain-[place] syndrome  -not on any treatment at this time.    -followed by Dr. Hogue in the past.      Cardiac/Vascular  CAD in native artery  -denies chest pain, SOB.  -continue home dose aspirin.      Hypertension  -hold HCTZ due to hypokalmeia  -continue ACEI, amlodipine, metoprolol.      Renal/  Hypokalemia  -monitor   -replace      ID  * Sepsis due to urinary tract infection  This patient does have evidence of infective focus  My overall impression is sepsis.  Source: Urinary Tract  Antibiotics given-   Antibiotics (72h ago, onward)    None        Latest lactate reviewed-  Recent Labs   Lab 06/30/23  1833   LACTATE 0.6     Organ dysfunction indicated by Acute kidney injury    Fluid challenge Ideal Body Weight- The patient's ideal body weight is Ideal body weight: 89.1 kg (196 lb 6.9 oz) which will be used to calculate fluid bolus of 30 ml/kg for treatment of septic shock.      Post- resuscitation assessment No - Post resuscitation assessment not needed     Source control achieved by: IV rocephin    E.coli uti = rocephin 1 gr qd x 3 dose  Enterococcus UTI= start on zyvox . F/u sensitivity       Final Active Diagnoses:    Diagnosis Date Noted POA    PRINCIPAL PROBLEM:  Sepsis due to urinary tract infection [A41.9, N39.0] 06/30/2023 Yes    History of Guillain-[place] syndrome [Z86.69] 12/06/2021 Not Applicable    CAD in native artery [I25.10] 12/06/2020 Yes    Hypokalemia [E87.6] 11/11/2020 Yes    Hypertension [I10] 01/06/2017 Yes      Problems  Resolved During this Admission:       Discharged Condition: stable    Disposition: Home or Self Care    Follow Up:   Follow-up Information     Therese Lala MD Follow up in 1 week(s).    Specialty: Internal Medicine  Contact information:  [address] [place] [address] [address] [place] [address] [phone]                       Patient Instructions:      Diet Cardiac     Activity as tolerated       Significant Diagnostic Studies: Labs:   BMP:   Recent Labs   Lab 07/02/23  0950 07/03/23  0707   * 103   * 138   K 3.4* 3.4*    104   CO2 23 23   BUN 6 5*   CREATININE 0.6 0.5   CALCIUM 8.2* 8.2*   , CMP   Recent Labs   Lab 07/02/23  0950 07/03/23  0707   * 138   K 3.4* 3.4*    104   CO2 23 23   * 103   BUN 6 5*   CREATININE 0.6 0.5   CALCIUM 8.2* 8.2*   ANIONGAP 10 11    and CBC   Recent Labs   Lab 07/02/23  0950 07/03/23  0707   WBC 6.09 4.52   HGB 12.7* 13.3*   HCT 36.2* 37.4*    157     Microbiology:   Blood Culture   Lab Results   Component Value Date    LABBLOO No Growth to date 06/30/2023    LABBLOO No Growth to date 06/30/2023    LABBLOO No Growth to date 06/30/2023    LABBLOO No Growth to date 06/30/2023    LABBLOO No Growth to date 06/30/2023    LABBLOO No Growth to date 06/30/2023       Pending Diagnostic Studies:     None         Medications:  Reconciled Home Medications:      Medication List      START taking these medications    amoxicillin 500 MG Tab  Commonly known as: AMOXIL  Take 1 tablet (500 mg total) by mouth every 8 (eight) hours. for 7 days        CONTINUE taking these medications    amLODIPine 5 MG tablet  Commonly known as: NORVASC  Take 1 tablet (5 mg total) by mouth once daily.     aspirin 81 MG EC tablet  Commonly known as: ECOTRIN  Take 81 mg by mouth once daily.     calcium-vitamin D3 600 mg-5 mcg (200 unit) Tab  Commonly known as: CALCIUM 600 + D(3)  Take 1 tablet by mouth once daily.     cetirizine 10 MG tablet  Commonly known as: ZYRTEC  Take 1 tablet  (10 mg total) by mouth once daily.     cyanocobalamin 1,000 mcg/mL injection  Commonly known as: VITAMIN B-12  Inject 1 mL (1,000 mcg total) into the muscle every 30 days.     esomeprazole 20 MG capsule  Commonly known as: NEXIUM  Take 2 capsules (40 mg total) by mouth before breakfast.     fluticasone propionate 50 mcg/actuation nasal spray  Commonly known as: FLONASE  1 spray (50 mcg total) by Each Nostril route once daily.     furosemide 20 MG tablet  Commonly known as: LASIX  Take 1 tablet (20 mg total) by mouth once daily.     ibuprofen 800 MG tablet  Commonly known as: ADVIL,MOTRIN  Take 1 tablet (800 mg total) by mouth every 6 (six) hours as needed.     isosorbide mononitrate 30 MG 24 hr tablet  Commonly known as: IMDUR  Take 1 tablet (30 mg total) by mouth once daily.     ketorolac 10 mg tablet  Commonly known as: TORADOL  Take 1 tablet (10 mg total) by mouth every 6 (six) hours. for 5 days     lisinopriL-hydrochlorothiazide 20-12.5 mg per tablet  Commonly known as: PRINZIDE,ZESTORETIC  Take 2 tablets by mouth once daily.     metoprolol succinate 25 MG 24 hr tablet  Commonly known as: TOPROL-XL  Take 1 tablet (25 mg total) by mouth once daily.     multivitamin per tablet  Commonly known as: THERAGRAN  Take 1 tablet by mouth nightly.     mupirocin 2 % ointment  Commonly known as: BACTROBAN  Apply topically 2 (two) times daily.     nitrofurantoin (macrocrystal-monohydrate) 100 MG capsule  Commonly known as: MACROBID  Take 1 capsule (100 mg total) by mouth 2 (two) times daily. for 7 days     ondansetron 4 MG tablet  Commonly known as: ZOFRAN  Take 1 tablet (4 mg total) by mouth every 6 (six) hours as needed for Nausea.     PRALUENT  mg/mL Pnij  Generic drug: alirocumab  INJECT 1.06MLS ( 159 MG TOTAL ) BY ABDOMINAL SUBCUTANEOUS ROUTE EVERY 14 DAYS     tamsulosin 0.4 mg Cap  Commonly known as: FLOMAX  Take 1 capsule (0.4 mg total) by mouth once daily. for 20 days            Indwelling Lines/Drains at time  of discharge:   Lines/Drains/Airways     None                 Time spent on the discharge of patient: 30 minutes         Frank Hunter MD  Department of Hospital Medicine  [facility]

## 2023-07-03 NOTE — NURSING
Discharge summary, health teachings, printed prescription and instructions given to patient --verbalized understanding. IV removed-no complications noted. Tele monitor removed and returned to the monitor room. All questions answered with regards to discharge instructions. Reminded of the importance of follow-up appointments.

## 2023-07-06 LAB
BACTERIA BLD CULT: NORMAL
BACTERIA BLD CULT: NORMAL

## 2023-07-19 ENCOUNTER — HOSPITAL ENCOUNTER (OUTPATIENT)
Facility: HOSPITAL | Age: 53
Discharge: HOME | End: 2023-07-21
Attending: EMERGENCY MEDICINE | Admitting: FAMILY MEDICINE
Payer: MEDICARE

## 2023-07-19 DIAGNOSIS — N12 PYELONEPHRITIS: ICD-10-CM

## 2023-07-19 DIAGNOSIS — R00.1 BRADYCARDIA: ICD-10-CM

## 2023-07-19 DIAGNOSIS — F10.10 ALCOHOL ABUSE: ICD-10-CM

## 2023-07-19 DIAGNOSIS — N30.01 ACUTE CYSTITIS WITH HEMATURIA: Primary | ICD-10-CM

## 2023-07-19 DIAGNOSIS — R53.1 WEAKNESS: ICD-10-CM

## 2023-07-19 PROBLEM — K92.1 BLACK STOOLS: Status: ACTIVE | Noted: 2023-07-19

## 2023-07-19 PROBLEM — F10.20 ALCOHOLISM: Status: ACTIVE | Noted: 2023-07-19

## 2023-07-19 LAB
ALBUMIN SERPL BCP-MCNC: 3.7 G/DL (ref 3.5–5.2)
ALP SERPL-CCNC: 170 U/L (ref 55–135)
ALT SERPL W/O P-5'-P-CCNC: 24 U/L (ref 10–44)
ANION GAP SERPL CALC-SCNC: 15 MMOL/L (ref 8–16)
AST SERPL-CCNC: 30 U/L (ref 10–40)
BACTERIA #/AREA URNS HPF: ABNORMAL /HPF
BASOPHILS # BLD AUTO: 0.04 K/UL (ref 0–0.2)
BASOPHILS NFR BLD: 0.7 % (ref 0–1.9)
BILIRUB SERPL-MCNC: 0.5 MG/DL (ref 0.1–1)
BILIRUB UR QL STRIP: NEGATIVE
BNP SERPL-MCNC: 17 PG/ML (ref 0–99)
BUN SERPL-MCNC: 5 MG/DL (ref 6–20)
CALCIUM SERPL-MCNC: 8.6 MG/DL (ref 8.7–10.5)
CHLORIDE SERPL-SCNC: 102 MMOL/L (ref 95–110)
CLARITY UR: CLEAR
CO2 SERPL-SCNC: 23 MMOL/L (ref 23–29)
COLOR UR: YELLOW
CREAT SERPL-MCNC: 0.6 MG/DL (ref 0.5–1.4)
DIFFERENTIAL METHOD: ABNORMAL
EOSINOPHIL # BLD AUTO: 0.1 K/UL (ref 0–0.5)
EOSINOPHIL NFR BLD: 0.9 % (ref 0–8)
ERYTHROCYTE [DISTWIDTH] IN BLOOD BY AUTOMATED COUNT: 14.9 % (ref 11.5–14.5)
EST. GFR  (NO RACE VARIABLE): >60 ML/MIN/1.73 M^2
GLUCOSE SERPL-MCNC: 102 MG/DL (ref 70–110)
GLUCOSE UR QL STRIP: NEGATIVE
HCT VFR BLD AUTO: 40.2 % (ref 40–54)
HCT VFR BLD AUTO: 47.6 % (ref 40–54)
HGB BLD-MCNC: 14 G/DL (ref 14–18)
HGB BLD-MCNC: 16.2 G/DL (ref 14–18)
HGB UR QL STRIP: ABNORMAL
IMM GRANULOCYTES # BLD AUTO: 0.02 K/UL (ref 0–0.04)
IMM GRANULOCYTES NFR BLD AUTO: 0.4 % (ref 0–0.5)
KETONES UR QL STRIP: NEGATIVE
LACTATE SERPL-SCNC: 2.2 MMOL/L (ref 0.5–2.2)
LACTATE SERPL-SCNC: 2.8 MMOL/L (ref 0.5–2.2)
LEUKOCYTE ESTERASE UR QL STRIP: ABNORMAL
LIPASE SERPL-CCNC: 37 U/L (ref 4–60)
LYMPHOCYTES # BLD AUTO: 1.5 K/UL (ref 1–4.8)
LYMPHOCYTES NFR BLD: 26.8 % (ref 18–48)
MCH RBC QN AUTO: 31.2 PG (ref 27–31)
MCHC RBC AUTO-ENTMCNC: 34 G/DL (ref 32–36)
MCV RBC AUTO: 92 FL (ref 82–98)
MICROSCOPIC COMMENT: ABNORMAL
MONOCYTES # BLD AUTO: 0.5 K/UL (ref 0.3–1)
MONOCYTES NFR BLD: 9.5 % (ref 4–15)
NEUTROPHILS # BLD AUTO: 3.4 K/UL (ref 1.8–7.7)
NEUTROPHILS NFR BLD: 61.7 % (ref 38–73)
NITRITE UR QL STRIP: POSITIVE
NRBC BLD-RTO: 0 /100 WBC
OB PNL STL: NEGATIVE
PH UR STRIP: 6 [PH] (ref 5–8)
PLATELET # BLD AUTO: 137 K/UL (ref 150–450)
PMV BLD AUTO: 10.6 FL (ref 9.2–12.9)
POTASSIUM SERPL-SCNC: 4 MMOL/L (ref 3.5–5.1)
PROCALCITONIN SERPL IA-MCNC: <0.02 NG/ML
PROT SERPL-MCNC: 7.2 G/DL (ref 6–8.4)
PROT UR QL STRIP: NEGATIVE
RBC # BLD AUTO: 5.19 M/UL (ref 4.6–6.2)
RBC #/AREA URNS HPF: 2 /HPF (ref 0–4)
SODIUM SERPL-SCNC: 140 MMOL/L (ref 136–145)
SP GR UR STRIP: 1.01 (ref 1–1.03)
SQUAMOUS #/AREA URNS HPF: 2 /HPF
TROPONIN I SERPL DL<=0.01 NG/ML-MCNC: <0.006 NG/ML (ref 0–0.03)
URN SPEC COLLECT METH UR: ABNORMAL
UROBILINOGEN UR STRIP-ACNC: NEGATIVE EU/DL
WBC # BLD AUTO: 5.55 K/UL (ref 3.9–12.7)
WBC #/AREA URNS HPF: >100 /HPF (ref 0–5)
WBC CLUMPS URNS QL MICRO: ABNORMAL

## 2023-07-19 PROCEDURE — 96361 HYDRATE IV INFUSION ADD-ON: CPT

## 2023-07-19 PROCEDURE — 25000003 PHARM REV CODE 250: Performed by: EMERGENCY MEDICINE

## 2023-07-19 PROCEDURE — 83690 ASSAY OF LIPASE: CPT | Performed by: EMERGENCY MEDICINE

## 2023-07-19 PROCEDURE — 80053 COMPREHEN METABOLIC PANEL: CPT | Performed by: EMERGENCY MEDICINE

## 2023-07-19 PROCEDURE — 83605 ASSAY OF LACTIC ACID: CPT | Mod: 91 | Performed by: EMERGENCY MEDICINE

## 2023-07-19 PROCEDURE — 93010 EKG 12-LEAD: ICD-10-PCS | Mod: ,,, | Performed by: INTERNAL MEDICINE

## 2023-07-19 PROCEDURE — 63600175 PHARM REV CODE 636 W HCPCS: Performed by: FAMILY MEDICINE

## 2023-07-19 PROCEDURE — 87088 URINE BACTERIA CULTURE: CPT | Performed by: EMERGENCY MEDICINE

## 2023-07-19 PROCEDURE — 81000 URINALYSIS NONAUTO W/SCOPE: CPT | Performed by: EMERGENCY MEDICINE

## 2023-07-19 PROCEDURE — 36415 COLL VENOUS BLD VENIPUNCTURE: CPT | Performed by: FAMILY MEDICINE

## 2023-07-19 PROCEDURE — 82272 OCCULT BLD FECES 1-3 TESTS: CPT | Performed by: FAMILY MEDICINE

## 2023-07-19 PROCEDURE — 96375 TX/PRO/DX INJ NEW DRUG ADDON: CPT

## 2023-07-19 PROCEDURE — 84145 PROCALCITONIN (PCT): CPT | Performed by: EMERGENCY MEDICINE

## 2023-07-19 PROCEDURE — 93005 ELECTROCARDIOGRAM TRACING: CPT

## 2023-07-19 PROCEDURE — 25000003 PHARM REV CODE 250: Performed by: FAMILY MEDICINE

## 2023-07-19 PROCEDURE — 85018 HEMOGLOBIN: CPT | Mod: 91 | Performed by: FAMILY MEDICINE

## 2023-07-19 PROCEDURE — 96365 THER/PROPH/DIAG IV INF INIT: CPT

## 2023-07-19 PROCEDURE — S4991 NICOTINE PATCH NONLEGEND: HCPCS | Performed by: FAMILY MEDICINE

## 2023-07-19 PROCEDURE — 96376 TX/PRO/DX INJ SAME DRUG ADON: CPT

## 2023-07-19 PROCEDURE — 87077 CULTURE AEROBIC IDENTIFY: CPT | Performed by: EMERGENCY MEDICINE

## 2023-07-19 PROCEDURE — 63600175 PHARM REV CODE 636 W HCPCS: Performed by: EMERGENCY MEDICINE

## 2023-07-19 PROCEDURE — 36415 COLL VENOUS BLD VENIPUNCTURE: CPT | Performed by: EMERGENCY MEDICINE

## 2023-07-19 PROCEDURE — 85025 COMPLETE CBC W/AUTO DIFF WBC: CPT | Performed by: EMERGENCY MEDICINE

## 2023-07-19 PROCEDURE — 87086 URINE CULTURE/COLONY COUNT: CPT | Performed by: EMERGENCY MEDICINE

## 2023-07-19 PROCEDURE — G0378 HOSPITAL OBSERVATION PER HR: HCPCS

## 2023-07-19 PROCEDURE — 87040 BLOOD CULTURE FOR BACTERIA: CPT | Performed by: EMERGENCY MEDICINE

## 2023-07-19 PROCEDURE — 87186 SC STD MICRODIL/AGAR DIL: CPT | Performed by: EMERGENCY MEDICINE

## 2023-07-19 PROCEDURE — 83880 ASSAY OF NATRIURETIC PEPTIDE: CPT | Performed by: EMERGENCY MEDICINE

## 2023-07-19 PROCEDURE — 93010 ELECTROCARDIOGRAM REPORT: CPT | Mod: ,,, | Performed by: INTERNAL MEDICINE

## 2023-07-19 PROCEDURE — 99285 EMERGENCY DEPT VISIT HI MDM: CPT | Mod: 25

## 2023-07-19 PROCEDURE — 84484 ASSAY OF TROPONIN QUANT: CPT | Performed by: EMERGENCY MEDICINE

## 2023-07-19 PROCEDURE — 85014 HEMATOCRIT: CPT | Mod: 91 | Performed by: FAMILY MEDICINE

## 2023-07-19 RX ORDER — LORAZEPAM 0.5 MG/1
1 TABLET ORAL EVERY 4 HOURS PRN
Status: DISCONTINUED | OUTPATIENT
Start: 2023-07-19 | End: 2023-07-21

## 2023-07-19 RX ORDER — ONDANSETRON 2 MG/ML
4 INJECTION INTRAMUSCULAR; INTRAVENOUS EVERY 8 HOURS PRN
Status: DISCONTINUED | OUTPATIENT
Start: 2023-07-19 | End: 2023-07-21 | Stop reason: HOSPADM

## 2023-07-19 RX ORDER — LORAZEPAM 2 MG/ML
2 INJECTION INTRAMUSCULAR
Status: COMPLETED | OUTPATIENT
Start: 2023-07-19 | End: 2023-07-19

## 2023-07-19 RX ORDER — ACETAMINOPHEN 325 MG/1
650 TABLET ORAL EVERY 6 HOURS PRN
Status: DISCONTINUED | OUTPATIENT
Start: 2023-07-19 | End: 2023-07-21 | Stop reason: HOSPADM

## 2023-07-19 RX ORDER — ASPIRIN 81 MG/1
81 TABLET ORAL DAILY
Status: DISCONTINUED | OUTPATIENT
Start: 2023-07-19 | End: 2023-07-19

## 2023-07-19 RX ORDER — HYDRALAZINE HYDROCHLORIDE 20 MG/ML
10 INJECTION INTRAMUSCULAR; INTRAVENOUS EVERY 8 HOURS PRN
Status: DISCONTINUED | OUTPATIENT
Start: 2023-07-19 | End: 2023-07-21 | Stop reason: HOSPADM

## 2023-07-19 RX ORDER — IBUPROFEN 200 MG
1 TABLET ORAL DAILY
Status: DISCONTINUED | OUTPATIENT
Start: 2023-07-19 | End: 2023-07-21 | Stop reason: HOSPADM

## 2023-07-19 RX ORDER — LISINOPRIL 20 MG/1
20 TABLET ORAL DAILY
Status: DISCONTINUED | OUTPATIENT
Start: 2023-07-19 | End: 2023-07-21 | Stop reason: HOSPADM

## 2023-07-19 RX ORDER — AMLODIPINE BESYLATE 10 MG/1
10 TABLET ORAL DAILY
Status: DISCONTINUED | OUTPATIENT
Start: 2023-07-19 | End: 2023-07-21 | Stop reason: HOSPADM

## 2023-07-19 RX ORDER — PANTOPRAZOLE SODIUM 40 MG/1
40 TABLET, DELAYED RELEASE ORAL DAILY
Status: DISCONTINUED | OUTPATIENT
Start: 2023-07-19 | End: 2023-07-21 | Stop reason: HOSPADM

## 2023-07-19 RX ORDER — METOPROLOL SUCCINATE 25 MG/1
25 TABLET, EXTENDED RELEASE ORAL DAILY
Status: DISCONTINUED | OUTPATIENT
Start: 2023-07-19 | End: 2023-07-21

## 2023-07-19 RX ORDER — LORAZEPAM 2 MG/ML
1 INJECTION INTRAMUSCULAR ONCE
Status: COMPLETED | OUTPATIENT
Start: 2023-07-19 | End: 2023-07-19

## 2023-07-19 RX ORDER — LORAZEPAM 2 MG/ML
1 INJECTION INTRAMUSCULAR
Status: COMPLETED | OUTPATIENT
Start: 2023-07-19 | End: 2023-07-19

## 2023-07-19 RX ORDER — TAMSULOSIN HYDROCHLORIDE 0.4 MG/1
0.4 CAPSULE ORAL DAILY
Status: DISCONTINUED | OUTPATIENT
Start: 2023-07-19 | End: 2023-07-21 | Stop reason: HOSPADM

## 2023-07-19 RX ORDER — CHLORDIAZEPOXIDE HYDROCHLORIDE 25 MG/1
25 CAPSULE, GELATIN COATED ORAL 3 TIMES DAILY
Status: DISCONTINUED | OUTPATIENT
Start: 2023-07-19 | End: 2023-07-21 | Stop reason: HOSPADM

## 2023-07-19 RX ADMIN — METOPROLOL SUCCINATE 25 MG: 25 TABLET, EXTENDED RELEASE ORAL at 11:07

## 2023-07-19 RX ADMIN — AMLODIPINE BESYLATE 10 MG: 10 TABLET ORAL at 11:07

## 2023-07-19 RX ADMIN — PANTOPRAZOLE SODIUM 40 MG: 40 TABLET, DELAYED RELEASE ORAL at 11:07

## 2023-07-19 RX ADMIN — CHLORDIAZEPOXIDE HYDROCHLORIDE 25 MG: 25 CAPSULE ORAL at 08:07

## 2023-07-19 RX ADMIN — LORAZEPAM 1 MG: 0.5 TABLET ORAL at 04:07

## 2023-07-19 RX ADMIN — ASPIRIN 81 MG: 81 TABLET, COATED ORAL at 11:07

## 2023-07-19 RX ADMIN — LORAZEPAM 1 MG: 2 INJECTION INTRAMUSCULAR; INTRAVENOUS at 09:07

## 2023-07-19 RX ADMIN — FOLIC ACID: 5 INJECTION, SOLUTION INTRAMUSCULAR; INTRAVENOUS; SUBCUTANEOUS at 05:07

## 2023-07-19 RX ADMIN — SODIUM CHLORIDE 3564 ML: 9 INJECTION, SOLUTION INTRAVENOUS at 10:07

## 2023-07-19 RX ADMIN — CHLORDIAZEPOXIDE HYDROCHLORIDE 25 MG: 25 CAPSULE ORAL at 01:07

## 2023-07-19 RX ADMIN — LORAZEPAM 1 MG: 0.5 TABLET ORAL at 08:07

## 2023-07-19 RX ADMIN — LORAZEPAM 2 MG: 2 INJECTION INTRAMUSCULAR; INTRAVENOUS at 10:07

## 2023-07-19 RX ADMIN — CEFTRIAXONE 1 G: 1 INJECTION, POWDER, FOR SOLUTION INTRAMUSCULAR; INTRAVENOUS at 10:07

## 2023-07-19 RX ADMIN — NICOTINE 1 PATCH: 21 PATCH, EXTENDED RELEASE TRANSDERMAL at 11:07

## 2023-07-19 RX ADMIN — TAMSULOSIN HYDROCHLORIDE 0.4 MG: 0.4 CAPSULE ORAL at 11:07

## 2023-07-19 RX ADMIN — LORAZEPAM 1 MG: 2 INJECTION INTRAMUSCULAR; INTRAVENOUS at 12:07

## 2023-07-19 RX ADMIN — ACETAMINOPHEN 650 MG: 325 TABLET ORAL at 08:07

## 2023-07-19 NOTE — PLAN OF CARE
07/19/23 1533   Readmission   Why were you hospitalized in the last 30 days? yes   Why were you readmitted? Related to previous admission   When you left the hospital how did you feel? Felt better but not fully 100% better   When you left the hospital where did you go? Home Alone   Did patient/caregiver refused recommended DC plan? No   Tell me about what happened between when you left the hospital and the day you returned. running a fever everday, lost of apetite, alcohol comsumption has gone up.   When did you start not feeling well? today   Did you try to manage your symptoms your self? Yes   What did you do? cutting back on drinking, took meds   Did you call anyone? No   Why? just came straight to the er   Did you try to see or did see a doctor or nurse before you came? No   Did you have  a follow-up appointment on discharge? No   Was this a planned readmission? No

## 2023-07-19 NOTE — H&P
[facility] Medicine  History & Physical    Patient Name: Valerio Yan  MRN: [id number]  Patient Class: OP- Observation  Admission Date: 7/19/2023  Attending Physician: Andrea Astorga MD  Primary Care Provider: Therese Lala MD         Patient information was obtained from patient and ER records.     Subjective:     Principal Problem:Pyelonephritis    Chief Complaint:   Chief Complaint   Patient presents with    Fatigue     Pt. Reports he has been feeling shaky and feeling weak since he was released from the hospital for urosepsis last month.         HPI: Patient is a 52 y.o. aa male with a PMH of HTN, HLD, COPD, and alcoholism who presents to the Emergency Department for generalized weakness, onset 2 weeks PTA. Pt states that he has been feeling weak since he was admitted for pyelonephritis on 6/30/23. He reports having recent c-scope and egd performed. EGD showed Barretts disease and c-scope unremarkable. Patient reports having fever, chills, and back pain. He states he took his antibiotics for UTI. Most recent urine cultures grew enterococcus and ecoli. He reveals having alcohol withdrawal symptoms. States he has been drinking heavily for the last few weeks. Does report having tremors whenever he stops drinking. Last drink was yesterday. Upon further questioning he reports having dark tarry stools for the past few days.     In the Ed, u/a indicative of UTI. He was bolused fluids and started on rocephin. Patient placed in observation.       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    Tracheostomy status 12/29/2021    Transfusion reaction     1 x  to PRBC fever       Past Surgical History:   Procedure Laterality Date    ANGIOGRAM, CORONARY, WITH LEFT HEART CATHETERIZATION  12/10/2020    Procedure: Angiogram, Coronary, with Left Heart Cath;  Surgeon: Tomi Mir MD;  Location: [facility] CATH LAB;  Service: Cardiology;;    barrette esophagus      COLONOSCOPY N/A 5/17/2018    Procedure: COLONOSCOPY;  Surgeon: Ilan Araya III, MD;  Location: [facility] ENDO;  Service: Endoscopy;  Laterality: N/A;    COLONOSCOPY N/A 6/28/2023    Procedure: COLONOSCOPY;  Surgeon: Colby Rodriguez MD;  Location: [facility];  Service: Endoscopy;  Laterality: N/A;    decubitus surgery      to sacral    ESOPHAGOGASTRODUODENOSCOPY      ESOPHAGOGASTRODUODENOSCOPY N/A 6/17/2021    Procedure: EGD (ESOPHAGOGASTRODUODENOSCOPY);  Surgeon: Ban Zheng MD;  Location: [facility];  Service: Endoscopy;  Laterality: N/A;    ESOPHAGOGASTRODUODENOSCOPY N/A 6/28/2023    Procedure: EGD (ESOPHAGOGASTRODUODENOSCOPY);  Surgeon: Colby Rodriguez MD;  Location: [facility];  Service: Endoscopy;  Laterality: N/A;    GASTROSTOMY TUBE PLACEMENT      KNEE ARTHROSCOPY Left 2002    LEFT HEART CATHETERIZATION Left 12/10/2020    Procedure: CATHETERIZATION, HEART, LEFT;  Surgeon: Tomi Mir MD;  Location: [facility] CATH LAB;  Service: Cardiology;  Laterality: Left;  730 start time    PEG TUBE REMOVAL      RADIOFREQUENCY ABLATION      RFA      ROBOT-ASSISTED CHOLECYSTECTOMY USING DA GABRIELA XI N/A 5/2/2019    Procedure: XI ROBOTIC CHOLECYSTECTOMY;  Surgeon: Valerio Lai MD;  Location: [facility] OR;  Service: General;  Laterality: N/A;    JUAN-EN-Y PROCEDURE      TONSILLECTOMY      TRACHEAL SURGERY         Review of patient's allergies indicates:   Allergen Reactions    Metoclopramide Other (See Comments) and  "Hives     Pt. Was in coma so is not aware of the reaction  Pt states "he don't know, was in coma"      Metoclopramide (bulk)      Other reaction(s): Unable to obtain    Reglan  [metoclopramide hcl] Other (See Comments)     Pt. Was in coma so is not aware of the reaction  Other reaction(s): Unable to obtain    Statins-hmg-coa reductase inhibitors      Myalgia      Sulfa (sulfonamide antibiotics) Other (See Comments)     Never taken  States son is allergic    Latex Hives, Itching and Rash           Latex, natural rubber Rash     Blisters, adhesives          Current Facility-Administered Medications on File Prior to Encounter   Medication    lactated ringers infusion     Current Outpatient Medications on File Prior to Encounter   Medication Sig    alirocumab (PRALUENT PEN) 150 mg/mL PnIj INJECT 1.06MLS ( 159 MG TOTAL ) BY ABDOMINAL SUBCUTANEOUS ROUTE EVERY 14 DAYS    amLODIPine (NORVASC) 5 MG tablet Take 1 tablet (5 mg total) by mouth once daily. (Patient taking differently: Take 5 mg by mouth every evening.)    aspirin (ECOTRIN) 81 MG EC tablet Take 81 mg by mouth once daily.    calcium-vitamin D 600 mg, 1,500mg,-200 unit 600 mg(1,500mg) -200 unit Tab Take 1 tablet by mouth once daily.     cetirizine (ZYRTEC) 10 MG tablet Take 1 tablet (10 mg total) by mouth once daily.    cyanocobalamin (VITAMIN B-12) 1,000 mcg/mL injection Inject 1 mL (1,000 mcg total) into the muscle every 30 days.    esomeprazole (NEXIUM) 20 MG capsule Take 2 capsules (40 mg total) by mouth before breakfast.    fluticasone propionate (FLONASE) 50 mcg/actuation nasal spray 1 spray (50 mcg total) by Each Nostril route once daily.    ibuprofen (ADVIL,MOTRIN) 800 MG tablet Take 1 tablet (800 mg total) by mouth every 6 (six) hours as needed.    isosorbide mononitrate (IMDUR) 30 MG 24 hr tablet Take 1 tablet (30 mg total) by mouth once daily. (Patient taking differently: Take 30 mg by mouth every evening.)    " lisinopriL-hydrochlorothiazide (PRINZIDE,ZESTORETIC) 20-12.5 mg per tablet Take 2 tablets by mouth once daily.    metoprolol succinate (TOPROL-XL) 25 MG 24 hr tablet Take 1 tablet (25 mg total) by mouth once daily.    multivitamin (THERAGRAN) per tablet Take 1 tablet by mouth nightly.     furosemide (LASIX) 20 MG tablet Take 1 tablet (20 mg total) by mouth once daily. (Patient not taking: Reported on 7/19/2023)    tamsulosin (FLOMAX) 0.4 mg Cap Take 1 capsule (0.4 mg total) by mouth once daily. for 20 days    [DISCONTINUED] mupirocin (BACTROBAN) 2 % ointment Apply topically 2 (two) times daily.    [DISCONTINUED] ondansetron (ZOFRAN) 4 MG tablet Take 1 tablet (4 mg total) by mouth every 6 (six) hours as needed for Nausea.     Family History       Problem Relation (Age of Onset)    Heart attack Mother, Father    Heart disease Mother, Father          Tobacco Use    Smoking status: Every Day     Packs/day: 1.00     Years: 32.00     Pack years: 32.00     Types: Cigarettes     Start date: 4/4/1988     Last attempt to quit: 5/1/2020     Years since quitting: 3.2    Smokeless tobacco: Former     Types: Snuff     Quit date: 1/6/1999   Substance and Sexual Activity    Alcohol use: Not Currently     Alcohol/week: 14.0 standard drinks     Types: 14 Glasses of wine per week     Comment: no alcohol for past week, usually 7 drinks/week    Drug use: No    Sexual activity: Yes     Partners: Female     Review of Systems   Constitutional:  Positive for chills, fatigue and fever.   HENT:  Negative for sinus pressure.    Eyes:  Negative for visual disturbance.   Respiratory:  Negative for shortness of breath.    Cardiovascular:  Negative for chest pain.   Gastrointestinal:  Positive for blood in stool. Negative for nausea and vomiting.   Genitourinary:  Positive for flank pain. Negative for difficulty urinating.   Musculoskeletal:  Negative for back pain.   Skin:  Negative for rash.   Neurological:  Negative for headaches.    Psychiatric/Behavioral:  Negative for confusion.    Objective:     Vital Signs (Most Recent):  Temp: 98.4 °F (36.9 °C) (07/19/23 1251)  Pulse: 88 (07/19/23 1413)  Resp: 18 (07/19/23 1413)  BP: (!) 161/92 (07/19/23 1413)  SpO2: 97 % (07/19/23 1434) Vital Signs (24h Range):  Temp:  [98.2 °F (36.8 °C)-98.4 °F (36.9 °C)] 98.4 °F (36.9 °C)  Pulse:  [57-96] 88  Resp:  [14-22] 18  SpO2:  [96 %-100 %] 97 %  BP: (142-194)/() 161/92     Weight: 121.2 kg (267 lb 3.2 oz)  Body mass index is 31.68 kg/m².     Physical Exam  Constitutional:       General: He is not in acute distress.     Appearance: He is well-developed. He is not diaphoretic.   HENT:      Head: Normocephalic and atraumatic.   Eyes:      Pupils: Pupils are equal, round, and reactive to light.   Cardiovascular:      Rate and Rhythm: Normal rate and regular rhythm.      Heart sounds: Normal heart sounds. No murmur heard.    No friction rub. No gallop.   Pulmonary:      Effort: Pulmonary effort is normal. No respiratory distress.      Breath sounds: Normal breath sounds. No stridor. No wheezing or rales.   Abdominal:      General: Bowel sounds are normal. There is no distension.      Palpations: Abdomen is soft. There is no mass.      Tenderness: There is no abdominal tenderness. There is no right CVA tenderness, left CVA tenderness or guarding.   Musculoskeletal:      Right lower leg: No edema.      Left lower leg: No edema.   Skin:     General: Skin is warm.      Findings: No erythema.   Neurological:      Mental Status: He is alert and oriented to person, place, and time.            CRANIAL NERVES     CN III, IV, VI   Pupils are equal, round, and reactive to light.     Significant Labs:   Results for orders placed or performed during the hospital encounter of 07/19/23   CBC auto differential   Result Value Ref Range    WBC 5.55 3.90 - 12.70 K/uL    RBC 5.19 4.60 - 6.20 M/uL    Hemoglobin 16.2 14.0 - 18.0 g/dL    Hematocrit 47.6 40.0 - 54.0 %    MCV 92 82 -  98 fL    MCH 31.2 (H) 27.0 - 31.0 pg    MCHC 34.0 32.0 - 36.0 g/dL    RDW 14.9 (H) 11.5 - 14.5 %    Platelets 137 (L) 150 - 450 K/uL    MPV 10.6 9.2 - 12.9 fL    Immature Granulocytes 0.4 0.0 - 0.5 %    Gran # (ANC) 3.4 1.8 - 7.7 K/uL    Immature Grans (Abs) 0.02 0.00 - 0.04 K/uL    Lymph # 1.5 1.0 - 4.8 K/uL    Mono # 0.5 0.3 - 1.0 K/uL    Eos # 0.1 0.0 - 0.5 K/uL    Baso # 0.04 0.00 - 0.20 K/uL    nRBC 0 0 /100 WBC    Gran % 61.7 38.0 - 73.0 %    Lymph % 26.8 18.0 - 48.0 %    Mono % 9.5 4.0 - 15.0 %    Eosinophil % 0.9 0.0 - 8.0 %    Basophil % 0.7 0.0 - 1.9 %    Differential Method Automated    Comprehensive metabolic panel   Result Value Ref Range    Sodium 140 136 - 145 mmol/L    Potassium 4.0 3.5 - 5.1 mmol/L    Chloride 102 95 - 110 mmol/L    CO2 23 23 - 29 mmol/L    Glucose 102 70 - 110 mg/dL    BUN 5 (L) 6 - 20 mg/dL    Creatinine 0.6 0.5 - 1.4 mg/dL    Calcium 8.6 (L) 8.7 - 10.5 mg/dL    Total Protein 7.2 6.0 - 8.4 g/dL    Albumin 3.7 3.5 - 5.2 g/dL    Total Bilirubin 0.5 0.1 - 1.0 mg/dL    Alkaline Phosphatase 170 (H) 55 - 135 U/L    AST 30 10 - 40 U/L    ALT 24 10 - 44 U/L    eGFR >60 >60 mL/min/1.73 m^2    Anion Gap 15 8 - 16 mmol/L   Lactic acid, plasma #1   Result Value Ref Range    Lactate (Lactic Acid) 2.2 0.5 - 2.2 mmol/L   Urinalysis, Reflex to Urine Culture Urine, Clean Catch    Specimen: Urine   Result Value Ref Range    Specimen UA Urine, Clean Catch     Color, UA Yellow Yellow, Straw, Beatris    Appearance, UA Clear Clear    pH, UA 6.0 5.0 - 8.0    Specific Gravity, UA 1.015 1.005 - 1.030    Protein, UA Negative Negative    Glucose, UA Negative Negative    Ketones, UA Negative Negative    Bilirubin (UA) Negative Negative    Occult Blood UA 2+ (A) Negative    Nitrite, UA Positive (A) Negative    Urobilinogen, UA Negative <2.0 EU/dL    Leukocytes, UA 3+ (A) Negative   Brain natriuretic peptide   Result Value Ref Range    BNP 17 0 - 99 pg/mL   Lipase   Result Value Ref Range    Lipase 37 4 - 60 U/L    Troponin I   Result Value Ref Range    Troponin I <0.006 0.000 - 0.026 ng/mL   Procalcitonin   Result Value Ref Range    Procalcitonin <0.02 <0.25 ng/mL   Urinalysis Microscopic   Result Value Ref Range    RBC, UA 2 0 - 4 /hpf    WBC, UA >100 (H) 0 - 5 /hpf    WBC Clumps, UA Few (A) None-Rare    Bacteria Many (A) None-Occ /hpf    Squam Epithel, UA 2 /hpf    Microscopic Comment SEE COMMENT    Lactic acid, plasma #2   Result Value Ref Range    Lactate (Lactic Acid) 2.8 (H) 0.5 - 2.2 mmol/L   Occult blood x 1, stool    Specimen: Stool   Result Value Ref Range    Occult Blood Negative Negative        Significant Imaging: X-Ray Chest AP Portable  Narrative: EXAMINATION:  XR CHEST AP PORTABLE    CLINICAL HISTORY:  Sepsis;    FINDINGS:  Comparison: June 30, 2023.    Mediastinal silhouette is within normal limits.  The lungs are clear.  No pneumothorax or pleural effusion.  No acute osseous finding.  Impression: No acute finding in the chest.    Electronically signed by: Dat Davidson  Date:    07/19/2023  Time:    09:10        Assessment/Plan:     * Pyelonephritis  Will cont on rocephin  Urine culture pending        Black stools  Pt reports having black tarry stool  However heme occult was negative  hgb 16   Will trend h/h  Cont PPI  Pt had recent EGD  However given hx of alcoholism  Pt may have ulcers  Will keep NPO at midnight in event H&H trend down  Given h&h and heme/occult negative will not   Treat as gi bleed for the time being        Alcoholism  Counseled on cessation   Librium tid   Will plan to dc with taper  Banana bag  Ativan PRN       Hypertension  Resume home meds  Hydralazine PRN       Ozuna esophagus  Cont ppi       VTE Risk Mitigation (From admission, onward)         Ordered     Place sequential compression device  Until discontinued         07/19/23 1630                   On 07/19/2023, patient should be placed in hospital observation services under my care.        Andrea Astorga MD  Department of Hospital  Medicine  [facility]

## 2023-07-19 NOTE — HPI
Patient is a 52 y.o. aa male with a PMH of HTN, HLD, COPD, and alcoholism who presents to the Emergency Department for generalized weakness, onset 2 weeks PTA. Pt states that he has been feeling weak since he was admitted for pyelonephritis on 6/30/23. He reports having recent c-scope and egd performed. EGD showed Barretts disease and c-scope unremarkable. Patient reports having fever, chills, and back pain. He states he took his antibiotics for UTI. Most recent urine cultures grew enterococcus and ecoli. He reveals having alcohol withdrawal symptoms. States he has been drinking heavily for the last few weeks. Does report having tremors whenever he stops drinking. Last drink was yesterday. Upon further questioning he reports having dark tarry stools for the past few days.     In the Ed, u/a indicative of UTI. He was bolused fluids and started on rocephin. Patient placed in observation.

## 2023-07-19 NOTE — SUBJECTIVE & OBJECTIVE
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 [place] CATH LAB;  Service: Cardiology;;    barrette esophagus      COLONOSCOPY N/A 5/17/2018    Procedure: COLONOSCOPY;  Surgeon: Ilan Araya III, MD;  Location: [facility];  Service: Endoscopy;  Laterality: N/A;    COLONOSCOPY N/A 6/28/2023    Procedure: COLONOSCOPY;  Surgeon: Colby Rodriguez MD;  Location: [facility];  Service: Endoscopy;  Laterality: N/A;    decubitus surgery      to sacral    ESOPHAGOGASTRODUODENOSCOPY      ESOPHAGOGASTRODUODENOSCOPY N/A 6/17/2021    Procedure: EGD (ESOPHAGOGASTRODUODENOSCOPY);  Surgeon: Ban Zheng MD;  Location: [facility];  Service: Endoscopy;  Laterality: N/A;    ESOPHAGOGASTRODUODENOSCOPY N/A 6/28/2023    Procedure: EGD (ESOPHAGOGASTRODUODENOSCOPY);  Surgeon: Colby Rodriguez MD;  Location: [facility];  Service: Endoscopy;  Laterality: N/A;    GASTROSTOMY TUBE PLACEMENT      KNEE ARTHROSCOPY Left 2002    LEFT HEART CATHETERIZATION Left 12/10/2020    Procedure: CATHETERIZATION, HEART, LEFT;  Surgeon: Tomi Mir MD;  Location: [facility] [place] CATH LAB;  Service: Cardiology;  Laterality: Left;  730 start time  "   PEG TUBE REMOVAL      RADIOFREQUENCY ABLATION      RFA      ROBOT-ASSISTED CHOLECYSTECTOMY USING DA GABRIELA XI N/A 5/2/2019    Procedure: XI ROBOTIC CHOLECYSTECTOMY;  Surgeon: Valerio Lai MD;  Location: [facility];  Service: General;  Laterality: N/A;    JUAN-EN-Y PROCEDURE      TONSILLECTOMY      TRACHEAL SURGERY         Review of patient's allergies indicates:   Allergen Reactions    Metoclopramide Other (See Comments) and Hives     Pt. Was in coma so is not aware of the reaction  Pt states "he don't know, was in coma"      Metoclopramide (bulk)      Other reaction(s): Unable to obtain    Reglan  [metoclopramide hcl] Other (See Comments)     Pt. Was in coma so is not aware of the reaction  Other reaction(s): Unable to obtain    Statins-hmg-coa reductase inhibitors      Myalgia      Sulfa (sulfonamide antibiotics) Other (See Comments)     Never taken  States son is allergic    Latex Hives, Itching and Rash           Latex, natural rubber Rash     Blisters, adhesives          Current Facility-Administered Medications on File Prior to Encounter   Medication    lactated ringers infusion     Current Outpatient Medications on File Prior to Encounter   Medication Sig    alirocumab (PRALUENT PEN) 150 mg/mL PnIj INJECT 1.06MLS ( 159 MG TOTAL ) BY ABDOMINAL SUBCUTANEOUS ROUTE EVERY 14 DAYS    amLODIPine (NORVASC) 5 MG tablet Take 1 tablet (5 mg total) by mouth once daily. (Patient taking differently: Take 5 mg by mouth every evening.)    aspirin (ECOTRIN) 81 MG EC tablet Take 81 mg by mouth once daily.    calcium-vitamin D 600 mg, 1,500mg,-200 unit 600 mg(1,500mg) -200 unit Tab Take 1 tablet by mouth once daily.     cetirizine (ZYRTEC) 10 MG tablet Take 1 tablet (10 mg total) by mouth once daily.    cyanocobalamin (VITAMIN B-12) 1,000 mcg/mL injection Inject 1 mL (1,000 mcg total) into the muscle every 30 days.    esomeprazole (NEXIUM) 20 MG capsule Take 2 capsules (40 mg total) by mouth before breakfast.    fluticasone " propionate (FLONASE) 50 mcg/actuation nasal spray 1 spray (50 mcg total) by Each Nostril route once daily.    ibuprofen (ADVIL,MOTRIN) 800 MG tablet Take 1 tablet (800 mg total) by mouth every 6 (six) hours as needed.    isosorbide mononitrate (IMDUR) 30 MG 24 hr tablet Take 1 tablet (30 mg total) by mouth once daily. (Patient taking differently: Take 30 mg by mouth every evening.)    lisinopriL-hydrochlorothiazide (PRINZIDE,ZESTORETIC) 20-12.5 mg per tablet Take 2 tablets by mouth once daily.    metoprolol succinate (TOPROL-XL) 25 MG 24 hr tablet Take 1 tablet (25 mg total) by mouth once daily.    multivitamin (THERAGRAN) per tablet Take 1 tablet by mouth nightly.     furosemide (LASIX) 20 MG tablet Take 1 tablet (20 mg total) by mouth once daily. (Patient not taking: Reported on 7/19/2023)    tamsulosin (FLOMAX) 0.4 mg Cap Take 1 capsule (0.4 mg total) by mouth once daily. for 20 days    [DISCONTINUED] mupirocin (BACTROBAN) 2 % ointment Apply topically 2 (two) times daily.    [DISCONTINUED] ondansetron (ZOFRAN) 4 MG tablet Take 1 tablet (4 mg total) by mouth every 6 (six) hours as needed for Nausea.     Family History       Problem Relation (Age of Onset)    Heart attack Mother, Father    Heart disease Mother, Father          Tobacco Use    Smoking status: Every Day     Packs/day: 1.00     Years: 32.00     Pack years: 32.00     Types: Cigarettes     Start date: 4/4/1988     Last attempt to quit: 5/1/2020     Years since quitting: 3.2    Smokeless tobacco: Former     Types: Snuff     Quit date: 1/6/1999   Substance and Sexual Activity    Alcohol use: Not Currently     Alcohol/week: 14.0 standard drinks     Types: 14 Glasses of wine per week     Comment: no alcohol for past week, usually 7 drinks/week    Drug use: No    Sexual activity: Yes     Partners: Female     Review of Systems   Constitutional:  Positive for chills, fatigue and fever.   HENT:  Negative for sinus pressure.    Eyes:  Negative for visual  disturbance.   Respiratory:  Negative for shortness of breath.    Cardiovascular:  Negative for chest pain.   Gastrointestinal:  Positive for blood in stool. Negative for nausea and vomiting.   Genitourinary:  Positive for flank pain. Negative for difficulty urinating.   Musculoskeletal:  Negative for back pain.   Skin:  Negative for rash.   Neurological:  Negative for headaches.   Psychiatric/Behavioral:  Negative for confusion.    Objective:     Vital Signs (Most Recent):  Temp: 98.4 °F (36.9 °C) (07/19/23 1251)  Pulse: 88 (07/19/23 1413)  Resp: 18 (07/19/23 1413)  BP: (!) 161/92 (07/19/23 1413)  SpO2: 97 % (07/19/23 1434) Vital Signs (24h Range):  Temp:  [98.2 °F (36.8 °C)-98.4 °F (36.9 °C)] 98.4 °F (36.9 °C)  Pulse:  [57-96] 88  Resp:  [14-22] 18  SpO2:  [96 %-100 %] 97 %  BP: (142-194)/() 161/92     Weight: 121.2 kg (267 lb 3.2 oz)  Body mass index is 31.68 kg/m².     Physical Exam  Constitutional:       General: He is not in acute distress.     Appearance: He is well-developed. He is not diaphoretic.   HENT:      Head: Normocephalic and atraumatic.   Eyes:      Pupils: Pupils are equal, round, and reactive to light.   Cardiovascular:      Rate and Rhythm: Normal rate and regular rhythm.      Heart sounds: Normal heart sounds. No murmur heard.    No friction rub. No gallop.   Pulmonary:      Effort: Pulmonary effort is normal. No respiratory distress.      Breath sounds: Normal breath sounds. No stridor. No wheezing or rales.   Abdominal:      General: Bowel sounds are normal. There is no distension.      Palpations: Abdomen is soft. There is no mass.      Tenderness: There is no abdominal tenderness. There is no right CVA tenderness, left CVA tenderness or guarding.   Musculoskeletal:      Right lower leg: No edema.      Left lower leg: No edema.   Skin:     General: Skin is warm.      Findings: No erythema.   Neurological:      Mental Status: He is alert and oriented to person, place, and time.             CRANIAL NERVES     CN III, IV, VI   Pupils are equal, round, and reactive to light.     Significant Labs:   Results for orders placed or performed during the hospital encounter of 07/19/23   CBC auto differential   Result Value Ref Range    WBC 5.55 3.90 - 12.70 K/uL    RBC 5.19 4.60 - 6.20 M/uL    Hemoglobin 16.2 14.0 - 18.0 g/dL    Hematocrit 47.6 40.0 - 54.0 %    MCV 92 82 - 98 fL    MCH 31.2 (H) 27.0 - 31.0 pg    MCHC 34.0 32.0 - 36.0 g/dL    RDW 14.9 (H) 11.5 - 14.5 %    Platelets 137 (L) 150 - 450 K/uL    MPV 10.6 9.2 - 12.9 fL    Immature Granulocytes 0.4 0.0 - 0.5 %    Gran # (ANC) 3.4 1.8 - 7.7 K/uL    Immature Grans (Abs) 0.02 0.00 - 0.04 K/uL    Lymph # 1.5 1.0 - 4.8 K/uL    Mono # 0.5 0.3 - 1.0 K/uL    Eos # 0.1 0.0 - 0.5 K/uL    Baso # 0.04 0.00 - 0.20 K/uL    nRBC 0 0 /100 WBC    Gran % 61.7 38.0 - 73.0 %    Lymph % 26.8 18.0 - 48.0 %    Mono % 9.5 4.0 - 15.0 %    Eosinophil % 0.9 0.0 - 8.0 %    Basophil % 0.7 0.0 - 1.9 %    Differential Method Automated    Comprehensive metabolic panel   Result Value Ref Range    Sodium 140 136 - 145 mmol/L    Potassium 4.0 3.5 - 5.1 mmol/L    Chloride 102 95 - 110 mmol/L    CO2 23 23 - 29 mmol/L    Glucose 102 70 - 110 mg/dL    BUN 5 (L) 6 - 20 mg/dL    Creatinine 0.6 0.5 - 1.4 mg/dL    Calcium 8.6 (L) 8.7 - 10.5 mg/dL    Total Protein 7.2 6.0 - 8.4 g/dL    Albumin 3.7 3.5 - 5.2 g/dL    Total Bilirubin 0.5 0.1 - 1.0 mg/dL    Alkaline Phosphatase 170 (H) 55 - 135 U/L    AST 30 10 - 40 U/L    ALT 24 10 - 44 U/L    eGFR >60 >60 mL/min/1.73 m^2    Anion Gap 15 8 - 16 mmol/L   Lactic acid, plasma #1   Result Value Ref Range    Lactate (Lactic Acid) 2.2 0.5 - 2.2 mmol/L   Urinalysis, Reflex to Urine Culture Urine, Clean Catch    Specimen: Urine   Result Value Ref Range    Specimen UA Urine, Clean Catch     Color, UA Yellow Yellow, Straw, Beatris    Appearance, UA Clear Clear    pH, UA 6.0 5.0 - 8.0    Specific Gravity, UA 1.015 1.005 - 1.030    Protein, UA Negative  Negative    Glucose, UA Negative Negative    Ketones, UA Negative Negative    Bilirubin (UA) Negative Negative    Occult Blood UA 2+ (A) Negative    Nitrite, UA Positive (A) Negative    Urobilinogen, UA Negative <2.0 EU/dL    Leukocytes, UA 3+ (A) Negative   Brain natriuretic peptide   Result Value Ref Range    BNP 17 0 - 99 pg/mL   Lipase   Result Value Ref Range    Lipase 37 4 - 60 U/L   Troponin I   Result Value Ref Range    Troponin I <0.006 0.000 - 0.026 ng/mL   Procalcitonin   Result Value Ref Range    Procalcitonin <0.02 <0.25 ng/mL   Urinalysis Microscopic   Result Value Ref Range    RBC, UA 2 0 - 4 /hpf    WBC, UA >100 (H) 0 - 5 /hpf    WBC Clumps, UA Few (A) None-Rare    Bacteria Many (A) None-Occ /hpf    Squam Epithel, UA 2 /hpf    Microscopic Comment SEE COMMENT    Lactic acid, plasma #2   Result Value Ref Range    Lactate (Lactic Acid) 2.8 (H) 0.5 - 2.2 mmol/L   Occult blood x 1, stool    Specimen: Stool   Result Value Ref Range    Occult Blood Negative Negative        Significant Imaging: X-Ray Chest AP Portable  Narrative: EXAMINATION:  XR CHEST AP PORTABLE    CLINICAL HISTORY:  Sepsis;    FINDINGS:  Comparison: June 30, 2023.    Mediastinal silhouette is within normal limits.  The lungs are clear.  No pneumothorax or pleural effusion.  No acute osseous finding.  Impression: No acute finding in the chest.    Electronically signed by: Dat Davidson  Date:    07/19/2023  Time:    09:10

## 2023-07-19 NOTE — ASSESSMENT & PLAN NOTE
Pt reports having black tarry stool  However heme occult was negative  hgb 16   Will trend h/h  Cont PPI  Pt had recent EGD  However given hx of alcoholism  Pt may have ulcers  Will keep NPO at midnight in event H&H trend down  Given h&h and heme/occult negative will not   Treat as gi bleed for the time being

## 2023-07-19 NOTE — PHARMACY MED REC
"Admission Medication History     The home medication history was taken by Tom Crawford.    You may go to "Admission" then "Reconcile Home Medications" tabs to review and/or act upon these items.     The home medication list has been updated by the Pharmacy department.   Please read ALL comments highlighted in yellow.   Please address this information as you see fit.    Feel free to contact us if you have any questions or require assistance.      The medications listed below were removed from the home medication list. Please reorder if appropriate:  Patient reports no longer taking the following medication(s):  ZOFRAN 4 MG  BACTROBAN OINTMENT    Medications listed below were obtained from: Patient/family and Analytic software- [employer]  (Not in a hospital admission)        Tom Crawford  [id number]    Current Outpatient Medications on File Prior to Encounter   Medication Sig Dispense Refill Last Dose    alirocumab (PRALUENT PEN) 150 mg/mL PnIj INJECT 1.06MLS ( 159 MG TOTAL ) BY ABDOMINAL SUBCUTANEOUS ROUTE EVERY 14 DAYS 2 mL 12 7/19/2023    amLODIPine (NORVASC) 5 MG tablet Take 1 tablet (5 mg total) by mouth once daily. (Patient taking differently: Take 5 mg by mouth every evening.) 90 tablet 4 Past Month    aspirin (ECOTRIN) 81 MG EC tablet Take 81 mg by mouth once daily.   7/19/2023    calcium-vitamin D 600 mg, 1,500mg,-200 unit 600 mg(1,500mg) -200 unit Tab Take 1 tablet by mouth once daily.    7/19/2023    cetirizine (ZYRTEC) 10 MG tablet Take 1 tablet (10 mg total) by mouth once daily. 30 tablet 5 7/19/2023    cyanocobalamin (VITAMIN B-12) 1,000 mcg/mL injection Inject 1 mL (1,000 mcg total) into the muscle every 30 days. 10 mL 0 7/18/2023    esomeprazole (NEXIUM) 20 MG capsule Take 2 capsules (40 mg total) by mouth before breakfast. 60 capsule 11 7/19/2023    fluticasone propionate (FLONASE) 50 mcg/actuation nasal spray 1 spray (50 mcg total) by Each Nostril route once daily. 16 g 1 Past Month    " ibuprofen (ADVIL,MOTRIN) 800 MG tablet Take 1 tablet (800 mg total) by mouth every 6 (six) hours as needed. 30 tablet 1 Past Week    isosorbide mononitrate (IMDUR) 30 MG 24 hr tablet Take 1 tablet (30 mg total) by mouth once daily. (Patient taking differently: Take 30 mg by mouth every evening.) 90 tablet 3 7/18/2023    lisinopriL-hydrochlorothiazide (PRINZIDE,ZESTORETIC) 20-12.5 mg per tablet Take 2 tablets by mouth once daily. 180 tablet 3 7/19/2023    metoprolol succinate (TOPROL-XL) 25 MG 24 hr tablet Take 1 tablet (25 mg total) by mouth once daily. 90 tablet 1 7/19/2023    multivitamin (THERAGRAN) per tablet Take 1 tablet by mouth nightly.    7/19/2023    furosemide (LASIX) 20 MG tablet Take 1 tablet (20 mg total) by mouth once daily. (Patient not taking: Reported on 7/19/2023) 180 tablet 1 Not Taking    tamsulosin (FLOMAX) 0.4 mg Cap Take 1 capsule (0.4 mg total) by mouth once daily. for 20 days 20 capsule 0    .

## 2023-07-19 NOTE — ED PROVIDER NOTES
"SCRIBE #1 NOTE: I, Cecil Rojas, am scribing for, and in the presence of, Andrew Conteh MD. I have scribed the entire note.      History      Chief Complaint   Patient presents with    Fatigue     Pt. Reports he has been feeling shaky and feeling weak since he was released from the hospital for urosepsis last month.        Review of patient's allergies indicates:   Allergen Reactions    Metoclopramide Other (See Comments) and Hives     Pt. Was in coma so is not aware of the reaction  Pt states "he don't know, was in coma"      Metoclopramide (bulk)      Other reaction(s): Unable to obtain    Reglan  [metoclopramide hcl] Other (See Comments)     Pt. Was in coma so is not aware of the reaction  Other reaction(s): Unable to obtain    Statins-hmg-coa reductase inhibitors      Myalgia      Sulfa (sulfonamide antibiotics) Other (See Comments)     Never taken  States son is allergic    Latex Hives, Itching and Rash           Latex, natural rubber Rash     Blisters, adhesives           HPI   HPI    7/19/2023, 8:28 AM   History obtained from the patient      History of Present Illness: Valerio Yan is a 52 y.o. male patient who presents to the Emergency Department for generalized weakness, onset 2 weeks PTA. Pt states that he has been feeling weak since he was admitted for pyelonephritis on 6/30/23. Symptoms are constant and moderate in severity. No mitigating or exacerbating factors reported. Associated sxs include n/v and lightheadedness; pt is concerned that he might also be withdrawing from alcohol, noting that his last drink was last night. Patient denies any fever, chills, SOB, CP, numbness, dizziness, headache, and all other sxs at this time. No prior Tx reported. No further complaints or concerns at this time.     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    Hyperlipidemia     Hypertension     Joint pain     knees    LVH (left ventricular hypertrophy) due to hypertensive disease 2/10/2017    Mitral regurgitation 6/9/2016    KIA (obstructive sleep apnea)     Primary osteoarthritis of left knee 1/7/2019    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    Tracheostomy status 12/29/2021    Transfusion reaction     1 x  to PRBC fever       Past Surgical History:  Past Surgical History:   Procedure Laterality Date    ANGIOGRAM, CORONARY, WITH LEFT HEART CATHETERIZATION  12/10/2020    Procedure: Angiogram, Coronary, with Left Heart Cath;  Surgeon: Tomi Mir MD;  Location: [facility] CATH LAB;  Service: Cardiology;;    barrette esophagus      COLONOSCOPY N/A 5/17/2018    Procedure: COLONOSCOPY;  Surgeon: Ilan Araya III, MD;  Location: [facility];  Service: Endoscopy;  Laterality: N/A;    COLONOSCOPY N/A 6/28/2023    Procedure: COLONOSCOPY;  Surgeon: Colby Rodriguez MD;  Location: [facility];  Service: Endoscopy;  Laterality: N/A;    decubitus surgery      to sacral    ESOPHAGOGASTRODUODENOSCOPY      ESOPHAGOGASTRODUODENOSCOPY N/A 6/17/2021    Procedure: EGD (ESOPHAGOGASTRODUODENOSCOPY);  Surgeon: Ban Zheng MD;  Location: [facility];  Service: Endoscopy;  Laterality: N/A;    ESOPHAGOGASTRODUODENOSCOPY N/A 6/28/2023    Procedure: EGD (ESOPHAGOGASTRODUODENOSCOPY);  Surgeon: Colby Rodriguez MD;  Location: [facility];  Service: Endoscopy;  Laterality: N/A;    GASTROSTOMY TUBE PLACEMENT      KNEE ARTHROSCOPY Left 2002    LEFT HEART CATHETERIZATION Left 12/10/2020    Procedure: CATHETERIZATION, HEART, LEFT;  Surgeon: Tomi Mir MD;  Location: [facility] CATH LAB;  Service: Cardiology;  Laterality: Left;  730 start time    PEG TUBE REMOVAL      RADIOFREQUENCY ABLATION      RFA      ROBOT-ASSISTED  CHOLECYSTECTOMY USING DA GABRIELA XI N/A 5/2/2019    Procedure: XI ROBOTIC CHOLECYSTECTOMY;  Surgeon: Valerio Lai MD;  Location: [facility];  Service: General;  Laterality: N/A;    JUAN-EN-Y PROCEDURE      TONSILLECTOMY      TRACHEAL SURGERY           Family History:  Family History   Problem Relation Age of Onset    Heart attack Mother     Heart disease Mother     Heart disease Father     Heart attack Father        Social History:  Social History     Tobacco Use    Smoking status: Every Day     Packs/day: 1.00     Years: 32.00     Pack years: 32.00     Types: Cigarettes     Start date: 4/4/1988     Last attempt to quit: 5/1/2020     Years since quitting: 3.2    Smokeless tobacco: Former     Types: Snuff     Quit date: 1/6/1999   Substance and Sexual Activity    Alcohol use: Not Currently     Alcohol/week: 14.0 standard drinks     Types: 14 Glasses of wine per week     Comment: no alcohol for past week, usually 7 drinks/week    Drug use: No    Sexual activity: Yes     Partners: Female       ROS   Review of Systems   Constitutional:  Negative for chills and fever.   HENT:  Negative for sore throat.    Respiratory:  Negative for shortness of breath.    Cardiovascular:  Negative for chest pain.   Gastrointestinal:  Positive for nausea and vomiting.   Genitourinary:  Negative for dysuria.   Musculoskeletal:  Negative for back pain.   Skin:  Negative for rash.   Neurological:  Positive for weakness (generalized) and light-headedness. Negative for dizziness, syncope, numbness and headaches.   Hematological:  Does not bruise/bleed easily.   All other systems reviewed and are negative.    Physical Exam      Initial Vitals [07/19/23 0821]   BP Pulse Resp Temp SpO2   (!) 194/105 96 20 98.2 °F (36.8 °C) 97 %      MAP       --          Physical Exam  Nursing Notes and Vital Signs Reviewed.  Constitutional: Patient is in no acute distress. Well-developed and well-nourished.  Head: Atraumatic. Normocephalic.  Eyes: PERRL. EOM intact.  Conjunctivae are not pale. No scleral icterus.  ENT: Mucous membranes are moist. Oropharynx is clear and symmetric.    Neck: Supple. Full ROM.   Cardiovascular: Regular rate. Regular rhythm. No murmurs, rubs, or gallops. Distal pulses are 2+ and symmetric.  Pulmonary/Chest: No respiratory distress. Clear to auscultation bilaterally. No wheezing or rales.  Abdominal: Soft and non-distended.  There is no tenderness.  No rebound, guarding, or rigidity.   Musculoskeletal: Moves all extremities. No obvious deformities. No edema.  Skin: Warm and dry.  Neurological:  Alert, awake, and appropriate.  Normal speech.  No acute focal neurological deficits are appreciated.  Psychiatric: Normal affect. Good eye contact. Appropriate in content.    ED Course    Procedures  ED Vital Signs:  Vitals:    07/19/23 0821 07/19/23 0900 07/19/23 0930 07/19/23 0954   BP: (!) 194/105 (!) 158/90 (!) 157/79    Pulse: 96 63 (!) 57 70   Resp: 20 14 15    Temp: 98.2 °F (36.8 °C)      TempSrc: Oral      SpO2: 97% 97% 96%    Weight: 118.8 kg (261 lb 14.5 oz)       07/19/23 1000   BP: (!) 176/93   Pulse: 73   Resp: 16   Temp:    TempSrc:    SpO2: 96%   Weight:        Abnormal Lab Results:  Labs Reviewed   CBC W/ AUTO DIFFERENTIAL - Abnormal; Notable for the following components:       Result Value    MCH 31.2 (*)     RDW 14.9 (*)     Platelets 137 (*)     All other components within normal limits   COMPREHENSIVE METABOLIC PANEL - Abnormal; Notable for the following components:    BUN 5 (*)     Calcium 8.6 (*)     Alkaline Phosphatase 170 (*)     All other components within normal limits   URINALYSIS, REFLEX TO URINE CULTURE - Abnormal; Notable for the following components:    Occult Blood UA 2+ (*)     Nitrite, UA Positive (*)     Leukocytes, UA 3+ (*)     All other components within normal limits    Narrative:     Specimen Source->Urine   URINALYSIS MICROSCOPIC - Abnormal; Notable for the following components:    WBC, UA >100 (*)     WBC Clumps, UA  Few (*)     Bacteria Many (*)     All other components within normal limits    Narrative:     Specimen Source->Urine   CULTURE, BLOOD   CULTURE, BLOOD   CULTURE, URINE   LACTIC ACID, PLASMA   B-TYPE NATRIURETIC PEPTIDE   LIPASE   TROPONIN I   PROCALCITONIN   LACTIC ACID, PLASMA        All Lab Results:  Results for orders placed or performed during the hospital encounter of 07/19/23   CBC auto differential   Result Value Ref Range    WBC 5.55 3.90 - 12.70 K/uL    RBC 5.19 4.60 - 6.20 M/uL    Hemoglobin 16.2 14.0 - 18.0 g/dL    Hematocrit 47.6 40.0 - 54.0 %    MCV 92 82 - 98 fL    MCH 31.2 (H) 27.0 - 31.0 pg    MCHC 34.0 32.0 - 36.0 g/dL    RDW 14.9 (H) 11.5 - 14.5 %    Platelets 137 (L) 150 - 450 K/uL    MPV 10.6 9.2 - 12.9 fL    Immature Granulocytes 0.4 0.0 - 0.5 %    Gran # (ANC) 3.4 1.8 - 7.7 K/uL    Immature Grans (Abs) 0.02 0.00 - 0.04 K/uL    Lymph # 1.5 1.0 - 4.8 K/uL    Mono # 0.5 0.3 - 1.0 K/uL    Eos # 0.1 0.0 - 0.5 K/uL    Baso # 0.04 0.00 - 0.20 K/uL    nRBC 0 0 /100 WBC    Gran % 61.7 38.0 - 73.0 %    Lymph % 26.8 18.0 - 48.0 %    Mono % 9.5 4.0 - 15.0 %    Eosinophil % 0.9 0.0 - 8.0 %    Basophil % 0.7 0.0 - 1.9 %    Differential Method Automated    Comprehensive metabolic panel   Result Value Ref Range    Sodium 140 136 - 145 mmol/L    Potassium 4.0 3.5 - 5.1 mmol/L    Chloride 102 95 - 110 mmol/L    CO2 23 23 - 29 mmol/L    Glucose 102 70 - 110 mg/dL    BUN 5 (L) 6 - 20 mg/dL    Creatinine 0.6 0.5 - 1.4 mg/dL    Calcium 8.6 (L) 8.7 - 10.5 mg/dL    Total Protein 7.2 6.0 - 8.4 g/dL    Albumin 3.7 3.5 - 5.2 g/dL    Total Bilirubin 0.5 0.1 - 1.0 mg/dL    Alkaline Phosphatase 170 (H) 55 - 135 U/L    AST 30 10 - 40 U/L    ALT 24 10 - 44 U/L    eGFR >60 >60 mL/min/1.73 m^2    Anion Gap 15 8 - 16 mmol/L   Lactic acid, plasma #1   Result Value Ref Range    Lactate (Lactic Acid) 2.2 0.5 - 2.2 mmol/L   Urinalysis, Reflex to Urine Culture Urine, Clean Catch    Specimen: Urine   Result Value Ref Range    Specimen  UA Urine, Clean Catch     Color, UA Yellow Yellow, Straw, Beatris    Appearance, UA Clear Clear    pH, UA 6.0 5.0 - 8.0    Specific Gravity, UA 1.015 1.005 - 1.030    Protein, UA Negative Negative    Glucose, UA Negative Negative    Ketones, UA Negative Negative    Bilirubin (UA) Negative Negative    Occult Blood UA 2+ (A) Negative    Nitrite, UA Positive (A) Negative    Urobilinogen, UA Negative <2.0 EU/dL    Leukocytes, UA 3+ (A) Negative   Brain natriuretic peptide   Result Value Ref Range    BNP 17 0 - 99 pg/mL   Lipase   Result Value Ref Range    Lipase 37 4 - 60 U/L   Troponin I   Result Value Ref Range    Troponin I <0.006 0.000 - 0.026 ng/mL   Procalcitonin   Result Value Ref Range    Procalcitonin <0.02 <0.25 ng/mL   Urinalysis Microscopic   Result Value Ref Range    RBC, UA 2 0 - 4 /hpf    WBC, UA >100 (H) 0 - 5 /hpf    WBC Clumps, UA Few (A) None-Rare    Bacteria Many (A) None-Occ /hpf    Squam Epithel, UA 2 /hpf    Microscopic Comment SEE COMMENT      Imaging Results:  Imaging Results              X-Ray Chest AP Portable (Final result)  Result time 07/19/23 09:10:52      Final result by Dat Davidson MD (07/19/23 09:10:52)                   Impression:      No acute finding in the chest.      Electronically signed by: Dat Davidson  Date:    07/19/2023  Time:    09:10               Narrative:    EXAMINATION:  XR CHEST AP PORTABLE    CLINICAL HISTORY:  Sepsis;    FINDINGS:  Comparison: June 30, 2023.    Mediastinal silhouette is within normal limits.  The lungs are clear.  No pneumothorax or pleural effusion.  No acute osseous finding.                                     The EKG was ordered, reviewed, and independently interpreted by the ED provider.  Interpretation time: 08:50  Rate: 63 BPM  Rhythm: normal sinus rhythm  Interpretation: No acute ST changes. No STEMI.           The Emergency Provider reviewed the vital signs and test results, which are outlined above.    ED Discussion     10:49 AM:  Discussed case with Dr. Peraza ([facility]). Dr. Peraza agrees with current care and management of pt and accepts admission.   Admitting Service: Hospital Medicine  Admitting Physician: Dr. Peraza  Admit to: Obs    10:50 AM: Re-evaluated pt. I have discussed test results, shared treatment plan, and the need for admission with patient and family at bedside. Pt and family express understanding at this time and agree with all information. All questions answered. Pt and family have no further questions or concerns at this time. Pt is ready for admit.         ED Medication(s):  Medications   sodium chloride 0.9% bolus 3,564 mL 3,564 mL (3,564 mLs Intravenous New Bag 7/19/23 1055)   amLODIPine tablet 10 mg (has no administration in time range)   aspirin EC tablet 81 mg (has no administration in time range)   metoprolol succinate (TOPROL-XL) 24 hr tablet 25 mg (has no administration in time range)   lisinopriL tablet 20 mg (has no administration in time range)   nicotine 21 mg/24 hr 1 patch (has no administration in time range)   pantoprazole EC tablet 40 mg (has no administration in time range)   tamsulosin 24 hr capsule 0.4 mg (has no administration in time range)   hydrALAZINE injection 10 mg (has no administration in time range)   ondansetron injection 4 mg (has no administration in time range)   cefTRIAXone (ROCEPHIN) 1 g in dextrose 5 % in water (D5W) 5 % 100 mL IVPB (MB+) (has no administration in time range)   acetaminophen tablet 650 mg (has no administration in time range)   LORazepam injection 1 mg (1 mg Intravenous Given 7/19/23 0956)   cefTRIAXone (ROCEPHIN) 1 g in dextrose 5 % in water (D5W) 5 % 100 mL IVPB (MB+) (0 g Intravenous Stopped 7/19/23 1038)   LORazepam injection 2 mg (2 mg Intravenous Given 7/19/23 1025)       New Prescriptions    No medications on file       Medical Decision Making    Medical Decision Making:   Initial Assessment:   Feeling shaky and fatigued since last night.  Discharged from  here a few weeks ago after being admitted for UTI and sepsis  Differential Diagnosis:   UTI, sepsis, weakness, alcohol abuse  Clinical Tests:   Lab Tests: Ordered and Reviewed  Radiological Study: Ordered and Reviewed  Medical Tests: Ordered and Reviewed  ED Management:  Labs and imaging reviewed by me.  No acute findings except for UTI, lactic acid of 2.2.  Considered admission, patient is agreeable.    Other:   I have discussed this case with another health care provider.       <> Summary of the Discussion: Case discussed with Dr. Peraza () will place in observation         Scribe Attestation:   Scribe #1: I performed the above scribed service and the documentation accurately describes the services I performed. I attest to the accuracy of the note.    Attending:   Physician Attestation Statement for Scribe #1: I, Andrew Conteh MD, personally performed the services described in this documentation, as scribed by Cecil Rojas, in my presence, and it is both accurate and complete.          Clinical Impression       ICD-10-CM ICD-9-CM   1. Acute cystitis with hematuria  N30.01 595.0   2. Weakness  R53.1 780.79   3. Alcohol abuse  F10.10 305.00   4. Pyelonephritis  N12 590.80       Disposition:   Disposition: Placed in Observation  Condition: Fair       Andrew Conteh MD  07/19/23 1121

## 2023-07-20 ENCOUNTER — CLINICAL SUPPORT (OUTPATIENT)
Dept: SMOKING CESSATION | Facility: CLINIC | Age: 53
End: 2023-07-20
Payer: COMMERCIAL

## 2023-07-20 DIAGNOSIS — F17.200 NICOTINE DEPENDENCE: Primary | ICD-10-CM

## 2023-07-20 LAB
ANION GAP SERPL CALC-SCNC: 10 MMOL/L (ref 8–16)
BASOPHILS # BLD AUTO: 0.02 K/UL (ref 0–0.2)
BASOPHILS NFR BLD: 0.4 % (ref 0–1.9)
BUN SERPL-MCNC: 4 MG/DL (ref 6–20)
CALCIUM SERPL-MCNC: 8.5 MG/DL (ref 8.7–10.5)
CHLORIDE SERPL-SCNC: 104 MMOL/L (ref 95–110)
CO2 SERPL-SCNC: 26 MMOL/L (ref 23–29)
CREAT SERPL-MCNC: 0.6 MG/DL (ref 0.5–1.4)
DIFFERENTIAL METHOD: ABNORMAL
EOSINOPHIL # BLD AUTO: 0.1 K/UL (ref 0–0.5)
EOSINOPHIL NFR BLD: 1.9 % (ref 0–8)
ERYTHROCYTE [DISTWIDTH] IN BLOOD BY AUTOMATED COUNT: 14.8 % (ref 11.5–14.5)
EST. GFR  (NO RACE VARIABLE): >60 ML/MIN/1.73 M^2
GLUCOSE SERPL-MCNC: 91 MG/DL (ref 70–110)
HCT VFR BLD AUTO: 39.5 % (ref 40–54)
HCT VFR BLD AUTO: 40.2 % (ref 40–54)
HCT VFR BLD AUTO: 41.9 % (ref 40–54)
HCT VFR BLD AUTO: 42.7 % (ref 40–54)
HGB BLD-MCNC: 13.4 G/DL (ref 14–18)
HGB BLD-MCNC: 13.5 G/DL (ref 14–18)
HGB BLD-MCNC: 14.1 G/DL (ref 14–18)
HGB BLD-MCNC: 14.2 G/DL (ref 14–18)
IMM GRANULOCYTES # BLD AUTO: 0.01 K/UL (ref 0–0.04)
IMM GRANULOCYTES NFR BLD AUTO: 0.2 % (ref 0–0.5)
LACTATE SERPL-SCNC: 0.8 MMOL/L (ref 0.5–2.2)
LYMPHOCYTES # BLD AUTO: 1.5 K/UL (ref 1–4.8)
LYMPHOCYTES NFR BLD: 27.5 % (ref 18–48)
MCH RBC QN AUTO: 31.3 PG (ref 27–31)
MCHC RBC AUTO-ENTMCNC: 33.3 G/DL (ref 32–36)
MCV RBC AUTO: 94 FL (ref 82–98)
MONOCYTES # BLD AUTO: 0.4 K/UL (ref 0.3–1)
MONOCYTES NFR BLD: 8.2 % (ref 4–15)
NEUTROPHILS # BLD AUTO: 3.3 K/UL (ref 1.8–7.7)
NEUTROPHILS NFR BLD: 61.8 % (ref 38–73)
NRBC BLD-RTO: 0 /100 WBC
PLATELET # BLD AUTO: 106 K/UL (ref 150–450)
PMV BLD AUTO: 10.5 FL (ref 9.2–12.9)
POTASSIUM SERPL-SCNC: 3.6 MMOL/L (ref 3.5–5.1)
RBC # BLD AUTO: 4.53 M/UL (ref 4.6–6.2)
SODIUM SERPL-SCNC: 140 MMOL/L (ref 136–145)
WBC # BLD AUTO: 5.35 K/UL (ref 3.9–12.7)

## 2023-07-20 PROCEDURE — 96375 TX/PRO/DX INJ NEW DRUG ADDON: CPT

## 2023-07-20 PROCEDURE — G0378 HOSPITAL OBSERVATION PER HR: HCPCS

## 2023-07-20 PROCEDURE — 25000003 PHARM REV CODE 250: Performed by: FAMILY MEDICINE

## 2023-07-20 PROCEDURE — 85014 HEMATOCRIT: CPT | Mod: 91 | Performed by: NURSE PRACTITIONER

## 2023-07-20 PROCEDURE — 99407 BEHAV CHNG SMOKING > 10 MIN: CPT | Mod: S$GLB,,,

## 2023-07-20 PROCEDURE — 85014 HEMATOCRIT: CPT | Performed by: FAMILY MEDICINE

## 2023-07-20 PROCEDURE — 85025 COMPLETE CBC W/AUTO DIFF WBC: CPT | Performed by: FAMILY MEDICINE

## 2023-07-20 PROCEDURE — 36415 COLL VENOUS BLD VENIPUNCTURE: CPT | Performed by: NURSE PRACTITIONER

## 2023-07-20 PROCEDURE — S4991 NICOTINE PATCH NONLEGEND: HCPCS | Performed by: FAMILY MEDICINE

## 2023-07-20 PROCEDURE — 36415 COLL VENOUS BLD VENIPUNCTURE: CPT | Performed by: FAMILY MEDICINE

## 2023-07-20 PROCEDURE — 85018 HEMOGLOBIN: CPT | Mod: 91 | Performed by: NURSE PRACTITIONER

## 2023-07-20 PROCEDURE — 63600175 PHARM REV CODE 636 W HCPCS: Performed by: FAMILY MEDICINE

## 2023-07-20 PROCEDURE — 25000003 PHARM REV CODE 250: Performed by: EMERGENCY MEDICINE

## 2023-07-20 PROCEDURE — 96374 THER/PROPH/DIAG INJ IV PUSH: CPT | Mod: 59

## 2023-07-20 PROCEDURE — 85018 HEMOGLOBIN: CPT | Mod: 91 | Performed by: FAMILY MEDICINE

## 2023-07-20 PROCEDURE — 96366 THER/PROPH/DIAG IV INF ADDON: CPT

## 2023-07-20 PROCEDURE — 83605 ASSAY OF LACTIC ACID: CPT | Performed by: NURSE PRACTITIONER

## 2023-07-20 PROCEDURE — 80048 BASIC METABOLIC PNL TOTAL CA: CPT | Performed by: FAMILY MEDICINE

## 2023-07-20 PROCEDURE — 99407 PR TOBACCO USE CESSATION INTENSIVE >10 MINUTES: ICD-10-PCS | Mod: S$GLB,,,

## 2023-07-20 RX ORDER — SODIUM CHLORIDE 9 MG/ML
INJECTION, SOLUTION INTRAVENOUS CONTINUOUS PRN
Status: DISCONTINUED | OUTPATIENT
Start: 2023-07-20 | End: 2023-07-21 | Stop reason: HOSPADM

## 2023-07-20 RX ADMIN — CHLORDIAZEPOXIDE HYDROCHLORIDE 25 MG: 25 CAPSULE ORAL at 08:07

## 2023-07-20 RX ADMIN — ONDANSETRON 4 MG: 2 INJECTION INTRAMUSCULAR; INTRAVENOUS at 08:07

## 2023-07-20 RX ADMIN — CEFTRIAXONE 1 G: 1 INJECTION, POWDER, FOR SOLUTION INTRAMUSCULAR; INTRAVENOUS at 09:07

## 2023-07-20 RX ADMIN — METOPROLOL SUCCINATE 25 MG: 25 TABLET, EXTENDED RELEASE ORAL at 08:07

## 2023-07-20 RX ADMIN — SODIUM CHLORIDE: 9 INJECTION, SOLUTION INTRAVENOUS at 09:07

## 2023-07-20 RX ADMIN — PANTOPRAZOLE SODIUM 40 MG: 40 TABLET, DELAYED RELEASE ORAL at 08:07

## 2023-07-20 RX ADMIN — ONDANSETRON 4 MG: 2 INJECTION INTRAMUSCULAR; INTRAVENOUS at 09:07

## 2023-07-20 RX ADMIN — LORAZEPAM 1 MG: 0.5 TABLET ORAL at 02:07

## 2023-07-20 RX ADMIN — TAMSULOSIN HYDROCHLORIDE 0.4 MG: 0.4 CAPSULE ORAL at 08:07

## 2023-07-20 RX ADMIN — CHLORDIAZEPOXIDE HYDROCHLORIDE 25 MG: 25 CAPSULE ORAL at 02:07

## 2023-07-20 RX ADMIN — LORAZEPAM 1 MG: 0.5 TABLET ORAL at 09:07

## 2023-07-20 RX ADMIN — LORAZEPAM 1 MG: 0.5 TABLET ORAL at 10:07

## 2023-07-20 RX ADMIN — LISINOPRIL 20 MG: 20 TABLET ORAL at 08:07

## 2023-07-20 RX ADMIN — LORAZEPAM 1 MG: 0.5 TABLET ORAL at 05:07

## 2023-07-20 RX ADMIN — CHLORDIAZEPOXIDE HYDROCHLORIDE 25 MG: 25 CAPSULE ORAL at 09:07

## 2023-07-20 RX ADMIN — ACETAMINOPHEN 650 MG: 325 TABLET ORAL at 08:07

## 2023-07-20 RX ADMIN — NICOTINE 1 PATCH: 21 PATCH, EXTENDED RELEASE TRANSDERMAL at 08:07

## 2023-07-20 RX ADMIN — AMLODIPINE BESYLATE 10 MG: 10 TABLET ORAL at 08:07

## 2023-07-20 NOTE — ASSESSMENT & PLAN NOTE
Counseled on cessation with understanding verbalized   Librium tid   Will plan to dc with taper  Banana bag  Ativan PRN   -will monitor for signs of withdrawal

## 2023-07-20 NOTE — PROGRESS NOTES
Formerly named [facility]  Progress Note    Patient Name: Valerio Yan  MRN: [id number]  Patient Class: OP- Observation   Admission Date: 7/19/2023  Length of Stay: 0 days  Attending Physician: Alejandrina Selby MD  Primary Care Provider: Therese Lala MD        Subjective:     Principal Problem:Pyelonephritis        HPI:  Patient is a 52 y.o. aa male with a PMH of HTN, HLD, COPD, and alcoholism who presents to the Emergency Department for generalized weakness, onset 2 weeks PTA. Pt states that he has been feeling weak since he was admitted for pyelonephritis on 6/30/23. He reports having recent c-scope and egd performed. EGD showed Barretts disease and c-scope unremarkable. Patient reports having fever, chills, and back pain. He states he took his antibiotics for UTI. Most recent urine cultures grew enterococcus and ecoli. He reveals having alcohol withdrawal symptoms. States he has been drinking heavily for the last few weeks. Does report having tremors whenever he stops drinking. Last drink was yesterday. Upon further questioning he reports having dark tarry stools for the past few days.     In the Ed, u/a indicative of UTI. He was bolused fluids and started on rocephin. Patient placed in observation.       Overview/Hospital Course:  Pt admitted to Observation for Pyelonephritis. UA is consistent with infectious process. IV antibiotics initiated. Urine culture results pending. Lactic acidosis resolved with hydration. Pt reports daily use of cigarettes and increased ETOH use since discharge with last drink reported on 7/18/23. Will monitor for signs of withdrawal and Librium initiated with Ativan as needed. Pt reports recent hospitalization with treatment for Urosepsis and outpatient completion of Amoxicillin. Dark stools also reported with H/H stable and FOBT negative- diet advanced to clear liquids. Pt had recent EGD and colonoscopy on 6/28/23 per Dr. Rodriguez (GI). Urine culture grew gram negative  rods.       Interval History: pt in bed upon exam with no additional complaints reports. Plan of care discussed with understanding verbalized. H/H stable with no signs of bleeding. IV antibiotics continued with urine culture + for gram negative rods.     Review of Systems   Constitutional:  Positive for chills, fatigue and fever.   HENT:  Negative for sinus pressure.    Eyes:  Negative for visual disturbance.   Respiratory:  Negative for shortness of breath.    Cardiovascular:  Negative for chest pain.   Gastrointestinal:  Positive for abdominal pain and blood in stool. Negative for nausea and vomiting.   Genitourinary:  Positive for flank pain. Negative for difficulty urinating.   Musculoskeletal:  Negative for back pain.   Skin:  Negative for rash.   Neurological:  Negative for headaches.   Psychiatric/Behavioral:  Negative for confusion.    Objective:     Vital Signs (Most Recent):  Temp: 98 °F (36.7 °C) (07/20/23 1126)  Pulse: 60 (07/20/23 1126)  Resp: 18 (07/20/23 1126)  BP: (!) 157/81 (07/20/23 1126)  SpO2: 97 % (07/20/23 1126) Vital Signs (24h Range):  Temp:  [97.8 °F (36.6 °C)-98.2 °F (36.8 °C)] 98 °F (36.7 °C)  Pulse:  [56-86] 60  Resp:  [16-18] 18  SpO2:  [92 %-99 %] 97 %  BP: (157-174)/(74-86) 157/81     Weight: 121.2 kg (267 lb 3.2 oz)  Body mass index is 31.68 kg/m².    Intake/Output Summary (Last 24 hours) at 7/20/2023 1547  Last data filed at 7/20/2023 1532  Gross per 24 hour   Intake --   Output 1700 ml   Net -1700 ml         Physical Exam  Constitutional:       General: He is not in acute distress.     Appearance: He is well-developed. He is not diaphoretic.   HENT:      Head: Normocephalic and atraumatic.   Eyes:      Pupils: Pupils are equal, round, and reactive to light.   Cardiovascular:      Rate and Rhythm: Normal rate and regular rhythm.      Heart sounds: Normal heart sounds. No murmur heard.    No friction rub. No gallop.   Pulmonary:      Effort: Pulmonary effort is normal. No respiratory  distress.      Breath sounds: Normal breath sounds. No stridor. No wheezing or rales.   Abdominal:      General: Bowel sounds are normal. There is no distension.      Palpations: Abdomen is soft. There is no mass.      Tenderness: There is no abdominal tenderness. There is no right CVA tenderness, left CVA tenderness or guarding.   Genitourinary:     Comments: Deferred   Musculoskeletal:         General: Deformity present.      Right lower leg: No edema.      Left lower leg: No edema.      Comments: Decreased mobility in BLE.    Skin:     General: Skin is warm.      Findings: No erythema.   Neurological:      Mental Status: He is alert and oriented to person, place, and time.   Psychiatric:         Mood and Affect: Mood normal.         Behavior: Behavior normal.           Significant Labs: All pertinent labs within the past 24 hours have been reviewed.  CBC:   Recent Labs   Lab 07/19/23  0953 07/19/23  1700 07/19/23  2350 07/20/23  0610 07/20/23  0758   WBC 5.55  --   --  5.35  --    HGB 16.2   < > 14.1 14.2 13.5*   HCT 47.6   < > 41.9 42.7 40.2   *  --   --  106*  --     < > = values in this interval not displayed.     CMP:   Recent Labs   Lab 07/19/23  0953 07/20/23  0610    140   K 4.0 3.6    104   CO2 23 26    91   BUN 5* 4*   CREATININE 0.6 0.6   CALCIUM 8.6* 8.5*   PROT 7.2  --    ALBUMIN 3.7  --    BILITOT 0.5  --    ALKPHOS 170*  --    AST 30  --    ALT 24  --    ANIONGAP 15 10     POCT Glucose: No results for input(s): POCTGLUCOSE in the last 48 hours.  Urine Culture:   Recent Labs   Lab 07/19/23  0914   LABURIN GRAM NEGATIVE FLORENCE  >100,000 cfu/ml  Identification and susceptibility pending  *     Urine Studies:   Recent Labs   Lab 07/19/23 0914   COLORU Yellow   APPEARANCEUA Clear   PHUR 6.0   SPECGRAV 1.015   PROTEINUA Negative   GLUCUA Negative   KETONESU Negative   BILIRUBINUA Negative   OCCULTUA 2+*   NITRITE Positive*   UROBILINOGEN Negative   LEUKOCYTESUR 3+*   RBCUA 2    WBCUA >100*   BACTERIA Many*   SQUAMEPITHEL 2       Significant Imaging: I have reviewed all pertinent imaging results/findings within the past 24 hours.      Assessment/Plan:      * Pyelonephritis  Will cont on rocephin  Urine culture grew gram negative rods - sensitivities pending   -Lactic acidosis 2.8>0.8- resolved with hydration   -pt reports recent hospitalization for Urosepsis with outpatient course of Amoxicillin completed         Black stools  Pt reports having black tarry stool  However heme occult was negative  hgb 16- improved with hydration   Will trend h/h  Cont PPI  Pt had recent EGD/Colonoscopy with 6/28/23  However given hx of alcoholism  Pt may have ulcers  Will keep NPO at midnight in event H&H trend down- -   Given h&h and heme/occult negative will not   Treat as gi bleed for the time being  -7/20/23- H/H repeated with results pending- diet clear liquids- no episodes of dark stools witnessed/reported        Alcoholism  Counseled on cessation with understanding verbalized   Librium tid   Will plan to dc with taper  Banana bag  Ativan PRN   -will monitor for signs of withdrawal       Hypertension  Resume home meds  Hydralazine PRN       Ozuna esophagus  Cont ppi         VTE Risk Mitigation (From admission, onward)           Ordered     Place sequential compression device  Until discontinued         07/19/23 1630                    Discharge Planning   ELSI:      Code Status: Full Code   Is the patient medically ready for discharge?:     Reason for patient still in hospital (select all that apply): Patient trending condition, Laboratory test and Treatment                     Magnolia Borrero NP  Department of Hospital Medicine   [facility] Med [facility]

## 2023-07-20 NOTE — SUBJECTIVE & OBJECTIVE
Interval History: pt in bed upon exam with no additional complaints reports. Plan of care discussed with understanding verbalized. H/H stable with no signs of bleeding. IV antibiotics continued with urine culture + for gram negative rods.     Review of Systems   Constitutional:  Positive for chills, fatigue and fever.   HENT:  Negative for sinus pressure.    Eyes:  Negative for visual disturbance.   Respiratory:  Negative for shortness of breath.    Cardiovascular:  Negative for chest pain.   Gastrointestinal:  Positive for abdominal pain and blood in stool. Negative for nausea and vomiting.   Genitourinary:  Positive for flank pain. Negative for difficulty urinating.   Musculoskeletal:  Negative for back pain.   Skin:  Negative for rash.   Neurological:  Negative for headaches.   Psychiatric/Behavioral:  Negative for confusion.    Objective:     Vital Signs (Most Recent):  Temp: 98 °F (36.7 °C) (07/20/23 1126)  Pulse: 60 (07/20/23 1126)  Resp: 18 (07/20/23 1126)  BP: (!) 157/81 (07/20/23 1126)  SpO2: 97 % (07/20/23 1126) Vital Signs (24h Range):  Temp:  [97.8 °F (36.6 °C)-98.2 °F (36.8 °C)] 98 °F (36.7 °C)  Pulse:  [56-86] 60  Resp:  [16-18] 18  SpO2:  [92 %-99 %] 97 %  BP: (157-174)/(74-86) 157/81     Weight: 121.2 kg (267 lb 3.2 oz)  Body mass index is 31.68 kg/m².    Intake/Output Summary (Last 24 hours) at 7/20/2023 1547  Last data filed at 7/20/2023 1532  Gross per 24 hour   Intake --   Output 1700 ml   Net -1700 ml         Physical Exam  Constitutional:       General: He is not in acute distress.     Appearance: He is well-developed. He is not diaphoretic.   HENT:      Head: Normocephalic and atraumatic.   Eyes:      Pupils: Pupils are equal, round, and reactive to light.   Cardiovascular:      Rate and Rhythm: Normal rate and regular rhythm.      Heart sounds: Normal heart sounds. No murmur heard.    No friction rub. No gallop.   Pulmonary:      Effort: Pulmonary effort is normal. No respiratory distress.       Breath sounds: Normal breath sounds. No stridor. No wheezing or rales.   Abdominal:      General: Bowel sounds are normal. There is no distension.      Palpations: Abdomen is soft. There is no mass.      Tenderness: There is no abdominal tenderness. There is no right CVA tenderness, left CVA tenderness or guarding.   Genitourinary:     Comments: Deferred   Musculoskeletal:         General: Deformity present.      Right lower leg: No edema.      Left lower leg: No edema.      Comments: Decreased mobility in BLE.    Skin:     General: Skin is warm.      Findings: No erythema.   Neurological:      Mental Status: He is alert and oriented to person, place, and time.   Psychiatric:         Mood and Affect: Mood normal.         Behavior: Behavior normal.           Significant Labs: All pertinent labs within the past 24 hours have been reviewed.  CBC:   Recent Labs   Lab 07/19/23  0953 07/19/23  1700 07/19/23  2350 07/20/23  0610 07/20/23  0758   WBC 5.55  --   --  5.35  --    HGB 16.2   < > 14.1 14.2 13.5*   HCT 47.6   < > 41.9 42.7 40.2   *  --   --  106*  --     < > = values in this interval not displayed.     CMP:   Recent Labs   Lab 07/19/23  0953 07/20/23  0610    140   K 4.0 3.6    104   CO2 23 26    91   BUN 5* 4*   CREATININE 0.6 0.6   CALCIUM 8.6* 8.5*   PROT 7.2  --    ALBUMIN 3.7  --    BILITOT 0.5  --    ALKPHOS 170*  --    AST 30  --    ALT 24  --    ANIONGAP 15 10     POCT Glucose: No results for input(s): POCTGLUCOSE in the last 48 hours.  Urine Culture:   Recent Labs   Lab 07/19/23  0914   LABURIN GRAM NEGATIVE FLORENCE  >100,000 cfu/ml  Identification and susceptibility pending  *     Urine Studies:   Recent Labs   Lab 07/19/23 0914   COLORU Yellow   APPEARANCEUA Clear   PHUR 6.0   SPECGRAV 1.015   PROTEINUA Negative   GLUCUA Negative   KETONESU Negative   BILIRUBINUA Negative   OCCULTUA 2+*   NITRITE Positive*   UROBILINOGEN Negative   LEUKOCYTESUR 3+*   RBCUA 2   WBCUA >100*    BACTERIA Many*   SQUAMEPITHEL 2       Significant Imaging: I have reviewed all pertinent imaging results/findings within the past 24 hours.

## 2023-07-20 NOTE — ASSESSMENT & PLAN NOTE
Will cont on rocephin  Urine culture grew gram negative rods - sensitivities pending   -pt reports recent hospitalization for Urosepsis with outpatient course of Amoxicillin completed

## 2023-07-20 NOTE — ASSESSMENT & PLAN NOTE
Pt reports having black tarry stool  However heme occult was negative  hgb 16- improved with hydration   Will trend h/h  Cont PPI  Pt had recent EGD/Colonoscopy with 6/28/23  However given hx of alcoholism  Pt may have ulcers  Will keep NPO at midnight in event H&H trend down- -   Given h&h and heme/occult negative will not   Treat as gi bleed for the time being  -7/20/23- H/H repeated with results pending- diet clear liquids- no episodes of dark stools witnessed/reported

## 2023-07-20 NOTE — PLAN OF CARE
Problem: Adult Inpatient Plan of Care  Goal: Plan of Care Review  Outcome: Ongoing, Progressing  Goal: Patient-Specific Goal (Individualized)  Outcome: Ongoing, Progressing  Goal: Absence of Hospital-Acquired Illness or Injury  Outcome: Ongoing, Progressing  Goal: Optimal Comfort and Wellbeing  Outcome: Ongoing, Progressing  Goal: Readiness for Transition of Care  Outcome: Ongoing, Progressing     Problem: Nutrition Impaired (Sepsis/Septic Shock)  Goal: Optimal Nutrition Intake  Outcome: Ongoing, Progressing

## 2023-07-20 NOTE — HOSPITAL COURSE
Pt admitted to Observation for Pyelonephritis. UA is consistent with infectious process. IV antibiotics initiated. Urine culture results pending. Lactic acidosis resolved with hydration. Pt reports daily use of cigarettes and increased ETOH use since discharge with last drink reported on 7/18/23. Will monitor for signs of withdrawal and Librium initiated with Ativan as needed. Pt reports recent hospitalization with treatment for Urosepsis and outpatient completion of Amoxicillin. Dark stools also reported with H/H stable and FOBT negative- diet advanced to clear liquids. Pt had recent EGD and colonoscopy on 6/28/23 per Dr. Rodriguez (GI). Urine culture grew gram negative rods. On 7/21/23, urine culture grew E. Coli with sensitivities reviewed. IV antibiotics transitioned to oral dosing. Pt verbalized symptom improvement and eager for discharge. Pt counseled on cessation of ETOH and compliance with prescribed medications with understanding verbalized. Pt does not have a plan of ETOH cessation at this time. Pt ambulating hallway and no evidence of dark stools witnessed or reported. Vital signs stable with no signs of acute distress. Pt seen and examined and deemed stable for discharge to home. Medications reconciled with Augmentin, Folic acid, thiamine, Magnesium, and Nicoderm prescribed. Pt instructed to follow up with PCP and GI upon discharge for further evaluation.

## 2023-07-21 VITALS
HEART RATE: 67 BPM | RESPIRATION RATE: 18 BRPM | WEIGHT: 267.19 LBS | TEMPERATURE: 98 F | SYSTOLIC BLOOD PRESSURE: 136 MMHG | OXYGEN SATURATION: 97 % | DIASTOLIC BLOOD PRESSURE: 84 MMHG | BODY MASS INDEX: 31.55 KG/M2 | HEIGHT: 77 IN

## 2023-07-21 LAB
ANION GAP SERPL CALC-SCNC: 10 MMOL/L (ref 8–16)
BACTERIA UR CULT: ABNORMAL
BASOPHILS # BLD AUTO: 0.01 K/UL (ref 0–0.2)
BASOPHILS NFR BLD: 0.3 % (ref 0–1.9)
BUN SERPL-MCNC: 5 MG/DL (ref 6–20)
CALCIUM SERPL-MCNC: 8.4 MG/DL (ref 8.7–10.5)
CHLORIDE SERPL-SCNC: 106 MMOL/L (ref 95–110)
CO2 SERPL-SCNC: 25 MMOL/L (ref 23–29)
CREAT SERPL-MCNC: 0.6 MG/DL (ref 0.5–1.4)
DIFFERENTIAL METHOD: ABNORMAL
EOSINOPHIL # BLD AUTO: 0.1 K/UL (ref 0–0.5)
EOSINOPHIL NFR BLD: 3.6 % (ref 0–8)
ERYTHROCYTE [DISTWIDTH] IN BLOOD BY AUTOMATED COUNT: 14.7 % (ref 11.5–14.5)
EST. GFR  (NO RACE VARIABLE): >60 ML/MIN/1.73 M^2
GLUCOSE SERPL-MCNC: 92 MG/DL (ref 70–110)
HCT VFR BLD AUTO: 40.1 % (ref 40–54)
HGB BLD-MCNC: 13.6 G/DL (ref 14–18)
IMM GRANULOCYTES # BLD AUTO: 0 K/UL (ref 0–0.04)
IMM GRANULOCYTES NFR BLD AUTO: 0 % (ref 0–0.5)
LYMPHOCYTES # BLD AUTO: 1.3 K/UL (ref 1–4.8)
LYMPHOCYTES NFR BLD: 33.2 % (ref 18–48)
MAGNESIUM SERPL-MCNC: 1.7 MG/DL (ref 1.6–2.6)
MCH RBC QN AUTO: 31.6 PG (ref 27–31)
MCHC RBC AUTO-ENTMCNC: 33.9 G/DL (ref 32–36)
MCV RBC AUTO: 93 FL (ref 82–98)
MONOCYTES # BLD AUTO: 0.4 K/UL (ref 0.3–1)
MONOCYTES NFR BLD: 10.1 % (ref 4–15)
NEUTROPHILS # BLD AUTO: 2.1 K/UL (ref 1.8–7.7)
NEUTROPHILS NFR BLD: 52.8 % (ref 38–73)
NRBC BLD-RTO: 0 /100 WBC
PHOSPHATE SERPL-MCNC: 3.1 MG/DL (ref 2.7–4.5)
PLATELET # BLD AUTO: 85 K/UL (ref 150–450)
PMV BLD AUTO: 10.1 FL (ref 9.2–12.9)
POTASSIUM SERPL-SCNC: 3.5 MMOL/L (ref 3.5–5.1)
RBC # BLD AUTO: 4.3 M/UL (ref 4.6–6.2)
SODIUM SERPL-SCNC: 141 MMOL/L (ref 136–145)
TSH SERPL DL<=0.005 MIU/L-ACNC: 0.79 UIU/ML (ref 0.4–4)
WBC # BLD AUTO: 3.88 K/UL (ref 3.9–12.7)

## 2023-07-21 PROCEDURE — 96366 THER/PROPH/DIAG IV INF ADDON: CPT

## 2023-07-21 PROCEDURE — 80048 BASIC METABOLIC PNL TOTAL CA: CPT | Performed by: FAMILY MEDICINE

## 2023-07-21 PROCEDURE — 93010 ELECTROCARDIOGRAM REPORT: CPT | Mod: ,,, | Performed by: INTERNAL MEDICINE

## 2023-07-21 PROCEDURE — 83735 ASSAY OF MAGNESIUM: CPT | Performed by: NURSE PRACTITIONER

## 2023-07-21 PROCEDURE — 25000003 PHARM REV CODE 250: Performed by: FAMILY MEDICINE

## 2023-07-21 PROCEDURE — 85025 COMPLETE CBC W/AUTO DIFF WBC: CPT | Performed by: FAMILY MEDICINE

## 2023-07-21 PROCEDURE — 93005 ELECTROCARDIOGRAM TRACING: CPT

## 2023-07-21 PROCEDURE — G0378 HOSPITAL OBSERVATION PER HR: HCPCS

## 2023-07-21 PROCEDURE — 36415 COLL VENOUS BLD VENIPUNCTURE: CPT | Performed by: FAMILY MEDICINE

## 2023-07-21 PROCEDURE — 96376 TX/PRO/DX INJ SAME DRUG ADON: CPT

## 2023-07-21 PROCEDURE — 36415 COLL VENOUS BLD VENIPUNCTURE: CPT | Performed by: NURSE PRACTITIONER

## 2023-07-21 PROCEDURE — 63600175 PHARM REV CODE 636 W HCPCS: Performed by: NURSE PRACTITIONER

## 2023-07-21 PROCEDURE — 84100 ASSAY OF PHOSPHORUS: CPT | Performed by: NURSE PRACTITIONER

## 2023-07-21 PROCEDURE — 93010 EKG 12-LEAD: ICD-10-PCS | Mod: ,,, | Performed by: INTERNAL MEDICINE

## 2023-07-21 PROCEDURE — 84443 ASSAY THYROID STIM HORMONE: CPT | Performed by: NURSE PRACTITIONER

## 2023-07-21 PROCEDURE — 63600175 PHARM REV CODE 636 W HCPCS: Performed by: FAMILY MEDICINE

## 2023-07-21 PROCEDURE — 25000003 PHARM REV CODE 250: Performed by: NURSE PRACTITIONER

## 2023-07-21 PROCEDURE — S4991 NICOTINE PATCH NONLEGEND: HCPCS | Performed by: FAMILY MEDICINE

## 2023-07-21 RX ORDER — LANOLIN ALCOHOL/MO/W.PET/CERES
400 CREAM (GRAM) TOPICAL 2 TIMES DAILY
Status: DISCONTINUED | OUTPATIENT
Start: 2023-07-21 | End: 2023-07-21 | Stop reason: HOSPADM

## 2023-07-21 RX ORDER — MAGNESIUM SULFATE HEPTAHYDRATE 40 MG/ML
2 INJECTION, SOLUTION INTRAVENOUS ONCE
Status: DISCONTINUED | OUTPATIENT
Start: 2023-07-21 | End: 2023-07-21

## 2023-07-21 RX ORDER — THIAMINE HCL 100 MG
100 TABLET ORAL DAILY
Status: DISCONTINUED | OUTPATIENT
Start: 2023-07-21 | End: 2023-07-21 | Stop reason: HOSPADM

## 2023-07-21 RX ORDER — LANOLIN ALCOHOL/MO/W.PET/CERES
400 CREAM (GRAM) TOPICAL DAILY
Qty: 3 TABLET | Refills: 0 | Status: SHIPPED | OUTPATIENT
Start: 2023-07-22 | End: 2023-07-25

## 2023-07-21 RX ORDER — FOLIC ACID 1 MG/1
1 TABLET ORAL DAILY
Status: DISCONTINUED | OUTPATIENT
Start: 2023-07-21 | End: 2023-07-21 | Stop reason: HOSPADM

## 2023-07-21 RX ORDER — LANOLIN ALCOHOL/MO/W.PET/CERES
100 CREAM (GRAM) TOPICAL DAILY
Qty: 30 TABLET | Refills: 0 | Status: SHIPPED | OUTPATIENT
Start: 2023-07-22 | End: 2023-08-21

## 2023-07-21 RX ORDER — AMOXICILLIN AND CLAVULANATE POTASSIUM 875; 125 MG/1; MG/1
1 TABLET, FILM COATED ORAL EVERY 12 HOURS
Qty: 6 TABLET | Refills: 0 | Status: SHIPPED | OUTPATIENT
Start: 2023-07-22 | End: 2023-07-25

## 2023-07-21 RX ORDER — IBUPROFEN 200 MG
1 TABLET ORAL DAILY
Qty: 30 PATCH | Refills: 0
Start: 2023-07-22 | End: 2023-08-07

## 2023-07-21 RX ORDER — LORAZEPAM 0.5 MG/1
1 TABLET ORAL EVERY 6 HOURS PRN
Status: DISCONTINUED | OUTPATIENT
Start: 2023-07-21 | End: 2023-07-21 | Stop reason: HOSPADM

## 2023-07-21 RX ORDER — AMOXICILLIN AND CLAVULANATE POTASSIUM 875; 125 MG/1; MG/1
1 TABLET, FILM COATED ORAL EVERY 12 HOURS
Status: DISCONTINUED | OUTPATIENT
Start: 2023-07-21 | End: 2023-07-21 | Stop reason: HOSPADM

## 2023-07-21 RX ORDER — AMLODIPINE BESYLATE 5 MG/1
10 TABLET ORAL DAILY
Qty: 60 TABLET | Refills: 0 | Status: SHIPPED | OUTPATIENT
Start: 2023-07-22 | End: 2023-08-24 | Stop reason: SDUPTHER

## 2023-07-21 RX ORDER — FOLIC ACID 1 MG/1
1 TABLET ORAL DAILY
Qty: 30 TABLET | Refills: 0 | Status: SHIPPED | OUTPATIENT
Start: 2023-07-22 | End: 2023-08-21

## 2023-07-21 RX ADMIN — CEFTRIAXONE 1 G: 1 INJECTION, POWDER, FOR SOLUTION INTRAMUSCULAR; INTRAVENOUS at 09:07

## 2023-07-21 RX ADMIN — Medication 400 MG: at 04:07

## 2023-07-21 RX ADMIN — TAMSULOSIN HYDROCHLORIDE 0.4 MG: 0.4 CAPSULE ORAL at 09:07

## 2023-07-21 RX ADMIN — LISINOPRIL 20 MG: 20 TABLET ORAL at 09:07

## 2023-07-21 RX ADMIN — NICOTINE 1 PATCH: 21 PATCH, EXTENDED RELEASE TRANSDERMAL at 09:07

## 2023-07-21 RX ADMIN — CHLORDIAZEPOXIDE HYDROCHLORIDE 25 MG: 25 CAPSULE ORAL at 09:07

## 2023-07-21 RX ADMIN — PANTOPRAZOLE SODIUM 40 MG: 40 TABLET, DELAYED RELEASE ORAL at 09:07

## 2023-07-21 RX ADMIN — AMLODIPINE BESYLATE 10 MG: 10 TABLET ORAL at 09:07

## 2023-07-21 RX ADMIN — CHLORDIAZEPOXIDE HYDROCHLORIDE 25 MG: 25 CAPSULE ORAL at 02:07

## 2023-07-21 RX ADMIN — FOLIC ACID: 5 INJECTION, SOLUTION INTRAMUSCULAR; INTRAVENOUS; SUBCUTANEOUS at 01:07

## 2023-07-21 NOTE — DISCHARGE SUMMARY
[facility]  Discharge Summary      Patient Name: Valerio Yan  MRN: [id number]  [facility]: [id number]  Patient Class: OP- Observation  Admission Date: 7/19/2023  Hospital Length of Stay: 0 days  Discharge Date and Time: 7/21/2023  6:48 PM  Attending Physician: Dr. Micheal Soria   Discharging Provider: Magnolia Borrero NP  Primary Care Provider: Therese Lala MD    Primary Care Team: Networked reference to record PCT     HPI:   Patient is a 52 y.o. aa male with a PMH of HTN, HLD, COPD, and alcoholism who presents to the Emergency Department for generalized weakness, onset 2 weeks PTA. Pt states that he has been feeling weak since he was admitted for pyelonephritis on 6/30/23. He reports having recent c-scope and egd performed. EGD showed Barretts disease and c-scope unremarkable. Patient reports having fever, chills, and back pain. He states he took his antibiotics for UTI. Most recent urine cultures grew enterococcus and ecoli. He reveals having alcohol withdrawal symptoms. States he has been drinking heavily for the last few weeks. Does report having tremors whenever he stops drinking. Last drink was yesterday. Upon further questioning he reports having dark tarry stools for the past few days.     In the Ed, u/a indicative of UTI. He was bolused fluids and started on rocephin. Patient placed in observation.       * No surgery found *      Hospital Course:   Pt admitted to Observation for Pyelonephritis. UA is consistent with infectious process. IV antibiotics initiated. Urine culture results pending. Lactic acidosis resolved with hydration. Pt reports daily use of cigarettes and increased ETOH use since discharge with last drink reported on 7/18/23. Will monitor for signs of withdrawal and Librium initiated with Ativan as needed. Pt reports recent hospitalization with treatment for Urosepsis and outpatient completion of Amoxicillin. Dark stools also reported with H/H stable and FOBT negative-  diet advanced to clear liquids. Pt had recent EGD and colonoscopy on 6/28/23 per Dr. Rodriguez (GI). Urine culture grew gram negative rods. On 7/21/23, urine culture grew E. Coli with sensitivities reviewed. IV antibiotics transitioned to oral dosing. Pt verbalized symptom improvement and eager for discharge. Pt counseled on cessation of ETOH and compliance with prescribed medications with understanding verbalized. Pt does not have a plan of ETOH cessation at this time. Pt ambulating hallway and no evidence of dark stools witnessed or reported. Vital signs stable with no signs of acute distress. Pt seen and examined and deemed stable for discharge to home. Medications reconciled with Augmentin, Folic acid, thiamine, Magnesium, and Nicoderm prescribed. Pt instructed to follow up with PCP and GI upon discharge for further evaluation.        Goals of Care Treatment Preferences:  Code Status: Full Code      Consults:   Consults (From admission, onward)        Status Ordering Provider     IP consult to case management  Once        Provider:  (Not yet assigned)    Completed KYRA OJEDA          Final Active Diagnoses:    Diagnosis Date Noted POA    PRINCIPAL PROBLEM:  Pyelonephritis [N12] 07/19/2023 Yes    Alcoholism [F10.20] 07/19/2023 Yes    Black stools [K92.1] 07/19/2023 Yes    Hypertension [I10] 01/06/2017 Yes    Ozuna esophagus [K22.70] 04/02/2014 Yes      Problems Resolved During this Admission:       Discharged Condition: stable    Disposition: Home or Self Care    Follow Up:   Follow-up Information     Therese Lala MD Follow up in 3 day(s).    Specialty: Internal Medicine  Why: -hospital follow-up  Contact information:  [address] [place] [address] [phone]             Carissa Cheng NP Follow up in 1 week(s).    Specialty: Gastroenterology  Why: -hospital follow-up and further evaluation for repeat EGD/colon  Contact information:  [address]  [place]  [address]  [phone]                       Patient Instructions:      Diet Cardiac     Notify your health care provider if you experience any of the following:  temperature >100.4     Notify your health care provider if you experience any of the following:  persistent nausea and vomiting or diarrhea     Notify your health care provider if you experience any of the following:  increased confusion or weakness     Notify your health care provider if you experience any of the following:  persistent dizziness, light-headedness, or visual disturbances     Notify your health care provider if you experience any of the following:  severe persistent headache     Notify your health care provider if you experience any of the following:  difficulty breathing or increased cough     Notify your health care provider if you experience any of the following:  severe uncontrolled pain     Activity as tolerated       Significant Diagnostic Studies: Labs: All labs within the past 24 hours have been reviewed    Pending Diagnostic Studies:     None         Medications:  Reconciled Home Medications:      Medication List      START taking these medications    amoxicillin-clavulanate 875-125mg 875-125 mg per tablet  Commonly known as: AUGMENTIN  Take 1 tablet by mouth every 12 (twelve) hours. for 3 days     folic acid 1 MG tablet  Commonly known as: FOLVITE  Take 1 tablet (1 mg total) by mouth once daily.     magnesium oxide 400 mg (241.3 mg magnesium) tablet  Commonly known as: MAG-OX  Take 1 tablet (400 mg total) by mouth once daily. for 3 days     nicotine 21 mg/24 hr  Commonly known as: NICODERM CQ  Place 1 patch onto the skin once daily.     thiamine 100 MG tablet  Take 1 tablet (100 mg total) by mouth once daily.        CHANGE how you take these medications    amLODIPine 5 MG tablet  Commonly known as: NORVASC  Take 2 tablets (10 mg total) by mouth once daily.  What changed: how much to take     isosorbide mononitrate 30 MG 24 hr  tablet  Commonly known as: IMDUR  Take 1 tablet (30 mg total) by mouth once daily.  What changed: when to take this        CONTINUE taking these medications    aspirin 81 MG EC tablet  Commonly known as: ECOTRIN  Take 81 mg by mouth once daily.     calcium-vitamin D3 600 mg-5 mcg (200 unit) Tab  Commonly known as: CALCIUM 600 + D(3)  Take 1 tablet by mouth once daily.     cetirizine 10 MG tablet  Commonly known as: ZYRTEC  Take 1 tablet (10 mg total) by mouth once daily.     cyanocobalamin 1,000 mcg/mL injection  Commonly known as: VITAMIN B-12  Inject 1 mL (1,000 mcg total) into the muscle every 30 days.     esomeprazole 20 MG capsule  Commonly known as: NEXIUM  Take 2 capsules (40 mg total) by mouth before breakfast.     fluticasone propionate 50 mcg/actuation nasal spray  Commonly known as: FLONASE  1 spray (50 mcg total) by Each Nostril route once daily.     lisinopriL-hydrochlorothiazide 20-12.5 mg per tablet  Commonly known as: PRINZIDE,ZESTORETIC  Take 2 tablets by mouth once daily.     multivitamin per tablet  Commonly known as: THERAGRAN  Take 1 tablet by mouth nightly.     PRALUENT  mg/mL Pnij  Generic drug: alirocumab  INJECT 1.06MLS ( 159 MG TOTAL ) BY ABDOMINAL SUBCUTANEOUS ROUTE EVERY 14 DAYS     tamsulosin 0.4 mg Cap  Commonly known as: FLOMAX  Take 1 capsule (0.4 mg total) by mouth once daily. for 20 days        STOP taking these medications    furosemide 20 MG tablet  Commonly known as: LASIX     ibuprofen 800 MG tablet  Commonly known as: ADVIL,MOTRIN     metoprolol succinate 25 MG 24 hr tablet  Commonly known as: TOPROL-XL            Indwelling Lines/Drains at time of discharge:   Lines/Drains/Airways     None                 Time spent on the discharge of patient: > 38 minutes         Magnolia Borrero NP  Department of Hospital Medicine  [facility]

## 2023-07-21 NOTE — PLAN OF CARE
Pt sitting up on side of the bed , no distress . IV infusing . Pt tolerating banana bag . Chart check complete , will continue to monitor .

## 2023-07-24 LAB
BACTERIA BLD CULT: NORMAL
BACTERIA BLD CULT: NORMAL

## 2023-07-27 ENCOUNTER — OFFICE VISIT (OUTPATIENT)
Dept: INTERNAL MEDICINE | Facility: CLINIC | Age: 53
End: 2023-07-27
Payer: MEDICARE

## 2023-07-27 ENCOUNTER — LAB VISIT (OUTPATIENT)
Dept: LAB | Facility: HOSPITAL | Age: 53
End: 2023-07-27
Attending: PHYSICIAN ASSISTANT
Payer: MEDICARE

## 2023-07-27 ENCOUNTER — PATIENT MESSAGE (OUTPATIENT)
Dept: FAMILY MEDICINE | Facility: CLINIC | Age: 53
End: 2023-07-27
Payer: MEDICARE

## 2023-07-27 VITALS
OXYGEN SATURATION: 96 % | DIASTOLIC BLOOD PRESSURE: 74 MMHG | TEMPERATURE: 97 F | SYSTOLIC BLOOD PRESSURE: 120 MMHG | HEART RATE: 74 BPM | BODY MASS INDEX: 31.08 KG/M2 | WEIGHT: 262.13 LBS

## 2023-07-27 DIAGNOSIS — Z12.5 ENCOUNTER FOR SCREENING FOR MALIGNANT NEOPLASM OF PROSTATE: ICD-10-CM

## 2023-07-27 DIAGNOSIS — N30.01 ACUTE CYSTITIS WITH HEMATURIA: Primary | ICD-10-CM

## 2023-07-27 DIAGNOSIS — Z09 HOSPITAL DISCHARGE FOLLOW-UP: Primary | ICD-10-CM

## 2023-07-27 DIAGNOSIS — N12 PYELONEPHRITIS: ICD-10-CM

## 2023-07-27 DIAGNOSIS — K22.70 BARRETT'S ESOPHAGUS WITHOUT DYSPLASIA: ICD-10-CM

## 2023-07-27 DIAGNOSIS — I10 ESSENTIAL HYPERTENSION: ICD-10-CM

## 2023-07-27 DIAGNOSIS — K92.1 BLACK STOOLS: ICD-10-CM

## 2023-07-27 DIAGNOSIS — F10.20 ALCOHOLISM: ICD-10-CM

## 2023-07-27 LAB
BASOPHILS # BLD AUTO: 0.06 K/UL (ref 0–0.2)
BASOPHILS NFR BLD: 0.8 % (ref 0–1.9)
DIFFERENTIAL METHOD: ABNORMAL
EOSINOPHIL # BLD AUTO: 0.2 K/UL (ref 0–0.5)
EOSINOPHIL NFR BLD: 2 % (ref 0–8)
ERYTHROCYTE [DISTWIDTH] IN BLOOD BY AUTOMATED COUNT: 15.8 % (ref 11.5–14.5)
HCT VFR BLD AUTO: 45.5 % (ref 40–54)
HGB BLD-MCNC: 15.6 G/DL (ref 14–18)
IMM GRANULOCYTES # BLD AUTO: 0.06 K/UL (ref 0–0.04)
IMM GRANULOCYTES NFR BLD AUTO: 0.8 % (ref 0–0.5)
LYMPHOCYTES # BLD AUTO: 2.1 K/UL (ref 1–4.8)
LYMPHOCYTES NFR BLD: 27.8 % (ref 18–48)
MCH RBC QN AUTO: 33.5 PG (ref 27–31)
MCHC RBC AUTO-ENTMCNC: 34.3 G/DL (ref 32–36)
MCV RBC AUTO: 98 FL (ref 82–98)
MONOCYTES # BLD AUTO: 1.1 K/UL (ref 0.3–1)
MONOCYTES NFR BLD: 14.7 % (ref 4–15)
NEUTROPHILS # BLD AUTO: 4 K/UL (ref 1.8–7.7)
NEUTROPHILS NFR BLD: 53.9 % (ref 38–73)
NRBC BLD-RTO: 0 /100 WBC
PLATELET # BLD AUTO: 143 K/UL (ref 150–450)
PMV BLD AUTO: 11.4 FL (ref 9.2–12.9)
RBC # BLD AUTO: 4.66 M/UL (ref 4.6–6.2)
WBC # BLD AUTO: 7.42 K/UL (ref 3.9–12.7)

## 2023-07-27 PROCEDURE — 99999 PR PBB SHADOW E&M-EST. PATIENT-LVL V: CPT | Mod: PBBFAC,,, | Performed by: PHYSICIAN ASSISTANT

## 2023-07-27 PROCEDURE — 3078F DIAST BP <80 MM HG: CPT | Mod: CPTII,S$GLB,, | Performed by: PHYSICIAN ASSISTANT

## 2023-07-27 PROCEDURE — 1159F MED LIST DOCD IN RCRD: CPT | Mod: CPTII,S$GLB,, | Performed by: PHYSICIAN ASSISTANT

## 2023-07-27 PROCEDURE — 3078F PR MOST RECENT DIASTOLIC BLOOD PRESSURE < 80 MM HG: ICD-10-PCS | Mod: CPTII,S$GLB,, | Performed by: PHYSICIAN ASSISTANT

## 2023-07-27 PROCEDURE — 4010F ACE/ARB THERAPY RXD/TAKEN: CPT | Mod: CPTII,S$GLB,, | Performed by: PHYSICIAN ASSISTANT

## 2023-07-27 PROCEDURE — 80048 BASIC METABOLIC PNL TOTAL CA: CPT | Performed by: PHYSICIAN ASSISTANT

## 2023-07-27 PROCEDURE — 3008F BODY MASS INDEX DOCD: CPT | Mod: CPTII,S$GLB,, | Performed by: PHYSICIAN ASSISTANT

## 2023-07-27 PROCEDURE — 1160F PR REVIEW ALL MEDS BY PRESCRIBER/CLIN PHARMACIST DOCUMENTED: ICD-10-PCS | Mod: CPTII,S$GLB,, | Performed by: PHYSICIAN ASSISTANT

## 2023-07-27 PROCEDURE — 1159F PR MEDICATION LIST DOCUMENTED IN MEDICAL RECORD: ICD-10-PCS | Mod: CPTII,S$GLB,, | Performed by: PHYSICIAN ASSISTANT

## 2023-07-27 PROCEDURE — 1160F RVW MEDS BY RX/DR IN RCRD: CPT | Mod: CPTII,S$GLB,, | Performed by: PHYSICIAN ASSISTANT

## 2023-07-27 PROCEDURE — 3074F PR MOST RECENT SYSTOLIC BLOOD PRESSURE < 130 MM HG: ICD-10-PCS | Mod: CPTII,S$GLB,, | Performed by: PHYSICIAN ASSISTANT

## 2023-07-27 PROCEDURE — 84153 ASSAY OF PSA TOTAL: CPT | Performed by: PHYSICIAN ASSISTANT

## 2023-07-27 PROCEDURE — 99213 PR OFFICE/OUTPT VISIT, EST, LEVL III, 20-29 MIN: ICD-10-PCS | Mod: S$GLB,,, | Performed by: PHYSICIAN ASSISTANT

## 2023-07-27 PROCEDURE — 3008F PR BODY MASS INDEX (BMI) DOCUMENTED: ICD-10-PCS | Mod: CPTII,S$GLB,, | Performed by: PHYSICIAN ASSISTANT

## 2023-07-27 PROCEDURE — 36415 COLL VENOUS BLD VENIPUNCTURE: CPT | Performed by: PHYSICIAN ASSISTANT

## 2023-07-27 PROCEDURE — 99213 OFFICE O/P EST LOW 20 MIN: CPT | Mod: S$GLB,,, | Performed by: PHYSICIAN ASSISTANT

## 2023-07-27 PROCEDURE — 85025 COMPLETE CBC W/AUTO DIFF WBC: CPT | Performed by: PHYSICIAN ASSISTANT

## 2023-07-27 PROCEDURE — 99999 PR PBB SHADOW E&M-EST. PATIENT-LVL V: ICD-10-PCS | Mod: PBBFAC,,, | Performed by: PHYSICIAN ASSISTANT

## 2023-07-27 PROCEDURE — 4010F PR ACE/ARB THEARPY RXD/TAKEN: ICD-10-PCS | Mod: CPTII,S$GLB,, | Performed by: PHYSICIAN ASSISTANT

## 2023-07-27 PROCEDURE — 3074F SYST BP LT 130 MM HG: CPT | Mod: CPTII,S$GLB,, | Performed by: PHYSICIAN ASSISTANT

## 2023-07-27 RX ORDER — CEFDINIR 300 MG/1
300 CAPSULE ORAL 2 TIMES DAILY
Qty: 28 CAPSULE | Refills: 0 | Status: SHIPPED | OUTPATIENT
Start: 2023-07-27 | End: 2023-08-07

## 2023-07-27 NOTE — PROGRESS NOTES
Transitional Care Note  Subjective:      Patient ID: Valerio Yan is a 52 y.o. male.  Chief Complaint: Hospital Follow Up (Patient stated that he was at the hospital from the 30th to the 3rd and then back again last week. He stated that a UTI brought him into the hospital. )      Patient presents to clinic today for hospital TCC visit. Patient was recently hospitalized for pyelonephritis.       Family and/or Caretaker present at visit?  No.  Diagnostic tests reviewed/disposition: No diagnosic tests pending after this hospitalization.  Disease/illness education: See A&P  Home health/community services discussion/referrals: Patient does not have home health established from hospital visit.  They do not need home health.  If needed, we will set up home health for the patient.   Establishment or re-establishment of referral orders for community resources: No other necessary community resources.   Discussion with other health care providers: No discussion with other health care providers necessary.     Patient Class: OP- Observation  Admission Date: 7/19/2023  Hospital Length of Stay: 0 days  Discharge Date and Time: 7/21/2023  6:48 PM  Attending Physician: Dr. Micheal Soria   Discharging Provider: Magnolia Borrero NP  Primary Care Provider: Therese Lala MD      HPI:   Patient is a 52 y.o. aa male with a PMH of HTN, HLD, COPD, and alcoholism who presents to the Emergency Department for generalized weakness, onset 2 weeks PTA. Pt states that he has been feeling weak since he was admitted for pyelonephritis on 6/30/23. He reports having recent c-scope and egd performed. EGD showed Barretts disease and c-scope unremarkable. Patient reports having fever, chills, and back pain. He states he took his antibiotics for UTI. Most recent urine cultures grew enterococcus and ecoli. He reveals having alcohol withdrawal symptoms. States he has been drinking heavily for the last few weeks. Does report having tremors whenever he  stops drinking. Last drink was yesterday. Upon further questioning he reports having dark tarry stools for the past few days.      In the Ed, u/a indicative of UTI. He was bolused fluids and started on rocephin. Patient placed in observation.       Hospital Course:   Pt admitted to Observation for Pyelonephritis. UA is consistent with infectious process. IV antibiotics initiated. Urine culture results pending. Lactic acidosis resolved with hydration. Pt reports daily use of cigarettes and increased ETOH use since discharge with last drink reported on 7/18/23. Will monitor for signs of withdrawal and Librium initiated with Ativan as needed. Pt reports recent hospitalization with treatment for Urosepsis and outpatient completion of Amoxicillin. Dark stools also reported with H/H stable and FOBT negative- diet advanced to clear liquids. Pt had recent EGD and colonoscopy on 6/28/23 per Dr. Rodriguez (GI). Urine culture grew gram negative rods. On 7/21/23, urine culture grew E. Coli with sensitivities reviewed. IV antibiotics transitioned to oral dosing. Pt verbalized symptom improvement and eager for discharge. Pt counseled on cessation of ETOH and compliance with prescribed medications with understanding verbalized. Pt does not have a plan of ETOH cessation at this time. Pt ambulating hallway and no evidence of dark stools witnessed or reported. Vital signs stable with no signs of acute distress. Pt seen and examined and deemed stable for discharge to home. Medications reconciled with Augmentin, Folic acid, thiamine, Magnesium, and Nicoderm prescribed. Pt instructed to follow up with PCP and GI upon discharge for further evaluation.     Review of Systems   Constitutional:  Negative for chills and fever.   Respiratory:  Negative for shortness of breath.    Cardiovascular:  Negative for chest pain.   Gastrointestinal:  Negative for abdominal pain, nausea and vomiting.   Endocrine: Negative for polydipsia and polyuria.    Genitourinary:  Positive for flank pain (left), frequency and urgency. Negative for dysuria and hematuria.        +nocturia      Objective:      Physical Exam  Vitals and nursing note reviewed.   Constitutional:       General: He is not in acute distress.     Appearance: He is well-developed.   HENT:      Head: Normocephalic and atraumatic.   Eyes:      General: Lids are normal. No scleral icterus.     Extraocular Movements: Extraocular movements intact.      Conjunctiva/sclera: Conjunctivae normal.   Cardiovascular:      Rate and Rhythm: Normal rate and regular rhythm.   Pulmonary:      Effort: Pulmonary effort is normal.      Breath sounds: Normal breath sounds. No decreased breath sounds, wheezing, rhonchi or rales.   Abdominal:      General: There is no distension.      Palpations: Abdomen is soft.      Tenderness: There is no abdominal tenderness. There is left CVA tenderness. There is no right CVA tenderness.   Neurological:      Mental Status: He is alert.      Cranial Nerves: No cranial nerve deficit.   Psychiatric:         Mood and Affect: Mood and affect normal.       Assessment:       1. Hospital discharge follow-up    2. Pyelonephritis    3. Essential hypertension    4. Alcoholism    5. Ozuna's esophagus without dysplasia    6. Black stools    7. Encounter for screening for malignant neoplasm of prostate        Plan:   1. Hospital discharge follow-up    2. Pyelonephritis  -     Urinalysis; Future; Expected date: 07/27/2023  -     Urine culture; Future; Expected date: 07/27/2023  -     Basic Metabolic Panel; Future; Expected date: 07/27/2023  -     CBC Auto Differential; Future; Expected date: 07/27/2023  -     Ambulatory referral/consult to Urology; Future; Expected date: 08/03/2023  -     cefdinir (OMNICEF) 300 MG capsule; Take 1 capsule (300 mg total) by mouth 2 (two) times a day. for 14 days  Dispense: 28 capsule; Refill: 0    3. Essential hypertension    4. Alcoholism    5. Ozuna's esophagus  without dysplasia    6. Black stools    7. Encounter for screening for malignant neoplasm of prostate  -     PSA, Screening; Future; Expected date: 07/27/2023      Recheck urinalysis  Schedule with Urology for evaluation of enlarged prostate with obstructive symptoms  ED precautions given

## 2023-07-28 LAB
ANION GAP SERPL CALC-SCNC: 15 MMOL/L (ref 8–16)
BUN SERPL-MCNC: 11 MG/DL (ref 6–20)
CALCIUM SERPL-MCNC: 9.3 MG/DL (ref 8.7–10.5)
CHLORIDE SERPL-SCNC: 101 MMOL/L (ref 95–110)
CO2 SERPL-SCNC: 23 MMOL/L (ref 23–29)
COMPLEXED PSA SERPL-MCNC: 1.1 NG/ML (ref 0–4)
CREAT SERPL-MCNC: 0.7 MG/DL (ref 0.5–1.4)
EST. GFR  (NO RACE VARIABLE): >60 ML/MIN/1.73 M^2
GLUCOSE SERPL-MCNC: 79 MG/DL (ref 70–110)
POTASSIUM SERPL-SCNC: 3.4 MMOL/L (ref 3.5–5.1)
SODIUM SERPL-SCNC: 139 MMOL/L (ref 136–145)

## 2023-08-03 ENCOUNTER — PATIENT MESSAGE (OUTPATIENT)
Dept: CARDIOLOGY | Facility: CLINIC | Age: 53
End: 2023-08-03
Payer: MEDICARE

## 2023-08-04 ENCOUNTER — TELEPHONE (OUTPATIENT)
Dept: CARDIOLOGY | Facility: CLINIC | Age: 53
End: 2023-08-04
Payer: MEDICARE

## 2023-08-04 ENCOUNTER — OFFICE VISIT (OUTPATIENT)
Dept: UROLOGY | Facility: CLINIC | Age: 53
End: 2023-08-04
Payer: MEDICARE

## 2023-08-04 VITALS
HEART RATE: 55 BPM | DIASTOLIC BLOOD PRESSURE: 81 MMHG | SYSTOLIC BLOOD PRESSURE: 144 MMHG | WEIGHT: 266.13 LBS | BODY MASS INDEX: 31.42 KG/M2 | HEIGHT: 77 IN

## 2023-08-04 DIAGNOSIS — N12 PYELONEPHRITIS: ICD-10-CM

## 2023-08-04 DIAGNOSIS — N13.8 BENIGN PROSTATIC HYPERPLASIA WITH URINARY OBSTRUCTION: Primary | ICD-10-CM

## 2023-08-04 DIAGNOSIS — F52.4 PREMATURE EJACULATION: ICD-10-CM

## 2023-08-04 DIAGNOSIS — L72.3 SEBACEOUS CYST OF SCROTUM: ICD-10-CM

## 2023-08-04 DIAGNOSIS — N13.30 BILATERAL HYDRONEPHROSIS: ICD-10-CM

## 2023-08-04 DIAGNOSIS — N52.03 COMBINED ARTERIAL INSUFFICIENCY AND CORPORO-VENOUS OCCLUSIVE ERECTILE DYSFUNCTION: ICD-10-CM

## 2023-08-04 DIAGNOSIS — N40.1 BENIGN PROSTATIC HYPERPLASIA WITH URINARY OBSTRUCTION: Primary | ICD-10-CM

## 2023-08-04 DIAGNOSIS — N30.00 ACUTE CYSTITIS WITHOUT HEMATURIA: ICD-10-CM

## 2023-08-04 LAB
BILIRUB SERPL-MCNC: NORMAL MG/DL
BLOOD URINE, POC: NORMAL
CLARITY, POC UA: CLEAR
COLOR, POC UA: YELLOW
GLUCOSE UR QL STRIP: NORMAL
KETONES UR QL STRIP: NORMAL
LEUKOCYTE ESTERASE URINE, POC: NORMAL
NITRITE, POC UA: NORMAL
PH, POC UA: 7
PROTEIN, POC: NORMAL
SPECIFIC GRAVITY, POC UA: 1.01
UROBILINOGEN, POC UA: 0.2

## 2023-08-04 PROCEDURE — 4010F PR ACE/ARB THEARPY RXD/TAKEN: ICD-10-PCS | Mod: CPTII,S$GLB,, | Performed by: UROLOGY

## 2023-08-04 PROCEDURE — 99999 PR PBB SHADOW E&M-EST. PATIENT-LVL V: ICD-10-PCS | Mod: PBBFAC,,, | Performed by: UROLOGY

## 2023-08-04 PROCEDURE — 81002 POCT URINE DIPSTICK WITHOUT MICROSCOPE: ICD-10-PCS | Mod: S$GLB,,, | Performed by: UROLOGY

## 2023-08-04 PROCEDURE — 3079F DIAST BP 80-89 MM HG: CPT | Mod: CPTII,S$GLB,, | Performed by: UROLOGY

## 2023-08-04 PROCEDURE — 3077F PR MOST RECENT SYSTOLIC BLOOD PRESSURE >= 140 MM HG: ICD-10-PCS | Mod: CPTII,S$GLB,, | Performed by: UROLOGY

## 2023-08-04 PROCEDURE — 3077F SYST BP >= 140 MM HG: CPT | Mod: CPTII,S$GLB,, | Performed by: UROLOGY

## 2023-08-04 PROCEDURE — 99999 PR PBB SHADOW E&M-EST. PATIENT-LVL V: CPT | Mod: PBBFAC,,, | Performed by: UROLOGY

## 2023-08-04 PROCEDURE — 99204 OFFICE O/P NEW MOD 45 MIN: CPT | Mod: S$GLB,,, | Performed by: UROLOGY

## 2023-08-04 PROCEDURE — 81002 URINALYSIS NONAUTO W/O SCOPE: CPT | Mod: S$GLB,,, | Performed by: UROLOGY

## 2023-08-04 PROCEDURE — 1159F PR MEDICATION LIST DOCUMENTED IN MEDICAL RECORD: ICD-10-PCS | Mod: CPTII,S$GLB,, | Performed by: UROLOGY

## 2023-08-04 PROCEDURE — 4010F ACE/ARB THERAPY RXD/TAKEN: CPT | Mod: CPTII,S$GLB,, | Performed by: UROLOGY

## 2023-08-04 PROCEDURE — 3008F PR BODY MASS INDEX (BMI) DOCUMENTED: ICD-10-PCS | Mod: CPTII,S$GLB,, | Performed by: UROLOGY

## 2023-08-04 PROCEDURE — 3079F PR MOST RECENT DIASTOLIC BLOOD PRESSURE 80-89 MM HG: ICD-10-PCS | Mod: CPTII,S$GLB,, | Performed by: UROLOGY

## 2023-08-04 PROCEDURE — 1159F MED LIST DOCD IN RCRD: CPT | Mod: CPTII,S$GLB,, | Performed by: UROLOGY

## 2023-08-04 PROCEDURE — 99204 PR OFFICE/OUTPT VISIT, NEW, LEVL IV, 45-59 MIN: ICD-10-PCS | Mod: S$GLB,,, | Performed by: UROLOGY

## 2023-08-04 PROCEDURE — 3008F BODY MASS INDEX DOCD: CPT | Mod: CPTII,S$GLB,, | Performed by: UROLOGY

## 2023-08-04 RX ORDER — PAROXETINE 10 MG/1
10 TABLET, FILM COATED ORAL EVERY MORNING
Qty: 30 TABLET | Refills: 11 | Status: SHIPPED | OUTPATIENT
Start: 2023-08-04 | End: 2024-08-03

## 2023-08-04 RX ORDER — TAMSULOSIN HYDROCHLORIDE 0.4 MG/1
0.4 CAPSULE ORAL DAILY
Qty: 90 CAPSULE | Refills: 3 | Status: SHIPPED | OUTPATIENT
Start: 2023-08-04 | End: 2024-07-29

## 2023-08-04 NOTE — TELEPHONE ENCOUNTER
Please call pt.  He emailed (below) about sxs.  He has not been seen in clinic for > 2 years.  Needs f/u appt asap.    Dr Mir

## 2023-08-04 NOTE — PROGRESS NOTES
Chief Complaint:   Encounter Diagnoses   Name Primary?    Benign prostatic hyperplasia with urinary obstruction Yes    Pyelonephritis     Acute cystitis without hematuria     Combined arterial insufficiency and corporo-venous occlusive erectile dysfunction     Premature ejaculation     Flank pain        HPI:  52-year-old gentleman with significant urological issues especially lately.  Two recent UTIs requiring hospitalization even for possible pyelonephritis.  He is had weak stream for the last 5 years, attempted tamsulosin for 1 month after the hospital but not currently on it.  Patient also has significant erectile dysfunction and premature ejaculation, again no previous treatments.  Of note patient is currently on daily mononitrate.  Patient states the only gets up 1 time per night, no significant frequency but when he drinks water he has to go frequently.  States that he has a weak flow and is slow to start, occasional split stream.  No gross hematuria, no microscopic hematuria, he is a smoker.  No other urological history.  No family history of urological cancers, his father has had stones.    Allergies:  Metoclopramide; Metoclopramide (bulk); Reglan  [metoclopramide hcl]; Statins-hmg-coa reductase inhibitors; Sulfa (sulfonamide antibiotics); Latex; and Latex, natural rubber    Medications:  See MAR    Review of Systems:  General: No fever, chills, fatigability, or weight loss.  Skin: No rashes, itching, or changes in color or texture of skin.  Chest: Denies [employer], cyanosis, wheezing, cough, and sputum production.  Abdomen: Appetite fine. No weight loss. Denies diarrhea, abdominal pain, hematemesis, or blood in stool.  Musculoskeletal: No joint stiffness or swelling. Denies back pain.  : As above.  All other review of systems negative.    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    PSH:   has a past surgical history that includes Megha-en-y procedure; Tracheal surgery; Gastrostomy tube placement; PEG tube removal; decubitus surgery; barrette esophagus; Esophagogastroduodenoscopy; Knee arthroscopy (Left, 2002); Radiofrequency ablation; Colonoscopy (N/A, 05/17/2018); RFA; Tonsillectomy; Robot-assisted cholecystectomy using da Kameron Xi (N/A, 05/02/2019); Left heart catheterization (Left, 12/10/2020); ANGIOGRAM, CORONARY, WITH LEFT HEART CATHETERIZATION (12/10/2020); Esophagogastroduodenoscopy (N/A, 06/17/2021); Esophagogastroduodenoscopy (N/A, 06/28/2023); Colonoscopy (N/A, 06/28/2023); Cholecystectomy; and Cosmetic surgery.    FamHx: family history includes Arthritis in his mother; Cancer in his maternal grandfather; Heart attack in his father and mother; Heart disease in his father and mother; Hypertension in his father; Stroke in his father and sister.    SocHx:  reports that he has been smoking cigarettes. He started smoking about 20 months ago. He has a 0.5 pack-year smoking history. He quit smokeless tobacco use about 24 years ago.  His smokeless tobacco use included snuff. He reports current alcohol use of about 11.0 standard drinks of alcohol per week. He reports that he does not use drugs.      Physical Exam:  Vitals:    08/04/23 0942   BP: (!) 144/81   Pulse: (!) 55     General: A&Ox3, no apparent distress, no deformities  Neck: No masses, normal ROM  Lungs: normal inspiration, no use of accessory muscles  Heart: normal pulse, no arrhythmias  Abdomen: Soft, NT, ND, no masses, no  hernias, no hepatosplenomegaly  Skin: The skin is warm and dry. No jaundice.  Ext: No c/c/e.  : 8/23- Test desc ruchi, no abnormalities of epididymus. Normal penile and scrotal skin, except for a sebaceous cyst on the left scrotal wall, proximally 1 cm. Meatus normal. Normal rectal tone. Prost 35 gm no nodules or masses appreciated. SV not palpable. Perineum and anus normal.    Labs/Studies:   UA normal 8/23  Urine culture Enterococcus 6/23   Urine culture E coli 7/23   PSA 1.1 7/23   Creatinine 0.7 7/23   CT stone protocol mild bilateral hydronephrosis 6/23    Impression/Plan:     1. BPH- go ahead and initiate tamsulosin, I have informed him this may cause orthostatic hypotension or dizziness.  If he has any issues he is to stop the medication and contact our office.  This could be an etiology for his most recent recurrent infections, please see below.  Reassess in 6 weeks with a uroflow and postvoid residual, at that juncture will also do sebaceous cyst excision.    2. Sebaceous cyst- present on the left scrotum, will perform excision at the next appointment, please see above.      3. UTI/pyelonephritis- currently clear, hopefully with control of his BPH these will reduce in frequency.  In regards to the mild bilateral hydronephrosis on CT, will obtain an ultrasound to reassess.  Could have been due to outflow obstruction.      4. Erectile dysfunction- cannot pursue PDE5 at this juncture due to being on isosorbide mononitrate, he will discuss with Cardiology.  If this fails he may be interested in TriMix injections.    3. Premature ejaculation- will attempt Paxil 10 mg daily to see if this assists, reassess at the next appointment, call with any complaints prior to that appointment.

## 2023-08-07 ENCOUNTER — OFFICE VISIT (OUTPATIENT)
Dept: CARDIOLOGY | Facility: CLINIC | Age: 53
End: 2023-08-07
Payer: MEDICARE

## 2023-08-07 VITALS
WEIGHT: 267.19 LBS | BODY MASS INDEX: 31.55 KG/M2 | HEIGHT: 77 IN | HEART RATE: 60 BPM | DIASTOLIC BLOOD PRESSURE: 72 MMHG | RESPIRATION RATE: 14 BRPM | SYSTOLIC BLOOD PRESSURE: 136 MMHG

## 2023-08-07 DIAGNOSIS — I10 PRIMARY HYPERTENSION: ICD-10-CM

## 2023-08-07 DIAGNOSIS — M60.9 STATIN-INDUCED MYOSITIS: ICD-10-CM

## 2023-08-07 DIAGNOSIS — G57.72 COMPLEX REGIONAL PAIN SYNDROME TYPE 2 OF BOTH LOWER EXTREMITIES: ICD-10-CM

## 2023-08-07 DIAGNOSIS — T46.6X5A STATIN-INDUCED MYOSITIS: ICD-10-CM

## 2023-08-07 DIAGNOSIS — N39.0 SEPSIS DUE TO URINARY TRACT INFECTION: ICD-10-CM

## 2023-08-07 DIAGNOSIS — I25.10 CAD, MULTIPLE VESSEL: ICD-10-CM

## 2023-08-07 DIAGNOSIS — I34.0 NONRHEUMATIC MITRAL VALVE REGURGITATION: ICD-10-CM

## 2023-08-07 DIAGNOSIS — N13.8 BENIGN PROSTATIC HYPERPLASIA WITH URINARY OBSTRUCTION: ICD-10-CM

## 2023-08-07 DIAGNOSIS — I11.9 HYPERTENSIVE LEFT VENTRICULAR HYPERTROPHY, WITHOUT HEART FAILURE: ICD-10-CM

## 2023-08-07 DIAGNOSIS — A41.9 SEPSIS DUE TO URINARY TRACT INFECTION: ICD-10-CM

## 2023-08-07 DIAGNOSIS — R93.1 ELEVATED CORONARY ARTERY CALCIUM SCORE: ICD-10-CM

## 2023-08-07 DIAGNOSIS — I48.3 TYPICAL ATRIAL FLUTTER: ICD-10-CM

## 2023-08-07 DIAGNOSIS — Z86.69 HISTORY OF GUILLAIN-BARRE SYNDROME: ICD-10-CM

## 2023-08-07 DIAGNOSIS — F10.20 ALCOHOLISM: ICD-10-CM

## 2023-08-07 DIAGNOSIS — N13.30 BILATERAL HYDRONEPHROSIS: ICD-10-CM

## 2023-08-07 DIAGNOSIS — R00.1 SINUS BRADYCARDIA: Primary | ICD-10-CM

## 2023-08-07 DIAGNOSIS — G57.71 COMPLEX REGIONAL PAIN SYNDROME TYPE 2 OF BOTH LOWER EXTREMITIES: ICD-10-CM

## 2023-08-07 DIAGNOSIS — G82.50 QUADRIPARESIS: ICD-10-CM

## 2023-08-07 DIAGNOSIS — R00.2 PALPITATIONS: ICD-10-CM

## 2023-08-07 DIAGNOSIS — K27.9 PEPTIC ULCER DISEASE: ICD-10-CM

## 2023-08-07 DIAGNOSIS — E66.01 SEVERE OBESITY (BMI 35.0-39.9) WITH COMORBIDITY: ICD-10-CM

## 2023-08-07 DIAGNOSIS — G47.30 SLEEP-DISORDERED BREATHING: ICD-10-CM

## 2023-08-07 DIAGNOSIS — N40.1 BENIGN PROSTATIC HYPERPLASIA WITH URINARY OBSTRUCTION: ICD-10-CM

## 2023-08-07 DIAGNOSIS — E78.2 MIXED HYPERLIPIDEMIA: ICD-10-CM

## 2023-08-07 DIAGNOSIS — G96.00 CSF LEAK: ICD-10-CM

## 2023-08-07 DIAGNOSIS — N12 PYELONEPHRITIS: ICD-10-CM

## 2023-08-07 DIAGNOSIS — G90.1 DYSAUTONOMIA: ICD-10-CM

## 2023-08-07 PROCEDURE — 4010F ACE/ARB THERAPY RXD/TAKEN: CPT | Mod: CPTII,S$GLB,, | Performed by: INTERNAL MEDICINE

## 2023-08-07 PROCEDURE — 4010F PR ACE/ARB THEARPY RXD/TAKEN: ICD-10-PCS | Mod: CPTII,S$GLB,, | Performed by: INTERNAL MEDICINE

## 2023-08-07 PROCEDURE — 1159F MED LIST DOCD IN RCRD: CPT | Mod: CPTII,S$GLB,, | Performed by: INTERNAL MEDICINE

## 2023-08-07 PROCEDURE — 99205 OFFICE O/P NEW HI 60 MIN: CPT | Mod: S$GLB,,, | Performed by: INTERNAL MEDICINE

## 2023-08-07 PROCEDURE — 99205 PR OFFICE/OUTPT VISIT, NEW, LEVL V, 60-74 MIN: ICD-10-PCS | Mod: S$GLB,,, | Performed by: INTERNAL MEDICINE

## 2023-08-07 PROCEDURE — 3078F PR MOST RECENT DIASTOLIC BLOOD PRESSURE < 80 MM HG: ICD-10-PCS | Mod: CPTII,S$GLB,, | Performed by: INTERNAL MEDICINE

## 2023-08-07 PROCEDURE — 3078F DIAST BP <80 MM HG: CPT | Mod: CPTII,S$GLB,, | Performed by: INTERNAL MEDICINE

## 2023-08-07 PROCEDURE — 1159F PR MEDICATION LIST DOCUMENTED IN MEDICAL RECORD: ICD-10-PCS | Mod: CPTII,S$GLB,, | Performed by: INTERNAL MEDICINE

## 2023-08-07 PROCEDURE — 1160F RVW MEDS BY RX/DR IN RCRD: CPT | Mod: CPTII,S$GLB,, | Performed by: INTERNAL MEDICINE

## 2023-08-07 PROCEDURE — 99999 PR PBB SHADOW E&M-EST. PATIENT-LVL IV: CPT | Mod: PBBFAC,,, | Performed by: INTERNAL MEDICINE

## 2023-08-07 PROCEDURE — 1160F PR REVIEW ALL MEDS BY PRESCRIBER/CLIN PHARMACIST DOCUMENTED: ICD-10-PCS | Mod: CPTII,S$GLB,, | Performed by: INTERNAL MEDICINE

## 2023-08-07 PROCEDURE — 3008F BODY MASS INDEX DOCD: CPT | Mod: CPTII,S$GLB,, | Performed by: INTERNAL MEDICINE

## 2023-08-07 PROCEDURE — 3075F SYST BP GE 130 - 139MM HG: CPT | Mod: CPTII,S$GLB,, | Performed by: INTERNAL MEDICINE

## 2023-08-07 PROCEDURE — 3075F PR MOST RECENT SYSTOLIC BLOOD PRESS GE 130-139MM HG: ICD-10-PCS | Mod: CPTII,S$GLB,, | Performed by: INTERNAL MEDICINE

## 2023-08-07 PROCEDURE — 3008F PR BODY MASS INDEX (BMI) DOCUMENTED: ICD-10-PCS | Mod: CPTII,S$GLB,, | Performed by: INTERNAL MEDICINE

## 2023-08-07 PROCEDURE — 99999 PR PBB SHADOW E&M-EST. PATIENT-LVL IV: ICD-10-PCS | Mod: PBBFAC,,, | Performed by: INTERNAL MEDICINE

## 2023-08-07 RX ORDER — METOPROLOL TARTRATE 25 MG/1
25 TABLET, FILM COATED ORAL NIGHTLY
COMMUNITY
End: 2023-08-24 | Stop reason: CLARIF

## 2023-08-07 NOTE — PROGRESS NOTES
Subjective:   Patient ID:  Valerio Yan is a 52 y.o. male     Chief complaint:Bradycardia (Pt is self referral to make sure that his medications are not causing the problem. He has no symptoms)      HPI  New patient to me. 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  Pt taken off xarelto after his PAFL ablation ([facility], [facility], Dr. Barone)..    Past hx pertinent for following;  H/o Guillain-[place] syndrome age 36.    Quit smoking 5/20.  ecg 11/11/20 sinus dyana 59 bpm, normal otherwise.   Taken off statin for elevated muscle enzymes 2019.Had chronic elevation of CK enzymes and statin had to be stopped.  - stress MPI Feb 2019.  Echo Feb 2019 normal LVEF, LVH, LAE.  - Holter Feb 2019.  Coronary calcium score ordered Nov 2020 and score was high 2893.  S/p LHC 12/20 for elevated Calcium score.  LHC showed 50% prox/mid RCA, nonobstructive LCX disease, 50% OM1, calcified prox LAD w luminal irregularities, 30% mid LAD, 75% distal small diffusely diseased LAD with med mgt advised.  Normal LVEF 65%.  OMT advised. Prescribed Imdur but quit taking it due to back pain issues but didn't make a difference.    Toprol xl increased to 50 mg qd from 25 mg qd last appt Nov 2020.HR runs low, in 40's.    He was advised to cut Toprol back to 25 mg qd few weeks ago on telephone call but still on 50 mg qd dosing.  Feels sluggish on higher dose.      Additional details:   He was in the hospital recently open (urosepsis) and was told that his heart rate was low at night.  He went home and again checked his heart rate was in the mid 40s.  He got concerned and sent notes via the portal to Dr. Mir.  He did not mention any symptoms on these communications.  He is now here for further evaluation.  He does confirm that he  has no bradycardia related symptoms.  He has no loss of energy, no syncope etc..  Of interest, is that a few years ago open (a tracing it from the med card it would be between May and July of 2018), he was given increasing doses of amitriptyline to treat insomnia as well as back pain.  The reported max dose was 150 mg.  During that period, he had 3 syncopal episodes all on her that but all also in the setting that would promote the orthostasis.  No ECGs were available between 2017 and 2019.  Would like to discontinue isosorbide.  Current Outpatient Medications   Medication Sig    alirocumab (PRALUENT PEN) 150 mg/mL PnIj INJECT 1.06MLS ( 159 MG TOTAL ) BY ABDOMINAL SUBCUTANEOUS ROUTE EVERY 14 DAYS    aspirin (ECOTRIN) 81 MG EC tablet Take 81 mg by mouth once daily.    calcium-vitamin D 600 mg, 1,500mg,-200 unit 600 mg(1,500mg) -200 unit Tab Take 1 tablet by mouth once daily.     cyanocobalamin (VITAMIN B-12) 1,000 mcg/mL injection Inject 1 mL (1,000 mcg total) into the muscle every 30 days.    esomeprazole (NEXIUM) 20 MG capsule Take 2 capsules (40 mg total) by mouth before breakfast.    folic acid (FOLVITE) 1 MG tablet Take 1 tablet (1 mg total) by mouth once daily.    isosorbide mononitrate (IMDUR) 30 MG 24 hr tablet Take 1 tablet (30 mg total) by mouth once daily. (Patient taking differently: Take 30 mg by mouth every evening.)    lisinopriL-hydrochlorothiazide (PRINZIDE,ZESTORETIC) 20-12.5 mg per tablet Take 2 tablets by mouth once daily.    metoprolol tartrate (LOPRESSOR) 25 MG tablet Take 25 mg by mouth every evening.    multivitamin (THERAGRAN) per tablet Take 1 tablet by mouth nightly.     paroxetine (PAXIL) 10 MG tablet Take 1 tablet (10 mg total) by mouth every morning.    tamsulosin (FLOMAX) 0.4 mg Cap Take 1 capsule (0.4 mg total) by mouth once daily.    thiamine 100 MG tablet Take 1 tablet (100 mg total) by mouth once daily.    amLODIPine (NORVASC) 5 MG tablet Take 2 tablets (10 mg total) by mouth once  daily. (Patient not taking: Reported on 2023)    cetirizine (ZYRTEC) 10 MG tablet Take 1 tablet (10 mg total) by mouth once daily. (Patient not taking: Reported on 2023)    fluticasone propionate (FLONASE) 50 mcg/actuation nasal spray 1 spray (50 mcg total) by Each Nostril route once daily. (Patient not taking: Reported on 2023)     No current facility-administered medications for this visit.       Review of Systems     Constitutional: Reviewed  for decreased appetite, weight gain and weight loss.   HENT: Reviewed for nosebleeds.    Eyes:  Reviewed for blurred vision and visual disturbance.   Cardiovascular: Reviewed for chest pain, claudication, cyanosis,dyspnea on exertion, leg swelling, orthopnea,paroxysmal nocturnal dyspnearregular heartbeats, palpitations, near-syncope, and syncope.   Respiratory: Reviewed for cough, shortness of breath, wheezing, sleep disturbances due to breathing and snoring, .    Endocrine: Reviewed for heat intolerance.   Hematologic/Lymphatic: Reviewed for easy bruisability/bleeding.   Skin: Reviewed for rash.   Musculoskeletal: Reviewed for muscle weakness and myalgias.   Gastrointestinal: Reviewed for abdominal pain, anorexia, melena, nausea and vomiting.   Genitourinary: Reviewed for hesitancy, frequency, nocturia and incontinence.   Neurological: Reviewed for excessive daytime sleepiness, dizziness, vertigo, weakness, headaches, loss of balance and seizures,   Psychiatric/Behavioral:  Reviewed for insomnia, altered mental status, depression, anxiety and nervousness.       All symptoms reviewed above were negative except for motor complaints related to sequelae from Guillain-[place].       Social History     Tobacco Use   Smoking Status Every Day    Current packs/day: 0.00    Average packs/day: 0.5 packs/day for 1 year (0.5 ttl pk-yrs)    Types: Cigarettes    Start date: 2021    Last attempt to quit: 2021    Years since quittin.7   Smokeless Tobacco Former     Types: Snuff    Quit date: 1999   Tobacco Comments    Unknown       reports current alcohol use of about 11.0 standard drinks of alcohol per week.   Past Medical History:   Diagnosis Date    Arm vein blood clot, bilateral     Atrial flutter     Ozuna's esophagus     CRPS (complex regional pain syndrome type II)     Decubitus skin ulcer     Encounter for blood transfusion     Guillain-[place]     Guillain-[place] syndrome 2013    Hyperlipidemia     Hypertension     Joint pain     knees    LVH (left ventricular hypertrophy) due to hypertensive disease 2/10/2017    Mitral regurgitation 2016    KIA (obstructive sleep apnea)     Primary osteoarthritis of left knee 2019    Statin-induced myositis 2022    Tracheostomy status 2021    Transfusion reaction     1 x  to PRBC fever     Family History   Problem Relation Age of Onset    Heart attack Mother     Heart disease Mother     Arthritis Mother     Heart disease Father     Heart attack Father     Hypertension Father     Stroke Father     Cancer Maternal Grandfather     Stroke Sister      Social History     Socioeconomic History    Marital status:     Number of children: 2   Tobacco Use    Smoking status: Every Day     Current packs/day: 0.00     Average packs/day: 0.5 packs/day for 1 year (0.5 ttl pk-yrs)     Types: Cigarettes     Start date: 2021     Last attempt to quit: 2021     Years since quittin.7    Smokeless tobacco: Former     Types: Snuff     Quit date: 1999    Tobacco comments:     Unknown   Substance and Sexual Activity    Alcohol use: Yes     Alcohol/week: 11.0 standard drinks of alcohol     Types: 2 Glasses of wine, 4 Cans of beer, 5 Drinks containing 0.5 oz of alcohol per week     Comment: no alcohol for past week, usually 7 drinks/week    Drug use: No    Sexual activity: Yes     Partners: Female     Birth control/protection: None     Social Determinants of Health     Financial Resource Strain: Medium Risk  (8/5/2023)    Overall Financial Resource Strain (CARDIA)     Difficulty of Paying Living Expenses: Somewhat hard   Food Insecurity: No Food Insecurity (8/5/2023)    Hunger Vital Sign     Worried About Running Out of Food in the Last Year: Never true     Ran Out of Food in the Last Year: Never true   Transportation Needs: No Transportation Needs (8/5/2023)    PRAPARE - Transportation     Lack of Transportation (Medical): No     Lack of Transportation (Non-Medical): No   Physical Activity: Insufficiently Active (8/5/2023)    Exercise Vital Sign     Days of Exercise per Week: 2 days     Minutes of Exercise per Session: 40 min   Stress: No Stress Concern Present (8/5/2023)    [employer] - Occupational Stress Questionnaire     Feeling of Stress : Only a little   Social Connections: Unknown (8/5/2023)    Social Connection and Isolation Panel [NHANES]     Frequency of Communication with Friends and Family: Twice a week     Frequency of Social Gatherings with Friends and Family: Once a week     Active Member of Clubs or Organizations: No     Attends Club or Organization Meetings: Never     Marital Status:    Housing Stability: High Risk (8/5/2023)    Housing Stability Vital Sign     Unable to Pay for Housing in the Last Year: Yes     Number of Places Lived in the Last Year: 1     Unstable Housing in the Last Year: No     Past Surgical History:   Procedure Laterality Date    ANGIOGRAM, CORONARY, WITH LEFT HEART CATHETERIZATION  12/10/2020    Procedure: Angiogram, Coronary, with Left Heart Cath;  Surgeon: Tomi Mir MD;  Location: [facility] CATH LAB;  Service: Cardiology;;    barrette esophagus      CHOLECYSTECTOMY      2018?    COLONOSCOPY N/A 05/17/2018    Procedure: COLONOSCOPY;  Surgeon: Ilan Araya III, MD;  Location: [facility] ENDO;  Service: Endoscopy;  Laterality: N/A;    COLONOSCOPY N/A 06/28/2023    Procedure: COLONOSCOPY;  Surgeon: Colby Rodriguez MD;  Location: [facility];   Service: Endoscopy;  Laterality: N/A;    COSMETIC SURGERY      Flap june 2008    decubitus surgery      to sacral    ESOPHAGOGASTRODUODENOSCOPY      ESOPHAGOGASTRODUODENOSCOPY N/A 06/17/2021    Procedure: EGD (ESOPHAGOGASTRODUODENOSCOPY);  Surgeon: Ban Zheng MD;  Location: [facility] ENDO;  Service: Endoscopy;  Laterality: N/A;    ESOPHAGOGASTRODUODENOSCOPY N/A 06/28/2023    Procedure: EGD (ESOPHAGOGASTRODUODENOSCOPY);  Surgeon: Colby Rodriguez MD;  Location: [facility] ENDO;  Service: Endoscopy;  Laterality: N/A;    GASTROSTOMY TUBE PLACEMENT      KNEE ARTHROSCOPY Left 2002    LEFT HEART CATHETERIZATION Left 12/10/2020    Procedure: CATHETERIZATION, HEART, LEFT;  Surgeon: Tomi Mir MD;  Location: [facility] CATH LAB;  Service: Cardiology;  Laterality: Left;  730 start time    PEG TUBE REMOVAL      RADIOFREQUENCY ABLATION      RFA      ROBOT-ASSISTED CHOLECYSTECTOMY USING DA GABRIELA XI N/A 05/02/2019    Procedure: XI ROBOTIC CHOLECYSTECTOMY;  Surgeon: Valerio Lai MD;  Location: [facility] OR;  Service: General;  Laterality: N/A;    JUAN-EN-Y PROCEDURE      TONSILLECTOMY      TRACHEAL SURGERY         Objective:   Physical Exam  Vitals and nursing note reviewed.   Constitutional:       Appearance: Normal appearance. He is well-developed.      Comments: overweight   HENT:      Head: Normocephalic and atraumatic.      Right Ear: External ear normal.      Left Ear: External ear normal.   Eyes:      Conjunctiva/sclera: Conjunctivae normal.      Left eye: Left conjunctiva is not injected. No hemorrhage.     Pupils: Pupils are equal, round, and reactive to light.   Neck:      Thyroid: No thyromegaly.      Vascular: No JVD.   Cardiovascular:      Rate and Rhythm: Normal rate and regular rhythm.      Chest Wall: PMI is not displaced.      Pulses: Intact distal pulses.           Carotid pulses are 2+ on the right side and 2+ on the left side.       Radial pulses are 2+ on the right side and 2+ on the left side.        Dorsalis  "pedis pulses are 2+ on the right side and 2+ on the left side.        Posterior tibial pulses are 2+ on the right side and 2+ on the left side.      Heart sounds: Normal heart sounds. No midsystolic click and no opening snap. No murmur heard.     No friction rub. No gallop.   Pulmonary:      Effort: Pulmonary effort is normal. No respiratory distress.      Breath sounds: Normal breath sounds. No wheezing or rales.   Chest:      Chest wall: No tenderness.   Abdominal:      Palpations: Abdomen is soft. Abdomen is not rigid. There is no hepatomegaly.      Tenderness: There is no guarding.      Comments: Obese abdomen   Musculoskeletal:         General: No tenderness. Normal range of motion.      Cervical back: Neck supple.      Right knee: No swelling.      Left knee: No swelling.      Right lower leg: No swelling.      Left lower leg: No swelling.      Right ankle: No swelling.      Left ankle: No swelling.      Right foot: No swelling.      Left foot: No swelling.   Skin:     General: Skin is warm and dry.      Coloration: Skin is not pale.      Findings: No rash.   Neurological:      General: No focal deficit present.      Mental Status: He is alert and oriented to person, place, and time.      Cranial Nerves: No cranial nerve deficit.      Coordination: Coordination normal.      Deep Tendon Reflexes: Reflexes are normal and symmetric.      Comments: He can walk but he needs leg braces to help with his bilateral foot drops.  Right arm is also slightly more obviously weak than the left.   Psychiatric:         Mood and Affect: Mood normal.         Behavior: Behavior normal.     /72   Pulse 60   Resp 14   Ht 6' 5" (1.956 m)   Wt 121.2 kg (267 lb 3.2 oz)   BMI 31.68 kg/m²       No results found for this or any previous visit.    WBC   Date Value Ref Range Status   07/27/2023 7.42 3.90 - 12.70 K/uL Final     Hematocrit   Date Value Ref Range Status   07/27/2023 45.5 40.0 - 54.0 % Final     Hemoglobin   Date " Value Ref Range Status   07/27/2023 15.6 14.0 - 18.0 g/dL Final     Lab Results   Component Value Date     (L) 07/27/2023     Lab Results   Component Value Date    CREATININE 0.7 07/27/2023    EGFRNORACEVR >60.0 07/27/2023    K 3.4 (L) 07/27/2023     Lab Results   Component Value Date    BNP 17 07/19/2023            Assessment:    Asymptomatic sinus bradycardia.  There is no need to discontinue Toprol.  There is no indication for pacing.  There is no evidence of AV jessica block.  History of syncope on large doses of the amitriptyline is somewhat concerning for drug inducible long QT syndrome.  I have no way of confirming this but except for the timeline.  In general I would not expect amitriptyline to close the orthostatic hypotension nor bradycardia (indeed it is vagolytic).      1. Sinus bradycardia    2. History of Guillain-[place] syndrome    3. Dysautonomia    4. Quadriparesis    5. CSF leak    6. Complex regional pain syndrome type 2 of both lower extremities    7. Alcoholism    8. Typical atrial flutter    9. Nonrheumatic mitral valve regurgitation    10. Primary hypertension    11. Palpitations    12. Hypertensive left ventricular hypertrophy, without heart failure    13. Mixed hyperlipidemia    14. Elevated coronary artery calcium score (2893)    15. CAD, multiple vessel    16. Benign prostatic hyperplasia with urinary obstruction    17. Bilateral hydronephrosis    18. Sepsis due to urinary tract infection    19. Pyelonephritis    20. Severe obesity (BMI 35.0-39.9) with comorbidity    21. Peptic ulcer disease    22. 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  May need to avoid QT prolonging drugs.      Patient would like to stop her isosorbide and I agree that is reasonable (especially that he may have had some orthostatic issues).  However, I asked him to consult with Dr. Mir 1st.  He did ask about discontinuing Toprol but I recommended against it  since he has significant coronary artery disease.  No orders of the defined types were placed in this encounter.    Follow up if symptoms worsen or fail to improve.  Medications Discontinued During This Encounter   Medication Reason    cefdinir (OMNICEF) 300 MG capsule     nicotine (NICODERM CQ) 21 mg/24 hr      Outpatient Encounter Medications as of 8/7/2023   Medication Sig Dispense Refill    alirocumab (PRALUENT PEN) 150 mg/mL PnIj INJECT 1.06MLS ( 159 MG TOTAL ) BY ABDOMINAL SUBCUTANEOUS ROUTE EVERY 14 DAYS 2 mL 12    aspirin (ECOTRIN) 81 MG EC tablet Take 81 mg by mouth once daily.      calcium-vitamin D 600 mg, 1,500mg,-200 unit 600 mg(1,500mg) -200 unit Tab Take 1 tablet by mouth once daily.       cyanocobalamin (VITAMIN B-12) 1,000 mcg/mL injection Inject 1 mL (1,000 mcg total) into the muscle every 30 days. 10 mL 0    esomeprazole (NEXIUM) 20 MG capsule Take 2 capsules (40 mg total) by mouth before breakfast. 60 capsule 11    folic acid (FOLVITE) 1 MG tablet Take 1 tablet (1 mg total) by mouth once daily. 30 tablet 0    isosorbide mononitrate (IMDUR) 30 MG 24 hr tablet Take 1 tablet (30 mg total) by mouth once daily. (Patient taking differently: Take 30 mg by mouth every evening.) 90 tablet 3    lisinopriL-hydrochlorothiazide (PRINZIDE,ZESTORETIC) 20-12.5 mg per tablet Take 2 tablets by mouth once daily. 180 tablet 3    metoprolol tartrate (LOPRESSOR) 25 MG tablet Take 25 mg by mouth every evening.      multivitamin (THERAGRAN) per tablet Take 1 tablet by mouth nightly.       paroxetine (PAXIL) 10 MG tablet Take 1 tablet (10 mg total) by mouth every morning. 30 tablet 11    tamsulosin (FLOMAX) 0.4 mg Cap Take 1 capsule (0.4 mg total) by mouth once daily. 90 capsule 3    thiamine 100 MG tablet Take 1 tablet (100 mg total) by mouth once daily. 30 tablet 0    amLODIPine (NORVASC) 5 MG tablet Take 2 tablets (10 mg total) by mouth once daily. (Patient not taking: Reported on 8/7/2023) 60 tablet 0    cetirizine  (ZYRTEC) 10 MG tablet Take 1 tablet (10 mg total) by mouth once daily. (Patient not taking: Reported on 8/7/2023) 30 tablet 5    fluticasone propionate (FLONASE) 50 mcg/actuation nasal spray 1 spray (50 mcg total) by Each Nostril route once daily. (Patient not taking: Reported on 8/7/2023) 16 g 1    [DISCONTINUED] cefdinir (OMNICEF) 300 MG capsule Take 1 capsule (300 mg total) by mouth 2 (two) times a day. for 14 days (Patient not taking: Reported on 8/7/2023) 28 capsule 0    [DISCONTINUED] nicotine (NICODERM CQ) 21 mg/24 hr Place 1 patch onto the skin once daily. (Patient not taking: Reported on 8/7/2023) 30 patch 0     No facility-administered encounter medications on file as of 8/7/2023.     Medication List with Changes/Refills   Current Medications    ALIROCUMAB (PRALUENT PEN) 150 MG/ML PNIJ    INJECT 1.06MLS ( 159 MG TOTAL ) BY ABDOMINAL SUBCUTANEOUS ROUTE EVERY 14 DAYS    AMLODIPINE (NORVASC) 5 MG TABLET    Take 2 tablets (10 mg total) by mouth once daily.    ASPIRIN (ECOTRIN) 81 MG EC TABLET    Take 81 mg by mouth once daily.    CALCIUM-VITAMIN D 600 MG, 1,500MG,-200 UNIT 600 MG(1,500MG) -200 UNIT TAB    Take 1 tablet by mouth once daily.     CETIRIZINE (ZYRTEC) 10 MG TABLET    Take 1 tablet (10 mg total) by mouth once daily.    CYANOCOBALAMIN (VITAMIN B-12) 1,000 MCG/ML INJECTION    Inject 1 mL (1,000 mcg total) into the muscle every 30 days.    ESOMEPRAZOLE (NEXIUM) 20 MG CAPSULE    Take 2 capsules (40 mg total) by mouth before breakfast.    FLUTICASONE PROPIONATE (FLONASE) 50 MCG/ACTUATION NASAL SPRAY    1 spray (50 mcg total) by Each Nostril route once daily.    FOLIC ACID (FOLVITE) 1 MG TABLET    Take 1 tablet (1 mg total) by mouth once daily.    ISOSORBIDE MONONITRATE (IMDUR) 30 MG 24 HR TABLET    Take 1 tablet (30 mg total) by mouth once daily.    LISINOPRIL-HYDROCHLOROTHIAZIDE (PRINZIDE,ZESTORETIC) 20-12.5 MG PER TABLET    Take 2 tablets by mouth once daily.    METOPROLOL TARTRATE (LOPRESSOR) 25 MG  TABLET    Take 25 mg by mouth every evening.    MULTIVITAMIN (THERAGRAN) PER TABLET    Take 1 tablet by mouth nightly.     PAROXETINE (PAXIL) 10 MG TABLET    Take 1 tablet (10 mg total) by mouth every morning.    TAMSULOSIN (FLOMAX) 0.4 MG CAP    Take 1 capsule (0.4 mg total) by mouth once daily.    THIAMINE 100 MG TABLET    Take 1 tablet (100 mg total) by mouth once daily.   Discontinued Medications    CEFDINIR (OMNICEF) 300 MG CAPSULE    Take 1 capsule (300 mg total) by mouth 2 (two) times a day. for 14 days    NICOTINE (NICODERM CQ) 21 MG/24 HR    Place 1 patch onto the skin once daily.          This note is at least partially dictated using the M*Modal Fluency Direct word recognition program. There are word recognition mistakes that are occasionally missed on review.

## 2023-08-08 ENCOUNTER — TELEPHONE (OUTPATIENT)
Dept: CARDIOLOGY | Facility: CLINIC | Age: 53
End: 2023-08-08
Payer: MEDICARE

## 2023-08-08 NOTE — TELEPHONE ENCOUNTER
I have not seen pt in 2.5 years.  He just saw Dr. Ya about bradycardia.    Send message to him for disposition.  He can stop the Isosorbide as he noted.    Schedule f/u appt with me if not already scheduled.    Dr Mir          I wanted to know if I'm still supposed to be taking metoprolol.  My heart rate gets to mid 40s, I saw Gary Ya MD yesterday, he wasn't concerned about it, but I am.  I can't get in to Dr Mir for 2 weeks, and he told me to consult with cardiologist.    I would also like to discontinue isosorbide mononitrate.  I've not taken it for a week, no chest pains.

## 2023-08-11 ENCOUNTER — HOSPITAL ENCOUNTER (OUTPATIENT)
Dept: RADIOLOGY | Facility: HOSPITAL | Age: 53
Discharge: HOME | End: 2023-08-11
Attending: UROLOGY
Payer: MEDICARE

## 2023-08-11 ENCOUNTER — OFFICE VISIT (OUTPATIENT)
Dept: DERMATOLOGY | Facility: CLINIC | Age: 53
End: 2023-08-11
Payer: MEDICARE

## 2023-08-11 DIAGNOSIS — N13.8 BENIGN PROSTATIC HYPERPLASIA WITH URINARY OBSTRUCTION: ICD-10-CM

## 2023-08-11 DIAGNOSIS — N40.1 BENIGN PROSTATIC HYPERPLASIA WITH URINARY OBSTRUCTION: ICD-10-CM

## 2023-08-11 DIAGNOSIS — L82.1 SEBORRHEIC KERATOSIS: ICD-10-CM

## 2023-08-11 PROCEDURE — 1159F MED LIST DOCD IN RCRD: CPT | Mod: CPTII,S$GLB,, | Performed by: STUDENT IN AN ORGANIZED HEALTH CARE EDUCATION/TRAINING PROGRAM

## 2023-08-11 PROCEDURE — 1160F PR REVIEW ALL MEDS BY PRESCRIBER/CLIN PHARMACIST DOCUMENTED: ICD-10-PCS | Mod: CPTII,S$GLB,, | Performed by: STUDENT IN AN ORGANIZED HEALTH CARE EDUCATION/TRAINING PROGRAM

## 2023-08-11 PROCEDURE — 99202 OFFICE O/P NEW SF 15 MIN: CPT | Mod: S$GLB,,, | Performed by: STUDENT IN AN ORGANIZED HEALTH CARE EDUCATION/TRAINING PROGRAM

## 2023-08-11 PROCEDURE — 76770 US RETROPERITONEAL COMPLETE: ICD-10-PCS | Mod: 26,,, | Performed by: RADIOLOGY

## 2023-08-11 PROCEDURE — 76770 US EXAM ABDO BACK WALL COMP: CPT | Mod: TC

## 2023-08-11 PROCEDURE — 1160F RVW MEDS BY RX/DR IN RCRD: CPT | Mod: CPTII,S$GLB,, | Performed by: STUDENT IN AN ORGANIZED HEALTH CARE EDUCATION/TRAINING PROGRAM

## 2023-08-11 PROCEDURE — 1159F PR MEDICATION LIST DOCUMENTED IN MEDICAL RECORD: ICD-10-PCS | Mod: CPTII,S$GLB,, | Performed by: STUDENT IN AN ORGANIZED HEALTH CARE EDUCATION/TRAINING PROGRAM

## 2023-08-11 PROCEDURE — 99999 PR PBB SHADOW E&M-EST. PATIENT-LVL III: ICD-10-PCS | Mod: PBBFAC,,, | Performed by: STUDENT IN AN ORGANIZED HEALTH CARE EDUCATION/TRAINING PROGRAM

## 2023-08-11 PROCEDURE — 99202 PR OFFICE/OUTPT VISIT, NEW, LEVL II, 15-29 MIN: ICD-10-PCS | Mod: S$GLB,,, | Performed by: STUDENT IN AN ORGANIZED HEALTH CARE EDUCATION/TRAINING PROGRAM

## 2023-08-11 PROCEDURE — 4010F ACE/ARB THERAPY RXD/TAKEN: CPT | Mod: CPTII,S$GLB,, | Performed by: STUDENT IN AN ORGANIZED HEALTH CARE EDUCATION/TRAINING PROGRAM

## 2023-08-11 PROCEDURE — 4010F PR ACE/ARB THEARPY RXD/TAKEN: ICD-10-PCS | Mod: CPTII,S$GLB,, | Performed by: STUDENT IN AN ORGANIZED HEALTH CARE EDUCATION/TRAINING PROGRAM

## 2023-08-11 PROCEDURE — 99999 PR PBB SHADOW E&M-EST. PATIENT-LVL III: CPT | Mod: PBBFAC,,, | Performed by: STUDENT IN AN ORGANIZED HEALTH CARE EDUCATION/TRAINING PROGRAM

## 2023-08-11 PROCEDURE — 76770 US EXAM ABDO BACK WALL COMP: CPT | Mod: 26,,, | Performed by: RADIOLOGY

## 2023-08-11 NOTE — PROGRESS NOTES
Patient Information  Name: Valerio Yan  : 1970  MRN: [id number]     Referring Physician:  Dr. Jay   Primary Care Physician:  Therese Soriano MD (Inactive)   Date of Visit: 2023      Subjective:       Valerio Yan is a 52 y.o. male who presents for   Chief Complaint   Patient presents with    Mole     Mole.      Mole      Patient with new complaint of lesion(s)  Location: left leg  Duration: year  Symptoms: none  Relieving factors/Previous treatments: none    Patient was last seen:Visit date not found     Prior notes by myself reviewed.   Clinical documentation obtained by nursing staff reviewed.    Review of Systems   Skin:  Negative for itching and rash.        Objective:    Physical Exam   Constitutional: He appears well-developed and well-nourished. No distress.   Neurological: He is alert and oriented to person, place, and time. He is not disoriented.   Psychiatric: He has a normal mood and affect.   Skin:   Areas Examined (abnormalities noted in diagram):   LLE Inspection Performed              Diagram Legend     Erythematous scaling macule/papule c/w actinic keratosis       Vascular papule c/w angioma      Pigmented verrucoid papule/plaque c/w seborrheic keratosis      Yellow umbilicated papule c/w sebaceous hyperplasia      Irregularly shaped tan macule c/w lentigo     1-2 mm smooth white papules consistent with Milia      Movable subcutaneous cyst with punctum c/w epidermal inclusion cyst      Subcutaneous movable cyst c/w pilar cyst      Firm pink to brown papule c/w dermatofibroma      Pedunculated fleshy papule(s) c/w skin tag(s)      Evenly pigmented macule c/w junctional nevus     Mildly variegated pigmented, slightly irregular-bordered macule c/w mildly atypical nevus      Flesh colored to evenly pigmented papule c/w intradermal nevus       Pink pearly papule/plaque c/w basal cell carcinoma      Erythematous hyperkeratotic cursted plaque c/w SCC      Surgical  scar with no sign of skin cancer recurrence      Open and closed comedones      Inflammatory papules and pustules      Verrucoid papule consistent consistent with wart     Erythematous eczematous patches and plaques     Dystrophic onycholytic nail with subungual debris c/w onychomycosis     Umbilicated papule    Erythematous-base heme-crusted tan verrucoid plaque consistent with inflamed seborrheic keratosis     Erythematous Silvery Scaling Plaque c/w Psoriasis     See annotation      No images are attached to the encounter or orders placed in the encounter.    [] Data reviewed  [] Independent review of test  [] Management discussed with another provider    Assessment / Plan:        Seborrheic keratosis  -     These are benign inherited growths without a malignant potential. Reassurance given to patient. No treatment is necessary.                LOS NUMBER AND COMPLEXITY OF PROBLEMS    COMPLEXITY OF DATA RISK TOTAL TIME (m)   23909  [id number] [] 1 self-limited or minor problem [x] Minimal to none [x] No treatment recommended or patient to monitor 15-29  10-19   60381  10019 Low  [] 2 or > self limited or minor problems  [x] 1 stable chronic illness  [] 1 acute, uncomplicated illness or injury Limited (2)  [] Prior external notes from each unique source  [] Review result of each unique test  [] Order each unique test []  Low  OTC medications, minor skin biopsy 30-44  20-29   65202  59453 Moderate  []  1 or > chronic illness with progression, exacerbation or SE of treatment  []  2 or more stable chronic illnesses  []  1 acute illness with systemic symptoms  []  1 acute complicated injury  []  1 undiagnosed new problem with uncertain prognosis Moderate (1/3 below)  []  3 or more data items        *Now includes assessment requiring independent historian  []  Independent interpretation of a test  []  Discuss management/test with another provider Moderate  []  Prescription drug mgmt  []  Minor surgery with risk discussed  []   Mgmt limited by social determinates 45-59  30-39   74086  94358 High  []  1 or more chronic illness with severe exacerbation, progression or SE of treatment  []  1 acute or chronic illness/injury that poses a threat to life or bodily function Extensive (2/3 below)  []  3 or more data items        *Now includes assessment requiring independent historian.  []  Independent interpretation of a test  []  Discuss management/test with another provider High  []  Major surgery with risk discussed  []  Drug therapy requiring intensive monitoring for toxicity  []  Hospitalization  []  Decision for DNR 60-74  40-54      No follow-ups on file.    Kamila Valentino MD, FAAD  [facility]

## 2023-08-17 ENCOUNTER — PATIENT MESSAGE (OUTPATIENT)
Dept: CARDIOLOGY | Facility: CLINIC | Age: 53
End: 2023-08-17
Payer: MEDICARE

## 2023-08-18 ENCOUNTER — TELEPHONE (OUTPATIENT)
Dept: CARDIOLOGY | Facility: CLINIC | Age: 53
End: 2023-08-18
Payer: MEDICARE

## 2023-08-18 DIAGNOSIS — M79.605 LEG PAIN, BILATERAL: ICD-10-CM

## 2023-08-18 DIAGNOSIS — R00.2 PALPITATIONS: ICD-10-CM

## 2023-08-18 DIAGNOSIS — M79.604 LEG PAIN, BILATERAL: ICD-10-CM

## 2023-08-18 DIAGNOSIS — I48.3 TYPICAL ATRIAL FLUTTER: ICD-10-CM

## 2023-08-18 DIAGNOSIS — G90.1 DYSAUTONOMIA: Primary | ICD-10-CM

## 2023-08-24 ENCOUNTER — PATIENT MESSAGE (OUTPATIENT)
Dept: UROLOGY | Facility: CLINIC | Age: 53
End: 2023-08-24
Payer: MEDICARE

## 2023-08-24 ENCOUNTER — PATIENT MESSAGE (OUTPATIENT)
Dept: CARDIOLOGY | Facility: CLINIC | Age: 53
End: 2023-08-24

## 2023-08-24 ENCOUNTER — OFFICE VISIT (OUTPATIENT)
Dept: CARDIOLOGY | Facility: CLINIC | Age: 53
End: 2023-08-24
Payer: MEDICARE

## 2023-08-24 VITALS
DIASTOLIC BLOOD PRESSURE: 72 MMHG | HEIGHT: 77 IN | BODY MASS INDEX: 31.19 KG/M2 | WEIGHT: 264.13 LBS | OXYGEN SATURATION: 96 % | SYSTOLIC BLOOD PRESSURE: 130 MMHG | HEART RATE: 69 BPM

## 2023-08-24 DIAGNOSIS — R00.2 PALPITATIONS: ICD-10-CM

## 2023-08-24 DIAGNOSIS — I10 PRIMARY HYPERTENSION: ICD-10-CM

## 2023-08-24 DIAGNOSIS — Z78.9 STATIN INTOLERANCE: ICD-10-CM

## 2023-08-24 DIAGNOSIS — I25.10 CAD IN NATIVE ARTERY: ICD-10-CM

## 2023-08-24 DIAGNOSIS — I70.0 AORTIC ATHEROSCLEROSIS: ICD-10-CM

## 2023-08-24 DIAGNOSIS — I34.0 NONRHEUMATIC MITRAL VALVE REGURGITATION: ICD-10-CM

## 2023-08-24 DIAGNOSIS — R00.1 SINUS BRADYCARDIA: Primary | ICD-10-CM

## 2023-08-24 DIAGNOSIS — E78.2 MIXED HYPERLIPIDEMIA: ICD-10-CM

## 2023-08-24 DIAGNOSIS — F10.20 ALCOHOLISM: ICD-10-CM

## 2023-08-24 DIAGNOSIS — Z72.0 TOBACCO ABUSE: ICD-10-CM

## 2023-08-24 DIAGNOSIS — I25.10 CAD, MULTIPLE VESSEL: ICD-10-CM

## 2023-08-24 DIAGNOSIS — I48.3 TYPICAL ATRIAL FLUTTER: ICD-10-CM

## 2023-08-24 DIAGNOSIS — I11.9 HYPERTENSIVE LEFT VENTRICULAR HYPERTROPHY, WITHOUT HEART FAILURE: ICD-10-CM

## 2023-08-24 DIAGNOSIS — R93.1 ELEVATED CORONARY ARTERY CALCIUM SCORE: ICD-10-CM

## 2023-08-24 DIAGNOSIS — R07.89 ATYPICAL CHEST PAIN: ICD-10-CM

## 2023-08-24 PROCEDURE — 3075F PR MOST RECENT SYSTOLIC BLOOD PRESS GE 130-139MM HG: ICD-10-PCS | Mod: CPTII,S$GLB,, | Performed by: INTERNAL MEDICINE

## 2023-08-24 PROCEDURE — 99999 PR PBB SHADOW E&M-EST. PATIENT-LVL III: ICD-10-PCS | Mod: PBBFAC,,, | Performed by: INTERNAL MEDICINE

## 2023-08-24 PROCEDURE — 3008F BODY MASS INDEX DOCD: CPT | Mod: CPTII,S$GLB,, | Performed by: INTERNAL MEDICINE

## 2023-08-24 PROCEDURE — 3078F PR MOST RECENT DIASTOLIC BLOOD PRESSURE < 80 MM HG: ICD-10-PCS | Mod: CPTII,S$GLB,, | Performed by: INTERNAL MEDICINE

## 2023-08-24 PROCEDURE — 3008F PR BODY MASS INDEX (BMI) DOCUMENTED: ICD-10-PCS | Mod: CPTII,S$GLB,, | Performed by: INTERNAL MEDICINE

## 2023-08-24 PROCEDURE — 4010F ACE/ARB THERAPY RXD/TAKEN: CPT | Mod: CPTII,S$GLB,, | Performed by: INTERNAL MEDICINE

## 2023-08-24 PROCEDURE — 1159F PR MEDICATION LIST DOCUMENTED IN MEDICAL RECORD: ICD-10-PCS | Mod: CPTII,S$GLB,, | Performed by: INTERNAL MEDICINE

## 2023-08-24 PROCEDURE — 3075F SYST BP GE 130 - 139MM HG: CPT | Mod: CPTII,S$GLB,, | Performed by: INTERNAL MEDICINE

## 2023-08-24 PROCEDURE — 1160F PR REVIEW ALL MEDS BY PRESCRIBER/CLIN PHARMACIST DOCUMENTED: ICD-10-PCS | Mod: CPTII,S$GLB,, | Performed by: INTERNAL MEDICINE

## 2023-08-24 PROCEDURE — 99214 OFFICE O/P EST MOD 30 MIN: CPT | Mod: S$GLB,,, | Performed by: INTERNAL MEDICINE

## 2023-08-24 PROCEDURE — 1159F MED LIST DOCD IN RCRD: CPT | Mod: CPTII,S$GLB,, | Performed by: INTERNAL MEDICINE

## 2023-08-24 PROCEDURE — 99214 PR OFFICE/OUTPT VISIT, EST, LEVL IV, 30-39 MIN: ICD-10-PCS | Mod: S$GLB,,, | Performed by: INTERNAL MEDICINE

## 2023-08-24 PROCEDURE — 1160F RVW MEDS BY RX/DR IN RCRD: CPT | Mod: CPTII,S$GLB,, | Performed by: INTERNAL MEDICINE

## 2023-08-24 PROCEDURE — 4010F PR ACE/ARB THEARPY RXD/TAKEN: ICD-10-PCS | Mod: CPTII,S$GLB,, | Performed by: INTERNAL MEDICINE

## 2023-08-24 PROCEDURE — 3078F DIAST BP <80 MM HG: CPT | Mod: CPTII,S$GLB,, | Performed by: INTERNAL MEDICINE

## 2023-08-24 PROCEDURE — 99999 PR PBB SHADOW E&M-EST. PATIENT-LVL III: CPT | Mod: PBBFAC,,, | Performed by: INTERNAL MEDICINE

## 2023-08-24 RX ORDER — METOPROLOL SUCCINATE 50 MG/1
50 TABLET, EXTENDED RELEASE ORAL DAILY
COMMUNITY
End: 2023-08-25

## 2023-08-24 RX ORDER — AMLODIPINE BESYLATE 5 MG/1
10 TABLET ORAL DAILY
Qty: 60 TABLET | Refills: 0
Start: 2023-08-24 | End: 2023-08-25

## 2023-08-24 NOTE — PROGRESS NOTES
Subjective:    Patient ID:  Valerio Yan is a 52 y.o. male who presents for evaluation of Hyperlipidemia, Coronary Artery Disease, and Bradycardia        HPI Pt presents for eval.   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  Past hx pertinent for following;  H/o Guillain-[place] syndrome age 36.  + family h/o CAD (father MI in 50's, cabg; mother w MI).  Sister has had PCI age 55.  Pt taken off xarelto after his PAFL ablation.  Negative stress MPI Jan 2017.  Echo Jan 2017 showed normal LV function, LVH.  Quit smoking 5/20.  ecg 11/11/20 sinus dyana 59 bpm, normal otherwise.   Taken off statin for elevated muscle enzymes 2019.  - stress MPI Feb 2019.  Echo Feb 2019 normal LVEF, LVH, LAE.  - Holter Feb 2019.  Coronary calcium score ordered Nov 2020 and score was high 2893.  Also has chronic elevation of CK enzymes and statin had to be stopped.  S/p LHC 12/20 for elevated Calcium score.  LHC showed 50% prox/mid RCA, nonobstructive LCX disease, 50% OM1, calcified prox LAD w luminal irregularities, 30% mid LAD, 75% distal small diffusely diseased LAD with med mgt advised.  Normal LVEF 65%.  OMT advised.     Now here.  I last saw pt Jan 2021; did not f/u.  Admitted 6/23 - 7/23 with UTI, pyelonephritis, alcohol abuse as noted in D/c summary below.  Saw Dr. Ya, EP, Aug 2023 for bradycardia.  Per his consult note:      Additional details:   He was in the hospital recently open (urosepsis) and was told that his heart rate was low at night.  He went home and again checked his heart rate was in the mid 40s.  He got concerned and sent notes via the portal to Dr. Mir.  He did not mention any symptoms on these communications.  He is now here for further evaluation.  He does confirm that he has no bradycardia related symptoms.  He has no loss of energy, no syncope etc..  Of interest, is that a few years ago open (a tracing it from the med card it would be between  May and July of 2018), he was given increasing doses of amitriptyline to treat insomnia as well as back pain.  The reported max dose was 150 mg.  During that period, he had 3 syncopal episodes all on her that but all also in the setting that would promote the orthostasis.  No ECGs were available between 2017 and 2019.  Would like to discontinue isosorbide.  Asymptomatic sinus bradycardia.  There is no need to discontinue Toprol.  There is no indication for pacing.  There is no evidence of AV jessica block.  History of syncope on large doses of the amitriptyline is somewhat concerning for drug inducible long QT syndrome.  I have no way of confirming this but except for the timeline.  In general I would not expect amitriptyline to close the orthostatic hypotension nor bradycardia (indeed it is vagolytic).       Dr. Ya stopped Imdur but advised him to stay on Metoprolol for his CAD.  No need for PPM per his consult note.  He states his HR runs low at night.  Seems asx.  No angina.  No CHF sxs.  No syncope.  Still smoking and drinking.  Ecg x 4 June - July 2023 NSR overall, 1 had sinus dyana 58 bpm.  He quit Imdur 1 month ago.  He is taking his Toprol every other day right now but is not sure if it is 25 mg or 50 mg pill.        Discharge Date and Time: 7/21/2023  6:48 PM  Attending Physician: Dr. Micheal Soria   Discharging Provider: Magnolia Borrero NP  Primary Care Provider: Therese Lala MD     Primary Care Team: Networked reference to record PCT      HPI:   Patient is a 52 y.o. aa male with a PMH of HTN, HLD, COPD, and alcoholism who presents to the Emergency Department for generalized weakness, onset 2 weeks PTA. Pt states that he has been feeling weak since he was admitted for pyelonephritis on 6/30/23. He reports having recent c-scope and egd performed. EGD showed Barretts disease and c-scope unremarkable. Patient reports having fever, chills, and back pain. He states he took his antibiotics for UTI. Most  recent urine cultures grew enterococcus and ecoli. He reveals having alcohol withdrawal symptoms. States he has been drinking heavily for the last few weeks. Does report having tremors whenever he stops drinking. Last drink was yesterday. Upon further questioning he reports having dark tarry stools for the past few days.      In the Ed, u/a indicative of UTI. He was bolused fluids and started on rocephin. Patient placed in observation.         * No surgery found *       Hospital Course:   Pt admitted to Observation for Pyelonephritis. UA is consistent with infectious process. IV antibiotics initiated. Urine culture results pending. Lactic acidosis resolved with hydration. Pt reports daily use of cigarettes and increased ETOH use since discharge with last drink reported on 7/18/23. Will monitor for signs of withdrawal and Librium initiated with Ativan as needed. Pt reports recent hospitalization with treatment for Urosepsis and outpatient completion of Amoxicillin. Dark stools also reported with H/H stable and FOBT negative- diet advanced to clear liquids. Pt had recent EGD and colonoscopy on 6/28/23 per Dr. Rodriguez (GI). Urine culture grew gram negative rods. On 7/21/23, urine culture grew E. Coli with sensitivities reviewed. IV antibiotics transitioned to oral dosing. Pt verbalized symptom improvement and eager for discharge. Pt counseled on cessation of ETOH and compliance with prescribed medications with understanding verbalized. Pt does not have a plan of ETOH cessation at this time. Pt ambulating hallway and no evidence of dark stools witnessed or reported. Vital signs stable with no signs of acute distress. Pt seen and examined and deemed stable for discharge to home. Medications reconciled with Augmentin, Folic acid, thiamine, Magnesium, and Nicoderm prescribed. Pt instructed to follow up with PCP and GI upon discharge for further evaluation.             Current Outpatient Medications:     alirocumab (PRALUENT  PEN) 150 mg/mL PnIj, INJECT 1.06MLS ( 159 MG TOTAL ) BY ABDOMINAL SUBCUTANEOUS ROUTE EVERY 14 DAYS, Disp: 2 mL, Rfl: 12    aspirin (ECOTRIN) 81 MG EC tablet, Take 81 mg by mouth once daily., Disp: , Rfl:     calcium-vitamin D 600 mg, 1,500mg,-200 unit 600 mg(1,500mg) -200 unit Tab, Take 1 tablet by mouth once daily. , Disp: , Rfl:     cetirizine (ZYRTEC) 10 MG tablet, Take 1 tablet (10 mg total) by mouth once daily., Disp: 30 tablet, Rfl: 5    cyanocobalamin (VITAMIN B-12) 1,000 mcg/mL injection, Inject 1 mL (1,000 mcg total) into the muscle every 30 days., Disp: 10 mL, Rfl: 0    esomeprazole (NEXIUM) 20 MG capsule, Take 2 capsules (40 mg total) by mouth before breakfast., Disp: 60 capsule, Rfl: 11    fluticasone propionate (FLONASE) 50 mcg/actuation nasal spray, 1 spray (50 mcg total) by Each Nostril route once daily., Disp: 16 g, Rfl: 1    lisinopriL-hydrochlorothiazide (PRINZIDE,ZESTORETIC) 20-12.5 mg per tablet, Take 2 tablets by mouth once daily., Disp: 180 tablet, Rfl: 3    metoprolol succinate (TOPROL-XL) 50 MG 24 hr tablet, Take 50 mg by mouth once daily., Disp: , Rfl:     multivitamin (THERAGRAN) per tablet, Take 1 tablet by mouth nightly. , Disp: , Rfl:     paroxetine (PAXIL) 10 MG tablet, Take 1 tablet (10 mg total) by mouth every morning., Disp: 30 tablet, Rfl: 11    tamsulosin (FLOMAX) 0.4 mg Cap, Take 1 capsule (0.4 mg total) by mouth once daily., Disp: 90 capsule, Rfl: 3    amLODIPine (NORVASC) 5 MG tablet, Take 2 tablets (10 mg total) by mouth once daily., Disp: 60 tablet, Rfl: 0    folic acid (FOLVITE) 1 MG tablet, Take 1 tablet (1 mg total) by mouth once daily., Disp: 30 tablet, Rfl: 0      Review of Systems   Constitutional: Positive for weight loss.   HENT: Negative.     Eyes: Negative.    Cardiovascular: Negative.    Respiratory: Negative.     Endocrine: Negative.    Hematologic/Lymphatic: Negative.    Skin: Negative.    Musculoskeletal: Negative.    Gastrointestinal: Negative.    Genitourinary:  "Negative.    Neurological: Negative.    Psychiatric/Behavioral: Negative.     Allergic/Immunologic: Negative.        /72 (BP Location: Right arm, Patient Position: Sitting, BP Method: Large (Manual))   Pulse 69   Ht 6' 5" (1.956 m)   Wt 119.8 kg (264 lb 1.8 oz)   SpO2 96%   BMI 31.32 kg/m²       Wt Readings from Last 3 Encounters:   08/24/23 119.8 kg (264 lb 1.8 oz)   08/07/23 121.2 kg (267 lb 3.2 oz)   08/04/23 120.7 kg (266 lb 1.5 oz)     Temp Readings from Last 3 Encounters:   07/27/23 97.1 °F (36.2 °C) (Tympanic)   07/21/23 98 °F (36.7 °C)   07/03/23 98.7 °F (37.1 °C) (Oral)     BP Readings from Last 3 Encounters:   08/24/23 130/72   08/07/23 136/72   08/04/23 (!) 144/81     Pulse Readings from Last 3 Encounters:   08/24/23 69   08/07/23 60   08/04/23 (!) 55          Objective:    Physical Exam  Vitals and nursing note reviewed.   Constitutional:       Appearance: He is well-developed.   HENT:      Head: Normocephalic.   Neck:      Thyroid: No thyromegaly.      Vascular: Normal carotid pulses. No carotid bruit, hepatojugular reflux or JVD.   Cardiovascular:      Rate and Rhythm: Normal rate and regular rhythm.      Pulses:           Radial pulses are 2+ on the right side and 2+ on the left side.      Heart sounds: S1 normal and S2 normal. Heart sounds not distant. No midsystolic click and no opening snap. No murmur heard.     No friction rub. No S3 or S4 sounds.   Pulmonary:      Effort: Pulmonary effort is normal.      Breath sounds: Normal breath sounds. No wheezing or rales.   Abdominal:      General: Bowel sounds are normal. There is no distension or abdominal bruit.      Palpations: Abdomen is soft. There is no mass.      Tenderness: There is no abdominal tenderness.   Musculoskeletal:      Cervical back: Normal range of motion and neck supple.   Skin:     General: Skin is warm.   Neurological:      Mental Status: He is alert and oriented to person, place, and time.   Psychiatric:         " Behavior: Behavior normal.         I have reviewed all pertinent labs and cardiac studies.        Chemistry        Component Value Date/Time     07/27/2023 1553    K 3.4 (L) 07/27/2023 1553     07/27/2023 1553    CO2 23 07/27/2023 1553    BUN 11 07/27/2023 1553    CREATININE 0.7 07/27/2023 1553    GLU 79 07/27/2023 1553        Component Value Date/Time    CALCIUM 9.3 07/27/2023 1553    ALKPHOS 170 (H) 07/19/2023 0953    AST 30 07/19/2023 0953    ALT 24 07/19/2023 0953    BILITOT 0.5 07/19/2023 0953    ESTGFRAFRICA >60.0 05/04/2022 0736    EGFRNONAA >60.0 05/04/2022 0736        Lab Results   Component Value Date    WBC 7.42 07/27/2023    HGB 15.6 07/27/2023    HCT 45.5 07/27/2023    MCV 98 07/27/2023     (L) 07/27/2023       Lab Results   Component Value Date    HGBA1C 4.8 05/31/2017     Lab Results   Component Value Date    CHOL 148 05/04/2022    CHOL 164 05/07/2021    CHOL 239 (H) 01/05/2021     Lab Results   Component Value Date    HDL 44 05/04/2022    HDL 58 05/07/2021    HDL 56 01/05/2021     Lab Results   Component Value Date    LDLCALC 75.2 05/04/2022    LDLCALC 72.4 05/07/2021    LDLCALC 144.4 01/05/2021     Lab Results   Component Value Date    TRIG 144 05/04/2022    TRIG 168 (H) 05/07/2021    TRIG 193 (H) 01/05/2021     Lab Results   Component Value Date    CHOLHDL 29.7 05/04/2022    CHOLHDL 35.4 05/07/2021    CHOLHDL 23.4 01/05/2021         Left Main   The vessel is angiographically normal.   Left Anterior Descending   LAD has moderate calcification. There are luminal irregularities proximal segment. There is 30% mid LAD stenosis. In apical segment, LAD is smaller and has 75% stenosis.   First Diagonal Branch   The vessel exhibits minimal luminal irregularities.   Left Circumflex   LCX has diffuse nonobstuctive disease.   First Obtuse Marginal Branch   OM1 has 50% mid stenosis.   Right Coronary Artery   RCA has diffuse disease. There is 50% eccentric calcified stenosis in proximal to mid  segment on a bend. Rest of RCA has nonobstructive disease.   Intervention    No interventions have been documented.  Left Heart Findings    Left Ventricle    The ejection fraction is calculated to be 65%.   LVEDP (Pre):18 mmHGLVEDP (Post):18 mmHG  No significant gradient noted on pullback from LV.   Summary       Diffuse nonobstructive CAD overall with exception of apical LAD stenosis -- optimal medical treatment advised.  The ejection fraction was calculated to be 65%.           Assessment:       1. Sinus bradycardia    2. CAD in native artery    3. Atypical chest pain    4. Aortic atherosclerosis    5. CAD, multiple vessel    6. Palpitations    7. Mixed hyperlipidemia    8. Nonrheumatic mitral valve regurgitation    9. Hypertensive left ventricular hypertrophy, without heart failure    10. Statin intolerance    11. Typical atrial flutter    12. Elevated coronary artery calcium score (2893)    13. Primary hypertension    14. Alcoholism    15. Tobacco abuse         Plan:             Check 1 week Vital Holter to assess sinus bradycardia.  He will email me his current dose of Toprol, 25 vs 50 mg.  Will assess Holter then make determination if dose change is needed.  Can stay off Imdur.  Med tx for CAD.  Smoking, etoh cessation advised.  Praluent for lipids.  Update lipid status.  HTN control.  Cardiac diet.  Exercise.    Phone review.    F/u in 6 months.      I have reviewed all pertinent labs and cardiac studies independently. Plans and recommendations have been formulated under my direct supervision. All questions answered and patient voiced understanding.

## 2023-08-25 ENCOUNTER — TELEPHONE (OUTPATIENT)
Dept: UROLOGY | Facility: CLINIC | Age: 53
End: 2023-08-25
Payer: MEDICARE

## 2023-08-25 RX ORDER — METOPROLOL SUCCINATE 50 MG/1
25 TABLET, EXTENDED RELEASE ORAL EVERY OTHER DAY
Qty: 30 TABLET | Refills: 6
Start: 2023-08-25 | End: 2024-02-29

## 2023-08-25 RX ORDER — TADALAFIL 20 MG/1
20 TABLET ORAL
Qty: 20 TABLET | Refills: 11 | Status: SHIPPED | OUTPATIENT
Start: 2023-08-25 | End: 2023-09-08

## 2023-08-25 RX ORDER — AMLODIPINE BESYLATE 5 MG/1
5 TABLET ORAL NIGHTLY
Qty: 60 TABLET | Refills: 0
Start: 2023-08-25 | End: 2024-03-05 | Stop reason: SDUPTHER

## 2023-08-30 ENCOUNTER — HOSPITAL ENCOUNTER (OUTPATIENT)
Dept: CARDIOLOGY | Facility: HOSPITAL | Age: 53
Discharge: HOME | End: 2023-08-30
Attending: INTERNAL MEDICINE
Payer: MEDICARE

## 2023-08-30 DIAGNOSIS — R00.2 PALPITATIONS: ICD-10-CM

## 2023-08-30 DIAGNOSIS — R00.1 SINUS BRADYCARDIA: ICD-10-CM

## 2023-08-30 PROCEDURE — 93244 CV CARDIAC MONITOR - 3-15 DAY ADULT (CUPID ONLY): ICD-10-PCS | Mod: ,,, | Performed by: INTERNAL MEDICINE

## 2023-08-30 PROCEDURE — 93244 EXT ECG>48HR<7D REV&INTERPJ: CPT | Mod: ,,, | Performed by: INTERNAL MEDICINE

## 2023-08-31 ENCOUNTER — PATIENT MESSAGE (OUTPATIENT)
Dept: CARDIOLOGY | Facility: CLINIC | Age: 53
End: 2023-08-31
Payer: MEDICARE

## 2023-09-04 ENCOUNTER — PATIENT MESSAGE (OUTPATIENT)
Dept: UROLOGY | Facility: CLINIC | Age: 53
End: 2023-09-04
Payer: MEDICARE

## 2023-09-05 ENCOUNTER — TELEPHONE (OUTPATIENT)
Dept: UROLOGY | Facility: CLINIC | Age: 53
End: 2023-09-05
Payer: MEDICARE

## 2023-09-08 ENCOUNTER — TELEPHONE (OUTPATIENT)
Dept: UROLOGY | Facility: CLINIC | Age: 53
End: 2023-09-08
Payer: MEDICARE

## 2023-09-08 RX ORDER — TADALAFIL 20 MG/1
20 TABLET ORAL
Qty: 20 TABLET | Refills: 11 | Status: SHIPPED | OUTPATIENT
Start: 2023-09-08

## 2023-09-08 NOTE — TELEPHONE ENCOUNTER
Spoke to pt and confirmed that he has not taken Imdur since July 2023.    Cialis 20 mg called in to his pharmacy.

## 2023-09-15 LAB
OHS CV HOLTER SINUS AVERAGE HR: 66
OHS CV HOLTER SINUS MAX HR: 135
OHS CV HOLTER SINUS MIN HR: 42

## 2023-10-02 PROBLEM — A41.9 SEPSIS DUE TO URINARY TRACT INFECTION: Status: RESOLVED | Noted: 2023-06-30 | Resolved: 2023-10-02

## 2023-10-02 PROBLEM — N39.0 SEPSIS DUE TO URINARY TRACT INFECTION: Status: RESOLVED | Noted: 2023-06-30 | Resolved: 2023-10-02

## 2023-10-23 PROBLEM — N12 PYELONEPHRITIS: Status: RESOLVED | Noted: 2023-07-19 | Resolved: 2023-10-23

## 2023-12-07 ENCOUNTER — PATIENT OUTREACH (OUTPATIENT)
Dept: ADMINISTRATIVE | Facility: HOSPITAL | Age: 53
End: 2023-12-07
Payer: MEDICARE

## 2023-12-07 ENCOUNTER — PATIENT MESSAGE (OUTPATIENT)
Dept: ADMINISTRATIVE | Facility: HOSPITAL | Age: 53
End: 2023-12-07
Payer: MEDICARE

## 2023-12-07 NOTE — PROGRESS NOTES
Working [facility] Statin Report: Patient is currently not on any statin due to intolerance, Msg sent to Pt to schedule a visit to [facility] Care/ Annual.

## 2024-01-10 NOTE — PLAN OF CARE
Discussed plan of care with pt and spouse. Pt verbalized understanding. Bed alarm refused with bed at lowest position. Pain managed with ordered medication. Tele monitor [id number] in place. Call light within reach. Purposeful rounding Q2h.        Pt had complaints of a persistent headache, with concerns of a previous cyst in the same area. Pt requested imaging on his head to rule out any issues. Hospital medicine was notified, head CT ordered. Pt received pain medication for headache and was able to rest comfortably.     Chart check complete.     Problem: Adult Inpatient Plan of Care  Goal: Plan of Care Review  Outcome: Ongoing, Progressing  Goal: Patient-Specific Goal (Individualized)  Outcome: Ongoing, Progressing  Goal: Absence of Hospital-Acquired Illness or Injury  Outcome: Ongoing, Progressing  Goal: Optimal Comfort and Wellbeing  Outcome: Ongoing, Progressing  Goal: Readiness for Transition of Care  Outcome: Ongoing, Progressing     Problem: Adjustment to Illness (Sepsis/Septic Shock)  Goal: Optimal Coping  Outcome: Ongoing, Progressing     Problem: Bleeding (Sepsis/Septic Shock)  Goal: Absence of Bleeding  Outcome: Ongoing, Progressing     Problem: Glycemic Control Impaired (Sepsis/Septic Shock)  Goal: Blood Glucose Level Within Desired Range  Outcome: Ongoing, Progressing     Problem: Infection Progression (Sepsis/Septic Shock)  Goal: Absence of Infection Signs and Symptoms  Outcome: Ongoing, Progressing     Problem: Nutrition Impaired (Sepsis/Septic Shock)  Goal: Optimal Nutrition Intake  Outcome: Ongoing, Progressing     Problem: Pain Acute  Goal: Acceptable Pain Control and Functional Ability  Outcome: Ongoing, Progressing     Problem: Fall Injury Risk  Goal: Absence of Fall and Fall-Related Injury  Outcome: Ongoing, Progressing     Problem: UTI (Urinary Tract Infection)  Goal: Improved Infection Symptoms  Outcome: Ongoing, Progressing      Ortho Note    Pt seen and examined at bedside. Pt comfortable, pain controlled. Pt states she urinated small amount overnight but then had to get SC this am for urinary retention. Pt Passing flatus. Denies BM. Tolerating PO diet without N/V. Denies feeling feverish overnight.  Denies fever, chills, CP, SOB, N/V, new numbness/tingling     Vital Signs Last 24 Hrs  T(C): 36.6 (01-10-24 @ 08:58), Max: 36.6 (01-10-24 @ 08:58)  T(F): 97.8 (01-10-24 @ 08:58), Max: 97.8 (01-10-24 @ 08:58)  HR: 90 (01-10-24 @ 08:58) (90 - 90)  BP: 109/65 (01-10-24 @ 11:33) (109/65 - 126/56)  BP(mean): --  RR: 18 (01-10-24 @ 11:33) (17 - 18)  SpO2: 99% (01-10-24 @ 11:33) (94% - 99%)  I&O's Summary    09 Jan 2024 07:01  -  10 Magdaleno 2024 07:00  --------------------------------------------------------  IN: 1643 mL / OUT: 2660 mL / NET: -1017 mL        General: Pt Alert and oriented, NAD  DSG C/D/I- Gauze, paper tape, HV x 1 holding suction draining sanguinous fluid, ERINN x 1  Pulses: 2+ DP bilaterally, brisk cap refill  Calves soft, nontender bilaterally  Sensation: SILT distally bilateral lower extremities  Motor:   EHL/FHL/TA/GS: 5/5 bilaterally                          8.9    6.15  )-----------( 114      ( 10 Magdaleno 2024 05:30 )             29.4     01-10    140  |  108  |  21  ----------------------------<  104<H>  3.9   |  24  |  0.72    Ca    8.2<L>      10 Jan 2024 05:30        A/P: 76yFemale POD #2 s/p L2-Pelvis PSF with Dr. Bahena  - Stable, labs and vitals reviewed- temp to 100.7F overnight s/p 1u prbc, WBC 6.15 today, discussed with Dr. Arguello-if new fever >100.4F will initiate fever workup  - H/H 8.9/29.4 , s/p 1u PRBC yesterday at 4pm  - Bladder US showing 700cc this am, SC x 1 at 7:30am, follow up TOV, Encouraged increase in po fluids  - Continue to monitor drain output- HV 115cc overnight/260cc in 24 hrs, pm drain check  - Bowel regimen  - Pain Control  - DVT ppx: LVX 40mg subq every 24 hrs, SCDs  - PT, WBS: WBAT  - Dispo: [facility] pending acceptance, must be afebrile 24hrs (<100F per facility)    Ortho Pager [phone] Ortho Note    Pt seen and examined at bedside. Pt comfortable, pain controlled. Pt states she urinated small amount overnight but then had to get SC this am for urinary retention. Pt Passing flatus. Denies BM. Tolerating PO diet without N/V. Denies feeling feverish overnight.  Denies fever, chills, CP, SOB, N/V, new numbness/tingling     Vital Signs Last 24 Hrs  T(C): 36.6 (01-10-24 @ 08:58), Max: 36.6 (01-10-24 @ 08:58)  T(F): 97.8 (01-10-24 @ 08:58), Max: 97.8 (01-10-24 @ 08:58)  HR: 90 (01-10-24 @ 08:58) (90 - 90)  BP: 109/65 (01-10-24 @ 11:33) (109/65 - 126/56)  BP(mean): --  RR: 18 (01-10-24 @ 11:33) (17 - 18)  SpO2: 99% (01-10-24 @ 11:33) (94% - 99%)  I&O's Summary    09 Jan 2024 07:01  -  10 Magdaleno 2024 07:00  --------------------------------------------------------  IN: 1643 mL / OUT: 2660 mL / NET: -1017 mL        General: Pt Alert and oriented, NAD  DSG C/D/I- Gauze, paper tape, HV x 1 holding suction draining sanguinous fluid, ERINN x 1  Pulses: 2+ DP bilaterally, brisk cap refill  Calves soft, nontender bilaterally  Sensation: SILT distally bilateral lower extremities  Motor:   EHL/FHL/TA/GS: 5/5 bilaterally                          8.9    6.15  )-----------( 114      ( 10 Magdaleno 2024 05:30 )             29.4     01-10    140  |  108  |  21  ----------------------------<  104<H>  3.9   |  24  |  0.72    Ca    8.2<L>      10 Jan 2024 05:30        A/P: 76yFemale POD #2 s/p L2-Pelvis PSF with Dr. Bahena  - Stable, labs and vitals reviewed- temp to 100.7F overnight s/p 1u prbc, WBC 6.15 today, discussed with Dr. Arguello-if new fever >100.4F will initiate fever workup  - H/H 8.9/29.4 , s/p 1u PRBC yesterday at 4pm  - Bladder US showing 700cc this am, SC x 1 at 7:30am, follow up TOV, Encouraged increase in po fluids  - Continue to monitor drain output- HV 115cc overnight/260cc in 24 hrs, pm drain check  - Bowel regimen  - Pain Control  - DVT ppx: LVX 40mg subq every 24 hrs, SCDs  - PT, WBS: WBAT  - Dispo: [facility] pending acceptance, must be afebrile 24hrs (<100F per facility)    Ortho Pager [phone]

## 2024-02-28 ENCOUNTER — HOSPITAL ENCOUNTER (EMERGENCY)
Facility: HOSPITAL | Age: 54
Discharge: HOME | End: 2024-02-28
Attending: EMERGENCY MEDICINE
Payer: MEDICARE

## 2024-02-28 VITALS
OXYGEN SATURATION: 98 % | RESPIRATION RATE: 18 BRPM | HEART RATE: 93 BPM | SYSTOLIC BLOOD PRESSURE: 161 MMHG | DIASTOLIC BLOOD PRESSURE: 102 MMHG | TEMPERATURE: 99 F

## 2024-02-28 DIAGNOSIS — M25.519 SHOULDER PAIN: ICD-10-CM
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
## 2024-02-28 DIAGNOSIS — S62.91XA HAND FRACTURE, RIGHT, CLOSED, INITIAL ENCOUNTER: ICD-10-CM

## 2024-02-28 DIAGNOSIS — T07.XXXA MULTIPLE CONTUSIONS: ICD-10-CM

## 2024-02-28 PROCEDURE — 99283 EMERGENCY DEPT VISIT LOW MDM: CPT | Mod: 25

## 2024-02-28 PROCEDURE — 29125 APPL SHORT ARM SPLINT STATIC: CPT | Mod: RT

## 2024-02-28 PROCEDURE — 25000003 PHARM REV CODE 250: Performed by: NURSE PRACTITIONER

## 2024-02-28 RX ORDER — OXYCODONE AND ACETAMINOPHEN 5; 325 MG/1; MG/1
1 TABLET ORAL EVERY 6 HOURS PRN
Qty: 12 TABLET | Refills: 0 | Status: SHIPPED | OUTPATIENT
Start: 2024-02-28 | End: 2024-03-01 | Stop reason: DRUGHIGH

## 2024-02-28 RX ORDER — HYDROCODONE BITARTRATE AND ACETAMINOPHEN 5; 325 MG/1; MG/1
1 TABLET ORAL
Status: COMPLETED | OUTPATIENT
Start: 2024-02-28 | End: 2024-02-28

## 2024-02-28 RX ORDER — HYDROCODONE BITARTRATE AND ACETAMINOPHEN 5; 325 MG/1; MG/1
1 TABLET ORAL EVERY 6 HOURS PRN
Qty: 12 TABLET | Refills: 0 | Status: SHIPPED | OUTPATIENT
Start: 2024-02-28 | End: 2024-02-28 | Stop reason: CLARIF

## 2024-02-28 RX ADMIN — HYDROCODONE BITARTRATE AND ACETAMINOPHEN 1 TABLET: 5; 325 TABLET ORAL at 11:02

## 2024-02-28 NOTE — ED PROVIDER NOTES
"Encounter Date: 2/28/2024       History     Chief Complaint   Patient presents with    Fall     Reports multiple fall in past 2 days resulting in pain to R wrist, R shoulder, R knee, and R hip. Hx of Guillain [place]     53-year-old male with complaint of right shoulder pain, right elbow pain, and right hand pain after fall yesterday.  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  Patient reports that he stumbled getting out of bed yesterday and then he tripped over a piece of furniture yesterday also.  Patient reports that he struck his head during 1 fall but did not lose consciousness.  Patient also reports right hip pain and right knee pain but patient reports he is able to walk without problems.        Review of patient's allergies indicates:   Allergen Reactions    Metoclopramide Other (See Comments) and Hives     Pt. Was in coma so is not aware of the reaction  Pt states "he don't know, was in coma"      Metoclopramide (bulk)      Other reaction(s): Unable to obtain    Reglan  [metoclopramide hcl] Other (See Comments)     Pt. Was in coma so is not aware of the reaction  Other reaction(s): Unable to obtain    Statins-hmg-coa reductase inhibitors      Myalgia      Sulfa (sulfonamide antibiotics) Other (See Comments)     Never taken  States son is allergic    Latex Hives, Itching and Rash           Latex, natural rubber Rash     Blisters, adhesives        Past Medical History:   Diagnosis Date    Arm vein blood clot, bilateral     Atrial flutter     Ozuna's esophagus     CRPS (complex regional pain syndrome type II)     Decubitus skin ulcer     Encounter for blood transfusion     Guillain-[place]hesapeake     Guillain-[place]hes[place] syndrome 7/30/2013    Hyperlipidemia     Hypertension     Joint pain     knees    LVH (left ventricular hypertrophy) due to hypertensive disease 2/10/2017    Mitral regurgitation 6/9/2016    KIA (obstructive sleep apnea)     " Primary osteoarthritis of left knee 1/7/2019    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 CATH LAB;  Service: Cardiology;;    barrette esophagus      CHOLECYSTECTOMY      2018?    COLONOSCOPY N/A 05/17/2018    Procedure: COLONOSCOPY;  Surgeon: Ilan Araya III, MD;  Location: [facility];  Service: Endoscopy;  Laterality: N/A;    COLONOSCOPY N/A 06/28/2023    Procedure: COLONOSCOPY;  Surgeon: Colby Rodriguez MD;  Location: [facility];  Service: Endoscopy;  Laterality: N/A;    COSMETIC SURGERY      Flap june 2008    decubitus surgery      to sacral    ESOPHAGOGASTRODUODENOSCOPY      ESOPHAGOGASTRODUODENOSCOPY N/A 06/17/2021    Procedure: EGD (ESOPHAGOGASTRODUODENOSCOPY);  Surgeon: Ban Zheng MD;  Location: [facility];  Service: Endoscopy;  Laterality: N/A;    ESOPHAGOGASTRODUODENOSCOPY N/A 06/28/2023    Procedure: EGD (ESOPHAGOGASTRODUODENOSCOPY);  Surgeon: Colby Rodriguez MD;  Location: [facility];  Service: Endoscopy;  Laterality: N/A;    GASTROSTOMY TUBE PLACEMENT      KNEE ARTHROSCOPY Left 2002    LEFT HEART CATHETERIZATION Left 12/10/2020    Procedure: CATHETERIZATION, HEART, LEFT;  Surgeon: Tomi Mir MD;  Location: [facility] CATH LAB;  Service: Cardiology;  Laterality: Left;  730 start time    PEG TUBE REMOVAL      RADIOFREQUENCY ABLATION      RFA      ROBOT-ASSISTED CHOLECYSTECTOMY USING DA GABRIELA XI N/A 05/02/2019    Procedure: XI ROBOTIC CHOLECYSTECTOMY;  Surgeon: Valerio Lai MD;  Location: [facility] OR;  Service: General;  Laterality: N/A;    JUAN-EN-Y PROCEDURE      TONSILLECTOMY      TRACHEAL SURGERY       Family History   Problem Relation Age of Onset    Heart attack Mother     Heart disease  Mother     Arthritis Mother     Heart disease Father     Heart attack Father     Hypertension Father     Stroke Father     Cancer Maternal Grandfather     Stroke Sister      Social History     Tobacco Use    Smoking status: Every Day     Current packs/day: 0.00     Average packs/day: 0.5 packs/day for 1 year (0.5 ttl pk-yrs)     Types: Cigarettes     Start date: 2021     Last attempt to quit: 2021     Years since quittin.2    Smokeless tobacco: Former     Types: Snuff     Quit date: 1999    Tobacco comments:     Unknown   Substance Use Topics    Alcohol use: Yes     Alcohol/week: 11.0 standard drinks of alcohol     Types: 2 Glasses of wine, 4 Cans of beer, 5 Drinks containing 0.5 oz of alcohol per week     Comment: no alcohol for past week, usually 7 drinks/week    Drug use: No     Review of Systems   Constitutional:  Negative for fever.   HENT:  Negative for sore throat.    Respiratory:  Negative for shortness of breath.    Cardiovascular:  Negative for chest pain.   Gastrointestinal:  Negative for nausea.   Genitourinary:  Negative for dysuria.   Musculoskeletal:  Negative for back pain.        Right shoulder pain, right elbow pain, right knee pain    Skin:  Negative for rash.   Neurological:  Negative for weakness.   Hematological:  Does not bruise/bleed easily.       Physical Exam     Initial Vitals [24 1108]   BP Pulse Resp Temp SpO2   (!) 161/102 93 18 98.7 °F (37.1 °C) 98 %      MAP       --         Physical Exam    Nursing note and vitals reviewed.  Constitutional: He appears well-developed and well-nourished.   HENT:   Head: Normocephalic and atraumatic.   Eyes: Conjunctivae are normal. Pupils are equal, round, and reactive to light.   Neck: Neck supple.   Normal range of motion.  Cardiovascular:  Normal rate, regular rhythm, normal heart sounds and intact distal pulses.           Pulmonary/Chest: Breath sounds normal.   Abdominal: Abdomen is soft. There is no rebound and no  guarding.   Musculoskeletal:         General: Normal range of motion.      Cervical back: Normal range of motion and neck supple.      Comments: Right lateral shoulder tenderness, right lateral elbow tenderness, tenderness and swelling dorsal lateral aspect of right hand, no wrist tenderness, no hip tenderness, no knee tenderness, no spine tenderness, patient ambulatory without difficulty     Neurological: He is alert.   Skin: Skin is warm and dry.   Psychiatric: He has a normal mood and affect. His behavior is normal. Thought content normal.         ED Course   Procedures  Labs Reviewed - No data to display       Imaging Results              CT Head Without Contrast (Final result)  Result time 02/28/24 13:12:03      Final result by Vivek Hernandez MD (02/28/24 13:12:03)                   Impression:      Chronic microvascular ischemic changes.      Electronically signed by: Vivek Hernandez MD  Date:    02/28/2024  Time:    13:12               Narrative:    EXAMINATION:  CT HEAD WITHOUT CONTRAST    CLINICAL HISTORY:  fall;    TECHNIQUE:  Low dose axial CT images obtained throughout the head without intravenous contrast. Sagittal and coronal reconstructions were performed.  All CT scans at this facility use dose modulation, iterative reconstruction and/or weight based dosing when appropriate to reduce radiation dose to as low as reasonably achievable.    COMPARISON:  07/01/2023    FINDINGS:  Intracranial compartment:    The brain parenchyma demonstrates areas of decreased attenuation with moderate periventricular white matter consistent with chronic microvascular ischemic changes..  No parenchymal mass, hemorrhage, edema or major vascular distribution infarct.  Vascular calcifications are noted.    Moderate prominence of the sulci and ventricles are consistent with age-related involutional changes.    No extra-axial blood or fluid collections.    Skull/extracranial contents (limited evaluation): No fracture.   Moderate mucosal thickening throughout the paranasal sinuses greater on the right.  Postoperative changes right superior orbit and superior ethmoid air cells.  Findings are similar to prior mastoid air cells and paranasal sinuses are essentially clear.                                       X-Ray Hand 3 view Right (Final result)  Result time 02/28/24 13:14:30      Final result by Dar Smith III, MD (02/28/24 13:14:30)                   Impression:      See above      Electronically signed by: Dar Smith MD  Date:    02/28/2024  Time:    13:14               Narrative:    EXAMINATION:  XR HAND COMPLETE 3 VIEW RIGHT    CLINICAL HISTORY:  hand pain;    FINDINGS:  There is an acute oblique fracture of the 5th metacarpal shaft with mild volar radial displacement and proximal migration of the distal fracture fragment.  No other acute fracture identified.  Joint alignment is within normal limits.                                       X-Ray Shoulder Complete 2 View Right (Final result)  Result time 02/28/24 13:15:09      Final result by Dar Smith III, MD (02/28/24 13:15:09)                   Impression:      No acute findings.      Electronically signed by: Dar Smith MD  Date:    02/28/2024  Time:    13:15               Narrative:    EXAMINATION:  XR SHOULDER COMPLETE 2 OR MORE VIEWS RIGHT    CLINICAL HISTORY:  Pain in unspecified shoulder    FINDINGS:  Bone alignment is satisfactory.  No fracture or dislocation. Moderate to severe glenohumeral osteoarthritis.  Moderate acromioclavicular osteoarthritis.  No significant soft tissue findings.                                       X-Ray Elbow Complete Right (Final result)  Result time 02/28/24 13:16:31      Final result by Dar Smith III, MD (02/28/24 13:16:31)                   Impression:      No acute findings.      Electronically signed by: Dar Smith MD  Date:    02/28/2024  Time:    13:16               Narrative:    EXAMINATION:  XR ELBOW  COMPLETE 3 VIEW RIGHT    CLINICAL HISTORY:  Right elbow pain;    FINDINGS:  Small chronic ossicle just proximal to the olecranon.  No acute fracture identified.  Joint alignment is normal.  No advanced arthritic changes.  No joint effusion.  7 mm benign osseous excrescence of the medial distal humeral metadiaphysis.                                       Imaging Results              CT Head Without Contrast (Final result)  Result time 02/28/24 13:12:03      Final result by Vivek Hernandez MD (02/28/24 13:12:03)                   Impression:      Chronic microvascular ischemic changes.      Electronically signed by: Vivek Hernandez MD  Date:    02/28/2024  Time:    13:12               Narrative:    EXAMINATION:  CT HEAD WITHOUT CONTRAST    CLINICAL HISTORY:  fall;    TECHNIQUE:  Low dose axial CT images obtained throughout the head without intravenous contrast. Sagittal and coronal reconstructions were performed.  All CT scans at this facility use dose modulation, iterative reconstruction and/or weight based dosing when appropriate to reduce radiation dose to as low as reasonably achievable.    COMPARISON:  07/01/2023    FINDINGS:  Intracranial compartment:    The brain parenchyma demonstrates areas of decreased attenuation with moderate periventricular white matter consistent with chronic microvascular ischemic changes..  No parenchymal mass, hemorrhage, edema or major vascular distribution infarct.  Vascular calcifications are noted.    Moderate prominence of the sulci and ventricles are consistent with age-related involutional changes.    No extra-axial blood or fluid collections.    Skull/extracranial contents (limited evaluation): No fracture.  Moderate mucosal thickening throughout the paranasal sinuses greater on the right.  Postoperative changes right superior orbit and superior ethmoid air cells.  Findings are similar to prior mastoid air cells and paranasal sinuses are essentially clear.                                        X-Ray Hand 3 view Right (Final result)  Result time 02/28/24 13:14:30      Final result by Dar Smith III, MD (02/28/24 13:14:30)                   Impression:      See above      Electronically signed by: Dar Smith MD  Date:    02/28/2024  Time:    13:14               Narrative:    EXAMINATION:  XR HAND COMPLETE 3 VIEW RIGHT    CLINICAL HISTORY:  hand pain;    FINDINGS:  There is an acute oblique fracture of the 5th metacarpal shaft with mild volar radial displacement and proximal migration of the distal fracture fragment.  No other acute fracture identified.  Joint alignment is within normal limits.                                       X-Ray Shoulder Complete 2 View Right (Final result)  Result time 02/28/24 13:15:09      Final result by Dar Smith III, MD (02/28/24 13:15:09)                   Impression:      No acute findings.      Electronically signed by: Dar Smith MD  Date:    02/28/2024  Time:    13:15               Narrative:    EXAMINATION:  XR SHOULDER COMPLETE 2 OR MORE VIEWS RIGHT    CLINICAL HISTORY:  Pain in unspecified shoulder    FINDINGS:  Bone alignment is satisfactory.  No fracture or dislocation. Moderate to severe glenohumeral osteoarthritis.  Moderate acromioclavicular osteoarthritis.  No significant soft tissue findings.                                       X-Ray Elbow Complete Right (Final result)  Result time 02/28/24 13:16:31      Final result by Dar Smith III, MD (02/28/24 13:16:31)                   Impression:      No acute findings.      Electronically signed by: Dar Smith MD  Date:    02/28/2024  Time:    13:16               Narrative:    EXAMINATION:  XR ELBOW COMPLETE 3 VIEW RIGHT    CLINICAL HISTORY:  Right elbow pain;    FINDINGS:  Small chronic ossicle just proximal to the olecranon.  No acute fracture identified.  Joint alignment is normal.  No advanced arthritic changes.  No joint effusion.  7 mm benign osseous excrescence  of the medial distal humeral metadiaphysis.                                      Medications   HYDROcodone-acetaminophen 5-325 mg per tablet 1 tablet (1 tablet Oral Given 2/28/24 1133)     Medical Decision Making  Amount and/or Complexity of Data Reviewed  Radiology: ordered.    Risk  Prescription drug management.       Procedure  Short-arm volar OCL splint applied to right hand, alignment good, neurovascular status intact                               Clinical Impression:  Final diagnoses:  [M25.519] Shoulder pain  [W19.XXXA] Fall, initial encounter (Primary)  [S62.91XA] Hand fracture, right, closed, initial encounter  [T07.XXXA] Multiple contusions          ED Disposition Condition    Discharge Stable          ED Prescriptions       Medication Sig Dispense Start Date End Date Auth. Provider    HYDROcodone-acetaminophen (NORCO) 5-325 mg per tablet Take 1 tablet by mouth every 6 (six) hours as needed for Pain. 12 tablet 2/28/2024 -- Hiram Dubois NP          Follow-up Information       Follow up With Specialties Details Why Contact [facility]    Clinic, [facility]  Schedule an appointment as soon as possible for a visit in 2 days  [address] [place] [address] [address]  [place] [id number]  [phone]               Hiram Dubois NP  02/28/24 0214

## 2024-02-29 ENCOUNTER — HOSPITAL ENCOUNTER (EMERGENCY)
Facility: HOSPITAL | Age: 54
Discharge: HOME | End: 2024-02-29
Attending: EMERGENCY MEDICINE
Payer: MEDICARE

## 2024-02-29 VITALS
BODY MASS INDEX: 33.2 KG/M2 | RESPIRATION RATE: 20 BRPM | SYSTOLIC BLOOD PRESSURE: 175 MMHG | WEIGHT: 280 LBS | DIASTOLIC BLOOD PRESSURE: 84 MMHG | HEART RATE: 59 BPM | OXYGEN SATURATION: 96 % | TEMPERATURE: 98 F

## 2024-02-29 DIAGNOSIS — M25.551 RIGHT HIP PAIN: Primary | ICD-10-CM

## 2024-02-29 DIAGNOSIS — I10 HYPERTENSION, UNSPECIFIED TYPE: ICD-10-CM

## 2024-02-29 DIAGNOSIS — R53.1 WEAKNESS: ICD-10-CM

## 2024-02-29 DIAGNOSIS — R11.2 NAUSEA AND VOMITING, UNSPECIFIED VOMITING TYPE: ICD-10-CM

## 2024-02-29 DIAGNOSIS — R11.0 NAUSEA: Primary | ICD-10-CM

## 2024-02-29 LAB
ALBUMIN SERPL BCP-MCNC: 3.6 G/DL (ref 3.5–5.2)
ALP SERPL-CCNC: 148 U/L (ref 55–135)
ALT SERPL W/O P-5'-P-CCNC: 85 U/L (ref 10–44)
ANION GAP SERPL CALC-SCNC: 15 MMOL/L (ref 8–16)
AST SERPL-CCNC: 117 U/L (ref 10–40)
BACTERIA #/AREA URNS HPF: ABNORMAL /HPF
BASOPHILS # BLD AUTO: 0.04 K/UL (ref 0–0.2)
BASOPHILS NFR BLD: 0.6 % (ref 0–1.9)
BILIRUB SERPL-MCNC: 0.9 MG/DL (ref 0.1–1)
BILIRUB UR QL STRIP: NEGATIVE
BNP SERPL-MCNC: 17 PG/ML (ref 0–99)
BUN SERPL-MCNC: 6 MG/DL (ref 6–20)
CALCIUM SERPL-MCNC: 8.1 MG/DL (ref 8.7–10.5)
CHLORIDE SERPL-SCNC: 103 MMOL/L (ref 95–110)
CK SERPL-CCNC: 588 U/L (ref 20–200)
CLARITY UR: CLEAR
CO2 SERPL-SCNC: 21 MMOL/L (ref 23–29)
COLOR UR: YELLOW
CREAT SERPL-MCNC: 0.6 MG/DL (ref 0.5–1.4)
DIFFERENTIAL METHOD BLD: ABNORMAL
EOSINOPHIL # BLD AUTO: 0.1 K/UL (ref 0–0.5)
EOSINOPHIL NFR BLD: 1.8 % (ref 0–8)
ERYTHROCYTE [DISTWIDTH] IN BLOOD BY AUTOMATED COUNT: 13.8 % (ref 11.5–14.5)
EST. GFR  (NO RACE VARIABLE): >60 ML/MIN/1.73 M^2
GLUCOSE SERPL-MCNC: 107 MG/DL (ref 70–110)
GLUCOSE UR QL STRIP: NEGATIVE
HCT VFR BLD AUTO: 46.2 % (ref 40–54)
HCV AB SERPL QL IA: NEGATIVE
HEP C VIRUS HOLD SPECIMEN: NORMAL
HGB BLD-MCNC: 16.1 G/DL (ref 14–18)
HGB UR QL STRIP: NEGATIVE
HIV 1+2 AB+HIV1 P24 AG SERPL QL IA: NEGATIVE
HYALINE CASTS #/AREA URNS LPF: 7 /LPF
IMM GRANULOCYTES # BLD AUTO: 0.02 K/UL (ref 0–0.04)
IMM GRANULOCYTES NFR BLD AUTO: 0.3 % (ref 0–0.5)
KETONES UR QL STRIP: NEGATIVE
LEUKOCYTE ESTERASE UR QL STRIP: ABNORMAL
LYMPHOCYTES # BLD AUTO: 1.2 K/UL (ref 1–4.8)
LYMPHOCYTES NFR BLD: 18.3 % (ref 18–48)
MCH RBC QN AUTO: 31.9 PG (ref 27–31)
MCHC RBC AUTO-ENTMCNC: 34.8 G/DL (ref 32–36)
MCV RBC AUTO: 92 FL (ref 82–98)
MICROSCOPIC COMMENT: ABNORMAL
MONOCYTES # BLD AUTO: 0.5 K/UL (ref 0.3–1)
MONOCYTES NFR BLD: 7.7 % (ref 4–15)
NEUTROPHILS # BLD AUTO: 4.7 K/UL (ref 1.8–7.7)
NEUTROPHILS NFR BLD: 71.3 % (ref 38–73)
NITRITE UR QL STRIP: NEGATIVE
NRBC BLD-RTO: 0 /100 WBC
OHS QRS DURATION: 100 MS
OHS QTC CALCULATION: 454 MS
PH UR STRIP: 6 [PH] (ref 5–8)
PLATELET # BLD AUTO: 149 K/UL (ref 150–450)
PMV BLD AUTO: 10.4 FL (ref 9.2–12.9)
POCT GLUCOSE: 107 MG/DL (ref 70–110)
POTASSIUM SERPL-SCNC: 3.5 MMOL/L (ref 3.5–5.1)
PROT SERPL-MCNC: 7.2 G/DL (ref 6–8.4)
PROT UR QL STRIP: ABNORMAL
RBC # BLD AUTO: 5.04 M/UL (ref 4.6–6.2)
RBC #/AREA URNS HPF: 3 /HPF (ref 0–4)
SODIUM SERPL-SCNC: 139 MMOL/L (ref 136–145)
SP GR UR STRIP: 1.02 (ref 1–1.03)
SQUAMOUS #/AREA URNS HPF: 2 /HPF
TROPONIN I SERPL DL<=0.01 NG/ML-MCNC: 0.01 NG/ML (ref 0–0.03)
URN SPEC COLLECT METH UR: ABNORMAL
UROBILINOGEN UR STRIP-ACNC: ABNORMAL EU/DL
WBC # BLD AUTO: 6.6 K/UL (ref 3.9–12.7)
WBC #/AREA URNS HPF: 18 /HPF (ref 0–5)

## 2024-02-29 PROCEDURE — 93010 ELECTROCARDIOGRAM REPORT: CPT | Mod: ,,, | Performed by: INTERNAL MEDICINE

## 2024-02-29 PROCEDURE — 86803 HEPATITIS C AB TEST: CPT | Performed by: EMERGENCY MEDICINE

## 2024-02-29 PROCEDURE — 80053 COMPREHEN METABOLIC PANEL: CPT | Performed by: EMERGENCY MEDICINE

## 2024-02-29 PROCEDURE — 85025 COMPLETE CBC W/AUTO DIFF WBC: CPT | Performed by: EMERGENCY MEDICINE

## 2024-02-29 PROCEDURE — 81000 URINALYSIS NONAUTO W/SCOPE: CPT | Performed by: EMERGENCY MEDICINE

## 2024-02-29 PROCEDURE — 87077 CULTURE AEROBIC IDENTIFY: CPT | Performed by: EMERGENCY MEDICINE

## 2024-02-29 PROCEDURE — 99285 EMERGENCY DEPT VISIT HI MDM: CPT | Mod: 25

## 2024-02-29 PROCEDURE — 96375 TX/PRO/DX INJ NEW DRUG ADDON: CPT

## 2024-02-29 PROCEDURE — 82962 GLUCOSE BLOOD TEST: CPT

## 2024-02-29 PROCEDURE — 96365 THER/PROPH/DIAG IV INF INIT: CPT

## 2024-02-29 PROCEDURE — 87088 URINE BACTERIA CULTURE: CPT | Performed by: EMERGENCY MEDICINE

## 2024-02-29 PROCEDURE — 84484 ASSAY OF TROPONIN QUANT: CPT | Performed by: EMERGENCY MEDICINE

## 2024-02-29 PROCEDURE — 83880 ASSAY OF NATRIURETIC PEPTIDE: CPT | Performed by: EMERGENCY MEDICINE

## 2024-02-29 PROCEDURE — 87086 URINE CULTURE/COLONY COUNT: CPT | Performed by: EMERGENCY MEDICINE

## 2024-02-29 PROCEDURE — 93005 ELECTROCARDIOGRAM TRACING: CPT

## 2024-02-29 PROCEDURE — 87389 HIV-1 AG W/HIV-1&-2 AB AG IA: CPT | Performed by: EMERGENCY MEDICINE

## 2024-02-29 PROCEDURE — 63600175 PHARM REV CODE 636 W HCPCS: Performed by: EMERGENCY MEDICINE

## 2024-02-29 PROCEDURE — 87186 SC STD MICRODIL/AGAR DIL: CPT | Performed by: EMERGENCY MEDICINE

## 2024-02-29 PROCEDURE — 82550 ASSAY OF CK (CPK): CPT | Performed by: EMERGENCY MEDICINE

## 2024-02-29 RX ORDER — CIPROFLOXACIN 2 MG/ML
400 INJECTION, SOLUTION INTRAVENOUS
Status: DISCONTINUED | OUTPATIENT
Start: 2024-02-29 | End: 2024-02-29 | Stop reason: HOSPADM

## 2024-02-29 RX ORDER — MORPHINE SULFATE 4 MG/ML
4 INJECTION, SOLUTION INTRAMUSCULAR; INTRAVENOUS
Status: COMPLETED | OUTPATIENT
Start: 2024-02-29 | End: 2024-02-29

## 2024-02-29 RX ORDER — METOPROLOL SUCCINATE 50 MG/1
25 TABLET, EXTENDED RELEASE ORAL EVERY OTHER DAY
Qty: 30 TABLET | Refills: 6 | Status: SHIPPED | OUTPATIENT
Start: 2024-02-29

## 2024-02-29 RX ORDER — ONDANSETRON 4 MG/1
4 TABLET, FILM COATED ORAL EVERY 6 HOURS
Qty: 20 TABLET | Refills: 0 | Status: SHIPPED | OUTPATIENT
Start: 2024-02-29

## 2024-02-29 RX ORDER — HYDRALAZINE HYDROCHLORIDE 20 MG/ML
10 INJECTION INTRAMUSCULAR; INTRAVENOUS
Status: COMPLETED | OUTPATIENT
Start: 2024-02-29 | End: 2024-02-29

## 2024-02-29 RX ORDER — CIPROFLOXACIN 500 MG/1
500 TABLET ORAL 2 TIMES DAILY
Qty: 14 TABLET | Refills: 0 | Status: SHIPPED | OUTPATIENT
Start: 2024-02-29 | End: 2024-03-07

## 2024-02-29 RX ORDER — ONDANSETRON HYDROCHLORIDE 2 MG/ML
4 INJECTION, SOLUTION INTRAVENOUS
Status: COMPLETED | OUTPATIENT
Start: 2024-02-29 | End: 2024-02-29

## 2024-02-29 RX ADMIN — MORPHINE SULFATE 4 MG: 4 INJECTION INTRAVENOUS at 08:02

## 2024-02-29 RX ADMIN — HYDRALAZINE HYDROCHLORIDE 10 MG: 20 INJECTION, SOLUTION INTRAMUSCULAR; INTRAVENOUS at 08:02

## 2024-02-29 RX ADMIN — CIPROFLOXACIN 400 MG: 400 INJECTION, SOLUTION INTRAVENOUS at 10:02

## 2024-02-29 RX ADMIN — ONDANSETRON 4 MG: 2 INJECTION INTRAMUSCULAR; INTRAVENOUS at 06:02

## 2024-02-29 NOTE — ED PROVIDER NOTES
"SCRIBE #1 NOTE: I, Cecil Felicianou, am scribing for, and in the presence of, Andrew Conteh MD. I have scribed the entire note.      History      Chief Complaint   Patient presents with    Hypertension    Nausea    Weakness     Pt reports that he feels shakey with n/v since 2am. States that he took his percocet at 2 am then symptoms started. Hx guillian barre and states that B legs feel numb with B arm tingling.       Review of patient's allergies indicates:   Allergen Reactions    Metoclopramide Other (See Comments) and Hives     Pt. Was in coma so is not aware of the reaction  Pt states "he don't know, was in coma"      Metoclopramide (bulk)      Other reaction(s): Unable to obtain    Reglan  [metoclopramide hcl] Other (See Comments)     Pt. Was in coma so is not aware of the reaction  Other reaction(s): Unable to obtain    Statins-hmg-coa reductase inhibitors      Myalgia      Sulfa (sulfonamide antibiotics) Other (See Comments)     Never taken  States son is allergic    Latex Hives, Itching and Rash           Latex, natural rubber Rash     Blisters, adhesives           HPI   HPI    2/29/2024, 6:19 AM   History obtained from the patient      History of Present Illness: Valerio Yan is a 53 y.o. male patient with a PMHx of atrial flutter, HTN, Guillain-[place] syndrome who presents to the Emergency Department for generalized weakness, onset this morning at 2 AM. Pt presented to the ED yesterday following multiple falls, and as discharged on Percocet. Pt states that his current sxs started after taking Percocet this morning. Symptoms are constant and moderate in severity. No mitigating or exacerbating factors reported. Associated sxs include L-sided facial numbness and n/v. Patient denies any fever, chills, SOB, CP, dizziness, headache, slurred speech, facial droop, and all other sxs at this time. No further complaints or concerns at this time.     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    Hyperlipidemia     Hypertension     Joint pain     knees    LVH (left ventricular hypertrophy) due to hypertensive disease 2/10/2017    Mitral regurgitation 6/9/2016    KIA (obstructive sleep apnea)     Primary osteoarthritis of left knee 1/7/2019    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    COLONOSCOPY N/A 05/17/2018    Procedure: COLONOSCOPY;  Surgeon: Ilan Araya III, MD;  Location: [facility];  Service: Endoscopy;  Laterality: N/A;    COLONOSCOPY N/A 06/28/2023    Procedure: COLONOSCOPY;  Surgeon: Colby Rodriguez MD;  Location: [facility];  Service: Endoscopy;  Laterality: N/A;    COSMETIC SURGERY      Flap june 2008    decubitus surgery      to sacral    ESOPHAGOGASTRODUODENOSCOPY      ESOPHAGOGASTRODUODENOSCOPY N/A 06/17/2021    Procedure: EGD (ESOPHAGOGASTRODUODENOSCOPY);  Surgeon: Ban Zheng MD;  Location: [facility];  Service: Endoscopy;  Laterality: N/A;    ESOPHAGOGASTRODUODENOSCOPY N/A 06/28/2023    Procedure: EGD (ESOPHAGOGASTRODUODENOSCOPY);  Surgeon: Colby Rodriguez MD;  Location: [facility];  Service: Endoscopy;  Laterality: N/A;    GASTROSTOMY TUBE PLACEMENT      KNEE ARTHROSCOPY Left 2002    LEFT HEART CATHETERIZATION Left 12/10/2020    Procedure:  CATHETERIZATION, HEART, LEFT;  Surgeon: Tomi Mir MD;  Location: [facility] CATH LAB;  Service: Cardiology;  Laterality: Left;  730 start time    PEG TUBE REMOVAL      RADIOFREQUENCY ABLATION      RFA      ROBOT-ASSISTED CHOLECYSTECTOMY USING DA GABRIELA XI N/A 2019    Procedure: XI ROBOTIC CHOLECYSTECTOMY;  Surgeon: Valerio Lai MD;  Location: [facility] OR;  Service: General;  Laterality: N/A;    JUAN-EN-Y PROCEDURE      TONSILLECTOMY      TRACHEAL SURGERY           Family History:  Family History   Problem Relation Age of Onset    Heart attack Mother     Heart disease Mother     Arthritis Mother     Heart disease Father     Heart attack Father     Hypertension Father     Stroke Father     Cancer Maternal Grandfather     Stroke Sister        Social History:  Social History     Tobacco Use    Smoking status: Every Day     Current packs/day: 0.00     Average packs/day: 0.5 packs/day for 1 year (0.5 ttl pk-yrs)     Types: Cigarettes     Start date: 2021     Last attempt to quit: 2021     Years since quittin.2    Smokeless tobacco: Former     Types: Snuff     Quit date: 1999    Tobacco comments:     Unknown   Substance and Sexual Activity    Alcohol use: Yes     Alcohol/week: 11.0 standard drinks of alcohol     Types: 2 Glasses of wine, 4 Cans of beer, 5 Drinks containing 0.5 oz of alcohol per week     Comment: no alcohol for past week, usually 7 drinks/week    Drug use: No    Sexual activity: Yes     Partners: Female     Birth control/protection: None       ROS   Review of Systems   Constitutional:  Negative for chills and fever.   HENT:  Negative for sore throat.    Respiratory:  Negative for shortness of breath.    Cardiovascular:  Negative for chest pain.   Gastrointestinal:  Positive for nausea and vomiting. Negative for diarrhea.   Genitourinary:  Negative for dysuria.   Musculoskeletal:  Negative for back pain.   Skin:  Negative for rash.   Neurological:  Positive for weakness (generalized)  and numbness (L face). Negative for dizziness and headaches.   Hematological:  Does not bruise/bleed easily.   All other systems reviewed and are negative.    Physical Exam      Initial Vitals [02/29/24 0612]   BP Pulse Resp Temp SpO2   (!) 192/96 78 18 98 °F (36.7 °C) 96 %      MAP       --          Physical Exam  Nursing Notes and Vital Signs Reviewed.  Constitutional: Patient is in no acute distress. Well-developed and well-nourished.  Head: Atraumatic. Normocephalic.  Eyes: PERRL. EOM intact. Conjunctivae are not pale. No scleral icterus.  ENT: Mucous membranes are moist. Oropharynx is clear and symmetric.    Neck: Supple. Full ROM.  Cardiovascular: Regular rate. Regular rhythm. No murmurs, rubs, or gallops. Distal pulses are 2+ and symmetric.  Pulmonary/Chest: No respiratory distress. Clear to auscultation bilaterally. No wheezing or rales.  Abdominal: Soft and non-distended.  There is no tenderness.  No rebound, guarding, or rigidity.   Musculoskeletal: Moves all extremities. No obvious deformities. No edema.  Skin: Warm and dry.  Neurological:  Alert, awake, and appropriate.  Normal speech. No facial droop. No acute focal neurological deficits are appreciated.  Psychiatric: Normal affect. Good eye contact. Appropriate in content.    ED Course    Procedures  ED Vital Signs:  Vitals:    02/29/24 0612 02/29/24 0629 02/29/24 0700 02/29/24 0800   BP: (!) 192/96 (!) 198/98 (!) 188/104 (!) 180/87   Pulse: 78 69 (!) 58 (!) 59   Resp: 18 14 17 20   Temp: 98 °F (36.7 °C)      TempSrc: Oral      SpO2: 96% 96%     Weight: 127 kg (280 lb)       02/29/24 0830 02/29/24 0839   BP: (!) 175/84    Pulse: (!) 59    Resp: 15 20   Temp:     TempSrc:     SpO2:     Weight:         Abnormal Lab Results:  Labs Reviewed   CBC W/ AUTO DIFFERENTIAL - Abnormal; Notable for the following components:       Result Value    MCH 31.9 (*)     Platelets 149 (*)     All other components within normal limits    Narrative:     Release to  patient->Immediate   COMPREHENSIVE METABOLIC PANEL - Abnormal; Notable for the following components:    CO2 21 (*)     Calcium 8.1 (*)     Alkaline Phosphatase 148 (*)      (*)     ALT 85 (*)     All other components within normal limits    Narrative:     Release to patient->Immediate   URINALYSIS, REFLEX TO URINE CULTURE - Abnormal; Notable for the following components:    Protein, UA 1+ (*)     Urobilinogen, UA 2.0-3.0 (*)     Leukocytes, UA Trace (*)     All other components within normal limits    Narrative:     Specimen Source->Urine   CK - Abnormal; Notable for the following components:     (*)     All other components within normal limits    Narrative:     Release to patient->Immediate   URINALYSIS MICROSCOPIC - Abnormal; Notable for the following components:    WBC, UA 18 (*)     Bacteria Moderate (*)     Hyaline Casts, UA 7 (*)     All other components within normal limits    Narrative:     Specimen Source->Urine   CULTURE, URINE   HIV 1 / 2 ANTIBODY    Narrative:     Release to patient->Immediate   HEP C VIRUS HOLD SPECIMEN    Narrative:     Release to patient->Immediate   TROPONIN I    Narrative:     Release to patient->Immediate   B-TYPE NATRIURETIC PEPTIDE   B-TYPE NATRIURETIC PEPTIDE    Narrative:     Release to patient->Immediate   HEPATITIS C ANTIBODY   POCT GLUCOSE        All Lab Results:  Results for orders placed or performed during the hospital encounter of 02/29/24   HIV 1/2 Ag/Ab (4th Gen)   Result Value Ref Range    HIV 1/2 Ag/Ab Negative Negative   HCV Virus Hold Specimen   Result Value Ref Range    HEP C Virus Hold Specimen Hold for HCV sendout    CBC Auto Differential   Result Value Ref Range    WBC 6.60 3.90 - 12.70 K/uL    RBC 5.04 4.60 - 6.20 M/uL    Hemoglobin 16.1 14.0 - 18.0 g/dL    Hematocrit 46.2 40.0 - 54.0 %    MCV 92 82 - 98 fL    MCH 31.9 (H) 27.0 - 31.0 pg    MCHC 34.8 32.0 - 36.0 g/dL    RDW 13.8 11.5 - 14.5 %    Platelets 149 (L) 150 - 450 K/uL    MPV 10.4 9.2 -  12.9 fL    Immature Granulocytes 0.3 0.0 - 0.5 %    Gran # (ANC) 4.7 1.8 - 7.7 K/uL    Immature Grans (Abs) 0.02 0.00 - 0.04 K/uL    Lymph # 1.2 1.0 - 4.8 K/uL    Mono # 0.5 0.3 - 1.0 K/uL    Eos # 0.1 0.0 - 0.5 K/uL    Baso # 0.04 0.00 - 0.20 K/uL    nRBC 0 0 /100 WBC    Gran % 71.3 38.0 - 73.0 %    Lymph % 18.3 18.0 - 48.0 %    Mono % 7.7 4.0 - 15.0 %    Eosinophil % 1.8 0.0 - 8.0 %    Basophil % 0.6 0.0 - 1.9 %    Differential Method Automated    Comprehensive Metabolic Panel   Result Value Ref Range    Sodium 139 136 - 145 mmol/L    Potassium 3.5 3.5 - 5.1 mmol/L    Chloride 103 95 - 110 mmol/L    CO2 21 (L) 23 - 29 mmol/L    Glucose 107 70 - 110 mg/dL    BUN 6 6 - 20 mg/dL    Creatinine 0.6 0.5 - 1.4 mg/dL    Calcium 8.1 (L) 8.7 - 10.5 mg/dL    Total Protein 7.2 6.0 - 8.4 g/dL    Albumin 3.6 3.5 - 5.2 g/dL    Total Bilirubin 0.9 0.1 - 1.0 mg/dL    Alkaline Phosphatase 148 (H) 55 - 135 U/L     (H) 10 - 40 U/L    ALT 85 (H) 10 - 44 U/L    eGFR >60 >60 mL/min/1.73 m^2    Anion Gap 15 8 - 16 mmol/L   Urinalysis, Reflex to Urine Culture Urine, Clean Catch    Specimen: Urine   Result Value Ref Range    Specimen UA Urine, Clean Catch     Color, UA Yellow Yellow, Straw, Beatris    Appearance, UA Clear Clear    pH, UA 6.0 5.0 - 8.0    Specific Gravity, UA 1.020 1.005 - 1.030    Protein, UA 1+ (A) Negative    Glucose, UA Negative Negative    Ketones, UA Negative Negative    Bilirubin (UA) Negative Negative    Occult Blood UA Negative Negative    Nitrite, UA Negative Negative    Urobilinogen, UA 2.0-3.0 (A) <2.0 EU/dL    Leukocytes, UA Trace (A) Negative   CK   Result Value Ref Range     (H) 20 - 200 U/L   Troponin I   Result Value Ref Range    Troponin I 0.014 0.000 - 0.026 ng/mL   BNP   Result Value Ref Range    BNP 17 0 - 99 pg/mL   Urinalysis Microscopic   Result Value Ref Range    RBC, UA 3 0 - 4 /hpf    WBC, UA 18 (H) 0 - 5 /hpf    Bacteria Moderate (A) None-Occ /hpf    Squam Epithel, UA 2 /hpf     Hyaline Casts, UA 7 (A) 0-1/lpf /lpf    Microscopic Comment SEE COMMENT    EKG 12-lead   Result Value Ref Range    QRS Duration 100 ms    [facility] QTC Calculation 454 ms   POCT glucose   Result Value Ref Range    POCT Glucose 107 70 - 110 mg/dL     Imaging Results:  Imaging Results              CT Head Without Contrast (Final result)  Result time 02/29/24 08:06:21      Final result by Vivek Hernandez MD (02/29/24 08:06:21)                   Impression:      No acute intracranial abnormalities.  Findings are similar to prior exam      Electronically signed by: Vivek Hernandez MD  Date:    02/29/2024  Time:    08:06               Narrative:    EXAMINATION:  CT HEAD WITHOUT CONTRAST    CLINICAL HISTORY:  Dizziness, persistent/recurrent, cardiac or vascular cause suspected;    TECHNIQUE:  Low dose axial CT images obtained throughout the head without intravenous contrast. Sagittal and coronal reconstructions were performed.  All CT scans at this facility use dose modulation, iterative reconstruction and/or weight based dosing when appropriate to reduce radiation dose to as low as reasonably achievable.    COMPARISON:  02/28/2024    FINDINGS:  Intracranial compartment: Encephalomalacia right frontal lobe similar to prior.    The brain parenchyma demonstrates areas of decreased attenuation with moderate periventricular white matter consistent with chronic microvascular ischemic changes..  No parenchymal mass, hemorrhage, edema or major vascular distribution infarct.  Vascular calcifications are noted.    Moderate prominence of the sulci and ventricles are consistent with age-related involutional changes.    No extra-axial blood or fluid collections.    Skull/extracranial contents (limited evaluation): No fracture.  Significant mucosal thickening throughout the right maxillary sinus and right sphenoid sinus status post right-sided Fess surgery.  Mucosal thickening measures up to 9 mm.  No fluid levels.  Patchy opacification  right mastoid air cells.  Left mastoid air cells and remaining.  Postoperative changes are seen involving the posterior wall of the right frontal sinus and medial upper orbit which is similar to prior.  Paranasal sinuses are essentially clear.                                       X-Ray Chest AP Portable (Final result)  Result time 02/29/24 07:17:27      Final result by Vivek Hernandez MD (02/29/24 07:17:27)                   Impression:      No acute process seen.      Electronically signed by: Vivek Hernandez MD  Date:    02/29/2024  Time:    07:17               Narrative:    EXAMINATION:  XR CHEST AP PORTABLE    CLINICAL HISTORY:  weakness;    FINDINGS:  Single view of the chest.  Comparison 07/19/2023    Cardiac silhouette is normal.  The lungs demonstrate no evidence of active disease.  No evidence of pleural effusion or pneumothorax.  Bones appear intact.                                     The EKG was ordered, reviewed, and independently interpreted by the ED provider.  Interpretation time: 06:22  Rate: 62 BPM  Rhythm: normal sinus rhythm  Interpretation: Left axis deviation. No STEMI.           The Emergency Provider reviewed the vital signs and test results, which are outlined above.    ED Discussion     9:34 AM: Reassessed pt at this time. Discussed with pt all pertinent ED information and results. Discussed pt dx and plan of tx. Gave pt all f/u and return to the ED instructions. All questions and concerns were addressed at this time. Pt expresses understanding of information and instructions, and is comfortable with plan to discharge. Pt is stable for discharge.    I discussed with patient and/or family/caretaker that evaluation in the ED does not suggest any emergent or life threatening medical conditions requiring immediate intervention beyond what was provided in the ED, and I believe patient is safe for discharge.  Regardless, an unremarkable evaluation in the ED does not preclude the development or  presence of a serious of life threatening condition. As such, patient was instructed to return immediately for any worsening or change in current symptoms.         ED Medication(s):  Medications   ciprofloxacin (CIPRO)400mg/200ml D5W IVPB 400 mg (has no administration in time range)   ondansetron injection 4 mg (4 mg Intravenous Given 2/29/24 0642)   hydrALAZINE injection 10 mg (10 mg Intravenous Given 2/29/24 0838)   morphine injection 4 mg (4 mg Intravenous Given 2/29/24 0839)        Follow-up Information       [place] Call in 1 day.    Contact information:  [id number] [facility]  [place] [address]  [phone]                           New Prescriptions    CIPROFLOXACIN HCL (CIPRO) 500 MG TABLET    Take 1 tablet (500 mg total) by mouth 2 (two) times daily. for 7 days         Medical Decision Making    Medical Decision Making  Feeling shaky with nausea since taking a percocet last night.  Reports that he didn't eat  DDx: nausea, htn    Amount and/or Complexity of Data Reviewed  Labs: ordered. Decision-making details documented in ED Course.  Radiology: ordered. Decision-making details documented in ED Course.  ECG/medicine tests: ordered and independent interpretation performed. Decision-making details documented in ED Course.  Discussion of management or test interpretation with external provider(s): Feeling better after treatment in the ED, and is ready to go home.     Risk  Prescription drug management.                Scribe Attestation:   Scribe #1: I performed the above scribed service and the documentation accurately describes the services I performed. I attest to the accuracy of the note.    Attending:   Physician Attestation Statement for Scribe #1: I, Andrew Conteh MD, personally performed the services described in this documentation, as scribed by Cecil Rojas, in my presence, and it is both accurate and complete.          Clinical Impression       ICD-10-CM ICD-9-CM   1. Nausea   R11.0 787.02   2. Weakness  R53.1 780.79   3. Nausea and vomiting, unspecified vomiting type  R11.2 787.01   4. Hypertension, unspecified type  I10 401.9       Disposition:   Disposition: Discharged  Condition: Stable         Andrew Conteh MD  02/29/24 0939

## 2024-02-29 NOTE — ED NOTES
Report received from DORIS Cevallos. Care assumed at this time. Patient resting quietly in bed, awake, wife at bedside. Splint noted to R forearm. VSS. Bed locked in lowest position, call light in reach. PUNEET

## 2024-03-01 ENCOUNTER — OFFICE VISIT (OUTPATIENT)
Dept: SPORTS MEDICINE | Facility: CLINIC | Age: 54
End: 2024-03-01
Payer: MEDICARE

## 2024-03-01 ENCOUNTER — HOSPITAL ENCOUNTER (OUTPATIENT)
Dept: RADIOLOGY | Facility: HOSPITAL | Age: 54
Discharge: HOME | End: 2024-03-01
Attending: STUDENT IN AN ORGANIZED HEALTH CARE EDUCATION/TRAINING PROGRAM
Payer: MEDICARE

## 2024-03-01 DIAGNOSIS — Z86.69 HISTORY OF GUILLAIN-BARRE SYNDROME: Primary | ICD-10-CM

## 2024-03-01 DIAGNOSIS — S62.326A CLOSED DISPLACED FRACTURE OF SHAFT OF FIFTH METACARPAL BONE OF RIGHT HAND, INITIAL ENCOUNTER: ICD-10-CM

## 2024-03-01 DIAGNOSIS — R26.89 BALANCE PROBLEMS: ICD-10-CM

## 2024-03-01 DIAGNOSIS — Z91.81 AT HIGH RISK FOR FALLS: ICD-10-CM

## 2024-03-01 DIAGNOSIS — S70.01XA CONTUSION OF RIGHT HIP, INITIAL ENCOUNTER: ICD-10-CM

## 2024-03-01 DIAGNOSIS — M25.551 RIGHT HIP PAIN: ICD-10-CM

## 2024-03-01 PROCEDURE — 99204 OFFICE O/P NEW MOD 45 MIN: CPT | Mod: 57,S$GLB,, | Performed by: STUDENT IN AN ORGANIZED HEALTH CARE EDUCATION/TRAINING PROGRAM

## 2024-03-01 PROCEDURE — 99999 PR PBB SHADOW E&M-EST. PATIENT-LVL IV: CPT | Mod: PBBFAC,,, | Performed by: STUDENT IN AN ORGANIZED HEALTH CARE EDUCATION/TRAINING PROGRAM

## 2024-03-01 PROCEDURE — 73503 X-RAY EXAM HIP UNI 4/> VIEWS: CPT | Mod: 26,RT,, | Performed by: RADIOLOGY

## 2024-03-01 PROCEDURE — 26600 TREAT METACARPAL FRACTURE: CPT | Mod: RT,S$GLB,, | Performed by: STUDENT IN AN ORGANIZED HEALTH CARE EDUCATION/TRAINING PROGRAM

## 2024-03-01 PROCEDURE — 73503 X-RAY EXAM HIP UNI 4/> VIEWS: CPT | Mod: TC,PO,RT

## 2024-03-01 RX ORDER — OXYCODONE AND ACETAMINOPHEN 10; 325 MG/1; MG/1
1 TABLET ORAL
Qty: 15 TABLET | Refills: 0 | Status: SHIPPED | OUTPATIENT
Start: 2024-03-01

## 2024-03-01 NOTE — PROGRESS NOTES
Patient ID: Valerio Yan  YOB: 1970  MRN: [id number]    Chief Complaint: Pain and Injury of the Right Hand and Pain and Injury of the Right Hip    Referred By: ED    Occupation: disabled      History of Present Illness: Valerio Yan is a right-hand dominant 53 y.o. male with Guillain-[place], CRPS, HTN, KIA and history of blood clots who presents today with Pain and Injury of the Right Hand and Pain and Injury of the Right Hip    He injured his right hand and right hip/low back on 2/27/24 when he lost his balance three times and fell.  This happens frequently due to Guillain-[place] syndrome with weakness in his legs and balance impairment.  He also has limited use of his hands - he is unable to use the thumb and index fingers of both hands at baseline.  He was seen at the [facility] where XR confirmed a right 5th metacarpal fracture.  He was placed in a right hand splint and prescribed Percocet 5/325.  He had other imaging to the head and elbow that were negative.  He reported hip and low back pain but these were not imaged.  He returned to the ED after using Percocet without eating for an entire day and began throwing up.  He continues to have significant pain in the right buttock and lateral hip and low back.      Past Medical History:   Past Medical History:   Diagnosis Date    Arm vein blood clot, bilateral     Atrial flutter     Ozuna's esophagus     CRPS (complex regional pain syndrome type II)     Decubitus skin ulcer     Encounter for blood transfusion     Guillain-[place]     Guillain-[place] syndrome 7/30/2013    Hyperlipidemia     Hypertension     Joint pain     knees    LVH (left ventricular hypertrophy) due to hypertensive disease 2/10/2017    Mitral regurgitation 6/9/2016    KIA (obstructive sleep apnea)     Primary osteoarthritis of left knee 1/7/2019    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    Tracheostomy status 12/29/2021    Transfusion reaction     1 x  to PRBC fever      Past Surgical History:   Procedure Laterality Date    ANGIOGRAM, CORONARY, WITH LEFT HEART CATHETERIZATION  12/10/2020    Procedure: Angiogram, Coronary, with Left Heart Cath;  Surgeon: Tomi Mir MD;  Location: [facility] CATH LAB;  Service: Cardiology;;    barrette esophagus      CHOLECYSTECTOMY      2018?    COLONOSCOPY N/A 05/17/2018    Procedure: COLONOSCOPY;  Surgeon: Ilan Araya III, MD;  Location: [facility] ENDO;  Service: Endoscopy;  Laterality: N/A;    COLONOSCOPY N/A 06/28/2023    Procedure: COLONOSCOPY;  Surgeon: Colby Rodriguez MD;  Location: [facility] ENDO;  Service: Endoscopy;  Laterality: N/A;    COSMETIC SURGERY      Flap june 2008    decubitus surgery      to sacral    ESOPHAGOGASTRODUODENOSCOPY      ESOPHAGOGASTRODUODENOSCOPY N/A 06/17/2021    Procedure: EGD (ESOPHAGOGASTRODUODENOSCOPY);  Surgeon: Ban Zheng MD;  Location: [facility];  Service: Endoscopy;  Laterality: N/A;    ESOPHAGOGASTRODUODENOSCOPY N/A 06/28/2023    Procedure: EGD (ESOPHAGOGASTRODUODENOSCOPY);  Surgeon: Colby Rodriguez MD;  Location: [facility];  Service: Endoscopy;  Laterality: N/A;    GASTROSTOMY TUBE PLACEMENT      KNEE ARTHROSCOPY Left 2002    LEFT HEART CATHETERIZATION Left 12/10/2020    Procedure: CATHETERIZATION, HEART, LEFT;  Surgeon: Tomi Mir MD;  Location: [facility] CATH LAB;  Service: Cardiology;  Laterality: Left;  730 start time    PEG TUBE REMOVAL      RADIOFREQUENCY ABLATION      RFA      ROBOT-ASSISTED CHOLECYSTECTOMY USING DA GABRIELA XI N/A 05/02/2019    Procedure: XI ROBOTIC CHOLECYSTECTOMY;  Surgeon: Valerio Lai MD;  Location: [facility] OR;  Service: General;  Laterality: N/A;    JUAN-EN-Y PROCEDURE      TONSILLECTOMY      TRACHEAL SURGERY       Family History   Problem Relation Age of Onset    Heart attack Mother     Heart disease Mother     Arthritis Mother     Heart disease Father     Heart attack Father     Hypertension Father     Stroke Father     Cancer Maternal Grandfather     Stroke  Sister      Social History     Socioeconomic History    Marital status:     Number of children: 2   Tobacco Use    Smoking status: Every Day     Current packs/day: 0.00     Average packs/day: 0.5 packs/day for 1 year (0.5 ttl pk-yrs)     Types: Cigarettes     Start date: 2021     Last attempt to quit: 2021     Years since quittin.2    Smokeless tobacco: Former     Types: Snuff     Quit date: 1999    Tobacco comments:     Unknown   Substance and Sexual Activity    Alcohol use: Yes     Alcohol/week: 11.0 standard drinks of alcohol     Types: 2 Glasses of wine, 4 Cans of beer, 5 Drinks containing 0.5 oz of alcohol per week     Comment: no alcohol for past week, usually 7 drinks/week    Drug use: No    Sexual activity: Yes     Partners: Female     Birth control/protection: None     Social Determinants of Health     Financial Resource Strain: Low Risk  (2023)    Overall Financial Resource Strain (CARDIA)     Difficulty of Paying Living Expenses: Not very hard   Recent Concern: Financial Resource Strain - Medium Risk (2023)    Overall Financial Resource Strain (CARDIA)     Difficulty of Paying Living Expenses: Somewhat hard   Food Insecurity: No Food Insecurity (2023)    Hunger Vital Sign     Worried About Running Out of Food in the Last Year: Never true     Ran Out of Food in the Last Year: Never true   Transportation Needs: No Transportation Needs (2023)    PRAPARE - Transportation     Lack of Transportation (Medical): No     Lack of Transportation (Non-Medical): No   Physical Activity: Insufficiently Active (2023)    Exercise Vital Sign     Days of Exercise per Week: 1 day     Minutes of Exercise per Session: 40 min   Stress: No Stress Concern Present (2023)    [employer] [place] [employer] - Occupational Stress Questionnaire     Feeling of Stress : Only a little   Social Connections: Unknown (2023)    Social Connection and Isolation Panel [NHANES]      Frequency of Communication with Friends and Family: More than three times a week     Frequency of Social Gatherings with Friends and Family: Once a week     Active Member of Clubs or Organizations: No     Attends Club or Organization Meetings: Never     Marital Status:    Housing Stability: High Risk (8/8/2023)    Housing Stability Vital Sign     Unable to Pay for Housing in the Last Year: Yes     Number of Places Lived in the Last Year: 1     Unstable Housing in the Last Year: No     Medication List with Changes/Refills   New Medications    OXYCODONE-ACETAMINOPHEN (PERCOCET)  MG PER TABLET    Take 1 tablet by mouth every 6 to 8 hours as needed for Pain.   Current Medications    ALIROCUMAB (PRALUENT PEN) 150 MG/ML PNIJ    INJECT 1.06MLS ( 159 MG TOTAL ) BY ABDOMINAL SUBCUTANEOUS ROUTE EVERY 14 DAYS    AMLODIPINE (NORVASC) 5 MG TABLET    Take 1 tablet (5 mg total) by mouth every evening.    ASPIRIN (ECOTRIN) 81 MG EC TABLET    Take 81 mg by mouth once daily.    CALCIUM-VITAMIN D 600 MG, 1,500MG,-200 UNIT 600 MG(1,500MG) -200 UNIT TAB    Take 1 tablet by mouth once daily.     CETIRIZINE (ZYRTEC) 10 MG TABLET    Take 1 tablet (10 mg total) by mouth once daily.    CIPROFLOXACIN HCL (CIPRO) 500 MG TABLET    Take 1 tablet (500 mg total) by mouth 2 (two) times daily. for 7 days    CYANOCOBALAMIN (VITAMIN B-12) 1,000 MCG/ML INJECTION    Inject 1 mL (1,000 mcg total) into the muscle every 30 days.    ESOMEPRAZOLE (NEXIUM) 20 MG CAPSULE    Take 2 capsules (40 mg total) by mouth before breakfast.    FLUTICASONE PROPIONATE (FLONASE) 50 MCG/ACTUATION NASAL SPRAY    1 spray (50 mcg total) by Each Nostril route once daily.    FOLIC ACID (FOLVITE) 1 MG TABLET    Take 1 tablet (1 mg total) by mouth once daily.    LISINOPRIL-HYDROCHLOROTHIAZIDE (PRINZIDE,ZESTORETIC) 20-12.5 MG PER TABLET    Take 2 tablets by mouth once daily.    METOPROLOL SUCCINATE (TOPROL-XL) 50 MG 24 HR TABLET    Take 0.5 tablets (25 mg total) by  "mouth every other day.    MULTIVITAMIN (THERAGRAN) PER TABLET    Take 1 tablet by mouth nightly.     ONDANSETRON (ZOFRAN) 4 MG TABLET    Take 1 tablet (4 mg total) by mouth every 6 (six) hours.    PAROXETINE (PAXIL) 10 MG TABLET    Take 1 tablet (10 mg total) by mouth every morning.    TADALAFIL (CIALIS) 20 MG TAB    Take 1 tablet (20 mg total) by mouth every 24 hours as needed (erectile dysfunction).    TAMSULOSIN (FLOMAX) 0.4 MG CAP    Take 1 capsule (0.4 mg total) by mouth once daily.   Discontinued Medications    OXYCODONE-ACETAMINOPHEN (PERCOCET) 5-325 MG PER TABLET    Take 1 tablet by mouth every 6 (six) hours as needed for Pain.     Review of patient's allergies indicates:   Allergen Reactions    Metoclopramide Other (See Comments) and Hives     Pt. Was in coma so is not aware of the reaction  Pt states "he don't know, was in coma"      Metoclopramide (bulk)      Other reaction(s): Unable to obtain    Reglan  [metoclopramide hcl] Other (See Comments)     Pt. Was in coma so is not aware of the reaction  Other reaction(s): Unable to obtain    Statins-hmg-coa reductase inhibitors      Myalgia      Sulfa (sulfonamide antibiotics) Other (See Comments)     Never taken  States son is allergic    Latex Hives, Itching and Rash           Latex, natural rubber Rash     Blisters, adhesives          Physical Exam:   There is no height or weight on file to calculate BMI.    Physical Exam  Musculoskeletal:      Right hip: Swelling present. No deformity.       Detailed MSK exam:               Right Hip Exam     Inspection   Swelling: present  Bruising: present  No deformity of hip.    Tenderness   The patient tender to palpation of the trochanteric bursa.      Right Hand/Wrist Exam     Comments:  R hand in ulnar gutter splint  Neurovascular status intact & normal          Muscle Strength   Right Lower Extremity   Hip Abduction: 4/5   Hip Adduction: 4/5   Hip Flexion: 4/5        Imaging:   XR Results:  Results for orders " placed during the hospital encounter of 02/28/24    X-Ray Hand 3 view Right    Narrative  EXAMINATION:  XR HAND COMPLETE 3 VIEW RIGHT    CLINICAL HISTORY:  hand pain;    FINDINGS:  There is an acute oblique fracture of the 5th metacarpal shaft with mild volar radial displacement and proximal migration of the distal fracture fragment.  No other acute fracture identified.  Joint alignment is within normal limits.    Impression  See above      Electronically signed by: Dar Smith MD  Date:    02/28/2024  Time:    13:14     XR Results:  Results for orders placed during the hospital encounter of 05/10/23    X-Ray Hip 2 or 3 views Left (with Pelvis when performed)    Narrative  EXAM:  XR HIP WITH PELVIS WHEN PERFORMED, 2 OR 3 VIEWS LEFT    INDICATIONS:  Left hip pain    TECHNIQUE:  3 views of the left hip    COMPARISONS:  None available.    FINDINGS:  No fractures nor dislocations.  Minimal degenerative changes are present with superior acetabular subchondral sclerosis but the joint space appears preserved.  Significant changes are present at the greater trochanter with heterotopic bone most likely representing remote fracture.  Pubic bone is normal.  Soft tissues are normal.    Impression  1.  Early degenerative left hip changes without evidence of fracture    2.  Abnormalities at the greater trochanter most consistent with remote injury and heterotopic bone formation versus old avulsion injury (history of injury?)    Finalized on: 5/10/2023 3:13 PM By:  Alexander Ulloa MD  [facility]# [id number]      2023-05-10 15:15:[id number]    [facility]         Relevant imaging results were reviewed and interpreted by me and per my read:   XR right hand - mildly displaced oblique fracture of the right 5th metacarpal shaft, with shortening, and volar angulation  XR right hip - mild degenerative changes about the right femoroacetabular joint, as well as cortical irregularity around the greater tubercle.  Chronic appearing ossicle at the lesser  tubercle.  No acute abnormalities or fractures.    This was discussed with the patient and / or family today.     Patient Instructions   Assessment:  Valerio Yan is a 53 y.o. male with a chief complaint of Pain and Injury of the Right Hand and Pain and Injury of the Right Hip    Encounter Diagnoses   Name Primary?    History of Guillain-[place] syndrome Yes    Balance problems     At high risk for falls     Closed displaced fracture of shaft of fifth metacarpal bone of right hand, initial encounter     Contusion of right hip, initial encounter       Plan:  X-rays reviewed  Right hand with 5th metacarpal shaft fracture, mildly displaced, shortened, and with mild volar angulation.    Right hip with mild degenerative changes, no acute injuries or fractures.  Given degree of angulation, displacement, and the importance of the 5th metacarpal for patient's hand function, we will refer to our orthopedic surgeon, Dr. Meghan Mcnamara, for evaluation.  Keep splint in place at this time, keeps clean and dry.    We will increase Percocet to , every 6-8 hours, as needed for pain and discomfort.    Continue ibuprofen 800 mg, every 6-8 hours, as needed as well.  Can take with Percocet for synergistic pain relieving affect.    Discussed the risks of taking narcotic pain medications and alcohol, including their potential for deadly affect due to respiratory depression, and you need to stop taking alcohol while you are taking this medication.  Right hip pain consistent with contusion of the subcutaneous tissue, muscles, and underlying bone.  Conservative management  Pain control, as above   Recommend ice and/or heat to the area, as needed    Follow-up: 3/4 with Dr Meghan Mcnamara at [facility] or sooner if there are any problems between now and then.    Thank you for choosing [facility] and Dr. Norbert Orta for your orthopedic & sports medicine care. It is our goal to provide you with  exceptional care that will help keep you healthy, active, and get you back in the game.    Please do not hesitate to reach out to us via email, phone, or MyChart with any questions, concerns, or feedback.    If you are experiencing pain/discomfort ,or have questions after 5pm and would like to be connected to the [facility] [place] on-call team, please call this number and specify which Sports Medicine provider is treating you: [phone]       A copy of today's visit note has been sent to the referring provider.           Norbert Orta MD  Primary Care Sports Medicine    Disclaimer: This note was prepared using a voice recognition system and is likely to have sound alike errors within the text.

## 2024-03-01 NOTE — PATIENT INSTRUCTIONS
Assessment:  Valerio Yan is a 53 y.o. male with a chief complaint of Pain and Injury of the Right Hand and Pain and Injury of the Right Hip    Encounter Diagnoses   Name Primary?    History of Guillain-[place] syndrome Yes    Balance problems     At high risk for falls     Closed displaced fracture of shaft of fifth metacarpal bone of right hand, initial encounter     Contusion of right hip, initial encounter       Plan:  X-rays reviewed  Right hand with 5th metacarpal shaft fracture, mildly displaced, shortened, and with mild volar angulation.    Right hip with mild degenerative changes, no acute injuries or fractures.  Given degree of angulation, displacement, and the importance of the 5th metacarpal for patient's hand function, we will refer to our orthopedic surgeon, Dr. Meghan Mcnamara, for evaluation.  Keep splint in place at this time, keeps clean and dry.    We will increase Percocet to , every 6-8 hours, as needed for pain and discomfort.    Continue ibuprofen 800 mg, every 6-8 hours, as needed as well.  Can take with Percocet for synergistic pain relieving affect.    Discussed the risks of taking narcotic pain medications and alcohol, including their potential for deadly affect due to respiratory depression, and you need to stop taking alcohol while you are taking this medication.  Right hip pain consistent with contusion of the subcutaneous tissue, muscles, and underlying bone.  Conservative management  Pain control, as above   Recommend ice and/or heat to the area, as needed    Follow-up: 3/4 with Dr Meghan Mcnamara at [facility] or sooner if there are any problems between now and then.    Thank you for choosing [facility] [place] and Dr. Norbert Orta for your orthopedic & sports medicine care. It is our goal to provide you with exceptional care that will help keep you healthy, active, and get you back in the game.    Please do not hesitate to reach out to us via email,  phone, or MyChart with any questions, concerns, or feedback.    If you are experiencing pain/discomfort ,or have questions after 5pm and would like to be connected to the [facility] [place] on-call team, please call this number and specify which Sports Medicine provider is treating you: [phone]

## 2024-03-03 ENCOUNTER — TELEPHONE (OUTPATIENT)
Dept: EMERGENCY MEDICINE | Facility: HOSPITAL | Age: 54
End: 2024-03-03
Payer: MEDICARE

## 2024-03-03 LAB — BACTERIA UR CULT: ABNORMAL

## 2024-03-04 ENCOUNTER — OFFICE VISIT (OUTPATIENT)
Dept: ORTHOPEDICS | Facility: CLINIC | Age: 54
End: 2024-03-04
Payer: MEDICARE

## 2024-03-04 ENCOUNTER — HOSPITAL ENCOUNTER (OUTPATIENT)
Dept: RADIOLOGY | Facility: HOSPITAL | Age: 54
Discharge: HOME | End: 2024-03-04
Attending: STUDENT IN AN ORGANIZED HEALTH CARE EDUCATION/TRAINING PROGRAM
Payer: MEDICARE

## 2024-03-04 VITALS — WEIGHT: 280 LBS | HEIGHT: 77 IN | BODY MASS INDEX: 33.06 KG/M2

## 2024-03-04 DIAGNOSIS — M79.641 RIGHT HAND PAIN: ICD-10-CM

## 2024-03-04 DIAGNOSIS — M79.641 RIGHT HAND PAIN: Primary | ICD-10-CM

## 2024-03-04 DIAGNOSIS — S62.326A CLOSED DISPLACED FRACTURE OF SHAFT OF FIFTH METACARPAL BONE OF RIGHT HAND, INITIAL ENCOUNTER: Primary | ICD-10-CM

## 2024-03-04 PROCEDURE — 73130 X-RAY EXAM OF HAND: CPT | Mod: 26,RT,, | Performed by: RADIOLOGY

## 2024-03-04 PROCEDURE — 99214 OFFICE O/P EST MOD 30 MIN: CPT | Mod: S$GLB,,, | Performed by: STUDENT IN AN ORGANIZED HEALTH CARE EDUCATION/TRAINING PROGRAM

## 2024-03-04 PROCEDURE — 97760 ORTHOTIC MGMT&TRAING 1ST ENC: CPT | Mod: GP,S$GLB,, | Performed by: STUDENT IN AN ORGANIZED HEALTH CARE EDUCATION/TRAINING PROGRAM

## 2024-03-04 PROCEDURE — 99999 PR PBB SHADOW E&M-EST. PATIENT-LVL V: CPT | Mod: PBBFAC,,, | Performed by: STUDENT IN AN ORGANIZED HEALTH CARE EDUCATION/TRAINING PROGRAM

## 2024-03-04 PROCEDURE — 73130 X-RAY EXAM OF HAND: CPT | Mod: TC,RT

## 2024-03-04 RX ORDER — CEFAZOLIN SODIUM 2 G/50ML
2 SOLUTION INTRAVENOUS
Status: CANCELLED | OUTPATIENT
Start: 2024-03-04

## 2024-03-04 RX ORDER — SODIUM CHLORIDE 9 MG/ML
INJECTION, SOLUTION INTRAVENOUS CONTINUOUS
Status: CANCELLED | OUTPATIENT
Start: 2024-03-04

## 2024-03-04 NOTE — TELEPHONE ENCOUNTER
----- Message from Dar Capone MD sent at 3/3/2024 11:52 AM CST -----  Mild urinary tract infection and was rx'd Cipro, but this organism can be difficult to treat.  If no symptoms and has taken antibiotics, recommend routine recheck with primary care.  If having symptoms or did not already take antibiotics as prescribed, recommend Augmentin 875 mg 1 p.o. b.i.d. for 7 days and follow-up with primary care to recheck after completed.

## 2024-03-04 NOTE — PROGRESS NOTES
Hand Surgery Clinic Note    Chief Complaint  Chief Complaint   Patient presents with    Right Hand - Injury, Pain, Swelling, Numbness       History of Present Illness  53-year-old right-hand dominant male who is disabled presents for evaluation of a right 5th metacarpal fracture.  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  He states that he sometimes has balance issues as a result of all this.  It is usually when he was not wearing his footdrop braces.  He says that he usually falls about 3 times a year.  Of note, he also has a history of a urinary tract infection in his currently being treated with amoxicillin for this.  He has listed in his chart a history of blood clots, this was from back when he was admitted with Guillain-Charleston back in 2008.  It also says in his chart that he has a history of complex regional pain syndrome.  Patient states that this is from an incident a proximally 3-5 years ago when his feet both became red and swollen for a period of time but subsequently resolved after several months.  Patient drinks 1 glass of wine per day.  He smokes less than half a pack of cigarettes per day.    Patient has sustained a fall on 02/28/2024, , 5 days ago.  He presented to the hospital emergency department where he was diagnosed with a closed right 5th metacarpal fracture and placed in a splint.  He is kept his splint clean and dry.  Patient was very concerned about the fracture and this finger as this is the predominant hand he uses to function given the issues in his other 3 extremities and he had full use/function and motion of the right middle, ring, small fingers before this fall.  He has no numbness or tingling in his hands at baseline.    Review of Systems  Review of systems negative for chest pain, shortness of breath, fevers, chills, nausea/vomiting.    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    COLONOSCOPY N/A 05/17/2018    Procedure: COLONOSCOPY;  Surgeon: Ilan Araya III, MD;  Location: [facility];  Service: Endoscopy;  Laterality: N/A;    COLONOSCOPY N/A 06/28/2023    Procedure: COLONOSCOPY;  Surgeon: Colby Rodriguez MD;  Location: [facility];  Service: Endoscopy;  Laterality: N/A;    COSMETIC SURGERY      Flap june 2008    decubitus surgery      to sacral    ESOPHAGOGASTRODUODENOSCOPY      ESOPHAGOGASTRODUODENOSCOPY N/A 06/17/2021    Procedure: EGD (ESOPHAGOGASTRODUODENOSCOPY);  Surgeon: Ban Zheng MD;  Location: [facility];  Service: Endoscopy;  Laterality: N/A;    ESOPHAGOGASTRODUODENOSCOPY N/A 06/28/2023    Procedure: EGD (ESOPHAGOGASTRODUODENOSCOPY);  Surgeon: Colby Rodriguez MD;  Location: [facility];  Service: Endoscopy;  Laterality: N/A;    GASTROSTOMY TUBE PLACEMENT      KNEE ARTHROSCOPY Left 2002    LEFT HEART CATHETERIZATION Left 12/10/2020    Procedure: CATHETERIZATION, HEART, LEFT;  Surgeon: Tomi VALDEZ  "MD Clarke;  Location: [facility] CATH LAB;  Service: Cardiology;  Laterality: Left;  730 start time    PEG TUBE REMOVAL      RADIOFREQUENCY ABLATION      RFA      ROBOT-ASSISTED CHOLECYSTECTOMY USING DA GABRIELA XI N/A 2019    Procedure: XI ROBOTIC CHOLECYSTECTOMY;  Surgeon: Valerio Lai MD;  Location: [facility] OR;  Service: General;  Laterality: N/A;    JUAN-EN-Y PROCEDURE      TONSILLECTOMY      TRACHEAL SURGERY         Allergies  Review of patient's allergies indicates:   Allergen Reactions    Metoclopramide Other (See Comments) and Hives     Pt. Was in coma so is not aware of the reaction  Pt states "he don't know, was in coma"      Metoclopramide (bulk)      Other reaction(s): Unable to obtain    Reglan  [metoclopramide hcl] Other (See Comments)     Pt. Was in coma so is not aware of the reaction  Other reaction(s): Unable to obtain    Statins-hmg-coa reductase inhibitors      Myalgia      Sulfa (sulfonamide antibiotics) Other (See Comments)     Never taken  States son is allergic    Latex Hives, Itching and Rash           Latex, natural rubber Rash     Blisters, adhesives          Family History  Family History   Problem Relation Age of Onset    Heart attack Mother     Heart disease Mother     Arthritis Mother     Heart disease Father     Heart attack Father     Hypertension Father     Stroke Father     Cancer Maternal Grandfather     Stroke Sister        Social History  Social History     Socioeconomic History    Marital status:     Number of children: 2   Tobacco Use    Smoking status: Every Day     Current packs/day: 0.00     Average packs/day: 0.5 packs/day for 1 year (0.5 ttl pk-yrs)     Types: Cigarettes     Start date: 2021     Last attempt to quit: 2021     Years since quittin.2    Smokeless tobacco: Former     Types: Snuff     Quit date: 1999    Tobacco comments:     Unknown   Substance and Sexual Activity    Alcohol use: Yes     Alcohol/week: 11.0 standard drinks of alcohol     " Types: 2 Glasses of wine, 4 Cans of beer, 5 Drinks containing 0.5 oz of alcohol per week     Comment: no alcohol for past week, usually 7 drinks/week    Drug use: No    Sexual activity: Yes     Partners: Female     Birth control/protection: None     Social Determinants of Health     Financial Resource Strain: Low Risk  (8/8/2023)    Overall Financial Resource Strain (CARDIA)     Difficulty of Paying Living Expenses: Not very hard   Recent Concern: Financial Resource Strain - Medium Risk (8/5/2023)    Overall Financial Resource Strain (CARDIA)     Difficulty of Paying Living Expenses: Somewhat hard   Food Insecurity: No Food Insecurity (8/8/2023)    Hunger Vital Sign     Worried About Running Out of Food in the Last Year: Never true     Ran Out of Food in the Last Year: Never true   Transportation Needs: No Transportation Needs (8/8/2023)    PRAPARE - Transportation     Lack of Transportation (Medical): No     Lack of Transportation (Non-Medical): No   Physical Activity: Insufficiently Active (8/8/2023)    Exercise Vital Sign     Days of Exercise per Week: 1 day     Minutes of Exercise per Session: 40 min   Stress: No Stress Concern Present (8/8/2023)    [employer] - Occupational Stress Questionnaire     Feeling of Stress : Only a little   Social Connections: Unknown (8/8/2023)    Social Connection and Isolation Panel [NHANES]     Frequency of Communication with Friends and Family: More than three times a week     Frequency of Social Gatherings with Friends and Family: Once a week     Active Member of Clubs or Organizations: No     Attends Club or Organization Meetings: Never     Marital Status:    Housing Stability: High Risk (8/8/2023)    Housing Stability Vital Sign     Unable to Pay for Housing in the Last Year: Yes     Number of Places Lived in the Last Year: 1     Unstable Housing in the Last Year: No       Vital Signs  There were no vitals filed for this visit.    Physical  Exam  Constitutional: Appears well-developed and well-nourished. No distress.   HENT:   Head: Normocephalic.   Eyes: EOM are normal.   Pulmonary/Chest: Effort normal.   Neurological: Oriented to person, place, and time.   Psychiatric: Normal mood and affect.     Right Upper Extremity:  No abrasions, lacerations, wounds.  Moderate swelling over the dorsal hand.  No erythema.  No drainage.  Patient has an approximately 20 degree extensor lag of the small finger.  There is a proximally 10° of increased external rotation of the small finger when he attempts to make a fist.  No ecchymosis.  Patient has tenderness over the 5th metacarpal.  Patient is able to actively flex and extend at the small finger MCP, PIP, and DIPJ joints.  Patient has no active motion at the index finger.  Sensation is intact in the median, radial, ulnar nerve distributions.  Palpable radial pulse.    Imaging  Right-hand x-rays three views were obtained today and independently reviewed by myself.  Patient was noted to have a displaced spiral 5th metacarpal shaft fracture with approximately 8 mm of shortening.    Assessment and Plan  53-year-old right-hand dominant male with a history of Guillain-Virgil syndrome in 2008 with subsequent lower extremity weakness presents with a displaced right 5th metacarpal fracture after a fall 5 days ago.  I had a long discussion with the patient regarding risks and benefits of operative versus nonoperative treatment for this fracture.  He already has decreased function resulting in bilateral foot drop, left wrist drop, decreased motor function of bilateral index fingers as a result of his Guillain-Virgil syndrome.  I discussed that with non surgical treatment he was likely have a slight extensor lag and possibly some mild malrotation but otherwise should have good function.  Patient is very concerned about the displacement of the fracture and he would like to have the fracture fixed.  He is concerned that his small  finger is 1 that he uses for all functional activities and that he does not want to lose any of his motion or any rotation.  As such he would like to proceed with surgery.  I discussed risks of surgery including nonunion, malunion, hardware failure, CRPS, infection, stiffness, pain, damage to nerve/vessel/soft tissue structures.  Patient was signed consent for open reduction internal fixation right 5th metacarpal fracture.  Patient was booked for surgery on 03/05/2024.  Patient was fitted for an ulnar gutter brace in clinic today.  Nonweightbearing right upper extremity.    At least 10 minutes were spent sizing, fitting, and educating for durable medical equipment application today.  This service was performed under the direction of Meghan Mcnamara MD.  CPT 09095.      Meghan Mcnamara MD  Orthopaedic Hand Surgery

## 2024-03-04 NOTE — TELEPHONE ENCOUNTER
Patient returned the call and was informed of the mild UTI.  After going over the message left per Dr Capone, the patient stated that he would like for me to call in the Augmentin to [facility] in [place].  Augmentin 875 mg take one by mouth twice daily for 7 days, dispense 14 with no refills phoned in to [facility], message left on recorder for pharmacist due to pharmacy closed at this time.

## 2024-03-05 ENCOUNTER — TELEPHONE (OUTPATIENT)
Dept: ORTHOPEDICS | Facility: CLINIC | Age: 54
End: 2024-03-05
Payer: MEDICARE

## 2024-03-05 DIAGNOSIS — S62.327A DISPLACED FRACTURE OF SHAFT OF FIFTH METACARPAL BONE, LEFT HAND, INITIAL ENCOUNTER FOR CLOSED FRACTURE: Primary | ICD-10-CM

## 2024-03-05 RX ORDER — AMLODIPINE BESYLATE 5 MG/1
5 TABLET ORAL NIGHTLY
Qty: 60 TABLET | Refills: 12 | Status: SHIPPED | OUTPATIENT
Start: 2024-03-05 | End: 2026-04-24

## 2024-03-05 RX ORDER — OXYCODONE AND ACETAMINOPHEN 10; 325 MG/1; MG/1
1 TABLET ORAL EVERY 6 HOURS PRN
Qty: 20 TABLET | Refills: 0 | Status: SHIPPED | OUTPATIENT
Start: 2024-03-05

## 2024-03-05 NOTE — TELEPHONE ENCOUNTER
----- Message from Niharika Dodge sent at 3/5/2024  9:32 AM CST -----  Contact: juanito Luo is needing a call back in regards to his procedure please give him a call back at [phone]

## 2024-03-05 NOTE — TELEPHONE ENCOUNTER
Patient called and stated he wanted to cancel his surgery and wants to treat the fracture nonoperatively instead. He is requesting pain medication. Script sent to pharmacy. Discussed that this will be a one time script and he will have to make it last. Continue to wear ulnar gutter brace. Remove a few times per day to work on gentle range of motion. Follow up in 2 weeks with hand xrays.

## 2024-03-05 NOTE — TELEPHONE ENCOUNTER
Returned call to pt as requested, he states that he has been thinking it over and does not want to proceed with surgery, but does need pain medication. Advised that I will route msg to  for her review. Pt verbalized understanding

## 2024-03-13 ENCOUNTER — TELEPHONE (OUTPATIENT)
Dept: FAMILY MEDICINE | Facility: CLINIC | Age: 54
End: 2024-03-13

## 2024-03-13 ENCOUNTER — E-VISIT (OUTPATIENT)
Dept: FAMILY MEDICINE | Facility: CLINIC | Age: 54
End: 2024-03-13
Payer: MEDICARE

## 2024-03-13 DIAGNOSIS — R19.7 DIARRHEA, UNSPECIFIED TYPE: Primary | ICD-10-CM

## 2024-03-13 DIAGNOSIS — K14.0 GLOSSITIS: ICD-10-CM

## 2024-03-13 DIAGNOSIS — J32.9 SINUSITIS, UNSPECIFIED CHRONICITY, UNSPECIFIED LOCATION: ICD-10-CM

## 2024-03-13 PROCEDURE — 99423 OL DIG E/M SVC 21+ MIN: CPT | Mod: ,,, | Performed by: STUDENT IN AN ORGANIZED HEALTH CARE EDUCATION/TRAINING PROGRAM

## 2024-03-13 NOTE — TELEPHONE ENCOUNTER
----- Message from Mayur Merlos MD sent at 3/13/2024  2:37 PM CDT -----  Regarding: labs and stool test  Please set up blood work and stool studies

## 2024-03-13 NOTE — PROGRESS NOTES
Patient ID: Valerio Yan is a 53 y.o. male.    Chief Complaint: GI Problem (Entered automatically based on patient selection in Patient Portal.)             274}  The patient initiated a request through [facility] on 3/13/2024 for evaluation and management with a chief complaint of GI Problem (Entered automatically based on patient selection in Patient Portal.)     I evaluated the questionnaire submission on 03/13/2024 .    I called the pt to get further further history.   Pt reports he has burning mouth sensation and red tongue no swelling or trouble breathing. He has sinus symptoms and also has diarrhea. Diarrhea for 3 days. No travel. Is taking amoxicillin.     Total Time (in minutes): 23     Ohs Peq Evisit Diarrhea    3/13/2024  1:38 PM CDT - Filed by Patient   Do you agree to participate in an E-Visit? Yes   If you have any of the following symptoms, please present to your local ER or call 911:  I acknowledge   What is the main issue that you would like for your doctor to address today? Diarrhea, metallic taste, red tongue, more   Are you able to take your vital signs? Yes   Systolic Blood Pressure: 157   Diastolic Blood Pressure: 91   Weight: 272   Height: 77   Pulse: 84   Temperature:    Respiration rate:    Pulse Oxygen:    Do you have diarrhea? Yes   How many stools have you passed in the last 24 hours? Five to eight   Is there blood in your stool, or is your stool dark red or black? None of the above   Does your stool contain pus or mucus? Yes   Have you taken a laxative or a medicine to help you move your bowels lately? No   Are you vomiting? Yes, I am vomiting occasionally   Are you able to keep down fluids? Yes, I can keep down some fluids.   Do you have belly pain? I have a little pain or no pain   Are you feeling dizzy or like you might pass out? Yes   When did your symptoms begin? 3/10/2024   Do you have a fever? I do not know   Are you having trouble walking or lifting yourself due to weakness  from this illness?  Yes   Do any of the following apply to you? I am not passing much urine   Did your condition begin after a specific meal that may have caused the illness? Not clearly related to a meal.    Have you taken antibiotics recently? Yes, I recently took some   Please enter when you took antibiotics, and the name of the medicine, if you know it. Amok clav   Have you been hospitalized in the past 2 months? No, I have not been hospitalized recently.   Do you work in a  center or healthcare environment? No   Does anyone you know have similar symptoms? No   Have you had a meal consisting of raw meat or fish in the week prior to your illness? No   Have you recently travelled to a place where you may have caught an illness? No   Have you tried any medication or other treatment for your symptoms? No   Provide any information you feel is important to your history not asked above    Please attach any relevant images or files           Active Problem List with Overview Notes    Diagnosis Date Noted    Tobacco abuse 08/24/2023    Acute cystitis without hematuria 08/04/2023    Benign prostatic hyperplasia with urinary obstruction 08/04/2023    Combined arterial insufficiency and corporo-venous occlusive erectile dysfunction 08/04/2023    Premature ejaculation 08/04/2023    Bilateral hydronephrosis 08/04/2023    Sebaceous cyst of scrotum 08/04/2023    Alcoholism 07/19/2023    Black stools 07/19/2023    Aortic atherosclerosis 06/07/2023    Statin-induced myositis 07/29/2022    Severe obesity (BMI 35.0-39.9) with comorbidity 12/29/2021    Complication of statin therapy 12/29/2021    Statin intolerance 12/29/2021    History of Guillain-[place] syndrome 12/06/2021    CSF leak 10/20/2021    Quadriparesis 10/20/2021    Numbness and tingling 10/20/2021    Dysautonomia 10/20/2021    Sensory ataxia 10/20/2021    Heat intolerance 10/20/2021    Psychophysiological insomnia     Inadequate sleep hygiene     Obstructive  sleep apnea      PSG 2/5/2021 The diagnostic polysomnography revealed a mild obstructive sleep apnea / hypopnea syndrome (A + H Index = 14.3 events / hr asleep with 4.9 respiratory event - related arousals / hr asleep for the study, and no RERAs (respiratory effort -  related arousals).  The mean SpO2 value was 93.3 %, moderate, minimum oxygen saturation during sleep was 82.0 %, and waking baseline SpO2 was 100 %. Sporadic, moderately loud snoring was noted.  CPAP titration polysomnography is recommended           Sleep-disordered breathing     Sinus bradycardia 01/11/2021    At risk for sleep apnea 01/11/2021    CAD, multiple vessel 01/11/2021    CAD in native artery 12/06/2020    Elevated coronary artery calcium score (2893) 12/06/2020    Hypokalemia 11/11/2020    Family history of cardiovascular disease 11/11/2020    Symptomatic cholelithiasis 04/23/2019    Atypical chest pain 02/07/2019    Abnormal CK 02/07/2019    Dizziness 02/07/2019    Rotator cuff syndrome of right shoulder and allied disorders 01/07/2019    Tendonitis of long head of biceps brachii of right shoulder 01/07/2019    Primary osteoarthritis of left knee 01/07/2019    Complex regional pain syndrome type 2 of both lower extremities 03/20/2018    LVH (left ventricular hypertrophy) due to hypertensive disease 02/10/2017    Mixed hyperlipidemia 02/10/2017    Diaphoresis 01/06/2017    Hypertension 01/06/2017    Mitral regurgitation 06/09/2016    Palpitations 06/09/2016    Former cigarette smoker 06/09/2016     32 pack year former cigarette smoker   Quit 5/1/2020       Ozuna's esophagus 03/23/2016    Typical atrial flutter 03/23/2016    Ozuna's esophagus without dysplasia 03/23/2016    Ozuna esophagus 04/02/2014    Peptic ulcer disease 04/02/2014    Polyneuropathy 07/30/2013    Epigastric pain 09/19/2012      Recent Labs Obtained:  Lab Results   Component Value Date    WBC 6.60 02/29/2024    HGB 16.1 02/29/2024    HCT 46.2 02/29/2024    MCV 92  "02/29/2024     (L) 02/29/2024     02/29/2024    K 3.5 02/29/2024     02/29/2024    CREATININE 0.6 02/29/2024    EGFRNORACEVR >60 02/29/2024    HGBA1C 4.8 05/31/2017      Review of patient's allergies indicates:   Allergen Reactions    Metoclopramide Other (See Comments) and Hives     Pt. Was in coma so is not aware of the reaction  Pt states "he don't know, was in coma"      Metoclopramide (bulk)      Other reaction(s): Unable to obtain    Reglan  [metoclopramide hcl] Other (See Comments)     Pt. Was in coma so is not aware of the reaction  Other reaction(s): Unable to obtain    Statins-hmg-coa reductase inhibitors      Myalgia      Sulfa (sulfonamide antibiotics) Other (See Comments)     Never taken  States son is allergic    Latex Hives, Itching and Rash           Latex, natural rubber Rash     Blisters, adhesives          Encounter Diagnoses   Name Primary?    Diarrhea, unspecified type Yes    Glossitis     Sinusitis, unspecified chronicity, unspecified location         Orders Placed This Encounter   Procedures    Clostridium difficile EIA     Standing Status:   Future     Standing Expiration Date:   5/12/2025    Stool culture     Standing Status:   Future     Standing Expiration Date:   3/13/2025    CBC Auto Differential     Standing Status:   Future     Standing Expiration Date:   3/14/2025    Comprehensive Metabolic Panel     Standing Status:   Future     Standing Expiration Date:   5/12/2025    Magnesium     Standing Status:   Future     Standing Expiration Date:   5/12/2025    Stool Exam-Ova,Cysts,Parasites     Standing Status:   Future     Standing Expiration Date:   3/13/2025    Fecal fat, quantitative     Standing Status:   Future     Standing Expiration Date:   5/12/2025     Order Specific Question:   Collection Duration     Answer:   24 Hours [24]        Rec to take covid test. Rec cdiff test and blood work today as well. He will take a COVID test in get back to me also he will get " blood work and follow-up on this    E-Visit Time Tracking:    Day 1 Time (in minutes): 23    Total Time (in minutes): 45         274}

## 2024-03-14 ENCOUNTER — LAB VISIT (OUTPATIENT)
Dept: LAB | Facility: HOSPITAL | Age: 54
End: 2024-03-14
Attending: STUDENT IN AN ORGANIZED HEALTH CARE EDUCATION/TRAINING PROGRAM
Payer: MEDICARE

## 2024-03-14 DIAGNOSIS — E87.6 HYPOKALEMIA: Primary | ICD-10-CM

## 2024-03-14 DIAGNOSIS — R19.7 DIARRHEA, UNSPECIFIED TYPE: ICD-10-CM

## 2024-03-14 DIAGNOSIS — M79.641 RIGHT HAND PAIN: Primary | ICD-10-CM

## 2024-03-14 LAB
ALBUMIN SERPL BCP-MCNC: 3.5 G/DL (ref 3.5–5.2)
ALP SERPL-CCNC: 120 U/L (ref 55–135)
ALT SERPL W/O P-5'-P-CCNC: 55 U/L (ref 10–44)
ANION GAP SERPL CALC-SCNC: 17 MMOL/L (ref 8–16)
AST SERPL-CCNC: 81 U/L (ref 10–40)
BASOPHILS # BLD AUTO: 0.04 K/UL (ref 0–0.2)
BASOPHILS NFR BLD: 0.7 % (ref 0–1.9)
BILIRUB SERPL-MCNC: 1.2 MG/DL (ref 0.1–1)
BUN SERPL-MCNC: 3 MG/DL (ref 6–20)
CALCIUM SERPL-MCNC: 8.7 MG/DL (ref 8.7–10.5)
CHLORIDE SERPL-SCNC: 101 MMOL/L (ref 95–110)
CO2 SERPL-SCNC: 24 MMOL/L (ref 23–29)
CREAT SERPL-MCNC: 0.7 MG/DL (ref 0.5–1.4)
DIFFERENTIAL METHOD BLD: ABNORMAL
EOSINOPHIL # BLD AUTO: 0.1 K/UL (ref 0–0.5)
EOSINOPHIL NFR BLD: 1.5 % (ref 0–8)
ERYTHROCYTE [DISTWIDTH] IN BLOOD BY AUTOMATED COUNT: 15.4 % (ref 11.5–14.5)
EST. GFR  (NO RACE VARIABLE): >60 ML/MIN/1.73 M^2
GLUCOSE SERPL-MCNC: 100 MG/DL (ref 70–110)
HCT VFR BLD AUTO: 47.6 % (ref 40–54)
HGB BLD-MCNC: 16.3 G/DL (ref 14–18)
IMM GRANULOCYTES # BLD AUTO: 0.02 K/UL (ref 0–0.04)
IMM GRANULOCYTES NFR BLD AUTO: 0.3 % (ref 0–0.5)
LYMPHOCYTES # BLD AUTO: 2 K/UL (ref 1–4.8)
LYMPHOCYTES NFR BLD: 33.2 % (ref 18–48)
MAGNESIUM SERPL-MCNC: 1.5 MG/DL (ref 1.6–2.6)
MCH RBC QN AUTO: 32.7 PG (ref 27–31)
MCHC RBC AUTO-ENTMCNC: 34.2 G/DL (ref 32–36)
MCV RBC AUTO: 96 FL (ref 82–98)
MONOCYTES # BLD AUTO: 0.5 K/UL (ref 0.3–1)
MONOCYTES NFR BLD: 8.8 % (ref 4–15)
NEUTROPHILS # BLD AUTO: 3.4 K/UL (ref 1.8–7.7)
NEUTROPHILS NFR BLD: 55.5 % (ref 38–73)
NRBC BLD-RTO: 0 /100 WBC
PLATELET # BLD AUTO: 170 K/UL (ref 150–450)
PMV BLD AUTO: 11.3 FL (ref 9.2–12.9)
POTASSIUM SERPL-SCNC: 2.7 MMOL/L (ref 3.5–5.1)
PROT SERPL-MCNC: 7 G/DL (ref 6–8.4)
RBC # BLD AUTO: 4.98 M/UL (ref 4.6–6.2)
SODIUM SERPL-SCNC: 142 MMOL/L (ref 136–145)
WBC # BLD AUTO: 6.05 K/UL (ref 3.9–12.7)

## 2024-03-14 PROCEDURE — 80053 COMPREHEN METABOLIC PANEL: CPT | Performed by: STUDENT IN AN ORGANIZED HEALTH CARE EDUCATION/TRAINING PROGRAM

## 2024-03-14 PROCEDURE — 83735 ASSAY OF MAGNESIUM: CPT | Performed by: STUDENT IN AN ORGANIZED HEALTH CARE EDUCATION/TRAINING PROGRAM

## 2024-03-14 PROCEDURE — 36415 COLL VENOUS BLD VENIPUNCTURE: CPT | Mod: PO | Performed by: STUDENT IN AN ORGANIZED HEALTH CARE EDUCATION/TRAINING PROGRAM

## 2024-03-14 PROCEDURE — 85025 COMPLETE CBC W/AUTO DIFF WBC: CPT | Performed by: STUDENT IN AN ORGANIZED HEALTH CARE EDUCATION/TRAINING PROGRAM

## 2024-03-14 RX ORDER — POTASSIUM CHLORIDE 750 MG/1
10 TABLET, EXTENDED RELEASE ORAL 2 TIMES DAILY
Qty: 10 TABLET | Refills: 0 | Status: SHIPPED | OUTPATIENT
Start: 2024-03-14 | End: 2024-04-01

## 2024-03-15 ENCOUNTER — TELEPHONE (OUTPATIENT)
Dept: FAMILY MEDICINE | Facility: CLINIC | Age: 54
End: 2024-03-15
Payer: MEDICARE

## 2024-03-15 ENCOUNTER — PATIENT MESSAGE (OUTPATIENT)
Dept: FAMILY MEDICINE | Facility: CLINIC | Age: 54
End: 2024-03-15
Payer: MEDICARE

## 2024-03-15 DIAGNOSIS — E87.6 HYPOKALEMIA: Primary | ICD-10-CM

## 2024-03-15 NOTE — TELEPHONE ENCOUNTER
Called patient about hypokalemia to go the emergency room has he is at risk for arrhythmias he verbalized understand in his not want to go at this time reports that his diarrhea has improved and he was given potassium if he would not go the emergency room a went over the dangerous with this and he verbalized understanding he wants to take oral potassium went over that I recommend that he not take hydrochlorothiazide as well gets this can lower his potassium recommended get repeat blood work today   96.6

## 2024-03-15 NOTE — TELEPHONE ENCOUNTER
Hypokalemia on labs done today.  Called patient will phone in some potassium pills.  His diarrhea has stopped now.  Does not wish to go to the emergency room for replacement tonight.  BMP also ordered for 3 or 4 days.

## 2024-03-18 ENCOUNTER — HOSPITAL ENCOUNTER (OUTPATIENT)
Dept: RADIOLOGY | Facility: HOSPITAL | Age: 54
Discharge: HOME | End: 2024-03-18
Attending: STUDENT IN AN ORGANIZED HEALTH CARE EDUCATION/TRAINING PROGRAM
Payer: MEDICARE

## 2024-03-18 ENCOUNTER — OFFICE VISIT (OUTPATIENT)
Dept: ORTHOPEDICS | Facility: CLINIC | Age: 54
End: 2024-03-18
Payer: MEDICARE

## 2024-03-18 VITALS — HEIGHT: 77 IN | WEIGHT: 280 LBS | RESPIRATION RATE: 17 BRPM | BODY MASS INDEX: 33.06 KG/M2

## 2024-03-18 DIAGNOSIS — S62.326A CLOSED DISPLACED FRACTURE OF SHAFT OF FIFTH METACARPAL BONE OF RIGHT HAND, INITIAL ENCOUNTER: Primary | ICD-10-CM

## 2024-03-18 DIAGNOSIS — M79.641 RIGHT HAND PAIN: ICD-10-CM

## 2024-03-18 DIAGNOSIS — S62.327A DISPLACED FRACTURE OF SHAFT OF FIFTH METACARPAL BONE, LEFT HAND, INITIAL ENCOUNTER FOR CLOSED FRACTURE: Primary | ICD-10-CM

## 2024-03-18 PROCEDURE — 97760 ORTHOTIC MGMT&TRAING 1ST ENC: CPT | Mod: GP,S$GLB,, | Performed by: STUDENT IN AN ORGANIZED HEALTH CARE EDUCATION/TRAINING PROGRAM

## 2024-03-18 PROCEDURE — 73130 X-RAY EXAM OF HAND: CPT | Mod: TC,RT

## 2024-03-18 PROCEDURE — 99213 OFFICE O/P EST LOW 20 MIN: CPT | Mod: S$GLB,,, | Performed by: STUDENT IN AN ORGANIZED HEALTH CARE EDUCATION/TRAINING PROGRAM

## 2024-03-18 PROCEDURE — 73130 X-RAY EXAM OF HAND: CPT | Mod: 26,RT,, | Performed by: RADIOLOGY

## 2024-03-18 PROCEDURE — 99999 PR PBB SHADOW E&M-EST. PATIENT-LVL IV: CPT | Mod: PBBFAC,,, | Performed by: STUDENT IN AN ORGANIZED HEALTH CARE EDUCATION/TRAINING PROGRAM

## 2024-03-18 NOTE — PROGRESS NOTES
Hand Surgery Clinic Note    Chief Complaint  Chief Complaint   Patient presents with    Right Hand - Injury       History of Present Illness  53-year-old right-hand dominant male presents for follow up.  He sustained a right 5th metacarpal fracture on 02/28/2024, 2.5 weeks ago.  He was initially seen in clinic on 03/04/2024.  Risks and benefits of operative versus nonoperative treatment were discussed with the at the time.  Patient initially elected proceed with surgery but subsequently changed his mind on the day of surgery.  Pain level is a 6/10.  He has tried wearing the ulnar gutter brace which has been provided to him but finds it was very uncomfortable and makes the pain worse.  At this point, he was taking over-the-counter medications as needed for pain control.  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     Review of Systems  Review of systems negative for chest pain, shortness of breath, fevers, chills, nausea/vomiting.    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    COLONOSCOPY N/A 05/17/2018    Procedure: COLONOSCOPY;  Surgeon: Ilan Araya III, MD;  Location: [facility] ENDO;  Service: Endoscopy;  Laterality: N/A;    COLONOSCOPY N/A 06/28/2023    Procedure: COLONOSCOPY;  Surgeon: Colby Rodriguez MD;  Location: [facility] ENDO;  Service: Endoscopy;  Laterality: N/A;    COSMETIC SURGERY      Flap june 2008    decubitus surgery      to sacral    ESOPHAGOGASTRODUODENOSCOPY      ESOPHAGOGASTRODUODENOSCOPY N/A 06/17/2021    Procedure: EGD (ESOPHAGOGASTRODUODENOSCOPY);  Surgeon: Ban Zheng MD;  Location: [facility] ENDO;  Service: Endoscopy;  Laterality: N/A;    ESOPHAGOGASTRODUODENOSCOPY N/A 06/28/2023    Procedure: EGD (ESOPHAGOGASTRODUODENOSCOPY);  Surgeon: Colby Rodriguez MD;  Location: [facility];  Service: Endoscopy;  Laterality: N/A;    GASTROSTOMY TUBE PLACEMENT      KNEE ARTHROSCOPY Left 2002    LEFT HEART CATHETERIZATION Left 12/10/2020    Procedure: CATHETERIZATION, HEART, LEFT;  Surgeon: Tomi Mir MD;  Location: [facility] CATH LAB;  Service: Cardiology;  Laterality: Left;  730 start time    PEG TUBE REMOVAL      RADIOFREQUENCY ABLATION      RFA      ROBOT-ASSISTED CHOLECYSTECTOMY USING DA GABRIELA XI N/A 05/02/2019    Procedure: XI ROBOTIC CHOLECYSTECTOMY;  Surgeon: Valerio Lai MD;  Location: [facility] OR;  Service: General;  Laterality: N/A;    JUAN-EN-Y PROCEDURE      TONSILLECTOMY      TRACHEAL SURGERY         Allergies  Review of patient's allergies indicates:   Allergen Reactions    Metoclopramide Other (See Comments) and Hives     Pt. Was in coma so is not aware of the reaction  Pt states "he don't know, was in coma"      Metoclopramide (bulk)      Other reaction(s): Unable to obtain    Reglan  [metoclopramide hcl] Other (See Comments)     Pt. Was in coma so is not aware of the reaction  Other reaction(s): Unable to obtain    " Statins-hmg-coa reductase inhibitors      Myalgia      Sulfa (sulfonamide antibiotics) Other (See Comments)     Never taken  States son is allergic    Latex Hives, Itching and Rash           Latex, natural rubber Rash     Blisters, adhesives          Family History  Family History   Problem Relation Age of Onset    Heart attack Mother     Heart disease Mother     Arthritis Mother     Heart disease Father     Heart attack Father     Hypertension Father     Stroke Father     Cancer Maternal Grandfather     Stroke Sister        Social History  Social History     Socioeconomic History    Marital status:     Number of children: 2   Tobacco Use    Smoking status: Every Day     Current packs/day: 0.00     Average packs/day: 0.5 packs/day for 1 year (0.5 ttl pk-yrs)     Types: Cigarettes     Start date: 2021     Last attempt to quit: 2021     Years since quittin.3    Smokeless tobacco: Former     Types: Snuff     Quit date: 1999    Tobacco comments:     Unknown   Substance and Sexual Activity    Alcohol use: Yes     Alcohol/week: 11.0 standard drinks of alcohol     Types: 2 Glasses of wine, 4 Cans of beer, 5 Drinks containing 0.5 oz of alcohol per week     Comment: no alcohol for past week, usually 7 drinks/week    Drug use: No    Sexual activity: Yes     Partners: Female     Birth control/protection: None     Social Determinants of Health     Financial Resource Strain: Low Risk  (2023)    Overall Financial Resource Strain (CARDIA)     Difficulty of Paying Living Expenses: Not very hard   Recent Concern: Financial Resource Strain - Medium Risk (2023)    Overall Financial Resource Strain (CARDIA)     Difficulty of Paying Living Expenses: Somewhat hard   Food Insecurity: No Food Insecurity (2023)    Hunger Vital Sign     Worried About Running Out of Food in the Last Year: Never true     Ran Out of Food in the Last Year: Never true   Transportation Needs: No Transportation Needs  (8/8/2023)    PRAPARE - Transportation     Lack of Transportation (Medical): No     Lack of Transportation (Non-Medical): No   Physical Activity: Insufficiently Active (8/8/2023)    Exercise Vital Sign     Days of Exercise per Week: 1 day     Minutes of Exercise per Session: 40 min   Stress: No Stress Concern Present (8/8/2023)    [employer] - Occupational Stress Questionnaire     Feeling of Stress : Only a little   Social Connections: Unknown (8/8/2023)    Social Connection and Isolation Panel [NHANES]     Frequency of Communication with Friends and Family: More than three times a week     Frequency of Social Gatherings with Friends and Family: Once a week     Active Member of Clubs or Organizations: No     Attends Club or Organization Meetings: Never     Marital Status:    Housing Stability: High Risk (8/8/2023)    Housing Stability Vital Sign     Unable to Pay for Housing in the Last Year: Yes     Number of Places Lived in the Last Year: 1     Unstable Housing in the Last Year: No       Vital Signs  Vitals:    03/18/24 1340   Resp: 17       Physical Exam  Constitutional: Appears well-developed and well-nourished. No distress.   HENT:   Head: Normocephalic.   Eyes: EOM are normal.   Pulmonary/Chest: Effort normal.   Neurological: Oriented to person, place, and time.   Psychiatric: Normal mood and affect.     Right Upper Extremity:  No abrasions, lacerations, wounds.  Mild swelling over the dorsal hand.  No erythema.  No drainage.  Patient has an approximately 20 degree extensor lag of the small finger.  There is approximally 10° of increased external rotation of the small finger when he attempts to make a fist.  No ecchymosis.  Patient has tenderness over the 5th metacarpal and a palpable bump in the location of the fracture site.  Patient is able to actively flex and extend at the small finger MCP, PIP, and DIPJ joints.  Patient has no active motion at the index finger.   Sensation is intact in the median, radial, ulnar nerve distributions.  Palpable radial pulse.    Imaging  Right hand xrays 3 views were obtained today and independently reviewed by myself. Displaced spiral 5th metacarpal fracture in similar alignment compared to previous set of xrays obtained 3/4/24.    Assessment and Plan  53-year-old right-hand dominant male with a history of Guillain-[place] syndrome in 2008 with subsequent lower extremity weakness presents with a displaced right 5th metacarpal fracture after a fall 2.5 weeks ago. He has had difficulty with the ulnar gutter brace that was provided to him at last clinic visit. Patient was fitted for an exos ulnar gutter brace today.     At least 14 minutes were spent sizing, fitting, and educating for durable medical equipment application today.  This service was performed under the direction of Meghan Mcnamara MD.  CPT 68455.    Discussed that the brace should be worn when out in public. Remove when at home to work on gentle range of motion. Starting in 2-3 weeks, can wean off of the brace completely. OTC medications as needed for pain control. Follow up in 4 weeks for reevaluation with right hand xrays. CURTIS BLANDON.      Meghan Mcnamara MD  Orthopaedic Hand Surgery

## 2024-04-01 ENCOUNTER — OFFICE VISIT (OUTPATIENT)
Dept: FAMILY MEDICINE | Facility: CLINIC | Age: 54
End: 2024-04-01
Payer: MEDICARE

## 2024-04-01 ENCOUNTER — LAB VISIT (OUTPATIENT)
Dept: LAB | Facility: HOSPITAL | Age: 54
End: 2024-04-01
Attending: NURSE PRACTITIONER
Payer: MEDICARE

## 2024-04-01 VITALS
RESPIRATION RATE: 18 BRPM | SYSTOLIC BLOOD PRESSURE: 148 MMHG | HEART RATE: 65 BPM | WEIGHT: 278.25 LBS | HEIGHT: 77 IN | BODY MASS INDEX: 32.85 KG/M2 | OXYGEN SATURATION: 99 % | TEMPERATURE: 98 F | DIASTOLIC BLOOD PRESSURE: 94 MMHG

## 2024-04-01 DIAGNOSIS — E87.6 HYPOKALEMIA: ICD-10-CM

## 2024-04-01 DIAGNOSIS — R19.7 DIARRHEA, UNSPECIFIED TYPE: ICD-10-CM

## 2024-04-01 DIAGNOSIS — E87.6 HYPOKALEMIA: Primary | ICD-10-CM

## 2024-04-01 LAB
ALBUMIN SERPL BCP-MCNC: 3.5 G/DL (ref 3.5–5.2)
ALP SERPL-CCNC: 121 U/L (ref 55–135)
ALT SERPL W/O P-5'-P-CCNC: 119 U/L (ref 10–44)
ANION GAP SERPL CALC-SCNC: 12 MMOL/L (ref 8–16)
AST SERPL-CCNC: 166 U/L (ref 10–40)
BILIRUB SERPL-MCNC: 0.7 MG/DL (ref 0.1–1)
BUN SERPL-MCNC: 4 MG/DL (ref 6–20)
CALCIUM SERPL-MCNC: 8.7 MG/DL (ref 8.7–10.5)
CHLORIDE SERPL-SCNC: 104 MMOL/L (ref 95–110)
CO2 SERPL-SCNC: 24 MMOL/L (ref 23–29)
CREAT SERPL-MCNC: 0.6 MG/DL (ref 0.5–1.4)
EST. GFR  (NO RACE VARIABLE): >60 ML/MIN/1.73 M^2
GLUCOSE SERPL-MCNC: 96 MG/DL (ref 70–110)
POTASSIUM SERPL-SCNC: 3.5 MMOL/L (ref 3.5–5.1)
PROT SERPL-MCNC: 6.9 G/DL (ref 6–8.4)
SODIUM SERPL-SCNC: 140 MMOL/L (ref 136–145)

## 2024-04-01 PROCEDURE — 99214 OFFICE O/P EST MOD 30 MIN: CPT | Mod: S$GLB,,, | Performed by: NURSE PRACTITIONER

## 2024-04-01 PROCEDURE — 99999 PR PBB SHADOW E&M-EST. PATIENT-LVL V: CPT | Mod: PBBFAC,,, | Performed by: NURSE PRACTITIONER

## 2024-04-01 PROCEDURE — 80053 COMPREHEN METABOLIC PANEL: CPT | Performed by: NURSE PRACTITIONER

## 2024-04-01 PROCEDURE — 36415 COLL VENOUS BLD VENIPUNCTURE: CPT | Mod: PO | Performed by: NURSE PRACTITIONER

## 2024-04-01 RX ORDER — POTASSIUM CHLORIDE 20 MEQ/1
20 TABLET, EXTENDED RELEASE ORAL 2 TIMES DAILY
Qty: 60 TABLET | Refills: 1 | Status: SHIPPED | OUTPATIENT
Start: 2024-04-01

## 2024-04-01 RX ORDER — DICYCLOMINE HYDROCHLORIDE 20 MG/1
20 TABLET ORAL EVERY 6 HOURS
Qty: 120 TABLET | Refills: 0 | Status: SHIPPED | OUTPATIENT
Start: 2024-04-01 | End: 2024-05-01

## 2024-04-01 NOTE — PROGRESS NOTES
Valerio Yan  04/01/2024  [id number]    Fabiola Andrade MD  Patient Care Team:  Fabiola Andrade MD as PCP - General (Family Medicine)  Allison [place], Advanced Pain (Pain Medicine)  Delicia Mcginnis LPN as Care Coordinator (Internal Medicine)  Tomi Mir MD as Consulting Physician (Cardiology)  Saul Hogue MD as Consulting Physician (Neurology)  Hi Adan MD as Consulting Physician (Rheumatology)  Ilan Araya III, MD (Inactive) as Consulting Physician (Gastroenterology)  Isabela Osborn as ED Navigator  Lizzy Jay NP as Nurse Practitioner (Family Medicine)          Visit Type:an urgent visit for a new problem    Chief Complaint:  Chief Complaint   Patient presents with    Follow-up    Diarrhea       History of Present Illness:    52 yo male presents today with co diarrhea for 10 days that subsided for one week and restarted for that past five days. Pt reports he has cut back on drinking. OTC pepto with symptom relief.   Pt states he was evaluated in the ED for hypokalemia.   Reports potassium was 2.7. He was given IV potassium then discharged   Pt states he has decreased his drinking by half. He states he drinks a fifth a day. 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    COLONOSCOPY N/A 05/17/2018    Procedure: COLONOSCOPY;  Surgeon: Ilan Araya III, MD;  Location: [facility] ENDO;  Service: Endoscopy;  Laterality: N/A;    COLONOSCOPY N/A 06/28/2023    Procedure: COLONOSCOPY;  Surgeon: Colby Rodriguez MD;  Location: [facility] ENDO;  Service: Endoscopy;  Laterality: N/A;    COSMETIC SURGERY      Flap june 2008    decubitus surgery      to sacral    ESOPHAGOGASTRODUODENOSCOPY      ESOPHAGOGASTRODUODENOSCOPY N/A 06/17/2021    Procedure: EGD (ESOPHAGOGASTRODUODENOSCOPY);  Surgeon: Ban Zheng MD;  Location: [facility] ENDO;  Service: Endoscopy;  Laterality: N/A;    ESOPHAGOGASTRODUODENOSCOPY N/A 06/28/2023    Procedure: EGD (ESOPHAGOGASTRODUODENOSCOPY);  Surgeon: Colby Rodriguez MD;  Location: [facility];  Service: Endoscopy;  Laterality: N/A;    GASTROSTOMY TUBE PLACEMENT      KNEE ARTHROSCOPY Left 2002    LEFT HEART CATHETERIZATION Left 12/10/2020    Procedure: CATHETERIZATION, HEART, LEFT;  Surgeon: Tomi Mir MD;  Location: [facility] CATH LAB;  Service: Cardiology;  Laterality: Left;  730 start time    PEG TUBE REMOVAL      RADIOFREQUENCY ABLATION      RFA      ROBOT-ASSISTED CHOLECYSTECTOMY USING DA GABRIELA XI N/A 05/02/2019    Procedure: XI ROBOTIC CHOLECYSTECTOMY;  Surgeon: Valerio Lai MD;  Location: [facility] OR;  Service: General;  Laterality: N/A;    JUAN-EN-Y PROCEDURE      TONSILLECTOMY      TRACHEAL SURGERY       Family History   Problem Relation Age of Onset    Heart attack Mother     Heart disease Mother     Arthritis Mother     Heart disease Father     Heart attack Father     Hypertension Father     Stroke Father     Cancer Maternal Grandfather     Stroke Sister      Social History     Socioeconomic  History    Marital status:     Number of children: 2   Tobacco Use    Smoking status: Every Day     Current packs/day: 0.00     Average packs/day: 0.5 packs/day for 1 year (0.5 ttl pk-yrs)     Types: Cigarettes     Start date: 2021     Last attempt to quit: 2021     Years since quittin.3    Smokeless tobacco: Former     Types: Snuff     Quit date: 1999    Tobacco comments:     Unknown   Substance and Sexual Activity    Alcohol use: Yes     Alcohol/week: 11.0 standard drinks of alcohol     Types: 2 Glasses of wine, 4 Cans of beer, 5 Drinks containing 0.5 oz of alcohol per week     Comment: no alcohol for past week, usually 7 drinks/week    Drug use: No    Sexual activity: Yes     Partners: Female     Birth control/protection: None     Social Determinants of Health     Financial Resource Strain: Low Risk  (2023)    Overall Financial Resource Strain (CARDIA)     Difficulty of Paying Living Expenses: Not very hard   Recent Concern: Financial Resource Strain - Medium Risk (2023)    Overall Financial Resource Strain (CARDIA)     Difficulty of Paying Living Expenses: Somewhat hard   Food Insecurity: No Food Insecurity (2023)    Hunger Vital Sign     Worried About Running Out of Food in the Last Year: Never true     Ran Out of Food in the Last Year: Never true   Transportation Needs: No Transportation Needs (2023)    PRAPARE - Transportation     Lack of Transportation (Medical): No     Lack of Transportation (Non-Medical): No   Physical Activity: Insufficiently Active (2023)    Exercise Vital Sign     Days of Exercise per Week: 1 day     Minutes of Exercise per Session: 40 min   Stress: No Stress Concern Present (2023)    [employer] [place] [employer] - Occupational Stress Questionnaire     Feeling of Stress : Only a little   Social Connections: Unknown (2023)    Social Connection and Isolation Panel [NHANES]     Frequency of Communication with Friends and  Family: More than three times a week     Frequency of Social Gatherings with Friends and Family: Once a week     Active Member of Clubs or Organizations: No     Attends Club or Organization Meetings: Never     Marital Status:    Housing Stability: High Risk (8/8/2023)    Housing Stability Vital Sign     Unable to Pay for Housing in the Last Year: Yes     Number of Places Lived in the Last Year: 1     Unstable Housing in the Last Year: No     Patient Active Problem List   Diagnosis    Epigastric pain    Polyneuropathy    Ozuna esophagus    Peptic ulcer disease    Ozuna's esophagus    Typical atrial flutter    Ozuna's esophagus without dysplasia    Mitral regurgitation    Palpitations    Former cigarette smoker    Diaphoresis    Hypertension    LVH (left ventricular hypertrophy) due to hypertensive disease    Mixed hyperlipidemia    Complex regional pain syndrome type 2 of both lower extremities    Rotator cuff syndrome of right shoulder and allied disorders    Tendonitis of long head of biceps brachii of right shoulder    Primary osteoarthritis of left knee    Atypical chest pain    Abnormal CK    Dizziness    Symptomatic cholelithiasis    Hypokalemia    Family history of cardiovascular disease    CAD in native artery    Elevated coronary artery calcium score (2893)    Sinus bradycardia    At risk for sleep apnea    CAD, multiple vessel    Sleep-disordered breathing    Psychophysiological insomnia    Inadequate sleep hygiene    Obstructive sleep apnea    CSF leak    Quadriparesis    Numbness and tingling    Dysautonomia    Sensory ataxia    Heat intolerance    History of Guillain-[place] syndrome    Severe obesity (BMI 35.0-39.9) with comorbidity    Complication of statin therapy    Statin intolerance    Statin-induced myositis    Aortic atherosclerosis    Alcoholism    Black stools    Acute cystitis without hematuria    Benign prostatic hyperplasia with urinary obstruction    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 Was in coma so is not aware of the reaction  Pt states "he don't know, was in coma"      Metoclopramide (bulk)      Other reaction(s): Unable to obtain    Reglan  [metoclopramide hcl] Other (See Comments)     Pt. Was in coma so is not aware of the reaction  Other reaction(s): Unable to obtain    Statins-hmg-coa reductase inhibitors      Myalgia      Sulfa (sulfonamide antibiotics) Other (See Comments)     Never taken  States son is allergic    Latex Hives, Itching and Rash           Latex, natural rubber Rash     Blisters, adhesives          The following were reviewed at this visit: active problem list, medication list, allergies, family history, social history, and health maintenance.    Medications:  Current Outpatient Medications on File Prior to Visit   Medication Sig Dispense Refill    alirocumab (PRALUENT PEN) 150 mg/mL PnIj INJECT 1.06MLS ( 159 MG TOTAL ) BY ABDOMINAL SUBCUTANEOUS ROUTE EVERY 14 DAYS 2 mL 12    amLODIPine (NORVASC) 5 MG tablet Take 1 tablet (5 mg total) by mouth every evening. 60 tablet 12    aspirin (ECOTRIN) 81 MG EC tablet Take 81 mg by mouth once daily.      calcium-vitamin D 600 mg, 1,500mg,-200 unit 600 mg(1,500mg) -200 unit Tab Take 1 tablet by mouth once daily.       cetirizine (ZYRTEC) 10 MG tablet Take 1 tablet (10 mg total) by mouth once daily. 30 tablet 5    cyanocobalamin (VITAMIN B-12) 1,000 mcg/mL injection Inject 1 mL (1,000 mcg total) into the muscle every 30 days. 10 mL 0    fluticasone propionate (FLONASE) 50 mcg/actuation nasal spray 1 spray (50 mcg total) by Each Nostril route once daily. 16 g 1    lisinopriL-hydrochlorothiazide (PRINZIDE,ZESTORETIC) 20-12.5 mg per tablet Take 2 tablets by " mouth once daily. 180 tablet 3    metoprolol succinate (TOPROL-XL) 50 MG 24 hr tablet Take 0.5 tablets (25 mg total) by mouth every other day. 30 tablet 6    multivitamin (THERAGRAN) per tablet Take 1 tablet by mouth nightly.       ondansetron (ZOFRAN) 4 MG tablet Take 1 tablet (4 mg total) by mouth every 6 (six) hours. 20 tablet 0    oxyCODONE-acetaminophen (PERCOCET)  mg per tablet Take 1 tablet by mouth every 6 to 8 hours as needed for Pain. 15 tablet 0    oxyCODONE-acetaminophen (PERCOCET)  mg per tablet Take 1 tablet by mouth every 6 (six) hours as needed for Pain. 20 tablet 0    paroxetine (PAXIL) 10 MG tablet Take 1 tablet (10 mg total) by mouth every morning. 30 tablet 11    tadalafiL (CIALIS) 20 MG Tab Take 1 tablet (20 mg total) by mouth every 24 hours as needed (erectile dysfunction). 20 tablet 11    tamsulosin (FLOMAX) 0.4 mg Cap Take 1 capsule (0.4 mg total) by mouth once daily. 90 capsule 3    [DISCONTINUED] potassium chloride (KLOR-CON) 10 MEQ TbSR Take 1 tablet (10 mEq total) by mouth 2 (two) times daily. 10 tablet 0    esomeprazole (NEXIUM) 20 MG capsule Take 2 capsules (40 mg total) by mouth before breakfast. 60 capsule 11    folic acid (FOLVITE) 1 MG tablet Take 1 tablet (1 mg total) by mouth once daily. 30 tablet 0     No current facility-administered medications on file prior to visit.       Medications have been reviewed and reconciled with patient at this visit.  Barriers to medications reviewed with patient.    Adverse reactions to current medications reviewed with patient..    Over the counter medications reviewed and reconciled with patient.    Exam:  Wt Readings from Last 3 Encounters:   04/01/24 126.2 kg (278 lb 3.5 oz)   03/18/24 127 kg (279 lb 15.8 oz)   03/04/24 127 kg (279 lb 15.8 oz)     Temp Readings from Last 3 Encounters:   04/01/24 98.4 °F (36.9 °C) (Tympanic)   02/29/24 98 °F (36.7 °C) (Oral)   02/28/24 98.7 °F (37.1 °C) (Oral)     BP Readings from Last 3 Encounters:    04/01/24 (!) 148/94   02/29/24 (!) 175/84   02/28/24 (!) 161/102     Pulse Readings from Last 3 Encounters:   04/01/24 65   02/29/24 (!) 59   02/28/24 93     Body mass index is 32.99 kg/m².      Review of Systems   Constitutional:  Negative for fever.   Respiratory:  Negative for cough, shortness of breath and wheezing.    Cardiovascular:  Negative for chest pain and palpitations.   Gastrointestinal:  Positive for diarrhea. Negative for nausea.   Neurological:  Negative for speech change, weakness and headaches.   All other systems reviewed and are negative.    Physical Exam  Vitals and nursing note reviewed.   Constitutional:       Appearance: Normal appearance. He is obese.   HENT:      Head: Normocephalic and atraumatic.      Right Ear: Tympanic membrane, ear canal and external ear normal.      Left Ear: Tympanic membrane, ear canal and external ear normal.      Nose: Nose normal.      Mouth/Throat:      Mouth: Mucous membranes are moist.      Pharynx: Oropharynx is clear.   Eyes:      Extraocular Movements: Extraocular movements intact.      Conjunctiva/sclera: Conjunctivae normal.      Pupils: Pupils are equal, round, and reactive to light.   Cardiovascular:      Rate and Rhythm: Normal rate and regular rhythm.      Pulses: Normal pulses.      Heart sounds: Normal heart sounds.   Pulmonary:      Effort: Pulmonary effort is normal.      Breath sounds: Normal breath sounds.   Abdominal:      General: Bowel sounds are normal.      Palpations: Abdomen is soft.   Musculoskeletal:         General: Normal range of motion.      Cervical back: Normal range of motion and neck supple.   Skin:     General: Skin is warm and dry.      Capillary Refill: Capillary refill takes less than 2 seconds.   Neurological:      General: No focal deficit present.      Mental Status: He is alert and oriented to person, place, and time.   Psychiatric:         Mood and Affect: Mood normal.         Behavior: Behavior normal.          Thought Content: Thought content normal.         Judgment: Judgment normal.         Laboratory Reviewed ({Yes)  Lab Results   Component Value Date    WBC 6.05 03/14/2024    HGB 16.3 03/14/2024    HCT 47.6 03/14/2024     03/14/2024    CHOL 154 08/30/2023    TRIG 117 08/30/2023    HDL 54 08/30/2023    ALT 55 (H) 03/14/2024    AST 81 (H) 03/14/2024     03/14/2024    K 2.7 (LL) 03/14/2024     03/14/2024    CREATININE 0.7 03/14/2024    BUN 3 (L) 03/14/2024    CO2 24 03/14/2024    TSH 0.786 07/21/2023    PSA 1.1 07/27/2023    INR 1.1 08/30/2023    HGBA1C 4.8 05/31/2017       Valerio was seen today for follow-up and diarrhea.    Diagnoses and all orders for this visit:    Hypokalemia  -     COMPREHENSIVE METABOLIC PANEL; Future  -     potassium chloride SA (K-DUR,KLOR-CON) 20 MEQ tablet; Take 1 tablet (20 mEq total) by mouth 2 (two) times daily.    Diarrhea, unspecified type  -     dicyclomine (BENTYL) 20 mg tablet; Take 1 tablet (20 mg total) by mouth every 6 (six) hours.  -     Ambulatory referral/consult to Gastroenterology; Future        Plan   CMP  Start potassium 40 meq   Bentyl        Care Plan/Goals: Reviewed    Goals         Blood Pressure < 130/80 (pt-stated)         Valerio was seen today for follow-up and diarrhea.    Diagnoses and all orders for this visit:    Hypokalemia  -     COMPREHENSIVE METABOLIC PANEL; Future  -     potassium chloride SA (K-DUR,KLOR-CON) 20 MEQ tablet; Take 1 tablet (20 mEq total) by mouth 2 (two) times daily.    Diarrhea, unspecified type  -     dicyclomine (BENTYL) 20 mg tablet; Take 1 tablet (20 mg total) by mouth every 6 (six) hours.  -     Ambulatory referral/consult to Gastroenterology; Future         Follow up: Follow up for with GI for evaluation and treatment.    After visit summary was printed and given to patient upon discharge today.  Patient goals and care plan are included in After Visit Summary.

## 2024-04-02 DIAGNOSIS — R79.89 ELEVATED LFTS: Primary | ICD-10-CM

## 2024-04-02 NOTE — PROGRESS NOTES
Please call him and tell him to take one potassium pill a day and follow up in 4 weeks for repeat labs. His liver enzymes are elevated and he needs to follow up with a liver doctor

## 2024-04-03 ENCOUNTER — PATIENT MESSAGE (OUTPATIENT)
Dept: PULMONOLOGY | Facility: CLINIC | Age: 54
End: 2024-04-03
Payer: MEDICARE

## 2024-04-09 ENCOUNTER — PATIENT MESSAGE (OUTPATIENT)
Dept: GASTROENTEROLOGY | Facility: CLINIC | Age: 54
End: 2024-04-09

## 2024-04-09 ENCOUNTER — OFFICE VISIT (OUTPATIENT)
Dept: GASTROENTEROLOGY | Facility: CLINIC | Age: 54
End: 2024-04-09
Payer: MEDICARE

## 2024-04-09 DIAGNOSIS — K22.70 BARRETT'S ESOPHAGUS WITHOUT DYSPLASIA: ICD-10-CM

## 2024-04-09 DIAGNOSIS — R19.7 DIARRHEA, UNSPECIFIED TYPE: Primary | ICD-10-CM

## 2024-04-09 PROCEDURE — 1159F MED LIST DOCD IN RCRD: CPT | Mod: CPTII,95,, | Performed by: NURSE PRACTITIONER

## 2024-04-09 PROCEDURE — 99214 OFFICE O/P EST MOD 30 MIN: CPT | Mod: 95,,, | Performed by: NURSE PRACTITIONER

## 2024-04-09 PROCEDURE — 1160F RVW MEDS BY RX/DR IN RCRD: CPT | Mod: CPTII,95,, | Performed by: NURSE PRACTITIONER

## 2024-04-09 NOTE — PROGRESS NOTES
GASTROENTEROLOGY CLINIC NOTE    The patient location is: [place]  The chief complaint leading to consultation is: diarrhea    Visit type: audiovisual    Face to Face time with patient: 21:57  30 minutes of total time spent on the encounter, which includes face to face time and non-face to face time preparing to see the patient (eg, review of tests), Obtaining and/or reviewing separately obtained history, Documenting clinical information in the electronic or other health record, Independently interpreting results (not separately reported) and communicating results to the patient/family/caregiver, or Care coordination (not separately reported).     Each patient to whom he or she provides medical services by telemedicine is:  (1) informed of the relationship between the physician and patient and the respective role of any other health care provider with respect to management of the patient; and (2) notified that he or she may decline to receive medical services by telemedicine and may withdraw from such care at any time.    Chief Complaint: The primary encounter diagnosis was Diarrhea, unspecified type. A diagnosis of Ozuna's esophagus without dysplasia was also pertinent to this visit.  Referring provider/PCP: Fabiola Andrade MD    Valerio Yan is a 53 y.o. male who is a new patient to me who presents via telemedicine   States symptoms started last July after EGD and Colonoscopy. Feels health has went downhill since procedures.   States he Will have to urinate and have a bowel movements as well.  Has incontinence only with urination. Denies leakage of feces.   States he does not feel the urge to have a bowel movement.     Diarrhea    How Long:  Worsening over last 6-7 months   Maximum Number of Bowel Movements: 4 per day   Minimum Number of Bowel Movements: 7 per day   Asymptomatic Days/Days without Bowel Movements: Rarely will have a day with formed stool   Consistency: loose    Mucus/Oily/Fatty/Foul Smelling: mucus   Nausea/Vomiting/Fever/Weight Loss: After drinking Coffee, water, or smoking will have gagging/gurgling only in morning, takes breath away. Denies reflux. This also began following procedures.  No dysphagia   Nocturnal Bowel Movements: no   Melena/Hematochezia: no   Abdominal Pain/Cramping no   Daily Fiber Supplement: no     Treatments: Imodium (stops for a day then returns)    Artifical Sweeteners: no  NSAIDs: ASA 81mg  ETOH: one drink per day; has cut back  Caffeine: 1 cup per day  New medications: Paroxetine and tadalafil started prior to diarrhea; has stopped these medications without improvement in diarrhea  Antibiotics: no  Travel: no  Cholecystectomy: yes    Medications:  Metformin: no  SSRIs: paroxetine  PPIs: nexium     GLP-1s: No  NSAIDs: No  Anticoagulation or Antiplatelet: No      History of H.pylori:  H.pylori Treatment:  Prior Upper Endoscopy: 2023  Prior Colonoscopy: 2023  Family h/o Colon Cancer: No  Family h/o Crohn's Disease or Ulcerative Colitis: Niece with Crohn's   Family h/o Celiac Sprue: No  Abdominal Surgeries: Megha en Y, cholecystectomy     Review of Systems   Constitutional:  Negative for weight loss.   HENT:  Negative for sore throat.    Eyes:  Negative for blurred vision.   Respiratory:  Negative for cough.    Cardiovascular:  Negative for chest pain.   Gastrointestinal:  Positive for diarrhea. Negative for abdominal pain, blood in stool, constipation, heartburn, melena, nausea and vomiting.   Genitourinary:  Negative for dysuria.   Musculoskeletal:  Negative for myalgias.   Skin:  Negative for rash.   Neurological:  Negative for headaches.   Endo/Heme/Allergies:  Negative for environmental allergies.   Psychiatric/Behavioral:  Negative for suicidal ideas. The patient is not nervous/anxious.        Past Medical History: has a past medical history of Arm vein blood clot, bilateral, Atrial flutter, Ozuna's esophagus, CRPS (complex regional pain  "syndrome type II), Decubitus skin ulcer, Encounter for blood transfusion, Guillain-[place], Guillain-[place] syndrome, Hyperlipidemia, Hypertension, Joint pain, LVH (left ventricular hypertrophy) due to hypertensive disease, Mitral regurgitation, KIA (obstructive sleep apnea), Primary osteoarthritis of left knee, Statin-induced myositis, Tracheostomy status, and Transfusion reaction.    Past Surgical History: has a past surgical history that includes Megha-en-y procedure; Tracheal surgery; Gastrostomy tube placement; PEG tube removal; decubitus surgery; barrette esophagus; Esophagogastroduodenoscopy; Knee arthroscopy (Left, 2002); Radiofrequency ablation; Colonoscopy (N/A, 05/17/2018); RFA; Tonsillectomy; Robot-assisted cholecystectomy using da Kameron Xi (N/A, 05/02/2019); Left heart catheterization (Left, 12/10/2020); ANGIOGRAM, CORONARY, WITH LEFT HEART CATHETERIZATION (12/10/2020); Esophagogastroduodenoscopy (N/A, 06/17/2021); Esophagogastroduodenoscopy (N/A, 06/28/2023); Colonoscopy (N/A, 06/28/2023); Cholecystectomy; and Cosmetic surgery.    Family History:family history includes Arthritis in his mother; Cancer in his maternal grandfather; Heart attack in his father and mother; Heart disease in his father and mother; Hypertension in his father; Stroke in his father and sister.    Allergies:   Review of patient's allergies indicates:   Allergen Reactions    Metoclopramide Other (See Comments) and Hives     Pt. Was in coma so is not aware of the reaction  Pt states "he don't know, was in coma"      Metoclopramide (bulk)      Other reaction(s): Unable to obtain    Reglan  [metoclopramide hcl] Other (See Comments)     Pt. Was in coma so is not aware of the reaction  Other reaction(s): Unable to obtain    Statins-hmg-coa reductase inhibitors      Myalgia      Sulfa (sulfonamide antibiotics) Other (See Comments)     Never taken  States son is allergic    Latex Hives, Itching and Rash           Latex, natural rubber Rash    "  Blisters, adhesives          Social History: reports that he has been smoking cigarettes. He started smoking about 2 years ago. He has a 0.5 pack-year smoking history. He quit smokeless tobacco use about 25 years ago.  His smokeless tobacco use included snuff. He reports current alcohol use of about 11.0 standard drinks of alcohol per week. He reports that he does not use drugs.    Home medications:   Current Outpatient Medications on File Prior to Visit   Medication Sig Dispense Refill    alirocumab (PRALUENT PEN) 150 mg/mL PnIj INJECT 1.06MLS ( 159 MG TOTAL ) BY ABDOMINAL SUBCUTANEOUS ROUTE EVERY 14 DAYS 2 mL 12    amLODIPine (NORVASC) 5 MG tablet Take 1 tablet (5 mg total) by mouth every evening. 60 tablet 12    aspirin (ECOTRIN) 81 MG EC tablet Take 81 mg by mouth once daily.      calcium-vitamin D 600 mg, 1,500mg,-200 unit 600 mg(1,500mg) -200 unit Tab Take 1 tablet by mouth once daily.       cetirizine (ZYRTEC) 10 MG tablet Take 1 tablet (10 mg total) by mouth once daily. 30 tablet 5    cyanocobalamin (VITAMIN B-12) 1,000 mcg/mL injection Inject 1 mL (1,000 mcg total) into the muscle every 30 days. 10 mL 0    dicyclomine (BENTYL) 20 mg tablet Take 1 tablet (20 mg total) by mouth every 6 (six) hours. 120 tablet 0    esomeprazole (NEXIUM) 20 MG capsule Take 2 capsules (40 mg total) by mouth before breakfast. 60 capsule 11    fluticasone propionate (FLONASE) 50 mcg/actuation nasal spray 1 spray (50 mcg total) by Each Nostril route once daily. 16 g 1    folic acid (FOLVITE) 1 MG tablet Take 1 tablet (1 mg total) by mouth once daily. 30 tablet 0    lisinopriL-hydrochlorothiazide (PRINZIDE,ZESTORETIC) 20-12.5 mg per tablet Take 2 tablets by mouth once daily. 180 tablet 3    metoprolol succinate (TOPROL-XL) 50 MG 24 hr tablet Take 0.5 tablets (25 mg total) by mouth every other day. 30 tablet 6    multivitamin (THERAGRAN) per tablet Take 1 tablet by mouth nightly.       ondansetron (ZOFRAN) 4 MG tablet Take 1 tablet  (4 mg total) by mouth every 6 (six) hours. 20 tablet 0    oxyCODONE-acetaminophen (PERCOCET)  mg per tablet Take 1 tablet by mouth every 6 to 8 hours as needed for Pain. 15 tablet 0    oxyCODONE-acetaminophen (PERCOCET)  mg per tablet Take 1 tablet by mouth every 6 (six) hours as needed for Pain. 20 tablet 0    paroxetine (PAXIL) 10 MG tablet Take 1 tablet (10 mg total) by mouth every morning. 30 tablet 11    potassium chloride SA (K-DUR,KLOR-CON) 20 MEQ tablet Take 1 tablet (20 mEq total) by mouth 2 (two) times daily. 60 tablet 1    tadalafiL (CIALIS) 20 MG Tab Take 1 tablet (20 mg total) by mouth every 24 hours as needed (erectile dysfunction). 20 tablet 11    tamsulosin (FLOMAX) 0.4 mg Cap Take 1 capsule (0.4 mg total) by mouth once daily. 90 capsule 3     No current facility-administered medications on file prior to visit.       Vital signs:  There were no vitals taken for this visit.    Physical Exam  Constitutional:       Appearance: Normal appearance. He is not ill-appearing.      Comments: Limited Physical Exam d/t Telemedicine Encounter   HENT:      Head: Normocephalic.   Pulmonary:      Effort: Pulmonary effort is normal. No respiratory distress.   Neurological:      Mental Status: He is alert and oriented to person, place, and time.   Psychiatric:         Mood and Affect: Mood normal.         Behavior: Behavior normal.         Thought Content: Thought content normal.         Judgment: Judgment normal.         Routine labs:  Lab Results   Component Value Date    WBC 6.05 03/14/2024    HGB 16.3 03/14/2024    HCT 47.6 03/14/2024    MCV 96 03/14/2024     03/14/2024     Lab Results   Component Value Date    INR 1.1 08/30/2023     Lab Results   Component Value Date    IRON 81 09/30/2014    TIBC 383 09/30/2014    FESATURATED 21 09/30/2014     Lab Results   Component Value Date     04/01/2024    K 3.5 04/01/2024     04/01/2024    CO2 24 04/01/2024    BUN 4 (L) 04/01/2024     "CREATININE 0.6 04/01/2024     Lab Results   Component Value Date    ALBUMIN 3.5 04/01/2024     (H) 04/01/2024     (H) 04/01/2024    ALKPHOS 121 04/01/2024    BILITOT 0.7 04/01/2024     No results found for: "GLUCOSE"  Lab Results   Component Value Date    TSH 0.786 07/21/2023     Lab Results   Component Value Date    CALCIUM 8.7 04/01/2024    PHOS 3.1 07/21/2023       Imaging:      I have reviewed prior labs, imaging, and notes.      Assessment:  1. Diarrhea, unspecified type    2. Ozuna's esophagus without dysplasia      Reports diarrhea has been ongoing since EGD/Colonoscopy in 2023. Bowel movements occur every time he urinates.   Denies incontinence but states he does not feel the urge to have a bowel movement. No nocturnal bowel movements.   Has tried Imodium which stops diarrhea for a day but then returns.   Nexium daily for Ozuna's  Stated numerous times during visit that symptoms and health have declined since EGD/Colonoscopy.     Plan:  Orders Placed This Encounter    Clostridium difficile EIA    Stool culture    X-Ray Abdomen AP 1 View    Calprotectin, Stool    Fecal fat, qualitative    Giardia / Cryptosporidum, EIA    IgA    Pancreatic elastase, fecal    Rotavirus antigen, stool    Stool Exam-Ova,Cysts,Parasites    WBC, Stool    Tissue Transglutaminase, IgA    H. pylori Antibody, IgG     Abdominal xray to evaluate stool burden. Rule out overflow as patient does take pain medication.  Stool studies  Celiac labs  Start metamucil     Consider referral to CRS pending workup due to inability to feel sensation of having to have a bowel movement  Patient should follow up with GI provider who did his procedures or provider closer to home for continued care.     Plan of care discussed with patient who is in agreement and verbalized understanding.     I have explained the planned procedures to the patient.The risks, benefits and alternatives of the procedure were also explained in detail. Patient " verbalized understanding, all questions were answered. The patient agrees to proceed as planned          KAVON John,FNP-BC  [facility]    (Portions of this note were dictated using voice recognition software and may contain dictation related errors in spelling/grammar/syntax not found on text review)

## 2024-04-15 ENCOUNTER — HOSPITAL ENCOUNTER (OUTPATIENT)
Dept: RADIOLOGY | Facility: HOSPITAL | Age: 54
Discharge: HOME | End: 2024-04-15
Attending: NURSE PRACTITIONER
Payer: MEDICARE

## 2024-04-15 ENCOUNTER — OFFICE VISIT (OUTPATIENT)
Dept: ORTHOPEDICS | Facility: CLINIC | Age: 54
End: 2024-04-15
Payer: MEDICARE

## 2024-04-15 ENCOUNTER — HOSPITAL ENCOUNTER (OUTPATIENT)
Dept: RADIOLOGY | Facility: HOSPITAL | Age: 54
Discharge: HOME | End: 2024-04-15
Attending: STUDENT IN AN ORGANIZED HEALTH CARE EDUCATION/TRAINING PROGRAM
Payer: MEDICARE

## 2024-04-15 VITALS — BODY MASS INDEX: 32.85 KG/M2 | HEIGHT: 77 IN | WEIGHT: 278.25 LBS

## 2024-04-15 DIAGNOSIS — M79.641 RIGHT HAND PAIN: Primary | ICD-10-CM

## 2024-04-15 DIAGNOSIS — M25.531 RIGHT WRIST PAIN: ICD-10-CM

## 2024-04-15 DIAGNOSIS — M79.641 RIGHT HAND PAIN: ICD-10-CM

## 2024-04-15 DIAGNOSIS — R19.7 DIARRHEA, UNSPECIFIED TYPE: ICD-10-CM

## 2024-04-15 DIAGNOSIS — S62.326D CLOSED DISPLACED FRACTURE OF SHAFT OF FIFTH METACARPAL BONE OF RIGHT HAND WITH ROUTINE HEALING, SUBSEQUENT ENCOUNTER: Primary | ICD-10-CM

## 2024-04-15 PROCEDURE — 1159F MED LIST DOCD IN RCRD: CPT | Mod: CPTII,S$GLB,, | Performed by: STUDENT IN AN ORGANIZED HEALTH CARE EDUCATION/TRAINING PROGRAM

## 2024-04-15 PROCEDURE — 74018 RADEX ABDOMEN 1 VIEW: CPT | Mod: 26,,, | Performed by: RADIOLOGY

## 2024-04-15 PROCEDURE — 99214 OFFICE O/P EST MOD 30 MIN: CPT | Mod: S$GLB,,, | Performed by: STUDENT IN AN ORGANIZED HEALTH CARE EDUCATION/TRAINING PROGRAM

## 2024-04-15 PROCEDURE — 73130 X-RAY EXAM OF HAND: CPT | Mod: 26,RT,, | Performed by: RADIOLOGY

## 2024-04-15 PROCEDURE — 73130 X-RAY EXAM OF HAND: CPT | Mod: TC,RT

## 2024-04-15 PROCEDURE — 1160F RVW MEDS BY RX/DR IN RCRD: CPT | Mod: CPTII,S$GLB,, | Performed by: STUDENT IN AN ORGANIZED HEALTH CARE EDUCATION/TRAINING PROGRAM

## 2024-04-15 PROCEDURE — 99999 PR PBB SHADOW E&M-EST. PATIENT-LVL III: CPT | Mod: PBBFAC,,, | Performed by: STUDENT IN AN ORGANIZED HEALTH CARE EDUCATION/TRAINING PROGRAM

## 2024-04-15 PROCEDURE — 3008F BODY MASS INDEX DOCD: CPT | Mod: CPTII,S$GLB,, | Performed by: STUDENT IN AN ORGANIZED HEALTH CARE EDUCATION/TRAINING PROGRAM

## 2024-04-15 PROCEDURE — 74018 RADEX ABDOMEN 1 VIEW: CPT | Mod: TC

## 2024-04-15 RX ORDER — CELECOXIB 100 MG/1
100 CAPSULE ORAL 2 TIMES DAILY
Qty: 60 CAPSULE | Refills: 2 | Status: SHIPPED | OUTPATIENT
Start: 2024-04-15

## 2024-04-15 NOTE — PROGRESS NOTES
Hand Surgery Clinic Follow Up Note    Chief Complaint  Chief Complaint   Patient presents with    Right Hand - Pain, Follow-up       History of Present Illness  53-year-old right-hand dominant male presents for follow up.  He sustained a right 5th metacarpal fracture on 02/28/2024, 2.5 weeks ago.  Fracture was treated nonoperatively.  Pain level is 4/10.  Patient states he also has developed pain over the dorsal aspect of the right wrist over the last few weeks.  Pain is worse with activity.  Patient notes an extensor lag in the right small finger. 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     Review of Systems  Review of systems negative for chest pain, shortness of breath, fevers, chills, nausea/vomiting.    Vital Signs  There were no vitals filed for this visit.    Physical Exam  Constitutional: Appears well-developed and well-nourished. No distress.   HENT:   Head: Normocephalic.   Eyes: EOM are normal.   Pulmonary/Chest: Effort normal.   Neurological: Oriented to person, place, and time.   Psychiatric: Normal mood and affect.     Right Upper Extremity:  No abrasions, lacerations, wounds.  No swelling over the dorsal hand.  No erythema.  No drainage.  Patient has an approximately 15 degree extensor lag of the small finger.  There is approximally 10° of increased external rotation of the small finger when he attempts to make a fist.  No ecchymosis.  Patient has mild tenderness over the 5th metacarpal.  Patient is able to actively flex and extend at the small finger MCP, PIP, and DIPJ joints.  Patient has no active motion at the index finger.  Patient was tender numbness over the radiocarpal articulation dorsally.  Wrist flexion to 50° and extension to 30°.   Sensation is intact in the median, radial, ulnar nerve distributions.  Palpable radial pulse.    Imaging  Right-hand x-rays five views  were obtained today and independently reviewed by myself.  No change in displacement of the 5th metacarpal shaft fracture.  There is signs of callus formation at the fracture site.  Fracture line still visible.  No arthritic changes noted throughout the carpus.    Assessment and Plan  53-year-old right-hand dominant male who is 6.5 weeks status post right 5th metacarpal shaft fracture being treated nonoperatively.  Additionally, he was developed right wrist pain over the last few weeks.  Discussed potentially treatment options.  I recommended working with hand therapy; patient states he is probably not going to go and would rather not do therapy for his hand.  I discussed potential treatment options for the wrist pain including bracing and injections.  Patient would like to try Celebrex 1st.  Celebrex prescription was sent to his pharmacy.  Patient can progress activity as tolerated with regard to the right 5th metacarpal fracture.  No restrictions.  Follow up in clinic as needed if symptoms do not resolve over the next 1-2 months.    Meghan Mcnamara MD  Orthopaedic Hand Surgery

## 2024-04-30 DIAGNOSIS — Z00.00 ENCOUNTER FOR MEDICARE ANNUAL WELLNESS EXAM: ICD-10-CM

## 2024-05-17 ENCOUNTER — HOSPITAL ENCOUNTER (OUTPATIENT)
Dept: RADIOLOGY | Facility: HOSPITAL | Age: 54
Discharge: HOME | End: 2024-05-17
Attending: STUDENT IN AN ORGANIZED HEALTH CARE EDUCATION/TRAINING PROGRAM
Payer: MEDICARE

## 2024-05-17 ENCOUNTER — E-VISIT (OUTPATIENT)
Dept: FAMILY MEDICINE | Facility: CLINIC | Age: 54
End: 2024-05-17
Payer: MEDICARE

## 2024-05-17 DIAGNOSIS — M54.9 BACK PAIN, UNSPECIFIED BACK LOCATION, UNSPECIFIED BACK PAIN LATERALITY, UNSPECIFIED CHRONICITY: ICD-10-CM

## 2024-05-17 DIAGNOSIS — M54.9 BACK PAIN, UNSPECIFIED BACK LOCATION, UNSPECIFIED BACK PAIN LATERALITY, UNSPECIFIED CHRONICITY: Primary | ICD-10-CM

## 2024-05-17 PROCEDURE — 72220 X-RAY EXAM SACRUM TAILBONE: CPT | Mod: 26,,, | Performed by: RADIOLOGY

## 2024-05-17 PROCEDURE — 72110 X-RAY EXAM L-2 SPINE 4/>VWS: CPT | Mod: 26,,, | Performed by: RADIOLOGY

## 2024-05-17 PROCEDURE — 72110 X-RAY EXAM L-2 SPINE 4/>VWS: CPT | Mod: TC,PO

## 2024-05-17 PROCEDURE — 72220 X-RAY EXAM SACRUM TAILBONE: CPT | Mod: TC,PO

## 2024-05-17 PROCEDURE — 99423 OL DIG E/M SVC 21+ MIN: CPT | Mod: ,,, | Performed by: STUDENT IN AN ORGANIZED HEALTH CARE EDUCATION/TRAINING PROGRAM

## 2024-05-17 RX ORDER — METHYLPREDNISOLONE 4 MG/1
TABLET ORAL
Qty: 21 TABLET | Refills: 0 | Status: SHIPPED | OUTPATIENT
Start: 2024-05-17

## 2024-05-17 RX ORDER — LIDOCAINE 50 MG/G
1 PATCH TOPICAL DAILY
Qty: 30 PATCH | Refills: 0 | Status: SHIPPED | OUTPATIENT
Start: 2024-05-17

## 2024-05-17 RX ORDER — METHOCARBAMOL 500 MG/1
500 TABLET, FILM COATED ORAL 3 TIMES DAILY PRN
Qty: 60 TABLET | Refills: 0 | Status: SHIPPED | OUTPATIENT
Start: 2024-05-17 | End: 2024-06-16

## 2024-05-17 NOTE — PROGRESS NOTES
Patient ID: Valerio Yan is a 53 y.o. male.    Chief Complaint: Back Pain (Entered automatically based on patient selection in Patient Portal.)          274}  The patient initiated a request through [employer] on 5/17/2024 for evaluation and management with a chief complaint of Back Pain (Entered automatically based on patient selection in Patient Portal.)     I evaluated the questionnaire submission on 05/17/2024 .    Total Time (in minutes): 22     Ohs Peq Evisit Back Pain    5/17/2024  9:53 AM CDT - Filed by Patient   Do you agree to participate in an E-Visit? Yes   If you have any of the following symptoms, please present to your local ER or call 911: I acknowledge   What is the main issue you would like addressed today? I fell and i did something to my back and buttocks.  It cramps up when i lay down   Are you able to take your vital signs? No   Where are you having pain? Middle Back   Does the pain extend into your legs? No   How bad is the pain? The pain is severe   Did you have an injury that caused the pain? No, I cannot remember an injury   How long has the pain been present? Today and yesterday   Have you had back pain in the past? I have had back pain before, but this is markedly different   Please list any medications or treatments you have used for back pain and indicate if it was effective or not. Ibuprofen   Do you have a fever? No, I do not have a fever   Do you have any of the following? None of the above   What makes the pain worse? Bending over   What makes the pain better? Nothing makes it better   Have you ever been diagnosed with cancer? No   Have you ever been diagnosed with degenerative disc disease (arthritis of the spine)? No   Have you ever been diagnosed with osteoporosis or any other bone weakness? No   Have you ever had surgery on your back or spine? No   What is your usual health status? My activity is physically restricted   Provide any additional information you feel is  important.    Please attach any relevant images or files           Active Problem List with Overview Notes    Diagnosis Date Noted    Elevated LFTs 04/02/2024    Tobacco abuse 08/24/2023    Acute cystitis without hematuria 08/04/2023    Benign prostatic hyperplasia with urinary obstruction 08/04/2023    Combined arterial insufficiency and corporo-venous occlusive erectile dysfunction 08/04/2023    Premature ejaculation 08/04/2023    Bilateral hydronephrosis 08/04/2023    Sebaceous cyst of scrotum 08/04/2023    Alcoholism 07/19/2023    Black stools 07/19/2023    Aortic atherosclerosis 06/07/2023    Statin-induced myositis 07/29/2022    Severe obesity (BMI 35.0-39.9) with comorbidity 12/29/2021    Complication of statin therapy 12/29/2021    Statin intolerance 12/29/2021    History of Guillain-[place] syndrome 12/06/2021    CSF leak 10/20/2021    Quadriparesis 10/20/2021    Numbness and tingling 10/20/2021    Dysautonomia 10/20/2021    Sensory ataxia 10/20/2021    Heat intolerance 10/20/2021    Psychophysiological insomnia     Inadequate sleep hygiene     Obstructive sleep apnea      PSG 2/5/2021 The diagnostic polysomnography revealed a mild obstructive sleep apnea / hypopnea syndrome (A + H Index = 14.3 events / hr asleep with 4.9 respiratory event - related arousals / hr asleep for the study, and no RERAs (respiratory effort -  related arousals).  The mean SpO2 value was 93.3 %, moderate, minimum oxygen saturation during sleep was 82.0 %, and waking baseline SpO2 was 100 %. Sporadic, moderately loud snoring was noted.  CPAP titration polysomnography is recommended           Sleep-disordered breathing     Sinus bradycardia 01/11/2021    At risk for sleep apnea 01/11/2021    CAD, multiple vessel 01/11/2021    CAD in native artery 12/06/2020    Elevated coronary artery calcium score (2893) 12/06/2020    Hypokalemia 11/11/2020    Family history of cardiovascular disease 11/11/2020    Symptomatic cholelithiasis 04/23/2019  "   Atypical chest pain 02/07/2019    Abnormal CK 02/07/2019    Dizziness 02/07/2019    Rotator cuff syndrome of right shoulder and allied disorders 01/07/2019    Tendonitis of long head of biceps brachii of right shoulder 01/07/2019    Primary osteoarthritis of left knee 01/07/2019    Complex regional pain syndrome type 2 of both lower extremities 03/20/2018    LVH (left ventricular hypertrophy) due to hypertensive disease 02/10/2017    Mixed hyperlipidemia 02/10/2017    Diaphoresis 01/06/2017    Hypertension 01/06/2017    Mitral regurgitation 06/09/2016    Palpitations 06/09/2016    Former cigarette smoker 06/09/2016     32 pack year former cigarette smoker   Quit 5/1/2020       Ozuna's esophagus 03/23/2016    Typical atrial flutter 03/23/2016    Ozuna's esophagus without dysplasia 03/23/2016    Ozuna esophagus 04/02/2014    Peptic ulcer disease 04/02/2014    Polyneuropathy 07/30/2013    Epigastric pain 09/19/2012      Recent Labs Obtained:  Lab Results   Component Value Date    WBC 6.05 03/14/2024    HGB 16.3 03/14/2024    HCT 47.6 03/14/2024    MCV 96 03/14/2024     03/14/2024     04/01/2024    K 3.5 04/01/2024    GLU 96 04/01/2024    CREATININE 0.6 04/01/2024    EGFRNORACEVR >60.0 04/01/2024    HGBA1C 4.8 05/31/2017      Review of patient's allergies indicates:   Allergen Reactions    Metoclopramide Other (See Comments) and Hives     Pt. Was in coma so is not aware of the reaction  Pt states "he don't know, was in coma"      Metoclopramide (bulk)      Other reaction(s): Unable to obtain    Reglan  [metoclopramide hcl] Other (See Comments)     Pt. Was in coma so is not aware of the reaction  Other reaction(s): Unable to obtain    Statins-hmg-coa reductase inhibitors      Myalgia      Sulfa (sulfonamide antibiotics) Other (See Comments)     Never taken  States son is allergic    Latex Hives, Itching and Rash           Latex, natural rubber Rash     Blisters, adhesives          Encounter " Diagnosis   Name Primary?    Back pain, unspecified back location, unspecified back pain laterality, unspecified chronicity Yes        Orders Placed This Encounter   Procedures    X-Ray Lumbar Spine 5 View     Standing Status:   Future     Standing Expiration Date:   5/17/2025     Order Specific Question:   May the Radiologist modify the order per protocol to meet the clinical needs of the patient?     Answer:   Yes    X-Ray Sacrum And Coccyx     Standing Status:   Future     Standing Expiration Date:   5/17/2025     Order Specific Question:   May the Radiologist modify the order per protocol to meet the clinical needs of the patient?     Answer:   Yes     Order Specific Question:   Release to patient     Answer:   Immediate      Medications Ordered This Encounter   Medications    LIDOcaine (LIDODERM) 5 %     Sig: Place 1 patch onto the skin once daily. Remove & Discard patch within 12 hours or as directed by MD     Dispense:  30 patch     Refill:  0    methocarbamoL (ROBAXIN) 500 MG Tab     Sig: Take 1 tablet (500 mg total) by mouth 3 (three) times daily as needed (back pain).     Dispense:  60 tablet     Refill:  0    methylPREDNISolone (MEDROL DOSEPACK) 4 mg tablet     Sig: follow package directions     Dispense:  21 tablet     Refill:  0        E-Visit Time Tracking:    Day 1 Time (in minutes): 22    Total Time (in minutes): 22      274}

## 2024-05-21 ENCOUNTER — PATIENT MESSAGE (OUTPATIENT)
Dept: FAMILY MEDICINE | Facility: CLINIC | Age: 54
End: 2024-05-21
Payer: MEDICARE

## 2024-06-24 ENCOUNTER — PATIENT MESSAGE (OUTPATIENT)
Dept: NEUROLOGY | Facility: CLINIC | Age: 54
End: 2024-06-24
Payer: MEDICARE

## 2024-06-26 ENCOUNTER — OFFICE VISIT (OUTPATIENT)
Dept: INTERNAL MEDICINE | Facility: CLINIC | Age: 54
End: 2024-06-26
Payer: MEDICARE

## 2024-06-26 ENCOUNTER — OFFICE VISIT (OUTPATIENT)
Dept: FAMILY MEDICINE | Facility: CLINIC | Age: 54
End: 2024-06-26
Payer: MEDICARE

## 2024-06-26 ENCOUNTER — HOSPITAL ENCOUNTER (OUTPATIENT)
Dept: RADIOLOGY | Facility: HOSPITAL | Age: 54
Discharge: HOME | End: 2024-06-26
Attending: NURSE PRACTITIONER
Payer: MEDICARE

## 2024-06-26 VITALS
OXYGEN SATURATION: 96 % | HEIGHT: 77 IN | RESPIRATION RATE: 15 BRPM | TEMPERATURE: 100 F | WEIGHT: 286.81 LBS | HEART RATE: 95 BPM | DIASTOLIC BLOOD PRESSURE: 72 MMHG | BODY MASS INDEX: 33.86 KG/M2 | SYSTOLIC BLOOD PRESSURE: 116 MMHG

## 2024-06-26 VITALS
WEIGHT: 287.69 LBS | SYSTOLIC BLOOD PRESSURE: 118 MMHG | OXYGEN SATURATION: 95 % | DIASTOLIC BLOOD PRESSURE: 76 MMHG | HEART RATE: 81 BPM | HEIGHT: 77 IN | BODY MASS INDEX: 33.97 KG/M2

## 2024-06-26 DIAGNOSIS — Z12.5 ENCOUNTER FOR SCREENING FOR MALIGNANT NEOPLASM OF PROSTATE: ICD-10-CM

## 2024-06-26 DIAGNOSIS — Z00.00 ENCOUNTER FOR MEDICARE ANNUAL WELLNESS EXAM: ICD-10-CM

## 2024-06-26 DIAGNOSIS — I25.10 CAD IN NATIVE ARTERY: ICD-10-CM

## 2024-06-26 DIAGNOSIS — R60.0 EDEMA OF LEFT LOWER EXTREMITY: ICD-10-CM

## 2024-06-26 DIAGNOSIS — G57.71 COMPLEX REGIONAL PAIN SYNDROME TYPE 2 OF BOTH LOWER EXTREMITIES: ICD-10-CM

## 2024-06-26 DIAGNOSIS — M79.605 LEFT LEG PAIN: ICD-10-CM

## 2024-06-26 DIAGNOSIS — Z86.69 HISTORY OF GUILLAIN-BARRE SYNDROME: ICD-10-CM

## 2024-06-26 DIAGNOSIS — I48.3 TYPICAL ATRIAL FLUTTER: ICD-10-CM

## 2024-06-26 DIAGNOSIS — Z72.0 TOBACCO USE: ICD-10-CM

## 2024-06-26 DIAGNOSIS — N39.0 URINARY TRACT INFECTION WITHOUT HEMATURIA, SITE UNSPECIFIED: ICD-10-CM

## 2024-06-26 DIAGNOSIS — G82.50 QUADRIPARESIS: ICD-10-CM

## 2024-06-26 DIAGNOSIS — G62.9 POLYNEUROPATHY: ICD-10-CM

## 2024-06-26 DIAGNOSIS — G47.33 OBSTRUCTIVE SLEEP APNEA: ICD-10-CM

## 2024-06-26 DIAGNOSIS — R10.9 FLANK PAIN: ICD-10-CM

## 2024-06-26 DIAGNOSIS — R26.9 ABNORMALITY OF GAIT AND MOBILITY: ICD-10-CM

## 2024-06-26 DIAGNOSIS — Z00.00 ENCOUNTER FOR PREVENTIVE HEALTH EXAMINATION: Primary | ICD-10-CM

## 2024-06-26 DIAGNOSIS — I10 PRIMARY HYPERTENSION: ICD-10-CM

## 2024-06-26 DIAGNOSIS — E66.9 OBESITY (BMI 30.0-34.9): ICD-10-CM

## 2024-06-26 DIAGNOSIS — E78.2 MIXED HYPERLIPIDEMIA: ICD-10-CM

## 2024-06-26 DIAGNOSIS — G57.72 COMPLEX REGIONAL PAIN SYNDROME TYPE 2 OF BOTH LOWER EXTREMITIES: ICD-10-CM

## 2024-06-26 DIAGNOSIS — F10.20 ALCOHOLISM: ICD-10-CM

## 2024-06-26 DIAGNOSIS — D69.2 SENILE PURPURA: ICD-10-CM

## 2024-06-26 DIAGNOSIS — R22.42 LOCALIZED SWELLING OF LEFT LOWER EXTREMITY: ICD-10-CM

## 2024-06-26 DIAGNOSIS — Z00.00 WELLNESS EXAMINATION: Primary | ICD-10-CM

## 2024-06-26 DIAGNOSIS — L03.116 CELLULITIS OF LEFT LOWER EXTREMITY: ICD-10-CM

## 2024-06-26 DIAGNOSIS — R79.9 ABNORMAL FINDING OF BLOOD CHEMISTRY, UNSPECIFIED: ICD-10-CM

## 2024-06-26 DIAGNOSIS — N13.30 BILATERAL HYDRONEPHROSIS: ICD-10-CM

## 2024-06-26 DIAGNOSIS — I70.8 AORTO-ILIAC ATHEROSCLEROSIS: ICD-10-CM

## 2024-06-26 DIAGNOSIS — K22.70 BARRETT'S ESOPHAGUS WITHOUT DYSPLASIA: ICD-10-CM

## 2024-06-26 DIAGNOSIS — I70.0 AORTO-ILIAC ATHEROSCLEROSIS: ICD-10-CM

## 2024-06-26 PROCEDURE — 3078F DIAST BP <80 MM HG: CPT | Mod: CPTII,S$GLB,, | Performed by: NURSE PRACTITIONER

## 2024-06-26 PROCEDURE — 99999 PR PBB SHADOW E&M-EST. PATIENT-LVL V: CPT | Mod: PBBFAC,,, | Performed by: NURSE PRACTITIONER

## 2024-06-26 PROCEDURE — 1160F RVW MEDS BY RX/DR IN RCRD: CPT | Mod: CPTII,S$GLB,, | Performed by: NURSE PRACTITIONER

## 2024-06-26 PROCEDURE — 3074F SYST BP LT 130 MM HG: CPT | Mod: CPTII,S$GLB,, | Performed by: NURSE PRACTITIONER

## 2024-06-26 PROCEDURE — 93971 EXTREMITY STUDY: CPT | Mod: 26,LT,, | Performed by: RADIOLOGY

## 2024-06-26 PROCEDURE — 3044F HG A1C LEVEL LT 7.0%: CPT | Mod: CPTII,S$GLB,, | Performed by: NURSE PRACTITIONER

## 2024-06-26 PROCEDURE — 3008F BODY MASS INDEX DOCD: CPT | Mod: CPTII,S$GLB,, | Performed by: NURSE PRACTITIONER

## 2024-06-26 PROCEDURE — 93971 EXTREMITY STUDY: CPT | Mod: TC,LT

## 2024-06-26 PROCEDURE — G0439 PPPS, SUBSEQ VISIT: HCPCS | Mod: S$GLB,,, | Performed by: NURSE PRACTITIONER

## 2024-06-26 PROCEDURE — 99214 OFFICE O/P EST MOD 30 MIN: CPT | Mod: S$GLB,,, | Performed by: NURSE PRACTITIONER

## 2024-06-26 PROCEDURE — 1159F MED LIST DOCD IN RCRD: CPT | Mod: CPTII,S$GLB,, | Performed by: NURSE PRACTITIONER

## 2024-06-26 RX ORDER — DOXYCYCLINE 100 MG/1
100 CAPSULE ORAL 2 TIMES DAILY
Qty: 14 CAPSULE | Refills: 0 | Status: SHIPPED | OUTPATIENT
Start: 2024-06-26 | End: 2024-07-03

## 2024-06-26 NOTE — PATIENT INSTRUCTIONS
Counseling and Referral of Other Preventative  (Italic type indicates deductible and co-insurance are waived)    Patient Name: Valerio Yan  Today's Date: 6/26/2024    Health Maintenance       Date Due Completion Date    Hemoglobin A1c (Diabetic Prevention Screening) 05/31/2020 5/31/2017    Shingles Vaccine (1 of 2) 06/26/2025 (Originally 10/15/2018) ---    COVID-19 Vaccine (4 - 2023-24 season) 06/26/2025 (Originally 9/1/2023) 10/27/2021    Pneumococcal Vaccines (Age 0-64) (1 of 2 - PCV) 06/26/2025 (Originally 10/15/1976) ---    Colorectal Cancer Screening 06/28/2026 6/28/2023    Lipid Panel 08/30/2028 8/30/2023    TETANUS VACCINE 06/03/2032 6/3/2022        Orders Placed This Encounter   Procedures    US Lower Extremity Veins Left    Ambulatory referral/consult to Smoking Cessation Program       The following information is provided to all patients.  This information is to help you find resources for any of the problems found today that may be affecting your health:                  Living healthy guide: www.[facility].[place].gov      Understanding Diabetes: www.diabetes.org      Eating healthy: www.cdc.gov/healthyweight      CDC home safety checklist: www.cdc.gov/steadi/patient.html      Agency on Aging: www.goea.[place].[facility]      Alcoholics anonymous (AA): www.aa.org      Physical Activity: www.leobardo.nih.gov/ua8wkul      Tobacco use: www.quitwithusla.org

## 2024-06-26 NOTE — PROGRESS NOTES
"  Valerio Yan presented for a  Medicare AWV and comprehensive Health Risk Assessment today. The following components were reviewed and updated:    Medical history  Family History  Social history  Allergies and Current Medications  Health Risk Assessment  Health Maintenance  Care Team         ** See Completed Assessments for Annual Wellness Visit within the encounter summary.**         The following assessments were completed:  Living Situation  CAGE  Depression Screening  Timed Get Up and Go  Whisper Test  Cognitive Function Screening  Nutrition Screening  ADL Screening  PAQ Screening      Opioid documentation:      Patient does not have a current opioid prescription.        Vitals:    24 1251   BP: 118/76   Pulse: 81   SpO2: 95%   Weight: 130.5 kg (287 lb 11.2 oz)   Height: 6' 5" (1.956 m)     Body mass index is 34.12 kg/m².  Physical Exam  Vitals and nursing note reviewed.   Constitutional:       Appearance: He is well-developed.   HENT:      Head: Normocephalic.   Cardiovascular:      Rate and Rhythm: Normal rate and regular rhythm.      Heart sounds: Normal heart sounds.   Pulmonary:      Effort: Pulmonary effort is normal. No respiratory distress.      Breath sounds: Normal breath sounds.   Abdominal:      Palpations: Abdomen is soft. There is no mass.      Tenderness: There is no abdominal tenderness.   Musculoskeletal:         General: Normal range of motion.      Right lower leg: No edema.      Left lower le+ Edema present.   Skin:     General: Skin is warm and dry.   Neurological:      Mental Status: He is alert and oriented to person, place, and time.      Motor: No abnormal muscle tone.   Psychiatric:         Speech: Speech normal.         Behavior: Behavior normal.               Diagnoses and health risks identified today and associated recommendations/orders:    1. Encounter for preventive health examination  Declines pneumonia, shingirx, and covid vaccines  He would like to defer A1c and PSA " to PCP with routine blood work.   Financial difficulties and food insecurities. [facility] financial resources information page given to patient.  Declines case management referral at this time.   Reports stress but is not problematic at this time. Patient knows to follow up with PCP if becomes problematic.      2. Quadriparesis  R>L  Ongoing since Hubert Kim  Completed PT   normal timed get up and go test. Reports 2+ falls in the last 12 months.    Fall precautions reviewed with patient. Advised to follow up with PCP for further recommendations. Patient expressed understanding.       3. Alcoholism  CAGE-3  Recommended to cut back on alcohol intake      4. Obesity (BMI 30.0-34.9)   Encouraged healthy diet and exercise as tolerated to help bring BMI into normal range.    Continue current treatment plan as previously prescribed with your  pcp     5. Aorto-iliac atherosclerosis  Ct 6/23  Continue current treatment plan as previously prescribed with your  cardiologist.     6. Senile purpura  Chronic   See pic for documentation  Advised to follow up with PCP for further evaluation and recommendations. Patient expressed understanding.       7. Typical atrial flutter  Stable. Continue current treatment plan as previously prescribed with your  cardiologist.     8. Complex regional pain syndrome type 2 of both lower extremities  Continue current treatment plan as previously prescribed with your  neurologist.     9. History of Guillain-Bent syndrome  Continue current treatment plan as previously prescribed with your  neurologist.     10. Polyneuropathy  Reports chronic n/t BUE   Continue current treatment plan as previously prescribed with your  pcp     11. CAD in native artery  Continue current treatment plan as previously prescribed with your  cardiologist.     12. Primary hypertension  Stable. Continue current treatment plan as previously prescribed with your  pcp and cardiologist.     13. Mixed hyperlipidemia  Stable.  Continue current treatment plan as previously prescribed with your  cardiologist.     14. Ozuna's esophagus without dysplasia  NExium  Continue current treatment plan as previously prescribed with your  gastroenterologist.     15. Obstructive sleep apnea  Discussed risks associated with untreated sleep apnea  Declines pulmonary referral at this time  Advised to follow up with PCP for further evaluation and recommendations. Patient expressed understanding.      16. Tobacco use  Discussed the importance of smoking cessation and advised to quit smoking. Patient expressed understanding.   - Ambulatory referral/consult to Smoking Cessation Program; Future    17. Encounter for Medicare annual wellness exam  - Ambulatory Referral/Consult to Enhanced Annual Wellness Visit (eAWV)    18. Edema of left lower extremity  Reports acute LLE edema, pain, erythema, and warmth that began about 3 days ago. Smoker. Recent travel to/from [place].   US to r/o DVT. He will see PC today for further evaluation and treatment (apt scheduled)  - US Lower Extremity Veins Left; Future  No evidence of a left lower extremity deep venous thrombosis.       19. Left leg pain  See #18  - US Lower Extremity Veins Left; Future    20. Abnormality of gait and mobility  Advised to follow up with PCP for further evaluation and recommendations. Patient expressed understanding.        Provided Valerio with a 5-10 year written screening schedule and personal prevention plan. Recommendations were developed using the USPSTF age appropriate recommendations. Education, counseling, and referrals were provided as needed. After Visit Summary printed and given to patient which includes a list of additional screenings\tests needed.    Follow up in about 1 year (around 6/26/2025) for awv.    Ashia Cruz NP  I offered to discuss advanced care planning, including how to pick a person who would make decisions for you if you were unable to make them for yourself,  called a health care power of , and what kind of decisions you might make such as use of life sustaining treatments such as ventilators and tube feeding when faced with a life limiting illness recorded on a living will that they will need to know. (How you want to be cared for as you near the end of your natural life)     X Patient is interested in learning more about how to make advanced directives.  I provided them paperwork and offered to discuss this with them.

## 2024-06-26 NOTE — PROGRESS NOTES
Valerio Yan  07/15/2024  [id number]    Fabiola Andrade MD  Patient Care Team:  Fabiola Andrade MD as PCP - General (Family Medicine)  Delicia Mcginnis LPN as Care Coordinator (Internal Medicine)  Tomi Mir MD as Consulting Physician (Cardiology)  Hi Adan MD as Consulting Physician (Rheumatology)  Isabela Osborn as ED Navigator  Lizzy Jay, TIFFANIE as Nurse Practitioner (Family Medicine)  Roseann Aguillon MD as Consulting Physician (Neurology)  Laine Rothman NP as Nurse Practitioner (Gastroenterology)  [facility]          Visit Type:a scheduled routine follow-up visit    Chief Complaint:  Chief Complaint   Patient presents with    Foot Swelling     Left foot    Ankle Pain       History of Present Illness:    54 yo male presents with co left foot swelling.  US negative for DVT  Started on Abx for uti and flank pain    History:  Past Medical History:   Diagnosis Date    Arm vein blood clot, bilateral     Atrial flutter     Ozuna's esophagus     CRPS (complex regional pain syndrome type II)     Decubitus skin ulcer     Encounter for blood transfusion     Guillain-Elkins     Guillain-Elkins syndrome 7/30/2013    Hyperlipidemia     Hypertension     Joint pain     knees    LVH (left ventricular hypertrophy) due to hypertensive disease 2/10/2017    Mitral regurgitation 6/9/2016    KIA (obstructive sleep apnea)     Primary osteoarthritis of left knee 1/7/2019    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    COLONOSCOPY N/A 05/17/2018    Procedure: COLONOSCOPY;  Surgeon:  Ilan Araya III, MD;  Location: [facility] ENDO;  Service: Endoscopy;  Laterality: N/A;    COLONOSCOPY N/A 06/28/2023    Procedure: COLONOSCOPY;  Surgeon: Colby Rodriguez MD;  Location: [facility] ENDO;  Service: Endoscopy;  Laterality: N/A;    COSMETIC SURGERY      Flap june 2008    decubitus surgery      to sacral    ESOPHAGOGASTRODUODENOSCOPY      ESOPHAGOGASTRODUODENOSCOPY N/A 06/17/2021    Procedure: EGD (ESOPHAGOGASTRODUODENOSCOPY);  Surgeon: Ban Zheng MD;  Location: [facility] ENDO;  Service: Endoscopy;  Laterality: N/A;    ESOPHAGOGASTRODUODENOSCOPY N/A 06/28/2023    Procedure: EGD (ESOPHAGOGASTRODUODENOSCOPY);  Surgeon: Colby Rodriguez MD;  Location: [facility] ENDO;  Service: Endoscopy;  Laterality: N/A;    GASTROSTOMY TUBE PLACEMENT      KNEE ARTHROSCOPY Left 2002    LEFT HEART CATHETERIZATION Left 12/10/2020    Procedure: CATHETERIZATION, HEART, LEFT;  Surgeon: Tomi Mir MD;  Location: [facility] CATH LAB;  Service: Cardiology;  Laterality: Left;  730 start time    PEG TUBE REMOVAL      RADIOFREQUENCY ABLATION      RFA      ROBOT-ASSISTED CHOLECYSTECTOMY USING DA GABRIELA XI N/A 05/02/2019    Procedure: XI ROBOTIC CHOLECYSTECTOMY;  Surgeon: Valerio Lai MD;  Location: [facility] OR;  Service: General;  Laterality: N/A;    JUAN-EN-Y PROCEDURE      TONSILLECTOMY      TRACHEAL SURGERY       Family History   Problem Relation Name Age of Onset    Heart attack Mother Onelia     Heart disease Mother Onelia     Arthritis Mother Onelia     Heart disease Father Josiah     Heart attack Father Josiah     Hypertension Father Josiah     Stroke Father Josiah     Cancer Maternal Grandfather Jh     Stroke Sister Zuly      Social History     Socioeconomic History    Marital status:     Number of children: 2   Tobacco Use    Smoking status: Every Day     Current packs/day: 0.00     Average packs/day: 0.5 packs/day for 1 year (0.5 ttl pk-yrs)     Types: Cigarettes     Start date: 11/17/2021     Last attempt to quit:  2021     Years since quittin.6    Smokeless tobacco: Former     Types: Snuff     Quit date: 1999    Tobacco comments:     Unknown   Substance and Sexual Activity    Alcohol use: Not Currently     Alcohol/week: 14.0 standard drinks of alcohol     Types: 6 Glasses of wine, 4 Cans of beer, 4 Shots of liquor per week    Drug use: No    Sexual activity: Yes     Partners: Female     Birth control/protection: None     Social Determinants of Health     Financial Resource Strain: Medium Risk (2024)    Overall Financial Resource Strain (CARDIA)     Difficulty of Paying Living Expenses: Somewhat hard   Food Insecurity: Food Insecurity Present (2024)    Hunger Vital Sign     Worried About Running Out of Food in the Last Year: Sometimes true     Ran Out of Food in the Last Year: Never true   Transportation Needs: No Transportation Needs (2024)    PRAPARE - Transportation     Lack of Transportation (Medical): No     Lack of Transportation (Non-Medical): No   Physical Activity: Inactive (2024)    Exercise Vital Sign     Days of Exercise per Week: 0 days     Minutes of Exercise per Session: 0 min   Stress: Stress Concern Present (2024)    [employer] - Occupational Stress Questionnaire     Feeling of Stress : To some extent   Housing Stability: High Risk (2024)    Housing Stability Vital Sign     Unable to Pay for Housing in the Last Year: Yes     Homeless in the Last Year: No     Patient Active Problem List   Diagnosis    Epigastric pain    Polyneuropathy    Ozuna esophagus    Peptic ulcer disease    Ozuna's esophagus    Typical atrial flutter    Ozuna's esophagus without dysplasia    Mitral regurgitation    Palpitations    Former cigarette smoker    Diaphoresis    Hypertension    LVH (left ventricular hypertrophy) due to hypertensive disease    Mixed hyperlipidemia    Complex regional pain syndrome type 2 of both lower extremities    Rotator cuff  "syndrome of right shoulder and allied disorders    Tendonitis of long head of biceps brachii of right shoulder    Primary osteoarthritis of left knee    Atypical chest pain    Abnormal CK    Dizziness    Symptomatic cholelithiasis    Hypokalemia    Family history of cardiovascular disease    CAD in native artery    Elevated coronary artery calcium score (2893)    Sinus bradycardia    At risk for sleep apnea    CAD, multiple vessel    Sleep-disordered breathing    Psychophysiological insomnia    Inadequate sleep hygiene    Obstructive sleep apnea    CSF leak    Quadriparesis    Numbness and tingling    Dysautonomia    Sensory ataxia    Heat intolerance    History of Guillain-[place] syndrome    Severe obesity (BMI 35.0-39.9) with comorbidity    Complication of statin therapy    Statin intolerance    Statin-induced myositis    Aortic atherosclerosis    Alcoholism    Black stools    Acute cystitis without hematuria    Benign prostatic hyperplasia with urinary obstruction    Combined arterial insufficiency and corporo-venous occlusive erectile dysfunction    Premature ejaculation    Bilateral hydronephrosis    Sebaceous cyst of scrotum    Tobacco abuse    Elevated LFTs    Metabolic dysfunction-associated steatotic liver disease (MASLD)    Alcohol use disorder    Elevated liver enzymes    Fatty liver     Review of patient's allergies indicates:   Allergen Reactions    Metoclopramide Other (See Comments) and Hives     Pt. Was in coma so is not aware of the reaction  Pt states "he don't know, was in coma"      Metoclopramide (bulk)      Other reaction(s): Unable to obtain    Reglan  [metoclopramide hcl] Other (See Comments)     Pt. Was in coma so is not aware of the reaction  Other reaction(s): Unable to obtain    Statins-hmg-coa reductase inhibitors      Myalgia      Sulfa (sulfonamide antibiotics) Other (See Comments)     Never taken  States son is allergic    Latex Hives, Itching and Rash           Latex, natural rubber " Rash     Blisters, adhesives          The following were reviewed at this visit: active problem list, medication list, allergies, family history, social history, and health maintenance.    Medications:  Current Outpatient Medications on File Prior to Visit   Medication Sig Dispense Refill    calcium-vitamin D 600 mg, 1,500mg,-200 unit 600 mg(1,500mg) -200 unit Tab Take 1 tablet by mouth once daily.       celecoxib (CELEBREX) 100 MG capsule Take 1 capsule (100 mg total) by mouth 2 (two) times daily. 60 capsule 2    esomeprazole (NEXIUM) 20 MG capsule Take 2 capsules (40 mg total) by mouth before breakfast. 60 capsule 11    fluticasone propionate (FLONASE) 50 mcg/actuation nasal spray 1 spray (50 mcg total) by Each Nostril route once daily. 16 g 1    LIDOcaine (LIDODERM) 5 % Place 1 patch onto the skin once daily. Remove & Discard patch within 12 hours or as directed by MD 30 patch 0    lisinopriL-hydrochlorothiazide (PRINZIDE,ZESTORETIC) 20-12.5 mg per tablet Take 2 tablets by mouth once daily. 180 tablet 3    methylPREDNISolone (MEDROL DOSEPACK) 4 mg tablet follow package directions 21 tablet 0    metoprolol succinate (TOPROL-XL) 50 MG 24 hr tablet Take 0.5 tablets (25 mg total) by mouth every other day. 30 tablet 6    multivitamin (THERAGRAN) per tablet Take 1 tablet by mouth nightly.       ondansetron (ZOFRAN) 4 MG tablet Take 1 tablet (4 mg total) by mouth every 6 (six) hours. 20 tablet 0    paroxetine (PAXIL) 10 MG tablet Take 1 tablet (10 mg total) by mouth every morning. 30 tablet 11    potassium chloride SA (K-DUR,KLOR-CON) 20 MEQ tablet Take 1 tablet (20 mEq total) by mouth 2 (two) times daily. 60 tablet 1    tadalafiL (CIALIS) 20 MG Tab Take 1 tablet (20 mg total) by mouth every 24 hours as needed (erectile dysfunction). 20 tablet 11    tamsulosin (FLOMAX) 0.4 mg Cap Take 1 capsule (0.4 mg total) by mouth once daily. 90 capsule 3    amLODIPine (NORVASC) 5 MG tablet Take 1 tablet (5 mg total) by mouth every  evening. 60 tablet 12    aspirin (ECOTRIN) 81 MG EC tablet Take 81 mg by mouth once daily.      cetirizine (ZYRTEC) 10 MG tablet Take 1 tablet (10 mg total) by mouth once daily. 30 tablet 5    cyanocobalamin (VITAMIN B-12) 1,000 mcg/mL injection Inject 1 mL (1,000 mcg total) into the muscle every 30 days. 10 mL 0    folic acid (FOLVITE) 1 MG tablet Take 1 tablet (1 mg total) by mouth once daily. 30 tablet 0     No current facility-administered medications on file prior to visit.       Medications have been reviewed and reconciled with patient at this visit.  Barriers to medications reviewed with patient.    Adverse reactions to current medications reviewed with patient..    Over the counter medications reviewed and reconciled with patient.    Exam:  Wt Readings from Last 3 Encounters:   07/10/24 130 kg (286 lb 9.6 oz)   07/09/24 130.4 kg (287 lb 7.7 oz)   07/03/24 128.7 kg (283 lb 11.7 oz)     Temp Readings from Last 3 Encounters:   07/09/24 98.2 °F (36.8 °C) (Tympanic)   07/03/24 99.6 °F (37.6 °C) (Tympanic)   06/26/24 100 °F (37.8 °C) (Tympanic)     BP Readings from Last 3 Encounters:   07/10/24 (!) 162/94   07/09/24 114/62   07/03/24 138/88     Pulse Readings from Last 3 Encounters:   07/10/24 67   07/09/24 60   07/03/24 83     Body mass index is 34.01 kg/m².      Review of Systems   Constitutional:  Negative for fever.   Respiratory:  Negative for cough, shortness of breath and wheezing.    Cardiovascular:  Negative for chest pain and palpitations.   Gastrointestinal:  Negative for nausea.   Neurological:  Negative for speech change, weakness and headaches.   All other systems reviewed and are negative.    Physical Exam  Vitals and nursing note reviewed.   Constitutional:       Appearance: Normal appearance. He is normal weight.   HENT:      Head: Normocephalic and atraumatic.      Right Ear: Tympanic membrane, ear canal and external ear normal.      Left Ear: Tympanic membrane, ear canal and external ear  normal.      Nose: Nose normal.      Mouth/Throat:      Mouth: Mucous membranes are moist.      Pharynx: Oropharynx is clear.   Eyes:      Extraocular Movements: Extraocular movements intact.      Conjunctiva/sclera: Conjunctivae normal.      Pupils: Pupils are equal, round, and reactive to light.   Cardiovascular:      Rate and Rhythm: Normal rate and regular rhythm.      Pulses: Normal pulses.      Heart sounds: Normal heart sounds.   Pulmonary:      Effort: Pulmonary effort is normal.      Breath sounds: Normal breath sounds.   Abdominal:      General: Bowel sounds are normal.      Palpations: Abdomen is soft.   Musculoskeletal:         General: Normal range of motion.      Cervical back: Normal range of motion and neck supple.   Skin:     General: Skin is warm and dry.      Capillary Refill: Capillary refill takes less than 2 seconds.   Neurological:      General: No focal deficit present.      Mental Status: He is alert and oriented to person, place, and time.   Psychiatric:         Mood and Affect: Mood normal.         Behavior: Behavior normal.         Thought Content: Thought content normal.         Judgment: Judgment normal.           Laboratory Reviewed ({Yes)  Lab Results   Component Value Date    WBC 6.30 07/01/2024    HGB 16.0 07/01/2024    HCT 45.7 07/01/2024     07/01/2024    CHOL 200 (H) 07/01/2024    TRIG 169 (H) 07/01/2024    HDL 44 07/01/2024    ALT 57 (H) 07/01/2024    AST 63 (H) 07/01/2024     07/01/2024    K 3.5 07/01/2024     07/01/2024    CREATININE 0.6 07/01/2024    BUN 4 (L) 07/01/2024    CO2 22 (L) 07/01/2024    TSH 1.895 07/01/2024    PSA 1.8 07/01/2024    INR 1.1 08/30/2023    HGBA1C 4.8 07/01/2024       Valerio was seen today for foot swelling and ankle pain.    Diagnoses and all orders for this visit:    Wellness examination  -     Ambulatory referral/consult to Podiatry; Future  -     PSA, SCREENING; Standing    Urinary tract infection without hematuria, site  unspecified  -     Ambulatory referral/consult to Urology; Future  -     Cancel: Urinalysis, Reflex to Urine Culture Urine, Clean Catch; Standing    Flank pain  -     Cancel: Urinalysis, Reflex to Urine Culture Urine, Clean Catch  -     Cancel: Urinalysis, Reflex to Urine Culture Urine, Clean Catch; Standing    Localized swelling of left lower extremity    Cellulitis of left lower extremity  -     doxycycline (VIBRAMYCIN) 100 MG Cap; Take 1 capsule (100 mg total) by mouth 2 (two) times daily. for 7 days    Primary hypertension  -     Lipid Panel; Standing  -     T4, Free; Standing  -     TSH; Standing  -     Comprehensive Metabolic Panel; Standing  -     CBC Auto Differential; Standing    Mixed hyperlipidemia  -     Hemoglobin A1C; Standing    Bilateral hydronephrosis    Abnormal finding of blood chemistry, unspecified  -     Hemoglobin A1C; Standing    Encounter for screening for malignant neoplasm of prostate  -     PSA, SCREENING; Standing          Plan   Labs   2. Referral to urology   3. Referral to Podiatry     Care Plan/Goals: Reviewed    Goals         Blood Pressure < 130/80 (pt-stated)           Valerio was seen today for foot swelling and ankle pain.    Diagnoses and all orders for this visit:    Wellness examination  -     Ambulatory referral/consult to Podiatry; Future  -     PSA, SCREENING; Standing    Urinary tract infection without hematuria, site unspecified  -     Ambulatory referral/consult to Urology; Future  -     Cancel: Urinalysis, Reflex to Urine Culture Urine, Clean Catch; Standing    Flank pain  -     Cancel: Urinalysis, Reflex to Urine Culture Urine, Clean Catch  -     Cancel: Urinalysis, Reflex to Urine Culture Urine, Clean Catch; Standing    Localized swelling of left lower extremity    Cellulitis of left lower extremity  -     doxycycline (VIBRAMYCIN) 100 MG Cap; Take 1 capsule (100 mg total) by mouth 2 (two) times daily. for 7 days    Primary hypertension  -     Lipid Panel; Standing  -      T4, Free; Standing  -     TSH; Standing  -     Comprehensive Metabolic Panel; Standing  -     CBC Auto Differential; Standing    Mixed hyperlipidemia  -     Hemoglobin A1C; Standing    Bilateral hydronephrosis    Abnormal finding of blood chemistry, unspecified  -     Hemoglobin A1C; Standing    Encounter for screening for malignant neoplasm of prostate  -     PSA, SCREENING; Standing       Follow up: Follow up for with  for 6 month follow up.    After visit summary was printed and given to patient upon discharge today.  Patient goals and care plan are included in After Visit Summary.

## 2024-06-30 PROBLEM — K76.0 FATTY LIVER: Status: ACTIVE | Noted: 2024-06-30

## 2024-06-30 PROBLEM — K76.0 METABOLIC DYSFUNCTION-ASSOCIATED STEATOTIC LIVER DISEASE (MASLD): Status: ACTIVE | Noted: 2024-06-30

## 2024-06-30 PROBLEM — R74.8 ELEVATED LIVER ENZYMES: Status: ACTIVE | Noted: 2024-06-30

## 2024-06-30 PROBLEM — F10.90 ALCOHOL USE DISORDER: Status: ACTIVE | Noted: 2024-06-30

## 2024-07-01 ENCOUNTER — LAB VISIT (OUTPATIENT)
Dept: LAB | Facility: HOSPITAL | Age: 54
End: 2024-07-01
Attending: NURSE PRACTITIONER
Payer: MEDICARE

## 2024-07-01 DIAGNOSIS — R79.9 ABNORMAL FINDING OF BLOOD CHEMISTRY, UNSPECIFIED: ICD-10-CM

## 2024-07-01 DIAGNOSIS — I10 PRIMARY HYPERTENSION: ICD-10-CM

## 2024-07-01 DIAGNOSIS — E78.2 MIXED HYPERLIPIDEMIA: ICD-10-CM

## 2024-07-01 DIAGNOSIS — Z00.00 WELLNESS EXAMINATION: ICD-10-CM

## 2024-07-01 DIAGNOSIS — Z12.5 ENCOUNTER FOR SCREENING FOR MALIGNANT NEOPLASM OF PROSTATE: ICD-10-CM

## 2024-07-01 LAB
ALBUMIN SERPL BCP-MCNC: 3.3 G/DL (ref 3.5–5.2)
ALP SERPL-CCNC: 104 U/L (ref 55–135)
ALT SERPL W/O P-5'-P-CCNC: 57 U/L (ref 10–44)
ANION GAP SERPL CALC-SCNC: 13 MMOL/L (ref 8–16)
AST SERPL-CCNC: 63 U/L (ref 10–40)
BASOPHILS # BLD AUTO: 0.06 K/UL (ref 0–0.2)
BASOPHILS NFR BLD: 1 % (ref 0–1.9)
BILIRUB SERPL-MCNC: 1 MG/DL (ref 0.1–1)
BUN SERPL-MCNC: 4 MG/DL (ref 6–20)
CALCIUM SERPL-MCNC: 8.8 MG/DL (ref 8.7–10.5)
CHLORIDE SERPL-SCNC: 105 MMOL/L (ref 95–110)
CHOLEST SERPL-MCNC: 200 MG/DL (ref 120–199)
CHOLEST/HDLC SERPL: 4.5 {RATIO} (ref 2–5)
CO2 SERPL-SCNC: 22 MMOL/L (ref 23–29)
COMPLEXED PSA SERPL-MCNC: 1.8 NG/ML (ref 0–4)
CREAT SERPL-MCNC: 0.6 MG/DL (ref 0.5–1.4)
DIFFERENTIAL METHOD BLD: ABNORMAL
EOSINOPHIL # BLD AUTO: 0.2 K/UL (ref 0–0.5)
EOSINOPHIL NFR BLD: 2.4 % (ref 0–8)
ERYTHROCYTE [DISTWIDTH] IN BLOOD BY AUTOMATED COUNT: 14.4 % (ref 11.5–14.5)
EST. GFR  (NO RACE VARIABLE): >60 ML/MIN/1.73 M^2
ESTIMATED AVG GLUCOSE: 91 MG/DL (ref 68–131)
GLUCOSE SERPL-MCNC: 100 MG/DL (ref 70–110)
HBA1C MFR BLD: 4.8 % (ref 4–5.6)
HCT VFR BLD AUTO: 45.7 % (ref 40–54)
HDLC SERPL-MCNC: 44 MG/DL (ref 40–75)
HDLC SERPL: 22 % (ref 20–50)
HGB BLD-MCNC: 16 G/DL (ref 14–18)
IMM GRANULOCYTES # BLD AUTO: 0.01 K/UL (ref 0–0.04)
IMM GRANULOCYTES NFR BLD AUTO: 0.2 % (ref 0–0.5)
LDLC SERPL CALC-MCNC: 122.2 MG/DL (ref 63–159)
LYMPHOCYTES # BLD AUTO: 2.4 K/UL (ref 1–4.8)
LYMPHOCYTES NFR BLD: 38.1 % (ref 18–48)
MCH RBC QN AUTO: 33.9 PG (ref 27–31)
MCHC RBC AUTO-ENTMCNC: 35 G/DL (ref 32–36)
MCV RBC AUTO: 97 FL (ref 82–98)
MONOCYTES # BLD AUTO: 0.7 K/UL (ref 0.3–1)
MONOCYTES NFR BLD: 10.3 % (ref 4–15)
NEUTROPHILS # BLD AUTO: 3 K/UL (ref 1.8–7.7)
NEUTROPHILS NFR BLD: 48 % (ref 38–73)
NONHDLC SERPL-MCNC: 156 MG/DL
NRBC BLD-RTO: 0 /100 WBC
PLATELET # BLD AUTO: 195 K/UL (ref 150–450)
PMV BLD AUTO: 11.1 FL (ref 9.2–12.9)
POTASSIUM SERPL-SCNC: 3.5 MMOL/L (ref 3.5–5.1)
PROT SERPL-MCNC: 6.8 G/DL (ref 6–8.4)
RBC # BLD AUTO: 4.72 M/UL (ref 4.6–6.2)
SODIUM SERPL-SCNC: 140 MMOL/L (ref 136–145)
T4 FREE SERPL-MCNC: 0.87 NG/DL (ref 0.71–1.51)
TRIGL SERPL-MCNC: 169 MG/DL (ref 30–150)
TSH SERPL DL<=0.005 MIU/L-ACNC: 1.9 UIU/ML (ref 0.4–4)
WBC # BLD AUTO: 6.3 K/UL (ref 3.9–12.7)

## 2024-07-01 PROCEDURE — 80053 COMPREHEN METABOLIC PANEL: CPT | Performed by: NURSE PRACTITIONER

## 2024-07-01 PROCEDURE — 84439 ASSAY OF FREE THYROXINE: CPT | Performed by: NURSE PRACTITIONER

## 2024-07-01 PROCEDURE — 36415 COLL VENOUS BLD VENIPUNCTURE: CPT | Performed by: NURSE PRACTITIONER

## 2024-07-01 PROCEDURE — 85025 COMPLETE CBC W/AUTO DIFF WBC: CPT | Performed by: NURSE PRACTITIONER

## 2024-07-01 PROCEDURE — 83036 HEMOGLOBIN GLYCOSYLATED A1C: CPT | Performed by: NURSE PRACTITIONER

## 2024-07-01 PROCEDURE — 84443 ASSAY THYROID STIM HORMONE: CPT | Performed by: NURSE PRACTITIONER

## 2024-07-01 PROCEDURE — 80061 LIPID PANEL: CPT | Performed by: NURSE PRACTITIONER

## 2024-07-01 PROCEDURE — 84153 ASSAY OF PSA TOTAL: CPT | Performed by: NURSE PRACTITIONER

## 2024-07-01 NOTE — PROGRESS NOTES
Hepatology Note    PATIENT: Valerio Yan  MRN: [id number]  DATE: 7/2/2024    Provider: Hepatologist - Dr Rodriguez  Urgency of review: non-urgent  Referring provider: Lizzy Jay    Diagnosis:   1. Metabolic dysfunction-associated steatotic liver disease (MASLD)    2. Alcohol use disorder    3. Elevated liver enzymes    4. Fatty liver    5. History of Guillain-Heath syndrome    6. CAD in native artery    7. Typical atrial flutter    8. Mixed hyperlipidemia    9. Primary hypertension    10. Ozuna's esophagus without dysplasia    11. Elevated LFTs    12. Severe obesity (BMI 35.0-39.9) with comorbidity    13. Abnormal coagulation profile    14. Abnormal findings on diagnostic imaging of other specified body structures    15. Abnormal results of liver function studies    16. Acute stress reaction        Chief complaint:   Chief Complaint   Patient presents with    Elevated Hepatic Enzymes       Subjective:    Initial History: Valerio Yan is a 53 y.o. male with significant PMH as mentioned who was referred to Hepatology Clinic for consultation of elevated liver enzymes      07/02/2024   Patient was found to have elevated liver enzymes during recent lab work. Patient does not have any new complains from liver standpoint. Liver enzymes are normal in 2023 and since then they have been elevated with intermittent rise. CT in 2022 showed fatty liver   Patient had UTI during the same time with elevated Liver enzymes. Patient is on antibiotics for 1  more week. Patient also has leg swelling    Patient is getting 2-3 UTI over last 1 year. Patient has seen urology and on Flomax but no change in UTI    Alcohol--1-2 glass per day wine       As regards to liver disease,  - The patient reports no symptoms of hepatitis including malaise or flu-like symptoms to suggest a flare.  - The patient reports no new manifestations of portal hypertension including ascites, edema, GI bleeding, or hepatic encephalopathy to  suggest liver decompensation.  - The patient reports no fevers/chills or pruritis to suggest biliary disease.        Prior Relevant History:    He  denies hepatotoxic medication    Review of systems:  A review of 12+ systems was conducted with pertinent positive and negative findings documented in HPI with all other systems reviewed and negative.      PFSH:  Past medical, family, and social history reviewed as documented in chart with pertinent positive medical, family, and social history detailed in HPI.    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    COLONOSCOPY N/A 05/17/2018    Procedure: COLONOSCOPY;  Surgeon: Ilan Araya III, MD;  Location: [facility] ENDO;  Service: Endoscopy;  Laterality: N/A;    COLONOSCOPY N/A 06/28/2023    Procedure: COLONOSCOPY;  Surgeon: Colby Rodriguez MD;  Location: [facility] ENDO;  Service: Endoscopy;  Laterality: N/A;    COSMETIC SURGERY      Flap june 2008    decubitus surgery      to sacral    ESOPHAGOGASTRODUODENOSCOPY       ESOPHAGOGASTRODUODENOSCOPY N/A 06/17/2021    Procedure: EGD (ESOPHAGOGASTRODUODENOSCOPY);  Surgeon: Ban Zheng MD;  Location: [facility] [place] ENDO;  Service: Endoscopy;  Laterality: N/A;    ESOPHAGOGASTRODUODENOSCOPY N/A 06/28/2023    Procedure: EGD (ESOPHAGOGASTRODUODENOSCOPY);  Surgeon: Colby Rodriguez MD;  Location: [facility] [place] ENDO;  Service: Endoscopy;  Laterality: N/A;    GASTROSTOMY TUBE PLACEMENT      KNEE ARTHROSCOPY Left 2002    LEFT HEART CATHETERIZATION Left 12/10/2020    Procedure: CATHETERIZATION, HEART, LEFT;  Surgeon: Tomi Mir MD;  Location: [facility] [place] CATH LAB;  Service: Cardiology;  Laterality: Left;  730 start time    PEG TUBE REMOVAL      RADIOFREQUENCY ABLATION      RFA      ROBOT-ASSISTED CHOLECYSTECTOMY USING DA GABRIELA XI N/A 05/02/2019    Procedure: XI ROBOTIC CHOLECYSTECTOMY;  Surgeon: Valerio Lai MD;  Location: [facility] [place] OR;  Service: General;  Laterality: N/A;    JUAN-EN-Y PROCEDURE      TONSILLECTOMY      TRACHEAL SURGERY         Family History:   Family History   Problem Relation Name Age of Onset    Heart attack Mother Onelia     Heart disease Mother Onelia     Arthritis Mother Onelia     Heart disease Father Josiah     Heart attack Father Josiah     Hypertension Father Josiah     Stroke Father Josiah     Cancer Maternal Grandfather Jh     Stroke Sister Zuly      He has no known family history of liver disease.     Social History:  reports that he has been smoking cigarettes. He started smoking about 2 years ago. He has a 0.5 pack-year smoking history. He quit smokeless tobacco use about 25 years ago.  His smokeless tobacco use included snuff. He reports current alcohol use of about 14.0 standard drinks of alcohol per week. He reports that he does not use drugs.    He has significant history of Alcohol     He denies history of IV drug use/Tatto  He  denies high-risk sexual contacts, no raw seafood, no sick contacts      Allergies:  Review of patient's allergies indicates:   Allergen Reactions  "   Metoclopramide Other (See Comments) and Hives     Pt. Was in coma so is not aware of the reaction  Pt states "he don't know, was in coma"      Metoclopramide (bulk)      Other reaction(s): Unable to obtain    Reglan  [metoclopramide hcl] Other (See Comments)     Pt. Was in coma so is not aware of the reaction  Other reaction(s): Unable to obtain    Statins-hmg-coa reductase inhibitors      Myalgia      Sulfa (sulfonamide antibiotics) Other (See Comments)     Never taken  States son is allergic    Latex Hives, Itching and Rash           Latex, natural rubber Rash     Blisters, adhesives          Medications:  Current Outpatient Medications   Medication Sig Dispense Refill    alirocumab (PRALUENT PEN) 150 mg/mL PnIj INJECT 1.06MLS ( 159 MG TOTAL ) BY ABDOMINAL SUBCUTANEOUS ROUTE EVERY 14 DAYS 2 mL 12    amLODIPine (NORVASC) 5 MG tablet Take 1 tablet (5 mg total) by mouth every evening. 60 tablet 12    aspirin (ECOTRIN) 81 MG EC tablet Take 81 mg by mouth once daily.      calcium-vitamin D 600 mg, 1,500mg,-200 unit 600 mg(1,500mg) -200 unit Tab Take 1 tablet by mouth once daily.       celecoxib (CELEBREX) 100 MG capsule Take 1 capsule (100 mg total) by mouth 2 (two) times daily. 60 capsule 2    cetirizine (ZYRTEC) 10 MG tablet Take 1 tablet (10 mg total) by mouth once daily. 30 tablet 5    doxycycline (VIBRAMYCIN) 100 MG Cap Take 1 capsule (100 mg total) by mouth 2 (two) times daily. for 7 days 14 capsule 0    esomeprazole (NEXIUM) 20 MG capsule Take 2 capsules (40 mg total) by mouth before breakfast. 60 capsule 11    fluticasone propionate (FLONASE) 50 mcg/actuation nasal spray 1 spray (50 mcg total) by Each Nostril route once daily. 16 g 1    folic acid (FOLVITE) 1 MG tablet Take 1 tablet (1 mg total) by mouth once daily. 30 tablet 0    lisinopriL-hydrochlorothiazide (PRINZIDE,ZESTORETIC) 20-12.5 mg per tablet Take 2 tablets by mouth once daily. 180 tablet 3    metoprolol succinate (TOPROL-XL) 50 MG 24 hr tablet " Take 0.5 tablets (25 mg total) by mouth every other day. 30 tablet 6    multivitamin (THERAGRAN) per tablet Take 1 tablet by mouth nightly.       ondansetron (ZOFRAN) 4 MG tablet Take 1 tablet (4 mg total) by mouth every 6 (six) hours. 20 tablet 0    paroxetine (PAXIL) 10 MG tablet Take 1 tablet (10 mg total) by mouth every morning. 30 tablet 11    potassium chloride SA (K-DUR,KLOR-CON) 20 MEQ tablet Take 1 tablet (20 mEq total) by mouth 2 (two) times daily. 60 tablet 1    tadalafiL (CIALIS) 20 MG Tab Take 1 tablet (20 mg total) by mouth every 24 hours as needed (erectile dysfunction). 20 tablet 11    tamsulosin (FLOMAX) 0.4 mg Cap Take 1 capsule (0.4 mg total) by mouth once daily. 90 capsule 3    cyanocobalamin (VITAMIN B-12) 1,000 mcg/mL injection Inject 1 mL (1,000 mcg total) into the muscle every 30 days. (Patient not taking: Reported on 6/26/2024) 10 mL 0    LIDOcaine (LIDODERM) 5 % Place 1 patch onto the skin once daily. Remove & Discard patch within 12 hours or as directed by MD (Patient not taking: Reported on 7/2/2024) 30 patch 0    methylPREDNISolone (MEDROL DOSEPACK) 4 mg tablet follow package directions (Patient not taking: Reported on 7/2/2024) 21 tablet 0    oxyCODONE-acetaminophen (PERCOCET)  mg per tablet Take 1 tablet by mouth every 6 to 8 hours as needed for Pain. 15 tablet 0    oxyCODONE-acetaminophen (PERCOCET)  mg per tablet Take 1 tablet by mouth every 6 (six) hours as needed for Pain. 20 tablet 0     No current facility-administered medications for this visit.       Review of Systems   Constitutional:  Negative for fatigue, fever and unexpected weight change.   HENT:  Negative for ear pain, nosebleeds and trouble swallowing.    Eyes:  Negative for discharge and redness.   Respiratory:  Negative for cough and shortness of breath.    Cardiovascular:  Negative for palpitations and leg swelling.   Gastrointestinal:  Negative for abdominal distention, abdominal pain, diarrhea and  "vomiting.   Endocrine: Negative for cold intolerance and polyuria.   Genitourinary:  Negative for flank pain and hematuria.   Musculoskeletal:  Negative for back pain.   Skin:  Negative for pallor.   Neurological:  Negative for seizures and headaches.   Hematological:  Does not bruise/bleed easily.   Psychiatric/Behavioral:  Negative for confusion and hallucinations.               Objective:      Vitals:   Vitals:    07/02/24 0932   BP: (!) 124/90   BP Location: Left arm   Pulse: 60   SpO2: 96%   Weight: 128.5 kg (283 lb 6.4 oz)   Height: 6' 5" (1.956 m)       Physical Exam  Constitutional:       Appearance: Normal appearance.   HENT:      Head: Normocephalic and atraumatic.      Right Ear: Tympanic membrane and external ear normal.      Left Ear: Tympanic membrane and external ear normal.      Mouth/Throat:      Mouth: Mucous membranes are moist.   Eyes:      Extraocular Movements: Extraocular movements intact.      Pupils: Pupils are equal, round, and reactive to light.   Cardiovascular:      Rate and Rhythm: Normal rate and regular rhythm.      Pulses: Normal pulses.      Heart sounds: Normal heart sounds.   Pulmonary:      Effort: Pulmonary effort is normal.      Breath sounds: Normal breath sounds.   Abdominal:      General: Bowel sounds are normal. There is no distension.      Palpations: Abdomen is soft. There is no mass.      Tenderness: There is no abdominal tenderness.   Musculoskeletal:         General: No swelling or deformity. Normal range of motion.      Cervical back: Normal range of motion and neck supple.   Skin:     Coloration: Skin is not jaundiced.   Neurological:      General: No focal deficit present.      Mental Status: He is alert and oriented to person, place, and time.      Cranial Nerves: No cranial nerve deficit.   Psychiatric:         Mood and Affect: Mood normal.         Behavior: Behavior normal.         Laboratory Data:  Lab Visit on 07/02/2024   Component Date Value Ref Range Status "    Specimen UA 07/02/2024 Urine, Clean Catch   Final    Color, UA 07/02/2024 Yellow  Yellow, Straw, Beatris Final    Appearance, UA 07/02/2024 Clear  Clear Final    pH, UA 07/02/2024 6.0  5.0 - 8.0 Final    Specific Gravity, UA 07/02/2024 1.015  1.005 - 1.030 Final    Protein, UA 07/02/2024 Negative  Negative Final    Comment: Recommend a 24 hour urine protein or a urine   protein/creatinine ratio if globulin induced proteinuria is  clinically suspected.      Glucose, UA 07/02/2024 Negative  Negative Final    Ketones, UA 07/02/2024 Negative  Negative Final    Bilirubin (UA) 07/02/2024 Negative  Negative Final    Occult Blood UA 07/02/2024 Negative  Negative Final    Nitrite, UA 07/02/2024 Negative  Negative Final    Urobilinogen, UA 07/02/2024 Negative  <2.0 EU/dL Final    Leukocytes, UA 07/02/2024 Negative  Negative Final   Lab Visit on 07/01/2024   Component Date Value Ref Range Status    Cholesterol 07/01/2024 200 (H)  120 - 199 mg/dL Final    Comment: The National Cholesterol Education Program (NCEP) has set the  following guidelines (reference ranges) for Cholesterol:  Optimal.....................<200 mg/dL  Borderline High.............200-239 mg/dL  High........................> or = 240 mg/dL      Triglycerides 07/01/2024 169 (H)  30 - 150 mg/dL Final    Comment: The National Cholesterol Education Program (NCEP) has set the  following guidelines (reference values) for triglycerides:  Normal......................<150 mg/dL  Borderline High.............150-199 mg/dL  High........................200-499 mg/dL      HDL 07/01/2024 44  40 - 75 mg/dL Final    Comment: The National Cholesterol Education Program (NCEP) has set the  following guidelines (reference values) for HDL Cholesterol:  Low...............<40 mg/dL  Optimal...........>60 mg/dL      LDL Cholesterol 07/01/2024 122.2  63.0 - 159.0 mg/dL Final    Comment: The National Cholesterol Education Program (NCEP) has set the  following guidelines (reference  values) for LDL Cholesterol:  Optimal.......................<130 mg/dL  Borderline High...............130-159 mg/dL  High..........................160-189 mg/dL  Very High.....................>190 mg/dL      HDL/Cholesterol Ratio 07/01/2024 22.0  20.0 - 50.0 % Final    Total Cholesterol/HDL Ratio 07/01/2024 4.5  2.0 - 5.0 Final    Non-HDL Cholesterol 07/01/2024 156  mg/dL Final    Comment: Risk category and Non-HDL cholesterol goals:  Coronary heart disease (CHD)or equivalent (10-year risk of CHD >20%):  Non-HDL cholesterol goal     <130 mg/dL  Two or more CHD risk factors and 10-year risk of CHD <= 20%:  Non-HDL cholesterol goal     <160 mg/dL  0 to 1 CHD risk factor:  Non-HDL cholesterol goal     <190 mg/dL      Free T4 07/01/2024 0.87  0.71 - 1.51 ng/dL Final    TSH 07/01/2024 1.895  0.400 - 4.000 uIU/mL Final    Hemoglobin A1C 07/01/2024 4.8  4.0 - 5.6 % Final    Comment: ADA Screening Guidelines:  5.7-6.4%  Consistent with prediabetes  >or=6.5%  Consistent with diabetes    High levels of fetal hemoglobin interfere with the HbA1C  assay. Heterozygous hemoglobin variants (HbS, HgC, etc)do  not significantly interfere with this assay.   However, presence of multiple variants may affect accuracy.      Estimated Avg Glucose 07/01/2024 91  68 - 131 mg/dL Final    Sodium 07/01/2024 140  136 - 145 mmol/L Final    Potassium 07/01/2024 3.5  3.5 - 5.1 mmol/L Final    Chloride 07/01/2024 105  95 - 110 mmol/L Final    CO2 07/01/2024 22 (L)  23 - 29 mmol/L Final    Glucose 07/01/2024 100  70 - 110 mg/dL Final    BUN 07/01/2024 4 (L)  6 - 20 mg/dL Final    Creatinine 07/01/2024 0.6  0.5 - 1.4 mg/dL Final    Calcium 07/01/2024 8.8  8.7 - 10.5 mg/dL Final    Total Protein 07/01/2024 6.8  6.0 - 8.4 g/dL Final    Albumin 07/01/2024 3.3 (L)  3.5 - 5.2 g/dL Final    Total Bilirubin 07/01/2024 1.0  0.1 - 1.0 mg/dL Final    Comment: For infants and newborns, interpretation of results should be based  on gestational age, weight and in  agreement with clinical  observations.    Premature Infant recommended reference ranges:  Up to 24 hours.............<8.0 mg/dL  Up to 48 hours............<12.0 mg/dL  3-5 days..................<15.0 mg/dL  6-29 days.................<15.0 mg/dL      Alkaline Phosphatase 07/01/2024 104  55 - 135 U/L Final    AST 07/01/2024 63 (H)  10 - 40 U/L Final    ALT 07/01/2024 57 (H)  10 - 44 U/L Final    eGFR 07/01/2024 >60.0  >60 mL/min/1.73 m^2 Final    Anion Gap 07/01/2024 13  8 - 16 mmol/L Final    WBC 07/01/2024 6.30  3.90 - 12.70 K/uL Final    RBC 07/01/2024 4.72  4.60 - 6.20 M/uL Final    Hemoglobin 07/01/2024 16.0  14.0 - 18.0 g/dL Final    Hematocrit 07/01/2024 45.7  40.0 - 54.0 % Final    MCV 07/01/2024 97  82 - 98 fL Final    MCH 07/01/2024 33.9 (H)  27.0 - 31.0 pg Final    MCHC 07/01/2024 35.0  32.0 - 36.0 g/dL Final    RDW 07/01/2024 14.4  11.5 - 14.5 % Final    Platelets 07/01/2024 195  150 - 450 K/uL Final    MPV 07/01/2024 11.1  9.2 - 12.9 fL Final    Immature Granulocytes 07/01/2024 0.2  0.0 - 0.5 % Final    Gran # (ANC) 07/01/2024 3.0  1.8 - 7.7 K/uL Final    Immature Grans (Abs) 07/01/2024 0.01  0.00 - 0.04 K/uL Final    Comment: Mild elevation in immature granulocytes is non specific and   can be seen in a variety of conditions including stress response,   acute inflammation, trauma and pregnancy. Correlation with other   laboratory and clinical findings is essential.      Lymph # 07/01/2024 2.4  1.0 - 4.8 K/uL Final    Mono # 07/01/2024 0.7  0.3 - 1.0 K/uL Final    Eos # 07/01/2024 0.2  0.0 - 0.5 K/uL Final    Baso # 07/01/2024 0.06  0.00 - 0.20 K/uL Final    nRBC 07/01/2024 0  0 /100 WBC Final    Gran % 07/01/2024 48.0  38.0 - 73.0 % Final    Lymph % 07/01/2024 38.1  18.0 - 48.0 % Final    Mono % 07/01/2024 10.3  4.0 - 15.0 % Final    Eosinophil % 07/01/2024 2.4  0.0 - 8.0 % Final    Basophil % 07/01/2024 1.0  0.0 - 1.9 % Final    Differential Method 07/01/2024 Automated   Final    PSA, Screen 07/01/2024  "1.8  0.00 - 4.00 ng/mL Final    Comment: The testing method is a chemiluminescent microparticle immunoassay   manufactured by [employer] and performed on the [employer]   or   [employer] system. Values obtained with different assay manufacturers   for   methods may be different and cannot be used interchangeably.  PSA Expected levels:  Hormonal Therapy: <0.05 ng/ml  Prostatectomy: <0.01 ng/ml  Radiation Therapy: <1.00 ng/ml         Lab Results   Component Value Date    INR 1.1 08/30/2023    INR 1.0 12/07/2020    INR 1.1 05/23/2016       No results found for: "SMOOTHMUSCAB", "MITOAB"  Lab Results   Component Value Date    IRON 81 09/30/2014    TIBC 383 09/30/2014     Lab Results   Component Value Date    HEPAIGM Negative 08/07/2009    HEPBIGM Negative 08/07/2009    HEPCAB Negative 02/29/2024     Lab Results   Component Value Date    TSH 1.895 07/01/2024     No results found for: "WENDIE"    No results found for: "ABORH"        Lab Results   Component Value Date    HGBA1C 4.8 07/01/2024     Lab Results   Component Value Date    CHOL 200 (H) 07/01/2024    CHOL 154 08/30/2023    CHOL 148 05/04/2022     Lab Results   Component Value Date    HDL 44 07/01/2024    HDL 54 08/30/2023    HDL 44 05/04/2022     Lab Results   Component Value Date    LDLCALC 122.2 07/01/2024    LDLCALC 76.6 08/30/2023    LDLCALC 75.2 05/04/2022     Lab Results   Component Value Date    TRIG 169 (H) 07/01/2024    TRIG 117 08/30/2023    TRIG 144 05/04/2022     Lab Results   Component Value Date    CHOLHDL 22.0 07/01/2024    CHOLHDL 35.1 08/30/2023    CHOLHDL 29.7 05/04/2022         I personally reviewed imaging studies and outside records..      Assessment:       1. Metabolic dysfunction-associated steatotic liver disease (MASLD)    2. Alcohol use disorder    3. Elevated liver enzymes    4. Fatty liver    5. History of Guillain-Bab[place] syndrome    6. CAD in native artery    7. Typical atrial flutter    8. Mixed hyperlipidemia    9. Primary " hypertension    10. Ozuna's esophagus without dysplasia    11. Elevated LFTs    12. Severe obesity (BMI 35.0-39.9) with comorbidity    13. Abnormal coagulation profile    14. Abnormal findings on diagnostic imaging of other specified body structures    15. Abnormal results of liver function studies    16. Acute stress reaction                 Plan:     Problem List Items Addressed This Visit          Neuro    History of Guillain-[place] syndrome       Psychiatric    Alcohol use disorder       Cardiac/Vascular    CAD in native artery    Hypertension    Mixed hyperlipidemia    Typical atrial flutter       Endocrine    Severe obesity (BMI 35.0-39.9) with comorbidity       GI    Ozuna esophagus    Elevated LFTs    Elevated liver enzymes    Relevant Orders    Alpha-1-Antitrypsin    WENDIE Screen w/Reflex    Anti-Liver, Kidney, Microsome Ab    Antimitochondrial Antibody    CBC Auto Differential    Anti-Smooth Muscle Antibody    Ceruloplasmin    Iron and TIBC    Phosphatidylethanol (PETH)    Protime-INR    TSH    Hepatitis Panel, Acute    Hepatitis A antibody, IgG    Hepatitis B Core Antibody, IgM    Hepatitis Delta Virus    Hepatitis E IgM Antibody    CMV DNA, Quantitative, PCR    CBC Auto Differential    Comprehensive Metabolic Panel    Protime-INR    US Abdomen Limited    Fatty liver       Other    Metabolic dysfunction-associated steatotic liver disease (MASLD) - Primary     Other Visit Diagnoses       Abnormal coagulation profile        Relevant Orders    Protime-INR    Abnormal findings on diagnostic imaging of other specified body structures        Relevant Orders    TSH    Abnormal results of liver function studies        Relevant Orders    Hepatitis Panel, Acute    Protime-INR    Acute stress reaction        Relevant Orders    Phosphatidylethanol (PETH)            Valerio was seen today for elevated hepatic enzymes.    Diagnoses and all orders for this visit:    Metabolic dysfunction-associated steatotic liver disease  (MASLD)    Alcohol use disorder    Elevated liver enzymes  -     Alpha-1-Antitrypsin; Future  -     WENDIE Screen w/Reflex; Future  -     Anti-Liver, Kidney, Microsome Ab; Future  -     Antimitochondrial Antibody; Future  -     CBC Auto Differential; Future  -     Anti-Smooth Muscle Antibody; Future  -     Ceruloplasmin; Future  -     Iron and TIBC; Future  -     Phosphatidylethanol (PETH); Future  -     Protime-INR; Future  -     TSH; Future  -     Hepatitis Panel, Acute; Future  -     Hepatitis A antibody, IgG; Future  -     Hepatitis B Core Antibody, IgM; Future  -     Hepatitis Delta Virus; Future  -     Hepatitis E IgM Antibody; Future  -     CMV DNA, Quantitative, PCR; Future  -     CBC Auto Differential; Standing  -     Comprehensive Metabolic Panel; Standing  -     Protime-INR; Standing  -     US Abdomen Limited; Standing    Fatty liver    History of Guillain-[place] syndrome    CAD in native artery    Typical atrial flutter    Mixed hyperlipidemia    Primary hypertension    Ozuna's esophagus without dysplasia    Elevated LFTs  -     Ambulatory referral/consult to Hepatology    Severe obesity (BMI 35.0-39.9) with comorbidity    Abnormal coagulation profile  -     Protime-INR; Future    Abnormal findings on diagnostic imaging of other specified body structures  -     TSH; Future    Abnormal results of liver function studies  -     Hepatitis Panel, Acute; Future  -     Protime-INR; Standing    Acute stress reaction  -     Phosphatidylethanol (PETH); Future        Elevated Liver enzymes  Likely infection as patient gets recurrenct UTI coinciding the trend  -Underlying Met SLD Mainly alcohol related considering the trend but   MASLD considering imaging and metabolic risk factors  -Complete sobriety  --Plan checking serologies   -Avoid hepatotoxic drugs  Discussed liver biopsy if necessary    UTI  Follow up Urology        Ozuna's esophagus without dysplasia  Continue surveillance      Obesity  Patient would  benefit from weight loss and has tried to set realistic goals to achieve success. Lifestyle changes were discussed on eating healthy, exercising at least 150 minutes weekly, and reducing sedentary behavior.   Discussed the risk factors associated with obesity: Arthritis/KIA/Diabetes/Fatty Liver/Cardiovascular disease/GERD/HTN/HLP.     CAD in native artery  Continue current treatment plan as previously prescribed with your  cardiologist.       HTN  Chronic; stable on current treatment plan; follow up with PCP    History of Guillain-U[place] syndrome  Continue current treatment plan as per neurologist.     atrial flutter  Stable.    Obesity  Patient would benefit from weight loss and has tried to set realistic goals to achieve success. Lifestyle changes were discussed on eating healthy, exercising at least 150 minutes weekly, and reducing sedentary behavior.   Discussed the risk factors associated with obesity: Arthritis/KIA/Diabetes/Fatty Liver/Cardiovascular disease/GERD/HTN/HLP.     Return to clinic in 3 months.    I have sent communication to the referring physician and/or primary care provider.      Time Statement  A total time spent includes time preparing to see patient, reviewing  diagnostic studies and records, direct face-to-face visit, completing orders, medications , reconciliation, prescription management, and care coordination    We discussed in depth the nature of the patient's disease, the management plan in details. I have provided the patient with an opportunity to ask questions and have all questions answered to his satisfaction.     Discussed with patient that it is likely that she will see results before Myself or my nurse are able to view them and report results due to the Cures Act passed 4/1/21. Results will be sent immediately to the patient who are enrolled in the patient portal. If results come through after business hours or on weekend, we will not see them until the next business day that we  are in the office. If resulted during the business day, we will likely not be able to review them until after completing all patient visits in office that day.   Patient was seen in the liver transplant department at [facility] Selam Rodriguez MD  Transplant Hepatologist and Gastroenterologist  [facility]

## 2024-07-02 ENCOUNTER — LAB VISIT (OUTPATIENT)
Dept: LAB | Facility: HOSPITAL | Age: 54
End: 2024-07-02
Attending: NURSE PRACTITIONER
Payer: MEDICARE

## 2024-07-02 ENCOUNTER — OFFICE VISIT (OUTPATIENT)
Dept: HEPATOLOGY | Facility: CLINIC | Age: 54
End: 2024-07-02
Payer: MEDICARE

## 2024-07-02 ENCOUNTER — PATIENT MESSAGE (OUTPATIENT)
Dept: HEPATOLOGY | Facility: CLINIC | Age: 54
End: 2024-07-02

## 2024-07-02 ENCOUNTER — OFFICE VISIT (OUTPATIENT)
Dept: PODIATRY | Facility: CLINIC | Age: 54
End: 2024-07-02
Payer: MEDICARE

## 2024-07-02 VITALS
HEIGHT: 77 IN | WEIGHT: 283.38 LBS | HEART RATE: 60 BPM | OXYGEN SATURATION: 96 % | SYSTOLIC BLOOD PRESSURE: 124 MMHG | DIASTOLIC BLOOD PRESSURE: 90 MMHG | BODY MASS INDEX: 33.46 KG/M2

## 2024-07-02 DIAGNOSIS — R79.89 ELEVATED LFTS: ICD-10-CM

## 2024-07-02 DIAGNOSIS — K22.70 BARRETT'S ESOPHAGUS WITHOUT DYSPLASIA: ICD-10-CM

## 2024-07-02 DIAGNOSIS — I10 PRIMARY HYPERTENSION: ICD-10-CM

## 2024-07-02 DIAGNOSIS — F10.90 ALCOHOL USE DISORDER: ICD-10-CM

## 2024-07-02 DIAGNOSIS — R79.1 ABNORMAL COAGULATION PROFILE: ICD-10-CM

## 2024-07-02 DIAGNOSIS — Z86.69 HISTORY OF GUILLAIN-BARRE SYNDROME: ICD-10-CM

## 2024-07-02 DIAGNOSIS — R23.4 FISSURE IN SKIN OF LEFT FOOT: ICD-10-CM

## 2024-07-02 DIAGNOSIS — K76.0 METABOLIC DYSFUNCTION-ASSOCIATED STEATOTIC LIVER DISEASE (MASLD): Primary | ICD-10-CM

## 2024-07-02 DIAGNOSIS — I48.3 TYPICAL ATRIAL FLUTTER: ICD-10-CM

## 2024-07-02 DIAGNOSIS — R94.5 ABNORMAL RESULTS OF LIVER FUNCTION STUDIES: ICD-10-CM

## 2024-07-02 DIAGNOSIS — R74.8 ELEVATED LIVER ENZYMES: ICD-10-CM

## 2024-07-02 DIAGNOSIS — G62.9 POLYNEUROPATHY: Primary | ICD-10-CM

## 2024-07-02 DIAGNOSIS — K76.0 FATTY LIVER: ICD-10-CM

## 2024-07-02 DIAGNOSIS — E78.2 MIXED HYPERLIPIDEMIA: ICD-10-CM

## 2024-07-02 DIAGNOSIS — I25.10 CAD IN NATIVE ARTERY: ICD-10-CM

## 2024-07-02 DIAGNOSIS — N30.01 ACUTE CYSTITIS WITH HEMATURIA: ICD-10-CM

## 2024-07-02 DIAGNOSIS — E66.01 SEVERE OBESITY (BMI 35.0-39.9) WITH COMORBIDITY: ICD-10-CM

## 2024-07-02 DIAGNOSIS — L85.3 XEROSIS CUTIS: ICD-10-CM

## 2024-07-02 DIAGNOSIS — R93.89 ABNORMAL FINDINGS ON DIAGNOSTIC IMAGING OF OTHER SPECIFIED BODY STRUCTURES: ICD-10-CM

## 2024-07-02 DIAGNOSIS — F43.0 ACUTE STRESS REACTION: ICD-10-CM

## 2024-07-02 DIAGNOSIS — L84 CORN OR CALLUS: ICD-10-CM

## 2024-07-02 LAB
BILIRUB UR QL STRIP: NEGATIVE
CLARITY UR: CLEAR
COLOR UR: YELLOW
GLUCOSE UR QL STRIP: NEGATIVE
HGB UR QL STRIP: NEGATIVE
KETONES UR QL STRIP: NEGATIVE
LEUKOCYTE ESTERASE UR QL STRIP: NEGATIVE
NITRITE UR QL STRIP: NEGATIVE
PH UR STRIP: 6 [PH] (ref 5–8)
PROT UR QL STRIP: NEGATIVE
SP GR UR STRIP: 1.01 (ref 1–1.03)
URN SPEC COLLECT METH UR: NORMAL
UROBILINOGEN UR STRIP-ACNC: NEGATIVE EU/DL

## 2024-07-02 PROCEDURE — 3044F HG A1C LEVEL LT 7.0%: CPT | Mod: CPTII,S$GLB,, | Performed by: PODIATRIST

## 2024-07-02 PROCEDURE — 81003 URINALYSIS AUTO W/O SCOPE: CPT | Performed by: NURSE PRACTITIONER

## 2024-07-02 PROCEDURE — 1160F RVW MEDS BY RX/DR IN RCRD: CPT | Mod: CPTII,S$GLB,, | Performed by: PODIATRIST

## 2024-07-02 PROCEDURE — 11055 PARING/CUTG B9 HYPRKER LES 1: CPT | Mod: Q9,S$GLB,, | Performed by: PODIATRIST

## 2024-07-02 PROCEDURE — 99999 PR PBB SHADOW E&M-EST. PATIENT-LVL III: CPT | Mod: PBBFAC,,, | Performed by: INTERNAL MEDICINE

## 2024-07-02 PROCEDURE — 99203 OFFICE O/P NEW LOW 30 MIN: CPT | Mod: 25,S$GLB,, | Performed by: PODIATRIST

## 2024-07-02 PROCEDURE — 99999 PR PBB SHADOW E&M-EST. PATIENT-LVL IV: CPT | Mod: PBBFAC,,, | Performed by: PODIATRIST

## 2024-07-02 PROCEDURE — 1159F MED LIST DOCD IN RCRD: CPT | Mod: CPTII,S$GLB,, | Performed by: PODIATRIST

## 2024-07-02 NOTE — PROGRESS NOTES
"Subjective:       Patient ID: Valerio Yan is a 53 y.o. male.    Chief Complaint: Foot Swelling (Left foot swollen, rates pain 0, nondiabetic )    HPI: Valerio Yan presents to the clinic with the chief complaint of painful, thickened and hypertrophic skin formation of the left foot. 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 Patient's PMD is Fabiola Andrade MD. This patient last saw his/her primary care provider on 6/26. WB with B/L AFO.     Hemoglobin A1C   Date Value Ref Range Status   07/01/2024 4.8 4.0 - 5.6 % Final     Comment:     ADA Screening Guidelines:  5.7-6.4%  Consistent with prediabetes  >or=6.5%  Consistent with diabetes    High levels of fetal hemoglobin interfere with the HbA1C  assay. Heterozygous hemoglobin variants (HbS, HgC, etc)do  not significantly interfere with this assay.   However, presence of multiple variants may affect accuracy.     05/31/2017 4.8 4.5 - 6.2 % Final     Comment:     According to ADA guidelines, hemoglobin A1C <7.0% represents  optimal control in non-pregnant diabetic patients.  Different  metrics may apply to specific populations.   Standards of Medical Care in Diabetes - 2016.  For the purpose of screening for the presence of diabetes:  <5.7%     Consistent with the absence of diabetes  5.7-6.4%  Consistent with increasing risk for diabetes   (prediabetes)  >or=6.5%  Consistent with diabetes  Currently no consensus exists for use of hemoglobin A1C  for diagnosis of diabetes for children.     05/23/2011 5.8 4.0 - 6.2 % Final   .    Review of patient's allergies indicates:   Allergen Reactions    Metoclopramide Other (See Comments) and Hives     Pt. Was in coma so is not aware of the reaction  Pt states "he don't know, was in coma"      Metoclopramide (bulk)      Other reaction(s): Unable to obtain    Reglan  [metoclopramide hcl] Other (See Comments)     Pt. Was in coma so is not aware of the reaction  Other reaction(s): Unable to obtain    " Statins-hmg-coa reductase inhibitors      Myalgia      Sulfa (sulfonamide antibiotics) Other (See Comments)     Never taken  States son is allergic    Latex Hives, Itching and Rash           Latex, natural rubber Rash     Blisters, adhesives          Past Medical History:   Diagnosis Date    Arm vein blood clot, bilateral     Atrial flutter     Ozuna's esophagus     CRPS (complex regional pain syndrome type II)     Decubitus skin ulcer     Encounter for blood transfusion     Guillain-[place]     Guillain-[place] syndrome 2013    Hyperlipidemia     Hypertension     Joint pain     knees    LVH (left ventricular hypertrophy) due to hypertensive disease 2/10/2017    Mitral regurgitation 2016    KIA (obstructive sleep apnea)     Primary osteoarthritis of left knee 2019    Statin-induced myositis 2022    Tracheostomy status 2021    Transfusion reaction     1 x  to PRBC fever       Family History   Problem Relation Name Age of Onset    Heart attack Mother Onelia     Heart disease Mother Onelia     Arthritis Mother Onelia     Heart disease Father Josiah     Heart attack Father Josiah     Hypertension Father Josiah     Stroke Father Josiah     Cancer Maternal Grandfather Jh     Stroke Sister Zuly        Social History     Socioeconomic History    Marital status:     Number of children: 2   Tobacco Use    Smoking status: Every Day     Current packs/day: 0.00     Average packs/day: 0.5 packs/day for 1 year (0.5 ttl pk-yrs)     Types: Cigarettes     Start date: 2021     Last attempt to quit: 2021     Years since quittin.6    Smokeless tobacco: Former     Types: Snuff     Quit date: 1999    Tobacco comments:     Unknown   Substance and Sexual Activity    Alcohol use: Yes     Alcohol/week: 14.0 standard drinks of alcohol     Types: 6 Glasses of wine, 4 Cans of beer, 4 Shots of liquor per week     Comment: no alcohol for past week, usually 7 drinks/week    Drug use: No    Sexual  activity: Yes     Partners: Female     Birth control/protection: None     Social Determinants of Health     Financial Resource Strain: Medium Risk (6/24/2024)    Overall Financial Resource Strain (CARDIA)     Difficulty of Paying Living Expenses: Somewhat hard   Food Insecurity: Food Insecurity Present (6/24/2024)    Hunger Vital Sign     Worried About Running Out of Food in the Last Year: Sometimes true     Ran Out of Food in the Last Year: Never true   Transportation Needs: No Transportation Needs (6/24/2024)    PRAPARE - Transportation     Lack of Transportation (Medical): No     Lack of Transportation (Non-Medical): No   Physical Activity: Inactive (6/24/2024)    Exercise Vital Sign     Days of Exercise per Week: 0 days     Minutes of Exercise per Session: 0 min   Stress: Stress Concern Present (6/24/2024)    [employer] [place] [employer] - Occupational Stress Questionnaire     Feeling of Stress : To some extent   Housing Stability: High Risk (6/24/2024)    Housing Stability Vital Sign     Unable to Pay for Housing in the Last Year: Yes     Homeless in the Last Year: No       Past Surgical History:   Procedure Laterality Date    ANGIOGRAM, CORONARY, WITH LEFT HEART CATHETERIZATION  12/10/2020    Procedure: Angiogram, Coronary, with Left Heart Cath;  Surgeon: Tomi Mir MD;  Location: [facility];  Service: Cardiology;;    barrette esophagus      CHOLECYSTECTOMY      2018?    COLONOSCOPY N/A 05/17/2018    Procedure: COLONOSCOPY;  Surgeon: Ilan Araya III, MD;  Location: [facility];  Service: Endoscopy;  Laterality: N/A;    COLONOSCOPY N/A 06/28/2023    Procedure: COLONOSCOPY;  Surgeon: Cobly Rodriguez MD;  Location: [facility];  Service: Endoscopy;  Laterality: N/A;    COSMETIC SURGERY      Flap june 2008    decubitus surgery      to sacral    ESOPHAGOGASTRODUODENOSCOPY      ESOPHAGOGASTRODUODENOSCOPY N/A 06/17/2021    Procedure: EGD (ESOPHAGOGASTRODUODENOSCOPY);  Surgeon: Ban KENNEDY  MD Marce;  Location: [facility] ENDO;  Service: Endoscopy;  Laterality: N/A;    ESOPHAGOGASTRODUODENOSCOPY N/A 06/28/2023    Procedure: EGD (ESOPHAGOGASTRODUODENOSCOPY);  Surgeon: Colby Rodriguez MD;  Location: [facility] ENDO;  Service: Endoscopy;  Laterality: N/A;    GASTROSTOMY TUBE PLACEMENT      KNEE ARTHROSCOPY Left 2002    LEFT HEART CATHETERIZATION Left 12/10/2020    Procedure: CATHETERIZATION, HEART, LEFT;  Surgeon: Tomi Mir MD;  Location: [facility] CATH LAB;  Service: Cardiology;  Laterality: Left;  730 start time    PEG TUBE REMOVAL      RADIOFREQUENCY ABLATION      RFA      ROBOT-ASSISTED CHOLECYSTECTOMY USING DA GABRIELA XI N/A 05/02/2019    Procedure: XI ROBOTIC CHOLECYSTECTOMY;  Surgeon: Valerio Lai MD;  Location: [facility] OR;  Service: General;  Laterality: N/A;    JUAN-EN-Y PROCEDURE      TONSILLECTOMY      TRACHEAL SURGERY         Review of Systems   Constitutional:  Negative for chills, fatigue and fever.   HENT:  Negative for hearing loss.    Eyes:  Negative for photophobia and visual disturbance.   Respiratory:  Negative for cough, chest tightness, shortness of breath and wheezing.    Cardiovascular:  Positive for leg swelling. Negative for chest pain and palpitations.   Gastrointestinal:  Negative for constipation, diarrhea, nausea and vomiting.   Endocrine: Negative for cold intolerance and heat intolerance.   Genitourinary:  Negative for flank pain.   Musculoskeletal:  Positive for gait problem. Negative for neck pain and neck stiffness.   Skin:  Positive for wound. Negative for color change.   Neurological:  Positive for numbness. Negative for light-headedness and headaches.   Psychiatric/Behavioral:  Negative for sleep disturbance.          Objective:   There were no vitals taken for this visit.    Physical Exam  LOWER EXTREMITY PHYSICAL EXAMINATION    NEUROLOGY: Sensation to light touch is intact. Proprioception is intact, bilateral. Sensation to pin prick is reduced to absent. Vibratory  sensation is diminished to the left and right lower extremity. Examination with 5.07 Pearson Stephanie monofilament reveals that protective sensation is not intact to the left and right plantar surfaces of the foot and digits, as the patient has no sensation/detection at greater than 4 distinct points of contact.     ORTHOPEDIC: Dropfoot, B/L.    VASCULAR: On the left and right foot, the dorsalis pedis pulse is 1/4 and the posterior tibial pulse is 1/4. Capillary refill time is less than 3 seconds. Hair growth is noted on the dorsum of the foot and at the digits. Proximal to distal temperature is warm to warm. Edema is noted, pitting, LLE. Edema is noted, mild, RLE.    DERMATOLOGY: Callus formation to the LLE 1st IPJ and toe.    Assessment:     1. Polyneuropathy    2. Fissure in skin of left foot    3. History of Guillain-[place] syndrome    4. Xerosis cutis        Plan:     Polyneuropathy    Fissure in skin of left foot  -     Ambulatory referral/consult to Podiatry    History of Guillain-[place] syndrome    Xerosis cutis    Thorough discussion is had with the patient today, concerning the diagnosis, its etiology, and the treatment algorithm at present.     Recommend twice to 3 times daily topical emollient. Did discuss with the patient concerning dry and thin skin in relation to complications due to comorbid states. Patient will purchase OTC Urea 40% and use BID or TID or OTC Eucerin Lotion/Cream and use it BID or TID.    Hypertrophic skin formation, as outlined within the examination portion of this note, is surgically debrided with sharp #10/#15 blade, to alleviate discomfort with weight bearing and ambulation, and to lessen the possibility of skin complications, e.g., ulceration due to pressure. No ulceration(s) is are noted with/post debridement. The lesion is completed healed and resolved. No evidence of infection.     Continue B/L AFO.            Future Appointments   Date Time Provider Department Center    7/3/2024 11:00 AM [facility]

## 2024-07-02 NOTE — PATIENT INSTRUCTIONS
CLAUDE  Limit alcohol consumption, none until further notice   2 Weight loss goal of 30 lbs, referral for [facility] if interested. Also, if interested in a dietician visit to create a weight loss plan, contact the dietician team at [facility] at [email or web address] to schedule a visit to you can call [facility] in [place]: [phone] and the  will transfer the call to one of the dieticians to schedule an appointment. Or you can also call [phone] to schedule. They do offer video visits   3. Low carb/sugar, high fiber and protein diet.Try to limit your carb intake to LESS than 30-45 grams of carbs with a meal or LESS than 5-10 grams with any snack (total of any snack foods eaten during that time). Use My[employer]itness Pal [place] to add up your carbs through the day. Do NOT drink any beverages with calories or carbs (this can lead to high blood sugar and weight gain). Also, some of our patients have been very successful with weight loss using the pre made/planned meal planning services that limit calories and portion size (one example is Sensible Meals but there are many out there)  4. Exercise, 5 days per week, 30 minutes per day, as tolerated  5. Recommend good cholesterol, blood pressure, blood sugar levels .     In some people, fatty liver can progress to steatohepatitis (inflamatory fatty liver) and possibly to cirrhosis, putting one at increased risk for liver cancer, liver failure, and death. Therefore, the lifestyle changes are very important to decrease this risk.      Website with information about fatty liver and inflammation related to fatty liver ([place]) = www.[email or web address].com  AND [email or web address]

## 2024-07-03 ENCOUNTER — OFFICE VISIT (OUTPATIENT)
Dept: FAMILY MEDICINE | Facility: CLINIC | Age: 54
End: 2024-07-03
Payer: MEDICARE

## 2024-07-03 ENCOUNTER — PATIENT MESSAGE (OUTPATIENT)
Dept: FAMILY MEDICINE | Facility: CLINIC | Age: 54
End: 2024-07-03

## 2024-07-03 VITALS
BODY MASS INDEX: 33.5 KG/M2 | OXYGEN SATURATION: 97 % | HEIGHT: 77 IN | DIASTOLIC BLOOD PRESSURE: 88 MMHG | RESPIRATION RATE: 15 BRPM | HEART RATE: 83 BPM | WEIGHT: 283.75 LBS | TEMPERATURE: 100 F | SYSTOLIC BLOOD PRESSURE: 138 MMHG

## 2024-07-03 DIAGNOSIS — Z86.69 HISTORY OF GUILLAIN-BARRE SYNDROME: ICD-10-CM

## 2024-07-03 DIAGNOSIS — N39.0 URINARY TRACT INFECTION WITHOUT HEMATURIA, SITE UNSPECIFIED: ICD-10-CM

## 2024-07-03 DIAGNOSIS — Z09 FOLLOW-UP EXAM: Primary | ICD-10-CM

## 2024-07-03 PROCEDURE — 99999 PR PBB SHADOW E&M-EST. PATIENT-LVL V: CPT | Mod: PBBFAC,,, | Performed by: NURSE PRACTITIONER

## 2024-07-03 PROCEDURE — 99214 OFFICE O/P EST MOD 30 MIN: CPT | Mod: S$GLB,,, | Performed by: NURSE PRACTITIONER

## 2024-07-03 PROCEDURE — 3079F DIAST BP 80-89 MM HG: CPT | Mod: CPTII,S$GLB,, | Performed by: NURSE PRACTITIONER

## 2024-07-03 PROCEDURE — 3044F HG A1C LEVEL LT 7.0%: CPT | Mod: CPTII,S$GLB,, | Performed by: NURSE PRACTITIONER

## 2024-07-03 PROCEDURE — 3008F BODY MASS INDEX DOCD: CPT | Mod: CPTII,S$GLB,, | Performed by: NURSE PRACTITIONER

## 2024-07-03 PROCEDURE — 3075F SYST BP GE 130 - 139MM HG: CPT | Mod: CPTII,S$GLB,, | Performed by: NURSE PRACTITIONER

## 2024-07-03 NOTE — PROGRESS NOTES
Valerio Yan  07/03/2024  [id number]    Fabiola Andrade MD  Patient Care Team:  Fabiola Andrade MD as PCP - General (Family Medicine)  Delicia Mcginnis LPN as Care Coordinator (Internal Medicine)  Tomi Mir MD as Consulting Physician (Cardiology)  Hi Adan MD as Consulting Physician (Rheumatology)  Isabela Osborn as ED Navigator  Lizzy Jay NP as Nurse Practitioner (Family Medicine)  Roseann Aguillon MD as Consulting Physician (Neurology)  Laine Rothman NP as Nurse Practitioner (Gastroenterology)  [facility]          Visit Type:a scheduled routine follow-up visit    Chief Complaint:  Chief Complaint   Patient presents with    Follow-up       History of Present Illness:    53 year old male presents for follow up after recurrent uti. Pt reports he has completed antibiotics and denies symptoms.   He is also requesting a referral to neurology. States his neurologist is no longer available.   No medication refills being requested at this time.  No other complaints at this time.      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    COLONOSCOPY N/A 05/17/2018    Procedure: COLONOSCOPY;  Surgeon: Ilan Araya III, MD;  Location: [facility] ENDO;  Service: Endoscopy;  Laterality: N/A;    COLONOSCOPY N/A 06/28/2023    Procedure: COLONOSCOPY;  Surgeon: Colby Rodriguez MD;  Location: [facility] ENDO;  Service: Endoscopy;  Laterality: N/A;    COSMETIC SURGERY      Flap june 2008    decubitus surgery      to sacral    ESOPHAGOGASTRODUODENOSCOPY      ESOPHAGOGASTRODUODENOSCOPY N/A 06/17/2021    Procedure: EGD (ESOPHAGOGASTRODUODENOSCOPY);  Surgeon: Ban Zheng MD;  Location: [facility] ENDO;  Service: Endoscopy;  Laterality: N/A;    ESOPHAGOGASTRODUODENOSCOPY N/A 06/28/2023    Procedure: EGD (ESOPHAGOGASTRODUODENOSCOPY);  Surgeon: Colby Rodriguez MD;  Location: [facility];  Service: Endoscopy;  Laterality: N/A;    GASTROSTOMY TUBE PLACEMENT      KNEE ARTHROSCOPY Left 2002    LEFT HEART CATHETERIZATION Left 12/10/2020    Procedure: CATHETERIZATION, HEART, LEFT;  Surgeon: Tomi Mir MD;  Location: [facility] CATH LAB;  Service: Cardiology;  Laterality: Left;  730 start time    PEG TUBE REMOVAL      RADIOFREQUENCY ABLATION      RFA      ROBOT-ASSISTED CHOLECYSTECTOMY USING DA GABRIELA XI N/A 05/02/2019    Procedure: XI ROBOTIC CHOLECYSTECTOMY;  Surgeon: Valerio Lai MD;  Location: [facility] OR;  Service: General;  Laterality: N/A;    JUAN-EN-Y PROCEDURE      TONSILLECTOMY      TRACHEAL SURGERY       Family History   Problem Relation Name Age of Onset    Heart attack Mother Onelia     Heart disease Mother Onelia     Arthritis Mother Onelia     Heart disease Father Josiah     Heart attack Father Josiah     Hypertension Father Josiah     Stroke Father Josiah     Cancer Maternal Grandfather Jh     Stroke Sister Zuly      Social History     Socioeconomic History    Marital status:     Number of children: 2   Tobacco Use    Smoking status: Every Day      Current packs/day: 0.00     Average packs/day: 0.5 packs/day for 1 year (0.5 ttl pk-yrs)     Types: Cigarettes     Start date: 2021     Last attempt to quit: 2021     Years since quittin.6    Smokeless tobacco: Former     Types: Snuff     Quit date: 1999    Tobacco comments:     Unknown   Substance and Sexual Activity    Alcohol use: Yes     Alcohol/week: 14.0 standard drinks of alcohol     Types: 6 Glasses of wine, 4 Cans of beer, 4 Shots of liquor per week     Comment: no alcohol for past week, usually 7 drinks/week    Drug use: No    Sexual activity: Yes     Partners: Female     Birth control/protection: None     Social Determinants of Health     Financial Resource Strain: Medium Risk (2024)    Overall Financial Resource Strain (CARDIA)     Difficulty of Paying Living Expenses: Somewhat hard   Food Insecurity: Food Insecurity Present (2024)    Hunger Vital Sign     Worried About Running Out of Food in the Last Year: Sometimes true     Ran Out of Food in the Last Year: Never true   Transportation Needs: No Transportation Needs (2024)    PRAPARE - Transportation     Lack of Transportation (Medical): No     Lack of Transportation (Non-Medical): No   Physical Activity: Inactive (2024)    Exercise Vital Sign     Days of Exercise per Week: 0 days     Minutes of Exercise per Session: 0 min   Stress: Stress Concern Present (2024)    [employer] [place] [employer] - Occupational Stress Questionnaire     Feeling of Stress : To some extent   Housing Stability: High Risk (2024)    Housing Stability Vital Sign     Unable to Pay for Housing in the Last Year: Yes     Homeless in the Last Year: No     Patient Active Problem List   Diagnosis    Epigastric pain    Polyneuropathy    Ozuna esophagus    Peptic ulcer disease    Ozuna's esophagus    Typical atrial flutter    Ozuna's esophagus without dysplasia    Mitral regurgitation    Palpitations    Former cigarette  "smoker    Diaphoresis    Hypertension    LVH (left ventricular hypertrophy) due to hypertensive disease    Mixed hyperlipidemia    Complex regional pain syndrome type 2 of both lower extremities    Rotator cuff syndrome of right shoulder and allied disorders    Tendonitis of long head of biceps brachii of right shoulder    Primary osteoarthritis of left knee    Atypical chest pain    Abnormal CK    Dizziness    Symptomatic cholelithiasis    Hypokalemia    Family history of cardiovascular disease    CAD in native artery    Elevated coronary artery calcium score (2893)    Sinus bradycardia    At risk for sleep apnea    CAD, multiple vessel    Sleep-disordered breathing    Psychophysiological insomnia    Inadequate sleep hygiene    Obstructive sleep apnea    CSF leak    Quadriparesis    Numbness and tingling    Dysautonomia    Sensory ataxia    Heat intolerance    History of Guillain-[place] syndrome    Severe obesity (BMI 35.0-39.9) with comorbidity    Complication of statin therapy    Statin intolerance    Statin-induced myositis    Aortic atherosclerosis    Alcoholism    Black stools    Acute cystitis without hematuria    Benign prostatic hyperplasia with urinary obstruction    Combined arterial insufficiency and corporo-venous occlusive erectile dysfunction    Premature ejaculation    Bilateral hydronephrosis    Sebaceous cyst of scrotum    Tobacco abuse    Elevated LFTs    Metabolic dysfunction-associated steatotic liver disease (MASLD)    Alcohol use disorder    Elevated liver enzymes    Fatty liver     Review of patient's allergies indicates:   Allergen Reactions    Metoclopramide Other (See Comments) and Hives     Pt. Was in coma so is not aware of the reaction  Pt states "he don't know, was in coma"      Metoclopramide (bulk)      Other reaction(s): Unable to obtain    Reglan  [metoclopramide hcl] Other (See Comments)     Pt. Was in coma so is not aware of the reaction  Other reaction(s): Unable to obtain    " Statins-hmg-coa reductase inhibitors      Myalgia      Sulfa (sulfonamide antibiotics) Other (See Comments)     Never taken  States son is allergic    Latex Hives, Itching and Rash           Latex, natural rubber Rash     Blisters, adhesives          The following were reviewed at this visit: active problem list, medication list, allergies, family history, social history, and health maintenance.    Medications:  Current Outpatient Medications on File Prior to Visit   Medication Sig Dispense Refill    alirocumab (PRALUENT PEN) 150 mg/mL PnIj INJECT 1.06MLS ( 159 MG TOTAL ) BY ABDOMINAL SUBCUTANEOUS ROUTE EVERY 14 DAYS 2 mL 12    amLODIPine (NORVASC) 5 MG tablet Take 1 tablet (5 mg total) by mouth every evening. 60 tablet 12    aspirin (ECOTRIN) 81 MG EC tablet Take 81 mg by mouth once daily.      calcium-vitamin D 600 mg, 1,500mg,-200 unit 600 mg(1,500mg) -200 unit Tab Take 1 tablet by mouth once daily.       celecoxib (CELEBREX) 100 MG capsule Take 1 capsule (100 mg total) by mouth 2 (two) times daily. 60 capsule 2    cyanocobalamin (VITAMIN B-12) 1,000 mcg/mL injection Inject 1 mL (1,000 mcg total) into the muscle every 30 days. 10 mL 0    doxycycline (VIBRAMYCIN) 100 MG Cap Take 1 capsule (100 mg total) by mouth 2 (two) times daily. for 7 days 14 capsule 0    fluticasone propionate (FLONASE) 50 mcg/actuation nasal spray 1 spray (50 mcg total) by Each Nostril route once daily. 16 g 1    LIDOcaine (LIDODERM) 5 % Place 1 patch onto the skin once daily. Remove & Discard patch within 12 hours or as directed by MD 30 patch 0    lisinopriL-hydrochlorothiazide (PRINZIDE,ZESTORETIC) 20-12.5 mg per tablet Take 2 tablets by mouth once daily. 180 tablet 3    methylPREDNISolone (MEDROL DOSEPACK) 4 mg tablet follow package directions 21 tablet 0    metoprolol succinate (TOPROL-XL) 50 MG 24 hr tablet Take 0.5 tablets (25 mg total) by mouth every other day. 30 tablet 6    multivitamin (THERAGRAN) per tablet Take 1 tablet by mouth  nightly.       ondansetron (ZOFRAN) 4 MG tablet Take 1 tablet (4 mg total) by mouth every 6 (six) hours. 20 tablet 0    paroxetine (PAXIL) 10 MG tablet Take 1 tablet (10 mg total) by mouth every morning. 30 tablet 11    potassium chloride SA (K-DUR,KLOR-CON) 20 MEQ tablet Take 1 tablet (20 mEq total) by mouth 2 (two) times daily. 60 tablet 1    tadalafiL (CIALIS) 20 MG Tab Take 1 tablet (20 mg total) by mouth every 24 hours as needed (erectile dysfunction). 20 tablet 11    tamsulosin (FLOMAX) 0.4 mg Cap Take 1 capsule (0.4 mg total) by mouth once daily. 90 capsule 3    cetirizine (ZYRTEC) 10 MG tablet Take 1 tablet (10 mg total) by mouth once daily. 30 tablet 5    esomeprazole (NEXIUM) 20 MG capsule Take 2 capsules (40 mg total) by mouth before breakfast. 60 capsule 11    folic acid (FOLVITE) 1 MG tablet Take 1 tablet (1 mg total) by mouth once daily. 30 tablet 0     No current facility-administered medications on file prior to visit.       Medications have been reviewed and reconciled with patient at this visit.  Barriers to medications reviewed with patient.    Adverse reactions to current medications reviewed with patient..    Over the counter medications reviewed and reconciled with patient.    Exam:  Wt Readings from Last 3 Encounters:   07/03/24 128.7 kg (283 lb 11.7 oz)   07/02/24 128.5 kg (283 lb 6.4 oz)   06/26/24 130.1 kg (286 lb 13.1 oz)     Temp Readings from Last 3 Encounters:   07/03/24 99.6 °F (37.6 °C) (Tympanic)   06/26/24 100 °F (37.8 °C) (Tympanic)   04/01/24 98.4 °F (36.9 °C) (Tympanic)     BP Readings from Last 3 Encounters:   07/03/24 138/88   07/02/24 (!) 124/90   06/26/24 116/72     Pulse Readings from Last 3 Encounters:   07/03/24 83   07/02/24 60   06/26/24 95     Body mass index is 33.65 kg/m².      Review of Systems   Constitutional:  Negative for fever.   Respiratory:  Negative for cough, shortness of breath and wheezing.    Cardiovascular:  Negative for chest pain and palpitations.    Gastrointestinal:  Negative for nausea.   Neurological:  Negative for speech change, weakness and headaches.   All other systems reviewed and are negative.    Physical Exam  Vitals and nursing note reviewed.   Constitutional:       Appearance: Normal appearance. He is normal weight.   HENT:      Head: Normocephalic and atraumatic.      Right Ear: Tympanic membrane, ear canal and external ear normal.      Left Ear: Tympanic membrane, ear canal and external ear normal.      Nose: Nose normal.      Mouth/Throat:      Mouth: Mucous membranes are moist.      Pharynx: Oropharynx is clear.   Eyes:      Extraocular Movements: Extraocular movements intact.      Conjunctiva/sclera: Conjunctivae normal.      Pupils: Pupils are equal, round, and reactive to light.   Cardiovascular:      Rate and Rhythm: Normal rate and regular rhythm.      Pulses: Normal pulses.      Heart sounds: Normal heart sounds.   Pulmonary:      Effort: Pulmonary effort is normal.      Breath sounds: Normal breath sounds.   Abdominal:      General: Bowel sounds are normal.      Palpations: Abdomen is soft.   Musculoskeletal:         General: Normal range of motion.      Cervical back: Normal range of motion and neck supple.   Skin:     General: Skin is warm and dry.      Capillary Refill: Capillary refill takes less than 2 seconds.   Neurological:      General: No focal deficit present.      Mental Status: He is alert and oriented to person, place, and time.   Psychiatric:         Mood and Affect: Mood normal.         Behavior: Behavior normal.         Thought Content: Thought content normal.         Judgment: Judgment normal.         Laboratory Reviewed ({Yes)  Lab Results   Component Value Date    WBC 6.30 07/01/2024    HGB 16.0 07/01/2024    HCT 45.7 07/01/2024     07/01/2024    CHOL 200 (H) 07/01/2024    TRIG 169 (H) 07/01/2024    HDL 44 07/01/2024    ALT 57 (H) 07/01/2024    AST 63 (H) 07/01/2024     07/01/2024    K 3.5 07/01/2024      07/01/2024    CREATININE 0.6 07/01/2024    BUN 4 (L) 07/01/2024    CO2 22 (L) 07/01/2024    TSH 1.895 07/01/2024    PSA 1.8 07/01/2024    INR 1.1 08/30/2023    HGBA1C 4.8 07/01/2024       Valerio was seen today for follow-up.    Diagnoses and all orders for this visit:    Follow-up exam          Plan  Referral neurology for disability paperwork completion  Referrals to urology      Care Plan/Goals: Reviewed    Goals         Blood Pressure < 130/80 (pt-stated)           Valerio was seen today for follow-up.    Diagnoses and all orders for this visit:    Follow-up exam    Urinary tract infection without hematuria, site unspecified  -     Ambulatory referral/consult to Urology; Future    History of Guillain-[place] syndrome  -     Ambulatory referral/consult to Neurology; Future       Follow up: Follow up if symptoms worsen or fail to improve.    After visit summary was printed and given to patient upon discharge today.  Patient goals and care plan are included in After Visit Summary.

## 2024-07-08 ENCOUNTER — TELEPHONE (OUTPATIENT)
Dept: HEPATOLOGY | Facility: CLINIC | Age: 54
End: 2024-07-08
Payer: MEDICARE

## 2024-07-08 NOTE — TELEPHONE ENCOUNTER
Spoke with patient in regard to needing to schedule appointments. Patient already has all appointments scheduled and voices understanding.    ----- Message from Colby Rodriguez MD sent at 7/2/2024 10:12 AM CDT -----  Labs in 3-4 weeks  Labs and clinic in 3 months

## 2024-07-09 ENCOUNTER — OFFICE VISIT (OUTPATIENT)
Dept: FAMILY MEDICINE | Facility: CLINIC | Age: 54
End: 2024-07-09
Attending: FAMILY MEDICINE
Payer: MEDICARE

## 2024-07-09 VITALS
DIASTOLIC BLOOD PRESSURE: 62 MMHG | SYSTOLIC BLOOD PRESSURE: 114 MMHG | WEIGHT: 287.5 LBS | OXYGEN SATURATION: 98 % | HEART RATE: 60 BPM | TEMPERATURE: 98 F | HEIGHT: 77 IN | BODY MASS INDEX: 33.95 KG/M2

## 2024-07-09 DIAGNOSIS — G65.0 SEQUELAE OF GUILLAIN-BARRE SYNDROME: Primary | ICD-10-CM

## 2024-07-09 PROCEDURE — 99999 PR PBB SHADOW E&M-EST. PATIENT-LVL IV: CPT | Mod: PBBFAC,,, | Performed by: FAMILY MEDICINE

## 2024-07-10 ENCOUNTER — OFFICE VISIT (OUTPATIENT)
Dept: NEUROLOGY | Facility: CLINIC | Age: 54
End: 2024-07-10
Payer: MEDICARE

## 2024-07-10 VITALS
OXYGEN SATURATION: 99 % | RESPIRATION RATE: 16 BRPM | WEIGHT: 286.63 LBS | SYSTOLIC BLOOD PRESSURE: 162 MMHG | HEART RATE: 67 BPM | HEIGHT: 77 IN | DIASTOLIC BLOOD PRESSURE: 94 MMHG | BODY MASS INDEX: 33.84 KG/M2

## 2024-07-10 DIAGNOSIS — G82.50 QUADRIPARESIS: ICD-10-CM

## 2024-07-10 DIAGNOSIS — G96.00 CSF LEAK: ICD-10-CM

## 2024-07-10 DIAGNOSIS — R93.1 ELEVATED CORONARY ARTERY CALCIUM SCORE: ICD-10-CM

## 2024-07-10 DIAGNOSIS — R27.8 SENSORY ATAXIA: ICD-10-CM

## 2024-07-10 DIAGNOSIS — Z86.69 HISTORY OF GUILLAIN-BARRE SYNDROME: ICD-10-CM

## 2024-07-10 DIAGNOSIS — I48.3 TYPICAL ATRIAL FLUTTER: ICD-10-CM

## 2024-07-10 DIAGNOSIS — R74.8 ABNORMAL CK: ICD-10-CM

## 2024-07-10 DIAGNOSIS — M21.372 BILATERAL FOOT-DROP: ICD-10-CM

## 2024-07-10 DIAGNOSIS — G57.71 COMPLEX REGIONAL PAIN SYNDROME TYPE 2 OF BOTH LOWER EXTREMITIES: ICD-10-CM

## 2024-07-10 DIAGNOSIS — F10.20 ALCOHOLISM: ICD-10-CM

## 2024-07-10 DIAGNOSIS — F10.90 ALCOHOL USE DISORDER: ICD-10-CM

## 2024-07-10 DIAGNOSIS — L72.3 SEBACEOUS CYST OF SCROTUM: ICD-10-CM

## 2024-07-10 DIAGNOSIS — E78.2 MIXED HYPERLIPIDEMIA: ICD-10-CM

## 2024-07-10 DIAGNOSIS — G57.72 COMPLEX REGIONAL PAIN SYNDROME TYPE 2 OF BOTH LOWER EXTREMITIES: ICD-10-CM

## 2024-07-10 DIAGNOSIS — G65.0 SEQUELAE OF GUILLAIN-BARRE SYNDROME: Primary | ICD-10-CM

## 2024-07-10 DIAGNOSIS — G62.9 POLYNEUROPATHY: ICD-10-CM

## 2024-07-10 DIAGNOSIS — Z82.49 FAMILY HISTORY OF CARDIOVASCULAR DISEASE: ICD-10-CM

## 2024-07-10 DIAGNOSIS — G61.0 GUILLAIN-BARRE: ICD-10-CM

## 2024-07-10 DIAGNOSIS — I25.10 CAD IN NATIVE ARTERY: ICD-10-CM

## 2024-07-10 DIAGNOSIS — M21.371 BILATERAL FOOT-DROP: ICD-10-CM

## 2024-07-10 DIAGNOSIS — R20.2 NUMBNESS AND TINGLING: ICD-10-CM

## 2024-07-10 DIAGNOSIS — R20.0 NUMBNESS AND TINGLING: ICD-10-CM

## 2024-07-10 PROCEDURE — 99999 PR PBB SHADOW E&M-EST. PATIENT-LVL V: CPT | Mod: PBBFAC,,, | Performed by: NURSE PRACTITIONER

## 2024-07-10 RX ORDER — ALIROCUMAB 150 MG/ML
INJECTION, SOLUTION SUBCUTANEOUS
Qty: 2 ML | Refills: 12 | Status: SHIPPED | OUTPATIENT
Start: 2024-07-10

## 2024-07-10 RX ORDER — FUROSEMIDE 20 MG/1
20 TABLET ORAL 2 TIMES DAILY
COMMUNITY

## 2024-07-10 NOTE — PROGRESS NOTES
Subjective:       Patient ID: Valerio Yan is a 53 y.o. male.    Chief Complaint: Guillain-[place]ovell          HPI       The patient is new to me. Saw Dr. Hogue and Luiz, TIFFANIE.      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 Spent several weeks in the ICU and was in and out of ICU till  with various complications related to sepsis (decubuitis ulcers with PA) and cardiac dysautonomia. S/P Trach and PEG (removed and closed).  Was discharged to LTAC then Rehab. Came home in  and was using WC/Walker to ambulate. After traveling to the [facility] he learned to walk independently and was able to start walking in . 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 He is functionally quadriparetic with B/L AFOs (wears the RT only based on his wife's advice).  He is unable to move his LT wrist and unable to rotate his RUE. GBS also left him with loss of feeling below the elbow and knees (relative preservation of the medial fingers) and temprature dysregulation (heat intolerance).  Had a sustained bout of unexplained sweating that ultimately resolved. He is unable to maintain his balance with his eyes closed.     While he was inpatient he was found to have CSF leak and underwent RT craniotomy with dural repair as well.         INTERVAL HISTORY 07-: Patient present for follow up and paperwork for [facility]. Patient new to me but known to Dr. Aguillon. Patient is status post Idiopathic GBS (AMSAN) bilateral foot drop. Patient reports  that he is able to walk with AFO, gait is very unstable GBS also left him with loss of feeling below the elbow and knees (relative preservation of the medial fingers) and temprature dysregulation (heat intolerance).  Had a sustained bout of unexplained sweating that ultimately resolved. He is unable to maintain  his balance. Filled out his LTD paperwork. Patient recently discontinue alcohol use a week ago.             Review of Systems   Constitutional:  Negative for appetite change and fatigue.   HENT:  Negative for hearing loss and tinnitus.    Eyes:  Negative for photophobia and visual disturbance.   Respiratory:  Positive for apnea. Negative for shortness of breath.    Cardiovascular:  Negative for chest pain and palpitations.   Gastrointestinal:  Negative for nausea and vomiting.   Endocrine: Negative for cold intolerance and heat intolerance.   Genitourinary:  Negative for difficulty urinating and urgency.   Musculoskeletal:  Positive for arthralgias, back pain and gait problem. Negative for joint swelling, myalgias, neck pain and neck stiffness.   Skin:  Negative for color change and rash.   Allergic/Immunologic: Negative for environmental allergies and immunocompromised state.   Neurological:  Positive for weakness and numbness. Negative for dizziness, tremors, seizures, syncope, facial asymmetry, speech difficulty, light-headedness and headaches.   Hematological:  Negative for adenopathy. Does not bruise/bleed easily.   Psychiatric/Behavioral:  Negative for agitation, behavioral problems, confusion, decreased concentration, dysphoric mood, hallucinations, self-injury, sleep disturbance and suicidal ideas. The patient is not hyperactive.                  Current Outpatient Medications:     amLODIPine (NORVASC) 5 MG tablet, Take 1 tablet (5 mg total) by mouth every evening., Disp: 60 tablet, Rfl: 12    aspirin (ECOTRIN) 81 MG EC tablet, Take 81 mg by mouth once daily., Disp: , Rfl:     calcium-vitamin D 600 mg, 1,500mg,-200 unit 600 mg(1,500mg) -200 unit Tab, Take 1 tablet by mouth once daily. , Disp: , Rfl:     celecoxib (CELEBREX) 100 MG capsule, Take 1 capsule (100 mg total) by mouth 2 (two) times daily., Disp: 60 capsule, Rfl: 2    cetirizine (ZYRTEC) 10 MG tablet, Take 1 tablet (10 mg total) by mouth once  daily., Disp: 30 tablet, Rfl: 5    cyanocobalamin (VITAMIN B-12) 1,000 mcg/mL injection, Inject 1 mL (1,000 mcg total) into the muscle every 30 days., Disp: 10 mL, Rfl: 0    esomeprazole (NEXIUM) 20 MG capsule, Take 2 capsules (40 mg total) by mouth before breakfast., Disp: 60 capsule, Rfl: 11    fluticasone propionate (FLONASE) 50 mcg/actuation nasal spray, 1 spray (50 mcg total) by Each Nostril route once daily., Disp: 16 g, Rfl: 1    folic acid (FOLVITE) 1 MG tablet, Take 1 tablet (1 mg total) by mouth once daily., Disp: 30 tablet, Rfl: 0    furosemide (LASIX) 20 MG tablet, Take 20 mg by mouth 2 (two) times daily., Disp: , Rfl:     LIDOcaine (LIDODERM) 5 %, Place 1 patch onto the skin once daily. Remove & Discard patch within 12 hours or as directed by MD, Disp: 30 patch, Rfl: 0    lisinopriL-hydrochlorothiazide (PRINZIDE,ZESTORETIC) 20-12.5 mg per tablet, Take 2 tablets by mouth once daily., Disp: 180 tablet, Rfl: 3    methylPREDNISolone (MEDROL DOSEPACK) 4 mg tablet, follow package directions, Disp: 21 tablet, Rfl: 0    metoprolol succinate (TOPROL-XL) 50 MG 24 hr tablet, Take 0.5 tablets (25 mg total) by mouth every other day., Disp: 30 tablet, Rfl: 6    multivitamin (THERAGRAN) per tablet, Take 1 tablet by mouth nightly. , Disp: , Rfl:     ondansetron (ZOFRAN) 4 MG tablet, Take 1 tablet (4 mg total) by mouth every 6 (six) hours., Disp: 20 tablet, Rfl: 0    paroxetine (PAXIL) 10 MG tablet, Take 1 tablet (10 mg total) by mouth every morning., Disp: 30 tablet, Rfl: 11    potassium chloride SA (K-DUR,KLOR-CON) 20 MEQ tablet, Take 1 tablet (20 mEq total) by mouth 2 (two) times daily., Disp: 60 tablet, Rfl: 1    tadalafiL (CIALIS) 20 MG Tab, Take 1 tablet (20 mg total) by mouth every 24 hours as needed (erectile dysfunction)., Disp: 20 tablet, Rfl: 11    tamsulosin (FLOMAX) 0.4 mg Cap, Take 1 capsule (0.4 mg total) by mouth once daily., Disp: 90 capsule, Rfl: 3    alirocumab (PRALUENT PEN) 150 mg/mL PnIj, INJECT  1.06 ML (159 MG TOTAL) BY ABDOMINAL SUBCUTANEOUS ROUTE EVERY 14 DAYS, Disp: 2 mL, Rfl: 12  Past Medical History:   Diagnosis Date    Arm vein blood clot, bilateral     Atrial flutter     Ozuna's esophagus     CRPS (complex regional pain syndrome type II)     Decubitus skin ulcer     Encounter for blood transfusion     Guillain-Talihina     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    Hyperlipidemia     Hypertension     Joint pain     knees    LVH (left ventricular hypertrophy) due to hypertensive disease 2/10/2017    Mitral regurgitation 6/9/2016    KIA (obstructive sleep apnea)     Primary osteoarthritis of left knee 1/7/2019    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    COLONOSCOPY N/A 05/17/2018    Procedure: COLONOSCOPY;  Surgeon: Ilan Araya III, MD;  Location: [facility];  Service: Endoscopy;  Laterality: N/A;    COLONOSCOPY N/A 06/28/2023    Procedure: COLONOSCOPY;  Surgeon: Colby Rodriguez MD;  Location: [facility];  Service: Endoscopy;  Laterality: N/A;    COSMETIC SURGERY      Flap june 2008    decubitus surgery      to sacral    ESOPHAGOGASTRODUODENOSCOPY      ESOPHAGOGASTRODUODENOSCOPY N/A 06/17/2021    Procedure: EGD (ESOPHAGOGASTRODUODENOSCOPY);  Surgeon: Ban Zheng MD;  Location: [facility];  Service: Endoscopy;  Laterality: N/A;    ESOPHAGOGASTRODUODENOSCOPY N/A 06/28/2023    Procedure: EGD (ESOPHAGOGASTRODUODENOSCOPY);  Surgeon: Colby Rodriguez MD;  Location: [facility];  Service: Endoscopy;  Laterality: N/A;    GASTROSTOMY TUBE PLACEMENT      KNEE ARTHROSCOPY Left 2002    LEFT HEART CATHETERIZATION Left  12/10/2020    Procedure: CATHETERIZATION, HEART, LEFT;  Surgeon: Tomi Mir MD;  Location: [facility] CATH LAB;  Service: Cardiology;  Laterality: Left;  730 start time    PEG TUBE REMOVAL      RADIOFREQUENCY ABLATION      RFA      ROBOT-ASSISTED CHOLECYSTECTOMY USING DA GABRIELA XI N/A 2019    Procedure: XI ROBOTIC CHOLECYSTECTOMY;  Surgeon: Valerio Lai MD;  Location: [facility] OR;  Service: General;  Laterality: N/A;    JUAN-EN-Y PROCEDURE      TONSILLECTOMY      TRACHEAL SURGERY       Social History     Socioeconomic History    Marital status:     Number of children: 2   Tobacco Use    Smoking status: Every Day     Current packs/day: 0.00     Average packs/day: 0.5 packs/day for 1 year (0.5 ttl pk-yrs)     Types: Cigarettes     Start date: 2021     Last attempt to quit: 2021     Years since quittin.6    Smokeless tobacco: Former     Types: Snuff     Quit date: 1999    Tobacco comments:     Unknown   Substance and Sexual Activity    Alcohol use: Not Currently     Alcohol/week: 14.0 standard drinks of alcohol     Types: 6 Glasses of wine, 4 Cans of beer, 4 Shots of liquor per week    Drug use: No    Sexual activity: Yes     Partners: Female     Birth control/protection: None     Social Determinants of Health     Financial Resource Strain: Medium Risk (2024)    Overall Financial Resource Strain (CARDIA)     Difficulty of Paying Living Expenses: Somewhat hard   Food Insecurity: Food Insecurity Present (2024)    Hunger Vital Sign     Worried About Running Out of Food in the Last Year: Sometimes true     Ran Out of Food in the Last Year: Never true   Transportation Needs: No Transportation Needs (2024)    PRAPARE - Transportation     Lack of Transportation (Medical): No     Lack of Transportation (Non-Medical): No   Physical Activity: Inactive (2024)    Exercise Vital Sign     Days of Exercise per Week: 0 days     Minutes of Exercise per Session: 0 min   Stress: Stress  Concern Present (6/24/2024)    [employer] [place] [employer] - Occupational Stress Questionnaire     Feeling of Stress : To some extent   Housing Stability: High Risk (6/24/2024)    Housing Stability Vital Sign     Unable to Pay for Housing in the Last Year: Yes     Homeless in the Last Year: No             Past/Current Medical/Surgical History, Past/Current Social History, Past/Current Family History and Past/Current Medications were reviewed in detail.        Objective:           VITAL SIGNS WERE REVIEWED      GENERAL APPEARANCE:     The patient looks comfortable.    BMI 35.58    No signs of respiratory distress.    Normal breathing pattern.    No dysmorphic features    Normal eye contact.     GENERAL MEDICAL EXAM:    HEENT:  Head is atraumatic normocephalic. Fundoscopic (Ophthalmoscopic) exam showed no disc edema.  Trach scar. RT craniotomy scar.     Neck and Axillae: No JVD. No visible lesions.    Cardiopulmonary: No cyanosis. No tachypnea. Normal respiratory effort.    Gastrointestinal/Urogenital:  No jaundice. No stomas or lesions. No visible hernias. No catheters. PEG scar.     Skin, Hair and Nails: No pathognonomic skin rash. Grafted decubitus ulcer. No neurofibromatosis. No visible lesions.No stigmata of autoimmune disease. No clubbing.    Limbs: No varicose veins. No visible swelling.    Muskoskeletal: Flexion visible deformities. AFOs         Neurologic Exam     Mental Status   Oriented to person, place, and time.   Follows 3 step commands.   Attention: normal. Concentration: normal.   Speech: speech is normal   Level of consciousness: alert  Able to name object. Able to repeat. Normal comprehension.     Cranial Nerves   Cranial nerves II through XII intact.     CN II   Visual fields full to confrontation.   Visual acuity: normal  Right visual field deficit: none  Left visual field deficit: none     CN III, IV, VI   Pupils are equal, round, and reactive to light.  Extraocular motions are  normal.   Right pupil: Size: 2 mm. Shape: regular. Reactivity: brisk. Consensual response: intact. Accommodation: intact.   Left pupil: Size: 2 mm. Shape: regular. Reactivity: brisk. Consensual response: intact. Accommodation: intact.   CN III: no CN III palsy  CN VI: no CN VI palsy  Nystagmus: none   Diplopia: none  Ophthalmoparesis: none  Upgaze: normal  Downgaze: normal  Conjugate gaze: present  Vestibulo-ocular reflex: present    CN V   Facial sensation intact.   Right facial sensation deficit: none  Left facial sensation deficit: none    CN VII   Facial expression full, symmetric.   Right facial weakness: none  Left facial weakness: none    CN VIII   CN VIII normal.   Hearing: intact    CN IX, X   CN IX normal.   CN X normal.   Palate: symmetric    CN XI   CN XI normal.   Right sternocleidomastoid strength: normal  Left sternocleidomastoid strength: normal  Right trapezius strength: normal  Left trapezius strength: normal    CN XII   CN XII normal.   Tongue: not atrophic  Fasciculations: absent  Tongue deviation: none    Motor Exam   Muscle bulk: decreased  Overall muscle tone: decreased  Right arm tone: decreased  Left arm tone: decreased  Right leg tone: decreased  Left leg tone: decreased    Strength   Right neck flexion: 5/5  Left neck flexion: 5/5  Right neck extension: 5/5  Left neck extension: 5/5  Right deltoid: 4/5  Left deltoid: 5/5  Right biceps: 3/5  Left biceps: 4/5  Right triceps: 3/5  Left triceps: 4/5  Right wrist flexion: 4/5  Left wrist flexion: 0/5  Right wrist extension: 4/5  Left wrist extension: 0/5  Right interossei: 3/5  Left interossei: 3/5  Right iliopsoas: 4/5  Left iliopsoas: 5/5  Right quadriceps: 4/5  Left quadriceps: 5/5  Right hamstrin/5  Left hamstrin/5  Right glutei: 4/5  Left glutei: 5/5  Right anterior tibial: 1/5  Left anterior tibial: 2/5  Right posterior tibial: 2/5  Left posterior tibial: 3/5  Right peroneal: 1/5  Left peroneal: 2/5  Right gastroc: 2/5  Left  gastroc: 3/5    Sensory Exam   Right arm light touch: decreased from elbow  Left arm light touch: decreased from elbow  Right leg light touch: decreased from knee  Left leg light touch: decreased from knee  Right arm vibration: normal  Left arm vibration: normal  Right leg vibration: decreased from knee  Left leg vibration: decreased from knee  Right arm proprioception: normal  Left arm proprioception: normal  Right leg proprioception: decreased from knee  Left leg proprioception: decreased from knee  Right arm pinprick: decreased from elbow  Left arm pinprick: decreased from elbow  Right leg pinprick: decreased from knee  Left leg pinprick: decreased from knee  Graphesthesia: normal  Stereognosis: normal    Relative preservation of the medial fingers.     Heat intolerance.      Gait, Coordination, and Reflexes     Gait  Gait: (High Steppage Gait)    Coordination   Romberg: positive  Finger to nose coordination: normal  Heel to shin coordination: normal    Tremor   Resting tremor: absent  Intention tremor: absent  Action tremor: absent    Reflexes   Right brachioradialis: 0  Left brachioradialis: 0  Right biceps: 0  Left biceps: 0  Right triceps: 0  Left triceps: 0  Right patellar: 0  Left patellar: 0  Right achilles: 0  Left achilles: 0  Right plantar: normal  Left plantar: normal  Right Pathak: absent  Left Pathak: absent  Right ankle clonus: absent  Left ankle clonus: absent  Right pendular knee jerk: absent  Left pendular knee jerk: absent      Lab Results   Component Value Date    WBC 6.30 07/01/2024    HGB 16.0 07/01/2024    HCT 45.7 07/01/2024    MCV 97 07/01/2024     07/01/2024     Sodium   Date Value Ref Range Status   07/01/2024 140 136 - 145 mmol/L Final     Potassium   Date Value Ref Range Status   07/01/2024 3.5 3.5 - 5.1 mmol/L Final     Chloride   Date Value Ref Range Status   07/01/2024 105 95 - 110 mmol/L Final     CO2   Date Value Ref Range Status   07/01/2024 22 (L) 23 - 29 mmol/L Final      Glucose   Date Value Ref Range Status   07/01/2024 100 70 - 110 mg/dL Final     BUN   Date Value Ref Range Status   07/01/2024 4 (L) 6 - 20 mg/dL Final     Creatinine   Date Value Ref Range Status   07/01/2024 0.6 0.5 - 1.4 mg/dL Final     Calcium   Date Value Ref Range Status   07/01/2024 8.8 8.7 - 10.5 mg/dL Final     Total Protein   Date Value Ref Range Status   07/01/2024 6.8 6.0 - 8.4 g/dL Final     Albumin   Date Value Ref Range Status   07/01/2024 3.3 (L) 3.5 - 5.2 g/dL Final   05/19/2017 4.3 3.6 - 5.1 g/dL Final     Comment:     @ Test Performed By:  [facility]  JANE Caruso M.D.,   [id number] [place] [id number]  [facility]  [id number]       Total Bilirubin   Date Value Ref Range Status   07/01/2024 1.0 0.1 - 1.0 mg/dL Final     Comment:     For infants and newborns, interpretation of results should be based  on gestational age, weight and in agreement with clinical  observations.    Premature Infant recommended reference ranges:  Up to 24 hours.............<8.0 mg/dL  Up to 48 hours............<12.0 mg/dL  3-5 days..................<15.0 mg/dL  6-29 days.................<15.0 mg/dL       Alkaline Phosphatase   Date Value Ref Range Status   07/01/2024 104 55 - 135 U/L Final     AST   Date Value Ref Range Status   07/01/2024 63 (H) 10 - 40 U/L Final     ALT   Date Value Ref Range Status   07/01/2024 57 (H) 10 - 44 U/L Final     Anion Gap   Date Value Ref Range Status   07/01/2024 13 8 - 16 mmol/L Final     eGFR if    Date Value Ref Range Status   05/04/2022 >60.0 >60 mL/min/1.73 m^2 Final     eGFR if non    Date Value Ref Range Status   05/04/2022 >60.0 >60 mL/min/1.73 m^2 Final     Comment:     Calculation used to obtain the estimated glomerular filtration  rate (eGFR) is the CKD-EPI equation.        Lab Results   Component Value Date    JAHJPCAM33 855 05/31/2017     Lab Results   Component Value Date    TSH 1.895  07/01/2024    G8OOJFV 102 05/23/2011    O5JBILR 5.5 05/23/2011    FREET4 0.87 07/01/2024 2011-2017    TFT, B12 NL     11-    Brain MRI  Postsurgical changes in RT FL  with a focal area of encephalomalacia, surgical defect and a small amount of gliosis.  This appearance is similar to a prior CT scan of the head dated 02/06/2019.               12-    NCS/EMG Extremely Severe ASMPN with B/L CTS     05-    CT HEAD WO    No definite acute finding.   Postop change right frontal region, new since previous. Some underlying encephalomalacia.   Severe cerebral atrophy, somewhat greater than previous.   Postop change paranasal sinuses with some chronic mucosal thickening.   Reviewed the neuroimaging independently       05-    CT C-SPINE     No evidence of injury    Multilevel degenerative change, as above. No significant narrowing of canal.       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 Complex regional pain syndrome type 2 of both lower extremities    3. Guillain-[place]    4. Sensory ataxia    5. Polyneuropathy    6. Quadriparesis    7. Bilateral foot-drop    8. History of Guillain-[place] syndrome    9. Alcoholism    10. Alcohol use disorder    11. CSF leak    12. Numbness and tingling    13. Sebaceous cyst of scrotum    14. Typical atrial flutter        Plan:               HISTORY OF SEVERE IDIOPATHIC GBS-AMSAN S/P PROLONGED ICU AND INPATIENT CARE    RESIDUAL QUADRIPARESIS, LENGTH-DEPENDENT PARAESTHESIAS,  SENSORY ATAXIA, CHRONIC PAIN AND DYSAUTONOMIA (DECREASED HEAT PREPERCEPTION )       Does not want any pain medications.     Reached his maximum rehab potential and does not wish to restart rehab.    Falling down precautions.    Wants to explore nerve transplant and stem cell transplant.    Filled out his LTD paperwork.      HISTORY OF DURAL TEAR AND CSF LEAK      Brain MRI WO.                      MEDICAL/SURGICAL COMORBIDITIES     All relevant medical comorbidities noted  and managed by primary care physician and medical care team.          MISCELLANEOUS MEDICAL PROBLEMS       HEALTHY LIFESTYLE AND PREVENTATIVE CARE    The patient to adhere to the age-appropriate health maintenance guidelines including screening tests and vaccinations. The patient to adhere to  healthy lifestyle, optimal weight, exercise, healthy diet, good sleep hygiene and avoiding drugs including smoking, alcohol and recreational drugs.        RTC annually        Luiz Finley, MSN NP      Collaborating Provider: Roseann Aguillon MD, FAAN Neurologist/Epileptologist          TOTAL E/M 40

## 2024-07-22 ENCOUNTER — HOSPITAL ENCOUNTER (OUTPATIENT)
Dept: RADIOLOGY | Facility: HOSPITAL | Age: 54
Discharge: HOME | End: 2024-07-22
Attending: STUDENT IN AN ORGANIZED HEALTH CARE EDUCATION/TRAINING PROGRAM
Payer: MEDICARE

## 2024-07-22 DIAGNOSIS — S62.327A DISPLACED FRACTURE OF SHAFT OF FIFTH METACARPAL BONE, LEFT HAND, INITIAL ENCOUNTER FOR CLOSED FRACTURE: ICD-10-CM

## 2024-07-22 DIAGNOSIS — J32.9 CHRONIC SINUSITIS, UNSPECIFIED LOCATION: ICD-10-CM

## 2024-07-22 PROCEDURE — 73130 X-RAY EXAM OF HAND: CPT | Mod: 26,RT,, | Performed by: RADIOLOGY

## 2024-07-22 PROCEDURE — 73130 X-RAY EXAM OF HAND: CPT | Mod: TC,PO,RT

## 2024-07-23 RX ORDER — CETIRIZINE HYDROCHLORIDE 10 MG/1
10 TABLET ORAL DAILY
Qty: 90 TABLET | Refills: 0 | Status: SHIPPED | OUTPATIENT
Start: 2024-07-23

## 2024-07-23 NOTE — TELEPHONE ENCOUNTER
Refill Routing Note   Medication(s) are not appropriate for processing by [facility] for the following reason(s):        No active prescription written by provider    [facility] action(s):  Defer        Medication Therapy Plan: Last ordered: 1 year ago (4/20/2023) by Lizzy Jay NP      Appointments  past 12m or future 3m with PCP    Date Provider   Last Visit   7/9/2024 Fabiola Andrade MD   Next Visit   Visit date not found Fabiola Andrade MD   ED visits in past 90 days: 0        Note composed:9:50 PM 07/22/2024

## 2024-07-23 NOTE — TELEPHONE ENCOUNTER
No care due was identified.  [facility] Embedded Care Due Messages. Reference number: [id number].   7/22/2024 9:49:32 PM CDT

## 2024-07-23 NOTE — PROGRESS NOTES
Valerio Yan    Chief Complaint   Patient presents with    Form to be filled out       History of Present Illness:   Mr. Yan comes in today requesting completion of LTD paperwork for his neurologic problems.    He saw Dr. Aguillon, neurologist, on October 20, 2021 for history of Guillain-Trevor syndrome, epigastric pain, polyneuropathy, Ozuna's esophagus without dysplasia, peptic ulcer disease,   Ozuna's esophagus with dysplasia, typical atrial flutter, nonrheumatic mitral valve regurgitation, palpitations, former cigarette smoker, obstructive sleep apnea, inadequate sleep hygiene, psychophysiological insomnia, CAD, multiple vessel, sleep-disordered breathing, at risk for sleep apnea, coronary artery disease of native artery of native heart with stable angina pectorism, atypical angina, sinus bradycardia, CAD in native artery, elevated coronary artery calcium score (2893), family history of cardiovascular disease, hypokalemia, symptomatic cholelithiasis, abnormal CK, atypical chest pain, primary osteoarthritis of left knee, dizziness, tendonitis of long head of biceps brachii of right shoulder, rotator cuff syndrome of right shoulder and allied disorders, complex regional pain syndrome type 2 of both lower extremities, mixed hyperlipidemia, hypertensive left ventricular hypertrophy, without heart failure, primary hypertension, diaphoresis, sequelae of Guillain-Trevor syndrome, CSF leak, quadriparesis, numbness and tingling, dysautonomia, sensory ataxia, heat intolerance with annual follow up advised.        Current Outpatient Medications   Medication Sig    amLODIPine (NORVASC) 5 MG tablet Take 1 tablet (5 mg total) by mouth every evening.    aspirin (ECOTRIN) 81 MG EC tablet Take 81 mg by mouth once daily.    calcium-vitamin D 600 mg, 1,500mg,-200 unit 600 mg(1,500mg) -200 unit Tab Take 1 tablet by mouth once daily.     celecoxib (CELEBREX) 100 MG capsule Take 1 capsule (100 mg total) by mouth 2 (two)  times daily.    cetirizine (ZYRTEC) 10 MG tablet Take 1 tablet (10 mg total) by mouth once daily.    cyanocobalamin (VITAMIN B-12) 1,000 mcg/mL injection Inject 1 mL (1,000 mcg total) into the muscle every 30 days.    esomeprazole (NEXIUM) 20 MG capsule Take 2 capsules (40 mg total) by mouth before breakfast.    fluticasone propionate (FLONASE) 50 mcg/actuation nasal spray 1 spray (50 mcg total) by Each Nostril route once daily.    folic acid (FOLVITE) 1 MG tablet Take 1 tablet (1 mg total) by mouth once daily.    LIDOcaine (LIDODERM) 5 % Place 1 patch onto the skin once daily. Remove & Discard patch within 12 hours or as directed by MD    lisinopriL-hydrochlorothiazide (PRINZIDE,ZESTORETIC) 20-12.5 mg per tablet Take 2 tablets by mouth once daily.    methylPREDNISolone (MEDROL DOSEPACK) 4 mg tablet follow package directions    metoprolol succinate (TOPROL-XL) 50 MG 24 hr tablet Take 0.5 tablets (25 mg total) by mouth every other day.    multivitamin (THERAGRAN) per tablet Take 1 tablet by mouth nightly.     ondansetron (ZOFRAN) 4 MG tablet Take 1 tablet (4 mg total) by mouth every 6 (six) hours.    paroxetine (PAXIL) 10 MG tablet Take 1 tablet (10 mg total) by mouth every morning.    potassium chloride SA (K-DUR,KLOR-CON) 20 MEQ tablet Take 1 tablet (20 mEq total) by mouth 2 (two) times daily.    tadalafiL (CIALIS) 20 MG Tab Take 1 tablet (20 mg total) by mouth every 24 hours as needed (erectile dysfunction).    tamsulosin (FLOMAX) 0.4 mg Cap Take 1 capsule (0.4 mg total) by mouth once daily.    alirocumab (PRALUENT PEN) 150 mg/mL PnIj INJECT 1.06 ML (159 MG TOTAL) BY ABDOMINAL SUBCUTANEOUS ROUTE EVERY 14 DAYS    furosemide (LASIX) 20 MG tablet Take 20 mg by mouth 2 (two) times daily.         Review of Systems   Constitutional:  Negative for activity change and unexpected weight change.   HENT:  Negative for hearing loss, rhinorrhea and trouble swallowing.    Eyes:  Negative for discharge and visual disturbance.    Respiratory:  Negative for chest tightness and wheezing.    Cardiovascular:  Negative for chest pain and palpitations.   Gastrointestinal:  Negative for blood in stool, constipation, diarrhea and vomiting.   Endocrine: Negative for polydipsia and polyuria.   Genitourinary:  Negative for difficulty urinating, hematuria and urgency.   Musculoskeletal:  Negative for arthralgias, joint swelling and neck pain.   Neurological:  Positive for weakness. Negative for headaches.        See history of present illness.   Psychiatric/Behavioral:  Negative for confusion and dysphoric mood.        Objective:  Physical Exam  Constitutional:       General: He is not in acute distress.     Appearance: Normal appearance. He is not ill-appearing, toxic-appearing or diaphoretic.      Comments: Pleasant.   Cardiovascular:      Rate and Rhythm: Normal rate and regular rhythm.   Pulmonary:      Effort: Pulmonary effort is normal. No respiratory distress.   Musculoskeletal:      Comments: He is ambulatory.   Neurological:      Mental Status: He is alert.   Psychiatric:         Mood and Affect: Mood normal.         Behavior: Behavior normal.         Thought Content: Thought content normal.         Judgment: Judgment normal.         ASSESSMENT:  1. 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    Diagnoses and all orders for this visit:    Sequelae of Guillain-[place]ob[place] syndrome      I advised patient I will not be able to complete LTD paperwork for him but have contacted neurology department and he will be worked in this week for follow up and LTD paperwork completion.  He verbalized understanding.   Continue current medications, follow low sodium, low cholesterol, low carb diet, daily walks.  Keep follow up with specialists.  Flu shot this fall.  Follow up if symptoms worsen or fail to improve.

## 2024-07-24 ENCOUNTER — OFFICE VISIT (OUTPATIENT)
Dept: UROLOGY | Facility: CLINIC | Age: 54
End: 2024-07-24
Payer: MEDICARE

## 2024-07-24 VITALS
BODY MASS INDEX: 33.3 KG/M2 | WEIGHT: 282.06 LBS | HEIGHT: 77 IN | DIASTOLIC BLOOD PRESSURE: 97 MMHG | HEART RATE: 75 BPM | SYSTOLIC BLOOD PRESSURE: 160 MMHG

## 2024-07-24 DIAGNOSIS — N39.0 URINARY TRACT INFECTION WITHOUT HEMATURIA, SITE UNSPECIFIED: Primary | ICD-10-CM

## 2024-07-24 LAB
BILIRUB UR QL STRIP: NEGATIVE
GLUCOSE UR QL STRIP: NEGATIVE
KETONES UR QL STRIP: NEGATIVE
LEUKOCYTE ESTERASE UR QL STRIP: POSITIVE
PH, POC UA: 7
POC BLOOD, URINE: NEGATIVE
POC NITRATES, URINE: NEGATIVE
PROT UR QL STRIP: NEGATIVE
SP GR UR STRIP: 1.02 (ref 1–1.03)
UROBILINOGEN UR STRIP-ACNC: 1 (ref 0.3–2.2)

## 2024-07-24 PROCEDURE — 87186 SC STD MICRODIL/AGAR DIL: CPT | Performed by: NURSE PRACTITIONER

## 2024-07-24 PROCEDURE — 87086 URINE CULTURE/COLONY COUNT: CPT | Performed by: NURSE PRACTITIONER

## 2024-07-24 PROCEDURE — 3077F SYST BP >= 140 MM HG: CPT | Mod: CPTII,S$GLB,, | Performed by: NURSE PRACTITIONER

## 2024-07-24 PROCEDURE — 3080F DIAST BP >= 90 MM HG: CPT | Mod: CPTII,S$GLB,, | Performed by: NURSE PRACTITIONER

## 2024-07-24 PROCEDURE — 3044F HG A1C LEVEL LT 7.0%: CPT | Mod: CPTII,S$GLB,, | Performed by: NURSE PRACTITIONER

## 2024-07-24 PROCEDURE — 1159F MED LIST DOCD IN RCRD: CPT | Mod: CPTII,S$GLB,, | Performed by: NURSE PRACTITIONER

## 2024-07-24 PROCEDURE — 99999 PR PBB SHADOW E&M-EST. PATIENT-LVL V: CPT | Mod: PBBFAC,,, | Performed by: NURSE PRACTITIONER

## 2024-07-24 PROCEDURE — 81003 URINALYSIS AUTO W/O SCOPE: CPT | Mod: QW,S$GLB,, | Performed by: NURSE PRACTITIONER

## 2024-07-24 PROCEDURE — 99214 OFFICE O/P EST MOD 30 MIN: CPT | Mod: S$GLB,,, | Performed by: NURSE PRACTITIONER

## 2024-07-24 PROCEDURE — 3008F BODY MASS INDEX DOCD: CPT | Mod: CPTII,S$GLB,, | Performed by: NURSE PRACTITIONER

## 2024-07-24 PROCEDURE — 87088 URINE BACTERIA CULTURE: CPT | Performed by: NURSE PRACTITIONER

## 2024-07-24 NOTE — PROGRESS NOTES
Chief Complaint:   Recurrent urinary tract infections  BPH    HPI:   Patient is a 53-year-old male that has been seeing Dr. Kline for recurrent urinary tract infections and possible pyelonephritis.  Denies gross hematuria or dysuria, presently.  Patient states that he is currently on tamsulosin, however has nocturia 2-3 times nightly and a slow stream.  PVR is 153 mL.  Urine in clinic indicates trace leukocytes all other parameters are negative.  08/04/2024  Raisa WARE  52-year-old gentleman with significant urological issues especially lately. Two recent UTIs requiring hospitalization even for possible pyelonephritis. He is had weak stream for the last 5 years, attempted tamsulosin for 1 month after the hospital but not currently on it. Patient also has significant erectile dysfunction and premature ejaculation, again no previous treatments. Of note patient is currently on daily mononitrate. Patient states the only gets up 1 time per night, no significant frequency but when he drinks water he has to go frequently. States that he has a weak flow and is slow to start, occasional split stream. No gross hematuria, no microscopic hematuria, he is a smoker. No other urological history. No family history of urological cancers, his father has had stones.     Allergies:  Metoclopramide; Metoclopramide (bulk); Reglan  [metoclopramide hcl]; Statins-hmg-coa reductase inhibitors; Sulfa (sulfonamide antibiotics); Latex; and Latex, natural rubber    Medications:  has a current medication list which includes the following prescription(s): praluent pen, amlodipine, aspirin, calcium-vitamin d3, celecoxib, cetirizine, cyanocobalamin, fluticasone propionate, furosemide, lisinopril-hydrochlorothiazide, metoprolol succinate, multivitamin, ondansetron, paroxetine, potassium chloride sa, tadalafil, tamsulosin, esomeprazole, folic acid, lidocaine, and methylprednisolone.    Review of Systems:  General: No fever, chills, fatigability,  or weight loss.  Skin: No rashes, itching, or changes in color or texture of skin.  Chest: Denies DUKE, cyanosis, wheezing, cough, and sputum production.  Abdomen: Appetite fine. No weight loss. Denies diarrhea, abdominal pain, hematemesis, or blood in stool.  Musculoskeletal: No joint stiffness or swelling. Denies back pain.  : As above.  All other review of systems negative.    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    PSH:   has a past surgical history that includes Megha-en-y procedure; Tracheal surgery; Gastrostomy tube placement; PEG tube removal; decubitus surgery; barrette esophagus; Esophagogastroduodenoscopy; Knee arthroscopy (Left, 2002); Radiofrequency ablation; Colonoscopy (N/A, 05/17/2018); RFA; Tonsillectomy; Robot-assisted cholecystectomy using da Kameron Xi (N/A, 05/02/2019); Left heart catheterization (Left, 12/10/2020); ANGIOGRAM, CORONARY, WITH LEFT HEART CATHETERIZATION (12/10/2020); Esophagogastroduodenoscopy (N/A, 06/17/2021); Esophagogastroduodenoscopy (N/A, 06/28/2023); Colonoscopy (N/A, 06/28/2023); Cholecystectomy; and Cosmetic surgery.    FamHx: family history includes Arthritis in his mother; Cancer in his maternal grandfather; Heart attack in his father and mother; Heart disease in his father and mother; Hypertension in his father; Stroke in his father and sister.    SocHx:  reports that he has been smoking cigarettes. He started smoking about 2  years ago. He has a 0.5 pack-year smoking history. He quit smokeless tobacco use about 25 years ago.  His smokeless tobacco use included snuff. He reports that he does not currently use alcohol after a past usage of about 14.0 standard drinks of alcohol per week. He reports that he does not use drugs.      Physical Exam:  Vitals:    07/24/24 1335   BP: (!) 160/97   Pulse: 75     General: A&Ox3, no apparent distress, no deformities  Neck: No masses, normal thyroid  Lungs: normal inspiration, no use of accessory muscles  Heart: normal pulse, no arrhythmias  Abdomen: Soft, NT, ND, no masses, no hernias, no hepatosplenomegaly  Lymphatic: Neck and groin nodes negative  Skin: The skin is warm and dry. No jaundice.  Ext: No c/c/e.    Labs/Studies:   See HPI  08/11/2023  EXAMINATION:  US RETROPERITONEAL COMPLETE     CLINICAL HISTORY:  Benign prostatic hyperplasia with lower urinary tract symptoms     TECHNIQUE:  Ultrasound of the kidneys and urinary bladder was performed including color flow and Doppler evaluation of the kidneys.     COMPARISON:  None     FINDINGS:  The right kidney measures 12.5 cm. The left kidney measures 12.7 cm.  There is normal corticomedullary differentiation and cortical thickness.  No solid or cystic masses. There is minimal prominence of the more central right renal pelvis without evidence for shaye hydronephrosis.The bladder is unremarkable in appearance.     Impression:     As above  Impression/Plan:   Urine sent for culture and patient will be treated secondary to final urine culture results, is asymptomatic.  Slightly elevated PVR with BPH symptoms despite being on tamsulosin.  Patient to return to clinic in 2-3 weeks for reassessment.

## 2024-07-25 ENCOUNTER — HOSPITAL ENCOUNTER (OUTPATIENT)
Dept: RADIOLOGY | Facility: HOSPITAL | Age: 54
Discharge: HOME | End: 2024-07-25
Attending: NURSE PRACTITIONER
Payer: MEDICARE

## 2024-07-25 DIAGNOSIS — G62.9 POLYNEUROPATHY: ICD-10-CM

## 2024-07-25 DIAGNOSIS — R20.2 NUMBNESS AND TINGLING: ICD-10-CM

## 2024-07-25 DIAGNOSIS — R27.8 SENSORY ATAXIA: ICD-10-CM

## 2024-07-25 DIAGNOSIS — G96.00 CSF LEAK: ICD-10-CM

## 2024-07-25 DIAGNOSIS — R20.0 NUMBNESS AND TINGLING: ICD-10-CM

## 2024-07-25 DIAGNOSIS — Z86.69 HISTORY OF GUILLAIN-BARRE SYNDROME: ICD-10-CM

## 2024-07-25 PROCEDURE — 70551 MRI BRAIN STEM W/O DYE: CPT | Mod: 26,,, | Performed by: RADIOLOGY

## 2024-07-25 PROCEDURE — 70551 MRI BRAIN STEM W/O DYE: CPT | Mod: TC

## 2024-07-26 RX ORDER — CEFDINIR 300 MG/1
300 CAPSULE ORAL 2 TIMES DAILY
Qty: 20 CAPSULE | Refills: 0 | Status: SHIPPED | OUTPATIENT
Start: 2024-07-26 | End: 2024-08-05

## 2024-07-26 NOTE — PROGRESS NOTES
MRI BRAIN WO:    07-    Generalized cerebral volume loss.    Postop changes right frontal bone/orbit with subjacent right frontal encephalomalacia.    No acute abnormality.    Empty sella.

## 2024-07-27 DIAGNOSIS — K76.9 LIVER DISEASE, UNSPECIFIED: Primary | ICD-10-CM

## 2024-07-27 LAB — BACTERIA UR CULT: ABNORMAL

## 2024-08-07 RX ORDER — PAROXETINE 10 MG/1
10 TABLET, FILM COATED ORAL EVERY MORNING
Qty: 30 TABLET | Refills: 5 | Status: SHIPPED | OUTPATIENT
Start: 2024-08-07

## 2024-08-21 ENCOUNTER — OFFICE VISIT (OUTPATIENT)
Dept: UROLOGY | Facility: CLINIC | Age: 54
End: 2024-08-21
Payer: MEDICARE

## 2024-08-21 VITALS
SYSTOLIC BLOOD PRESSURE: 131 MMHG | DIASTOLIC BLOOD PRESSURE: 75 MMHG | WEIGHT: 282.19 LBS | HEIGHT: 77 IN | BODY MASS INDEX: 33.32 KG/M2 | HEART RATE: 54 BPM

## 2024-08-21 DIAGNOSIS — N39.0 RECURRENT UTI: Primary | ICD-10-CM

## 2024-08-21 LAB
BILIRUB UR QL STRIP: POSITIVE
GLUCOSE UR QL STRIP: NEGATIVE
KETONES UR QL STRIP: NEGATIVE
LEUKOCYTE ESTERASE UR QL STRIP: POSITIVE
PH, POC UA: 5.5
POC BLOOD, URINE: NEGATIVE
POC NITRATES, URINE: NEGATIVE
PROT UR QL STRIP: POSITIVE
SP GR UR STRIP: 1.03 (ref 1–1.03)
UROBILINOGEN UR STRIP-ACNC: 1 (ref 0.3–2.2)

## 2024-08-21 PROCEDURE — 99214 OFFICE O/P EST MOD 30 MIN: CPT | Mod: S$GLB,,, | Performed by: NURSE PRACTITIONER

## 2024-08-21 PROCEDURE — 81003 URINALYSIS AUTO W/O SCOPE: CPT | Mod: QW,S$GLB,, | Performed by: NURSE PRACTITIONER

## 2024-08-21 PROCEDURE — 3078F DIAST BP <80 MM HG: CPT | Mod: CPTII,S$GLB,, | Performed by: NURSE PRACTITIONER

## 2024-08-21 PROCEDURE — 1159F MED LIST DOCD IN RCRD: CPT | Mod: CPTII,S$GLB,, | Performed by: NURSE PRACTITIONER

## 2024-08-21 PROCEDURE — 3075F SYST BP GE 130 - 139MM HG: CPT | Mod: CPTII,S$GLB,, | Performed by: NURSE PRACTITIONER

## 2024-08-21 PROCEDURE — 3044F HG A1C LEVEL LT 7.0%: CPT | Mod: CPTII,S$GLB,, | Performed by: NURSE PRACTITIONER

## 2024-08-21 PROCEDURE — 99999 PR PBB SHADOW E&M-EST. PATIENT-LVL V: CPT | Mod: PBBFAC,,, | Performed by: NURSE PRACTITIONER

## 2024-08-21 PROCEDURE — 3008F BODY MASS INDEX DOCD: CPT | Mod: CPTII,S$GLB,, | Performed by: NURSE PRACTITIONER

## 2024-08-22 ENCOUNTER — TELEPHONE (OUTPATIENT)
Dept: INFECTIOUS DISEASES | Facility: CLINIC | Age: 54
End: 2024-08-22
Payer: MEDICARE

## 2024-08-22 ENCOUNTER — TELEPHONE (OUTPATIENT)
Dept: UROLOGY | Facility: CLINIC | Age: 54
End: 2024-08-22
Payer: MEDICARE

## 2024-08-22 NOTE — PROGRESS NOTES
Chief Complaint:   History of recurrent urinary tract infections with pyelonephritis    HPI:   Patient is presenting as a follow-up to urinary tract infection.  Urine in clinic indicates trace leukocytes and protein, all other parameters are negative.  PVR is 32 mL.  Patient states that he is concerned that he has not seen Infectious Disease secondary to multiple urinary tract infections and history of pyelonephritis.  07/24/2024  Patient is a 53-year-old male that has been seeing Dr. Kline for recurrent urinary tract infections and possible pyelonephritis.  Denies gross hematuria or dysuria, presently.  Patient states that he is currently on tamsulosin, however has nocturia 2-3 times nightly and a slow stream.  PVR is 153 mL.  Urine in clinic indicates trace leukocytes all other parameters are negative.  08/04/2024  Raisa WARE  52-year-old gentleman with significant urological issues especially lately. Two recent UTIs requiring hospitalization even for possible pyelonephritis. He is had weak stream for the last 5 years, attempted tamsulosin for 1 month after the hospital but not currently on it. Patient also has significant erectile dysfunction and premature ejaculation, again no previous treatments. Of note patient is currently on daily mononitrate. Patient states the only gets up 1 time per night, no significant frequency but when he drinks water he has to go frequently. States that he has a weak flow and is slow to start, occasional split stream. No gross hematuria, no microscopic hematuria, he is a smoker. No other urological history. No family history of urological cancers, his father has had stones.   Allergies:  Metoclopramide; Metoclopramide (bulk); Reglan  [metoclopramide hcl]; Statins-hmg-coa reductase inhibitors; Sulfa (sulfonamide antibiotics); Latex; and Latex, natural rubber    Medications:  has a current medication list which includes the following prescription(s): praluent pen, amlodipine,  aspirin, calcium-vitamin d3, celecoxib, cetirizine, cyanocobalamin, fluticasone propionate, furosemide, lisinopril-hydrochlorothiazide, metoprolol succinate, multivitamin, ondansetron, paroxetine, potassium chloride sa, tadalafil, esomeprazole, folic acid, and tamsulosin.    Review of Systems:  General: No fever, chills, fatigability, or weight loss.  Skin: No rashes, itching, or changes in color or texture of skin.  Chest: Denies DUKE, cyanosis, wheezing, cough, and sputum production.  Abdomen: Appetite fine. No weight loss. Denies diarrhea, abdominal pain, hematemesis, or blood in stool.  Musculoskeletal: No joint stiffness or swelling. Denies back pain.  : As above.  All other review of systems negative.    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    PSH:   has a past surgical history that includes Megha-en-y procedure; Tracheal surgery; Gastrostomy tube placement; PEG tube removal; decubitus surgery; barrette esophagus; Esophagogastroduodenoscopy; Knee arthroscopy (Left, 2002); Radiofrequency ablation; Colonoscopy (N/A, 05/17/2018); RFA; Tonsillectomy; Robot-assisted cholecystectomy using da Kameron Xi (N/A, 05/02/2019); Left heart catheterization (Left, 12/10/2020); ANGIOGRAM, CORONARY, WITH LEFT HEART CATHETERIZATION (12/10/2020); Esophagogastroduodenoscopy (N/A, 06/17/2021); Esophagogastroduodenoscopy (N/A, 06/28/2023); Colonoscopy (N/A, 06/28/2023); Cholecystectomy; and  Cosmetic surgery.    FamHx: family history includes Arthritis in his mother; Cancer in his maternal grandfather; Heart attack in his father and mother; Heart disease in his father and mother; Hypertension in his father; Stroke in his father and sister.    SocHx:  reports that he has been smoking cigarettes. He started smoking about 2 years ago. He has a 0.5 pack-year smoking history. He quit smokeless tobacco use about 25 years ago.  His smokeless tobacco use included snuff. He reports that he does not currently use alcohol after a past usage of about 14.0 standard drinks of alcohol per week. He reports that he does not use drugs.      Physical Exam:  Vitals:    08/21/24 1408   BP: 131/75   Pulse: (!) 54     General: A&Ox3, no apparent distress, no deformities  Neck: No masses, normal thyroid  Lungs: normal inspiration, no use of accessory muscles  Heart: normal pulse, no arrhythmias  Abdomen: Soft, NT, ND, no masses, no hernias, no hepatosplenomegaly  Lymphatic: Neck and groin nodes negative  Skin: The skin is warm and dry. No jaundice.  Ext: No c/c/e.      Labs/Studies:    Latest Reference Range & Units 07/27/23 15:53 07/01/24 09:24   Prostate Specific Antigen 0.00 - 4.00 ng/mL 1.1 1.8       Impression/Plan:   Recurrent urinary tract infections with a history of pyelonephritis  Ambulatory referral to Infectious Disease.    Prostate cancer screening  Recent PSA was normal.    Return to clinic in 6 months for re-evaluation.

## 2024-08-22 NOTE — TELEPHONE ENCOUNTER
Vicky Neal sent a request for scheduling:  [facility] in  place for 10/01/2024 with Dr. Soto at the [facility] location per patient request. Pt repeated back and voiced understand.   Female

## 2024-08-28 ENCOUNTER — PATIENT MESSAGE (OUTPATIENT)
Dept: CARDIOLOGY | Facility: CLINIC | Age: 54
End: 2024-08-28
Payer: MEDICARE

## 2024-08-28 DIAGNOSIS — I10 ESSENTIAL HYPERTENSION: ICD-10-CM

## 2024-08-28 RX ORDER — LISINOPRIL AND HYDROCHLOROTHIAZIDE 12.5; 2 MG/1; MG/1
2 TABLET ORAL DAILY
Qty: 180 TABLET | Refills: 3 | Status: SHIPPED | OUTPATIENT
Start: 2024-08-28

## 2024-08-28 RX ORDER — TADALAFIL 20 MG/1
TABLET ORAL
Qty: 30 TABLET | Refills: 11 | Status: SHIPPED | OUTPATIENT
Start: 2024-08-28

## 2024-09-03 ENCOUNTER — PATIENT MESSAGE (OUTPATIENT)
Dept: HEPATOLOGY | Facility: CLINIC | Age: 54
End: 2024-09-03
Payer: MEDICARE

## 2024-09-23 ENCOUNTER — PATIENT MESSAGE (OUTPATIENT)
Dept: ADMINISTRATIVE | Facility: HOSPITAL | Age: 54
End: 2024-09-23
Payer: MEDICARE

## 2024-09-23 ENCOUNTER — PATIENT OUTREACH (OUTPATIENT)
Dept: ADMINISTRATIVE | Facility: HOSPITAL | Age: 54
End: 2024-09-23
Payer: MEDICARE

## 2024-09-23 NOTE — PROGRESS NOTES
Statin CVD DM Report: Patient had PCP visit on 7.9.24, no code dropped for his statin intolerance. Has statin on allergy list due to myalgia. No upcoming appt with PCP. Tried to call to schedule follow up. No answer, LVM.

## 2024-09-29 NOTE — PROGRESS NOTES
Hepatology Note    PATIENT: Valerio Yan  MRN: [id number]  DATE: 10/1/2024    Provider: Hepatologist - Dr Rodriguez  Urgency of review: non-urgent  Referring provider: No ref. provider found    Diagnosis:   1. Liver disease, unspecified    2. Metabolic dysfunction-associated steatotic liver disease (MASLD)    3. Alcohol use disorder    4. Elevated liver enzymes    5. Fatty liver    6. CAD in native artery    7. Primary hypertension    8. Typical atrial flutter    9. Mixed hyperlipidemia    10. Constipation, unspecified constipation type          Chief complaint:   Chief Complaint   Patient presents with    Hepatic Disease       Subjective:    Initial History: Valerio Yan is a 53 y.o. male with significant PMH as mentioned who was following to Hepatology Clinic for consultation of elevated liver enzymes      10/01/2024   peTH positive but cut down significantly  Main complains of alternate diarrhea and constipation. Weight loss intentionally  Colonoscopy 2023--fair prep. No polyp  Viral , autoimmune panel negative  Imaging showed Liver: Normal in size, measuring 15.4 cm. Left lobe is not well seen. Homogeneous echotexture. There is a 3.1 x 2.8 cm hyperechoic lesion within the deep margin of the right hepatic lobe.   CT scheduled    7/24  Patient was found to have elevated liver enzymes during recent lab work. Patient does not have any new complains from liver standpoint. Liver enzymes are normal in 2023 and since then they have been elevated with intermittent rise. CT in 2022 showed fatty liver   Patient had UTI during the same time with elevated Liver enzymes. Patient is on antibiotics for 1  more week. Patient also has leg swelling    Patient is getting 2-3 UTI over last 1 year. Patient has seen urology and on Flomax but no change in UTI    Alcohol--1-2 glass per day wine       As regards to liver disease,  - The patient reports no symptoms of hepatitis including malaise or flu-like symptoms to suggest a  flare.  - The patient reports no new manifestations of portal hypertension including ascites, edema, GI bleeding, or hepatic encephalopathy to suggest liver decompensation.  - The patient reports no fevers/chills or pruritis to suggest biliary disease.        Prior Relevant History:    He  denies hepatotoxic medication    Review of systems:  A review of 12+ systems was conducted with pertinent positive and negative findings documented in HPI with all other systems reviewed and negative.      PFSH:  Past medical, family, and social history reviewed as documented in chart with pertinent positive medical, family, and social history detailed in HPI.    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    COLONOSCOPY N/A 05/17/2018    Procedure: COLONOSCOPY;  Surgeon: Ilan Araya III, MD;  Location: [facility] ENDO;  Service: Endoscopy;  Laterality: N/A;    COLONOSCOPY N/A 06/28/2023    Procedure: COLONOSCOPY;  Surgeon: Colby Rodriguez MD;  Location: [facility] ENDO;   Service: Endoscopy;  Laterality: N/A;    COSMETIC SURGERY      Flap june 2008    decubitus surgery      to sacral    ESOPHAGOGASTRODUODENOSCOPY      ESOPHAGOGASTRODUODENOSCOPY N/A 06/17/2021    Procedure: EGD (ESOPHAGOGASTRODUODENOSCOPY);  Surgeon: Ban Zheng MD;  Location: [facility] ENDO;  Service: Endoscopy;  Laterality: N/A;    ESOPHAGOGASTRODUODENOSCOPY N/A 06/28/2023    Procedure: EGD (ESOPHAGOGASTRODUODENOSCOPY);  Surgeon: Colby Rodriguez MD;  Location: [facility] ENDO;  Service: Endoscopy;  Laterality: N/A;    GASTROSTOMY TUBE PLACEMENT      KNEE ARTHROSCOPY Left 2002    LEFT HEART CATHETERIZATION Left 12/10/2020    Procedure: CATHETERIZATION, HEART, LEFT;  Surgeon: Tomi Mir MD;  Location: [facility] CATH LAB;  Service: Cardiology;  Laterality: Left;  730 start time    PEG TUBE REMOVAL      RADIOFREQUENCY ABLATION      RFA      ROBOT-ASSISTED CHOLECYSTECTOMY USING DA GABRIELA XI N/A 05/02/2019    Procedure: XI ROBOTIC CHOLECYSTECTOMY;  Surgeon: Valerio Lai MD;  Location: [facility] OR;  Service: General;  Laterality: N/A;    JUAN-EN-Y PROCEDURE      TONSILLECTOMY      TRACHEAL SURGERY         Family History:   Family History   Problem Relation Name Age of Onset    Heart attack Mother Onelia     Heart disease Mother Onelia     Arthritis Mother Onelia     Heart disease Father Josiah     Heart attack Father Josiah     Hypertension Father Josiah     Stroke Father Josiah     Cancer Maternal Grandfather Jh     Stroke Sister Zuly      He has no known family history of liver disease.     Social History:  reports that he has been smoking cigarettes. He started smoking about 2 years ago. He has a 2.9 pack-year smoking history. He quit smokeless tobacco use about 25 years ago.  His smokeless tobacco use included snuff. He reports that he does not currently use alcohol after a past usage of about 14.0 standard drinks of alcohol per week. He reports that he does not use drugs.    He has significant history of Alcohol     He  "denies history of IV drug use/Tatto  He  denies high-risk sexual contacts, no raw seafood, no sick contacts      Allergies:  Review of patient's allergies indicates:   Allergen Reactions    Metoclopramide Other (See Comments) and Hives     Pt. Was in coma so is not aware of the reaction  Pt states "he don't know, was in coma"      Metoclopramide (bulk)      Other reaction(s): Unable to obtain    Reglan  [metoclopramide hcl] Other (See Comments)     Pt. Was in coma so is not aware of the reaction  Other reaction(s): Unable to obtain    Statins-hmg-coa reductase inhibitors      Myalgia      Sulfa (sulfonamide antibiotics) Other (See Comments)     Never taken  States son is allergic    Latex Hives, Itching and Rash           Latex, natural rubber Rash     Blisters, adhesives          Medications:  Current Outpatient Medications   Medication Sig Dispense Refill    alirocumab (PRALUENT PEN) 150 mg/mL Donnell INJECT 1.06 ML (159 MG TOTAL) BY ABDOMINAL SUBCUTANEOUS ROUTE EVERY 14 DAYS 2 mL 12    amLODIPine (NORVASC) 5 MG tablet Take 1 tablet (5 mg total) by mouth every evening. 60 tablet 12    aspirin (ECOTRIN) 81 MG EC tablet Take 81 mg by mouth once daily.      calcium-vitamin D 600 mg, 1,500mg,-200 unit 600 mg(1,500mg) -200 unit Tab Take 1 tablet by mouth once daily.       celecoxib (CELEBREX) 100 MG capsule Take 1 capsule (100 mg total) by mouth 2 (two) times daily. 60 capsule 2    cetirizine (ZYRTEC) 10 MG tablet Take 1 tablet (10 mg total) by mouth once daily. 90 tablet 0    esomeprazole (NEXIUM) 20 MG capsule Take 2 capsules (40 mg total) by mouth before breakfast. 60 capsule 11    fluticasone propionate (FLONASE) 50 mcg/actuation nasal spray 1 spray (50 mcg total) by Each Nostril route once daily. 16 g 1    folic acid (FOLVITE) 1 MG tablet Take 1 tablet (1 mg total) by mouth once daily. 30 tablet 0    furosemide (LASIX) 20 MG tablet Take 20 mg by mouth 2 (two) times daily. (Patient taking differently: Take 20 mg by " mouth once daily.)      lisinopriL-hydrochlorothiazide (PRINZIDE,ZESTORETIC) 20-12.5 mg per tablet Take 2 tablets by mouth once daily. 180 tablet 3    metoprolol succinate (TOPROL-XL) 50 MG 24 hr tablet Take 0.5 tablets (25 mg total) by mouth every other day. 30 tablet 6    multivitamin (THERAGRAN) per tablet Take 1 tablet by mouth nightly.       ondansetron (ZOFRAN) 4 MG tablet Take 1 tablet (4 mg total) by mouth every 6 (six) hours. 20 tablet 0    paroxetine (PAXIL) 10 MG tablet TAKE 1 TABLET BY MOUTH ONCE DAILY IN THE MORNING 30 tablet 5    potassium chloride SA (K-DUR,KLOR-CON) 20 MEQ tablet Take 1 tablet (20 mEq total) by mouth 2 (two) times daily. 60 tablet 1    tadalafiL (CIALIS) 20 MG Tab TAKE 1 TABLET BY MOUTH EVERY 24 HOURS AS NEEDED FOR ERECTILE DYSFUNCTION 30 tablet 11    tamsulosin (FLOMAX) 0.4 mg Cap Take 1 capsule (0.4 mg total) by mouth once daily. 90 capsule 3    cyanocobalamin (VITAMIN B-12) 1,000 mcg/mL injection Inject 1 mL (1,000 mcg total) into the muscle every 30 days. (Patient not taking: Reported on 10/1/2024) 10 mL 0    polyethylene glycol (MIRALAX) 17 gram PwPk Take 17 g by mouth once daily. 30 packet 3     No current facility-administered medications for this visit.       Review of Systems   Constitutional:  Negative for fatigue, fever and unexpected weight change.   HENT:  Negative for ear pain, nosebleeds and trouble swallowing.    Eyes:  Negative for discharge and redness.   Respiratory:  Negative for cough and shortness of breath.    Cardiovascular:  Negative for palpitations and leg swelling.   Gastrointestinal:  Negative for abdominal distention, abdominal pain, diarrhea and vomiting.   Endocrine: Negative for cold intolerance and polyuria.   Genitourinary:  Negative for flank pain and hematuria.   Musculoskeletal:  Negative for back pain.   Skin:  Negative for pallor.   Neurological:  Negative for seizures and headaches.   Hematological:  Does not bruise/bleed easily.  "  Psychiatric/Behavioral:  Negative for confusion and hallucinations.               Objective:      Vitals:   Vitals:    10/01/24 1351   BP: (!) 186/68   BP Location: Left arm   Patient Position: Sitting   Pulse: 89   SpO2: 97%   Weight: 127.8 kg (281 lb 12 oz)   Height: 6' 5" (1.956 m)         Physical Exam  Constitutional:       Appearance: Normal appearance.   HENT:      Head: Normocephalic and atraumatic.      Right Ear: Tympanic membrane and external ear normal.      Left Ear: Tympanic membrane and external ear normal.      Mouth/Throat:      Mouth: Mucous membranes are moist.   Eyes:      Extraocular Movements: Extraocular movements intact.      Pupils: Pupils are equal, round, and reactive to light.   Cardiovascular:      Rate and Rhythm: Normal rate and regular rhythm.      Pulses: Normal pulses.      Heart sounds: Normal heart sounds.   Pulmonary:      Effort: Pulmonary effort is normal.      Breath sounds: Normal breath sounds.   Abdominal:      General: Bowel sounds are normal. There is no distension.      Palpations: Abdomen is soft. There is no mass.      Tenderness: There is no abdominal tenderness.   Musculoskeletal:         General: No swelling or deformity. Normal range of motion.      Cervical back: Normal range of motion and neck supple.   Skin:     Coloration: Skin is not jaundiced.   Neurological:      General: No focal deficit present.      Mental Status: He is alert and oriented to person, place, and time.      Cranial Nerves: No cranial nerve deficit.   Psychiatric:         Mood and Affect: Mood normal.         Behavior: Behavior normal.         Laboratory Data:  Lab Visit on 09/30/2024   Component Date Value Ref Range Status    WBC 09/30/2024 6.21  3.90 - 12.70 K/uL Final    RBC 09/30/2024 4.47 (L)  4.60 - 6.20 M/uL Final    Hemoglobin 09/30/2024 13.9 (L)  14.0 - 18.0 g/dL Final    Hematocrit 09/30/2024 41.1  40.0 - 54.0 % Final    MCV 09/30/2024 92  82 - 98 fL Final    MCH 09/30/2024 31.1 " (H)  27.0 - 31.0 pg Final    MCHC 09/30/2024 33.8  32.0 - 36.0 g/dL Final    RDW 09/30/2024 14.4  11.5 - 14.5 % Final    Platelets 09/30/2024 147 (L)  150 - 450 K/uL Final    MPV 09/30/2024 11.3  9.2 - 12.9 fL Final    Immature Granulocytes 09/30/2024 0.3  0.0 - 0.5 % Final    Gran # (ANC) 09/30/2024 3.3  1.8 - 7.7 K/uL Final    Immature Grans (Abs) 09/30/2024 0.02  0.00 - 0.04 K/uL Final    Comment: Mild elevation in immature granulocytes is non specific and   can be seen in a variety of conditions including stress response,   acute inflammation, trauma and pregnancy. Correlation with other   laboratory and clinical findings is essential.      Lymph # 09/30/2024 1.9  1.0 - 4.8 K/uL Final    Mono # 09/30/2024 0.7  0.3 - 1.0 K/uL Final    Eos # 09/30/2024 0.1  0.0 - 0.5 K/uL Final    Baso # 09/30/2024 0.04  0.00 - 0.20 K/uL Final    nRBC 09/30/2024 0  0 /100 WBC Final    Gran % 09/30/2024 53.8  38.0 - 73.0 % Final    Lymph % 09/30/2024 31.2  18.0 - 48.0 % Final    Mono % 09/30/2024 11.8  4.0 - 15.0 % Final    Eosinophil % 09/30/2024 2.3  0.0 - 8.0 % Final    Basophil % 09/30/2024 0.6  0.0 - 1.9 % Final    Differential Method 09/30/2024 Automated   Final    Sodium 09/30/2024 137  136 - 145 mmol/L Final    Potassium 09/30/2024 3.7  3.5 - 5.1 mmol/L Final    Chloride 09/30/2024 103  95 - 110 mmol/L Final    CO2 09/30/2024 22 (L)  23 - 29 mmol/L Final    Glucose 09/30/2024 104  70 - 110 mg/dL Final    BUN 09/30/2024 9  6 - 20 mg/dL Final    Creatinine 09/30/2024 0.7  0.5 - 1.4 mg/dL Final    Calcium 09/30/2024 8.4 (L)  8.7 - 10.5 mg/dL Final    Total Protein 09/30/2024 6.8  6.0 - 8.4 g/dL Final    Albumin 09/30/2024 3.6  3.5 - 5.2 g/dL Final    Total Bilirubin 09/30/2024 0.8  0.1 - 1.0 mg/dL Final    Comment: For infants and newborns, interpretation of results should be based  on gestational age, weight and in agreement with clinical  observations.    Premature Infant recommended reference ranges:  Up to 24  "hours.............<8.0 mg/dL  Up to 48 hours............<12.0 mg/dL  3-5 days..................<15.0 mg/dL  6-29 days.................<15.0 mg/dL      Alkaline Phosphatase 09/30/2024 83  55 - 135 U/L Final    AST 09/30/2024 38  10 - 40 U/L Final    ALT 09/30/2024 33  10 - 44 U/L Final    eGFR 09/30/2024 >60.0  >60 mL/min/1.73 m^2 Final    Anion Gap 09/30/2024 12  8 - 16 mmol/L Final    Prothrombin Time 09/30/2024 11.7  9.0 - 12.5 sec Final    INR 09/30/2024 1.0  0.8 - 1.2 Final    Comment: Coumadin Therapy:  2.0 - 3.0 for INR for all indicators except mechanical heart valves  and antiphospholipid syndromes which should use 2.5 - 3.5.         Lab Results   Component Value Date    INR 1.0 09/30/2024    INR 1.1 07/22/2024    INR 1.1 07/22/2024       Lab Results   Component Value Date    SMOOTHMUSCAB Negative 1:40 07/22/2024     Lab Results   Component Value Date    IRON 94 07/22/2024    TIBC 401 07/22/2024     Lab Results   Component Value Date    HEPAIGM Non-reactive 07/22/2024    HEPBIGM Non-reactive 07/22/2024    HEPBIGM Non-reactive 07/22/2024    HEPCAB Non-reactive 07/22/2024     Lab Results   Component Value Date    TSH 0.874 07/22/2024     No results found for: "WENDIE"    No results found for: "ABORH"        Lab Results   Component Value Date    HGBA1C 4.8 07/01/2024     Lab Results   Component Value Date    CHOL 200 (H) 07/01/2024    CHOL 154 08/30/2023    CHOL 148 05/04/2022     Lab Results   Component Value Date    HDL 44 07/01/2024    HDL 54 08/30/2023    HDL 44 05/04/2022     Lab Results   Component Value Date    LDLCALC 122.2 07/01/2024    LDLCALC 76.6 08/30/2023    LDLCALC 75.2 05/04/2022     Lab Results   Component Value Date    TRIG 169 (H) 07/01/2024    TRIG 117 08/30/2023    TRIG 144 05/04/2022     Lab Results   Component Value Date    CHOLHDL 22.0 07/01/2024    CHOLHDL 35.1 08/30/2023    CHOLHDL 29.7 05/04/2022         I personally reviewed imaging studies and outside records..      Assessment:     "   1. Liver disease, unspecified    2. Metabolic dysfunction-associated steatotic liver disease (MASLD)    3. Alcohol use disorder    4. Elevated liver enzymes    5. Fatty liver    6. CAD in native artery    7. Primary hypertension    8. Typical atrial flutter    9. Mixed hyperlipidemia    10. Constipation, unspecified constipation type                   Plan:     Problem List Items Addressed This Visit          Psychiatric    Alcohol use disorder       Cardiac/Vascular    CAD in native artery    Hypertension    Mixed hyperlipidemia    Typical atrial flutter       GI    Elevated liver enzymes    Fatty liver       Other    Metabolic dysfunction-associated steatotic liver disease (MASLD)     Other Visit Diagnoses       Liver disease, unspecified    -  Primary    Relevant Orders    CT Abdomen With Without Contrast    Constipation, unspecified constipation type        Relevant Medications    polyethylene glycol (MIRALAX) 17 gram PwPk              Valerio was seen today for hepatic disease.    Diagnoses and all orders for this visit:    Liver disease, unspecified  -     CT Abdomen With Without Contrast; Future    Metabolic dysfunction-associated steatotic liver disease (MASLD)    Alcohol use disorder    Elevated liver enzymes    Fatty liver    CAD in native artery    Primary hypertension    Typical atrial flutter    Mixed hyperlipidemia    Constipation, unspecified constipation type  -     polyethylene glycol (MIRALAX) 17 gram PwPk; Take 17 g by mouth once daily.          Elevated Liver enzymes  resolved  -Avoid hepatotoxic drugs    Liver lesion  Plan CT liver    UTI  Follow up Urology    Altered Bowel habits  Fibers soluble    Ozuna's esophagus without dysplasia  Continue surveillance    CAD in native artery  Continue current treatment plan as previously prescribed with your  cardiologist.       HTN  Chronic; stable on current treatment plan; follow up with PCP    History of Guillain-Irvington syndrome  Continue current  treatment plan as per neurologist.     atrial flutter  Stable.    Obesity  Patient would benefit from weight loss and has tried to set realistic goals to achieve success. Lifestyle changes were discussed on eating healthy, exercising at least 150 minutes weekly, and reducing sedentary behavior.   Discussed the risk factors associated with obesity: Arthritis/KIA/Diabetes/Fatty Liver/Cardiovascular disease/GERD/HTN/HLP.     Return to clinic in 12 months.    I have sent communication to the referring physician and/or primary care provider.      Time Statement  A total time spent includes time preparing to see patient, reviewing  diagnostic studies and records, direct face-to-face visit, completing orders, medications , reconciliation, prescription management, and care coordination    We discussed in depth the nature of the patient's disease, the management plan in details. I have provided the patient with an opportunity to ask questions and have all questions answered to his satisfaction.     Discussed with patient that it is likely that she will see results before Myself or my nurse are able to view them and report results due to the Cures Act passed 4/1/21. Results will be sent immediately to the patient who are enrolled in the patient portal. If results come through after business hours or on weekend, we will not see them until the next business day that we are in the office. If resulted during the business day, we will likely not be able to review them until after completing all patient visits in office that day.     Colby Rodriguez MD  Transplant Hepatologist and Gastroenterologist  [facility] [place]

## 2024-09-30 ENCOUNTER — LAB VISIT (OUTPATIENT)
Dept: LAB | Facility: HOSPITAL | Age: 54
End: 2024-09-30
Attending: INTERNAL MEDICINE
Payer: MEDICARE

## 2024-09-30 DIAGNOSIS — R74.8 ELEVATED LIVER ENZYMES: ICD-10-CM

## 2024-09-30 DIAGNOSIS — R94.5 ABNORMAL RESULTS OF LIVER FUNCTION STUDIES: ICD-10-CM

## 2024-09-30 LAB
ALBUMIN SERPL BCP-MCNC: 3.6 G/DL (ref 3.5–5.2)
ALP SERPL-CCNC: 83 U/L (ref 55–135)
ALT SERPL W/O P-5'-P-CCNC: 33 U/L (ref 10–44)
ANION GAP SERPL CALC-SCNC: 12 MMOL/L (ref 8–16)
AST SERPL-CCNC: 38 U/L (ref 10–40)
BASOPHILS # BLD AUTO: 0.04 K/UL (ref 0–0.2)
BASOPHILS NFR BLD: 0.6 % (ref 0–1.9)
BILIRUB SERPL-MCNC: 0.8 MG/DL (ref 0.1–1)
BUN SERPL-MCNC: 9 MG/DL (ref 6–20)
CALCIUM SERPL-MCNC: 8.4 MG/DL (ref 8.7–10.5)
CHLORIDE SERPL-SCNC: 103 MMOL/L (ref 95–110)
CO2 SERPL-SCNC: 22 MMOL/L (ref 23–29)
CREAT SERPL-MCNC: 0.7 MG/DL (ref 0.5–1.4)
DIFFERENTIAL METHOD BLD: ABNORMAL
EOSINOPHIL # BLD AUTO: 0.1 K/UL (ref 0–0.5)
EOSINOPHIL NFR BLD: 2.3 % (ref 0–8)
ERYTHROCYTE [DISTWIDTH] IN BLOOD BY AUTOMATED COUNT: 14.4 % (ref 11.5–14.5)
EST. GFR  (NO RACE VARIABLE): >60 ML/MIN/1.73 M^2
GLUCOSE SERPL-MCNC: 104 MG/DL (ref 70–110)
HCT VFR BLD AUTO: 41.1 % (ref 40–54)
HGB BLD-MCNC: 13.9 G/DL (ref 14–18)
IMM GRANULOCYTES # BLD AUTO: 0.02 K/UL (ref 0–0.04)
IMM GRANULOCYTES NFR BLD AUTO: 0.3 % (ref 0–0.5)
INR PPP: 1 (ref 0.8–1.2)
LYMPHOCYTES # BLD AUTO: 1.9 K/UL (ref 1–4.8)
LYMPHOCYTES NFR BLD: 31.2 % (ref 18–48)
MCH RBC QN AUTO: 31.1 PG (ref 27–31)
MCHC RBC AUTO-ENTMCNC: 33.8 G/DL (ref 32–36)
MCV RBC AUTO: 92 FL (ref 82–98)
MONOCYTES # BLD AUTO: 0.7 K/UL (ref 0.3–1)
MONOCYTES NFR BLD: 11.8 % (ref 4–15)
NEUTROPHILS # BLD AUTO: 3.3 K/UL (ref 1.8–7.7)
NEUTROPHILS NFR BLD: 53.8 % (ref 38–73)
NRBC BLD-RTO: 0 /100 WBC
PLATELET # BLD AUTO: 147 K/UL (ref 150–450)
PMV BLD AUTO: 11.3 FL (ref 9.2–12.9)
POTASSIUM SERPL-SCNC: 3.7 MMOL/L (ref 3.5–5.1)
PROT SERPL-MCNC: 6.8 G/DL (ref 6–8.4)
PROTHROMBIN TIME: 11.7 SEC (ref 9–12.5)
RBC # BLD AUTO: 4.47 M/UL (ref 4.6–6.2)
SODIUM SERPL-SCNC: 137 MMOL/L (ref 136–145)
WBC # BLD AUTO: 6.21 K/UL (ref 3.9–12.7)

## 2024-09-30 PROCEDURE — 80053 COMPREHEN METABOLIC PANEL: CPT | Performed by: INTERNAL MEDICINE

## 2024-09-30 PROCEDURE — 85025 COMPLETE CBC W/AUTO DIFF WBC: CPT | Performed by: INTERNAL MEDICINE

## 2024-09-30 PROCEDURE — 36415 COLL VENOUS BLD VENIPUNCTURE: CPT | Mod: PO | Performed by: INTERNAL MEDICINE

## 2024-09-30 PROCEDURE — 85610 PROTHROMBIN TIME: CPT | Performed by: INTERNAL MEDICINE

## 2024-10-01 ENCOUNTER — OFFICE VISIT (OUTPATIENT)
Dept: HEPATOLOGY | Facility: CLINIC | Age: 54
End: 2024-10-01
Payer: MEDICARE

## 2024-10-01 VITALS
WEIGHT: 281.75 LBS | DIASTOLIC BLOOD PRESSURE: 68 MMHG | HEART RATE: 89 BPM | OXYGEN SATURATION: 97 % | BODY MASS INDEX: 33.27 KG/M2 | HEIGHT: 77 IN | SYSTOLIC BLOOD PRESSURE: 186 MMHG

## 2024-10-01 DIAGNOSIS — I25.10 CAD IN NATIVE ARTERY: ICD-10-CM

## 2024-10-01 DIAGNOSIS — I48.3 TYPICAL ATRIAL FLUTTER: ICD-10-CM

## 2024-10-01 DIAGNOSIS — K76.0 FATTY LIVER: ICD-10-CM

## 2024-10-01 DIAGNOSIS — K59.00 CONSTIPATION, UNSPECIFIED CONSTIPATION TYPE: ICD-10-CM

## 2024-10-01 DIAGNOSIS — R74.8 ELEVATED LIVER ENZYMES: ICD-10-CM

## 2024-10-01 DIAGNOSIS — I10 PRIMARY HYPERTENSION: ICD-10-CM

## 2024-10-01 DIAGNOSIS — K76.0 METABOLIC DYSFUNCTION-ASSOCIATED STEATOTIC LIVER DISEASE (MASLD): ICD-10-CM

## 2024-10-01 DIAGNOSIS — E78.2 MIXED HYPERLIPIDEMIA: ICD-10-CM

## 2024-10-01 DIAGNOSIS — F10.90 ALCOHOL USE DISORDER: ICD-10-CM

## 2024-10-01 DIAGNOSIS — K76.9 LIVER DISEASE, UNSPECIFIED: Primary | ICD-10-CM

## 2024-10-01 PROCEDURE — 99214 OFFICE O/P EST MOD 30 MIN: CPT | Mod: S$GLB,,, | Performed by: INTERNAL MEDICINE

## 2024-10-01 PROCEDURE — 99999 PR PBB SHADOW E&M-EST. PATIENT-LVL V: CPT | Mod: PBBFAC,,, | Performed by: INTERNAL MEDICINE

## 2024-10-01 PROCEDURE — 3044F HG A1C LEVEL LT 7.0%: CPT | Mod: CPTII,S$GLB,, | Performed by: INTERNAL MEDICINE

## 2024-10-01 PROCEDURE — 4010F ACE/ARB THERAPY RXD/TAKEN: CPT | Mod: CPTII,S$GLB,, | Performed by: INTERNAL MEDICINE

## 2024-10-01 PROCEDURE — 1160F RVW MEDS BY RX/DR IN RCRD: CPT | Mod: CPTII,S$GLB,, | Performed by: INTERNAL MEDICINE

## 2024-10-01 PROCEDURE — 1159F MED LIST DOCD IN RCRD: CPT | Mod: CPTII,S$GLB,, | Performed by: INTERNAL MEDICINE

## 2024-10-01 PROCEDURE — 3078F DIAST BP <80 MM HG: CPT | Mod: CPTII,S$GLB,, | Performed by: INTERNAL MEDICINE

## 2024-10-01 PROCEDURE — 3008F BODY MASS INDEX DOCD: CPT | Mod: CPTII,S$GLB,, | Performed by: INTERNAL MEDICINE

## 2024-10-01 PROCEDURE — 3077F SYST BP >= 140 MM HG: CPT | Mod: CPTII,S$GLB,, | Performed by: INTERNAL MEDICINE

## 2024-10-01 RX ORDER — POLYETHYLENE GLYCOL 3350 17 G/17G
17 POWDER, FOR SOLUTION ORAL DAILY
Qty: 30 PACKET | Refills: 3 | Status: SHIPPED | OUTPATIENT
Start: 2024-10-01

## 2024-10-08 ENCOUNTER — TELEPHONE (OUTPATIENT)
Dept: FAMILY MEDICINE | Facility: CLINIC | Age: 54
End: 2024-10-08
Payer: MEDICARE

## 2024-10-08 ENCOUNTER — NURSE TRIAGE (OUTPATIENT)
Dept: ADMINISTRATIVE | Facility: CLINIC | Age: 54
End: 2024-10-08
Payer: MEDICARE

## 2024-10-08 NOTE — TELEPHONE ENCOUNTER
Received the following message from nurse triage. Please advise.     OOC RN  Patient c/o sob, coughing,  x 2 days, can not lay down flat, and now when he sits up he is sob.  Temp says positive and did not have thermometer.  Care advise  is to call 911,   states he jhust wants a ride to the ED.  Told him that was his decision.   Reason for Disposition   Stridor (harsh sound while breathing in)    Additional Information   Negative: SEVERE difficulty breathing (e.g., struggling for each breath, speaks in single words, pulse > 120)   Negative: Breathing stopped and hasn't returned   Negative: Choking on something   Negative: Bluish (or gray) lips or face   Negative: Difficult to awaken or acting confused (e.g., disoriented, slurred speech)   Negative: Passed out (e.g., fainted, lost consciousness, blacked out and was not responding)   Negative: Wheezing started suddenly after medicine, an allergic food, or bee sting    Protocols used: Breathing Difficulty-A-OH        
1) Bariatric Full liquid diet education done 2) Upon discharge, recommend liquid/chewable/crushable calcium citrate + Vitamin D 3) Upon discharge, recommend liquid, chewable or crushable MVI with iron and sublingual vitamin B12

## 2024-10-08 NOTE — TELEPHONE ENCOUNTER
OOC RN  Patient c/o sob, coughing,  x 2 days, can not lay down flat, and now when he sits up he is sob.  Temp says positive and did not have thermometer.  Care advise  is to call 911,   states he flako wants a ride to the ED.  Told him that was his decision.   Reason for Disposition   Stridor (harsh sound while breathing in)    Additional Information   Negative: SEVERE difficulty breathing (e.g., struggling for each breath, speaks in single words, pulse > 120)   Negative: Breathing stopped and hasn't returned   Negative: Choking on something   Negative: Bluish (or gray) lips or face   Negative: Difficult to awaken or acting confused (e.g., disoriented, slurred speech)   Negative: Passed out (e.g., fainted, lost consciousness, blacked out and was not responding)   Negative: Wheezing started suddenly after medicine, an allergic food, or bee sting    Protocols used: Breathing Difficulty-A-OH

## 2024-10-28 ENCOUNTER — PATIENT MESSAGE (OUTPATIENT)
Dept: GASTROENTEROLOGY | Facility: CLINIC | Age: 54
End: 2024-10-28
Payer: MEDICARE

## 2024-10-29 ENCOUNTER — TELEPHONE (OUTPATIENT)
Dept: GASTROENTEROLOGY | Facility: CLINIC | Age: 54
End: 2024-10-29
Payer: MEDICARE

## 2024-11-04 ENCOUNTER — LAB VISIT (OUTPATIENT)
Dept: LAB | Facility: HOSPITAL | Age: 54
End: 2024-11-04
Attending: NURSE PRACTITIONER
Payer: MEDICARE

## 2024-11-04 DIAGNOSIS — R19.7 DIARRHEA, UNSPECIFIED TYPE: ICD-10-CM

## 2024-11-04 LAB
C DIFF GDH STL QL: NEGATIVE
C DIFF TOX A+B STL QL IA: NEGATIVE

## 2024-11-04 PROCEDURE — 82705 FATS/LIPIDS FECES QUAL: CPT | Performed by: NURSE PRACTITIONER

## 2024-11-04 PROCEDURE — 87209 SMEAR COMPLEX STAIN: CPT | Performed by: NURSE PRACTITIONER

## 2024-11-04 PROCEDURE — 83993 ASSAY FOR CALPROTECTIN FECAL: CPT | Performed by: NURSE PRACTITIONER

## 2024-11-04 PROCEDURE — 87449 NOS EACH ORGANISM AG IA: CPT | Performed by: NURSE PRACTITIONER

## 2024-11-04 PROCEDURE — 87449 NOS EACH ORGANISM AG IA: CPT | Mod: 91 | Performed by: NURSE PRACTITIONER

## 2024-11-04 PROCEDURE — 89055 LEUKOCYTE ASSESSMENT FECAL: CPT | Performed by: NURSE PRACTITIONER

## 2024-11-04 PROCEDURE — 87046 STOOL CULTR AEROBIC BACT EA: CPT | Performed by: NURSE PRACTITIONER

## 2024-11-04 PROCEDURE — 87425 ROTAVIRUS AG IA: CPT | Performed by: NURSE PRACTITIONER

## 2024-11-04 PROCEDURE — 87427 SHIGA-LIKE TOXIN AG IA: CPT | Performed by: NURSE PRACTITIONER

## 2024-11-04 PROCEDURE — 82653 EL-1 FECAL QUANTITATIVE: CPT | Performed by: NURSE PRACTITIONER

## 2024-11-04 PROCEDURE — 87328 CRYPTOSPORIDIUM AG IA: CPT | Performed by: NURSE PRACTITIONER

## 2024-11-04 PROCEDURE — 87045 FECES CULTURE AEROBIC BACT: CPT | Performed by: NURSE PRACTITIONER

## 2024-11-05 LAB
CRYPTOSP AG STL QL IA: NEGATIVE
G LAMBLIA AG STL QL IA: NEGATIVE
RV AG STL QL IA.RAPID: NEGATIVE
WBC #/AREA STL HPF: NORMAL /[HPF]

## 2024-11-06 LAB
E COLI SXT1 STL QL IA: NEGATIVE
E COLI SXT2 STL QL IA: NEGATIVE

## 2024-11-07 LAB
BACTERIA STL CULT: NORMAL
CALPROTECTIN STL-MCNT: 42.2 MCG/G
ELASTASE 1, FECAL: 318 MCG/G
FAT STL QL: ABNORMAL
NEUTRAL FAT STL QL: NORMAL

## 2024-11-14 ENCOUNTER — OFFICE VISIT (OUTPATIENT)
Dept: GASTROENTEROLOGY | Facility: CLINIC | Age: 54
End: 2024-11-14
Payer: MEDICARE

## 2024-11-14 ENCOUNTER — HOSPITAL ENCOUNTER (OUTPATIENT)
Dept: RADIOLOGY | Facility: HOSPITAL | Age: 54
Discharge: HOME | End: 2024-11-14
Attending: NURSE PRACTITIONER
Payer: MEDICARE

## 2024-11-14 DIAGNOSIS — R19.7 DIARRHEA, UNSPECIFIED TYPE: ICD-10-CM

## 2024-11-14 DIAGNOSIS — R10.9 ABDOMINAL PAIN, UNSPECIFIED ABDOMINAL LOCATION: Primary | ICD-10-CM

## 2024-11-14 DIAGNOSIS — R19.7 DIARRHEA, UNSPECIFIED TYPE: Primary | ICD-10-CM

## 2024-11-14 DIAGNOSIS — R19.5 MUCUS IN STOOL: ICD-10-CM

## 2024-11-14 PROCEDURE — 1160F RVW MEDS BY RX/DR IN RCRD: CPT | Mod: CPTII,95,, | Performed by: NURSE PRACTITIONER

## 2024-11-14 PROCEDURE — 3044F HG A1C LEVEL LT 7.0%: CPT | Mod: CPTII,95,, | Performed by: NURSE PRACTITIONER

## 2024-11-14 PROCEDURE — 74019 RADEX ABDOMEN 2 VIEWS: CPT | Mod: TC,PO

## 2024-11-14 PROCEDURE — 4010F ACE/ARB THERAPY RXD/TAKEN: CPT | Mod: CPTII,95,, | Performed by: NURSE PRACTITIONER

## 2024-11-14 PROCEDURE — 1159F MED LIST DOCD IN RCRD: CPT | Mod: CPTII,95,, | Performed by: NURSE PRACTITIONER

## 2024-11-14 PROCEDURE — 99214 OFFICE O/P EST MOD 30 MIN: CPT | Mod: 95,,, | Performed by: NURSE PRACTITIONER

## 2024-11-14 PROCEDURE — 74019 RADEX ABDOMEN 2 VIEWS: CPT | Mod: 26,,, | Performed by: RADIOLOGY

## 2024-11-14 NOTE — PROGRESS NOTES
Clinic Follow Up:  [facility] Follow Up Note    Reason for Follow Up:  The primary encounter diagnosis was Diarrhea, unspecified type. A diagnosis of Mucus in stool was also pertinent to this visit.    PCP: Fabiola Andrade       The patient location is: [place]   The chief complaint leading to consultation is: altered bowel     Visit type: audiovisual    Face to Face time with patient: 10 minutes   15 minutes of total time spent on the encounter, which includes face to face time and non-face to face time preparing to see the patient (eg, review of tests), Obtaining and/or reviewing separately obtained history, Documenting clinical information in the electronic or other health record, Independently interpreting results (not separately reported) and communicating results to the patient/family/caregiver, or Care coordination (not separately reported).         Each patient to whom he or she provides medical services by telemedicine is:  (1) informed of the relationship between the physician and patient and the respective role of any other health care provider with respect to management of the patient; and (2) notified that he or she may decline to receive medical services by telemedicine and may withdraw from such care at any time.    Notes:       HPI:  This is a 54 y.o. male here for follow up.   Reports alternating stool. Will have normal bowel movements for a couple of days and will then have diarrhea. Sometimes will not produce stool but only what appears to be mucus. He will have incontinence issues when this occurs. He has abdominal discomfort when this occurs- feels like gurgling and rumbling. He had workup earlier this year with stool studies. Did have increased fat in stool but pancreatic elastase was normal. Negative for infection or inflammation. He had an xray in April that showed mild constipation. He had a colonoscopy 6/2023 with poor prep, despite taking bowel prep as directed. He  has not been taking fiber supplement.     Review of Systems   Constitutional:  Negative for activity change and appetite change.        As per interval history above   Respiratory:  Negative for cough and shortness of breath.    Cardiovascular:  Negative for chest pain.   Gastrointestinal:  Positive for abdominal pain and diarrhea. Negative for constipation, nausea and vomiting.   Skin:  Negative for color change and rash.       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    COLONOSCOPY N/A 05/17/2018    Procedure: COLONOSCOPY;  Surgeon: Ilan Araya III, MD;  Location: [facility];  Service: Endoscopy;  Laterality: N/A;    COLONOSCOPY N/A 06/28/2023    Procedure: COLONOSCOPY;  Surgeon: Colby Rodriguez MD;  Location: [facility];  Service: Endoscopy;  Laterality: N/A;    COSMETIC SURGERY      Flap june 2008    decubitus surgery      to sacral    ESOPHAGOGASTRODUODENOSCOPY      ESOPHAGOGASTRODUODENOSCOPY N/A 06/17/2021    Procedure: EGD (ESOPHAGOGASTRODUODENOSCOPY);   Surgeon: Ban Zheng MD;  Location: [facility] ENDO;  Service: Endoscopy;  Laterality: N/A;    ESOPHAGOGASTRODUODENOSCOPY N/A 06/28/2023    Procedure: EGD (ESOPHAGOGASTRODUODENOSCOPY);  Surgeon: Colby Rodriguez MD;  Location: [facility] ENDO;  Service: Endoscopy;  Laterality: N/A;    GASTROSTOMY TUBE PLACEMENT      KNEE ARTHROSCOPY Left 2002    LEFT HEART CATHETERIZATION Left 12/10/2020    Procedure: CATHETERIZATION, HEART, LEFT;  Surgeon: Tomi Mir MD;  Location: [facility] CATH LAB;  Service: Cardiology;  Laterality: Left;  730 start time    PEG TUBE REMOVAL      RADIOFREQUENCY ABLATION      RFA      ROBOT-ASSISTED CHOLECYSTECTOMY USING DA GABRIELA XI N/A 05/02/2019    Procedure: XI ROBOTIC CHOLECYSTECTOMY;  Surgeon: Valerio Lai MD;  Location: [facility] OR;  Service: General;  Laterality: N/A;    JUAN-EN-Y PROCEDURE      TONSILLECTOMY      TRACHEAL SURGERY         Family History:   Family History   Problem Relation Name Age of Onset    Heart attack Mother Onelia     Heart disease Mother Onelia     Arthritis Mother Onelia     Heart disease Father Josiah     Heart attack Father Josiah     Hypertension Father Josiah     Stroke Father Josiah     Cancer Maternal Grandfather Jh     Stroke Sister Zuly        Social History:   Social History     Tobacco Use    Smoking status: Every Day     Current packs/day: 1.00     Average packs/day: 1 pack/day for 3.0 years (3.0 ttl pk-yrs)     Types: Cigarettes     Start date: 11/17/2021    Smokeless tobacco: Former     Types: Snuff     Quit date: 1/6/1999    Tobacco comments:     Unknown   Substance Use Topics    Alcohol use: Not Currently     Alcohol/week: 14.0 standard drinks of alcohol     Types: 6 Glasses of wine, 4 Cans of beer, 4 Shots of liquor per week     Comment: 4 beers on weekend for football    Drug use: No       Allergies:   Review of patient's allergies indicates:   Allergen Reactions    Metoclopramide Other (See Comments) and Hives     Pt. Was in coma so is not aware of the  "reaction  Pt states "he don't know, was in coma"      Metoclopramide (bulk)      Other reaction(s): Unable to obtain    Reglan  [metoclopramide hcl] Other (See Comments)     Pt. Was in coma so is not aware of the reaction  Other reaction(s): Unable to obtain    Statins-hmg-coa reductase inhibitors      Myalgia      Sulfa (sulfonamide antibiotics) Other (See Comments)     Never taken  States son is allergic    Latex Hives, Itching and Rash           Latex, natural rubber Rash     Blisters, adhesives          Home Medications:  Current Outpatient Medications on File Prior to Visit   Medication Sig Dispense Refill    alirocumab (PRALUENT PEN) 150 mg/mL PnIj INJECT 1.06 ML (159 MG TOTAL) BY ABDOMINAL SUBCUTANEOUS ROUTE EVERY 14 DAYS 2 mL 12    amLODIPine (NORVASC) 5 MG tablet Take 1 tablet (5 mg total) by mouth every evening. 60 tablet 12    aspirin (ECOTRIN) 81 MG EC tablet Take 81 mg by mouth once daily.      calcium-vitamin D 600 mg, 1,500mg,-200 unit 600 mg(1,500mg) -200 unit Tab Take 1 tablet by mouth once daily.       celecoxib (CELEBREX) 100 MG capsule Take 1 capsule (100 mg total) by mouth 2 (two) times daily. 60 capsule 2    cetirizine (ZYRTEC) 10 MG tablet Take 1 tablet (10 mg total) by mouth once daily. 90 tablet 0    cyanocobalamin (VITAMIN B-12) 1,000 mcg/mL injection Inject 1 mL (1,000 mcg total) into the muscle every 30 days. (Patient not taking: Reported on 10/1/2024) 10 mL 0    esomeprazole (NEXIUM) 20 MG capsule Take 2 capsules (40 mg total) by mouth before breakfast. 60 capsule 11    fluticasone propionate (FLONASE) 50 mcg/actuation nasal spray 1 spray (50 mcg total) by Each Nostril route once daily. 16 g 1    folic acid (FOLVITE) 1 MG tablet Take 1 tablet (1 mg total) by mouth once daily. 30 tablet 0    furosemide (LASIX) 20 MG tablet Take 20 mg by mouth 2 (two) times daily. (Patient taking differently: Take 20 mg by mouth once daily.)      lisinopriL-hydrochlorothiazide (PRINZIDE,ZESTORETIC) 20-12.5 " mg per tablet Take 2 tablets by mouth once daily. 180 tablet 3    metoprolol succinate (TOPROL-XL) 50 MG 24 hr tablet Take 0.5 tablets (25 mg total) by mouth every other day. 30 tablet 6    multivitamin (THERAGRAN) per tablet Take 1 tablet by mouth nightly.       ondansetron (ZOFRAN) 4 MG tablet Take 1 tablet (4 mg total) by mouth every 6 (six) hours. 20 tablet 0    paroxetine (PAXIL) 10 MG tablet TAKE 1 TABLET BY MOUTH ONCE DAILY IN THE MORNING 30 tablet 5    polyethylene glycol (MIRALAX) 17 gram PwPk Take 17 g by mouth once daily. 30 packet 3    potassium chloride SA (K-DUR,KLOR-CON) 20 MEQ tablet Take 1 tablet (20 mEq total) by mouth 2 (two) times daily. 60 tablet 1    tadalafiL (CIALIS) 20 MG Tab TAKE 1 TABLET BY MOUTH EVERY 24 HOURS AS NEEDED FOR ERECTILE DYSFUNCTION 30 tablet 11    tamsulosin (FLOMAX) 0.4 mg Cap Take 1 capsule (0.4 mg total) by mouth once daily. 90 capsule 3     No current facility-administered medications on file prior to visit.       There were no vitals taken for this visit.  There is no height or weight on file to calculate BMI.  Physical Exam  Constitutional:       General: He is not in acute distress.  HENT:      Head: Normocephalic.   Neurological:      General: No focal deficit present.      Mental Status: He is alert.   Psychiatric:         Mood and Affect: Mood normal.         Judgment: Judgment normal.         Labs: Pertinent labs reviewed.  CRC Screening:     Assessment:   1. Diarrhea, unspecified type    2. Mucus in stool        Recommendations:   He will get xray today to assess stool burden  He will start metmaucil daily   If xray normal, may need to consider repeating colonoscopy but to also get random bx to assess for microscopic disease.     Diarrhea, unspecified type  -     X-Ray Abdomen Flat And Erect; Future; Expected date: 11/14/2024    Mucus in stool    Return to Clinic:  Follow up to be determined after results/ procedure(s).    Thank you for the opportunity to  participate in the care of this patient.  JUSTIN Wisdom

## 2024-11-21 ENCOUNTER — HOSPITAL ENCOUNTER (OUTPATIENT)
Dept: RADIOLOGY | Facility: HOSPITAL | Age: 54
Discharge: HOME | End: 2024-11-21
Attending: INTERNAL MEDICINE
Payer: MEDICARE

## 2024-11-21 DIAGNOSIS — R10.9 ABDOMINAL PAIN, UNSPECIFIED ABDOMINAL LOCATION: ICD-10-CM

## 2024-11-21 PROCEDURE — 74177 CT ABD & PELVIS W/CONTRAST: CPT | Mod: 26,,, | Performed by: RADIOLOGY

## 2024-11-21 PROCEDURE — A9698 NON-RAD CONTRAST MATERIALNOC: HCPCS | Performed by: INTERNAL MEDICINE

## 2024-11-21 PROCEDURE — 74177 CT ABD & PELVIS W/CONTRAST: CPT | Mod: TC

## 2024-11-21 PROCEDURE — 25500020 PHARM REV CODE 255: Performed by: INTERNAL MEDICINE

## 2024-11-21 RX ADMIN — IOHEXOL 1000 ML: 12 SOLUTION ORAL at 04:11

## 2024-11-21 RX ADMIN — IOHEXOL 100 ML: 350 INJECTION, SOLUTION INTRAVENOUS at 04:11

## 2024-11-27 ENCOUNTER — PRE-OP/PRE-PROCEDURE ORDERS (OUTPATIENT)
Dept: GASTROENTEROLOGY | Facility: CLINIC | Age: 54
End: 2024-11-27
Payer: MEDICARE

## 2024-11-27 DIAGNOSIS — R19.7 DIARRHEA, UNSPECIFIED TYPE: Primary | ICD-10-CM

## 2024-11-27 RX ORDER — POLYETHYLENE GLYCOL 3350, SODIUM SULFATE ANHYDROUS, SODIUM BICARBONATE, SODIUM CHLORIDE, POTASSIUM CHLORIDE 236; 22.74; 6.74; 5.86; 2.97 G/4L; G/4L; G/4L; G/4L; G/4L
4 POWDER, FOR SOLUTION ORAL ONCE
Qty: 4000 ML | Refills: 0 | OUTPATIENT
Start: 2024-11-27 | End: 2024-11-27

## 2024-11-27 RX ORDER — CHOLESTYRAMINE 4 G/9G
4 POWDER, FOR SUSPENSION ORAL DAILY
Qty: 30 PACKET | Refills: 11 | OUTPATIENT
Start: 2024-11-27 | End: 2025-11-27

## 2024-11-29 ENCOUNTER — HOSPITAL ENCOUNTER (OUTPATIENT)
Dept: PREADMISSION TESTING | Facility: HOSPITAL | Age: 54
Discharge: HOME | End: 2024-11-29
Attending: INTERNAL MEDICINE
Payer: MEDICARE

## 2024-11-29 DIAGNOSIS — R19.7 DIARRHEA, UNSPECIFIED TYPE: Primary | ICD-10-CM

## 2024-12-05 ENCOUNTER — ANESTHESIA EVENT (OUTPATIENT)
Dept: ENDOSCOPY | Facility: HOSPITAL | Age: 54
End: 2024-12-05
Payer: MEDICARE

## 2024-12-05 ENCOUNTER — HOSPITAL ENCOUNTER (OUTPATIENT)
Facility: HOSPITAL | Age: 54
Discharge: HOME | End: 2024-12-05
Attending: INTERNAL MEDICINE | Admitting: INTERNAL MEDICINE
Payer: MEDICARE

## 2024-12-05 ENCOUNTER — ANESTHESIA (OUTPATIENT)
Dept: ENDOSCOPY | Facility: HOSPITAL | Age: 54
End: 2024-12-05
Payer: MEDICARE

## 2024-12-05 DIAGNOSIS — R19.4 ENCOUNTER FOR DIAGNOSTIC COLONOSCOPY DUE TO CHANGE IN BOWEL HABITS: ICD-10-CM

## 2024-12-05 PROCEDURE — 45380 COLONOSCOPY AND BIOPSY: CPT | Mod: 59,,, | Performed by: INTERNAL MEDICINE

## 2024-12-05 PROCEDURE — 37000008 HC ANESTHESIA 1ST 15 MINUTES: Performed by: INTERNAL MEDICINE

## 2024-12-05 PROCEDURE — 63600175 PHARM REV CODE 636 W HCPCS

## 2024-12-05 PROCEDURE — 37000009 HC ANESTHESIA EA ADD 15 MINS: Performed by: INTERNAL MEDICINE

## 2024-12-05 PROCEDURE — 45385 COLONOSCOPY W/LESION REMOVAL: CPT | Performed by: INTERNAL MEDICINE

## 2024-12-05 PROCEDURE — 45380 COLONOSCOPY AND BIOPSY: CPT | Mod: 59 | Performed by: INTERNAL MEDICINE

## 2024-12-05 PROCEDURE — 27201012 HC FORCEPS, HOT/COLD, DISP: Performed by: INTERNAL MEDICINE

## 2024-12-05 PROCEDURE — 45385 COLONOSCOPY W/LESION REMOVAL: CPT | Mod: ,,, | Performed by: INTERNAL MEDICINE

## 2024-12-05 PROCEDURE — 27201089 HC SNARE, DISP (ANY): Performed by: INTERNAL MEDICINE

## 2024-12-05 PROCEDURE — 25000003 PHARM REV CODE 250: Performed by: INTERNAL MEDICINE

## 2024-12-05 PROCEDURE — 88305 TISSUE EXAM BY PATHOLOGIST: CPT | Performed by: STUDENT IN AN ORGANIZED HEALTH CARE EDUCATION/TRAINING PROGRAM

## 2024-12-05 RX ORDER — DEXTROMETHORPHAN/PSEUDOEPHED 2.5-7.5/.8
DROPS ORAL
Status: COMPLETED | OUTPATIENT
Start: 2024-12-05 | End: 2024-12-05

## 2024-12-05 RX ORDER — PROPOFOL 10 MG/ML
VIAL (ML) INTRAVENOUS
Status: DISCONTINUED | OUTPATIENT
Start: 2024-12-05 | End: 2024-12-05

## 2024-12-05 RX ORDER — LIDOCAINE HYDROCHLORIDE 10 MG/ML
INJECTION, SOLUTION EPIDURAL; INFILTRATION; INTRACAUDAL; PERINEURAL
Status: DISCONTINUED | OUTPATIENT
Start: 2024-12-05 | End: 2024-12-05

## 2024-12-05 RX ADMIN — PROPOFOL 20 MG: 10 INJECTION, EMULSION INTRAVENOUS at 08:12

## 2024-12-05 RX ADMIN — LIDOCAINE HYDROCHLORIDE 50 MG: 10 SOLUTION INTRAVENOUS at 07:12

## 2024-12-05 RX ADMIN — PROPOFOL 30 MG: 10 INJECTION, EMULSION INTRAVENOUS at 08:12

## 2024-12-05 RX ADMIN — PROPOFOL 40 MG: 10 INJECTION, EMULSION INTRAVENOUS at 08:12

## 2024-12-05 RX ADMIN — PROPOFOL 100 MG: 10 INJECTION, EMULSION INTRAVENOUS at 07:12

## 2024-12-05 NOTE — TRANSFER OF CARE
"Anesthesia Transfer of Care Note    Patient: Valerio Yan    Procedure(s) Performed: Procedure(s) (LRB):  COLONOSCOPY (N/A)    Patient location: GI    Anesthesia Type: MAC    Transport from OR: Transported from OR on room air with adequate spontaneous ventilation    Post pain: adequate analgesia    Post assessment: no apparent anesthetic complications    Post vital signs: stable    Level of consciousness: awake    Nausea/Vomiting: no nausea/vomiting    Complications: none    Transfer of care protocol was followed      Last vitals: Visit Vitals  BP (!) 158/77 (BP Location: Left arm, Patient Position: Lying)   Pulse 61   Temp 37 °C (98.6 °F) (Temporal)   Resp 18   Ht 6' 5" (1.956 m)   Wt 123.4 kg (272 lb)   SpO2 97%   BMI 32.25 kg/m²     "

## 2024-12-05 NOTE — ANESTHESIA POSTPROCEDURE EVALUATION
Anesthesia Post Evaluation    Patient: Valerio Yan    Procedure(s) Performed: Procedure(s) (LRB):  COLONOSCOPY (N/A)    Final Anesthesia Type: MAC      Patient location during evaluation: GI PACU  Patient participation: Yes- Able to Participate  Level of consciousness: awake and alert  Post-procedure vital signs: reviewed and stable  Pain management: adequate  Airway patency: patent    PONV status at discharge: No PONV  Anesthetic complications: no      Cardiovascular status: blood pressure returned to baseline and hemodynamically stable  Respiratory status: unassisted, spontaneous ventilation and room air  Hydration status: euvolemic  Follow-up not needed.              Vitals Value Taken Time   /72 12/05/24 0850   Temp  12/05/24 1202   Pulse 55 12/05/24 0850   Resp 15 12/05/24 0850   SpO2 97 % 12/05/24 0850         Event Time   Out of Recovery 09:03:48         Pain/Sridevi Score: Sridevi Score: 10 (12/5/2024  8:50 AM)

## 2024-12-05 NOTE — PROVATION PATIENT INSTRUCTIONS
Discharge Summary/Instructions after an Endoscopic Procedure  Patient Name: Valerio Yan  Patient MRN: [id number]  Patient YOB: 1970  Thursday, December 5, 2024 Bonnie Wright MD  Dear patient,  As a result of recent federal legislation (The Federal Cures Act), you may   receive lab or pathology results from your procedure in your [facility]   account before your physician is able to contact you. Your physician or   their representative will relay the results to you with their   recommendations at their soonest availability.  Thank you,  RESTRICTIONS:  During your procedure today, you received medications for sedation.  These   medications may affect your judgment, balance and coordination.  Therefore,   for 24 hours, you have the following restrictions:   - DO NOT drive a car, operate machinery, make legal/financial decisions,   sign important papers or drink alcohol.    ACTIVITY:  Today: no heavy lifting, straining or running due to procedural   sedation/anesthesia.  The following day: return to full activity including work.  DIET:  Eat and drink normally unless instructed otherwise.     TREATMENT FOR COMMON SIDE EFFECTS:  - Mild abdominal pain, nausea, belching, bloating or excessive gas:  rest,   eat lightly and use a heating pad.  - Sore Throat: treat with throat lozenges and/or gargle with warm salt   water.  - Because air was used during the procedure, expelling large amounts of air   from your rectum or belching is normal.  - If a bowel prep was taken, you may not have a bowel movement for 1-3 days.    This is normal.  SYMPTOMS TO WATCH FOR AND REPORT TO YOUR PHYSICIAN:  1. Abdominal pain or bloating, other than gas cramps.  2. Chest pain.  3. Back pain.  4. Signs of infection such as: chills or fever occurring within 24 hours   after the procedure.  5. Rectal bleeding, which would show as bright red, maroon, or black stools.   (A tablespoon of blood from the rectum is not serious, especially if    hemorrhoids are present.)  6. Vomiting.  7. Weakness or dizziness.  GO DIRECTLY TO THE NEAREST EMERGENCY ROOM IF YOU HAVE ANY OF THE FOLLOWING:      Difficulty breathing              Chills and/or fever over 101 F   Persistent vomiting and/or vomiting blood   Severe abdominal pain   Severe chest pain   Black, tarry stools   Bleeding- more than one tablespoon   Any other symptom or condition that you feel may need urgent attention  Your doctor recommends these additional instructions:  If any biopsies were taken, your doctors clinic will contact you in 1 to 2   weeks with any results.  - Discharge patient to home (with escort).   - Resume previous diet.   - Continue present medications.   - Await pathology results.   - Fecal quantitative study. If negative then may be experiencing overflor   diarrhea.  - Repeat colonoscopy in 3 years because the bowel preparation was suboptimal   and for surveillance based on pathology results.   - Return to nurse practitioner with ANIA Jasmine.  For questions, problems or results please call your physician Bonnie Wright MD at Work:  [phone]  If you have any questions about the above instructions, call the GI   department at [phone] or call the endoscopy unit at [phone]   from 7am until 3 pm.  [facility], EMERGENCY ROOM PHONE NUMBER:   [phone]  IF A COMPLICATION OR EMERGENCY SITUATION ARISES AND YOU ARE UNABLE TO REACH   YOUR PHYSICIAN - GO DIRECTLY TO THE EMERGENCY ROOM.  I have read or have had read to me these discharge instructions for my   procedure and have received a written copy.  I understand these   instructions and will follow-up with my physician if I have any questions.     __________________________________       _____________________________________  Nurse Signature                                          Patient/Designated   Responsible Party Signature  MD Bonnie Bains,  MD  12/5/2024 8:38:09 AM  This report has been verified and signed electronically.  Dear patient,  As a result of recent federal legislation (The Federal Cures Act), you may   receive lab or pathology results from your procedure in your [facility]   account before your physician is able to contact you. Your physician or   their representative will relay the results to you with their   recommendations at their soonest availability.  Thank you,  PROVATION

## 2024-12-05 NOTE — ANESTHESIA PREPROCEDURE EVALUATION
12/05/2024  Valerio Yan is a 54 y.o., male.    Patient Active Problem List   Diagnosis    Epigastric pain    Polyneuropathy    Peptic ulcer disease    Typical atrial flutter    Ozuna's esophagus without dysplasia    Mitral regurgitation    Former cigarette smoker    Diaphoresis    Hypertension    LVH (left ventricular hypertrophy) due to hypertensive disease    Mixed hyperlipidemia    Complex regional pain syndrome type 2 of both lower extremities    Rotator cuff syndrome of right shoulder and allied disorders    Tendonitis of long head of biceps brachii of right shoulder    Primary osteoarthritis of left knee    Atypical chest pain    Abnormal CK    Dizziness    Symptomatic cholelithiasis    Hypokalemia    Family history of cardiovascular disease    CAD in native artery    Elevated coronary artery calcium score (2893)    Sinus bradycardia    At risk for sleep apnea    CAD, multiple vessel    Sleep-disordered breathing    Psychophysiological insomnia    Inadequate sleep hygiene    Obstructive sleep apnea    CSF leak    Quadriparesis    Numbness and tingling    Dysautonomia    Sensory ataxia    Heat intolerance    History of Guillain-[place] syndrome    Severe obesity (BMI 35.0-39.9) with comorbidity    Complication of statin therapy    Statin intolerance    Statin-induced myositis    Aortic atherosclerosis    Alcoholism    Black stools    Acute cystitis without hematuria    Benign prostatic hyperplasia with urinary obstruction    Combined arterial insufficiency and corporo-venous occlusive erectile dysfunction    Premature ejaculation    Bilateral hydronephrosis    Sebaceous cyst of scrotum    Tobacco abuse    Elevated LFTs    Metabolic dysfunction-associated steatotic liver disease (MASLD)    Alcohol use disorder    Elevated liver enzymes    Fatty liver    Encounter for diagnostic colonoscopy due to  change in bowel habits     Past Medical History:   Diagnosis Date    Arm vein blood clot, bilateral     Atrial flutter     Ozuna's esophagus     CRPS (complex regional pain syndrome type II)     Decubitus skin ulcer     Encounter for blood transfusion     Guillain-[place]     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    Hyperlipidemia     Hypertension     Joint pain     knees    LVH (left ventricular hypertrophy) due to hypertensive disease 2/10/2017    Mitral regurgitation 6/9/2016    KIA (obstructive sleep apnea)     Primary osteoarthritis of left knee 1/7/2019    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    COLONOSCOPY N/A 05/17/2018    Procedure: COLONOSCOPY;  Surgeon: Ilan Araya III, MD;  Location: [facility] ENDO;  Service: Endoscopy;  Laterality: N/A;    COLONOSCOPY N/A 06/28/2023    Procedure: COLONOSCOPY;  Surgeon: Colby Rodriguez MD;  Location: [facility];  Service: Endoscopy;  Laterality: N/A;    COSMETIC SURGERY      Flap june 2008    decubitus surgery      to sacral    ESOPHAGOGASTRODUODENOSCOPY      ESOPHAGOGASTRODUODENOSCOPY N/A 06/17/2021    Procedure: EGD (ESOPHAGOGASTRODUODENOSCOPY);  Surgeon: Ban Zheng MD;  Location: [facility];  Service: Endoscopy;  Laterality: N/A;    ESOPHAGOGASTRODUODENOSCOPY N/A 06/28/2023    Procedure: EGD (ESOPHAGOGASTRODUODENOSCOPY);  Surgeon: Colby Rodriguez MD;  Location: [facility];  Service: Endoscopy;  Laterality: N/A;    GASTROSTOMY TUBE PLACEMENT      KNEE ARTHROSCOPY Left 2002    LEFT HEART CATHETERIZATION Left 12/10/2020    Procedure: CATHETERIZATION, HEART, LEFT;  Surgeon:  Tomi Mir MD;  Location: [facility] CATH LAB;  Service: Cardiology;  Laterality: Left;  730 start time    PEG TUBE REMOVAL      RADIOFREQUENCY ABLATION      RFA      ROBOT-ASSISTED CHOLECYSTECTOMY USING DA GABRIELA XI N/A 05/02/2019    Procedure: XI ROBOTIC CHOLECYSTECTOMY;  Surgeon: Valerio Lai MD;  Location: [facility] OR;  Service: General;  Laterality: N/A;    JUAN-EN-Y PROCEDURE      TONSILLECTOMY      TRACHEAL SURGERY         Pre-op Assessment    I have reviewed the Patient Summary Reports.     I have reviewed the Nursing Notes. I have reviewed the NPO Status.   I have reviewed the Medications.     Review of Systems  Anesthesia Hx:               Denies Personal Hx of Anesthesia complications.                    Social:  Smoker, No Alcohol Use Smokeless tobacco      Cardiovascular:     Hypertension   CAD    Dysrhythmias       hyperlipidemia                               Pulmonary:        Sleep Apnea                Hepatic/GI:   PUD,   Liver Disease,               Musculoskeletal:  Arthritis               Neurological:    Neuromuscular Disease,       Guillain-[place]                                Endocrine:        Obesity / BMI > 30  Psych:  Psychiatric History                Results for orders placed or performed during the hospital encounter of 02/29/24   EKG 12-lead    Collection Time: 02/29/24  6:22 AM   Result Value Ref Range    QRS Duration 100 ms    [facility] QTC Calculation 454 ms    Narrative    Test Reason : R53.1,    Vent. Rate : 062 BPM     Atrial Rate : 062 BPM     P-R Int : 116 ms          QRS Dur : 100 ms      QT Int : 448 ms       P-R-T Axes : -14 -32 054 degrees     QTc Int : 454 ms    Normal sinus rhythm  Left axis deviation  Abnormal ECG  When compared with ECG of 21-JUL-2023 12:12,  No significant change was found  Confirmed by ANTONINO MARTE MD ([id number]) on 2/29/2024 10:47:32 PM    Referred By: [email or web address]   SELF           Confirmed By:ANTONINO MARTE MD     Echo 2019: CONCLUSIONS     1 - Normal left ventricular  systolic function (EF 55%).     2 - Normal left ventricular diastolic function.     3 - Normal right ventricular systolic function .     Results for orders placed during the hospital encounter of 12/10/20    Cardiac catheterization    Conclusion  · Diffuse nonobstructive CAD overall with exception of apical LAD stenosis -- optimal medical treatment advised.  · The ejection fraction was calculated to be 65%.    Procedure Log documented by Documenter: Angie Lamar RN and verified by Tomi Mir MD.    Date: 12/10/2020  Time: 8:39 AM      Physical Exam  General: Well nourished, Cooperative, Alert and Oriented    Airway:  Mallampati: II   Mouth Opening: Normal  TM Distance: Normal  Tongue: Normal  Neck ROM: Normal ROM    Chest/Lungs:  Normal Respiratory Rate    Heart:  Rate: Normal  Rhythm: Regular Rhythm        Anesthesia Plan  Type of Anesthesia, risks & benefits discussed:    Anesthesia Type: MAC, Gen Natural Airway, Gen ETT  Intra-op Monitoring Plan: Standard ASA Monitors  Induction:  IV  Informed Consent: Informed consent signed with the Patient and all parties understand the risks and agree with anesthesia plan.  All questions answered.   ASA Score: 3  Day of Surgery Review of History & Physical: H&P Update referred to the surgeon/provider.    Ready For Surgery From Anesthesia Perspective.     .

## 2024-12-05 NOTE — INTERVAL H&P NOTE
The patient has been examined and the H&P has been reviewed:    I concur with the findings and changes have been noted since the H&P was written: given an extended prep for hx of constipation but currently experiencing loose stools. Stool studies notable for increased fecal fat. All other stool studies negative.      Anesthesia/Surgery risks, benefits and alternative options discussed and understood by patient/family.      The risks, benefits and alternatives of the procedure were discussed with the patient in detail. This discussion was had in the presence of his wife. The risks include, risks of adverse reaction to sedation requiring the use of reversal agents, bleeding requiring blood transfusion, perforation requiring surgical intervention and technical failure. Other risks include aspiration leading to respiratory distress and respiratory failure resulting in endotracheal intubation and mechanical ventilation including death. If anesthesia is being utilized for this procedure, it is up to the anesthesiologist to determine airway safety including elective endotracheal intubation. Questions were answered, they agree to proceed. There was no language barriers.      Active Hospital Problems    Diagnosis  POA    *Encounter for diagnostic colonoscopy due to change in bowel habits [R19.4]  Yes      Resolved Hospital Problems   No resolved problems to display.

## 2024-12-05 NOTE — PLAN OF CARE
DR GRUBBS AT BEDSIDE TO SPEAK TO PT. REGARDING RESULTS.  VSS, NO GI BLEEDING, NO ABD. PAIN, NO N/V. PT. DISCHARGED FROM UNIT.

## 2024-12-06 VITALS
TEMPERATURE: 99 F | DIASTOLIC BLOOD PRESSURE: 72 MMHG | BODY MASS INDEX: 32.12 KG/M2 | OXYGEN SATURATION: 97 % | RESPIRATION RATE: 15 BRPM | HEIGHT: 77 IN | SYSTOLIC BLOOD PRESSURE: 148 MMHG | WEIGHT: 272 LBS | HEART RATE: 55 BPM

## 2024-12-06 LAB
FINAL PATHOLOGIC DIAGNOSIS: NORMAL
GROSS: NORMAL
Lab: NORMAL

## 2024-12-10 ENCOUNTER — PATIENT MESSAGE (OUTPATIENT)
Dept: GASTROENTEROLOGY | Facility: CLINIC | Age: 54
End: 2024-12-10
Payer: MEDICARE

## 2024-12-10 NOTE — PROGRESS NOTES
The biopsies of your colon show no explanation why you are experiencing loose stools. Like we discussed, a fecal fat quantitative is recommended since your fecal fat stain stool test was abnormal. If this study is normal then likely overflow diarrhea is a potential cause of your loose stools since all other stool studies are normal. A few unusual causes can be ruled out for your loose stools but given your bowel prep was suboptimal, it is likely a overflow diarrhea from a significant amount of stool burden. Please follow up with MsKimberley Carissa Cheng in the GI clinic about atypical causes of diarrhea.     Also, the polyps removed were precancerous also known as adenomas. They were completely removed. Guidelines recommend a repeat colonoscopy in 3 years due to your suboptimal bowel prep and pathology results. We will send you a reminder as the time nears.      Thanks for trusting us with your healthcare needs and using [facility].     Sincerely,    Bonnie Wright M.D.

## 2025-01-08 ENCOUNTER — PATIENT MESSAGE (OUTPATIENT)
Dept: FAMILY MEDICINE | Facility: CLINIC | Age: 55
End: 2025-01-08
Payer: MEDICARE

## 2025-02-19 NOTE — TELEPHONE ENCOUNTER
[facility]  Lex Carter PharmD     [facility] 42 [facility] who was referred to the pharmacist for weight management, referred by Alexa Morgan DO . Patient presents via video for initial clinical pharmacist consult.     Virtual Care Documentation  Encounter provider Ana Rosa Otero    The Patient is located at Home and in the following state in which I hold an active license: [place].    The patient was identified by name and date of birth. An Yumi was informed that this is a telemedicine visit and that the visit is being conducted through the Epic Embedded platform. She agrees to proceed.  My office door was closed.  The patient was notified the following individuals were present in the room Leora Garcia PharmD student. She acknowledged consent and understanding of privacy and security of the video platform. The patient has agreed to participate and understands they can discontinue the visit at any time.       Assessment & Plan  Overweight with body mass index (BMI) of 27 to 27.9 in adult  Patient has failed conservative treatment with weight loss through lifestyle changes after at least 6 months of consistent interventions, reasonable to consider medication options.     Contraindications to weight loss: none  Potential of weight loss to improve health: moderate    Interventions:   1. Provided Wegovy education to patient. Discussed mechanism of action, potential side effects, expected outcomes, and proper administration.  Answered patient's questions.   - Demonstration device utilized  2. Start Wegovy (semaglutide): 0.25 mg SQ weekly x 4 weeks. Titrate dose every 4 weeks to target dose of 2.4 mg SQ weekly.    Prior Authorization Clinical Questions for Weight Management Pharmacotherapy    1. Does the patient have a contrainidcation to medication prescribed for weight management?: No  2. Does the patient have a diagnosis of obesity, confirmed by a BMI  See pt portal messages   greater than or equal to 30 kg/m^2?: No  3. Does the patient have a BMI of greater than or equal to 27 kg/m^2 with at least one weight-related comorbidity/risk factor/complication (e.g. diabetes, dyslipidemia, coronary artery disease)?: Yes  4. Weight-related co-morbidities/risk factors: prediabetes, dyslipidemia  5. WEGOVY CVA Indication: Does patient have established documented cardiovascular disease (history of a prior heart attack (myocardial infarction), stroke, or symptomatic peripheral arterial disease (PAD)?: N/A  6. ZEPBOUND TRACIE Indication: Does patient have documented TRACIE diagnosed via sleep study (insurance will require copy of sleep study results for approval)?: N/A  7. Has the patient been on a weight loss regimen of low-calorie diet, increased physical activity, and lifestyle modifications for a minimum of 6 months?: Yes  8. Has the patient completed a comprehensive weight loss program (ie, Weight Watchers, Noom, Bariatrics, other gordy on phone)? If so, what?: No  9. Does the patient have a history of type 2 diabetes?: No  10. Has the member tried and failed other weight loss medication within the past 12 months?: No  11. Will the member use requested medication in combination with another GLP agonist or weight loss drug?: No  12. Is the medication a controlled substance?: No     Baseline weight (in pounds): 159 lbs       Abnormal weight gain    Medication management  Medication Reconciliation:   Medication list updated to reflect medications pt is currently taking  Rx Insurance: Commercial  Discussed likelihood of required prior authorization and potential for prescription insurance exclusion of coverage  Education:   Provided strategies to mitigate GI symptoms, Long-term studies on GLP-1s are not available.  Reinforced the importance of diet and physical activity in combination with pharmacotherapy.  Encourage patient to track weight and physical activity at home  Weight regain often occurs when  "medications are discontinued.    Follow-up:   Follow-up with pharmacist in 4-6 weeks  Next PCP visit: 5/20/2025    - Home Monitoring Records: food and nutrition intake and weight    Patient-specific goals:  Weight loss of at least 5% of baseline body weight is recommended at 3 months for patients on Wegovy.  If goal is not reached, medication should be discontinued  - Goal weight: 150 lbs, by: 5/28/2025      Subjective:     An Eason is a 42 y.o. female here for discussion about obesity and treatment options.     Has a friend who has taken Ozempic. Has personal experiencing administering SQ meds (Aimovig)    Obesity History  Most recent Weight:159 lbs (2/20/2025)    Reports partial hysterectomy with left ovary spared (10/15/24) and has since noted weight gain     Eating behaviors: patient has received education at [facility] due to severe protein-calorie malnutrition    Weight-related complications: prediabetes and dyslipidemia    Obesity associated medications: none.     Previous weight loss medication: none    Previous surgical interventions: none    Barriers to weight loss: physical activity limited by ongoing health issues (chronic neck pain, migraines)    Motivation for weight loss: health concerns    Medication discussion  Oral and injectable options  She had a seizure in 2022 - avoid bupropion.     Patient denies personal or family history of MTC or MEN 2. No history of pancreatitis.     Patient is not currently pregnant, breastfeeding, or planning to become pregnant in the near future. Discussed risk for fetal harm among women of childbearing age.         Objective     Vitals:    Estimated body mass index is 27.33 kg/m² as calculated from the following:    Height as of 2/18/25: 5' 4\" (1.626 m).    Weight as of 2/18/25: 72.2 kg (159 lb 3.2 oz).    BP Readings from Last 3 Encounters:   02/18/25 118/84   02/10/25 110/64   01/30/25 100/80    Pulse Readings from Last 3 Encounters:   02/18/25 95   02/10/25 (!) 125 "   01/30/25 (!) 134        Labs:    Lab Results   Component Value Date    CREATININE 1.01 (H) 02/03/2025    EGFR 70 02/03/2025     Lab Results   Component Value Date    GLUC 106 (H) 02/03/2025    GLUF 101 (H) 10/18/2023    HGBA1C 5.5 04/26/2022     Lab Results   Component Value Date    CHOLESTEROL 279 (H) 12/30/2024    TRIG 346 (H) 12/30/2024    HDL 48 12/30/2024    LDLCALC 165 (H) 12/30/2024     Lab Results   Component Value Date    JTB7LVKYJRUR 6.223 (H) 10/16/2023    TSH 2.340 12/30/2024     THYROID   [facility]  Component 08/07/2024       Thyroid Stimulating Hormone 3.650     Lab Results   Component Value Date    ALT 12 02/03/2025    AST 15 02/03/2025    ALKPHOS 34 (L) 06/05/2024      Administrative Statements      Pharmacist Tracking Tool  Reason For Outreach: Embedded Pharmacist  Demographics:  Intervention Method: Video  Type of Intervention: New  Topics Addressed: Obesity  Pharmacologic Interventions: Medication Initiation  Non-Pharmacologic Interventions: Care coordination and Medication/Device education  Time:  Direct Patient Care:  20  mins  Care Coordination:  20  mins  Recommendation Recipient: Patient/Caregiver  Outcome: Accepted    Documentation under collaborative practice agreement. Orders pended for PCP signature if needed.    Lex Carter PharmD  Clinical Integration Pharmacist  [facility] Physician [facility]

## 2025-03-23 ENCOUNTER — PATIENT MESSAGE (OUTPATIENT)
Dept: DERMATOLOGY | Facility: CLINIC | Age: 55
End: 2025-03-23
Payer: MEDICARE

## 2025-04-25 ENCOUNTER — PATIENT MESSAGE (OUTPATIENT)
Dept: GASTROENTEROLOGY | Facility: CLINIC | Age: 55
End: 2025-04-25
Payer: MEDICARE

## 2025-05-07 ENCOUNTER — PATIENT MESSAGE (OUTPATIENT)
Dept: CARDIOLOGY | Facility: CLINIC | Age: 55
End: 2025-05-07
Payer: MEDICARE

## 2025-05-07 DIAGNOSIS — R93.1 ELEVATED CORONARY ARTERY CALCIUM SCORE: ICD-10-CM

## 2025-05-07 DIAGNOSIS — R74.8 ABNORMAL CK: ICD-10-CM

## 2025-05-07 DIAGNOSIS — I25.10 CAD IN NATIVE ARTERY: ICD-10-CM

## 2025-05-07 DIAGNOSIS — E78.2 MIXED HYPERLIPIDEMIA: ICD-10-CM

## 2025-05-07 DIAGNOSIS — Z82.49 FAMILY HISTORY OF CARDIOVASCULAR DISEASE: ICD-10-CM

## 2025-05-07 RX ORDER — ALIROCUMAB 150 MG/ML
150 INJECTION, SOLUTION SUBCUTANEOUS
Qty: 2 ML | Refills: 12 | Status: SHIPPED | OUTPATIENT
Start: 2025-05-07 | End: 2025-05-07 | Stop reason: SDUPTHER

## 2025-05-07 RX ORDER — ALIROCUMAB 150 MG/ML
150 INJECTION, SOLUTION SUBCUTANEOUS
Qty: 2 ML | Refills: 12 | Status: SHIPPED | OUTPATIENT
Start: 2025-05-07

## 2025-05-07 RX ORDER — METOPROLOL SUCCINATE 50 MG/1
25 TABLET, EXTENDED RELEASE ORAL EVERY OTHER DAY
Qty: 30 TABLET | Refills: 6 | Status: SHIPPED | OUTPATIENT
Start: 2025-05-07

## 2025-05-15 ENCOUNTER — TELEPHONE (OUTPATIENT)
Dept: CARDIOLOGY | Facility: CLINIC | Age: 55
End: 2025-05-15
Payer: MEDICARE

## 2025-05-15 NOTE — TELEPHONE ENCOUNTER
Initiated appeal for Praluent over the phone with [facility]. Patient is intolerant to statins and has a documented allergy to statins.    Ref# [id number]    Fax [phone]    Expedited appeal =72 hour decision.

## 2025-05-27 ENCOUNTER — LAB VISIT (OUTPATIENT)
Dept: LAB | Facility: HOSPITAL | Age: 55
End: 2025-05-27
Attending: FAMILY MEDICINE
Payer: MEDICARE

## 2025-05-27 ENCOUNTER — OFFICE VISIT (OUTPATIENT)
Dept: FAMILY MEDICINE | Facility: CLINIC | Age: 55
End: 2025-05-27
Attending: FAMILY MEDICINE
Payer: MEDICARE

## 2025-05-27 VITALS
BODY MASS INDEX: 32.38 KG/M2 | HEART RATE: 63 BPM | DIASTOLIC BLOOD PRESSURE: 84 MMHG | TEMPERATURE: 98 F | HEIGHT: 77 IN | SYSTOLIC BLOOD PRESSURE: 140 MMHG | OXYGEN SATURATION: 97 % | WEIGHT: 274.25 LBS

## 2025-05-27 DIAGNOSIS — J32.9 CHRONIC SINUSITIS, UNSPECIFIED LOCATION: ICD-10-CM

## 2025-05-27 DIAGNOSIS — J44.9 CHRONIC OBSTRUCTIVE PULMONARY DISEASE, UNSPECIFIED COPD TYPE: ICD-10-CM

## 2025-05-27 DIAGNOSIS — N39.0 URINARY TRACT INFECTION WITHOUT HEMATURIA, SITE UNSPECIFIED: ICD-10-CM

## 2025-05-27 DIAGNOSIS — I25.10 CAD, MULTIPLE VESSEL: ICD-10-CM

## 2025-05-27 DIAGNOSIS — E66.811 OBESITY (BMI 30.0-34.9): ICD-10-CM

## 2025-05-27 DIAGNOSIS — F10.20 ALCOHOLISM: ICD-10-CM

## 2025-05-27 DIAGNOSIS — G82.50 QUADRIPARESIS: ICD-10-CM

## 2025-05-27 DIAGNOSIS — Z00.00 ANNUAL PHYSICAL EXAM: Primary | ICD-10-CM

## 2025-05-27 DIAGNOSIS — R82.90 BAD ODOR OF URINE: ICD-10-CM

## 2025-05-27 DIAGNOSIS — J30.2 SEASONAL ALLERGIC RHINITIS, UNSPECIFIED TRIGGER: ICD-10-CM

## 2025-05-27 DIAGNOSIS — Z83.3 FAMILY HISTORY OF DIABETES MELLITUS: ICD-10-CM

## 2025-05-27 DIAGNOSIS — I10 HYPERTENSION, UNSPECIFIED TYPE: ICD-10-CM

## 2025-05-27 DIAGNOSIS — I48.3 TYPICAL ATRIAL FLUTTER: ICD-10-CM

## 2025-05-27 LAB
ABSOLUTE EOSINOPHIL (OHS): 0.13 K/UL
ABSOLUTE MONOCYTE (OHS): 0.66 K/UL (ref 0.3–1)
ABSOLUTE NEUTROPHIL COUNT (OHS): 2.73 K/UL (ref 1.8–7.7)
ALBUMIN SERPL BCP-MCNC: 3.7 G/DL (ref 3.5–5.2)
ALP SERPL-CCNC: 81 UNIT/L (ref 40–150)
ALT SERPL W/O P-5'-P-CCNC: 59 UNIT/L (ref 10–44)
ANION GAP (OHS): 14 MMOL/L (ref 8–16)
AST SERPL-CCNC: 60 UNIT/L (ref 11–45)
BASOPHILS # BLD AUTO: 0.04 K/UL
BASOPHILS NFR BLD AUTO: 0.7 %
BILIRUB SERPL-MCNC: 0.6 MG/DL (ref 0.1–1)
BILIRUB SERPL-MCNC: NEGATIVE MG/DL
BLOOD URINE, POC: ABNORMAL
BUN SERPL-MCNC: 7 MG/DL (ref 6–20)
CALCIUM SERPL-MCNC: 8.8 MG/DL (ref 8.7–10.5)
CHLORIDE SERPL-SCNC: 105 MMOL/L (ref 95–110)
CHOLEST SERPL-MCNC: 252 MG/DL (ref 120–199)
CHOLEST/HDLC SERPL: 4.1 {RATIO} (ref 2–5)
CLARITY, POC UA: ABNORMAL
CO2 SERPL-SCNC: 22 MMOL/L (ref 23–29)
COLOR, POC UA: ABNORMAL
CREAT SERPL-MCNC: 0.6 MG/DL (ref 0.5–1.4)
EAG (OHS): 91 MG/DL (ref 68–131)
ERYTHROCYTE [DISTWIDTH] IN BLOOD BY AUTOMATED COUNT: 15.2 % (ref 11.5–14.5)
GFR SERPLBLD CREATININE-BSD FMLA CKD-EPI: >60 ML/MIN/1.73/M2
GLUCOSE SERPL-MCNC: 85 MG/DL (ref 70–110)
GLUCOSE UR QL STRIP: NEGATIVE
HBA1C MFR BLD: 4.8 % (ref 4–5.6)
HCT VFR BLD AUTO: 45.2 % (ref 40–54)
HDLC SERPL-MCNC: 61 MG/DL (ref 40–75)
HDLC SERPL: 24.2 % (ref 20–50)
HGB BLD-MCNC: 15.2 GM/DL (ref 14–18)
IMM GRANULOCYTES # BLD AUTO: 0.02 K/UL (ref 0–0.04)
IMM GRANULOCYTES NFR BLD AUTO: 0.4 % (ref 0–0.5)
KETONES UR QL STRIP: NEGATIVE
LDLC SERPL CALC-MCNC: 143.6 MG/DL (ref 63–159)
LEUKOCYTE ESTERASE URINE, POC: ABNORMAL
LYMPHOCYTES # BLD AUTO: 2.06 K/UL (ref 1–4.8)
MCH RBC QN AUTO: 30.7 PG (ref 27–31)
MCHC RBC AUTO-ENTMCNC: 33.6 G/DL (ref 32–36)
MCV RBC AUTO: 91 FL (ref 82–98)
NITRITE, POC UA: ABNORMAL
NONHDLC SERPL-MCNC: 191 MG/DL
NUCLEATED RBC (/100WBC) (OHS): 0 /100 WBC
PH, POC UA: 6.5
PLATELET # BLD AUTO: 142 K/UL (ref 150–450)
PMV BLD AUTO: 11.1 FL (ref 9.2–12.9)
POTASSIUM SERPL-SCNC: 3.5 MMOL/L (ref 3.5–5.1)
PROT SERPL-MCNC: 7.2 GM/DL (ref 6–8.4)
PROTEIN, POC: ABNORMAL
RBC # BLD AUTO: 4.95 M/UL (ref 4.6–6.2)
RELATIVE EOSINOPHIL (OHS): 2.3 %
RELATIVE LYMPHOCYTE (OHS): 36.5 % (ref 18–48)
RELATIVE MONOCYTE (OHS): 11.7 % (ref 4–15)
RELATIVE NEUTROPHIL (OHS): 48.4 % (ref 38–73)
SODIUM SERPL-SCNC: 141 MMOL/L (ref 136–145)
SPECIFIC GRAVITY, POC UA: 1.02
TRIGL SERPL-MCNC: 237 MG/DL (ref 30–150)
TSH SERPL-ACNC: 0.93 UIU/ML (ref 0.4–4)
UROBILINOGEN, POC UA: 4
WBC # BLD AUTO: 5.64 K/UL (ref 3.9–12.7)

## 2025-05-27 PROCEDURE — 84443 ASSAY THYROID STIM HORMONE: CPT

## 2025-05-27 PROCEDURE — 1159F MED LIST DOCD IN RCRD: CPT | Mod: CPTII,S$GLB,, | Performed by: FAMILY MEDICINE

## 2025-05-27 PROCEDURE — 80061 LIPID PANEL: CPT

## 2025-05-27 PROCEDURE — 3079F DIAST BP 80-89 MM HG: CPT | Mod: CPTII,S$GLB,, | Performed by: FAMILY MEDICINE

## 2025-05-27 PROCEDURE — 99396 PREV VISIT EST AGE 40-64: CPT | Mod: 25,S$GLB,, | Performed by: FAMILY MEDICINE

## 2025-05-27 PROCEDURE — 1160F RVW MEDS BY RX/DR IN RCRD: CPT | Mod: CPTII,S$GLB,, | Performed by: FAMILY MEDICINE

## 2025-05-27 PROCEDURE — 99999 PR PBB SHADOW E&M-EST. PATIENT-LVL IV: CPT | Mod: PBBFAC,,, | Performed by: FAMILY MEDICINE

## 2025-05-27 PROCEDURE — 36415 COLL VENOUS BLD VENIPUNCTURE: CPT | Mod: PO

## 2025-05-27 PROCEDURE — 4010F ACE/ARB THERAPY RXD/TAKEN: CPT | Mod: CPTII,S$GLB,, | Performed by: FAMILY MEDICINE

## 2025-05-27 PROCEDURE — 85025 COMPLETE CBC W/AUTO DIFF WBC: CPT

## 2025-05-27 PROCEDURE — 81002 URINALYSIS NONAUTO W/O SCOPE: CPT | Mod: S$GLB,,, | Performed by: FAMILY MEDICINE

## 2025-05-27 PROCEDURE — 3077F SYST BP >= 140 MM HG: CPT | Mod: CPTII,S$GLB,, | Performed by: FAMILY MEDICINE

## 2025-05-27 PROCEDURE — 80053 COMPREHEN METABOLIC PANEL: CPT

## 2025-05-27 PROCEDURE — 3008F BODY MASS INDEX DOCD: CPT | Mod: CPTII,S$GLB,, | Performed by: FAMILY MEDICINE

## 2025-05-27 PROCEDURE — 83036 HEMOGLOBIN GLYCOSYLATED A1C: CPT | Mod: GA

## 2025-05-27 PROCEDURE — 3044F HG A1C LEVEL LT 7.0%: CPT | Mod: CPTII,S$GLB,, | Performed by: FAMILY MEDICINE

## 2025-05-27 RX ORDER — FLUTICASONE PROPIONATE 50 MCG
1 SPRAY, SUSPENSION (ML) NASAL DAILY
Qty: 16 G | Refills: 5 | Status: SHIPPED | OUTPATIENT
Start: 2025-05-27

## 2025-05-27 RX ORDER — CETIRIZINE HYDROCHLORIDE 10 MG/1
10 TABLET ORAL DAILY
Qty: 90 TABLET | Refills: 1 | Status: SHIPPED | OUTPATIENT
Start: 2025-05-27

## 2025-05-27 RX ORDER — CIPROFLOXACIN 500 MG/1
500 TABLET, FILM COATED ORAL EVERY 12 HOURS
Qty: 14 TABLET | Refills: 0 | Status: SHIPPED | OUTPATIENT
Start: 2025-05-27 | End: 2025-06-03

## 2025-05-27 NOTE — PROGRESS NOTES
Valerio Yan    Chief Complaint   Patient presents with    Annual Exam       History of Present Illness:   HPI    HISTORY OF PRESENT ILLNESS: Valerio Yan is a 54 y.o. male who comes in today for annual wellness examination.  He states he has not taken medication today but drank black coffee.  He is accompanied by his girlfriend today.    He states he is concerned about takes long time to heal and gets sick with eating sugary things.    He states he does not perform home blood pressure checks.    He states he thinks he has UTI as his urine smells.    He requests refills.     He saw Dr. 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 He saw Dr. Mir, cardiologist, on August 24, 2023 for sinus bradycardia, CAD in native artery, atypical chest pain, aortic atherosclerosis, CAD, multiple vessel, palpitations, mixed hyperlipidemia, nonrheumatic mitral valve regurgitation, hypertensive left ventricular hypertrophy, without heart failure, statin intolerance, typical atrial flutter, elevated coronary artery calcium score (2893), primary hypertension, alcoholism, tobacco abuse.      END OF LIFE DECISION: He does not have a living will. He does desire life support but depends on the situation.    SCREENINGS:  Cholesterol: July 1, 2024.  FFS/Colonoscopy:  December 5, 2024 - repeat in 3 years.  PSA: July 1, 2024 - 1.8.  Dexa Scan: Never.   Eye Exam: 2024. Scheduled for soon.  Dental Exam:  2023 per patient.  PPD: Positive in the past.  HCVAb: February 29, 2024.  HIVAb: February 29, 2024.      Immunization History   Administered Date(s) Administered    COVID-19 MRNA, LN-S PF (MODERNA HALF 0.25 ML DOSE) 10/27/2021    COVID-19, MRNA, LN-S, PF (MODERNA FULL 0.5 ML DOSE) 03/02/2021, 03/31/2021    Influenza 10/15/2018    Influenza - Quadrivalent - PF *Preferred* (6 months and older) 10/15/2018    Tdap 06/03/2022    Zostavax: N./A.  Shingrix: Never. He declines.  Pneumovax: Never. He declines.  Prevnar: Never. He declines.    Current Outpatient Medications   Medication Sig    amLODIPine (NORVASC) 5 MG tablet Take 1 tablet (5 mg total) by mouth every evening.    aspirin (ECOTRIN) 81 MG EC tablet Take 81 mg by mouth once daily.    calcium-vitamin D 600 mg, 1,500mg,-200 unit 600 mg(1,500mg) -200 unit Tab Take 1 tablet by mouth once daily.     celecoxib (CELEBREX) 100 MG capsule Take 1 capsule (100 mg total) by mouth 2 (two) times daily.    cetirizine (ZYRTEC) 10 MG tablet Take 1 tablet (10 mg total) by mouth once daily.    fluticasone propionate (FLONASE) 50 mcg/actuation nasal spray 1 spray (50 mcg total) by Each Nostril route once daily.    lisinopriL-hydrochlorothiazide (PRINZIDE,ZESTORETIC) 20-12.5 mg per tablet Take 2 tablets by mouth once daily.    metoprolol succinate (TOPROL-XL) 50 MG 24 hr tablet Take 0.5 tablets (25 mg total) by mouth every other day.    multivitamin (THERAGRAN) per tablet Take 1 tablet by mouth nightly.     ondansetron (ZOFRAN) 4 MG tablet Take 1 tablet (4 mg total) by mouth every 6 (six) hours.    tadalafiL (CIALIS) 20 MG Tab TAKE 1 TABLET BY MOUTH EVERY 24 HOURS AS NEEDED FOR ERECTILE DYSFUNCTION    alirocumab (PRALUENT PEN) 150 mg/mL Donnell Inject 1 mL (150 mg total) into the skin every 14 (fourteen) days. (Patient not taking: Reported on 5/27/2025)     esomeprazole (NEXIUM) 20 MG capsule Take 2 capsules (40 mg total) by mouth before breakfast.    furosemide (LASIX) 20 MG tablet Take 20 mg by mouth 2 (two) times daily. (Patient not taking: Reported on 5/27/2025)    tamsulosin (FLOMAX) 0.4 mg Cap Take 1 capsule (0.4 mg total) by mouth once daily. (Patient not taking: Reported on 5/27/2025)       Review of Systems   Constitutional:  Negative for activity change, appetite change, chills, fatigue and fever.   HENT:  Negative for congestion, postnasal drip, rhinorrhea, sinus pressure, sinus pain, sneezing and sore throat.    Eyes:  Negative for photophobia, pain, discharge, redness and itching.   Respiratory:  Negative for cough, shortness of breath and wheezing.    Cardiovascular:  Negative for chest pain, palpitations and leg swelling.   Gastrointestinal:  Negative for abdominal pain, constipation, diarrhea, nausea and vomiting.   Endocrine: Negative for cold intolerance, heat intolerance, polydipsia, polyphagia and polyuria.        See history of present illness.   Genitourinary:  Negative for difficulty urinating, dysuria, frequency, hematuria and urgency.        See history of present illness.   Musculoskeletal:  Negative for arthralgias, back pain and myalgias.   Skin:  Negative for rash.   Neurological:  Negative for numbness and headaches.   Hematological:  Negative for adenopathy. Does not bruise/bleed easily.   Psychiatric/Behavioral:  Negative for dysphoric mood and suicidal ideas. The patient is not nervous/anxious.         Negative for homicidal ideas.       Objective:  Physical Exam  Vitals reviewed.   Constitutional:       General: He is not in acute distress.     Appearance: Normal appearance. He is obese. He is not ill-appearing, toxic-appearing or diaphoretic.      Comments: Pleasant.   HENT:      Head: Normocephalic and atraumatic.      Right Ear: Tympanic membrane, ear canal and external ear normal. There is no impacted cerumen.      Left Ear:  Tympanic membrane, ear canal and external ear normal. There is no impacted cerumen.      Ears:      Comments: With wax bilaterally.     Nose: Nose normal. No congestion or rhinorrhea.      Mouth/Throat:      Mouth: Mucous membranes are moist.      Pharynx: Oropharynx is clear. No oropharyngeal exudate or posterior oropharyngeal erythema.   Eyes:      General:         Right eye: No discharge.         Left eye: No discharge.      Extraocular Movements: Extraocular movements intact.      Conjunctiva/sclera: Conjunctivae normal.      Pupils: Pupils are equal, round, and reactive to light.   Neck:      Vascular: No carotid bruit.   Cardiovascular:      Rate and Rhythm: Normal rate and regular rhythm.      Pulses: Normal pulses.      Heart sounds: No murmur heard.  Pulmonary:      Effort: Pulmonary effort is normal. No respiratory distress.      Breath sounds: Normal breath sounds. No wheezing.   Chest:   Breasts:     Right: No mass, nipple discharge, skin change or tenderness.      Left: No mass, nipple discharge, skin change or tenderness.   Abdominal:      General: Bowel sounds are normal. There is no distension.      Palpations: Abdomen is soft. There is no mass.      Tenderness: There is no abdominal tenderness. There is no right CVA tenderness, left CVA tenderness, guarding or rebound.      Hernia: No hernia is present.   Genitourinary:     Prostate: Not enlarged, not tender and no nodules present.      Rectum: Guaiac result negative. No mass, tenderness, anal fissure or external hemorrhoid. Normal anal tone.      Comments: Not examined.  Musculoskeletal:         General: Deformity present. No swelling or tenderness. Normal range of motion.      Cervical back: Normal range of motion and neck supple. No tenderness.      Comments: He is ambulatory without problems but wears AFO for both lower legs (for drop feet).  Deformity at right > left hands.   Lymphadenopathy:      Cervical: No cervical adenopathy.      Upper  Body:      Right upper body: No axillary adenopathy.      Left upper body: No axillary adenopathy.   Skin:     General: Skin is warm.      Findings: No rash.   Neurological:      General: No focal deficit present.      Mental Status: He is alert and oriented to person, place, and time.      Cranial Nerves: No cranial nerve deficit.      Gait: Gait normal.      Deep Tendon Reflexes: Reflexes normal.   Psychiatric:         Mood and Affect: Mood normal.         Behavior: Behavior normal.         Thought Content: Thought content normal.         Judgment: Judgment normal.       Urine dipstick ordered today -   Glucose, UA negative   Bilirubin, POC negative   Ketones, UA negative   Spec Grav UA 1.020   Blood, UA trace   pH, UA 6.5   Protein, POC 30+   Urobilinogen, UA 4   Nitrite, UA trace   WBC, UA large +++   Color, UA Brown   Clarity, UA Cloudy       ASSESSMENT:  1. Annual physical exam    2. Bad odor of urine    3. Hypertension, unspecified type    4. CAD, multiple vessel    5. Family history of diabetes mellitus    6. Chronic sinusitis, unspecified location    7. Seasonal allergic rhinitis, unspecified trigger    8. Urinary tract infection without hematuria, site unspecified    9. Chronic obstructive pulmonary disease, unspecified COPD type    10. Alcoholism    11. Quadriparesis    12. Typical atrial flutter    13. Obesity (BMI 30.0-34.9)        PLAN:  Valerio was seen today for annual exam.    Diagnoses and all orders for this visit:    Annual physical exam    Bad odor of urine  -     POCT URINE DIPSTICK WITHOUT MICROSCOPE    Hypertension, unspecified type  -     Lipid Panel; Future  -     Comprehensive Metabolic Panel; Future  -     TSH; Future  -     CBC Auto Differential; Future  -     Hemoglobin A1C; Future    CAD, multiple vessel  -     Lipid Panel; Future  -     Hemoglobin A1C; Future    Family history of diabetes mellitus  -     Hemoglobin A1C; Future    Chronic sinusitis, unspecified location    Seasonal  allergic rhinitis, unspecified trigger  -     fluticasone propionate (FLONASE) 50 mcg/actuation nasal spray; 1 spray (50 mcg total) by Each Nostril route once daily.  -     cetirizine (ZYRTEC) 10 MG tablet; Take 1 tablet (10 mg total) by mouth once daily.    Urinary tract infection without hematuria, site unspecified  -     ciprofloxacin HCl (CIPRO) 500 MG tablet; Take 1 tablet (500 mg total) by mouth every 12 (twelve) hours. for 7 days    Chronic obstructive pulmonary disease, unspecified COPD type - stable and followed by cardiology    Alcoholism  - stable and followed by hepatology    Quadriparesis  - stable and followed by neurology    Typical atrial flutter  - stable and followed by cardiology    Obesity (BMI 30.0-34.9)      Patient advised to call for results.  Continue current medications, follow low sodium, low cholesterol, low carb diet, daily walks.  Cipro 500 mg twice daily for 7 days; medication precautions discussed with patient.  Prescription refills as noted above.  Keep follow up with specialists.  Flu shot this fall.  Follow up in about 1 year (around 5/27/2026) for physical. But, see me sooner if no improvement or worsening symptoms noted.     35 minutes of total time spent on the encounter, which includes face to face time and non-face to face time preparing to see the patient (eg, review of tests), Obtaining and/or reviewing separately obtained history, Documenting clinical information in the electronic or other health record, Independently interpreting results (not separately reported) and communicating results to the patient/family/caregiver, or Care coordination (not separately reported).      This note is  generated with speech recognition software and is subject to transcription error and sound alike phrases that may be missed by proofreading.

## 2025-05-27 NOTE — TELEPHONE ENCOUNTER
Received message from on-call nurse  - patient stated out of Percocet (just Rx'd by PCP on 12/23/2020) and advised by PCP to call on call doctor if still in pain.  I instructed on-call nurse to notify #10 pills Percocet sent to local pharmacy and to contact PCP on Monday if additional pain medication needed.   
No

## 2025-06-06 ENCOUNTER — TELEPHONE (OUTPATIENT)
Dept: FAMILY MEDICINE | Facility: CLINIC | Age: 55
End: 2025-06-06
Payer: MEDICARE

## 2025-06-06 ENCOUNTER — PATIENT MESSAGE (OUTPATIENT)
Dept: FAMILY MEDICINE | Facility: CLINIC | Age: 55
End: 2025-06-06
Payer: MEDICARE

## 2025-06-16 ENCOUNTER — RESULTS FOLLOW-UP (OUTPATIENT)
Dept: FAMILY MEDICINE | Facility: CLINIC | Age: 55
End: 2025-06-16

## 2025-06-16 PROBLEM — E66.811 OBESITY (BMI 30.0-34.9): Status: ACTIVE | Noted: 2025-06-16

## 2025-06-16 PROBLEM — E66.01 SEVERE OBESITY (BMI 35.0-39.9) WITH COMORBIDITY: Status: RESOLVED | Noted: 2021-12-29 | Resolved: 2025-06-16

## 2025-06-16 PROBLEM — J44.9 CHRONIC OBSTRUCTIVE PULMONARY DISEASE, UNSPECIFIED COPD TYPE: Status: ACTIVE | Noted: 2025-06-16

## 2025-06-23 DIAGNOSIS — Z00.00 ENCOUNTER FOR MEDICARE ANNUAL WELLNESS EXAM: ICD-10-CM

## 2025-07-18 ENCOUNTER — OFFICE VISIT (OUTPATIENT)
Dept: DERMATOLOGY | Facility: CLINIC | Age: 55
End: 2025-07-18
Payer: MEDICARE

## 2025-07-18 DIAGNOSIS — L82.0 BENIGN LICHENOID KERATOSIS: ICD-10-CM

## 2025-07-18 DIAGNOSIS — L82.1 SEBORRHEIC KERATOSIS: Primary | ICD-10-CM

## 2025-07-18 PROCEDURE — 99999 PR PBB SHADOW E&M-EST. PATIENT-LVL III: CPT | Mod: PBBFAC,,, | Performed by: STUDENT IN AN ORGANIZED HEALTH CARE EDUCATION/TRAINING PROGRAM

## 2025-07-18 NOTE — PROGRESS NOTES
Patient Information  Name: Valerio Yan  : 1970  MRN: [id number]     Referring Physician:  Dr. Bauer ref. provider found   Primary Care Physician:  Fabiola Brooke MD   Date of Visit: 2025      Subjective:       Valerio Yan is a 54 y.o. male who presents for   Chief Complaint   Patient presents with    Follow-up     Follow-up    Patient is here with concern of: skin lesions  Location: bilateral LE  Duration: 3+ months  Symptoms: tender  Prior treatments: none    Patient was last seen:Visit date not found     Prior notes by myself reviewed.   Clinical documentation obtained by nursing staff reviewed.    Review of Systems   Skin:  Negative for itching and rash.        Objective:    Physical Exam   Constitutional: He appears well-developed and well-nourished. No distress.   Neurological: He is alert and oriented to person, place, and time. He is not disoriented.   Psychiatric: He has a normal mood and affect.   Skin:   Areas Examined (abnormalities noted in diagram):   RLE Inspected  LLE Inspection Performed              Diagram Legend     Erythematous scaling macule/papule c/w actinic keratosis       Vascular papule c/w angioma      Pigmented verrucoid papule/plaque c/w seborrheic keratosis      Yellow umbilicated papule c/w sebaceous hyperplasia      Irregularly shaped tan macule c/w lentigo     1-2 mm smooth white papules consistent with Milia      Movable subcutaneous cyst with punctum c/w epidermal inclusion cyst      Subcutaneous movable cyst c/w pilar cyst      Firm pink to brown papule c/w dermatofibroma      Pedunculated fleshy papule(s) c/w skin tag(s)      Evenly pigmented macule c/w junctional nevus     Mildly variegated pigmented, slightly irregular-bordered macule c/w mildly atypical nevus      Flesh colored to evenly pigmented papule c/w intradermal nevus       Pink pearly papule/plaque c/w basal cell carcinoma      Erythematous hyperkeratotic cursted plaque c/w SCC       Surgical scar with no sign of skin cancer recurrence      Open and closed comedones      Inflammatory papules and pustules      Verrucoid papule consistent consistent with wart     Erythematous eczematous patches and plaques     Dystrophic onycholytic nail with subungual debris c/w onychomycosis     Umbilicated papule    Erythematous-base heme-crusted tan verrucoid plaque consistent with inflamed seborrheic keratosis     Erythematous Silvery Scaling Plaque c/w Psoriasis     See annotation      No images are attached to the encounter or orders placed in the encounter.    [] Data reviewed  [] Independent review of test  [] Management discussed with another provider    Assessment / Plan:        Seborrheic keratosis  These are benign inherited growths without a malignant potential. Reassurance given to patient. No treatment is necessary.     Benign lichenoid keratosis  Reassurance given to patient. No treatment is necessary.              LOS NUMBER AND COMPLEXITY OF PROBLEMS    COMPLEXITY OF DATA RISK TOTAL TIME (m)   45301  39417 [] 1 self-limited or minor problem [x] Minimal to none [x] No treatment recommended or patient to monitor 15-29  10-19   14736  56999 Low  [] 2 or > self limited or minor problems  [] 1 stable chronic illness  [] 1 acute, uncomplicated illness or injury Limited (2)  [] Prior external notes from each unique source  [] Review result of each unique test  [] Order each unique test []  Low  OTC medications, minor skin biopsy 30-44 20-29   40758  87793 Moderate  []  1 or > chronic illness with progression, exacerbation or SE of treatment  [x]  2 or more stable chronic illnesses  []  1 acute illness with systemic symptoms  []  1 acute complicated injury  []  1 undiagnosed new problem with uncertain prognosis Moderate (1/3 below)  []  3 or more data items        *Now includes assessment requiring independent historian  []  Independent interpretation of a test  []  Discuss management/test with  another provider Moderate  []  Prescription drug mgmt  []  Minor surgery with risk discussed  []  Mgmt limited by social determinates 45-59  30-39   07971  31238 High  []  1 or more chronic illness with severe exacerbation, progression or SE of treatment  []  1 acute or chronic illness/injury that poses a threat to life or bodily function Extensive (2/3 below)  []  3 or more data items        *Now includes assessment requiring independent historian.  []  Independent interpretation of a test  []  Discuss management/test with another provider High  []  Major surgery with risk discussed  []  Drug therapy requiring intensive monitoring for toxicity  []  Hospitalization  []  Decision for DNR 60-74  40-54      No follow-ups on file.    Kamila Valentino MD, FAAD  [facility]

## 2025-07-22 ENCOUNTER — PATIENT OUTREACH (OUTPATIENT)
Dept: ADMINISTRATIVE | Facility: HOSPITAL | Age: 55
End: 2025-07-22
Payer: MEDICARE

## 2025-07-24 ENCOUNTER — PATIENT MESSAGE (OUTPATIENT)
Dept: UROLOGY | Facility: CLINIC | Age: 55
End: 2025-07-24
Payer: MEDICARE

## 2025-07-24 ENCOUNTER — PATIENT MESSAGE (OUTPATIENT)
Dept: FAMILY MEDICINE | Facility: CLINIC | Age: 55
End: 2025-07-24
Payer: MEDICARE

## 2025-07-25 ENCOUNTER — TELEPHONE (OUTPATIENT)
Dept: FAMILY MEDICINE | Facility: CLINIC | Age: 55
End: 2025-07-25
Payer: MEDICARE

## 2025-07-25 RX ORDER — PAROXETINE 20 MG/1
10 TABLET, FILM COATED ORAL EVERY MORNING
Qty: 30 TABLET | Refills: 11 | Status: SHIPPED | OUTPATIENT
Start: 2025-07-25 | End: 2026-07-25

## 2025-07-25 RX ORDER — AMLODIPINE BESYLATE 5 MG/1
10 TABLET ORAL NIGHTLY
Qty: 60 TABLET | Refills: 12 | Status: SHIPPED | OUTPATIENT
Start: 2025-07-25 | End: 2027-09-13

## 2025-07-25 RX ORDER — AMLODIPINE BESYLATE 5 MG/1
5 TABLET ORAL NIGHTLY
Qty: 90 TABLET | Refills: 3 | Status: SHIPPED | OUTPATIENT
Start: 2025-07-25

## 2025-07-25 NOTE — TELEPHONE ENCOUNTER
Portal msg     Could I get a prescription for Varenicline, formally Chantix.  I used it in the past, it works, but after a year, I started smoking again.  I'm ready to quit, and want to quit.     Thank you

## 2025-07-27 NOTE — PROGRESS NOTES
Subjective:    Patient ID:  Valerio Yan is a 54 y.o. male who presents for evaluation of Coronary Artery Disease        HPI Pt presents for eval.   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  Past hx pertinent for following;  H/o Guillain-Pillager syndrome age 36.  + family h/o CAD (father MI in 50's, cabg; mother w MI).  Sister has had PCI age 55.  Pt taken off xarelto after his PAFL ablation.  Taken off statin for elevated muscle enzymes 2019.  - stress MPI Feb 2019.  Echo Feb 2019 normal LVEF, LVH, LAE.  - Holter Feb 2019.  Coronary calcium score ordered Nov 2020 and score was high 2893.  Also has chronic elevation of CK enzymes and statin had to be stopped.  S/p LHC 12/20 for elevated Calcium score.  LHC showed 50% prox/mid RCA, nonobstructive LCX disease, 50% OM1, calcified prox LAD w luminal irregularities, 30% mid LAD, 75% distal small diffusely diseased LAD with med mgt advised.  Normal LVEF 65%.  OMT advised.   Admitted 6/23 - 7/23 with UTI, pyelonephritis, alcohol abuse as noted in D/c summary below.  Saw Dr. Ya, EP, Aug 2023 for bradycardia.  Per his consult note:  stopped Imdur but advised him to stay on Metoprolol for his CAD.  No need for PPM per his consult note.  Now here.  No acute issues.  States his quit drinking.  Still smokes.  Ecg today 7/28/25 personally reviewed: NSR, possible LAE, LAFB.  No active angina.  No CHF sxs.  No syncope.  HR seems ok.  Ecg x 4 June - July 2023 NSR overall, 1 had sinus dyana 58 bpm.  Lipids elevated -- off Praluent for some time now and states had hard time getting it.        Current Outpatient Medications:     alirocumab (PRALUENT PEN) 150 mg/mL Donnell, Inject 1 mL (150 mg total) into the skin every 14 (fourteen) days., Disp: 2 mL, Rfl: 12    amLODIPine (NORVASC) 5 MG tablet, TAKE 1 TABLET BY MOUTH ONCE DAILY IN THE EVENING, Disp: 90 tablet, Rfl: 3    amLODIPine (NORVASC) 5 MG tablet, Take  2 tablets (10 mg total) by mouth every evening., Disp: 60 tablet, Rfl: 12    aspirin (ECOTRIN) 81 MG EC tablet, Take 81 mg by mouth once daily., Disp: , Rfl:     calcium-vitamin D 600 mg, 1,500mg,-200 unit 600 mg(1,500mg) -200 unit Tab, Take 1 tablet by mouth once daily. , Disp: , Rfl:     celecoxib (CELEBREX) 100 MG capsule, Take 1 capsule (100 mg total) by mouth 2 (two) times daily., Disp: 60 capsule, Rfl: 2    cetirizine (ZYRTEC) 10 MG tablet, Take 1 tablet (10 mg total) by mouth once daily., Disp: 90 tablet, Rfl: 1    esomeprazole (NEXIUM) 20 MG capsule, Take 2 capsules (40 mg total) by mouth before breakfast., Disp: 60 capsule, Rfl: 11    fluticasone propionate (FLONASE) 50 mcg/actuation nasal spray, 1 spray (50 mcg total) by Each Nostril route once daily., Disp: 16 g, Rfl: 5    furosemide (LASIX) 20 MG tablet, Take 20 mg by mouth 2 (two) times daily., Disp: , Rfl:     lisinopriL-hydrochlorothiazide (PRINZIDE,ZESTORETIC) 20-12.5 mg per tablet, Take 2 tablets by mouth once daily., Disp: 180 tablet, Rfl: 3    metoprolol succinate (TOPROL-XL) 50 MG 24 hr tablet, Take 0.5 tablets (25 mg total) by mouth every other day., Disp: 30 tablet, Rfl: 6    multivitamin (THERAGRAN) per tablet, Take 1 tablet by mouth nightly. , Disp: , Rfl:     ondansetron (ZOFRAN) 4 MG tablet, Take 1 tablet (4 mg total) by mouth every 6 (six) hours., Disp: 20 tablet, Rfl: 0    paroxetine (PAXIL) 20 MG tablet, Take 0.5 tablets (10 mg total) by mouth every morning., Disp: 30 tablet, Rfl: 11    tadalafiL (CIALIS) 20 MG Tab, TAKE 1 TABLET BY MOUTH EVERY 24 HOURS AS NEEDED FOR ERECTILE DYSFUNCTION, Disp: 30 tablet, Rfl: 11    tamsulosin (FLOMAX) 0.4 mg Cap, Take 1 capsule (0.4 mg total) by mouth once daily. (Patient not taking: Reported on 5/27/2025), Disp: 90 capsule, Rfl: 3      Review of Systems   Constitutional: Positive for weight loss.   HENT: Negative.     Eyes: Negative.    Cardiovascular:  Positive for dyspnea on exertion.   Respiratory:   Positive for shortness of breath.    Endocrine: Negative.    Hematologic/Lymphatic: Negative.    Skin: Negative.    Musculoskeletal: Negative.    Gastrointestinal: Negative.    Genitourinary: Negative.    Neurological: Negative.    Psychiatric/Behavioral: Negative.     Allergic/Immunologic: Negative.        /88   Pulse 82   Wt 119.4 kg (263 lb 3.7 oz)   SpO2 97%   BMI 31.21 kg/m²       Wt Readings from Last 3 Encounters:   07/28/25 119.4 kg (263 lb 3.7 oz)   05/27/25 124.4 kg (274 lb 4 oz)   12/05/24 123.4 kg (272 lb)     Temp Readings from Last 3 Encounters:   05/27/25 98.4 °F (36.9 °C) (Tympanic)   12/05/24 98.7 °F (37.1 °C) (Skin)   07/09/24 98.2 °F (36.8 °C) (Tympanic)     BP Readings from Last 3 Encounters:   07/28/25 122/88   05/27/25 (!) 140/84   12/05/24 (!) 148/72     Pulse Readings from Last 3 Encounters:   07/28/25 82   05/27/25 63   12/05/24 (!) 55          Objective:    Physical Exam  Vitals and nursing note reviewed.   Constitutional:       Appearance: He is well-developed.   HENT:      Head: Normocephalic.   Neck:      Thyroid: No thyromegaly.      Vascular: Normal carotid pulses. No carotid bruit, hepatojugular reflux or JVD.   Cardiovascular:      Rate and Rhythm: Normal rate and regular rhythm.      Pulses:           Radial pulses are 2+ on the right side and 2+ on the left side.      Heart sounds: S1 normal and S2 normal. Heart sounds not distant. No midsystolic click and no opening snap. No murmur heard.     No friction rub. No S3 or S4 sounds.   Pulmonary:      Effort: Pulmonary effort is normal.      Breath sounds: Normal breath sounds. No wheezing or rales.   Abdominal:      General: Bowel sounds are normal. There is no distension or abdominal bruit.      Palpations: Abdomen is soft. There is no mass.      Tenderness: There is no abdominal tenderness.   Musculoskeletal:      Cervical back: Normal range of motion and neck supple.   Skin:     General: Skin is warm.   Neurological:       Mental Status: He is alert and oriented to person, place, and time.   Psychiatric:         Behavior: Behavior normal.         I have reviewed all pertinent labs and cardiac studies.        Chemistry        Component Value Date/Time     05/27/2025 1528     09/30/2024 1333    K 3.5 05/27/2025 1528    K 3.7 09/30/2024 1333     05/27/2025 1528     09/30/2024 1333    CO2 22 (L) 05/27/2025 1528    CO2 22 (L) 09/30/2024 1333    BUN 7 05/27/2025 1528    CREATININE 0.6 05/27/2025 1528    GLU 85 05/27/2025 1528     09/30/2024 1333        Component Value Date/Time    CALCIUM 8.8 05/27/2025 1528    CALCIUM 8.4 (L) 09/30/2024 1333    ALKPHOS 81 05/27/2025 1528    ALKPHOS 83 09/30/2024 1333    AST 60 (H) 05/27/2025 1528    AST 38 09/30/2024 1333    ALT 59 (H) 05/27/2025 1528    ALT 33 09/30/2024 1333    BILITOT 0.6 05/27/2025 1528    BILITOT 0.8 09/30/2024 1333    ESTGFRAFRICA >60.0 05/04/2022 0736    EGFRNONAA >60.0 05/04/2022 0736        Lab Results   Component Value Date    WBC 5.64 05/27/2025    HGB 15.2 05/27/2025    HCT 45.2 05/27/2025    MCV 91 05/27/2025     (L) 05/27/2025       Lab Results   Component Value Date    HGBA1C 4.8 05/27/2025     Lab Results   Component Value Date    CHOL 252 (H) 05/27/2025    CHOL 200 (H) 07/01/2024    CHOL 154 08/30/2023     Lab Results   Component Value Date    HDL 61 05/27/2025    HDL 44 07/01/2024    HDL 54 08/30/2023     Lab Results   Component Value Date    LDLCALC 143.6 05/27/2025    LDLCALC 122.2 07/01/2024    LDLCALC 76.6 08/30/2023     Lab Results   Component Value Date    TRIG 237 (H) 05/27/2025    TRIG 169 (H) 07/01/2024    TRIG 117 08/30/2023     Lab Results   Component Value Date    CHOLHDL 24.2 05/27/2025    CHOLHDL 22.0 07/01/2024    CHOLHDL 35.1 08/30/2023         Left Main   The vessel is angiographically normal.   Left Anterior Descending   LAD has moderate calcification. There are luminal irregularities proximal segment. There is 30%  mid LAD stenosis. In apical segment, LAD is smaller and has 75% stenosis.   First Diagonal Branch   The vessel exhibits minimal luminal irregularities.   Left Circumflex   LCX has diffuse nonobstuctive disease.   First Obtuse Marginal Branch   OM1 has 50% mid stenosis.   Right Coronary Artery   RCA has diffuse disease. There is 50% eccentric calcified stenosis in proximal to mid segment on a bend. Rest of RCA has nonobstructive disease.   Intervention    No interventions have been documented.  Left Heart Findings    Left Ventricle    The ejection fraction is calculated to be 65%.   LVEDP (Pre):18 mmHGLVEDP (Post):18 mmHG  No significant gradient noted on pullback from LV.   Summary       Diffuse nonobstructive CAD overall with exception of apical LAD stenosis -- optimal medical treatment advised.  The ejection fraction was calculated to be 65%.           Assessment:       1. Elevated coronary artery calcium score (2893)    2. Atypical chest pain    3. Aortic atherosclerosis    4. CAD, multiple vessel    5. Obstructive sleep apnea    6. Palpitations    7. Mixed hyperlipidemia    8. Nonrheumatic mitral valve regurgitation    9. Hypertensive left ventricular hypertrophy, without heart failure    10. Typical atrial flutter    11. Tobacco abuse    12. Statin-induced myositis    13. Statin intolerance    14. Sinus bradycardia    15. Primary hypertension         Plan:             Stable CV status.  CAD:  Very high coronary calcium score in past.  S/p C 2020 w OMT advised.  Medical tx advised for CAD and CV risk factor modification.  HTN: Goal < 130/80.  Continue current HTN meds.  Home BP monitoring advised.  Abnl ecg: Monitor.  Lipids: low cholesterol diet/Praluent.  Tobacco abuse: Chantix starter pack requested by pt.  Etoh: continue to abstain from all Etoh.  PAFL: S/p ablation. Monitor.  KIA: F/u w Pulmonary as needed.  Sinus bradycardia:  Monitor.  Cardiac diet.  Exercise.    Recheck lipids 3 months.    F/u 6  months.      I have reviewed all pertinent labs and cardiac studies independently. Plans and recommendations have been formulated under my direct supervision. All questions answered and patient voiced understanding.

## 2025-07-28 ENCOUNTER — HOSPITAL ENCOUNTER (OUTPATIENT)
Dept: CARDIOLOGY | Facility: HOSPITAL | Age: 55
Discharge: HOME | End: 2025-07-28
Attending: INTERNAL MEDICINE
Payer: MEDICARE

## 2025-07-28 ENCOUNTER — OFFICE VISIT (OUTPATIENT)
Dept: CARDIOLOGY | Facility: CLINIC | Age: 55
End: 2025-07-28
Payer: MEDICARE

## 2025-07-28 ENCOUNTER — PATIENT MESSAGE (OUTPATIENT)
Dept: CARDIOLOGY | Facility: CLINIC | Age: 55
End: 2025-07-28

## 2025-07-28 VITALS
SYSTOLIC BLOOD PRESSURE: 122 MMHG | DIASTOLIC BLOOD PRESSURE: 88 MMHG | BODY MASS INDEX: 31.21 KG/M2 | HEART RATE: 82 BPM | OXYGEN SATURATION: 97 % | WEIGHT: 263.25 LBS

## 2025-07-28 DIAGNOSIS — I11.9 HYPERTENSIVE LEFT VENTRICULAR HYPERTROPHY, WITHOUT HEART FAILURE: ICD-10-CM

## 2025-07-28 DIAGNOSIS — Z78.9 STATIN INTOLERANCE: ICD-10-CM

## 2025-07-28 DIAGNOSIS — R93.1 ELEVATED CORONARY ARTERY CALCIUM SCORE: ICD-10-CM

## 2025-07-28 DIAGNOSIS — E78.2 MIXED HYPERLIPIDEMIA: Primary | ICD-10-CM

## 2025-07-28 DIAGNOSIS — I25.10 CAD IN NATIVE ARTERY: ICD-10-CM

## 2025-07-28 DIAGNOSIS — R93.1 ELEVATED CORONARY ARTERY CALCIUM SCORE: Primary | ICD-10-CM

## 2025-07-28 DIAGNOSIS — I25.10 CAD, MULTIPLE VESSEL: ICD-10-CM

## 2025-07-28 DIAGNOSIS — I10 PRIMARY HYPERTENSION: ICD-10-CM

## 2025-07-28 DIAGNOSIS — R74.8 ABNORMAL CK: ICD-10-CM

## 2025-07-28 DIAGNOSIS — I44.4 LAFB (LEFT ANTERIOR FASCICULAR BLOCK): ICD-10-CM

## 2025-07-28 DIAGNOSIS — R00.1 SINUS BRADYCARDIA: ICD-10-CM

## 2025-07-28 DIAGNOSIS — R00.2 PALPITATIONS: ICD-10-CM

## 2025-07-28 DIAGNOSIS — I70.0 AORTIC ATHEROSCLEROSIS: ICD-10-CM

## 2025-07-28 DIAGNOSIS — M60.9 STATIN-INDUCED MYOSITIS: ICD-10-CM

## 2025-07-28 DIAGNOSIS — T46.6X5A STATIN-INDUCED MYOSITIS: ICD-10-CM

## 2025-07-28 DIAGNOSIS — Z72.0 TOBACCO ABUSE: ICD-10-CM

## 2025-07-28 DIAGNOSIS — E78.2 MIXED HYPERLIPIDEMIA: ICD-10-CM

## 2025-07-28 DIAGNOSIS — I34.0 NONRHEUMATIC MITRAL VALVE REGURGITATION: ICD-10-CM

## 2025-07-28 DIAGNOSIS — G47.33 OBSTRUCTIVE SLEEP APNEA: ICD-10-CM

## 2025-07-28 DIAGNOSIS — R07.89 ATYPICAL CHEST PAIN: ICD-10-CM

## 2025-07-28 DIAGNOSIS — I48.3 TYPICAL ATRIAL FLUTTER: ICD-10-CM

## 2025-07-28 DIAGNOSIS — Z82.49 FAMILY HISTORY OF CARDIOVASCULAR DISEASE: ICD-10-CM

## 2025-07-28 LAB
OHS QRS DURATION: 94 MS
OHS QTC CALCULATION: 461 MS

## 2025-07-28 PROCEDURE — 99214 OFFICE O/P EST MOD 30 MIN: CPT | Mod: S$GLB,,, | Performed by: INTERNAL MEDICINE

## 2025-07-28 PROCEDURE — 99999 PR PBB SHADOW E&M-EST. PATIENT-LVL III: CPT | Mod: PBBFAC,,, | Performed by: INTERNAL MEDICINE

## 2025-07-28 PROCEDURE — 1160F RVW MEDS BY RX/DR IN RCRD: CPT | Mod: CPTII,S$GLB,, | Performed by: INTERNAL MEDICINE

## 2025-07-28 PROCEDURE — 1159F MED LIST DOCD IN RCRD: CPT | Mod: CPTII,S$GLB,, | Performed by: INTERNAL MEDICINE

## 2025-07-28 PROCEDURE — 3044F HG A1C LEVEL LT 7.0%: CPT | Mod: CPTII,S$GLB,, | Performed by: INTERNAL MEDICINE

## 2025-07-28 PROCEDURE — 4010F ACE/ARB THERAPY RXD/TAKEN: CPT | Mod: CPTII,S$GLB,, | Performed by: INTERNAL MEDICINE

## 2025-07-28 PROCEDURE — 3079F DIAST BP 80-89 MM HG: CPT | Mod: CPTII,S$GLB,, | Performed by: INTERNAL MEDICINE

## 2025-07-28 PROCEDURE — 3008F BODY MASS INDEX DOCD: CPT | Mod: CPTII,S$GLB,, | Performed by: INTERNAL MEDICINE

## 2025-07-28 PROCEDURE — 93010 ELECTROCARDIOGRAM REPORT: CPT | Mod: ,,, | Performed by: INTERNAL MEDICINE

## 2025-07-28 PROCEDURE — 93005 ELECTROCARDIOGRAM TRACING: CPT

## 2025-07-28 PROCEDURE — 3074F SYST BP LT 130 MM HG: CPT | Mod: CPTII,S$GLB,, | Performed by: INTERNAL MEDICINE

## 2025-07-28 RX ORDER — VARENICLINE TARTRATE 0.5 (11)-1
KIT ORAL
Qty: 1 EACH | Refills: 0 | Status: SHIPPED | OUTPATIENT
Start: 2025-07-28

## 2025-07-28 RX ORDER — ALIROCUMAB 150 MG/ML
150 INJECTION, SOLUTION SUBCUTANEOUS
Qty: 2 ML | Refills: 12 | Status: SHIPPED | OUTPATIENT
Start: 2025-07-28 | End: 2025-07-28 | Stop reason: SDUPTHER

## 2025-07-28 RX ORDER — ALIROCUMAB 150 MG/ML
150 INJECTION, SOLUTION SUBCUTANEOUS
Qty: 2 ML | Refills: 12 | Status: ACTIVE | OUTPATIENT
Start: 2025-07-28

## 2025-07-29 ENCOUNTER — TELEPHONE (OUTPATIENT)
Dept: CARDIOLOGY | Facility: CLINIC | Age: 55
End: 2025-07-29
Payer: MEDICARE

## 2025-08-05 ENCOUNTER — PATIENT OUTREACH (OUTPATIENT)
Dept: ADMINISTRATIVE | Facility: HOSPITAL | Age: 55
End: 2025-08-05
Payer: MEDICARE

## 2025-08-06 ENCOUNTER — PATIENT MESSAGE (OUTPATIENT)
Dept: FAMILY MEDICINE | Facility: CLINIC | Age: 55
End: 2025-08-06
Payer: MEDICARE

## (undated) DEVICE — KIT INTRO VSSL DIL 6FX11CM

## (undated) DEVICE — KIT SYR REUSABLE

## (undated) DEVICE — CATH JL3.5 5FR

## (undated) DEVICE — POSITIONER HEAD DONUT 9IN FOAM

## (undated) DEVICE — SEE MEDLINE ITEM 157131

## (undated) DEVICE — SUT 7/0 18IN PROLENE BL MO

## (undated) DEVICE — TUBING HEATED INSUFFLATOR

## (undated) DEVICE — CATH JR4 5FR

## (undated) DEVICE — SHEATH INTRODUCER 6FR 11CM

## (undated) DEVICE — ADHESIVE DERMABOND ADVANCED

## (undated) DEVICE — SEE MEDLINE ITEM 157027

## (undated) DEVICE — BAG TISSUE RETRIEVAL 5MM

## (undated) DEVICE — PACK CATH LAB CUSTOM BR

## (undated) DEVICE — EVACUATOR KIT SMOKE PLUME AWAY

## (undated) DEVICE — COVER TIP CURVED SCISSORS XI

## (undated) DEVICE — SOL STRL WATER INJ 1000ML BG

## (undated) DEVICE — KIT ANTIFOG

## (undated) DEVICE — SEE MEDLINE ITEM 152622

## (undated) DEVICE — SUT MONOCRYL 4.0 PS2 CP496G

## (undated) DEVICE — OBTURATOR BLADELESS 8MM XI CLR

## (undated) DEVICE — DEVICE CLOSURE DISP 14G

## (undated) DEVICE — KIT MANIFOLD LOW PRESS TUBING

## (undated) DEVICE — GLOVE PROTEXIS HYDROGEL SZ7

## (undated) DEVICE — OMNIPAQUE 300MG 150ML VIAL

## (undated) DEVICE — APPLICATOR CHLORAPREP ORN 26ML

## (undated) DEVICE — SEE MEDLINE ITEM 157187

## (undated) DEVICE — GLOVE 7.0 PROTEXIS PI BLUE

## (undated) DEVICE — ELECTRODE REM PLYHSV RETURN 9

## (undated) DEVICE — CATH JL4 5FR

## (undated) DEVICE — MANIFOLD 4 PORT

## (undated) DEVICE — ANGIOTOUCH KIT

## (undated) DEVICE — SYR 10CC LUER LOCK

## (undated) DEVICE — DRAPE ABDOMINAL TIBURON 14X11

## (undated) DEVICE — COVER OVERHEAD SURG LT BLUE

## (undated) DEVICE — SUT CTD VICRYL 0 UND BR SUT

## (undated) DEVICE — IRRIGATOR ENDOWRIST XI SUCTION

## (undated) DEVICE — CATH PIGTAIL 5FX110CM

## (undated) DEVICE — DRAPE COLUMN DAVINCI XI

## (undated) DEVICE — SHEATH INTRODUCER 5FR 10CM

## (undated) DEVICE — SYR 3CC LUER LOC

## (undated) DEVICE — DEVICE CLOSURE EXOSEAL 5FR VAS

## (undated) DEVICE — SEAL UNIVERSAL 5MM-8MM XI

## (undated) DEVICE — CLIP HEMO-LOK MLX LARGE LF

## (undated) DEVICE — DRAPE ARM DAVINCI XI

## (undated) DEVICE — SUPPORT ULNA NERVE PROTECTOR

## (undated) DEVICE — SEE MEDLINE ITEM 157117

## (undated) DEVICE — SOL NS 1000CC

## (undated) DEVICE — WIRE GUIDE TEFLON 3CM .035 145